# Patient Record
Sex: FEMALE | Race: WHITE | NOT HISPANIC OR LATINO | Employment: OTHER | URBAN - METROPOLITAN AREA
[De-identification: names, ages, dates, MRNs, and addresses within clinical notes are randomized per-mention and may not be internally consistent; named-entity substitution may affect disease eponyms.]

---

## 2017-03-13 ENCOUNTER — HOSPITAL ENCOUNTER (OUTPATIENT)
Dept: CARDIOLOGY | Facility: HOSPITAL | Age: 77
Discharge: HOME | End: 2017-03-13
Attending: INTERNAL MEDICINE | Admitting: INTERNAL MEDICINE

## 2017-03-13 ENCOUNTER — HOSPITAL ENCOUNTER (OUTPATIENT)
Dept: LAB | Facility: HOSPITAL | Age: 77
Discharge: HOME | End: 2017-03-13
Attending: INTERNAL MEDICINE | Admitting: INTERNAL MEDICINE

## 2017-03-13 LAB
ALBUMIN SERPL-MCNC: 4.3 G/DL (ref 3.5–4.8)
ALP SERPL-CCNC: 71 IU/L (ref 32–91)
ALT SERPL-CCNC: 16 IU/L (ref 14–54)
ANION GAP SERPL CALC-SCNC: 14.7 MMOL/L (ref 10–20)
AST SERPL-CCNC: 19 IU/L (ref 15–41)
BILIRUB DIRECT SERPL-MCNC: 0.1 MG/DL (ref 0.1–0.5)
BILIRUB SERPL-MCNC: 0.7 MG/DL (ref 0.3–1.2)
BUN SERPL-MCNC: 23 MG/DL (ref 8–20)
BUN/CREAT SERPL: 20.9 (ref 5.4–26.2)
CALCIUM SERPL-MCNC: 9.5 MG/DL (ref 8.9–10.3)
CHLORIDE SERPL-SCNC: 101 MMOL/L (ref 101–111)
CONV CO2: 26 MMOL/L (ref 22–32)
CONV TOTAL PROTEIN: 6.7 G/DL (ref 6.1–7.9)
CREAT UR-MCNC: 1.1 MG/DL (ref 0.4–1)
GLUCOSE SERPL-MCNC: 103 MG/DL (ref 65–99)
POTASSIUM SERPL-SCNC: 5.7 MMOL/L (ref 3.6–5.1)
SODIUM SERPL-SCNC: 136 MMOL/L (ref 136–144)
TSH SERPL-ACNC: 3.25 UIU/ML (ref 0.34–5.6)

## 2017-04-03 ENCOUNTER — HOSPITAL ENCOUNTER (OUTPATIENT)
Dept: OTHER | Facility: HOSPITAL | Age: 77
Discharge: HOME | End: 2017-04-03
Attending: FAMILY MEDICINE | Admitting: FAMILY MEDICINE

## 2017-04-03 LAB
ALBUMIN SERPL-MCNC: 3.9 G/DL (ref 3.5–4.8)
ALBUMIN/GLOB SERPL: 1.9 {RATIO} (ref 1–1.7)
ALP SERPL-CCNC: 65 IU/L (ref 32–91)
ALT SERPL-CCNC: 18 IU/L (ref 14–54)
ANION GAP SERPL CALC-SCNC: 15 MMOL/L (ref 10–20)
AST SERPL-CCNC: 21 IU/L (ref 15–41)
BILIRUB SERPL-MCNC: 0.6 MG/DL (ref 0.3–1.2)
BUN SERPL-MCNC: 22 MG/DL (ref 8–20)
BUN/CREAT SERPL: 24.4 (ref 5.4–26.2)
CALCIUM SERPL-MCNC: 9.3 MG/DL (ref 8.9–10.3)
CHLORIDE SERPL-SCNC: 101 MMOL/L (ref 101–111)
CHOLEST SERPL-MCNC: 194 MG/DL
CHOLEST/HDLC SERPL: 3.9 {RATIO}
CONV CO2: 26 MMOL/L (ref 22–32)
CONV LDL CHOLESTEROL DIRECT: 115 MG/DL (ref 0–100)
CONV TOTAL PROTEIN: 6 G/DL (ref 6.1–7.9)
CREAT UR-MCNC: 0.9 MG/DL (ref 0.4–1)
GLOBULIN UR ELPH-MCNC: 2.1 G/DL (ref 2.5–3.8)
GLUCOSE SERPL-MCNC: 99 MG/DL (ref 65–99)
HDLC SERPL-MCNC: 50 MG/DL
LDLC/HDLC SERPL: 2.3 {RATIO}
LIPID INTERPRETATION: ABNORMAL
POTASSIUM SERPL-SCNC: 5 MMOL/L (ref 3.6–5.1)
SODIUM SERPL-SCNC: 137 MMOL/L (ref 136–144)
TRIGL SERPL-MCNC: 131 MG/DL
VLDLC SERPL CALC-MCNC: 28.9 MG/DL

## 2017-04-09 LAB
ALPRAZ SERPL-MCNC: NORMAL NG/ML
AMOBARBITAL SERPL-MCNC: NORMAL UG/ML
AMPHETAMINES SERPL QL SCN: NEGATIVE
AMPHETAMINES SPEC QL: NORMAL
AMPHETAMINES UR QL: NORMAL
BARBITURATES SERPLBLD QL: NEGATIVE
BENZODIAZ SERPL SCN-MCNC: NEGATIVE NG/ML
BUTABARBITAL SERPL-MCNC: NORMAL UG/ML
BUTALBITAL SERPL-MCNC: NORMAL UG/ML
BZE SERPL-MCNC: NORMAL NG/ML
BZE UR QL: NEGATIVE
CANNABINOIDS SERPL-MCNC: NEGATIVE NG/ML
CARBOXYTHC SPEC-MCNC: NORMAL NG/ML
COCAETHYLENE SERPL-MCNC: NORMAL MG/ML
COCAINE SPEC-MCNC: NORMAL NG/ML
CODEINE UR QL: NORMAL
DESALKYLFLURAZ BLD CFM-MCNC: NORMAL NG/ML
EME SERPL-MCNC: NORMAL NG/ML
HYDROCODONE SERPL-MCNC: NORMAL NG/ML
HYDROMORPHONE SERPL-MCNC: NORMAL NG/ML
LORAZEPAM SPEC-MCNC: NORMAL NG/ML
MDA SERPLBLD-MCNC: NORMAL NG/ML
MDMA SERPLBLD-MCNC: NORMAL NG/ML
METHADONE BLD QL SCN: NEGATIVE
METHADONE SPEC QL: NORMAL
MORPHINE SERPLBLD-MCNC: NORMAL NG/ML
NORDIAZEPAM SERPL-MCNC: NORMAL NG/ML
NORPROPOXYPH SPEC-MCNC: NORMAL UG/ML
OPIATES UR QL SCN: NEGATIVE
OXAZEPAM SPEC-MCNC: NORMAL UG/ML
OXYCODONE SERPL-MCNC: NORMAL NG/ML
PCP SPEC QL: NORMAL
PCP SPEC-MCNC: NEGATIVE NG/ML
PENTOBARB SERPL-MCNC: NORMAL UG/ML
PHENOBARB SERPL-MCNC: NORMAL UG/ML
PROPOXYPHENE SCREEN, SER/BLD: NEGATIVE
SECOBARBITAL UR QL: NORMAL
THC CONFIRM: NORMAL

## 2017-06-19 ENCOUNTER — HOSPITAL ENCOUNTER (OUTPATIENT)
Dept: PAIN MEDICINE | Facility: HOSPITAL | Age: 77
Discharge: HOME | End: 2017-06-19
Attending: PHYSICAL MEDICINE & REHABILITATION | Admitting: PHYSICAL MEDICINE & REHABILITATION

## 2017-11-15 ENCOUNTER — HOSPITAL ENCOUNTER (OUTPATIENT)
Dept: CARDIOLOGY | Facility: HOSPITAL | Age: 77
Discharge: HOME | End: 2017-11-15
Attending: INTERNAL MEDICINE | Admitting: INTERNAL MEDICINE

## 2019-06-11 ENCOUNTER — CONVERSION ENCOUNTER (OUTPATIENT)
Dept: ORTHOPEDIC SURGERY | Facility: CLINIC | Age: 79
End: 2019-06-11

## 2019-06-12 VITALS
DIASTOLIC BLOOD PRESSURE: 72 MMHG | HEART RATE: 59 BPM | SYSTOLIC BLOOD PRESSURE: 135 MMHG | HEIGHT: 63 IN | WEIGHT: 179 LBS | BODY MASS INDEX: 31.71 KG/M2

## 2019-06-13 NOTE — PROGRESS NOTES
Visit Type:  Follow-up    CC:  Right knee injection.      Vital Signs:    Patient Profile:    79 Years Old Female  Height:     63 inches  Weight:     179 pounds  BMI:        31.70     Pulse rate: 59 / minute  BP Sittin / 72  (left arm)    Cuff size:  regular      Problems: Active problems were reviewed with the patient during this visit.  Medications: Medications were reviewed with the patient during this visit.  Allergies: Allergies were reviewed with the patient during this visit.  No Known Drug Allergy.        Vitals Entered By: [facility] (2019 1:55 PM)    Active Medications (reviewed today):  MAGNESIUM 500 MG ORAL CAPSULE (MAGNESIUM OXIDE) Take 1 tablet by mouth daily as needed  OXYCODONE-ACETAMINOPHEN  MG ORAL TABLET (OXYCODONE-ACETAMINOPHEN) take every 6 hours prn  LISINOPRIL 10 MG ORAL TABLET (LISINOPRIL) Take 1/2 tablet by mouth daily  FLUTICASONE PROPIONATE 50 MCG/ACT NASAL SUSPENSION (FLUTICASONE PROPIONATE) Inhale 1 spray once daily  DICLOFENAC SODIUM  MG ORAL TABLET EXTENDED RELEASE 24 HOUR (DICLOFENAC SODIUM) Take one (1) tablet by mouth twice a day  METAMUCIL 0.52 GM ORAL CAPSULE (PSYLLIUM) As directed  COMBIVENT RESPIMAT  MCG/ACT INHALATION AEROSOL SOLUTION (IPRATROPIUM-ALBUTEROL) Instll 1 spray every morning  BUMETANIDE 0.5 MG ORAL TABLET (BUMETANIDE) Take 1 tablet by mouth daily  NEXIUM 40 MG ORAL CAPSULE DELAYED RELEASE (ESOMEPRAZOLE MAGNESIUM) Take 1 tablet by mouth daily  ASPIRIN 325 MG ORAL TABLET (ASPIRIN) Take 1 tablet by mouth daily  SERTRALINE HCL 50 MG ORAL TABLET (SERTRALINE HCL) Take 1 tablet by mouth daily at bedtime    Current Allergies (reviewed today):  * NKDA (Critical)    Current Medications (including medications started today):   MAGNESIUM 500 MG ORAL CAPSULE (MAGNESIUM OXIDE) Take 1 tablet by mouth daily as needed  OXYCODONE-ACETAMINOPHEN  MG ORAL TABLET (OXYCODONE-ACETAMINOPHEN) take every 6 hours prn  LISINOPRIL 10 MG ORAL TABLET  (LISINOPRIL) Take 1/2 tablet by mouth daily  FLUTICASONE PROPIONATE 50 MCG/ACT NASAL SUSPENSION (FLUTICASONE PROPIONATE) Inhale 1 spray once daily  DICLOFENAC SODIUM  MG ORAL TABLET EXTENDED RELEASE 24 HOUR (DICLOFENAC SODIUM) Take one (1) tablet by mouth twice a day  METAMUCIL 0.52 GM ORAL CAPSULE (PSYLLIUM) As directed  COMBIVENT RESPIMAT  MCG/ACT INHALATION AEROSOL SOLUTION (IPRATROPIUM-ALBUTEROL) Instll 1 spray every morning  BUMETANIDE 0.5 MG ORAL TABLET (BUMETANIDE) Take 1 tablet by mouth daily  NEXIUM 40 MG ORAL CAPSULE DELAYED RELEASE (ESOMEPRAZOLE MAGNESIUM) Take 1 tablet by mouth daily  ASPIRIN 325 MG ORAL TABLET (ASPIRIN) Take 1 tablet by mouth daily  SERTRALINE HCL 50 MG ORAL TABLET (SERTRALINE HCL) Take 1 tablet by mouth daily at bedtime      Past Medical History:     Reviewed history from 06/05/2018 and no changes required:        CHF        Hyperlipidemia        Hypertension        COPD        S/P Pacemaker        Hx of Hypertrophic Cardiomyopathy        Right knee DJD        No Drug Allergies?                              Past Surgical History:     Reviewed history from 03/09/2018 and no changes required:        Corpectomy C6, Arthrodesis C3-4, 5-6, 6-7 2/4/14        Open Heart Surgery 2013        Hypercardiomyoperathy        Breast Surgery: Implants        Gallbladder        TLIF L4-L5 posterior decompression L3-L4 L5-S1    6/4/14        Pacemaker: Dual Chamber  (03/16/2017)        Heart Catherization (12/11/2017)    Family History Summary:      Reviewed history Last on 04/17/2019 and no changes required:06/11/2019  Other Family Member - Has Family History of Heart Disease - Entered On: 3/30/2016  Other Family Member - Has FH Other Cancer - Entered On: 3/24/2016  Other Family Member - Has FH Hypertension - Entered On: 3/24/2016  Other Family Member - Has FH Heart Disease - Entered On: 3/24/2016  Other Family Member - Has FH Diabetes - Entered On: 3/24/2016    General Comments - FH:  FH  Diabetes  FH Heart Disease  FH Hypertension  FH Stroke  FH Other Cancer      Social History:     Reviewed history from 04/17/2019 and no changes required:        Patient is a former smoker.        Passive Smoke: N        Alcohol Use: N        Drug Use: N        HIV/High Risk: N        Regular Exercise: N                  Blood Pressure:  Today's BP: 135/72 mm Hg          Impression & Recommendations:    Problem # 1:  DJD of right knee (ICD-715.16) ([id number])    Orders:  Arthrocentesis Major  (20610)  Kenalog 40mg (CPT-)      ORTHO FOLLOW-UP VISIT     CHIEF COMPLAINT:    Domonique See presents for a right knee steriod injection. Her last injection was on 06/05/2018 and significantly reduced her pain.    REVIEW OF SYSTEMS    Gen -no fever, chills , sweats, headache  Eyes - no irritation or discharge  ENT -  no ear pain , runny nose , sore throat , difficulty swallowing  Resp - no cough , congestion , excessive expectoration  CVS - no chest pain , palpitations.  Abd - no pain , nausea , vomiting , diarrhea  Skin - no rash , lesions.  Neuro - no dizziness      PHYSICAL EXAMINATION:    Alert, oriented, overweight individual in no acute distress, walking unassisted  Right lower extremity shows no erythema, rashes, or open skin lesions. There is a mild amount of swelling. It is grossly well aligned, and the patient is neurovascularly intact distally. The knee is stable to varus and valgus stress, there is no patellar   maltracking or crepitus noted, and plantar and dorsiflexion is 5/5. There is mild tenderness to palpation and with range of motion.    ASSESSMENT:    Right knee DJD   overweight    PLAN:     intraarticular steroid injection today, weight loss, and follow up as needed.    PROCEDURE:    After consent was obtained and a time out was properly performed, the right knee was prepped with alcohol and chlorhexidine. Sterile technique was used, along with a 25 gauge needle, to inject 4 cc of 1% lidocaine  and 1 cc of 40 mg/mL kenalog into the   knee. The patient tolerated the procedure well.            Patient Instructions:  1)  Please schedule a follow-up appointment as needed.  2)  The patient was encouraged to lose weight for better health.  3)  Limit activity to comfort and avoid activities that increase discomfort.  Apply moist heat and/or ice to knee and take medication as instructed for pain relief.    ]      Electronically signed by Araceli WALLACE on 06/11/2019 at 2:14 PM  ________________________________________________________________________       Disclaimer: Converted Note message may not contain all data elements that existed in the [employer] source system. Please see [employer] for the original note details.

## 2019-07-19 ENCOUNTER — CLINICAL SUPPORT NO REQUIREMENTS (OUTPATIENT)
Dept: CARDIOLOGY | Facility: CLINIC | Age: 79
End: 2019-07-19

## 2019-07-19 ENCOUNTER — HOSPITAL ENCOUNTER (OUTPATIENT)
Dept: INFUSION THERAPY | Facility: HOSPITAL | Age: 79
Discharge: HOME | End: 2019-07-19
Admitting: NURSE PRACTITIONER

## 2019-07-19 VITALS
HEIGHT: 62 IN | SYSTOLIC BLOOD PRESSURE: 102 MMHG | RESPIRATION RATE: 12 BRPM | OXYGEN SATURATION: 98 % | DIASTOLIC BLOOD PRESSURE: 44 MMHG | BODY MASS INDEX: 33.13 KG/M2 | HEART RATE: 58 BPM | TEMPERATURE: 97.9 F | WEIGHT: 180 LBS

## 2019-07-19 DIAGNOSIS — R00.1 BRADYCARDIA: ICD-10-CM

## 2019-07-19 DIAGNOSIS — D50.9 IRON DEFICIENCY ANEMIA, UNSPECIFIED IRON DEFICIENCY ANEMIA TYPE: Primary | ICD-10-CM

## 2019-07-19 DIAGNOSIS — Z95.0 PACEMAKER: Primary | ICD-10-CM

## 2019-07-19 PROBLEM — D64.9 ANEMIA, UNSPECIFIED: Status: ACTIVE | Noted: 2019-07-19

## 2019-07-19 PROCEDURE — 93294 REM INTERROG EVL PM/LDLS PM: CPT | Performed by: INTERNAL MEDICINE

## 2019-07-19 PROCEDURE — 36415 COLL VENOUS BLD VENIPUNCTURE: CPT

## 2019-07-19 PROCEDURE — 96365 THER/PROPH/DIAG IV INF INIT: CPT

## 2019-07-19 PROCEDURE — 93296 REM INTERROG EVL PM/IDS: CPT | Performed by: INTERNAL MEDICINE

## 2019-07-19 PROCEDURE — 25010000002 FERRIC CARBOXYMALTOSE 750 MG/15ML SOLUTION 15 ML VIAL: Performed by: NURSE PRACTITIONER

## 2019-07-19 RX ORDER — DIPHENHYDRAMINE HYDROCHLORIDE 50 MG/ML
50 INJECTION INTRAMUSCULAR; INTRAVENOUS AS NEEDED
Status: CANCELLED | OUTPATIENT
Start: 2019-07-19

## 2019-07-19 RX ORDER — LISINOPRIL 10 MG/1
TABLET ORAL EVERY 24 HOURS
COMMUNITY
Start: 2017-03-31 | End: 2019-10-30 | Stop reason: DRUGHIGH

## 2019-07-19 RX ORDER — DIPHENHYDRAMINE HYDROCHLORIDE 50 MG/ML
50 INJECTION INTRAMUSCULAR; INTRAVENOUS AS NEEDED
Status: DISCONTINUED | OUTPATIENT
Start: 2019-07-19 | End: 2019-07-21 | Stop reason: HOSPADM

## 2019-07-19 RX ORDER — FLUTICASONE PROPIONATE 50 MCG
1 SPRAY, SUSPENSION (ML) NASAL DAILY PRN
COMMUNITY
Start: 2016-04-22 | End: 2023-01-18

## 2019-07-19 RX ORDER — GUAIFENESIN 1200 MG/1
TABLET, EXTENDED RELEASE ORAL
Refills: 6 | COMMUNITY
Start: 2019-07-10 | End: 2021-06-28

## 2019-07-19 RX ORDER — BUMETANIDE 0.5 MG/1
0.5 TABLET ORAL DAILY
COMMUNITY
Start: 2019-06-03 | End: 2023-01-23 | Stop reason: HOSPADM

## 2019-07-19 RX ORDER — ASPIRIN 325 MG
325 TABLET ORAL DAILY
COMMUNITY
Start: 2016-03-24 | End: 2022-11-22

## 2019-07-19 RX ORDER — FOLIC ACID 0.8 MG
TABLET ORAL
COMMUNITY
Start: 2017-11-10 | End: 2021-02-08

## 2019-07-19 RX ADMIN — FERRIC CARBOXYMALTOSE INJECTION 750 MG: 50 INJECTION, SOLUTION INTRAVENOUS at 17:20

## 2019-07-26 ENCOUNTER — HOSPITAL ENCOUNTER (OUTPATIENT)
Dept: INFUSION THERAPY | Facility: HOSPITAL | Age: 79
Discharge: HOME | End: 2019-07-26
Admitting: NURSE PRACTITIONER

## 2019-07-26 VITALS
RESPIRATION RATE: 14 BRPM | HEART RATE: 64 BPM | BODY MASS INDEX: 32.2 KG/M2 | OXYGEN SATURATION: 96 % | TEMPERATURE: 97.3 F | WEIGHT: 175 LBS | HEIGHT: 62 IN | DIASTOLIC BLOOD PRESSURE: 63 MMHG | SYSTOLIC BLOOD PRESSURE: 101 MMHG

## 2019-07-26 DIAGNOSIS — D50.9 IRON DEFICIENCY ANEMIA, UNSPECIFIED IRON DEFICIENCY ANEMIA TYPE: Primary | ICD-10-CM

## 2019-07-26 PROCEDURE — 36415 COLL VENOUS BLD VENIPUNCTURE: CPT

## 2019-07-26 PROCEDURE — 25010000002 FERRIC CARBOXYMALTOSE 750 MG/15ML SOLUTION 15 ML VIAL: Performed by: NURSE PRACTITIONER

## 2019-07-26 PROCEDURE — 96365 THER/PROPH/DIAG IV INF INIT: CPT

## 2019-07-26 RX ORDER — DIPHENHYDRAMINE HYDROCHLORIDE 50 MG/ML
50 INJECTION INTRAMUSCULAR; INTRAVENOUS AS NEEDED
OUTPATIENT
Start: 2019-07-26

## 2019-07-26 RX ADMIN — FERRIC CARBOXYMALTOSE INJECTION 750 MG: 50 INJECTION, SOLUTION INTRAVENOUS at 11:45

## 2019-10-18 ENCOUNTER — CLINICAL SUPPORT NO REQUIREMENTS (OUTPATIENT)
Dept: CARDIOLOGY | Facility: CLINIC | Age: 79
End: 2019-10-18

## 2019-10-18 DIAGNOSIS — R00.1 BRADYCARDIA: ICD-10-CM

## 2019-10-18 DIAGNOSIS — Z95.0 PACEMAKER: Primary | ICD-10-CM

## 2019-10-30 ENCOUNTER — CLINICAL SUPPORT NO REQUIREMENTS (OUTPATIENT)
Dept: CARDIOLOGY | Facility: CLINIC | Age: 79
End: 2019-10-30

## 2019-10-30 ENCOUNTER — OFFICE VISIT (OUTPATIENT)
Dept: CARDIOLOGY | Facility: CLINIC | Age: 79
End: 2019-10-30

## 2019-10-30 VITALS
DIASTOLIC BLOOD PRESSURE: 89 MMHG | HEART RATE: 60 BPM | HEIGHT: 62 IN | WEIGHT: 182 LBS | BODY MASS INDEX: 33.49 KG/M2 | SYSTOLIC BLOOD PRESSURE: 146 MMHG | OXYGEN SATURATION: 93 %

## 2019-10-30 DIAGNOSIS — I10 ESSENTIAL HYPERTENSION: ICD-10-CM

## 2019-10-30 DIAGNOSIS — Z95.0 PACEMAKER: Primary | ICD-10-CM

## 2019-10-30 DIAGNOSIS — I48.0 PAROXYSMAL ATRIAL FIBRILLATION (HCC): ICD-10-CM

## 2019-10-30 DIAGNOSIS — D50.9 IRON DEFICIENCY ANEMIA, UNSPECIFIED IRON DEFICIENCY ANEMIA TYPE: ICD-10-CM

## 2019-10-30 DIAGNOSIS — R00.1 BRADYCARDIA: ICD-10-CM

## 2019-10-30 DIAGNOSIS — E78.5 HYPERLIPIDEMIA, UNSPECIFIED HYPERLIPIDEMIA TYPE: ICD-10-CM

## 2019-10-30 PROCEDURE — 99213 OFFICE O/P EST LOW 20 MIN: CPT | Performed by: INTERNAL MEDICINE

## 2019-10-30 PROCEDURE — 93280 PM DEVICE PROGR EVAL DUAL: CPT | Performed by: INTERNAL MEDICINE

## 2019-10-30 RX ORDER — LISINOPRIL 5 MG/1
5 TABLET ORAL DAILY
COMMUNITY
Start: 2019-09-30 | End: 2022-06-09

## 2019-10-30 RX ORDER — OXYCODONE AND ACETAMINOPHEN 10; 325 MG/1; MG/1
1 TABLET ORAL EVERY 4 HOURS PRN
Refills: 0 | COMMUNITY
Start: 2019-10-26 | End: 2023-01-13

## 2020-01-30 ENCOUNTER — CLINICAL SUPPORT NO REQUIREMENTS (OUTPATIENT)
Dept: CARDIOLOGY | Facility: CLINIC | Age: 80
End: 2020-01-30

## 2020-01-30 DIAGNOSIS — R00.1 BRADYCARDIA: ICD-10-CM

## 2020-01-30 DIAGNOSIS — I48.0 PAROXYSMAL ATRIAL FIBRILLATION (HCC): ICD-10-CM

## 2020-01-30 DIAGNOSIS — Z95.0 PACEMAKER: Primary | ICD-10-CM

## 2020-01-30 PROCEDURE — 93294 REM INTERROG EVL PM/LDLS PM: CPT | Performed by: INTERNAL MEDICINE

## 2020-01-30 PROCEDURE — 93296 REM INTERROG EVL PM/IDS: CPT | Performed by: INTERNAL MEDICINE

## 2020-04-30 ENCOUNTER — CLINICAL SUPPORT NO REQUIREMENTS (OUTPATIENT)
Dept: CARDIOLOGY | Facility: CLINIC | Age: 80
End: 2020-04-30

## 2020-04-30 DIAGNOSIS — R00.1 BRADYCARDIA: ICD-10-CM

## 2020-04-30 DIAGNOSIS — Z95.0 PACEMAKER: Primary | ICD-10-CM

## 2020-04-30 DIAGNOSIS — I48.0 PAROXYSMAL ATRIAL FIBRILLATION (HCC): ICD-10-CM

## 2020-05-02 PROCEDURE — 93296 REM INTERROG EVL PM/IDS: CPT | Performed by: INTERNAL MEDICINE

## 2020-05-02 PROCEDURE — 93294 REM INTERROG EVL PM/LDLS PM: CPT | Performed by: INTERNAL MEDICINE

## 2020-08-05 ENCOUNTER — CLINICAL SUPPORT NO REQUIREMENTS (OUTPATIENT)
Dept: CARDIOLOGY | Facility: CLINIC | Age: 80
End: 2020-08-05

## 2020-08-05 ENCOUNTER — TELEPHONE (OUTPATIENT)
Dept: CARDIOLOGY | Facility: CLINIC | Age: 80
End: 2020-08-05

## 2020-08-05 DIAGNOSIS — Z95.0 PACEMAKER: Primary | ICD-10-CM

## 2020-08-05 DIAGNOSIS — R00.1 BRADYCARDIA: ICD-10-CM

## 2020-08-05 PROCEDURE — 93296 REM INTERROG EVL PM/IDS: CPT | Performed by: INTERNAL MEDICINE

## 2020-08-05 PROCEDURE — 93294 REM INTERROG EVL PM/LDLS PM: CPT | Performed by: INTERNAL MEDICINE

## 2020-08-05 NOTE — TELEPHONE ENCOUNTER
Please let the patient know there is no atrial fibrillation also can we decrease the high rate set to 150 beats?

## 2020-08-05 NOTE — TELEPHONE ENCOUNTER
PM report received, no A Fib noted. High rate set at 5 cycles at 175 before recording occurs. Please advise.

## 2020-08-05 NOTE — TELEPHONE ENCOUNTER
Patient called stating her PM box went off 6 days ago as well as today. She states she is having increase SOA denies CP or Jaw pain. Patient states her b/p has been 140/85 and HR is 104. She wanted to see what her device report shows.She is requesting a call from Rylie.   Call back number is [phone]

## 2020-08-06 NOTE — TELEPHONE ENCOUNTER
Spoke to patient, offered to see patient and reduce rates to 150 bpm. No A Fib noted. Patient concerned with SOA, advised her to see Dr Louis or PCP moore this. She is seeing PCP today.

## 2020-09-21 ENCOUNTER — OFFICE VISIT (OUTPATIENT)
Dept: ORTHOPEDIC SURGERY | Facility: CLINIC | Age: 80
End: 2020-09-21

## 2020-09-21 VITALS
HEIGHT: 60 IN | BODY MASS INDEX: 37.11 KG/M2 | DIASTOLIC BLOOD PRESSURE: 76 MMHG | SYSTOLIC BLOOD PRESSURE: 116 MMHG | HEART RATE: 106 BPM | WEIGHT: 189 LBS

## 2020-09-21 DIAGNOSIS — M17.11 PRIMARY OSTEOARTHRITIS OF RIGHT KNEE: Primary | ICD-10-CM

## 2020-09-21 DIAGNOSIS — E66.01 MORBID OBESITY (HCC): ICD-10-CM

## 2020-09-21 PROBLEM — I50.9 CONGESTIVE HEART FAILURE: Status: ACTIVE | Noted: 2020-09-21

## 2020-09-21 PROBLEM — I10 HYPERTENSION: Status: ACTIVE | Noted: 2020-09-21

## 2020-09-21 PROBLEM — I82.409 DEEP VEIN THROMBOSIS (DVT): Status: ACTIVE | Noted: 2017-11-20

## 2020-09-21 PROBLEM — I42.1 HYPERTROPHIC OBSTRUCTIVE CARDIOMYOPATHY: Status: ACTIVE | Noted: 2017-11-10

## 2020-09-21 PROBLEM — M79.7 FIBROMYOSITIS: Status: ACTIVE | Noted: 2017-06-19

## 2020-09-21 PROBLEM — E78.5 HYPERLIPIDEMIA: Status: ACTIVE | Noted: 2020-09-21

## 2020-09-21 PROBLEM — Z83.3 FAMILY HISTORY OF DIABETES MELLITUS: Status: ACTIVE | Noted: 2020-09-21

## 2020-09-21 PROBLEM — R94.30 ABNORMAL RESULT OF CARDIOVASCULAR FUNCTION STUDY: Status: ACTIVE | Noted: 2017-12-04

## 2020-09-21 PROCEDURE — 99213 OFFICE O/P EST LOW 20 MIN: CPT | Performed by: PHYSICIAN ASSISTANT

## 2020-09-21 PROCEDURE — 20610 DRAIN/INJ JOINT/BURSA W/O US: CPT | Performed by: PHYSICIAN ASSISTANT

## 2020-09-21 RX ORDER — LIDOCAINE HYDROCHLORIDE 10 MG/ML
4 INJECTION, SOLUTION INFILTRATION; PERINEURAL
Status: COMPLETED | OUTPATIENT
Start: 2020-09-21 | End: 2020-09-21

## 2020-09-21 RX ORDER — TRIAMCINOLONE ACETONIDE 40 MG/ML
40 INJECTION, SUSPENSION INTRA-ARTICULAR; INTRAMUSCULAR
Status: COMPLETED | OUTPATIENT
Start: 2020-09-21 | End: 2020-09-21

## 2020-09-21 RX ADMIN — TRIAMCINOLONE ACETONIDE 40 MG: 40 INJECTION, SUSPENSION INTRA-ARTICULAR; INTRAMUSCULAR at 12:28

## 2020-09-21 RX ADMIN — LIDOCAINE HYDROCHLORIDE 4 ML: 10 INJECTION, SOLUTION INFILTRATION; PERINEURAL at 12:28

## 2020-09-21 NOTE — PROGRESS NOTES
ORTHO FOLLOW UP       Subjective:    HPI:   Domonique See is a 80 y.o. female who presents for right knee steroid injection for her known history of right knee DJD.  Her last injection was on 6/11/2019 and helped reduce her pain.  She has not had imaging in quite some time.      Past Medical History:   Diagnosis Date   • Anemia    • CHF (congestive heart failure) (CMS/HCC)    • COPD (chronic obstructive pulmonary disease) (CMS/HCC)    • GERD (gastroesophageal reflux disease)    • Hx of hypertrophic cardiomyopathy    • Hyperlipidemia    • Hypertension    • Peptic ulceration    • Primary osteoarthritis of right knee 9/21/2020       Past Surgical History:   Procedure Laterality Date   • BACK SURGERY     • BREAST AUGMENTATION     • CARDIAC PACEMAKER PLACEMENT  03/16/2017    Dual Chamber   • CHOLECYSTECTOMY     • COLONOSCOPY     • CORONARY ARTERY BYPASS GRAFT  2013   • THORACIC DECOMPRESSION POSTERIOR FUSION  06/04/2014    L3-5 & S1   • UPPER GASTROINTESTINAL ENDOSCOPY         Social History     Occupational History   • Not on file   Tobacco Use   • Smoking status: Former Smoker   • Smokeless tobacco: Never Used   Substance and Sexual Activity   • Alcohol use: No     Frequency: Never   • Drug use: No   • Sexual activity: Defer      The following portions of the patient's history were reviewed and updated as appropriate: allergies, current medications, past family history, past medical history, past social history, past surgical history and problem list.    Medications:    Current Outpatient Medications:   •  aspirin 325 MG tablet, Daily., Disp: , Rfl:   •  bumetanide (BUMEX) 0.5 MG tablet, , Disp: , Rfl:   •  diclofenac sodium (VOTAREN XR) 100 MG 24 hr tablet, DICLOFENAC SODIUM  MG XR24H-TAB, Disp: , Rfl:   •  fluticasone (FLONASE) 50 MCG/ACT nasal spray, FLUTICASONE PROPIONATE 50 MCG/ACT SUSP, Disp: , Rfl:   •  ipratropium-albuterol (COMBIVENT RESPIMAT)  MCG/ACT inhaler, COMBIVENT RESPIMAT   "MCG/ACT AERS, Disp: , Rfl:   •  lisinopril (PRINIVIL,ZESTRIL) 5 MG tablet, , Disp: , Rfl:   •  Magnesium 500 MG capsule, MAGNESIUM 500 MG CAPS, Disp: , Rfl:   •  MUCINEX MAXIMUM STRENGTH 1200 MG tablet sustained-release 12 hour, TK 2 TS PO BID, Disp: , Rfl: 6  •  NEXIUM 40 MG capsule, , Disp: , Rfl:   •  oxyCODONE-acetaminophen (PERCOCET)  MG per tablet, TK 1 T PO  Q 4 H PRN, Disp: , Rfl: 0  •  psyllium (METAMUCIL) 0.52 g capsule, METAMUCIL 0.52 GM CAPS, Disp: , Rfl:   •  sertraline (ZOLOFT) 50 MG tablet, , Disp: , Rfl:     Allergies:  No Known Allergies    Review of Systems:  Gen -no fever, chills , sweats, headache   Eyes - no irritation or discharge   ENT -  no ear pain , runny nose , sore throat , difficulty swallowing   Resp - no cough , congestion , excessive expectoration   CVS - no chest pain , palpitations.   Abd - no pain , nausea , vomiting , diarrhea   Skin - no rash , lesions.   Neuro - no dizziness    Please see HPI for any other pertinent positives.  All other systems were reviewed and are negative.       Objective   Objective:    /76 (BP Location: Right arm, Patient Position: Sitting, Cuff Size: Large Adult)   Pulse 106   Ht 152.4 cm (60\")   Wt 85.7 kg (189 lb)   BMI 36.91 kg/m²     Physical Examination:  Alert, oriented, obese individual in no acute distress, walking unassisted  Right lower extremity shows no erythema, rashes, or open skin lesions. There is a mild amount of swelling. It is grossly well aligned, and the patient is neurovascularly intact distally. The knee is stable to varus and valgus stress, there is no patellar maltracking or crepitus noted, and plantar and dorsiflexion is 5/5. There is mild tenderness to palpation and with range of motion, which is decreased due to pain.         Imaging:  xrays obtained today   right Knee X-Ray  Indication: Right knee pain  AP, Lateral, Gillis views  Findings:Shows moderate to severe tricompartmental DJD, worse in the medial and " patellofemoral compartment, There is subchondral sclerosis, subchondral cysts, and osteophytosis present. and No fractures or dislocations are appreciated  decreased joint spaces  Hardware appropriately positioned not applicable    yes prior studies available for comparison.    This patient's x-ray report was graded according to the Kellgren and Brian classification.  This took into account the joint space narrowing, osteophyte formation, sclerosis of the distal femur/proximal tibia along with deformity of those bones.  The findings were indicative of K L grade 4.    X-RAY was ordered and reviewed by MADISON Coleman            Assessment:  1. Primary osteoarthritis of right knee    2. Morbid obesity (CMS/[facility])                 Plan:  We will continue with conservative treatment options at this time, as the patient is not interested in surgery.  Intra-articular steroid injection today.  Risks and benefits were discussed and postinjection instructions were given.  Weight loss is highly recommended.  She will be fitted for a brace today.  We did discuss future use with Visco supplementation.  She will continue her diclofenac.  She may follow-up as needed.  Natural history and expected course discussed. Questions answered.  Educational materials distributed.  Rest, ice, compression, and elevation (RICE) therapy.  Quad strengthening exercises.  OTC analgesics as needed.  Arthrocentesis. See procedure note.  cortisone injections  viscosupplementation  physical therapy  bracing  weight loss  activtiy modification  assistive devices  Large Joint Arthrocentesis: R knee  Date/Time: 9/21/2020 12:28 PM  Consent given by: patient  Timeout: Immediately prior to procedure a time out was called to verify the correct patient, procedure, equipment, support staff and site/side marked as required   Supporting Documentation  Indications: pain   Procedure Details  Location: knee - R knee  Preparation: Patient was prepped and draped  in the usual sterile fashion  Needle size: 25 G  Approach: anterolateral  Medications administered: 4 mL lidocaine 1 %; 40 mg triamcinolone acetonide 40 MG/ML  Patient tolerance: patient tolerated the procedure well with no immediate complications      After consent was obtained and a time out was properly performed, the right knee was prepped with alcohol and chlorhexidine. Sterile technique was used, along with a 25 gauge needle, to inject 4 cc of 1% lidocaine and 1 cc of 40 mg/mL kenalog into the knee. The patient tolerated the procedure well.     Advance Care Planning   ACP discussion was held with the patient during this visit. Patient does not have an advance directive, information provided.             MADISON Coleman  09/21/20  12:24 EDT

## 2020-09-21 NOTE — PATIENT INSTRUCTIONS
Preventing Health Risks of Being Overweight  Maintaining a healthy body weight is an important part of your overall health. Your healthy body weight depends on your age, gender, and height. Being overweight puts you at risk for many health problems, including:  · Heart disease.  · Diabetes.  · Problems sleeping.  · Joint problems.  You can make changes to your diet and lifestyle to prevent these risks. Consider working with a health care provider or a dietitian to make these changes.  What nutrition changes can be made?    · Eat only as much as your body needs. In most cases, this is about 2,000 calories a day, but the amount varies depending on your height, gender, and activity level. Ask your health care provider how many calories you should have each day. Eating more than your body needs on a regular basis can cause you to become overweight or obese.  · Eat slowly, and stop eating when you feel full.  · Choose healthy foods, including:  ? Fruits and vegetables.  ? Lean meats.  ? Low-fat dairy products.  ? High-fiber foods, such as whole grains and beans.  ? Healthy snacks like vegetable sticks, a piece of fruit, or a small amount of yogurt or cheese.  · Avoid foods and drinks that are high in sugar, salt (sodium), saturated fat, or trans fat. This includes:  ? Many desserts such as candy, cookies, and ice cream.  ? Soda.  ? Fried foods.  ? Processed meats such as hot dogs or lunch meats.  ? Prepackaged snack foods.  What lifestyle changes can be made?    · Exercise for at least 150 minutes a week to prevent weight gain, or as often as recommended by your health care provider. Do moderate-intensity exercise, such as brisk walking.  ? Spread it out by exercising for 30 minutes 5 days a week, or in short 10-minute bursts several times a day.  · Find other ways to stay active and burn calories, such as yard work or a hobby that involves physical activity.  · Get at least 8 hours of sleep each night. When you are  well-rested, you are more likely to be active and make healthy choices during the day. To sleep better:  ? Try to go to bed and wake up at about the same time every day.  ? Keep your bedroom dark, quiet, and cool.  ? Make sure that your bed is comfortable.  ? Avoid stimulating activities, such as watching television or exercising, for at least one hour before bedtime.  Why are these changes important?  Eating healthy and being active helps you lose weight and prevent health problems caused by being overweight. Making these changes can also help you manage stress, feel better mentally, and connect with friends and family.  What can happen if changes are not made?  Being overweight can affect you for your entire life. You may develop joint or bone problems that make it painful or difficult for you to play sports or do activities you enjoy. Being overweight puts stress on your heart and lungs and can lead to medical problems like diabetes, heart disease, and sleeping problems.  Where to find support  You can get support for preventing health risks of being overweight from:  · Your health care provider or a dietitian. They can provide guidance about healthy eating and healthy lifestyle choices.  · Weight loss support groups, online or in-person.  Where to find more information  · MyPlate: www.choosemyplate.gov  ? This an online tool that provides personalized recommendations about foods to eat each day.  · The Centers for Disease Control and Prevention: www.cdc.gov/healthyweight  ? This resource gives tips for managing weight and having an active lifestyle.  Summary  · To prevent unhealthy weight gain, it is important to maintain a healthy diet high in vegetables and whole grains, exercise regularly, and get at least 8 hours of sleep each night.  · Making these changes helps prevent many long-term (chronic) health conditions that can shorten your life, such as diabetes, heart disease, and stroke.  This information is  not intended to replace advice given to you by your health care provider. Make sure you discuss any questions you have with your health care provider.  Document Released: 11/14/2018 Document Revised: 12/21/2018 Document Reviewed: 11/14/2018  Elsefor; to (do) Patient Education © 2020 [employer]      Knee Injection  A knee injection is a procedure to get medicine into your knee joint to relieve the pain, swelling, and stiffness of arthritis. Your health care provider uses a needle to inject medicine, which may also help to lubricate and cushion your knee joint. You may need more than one injection.  Tell a health care provider about:  · Any allergies you have.  · All medicines you are taking, including vitamins, herbs, eye drops, creams, and over-the-counter medicines.  · Any problems you or family members have had with anesthetic medicines.  · Any blood disorders you have.  · Any surgeries you have had.  · Any medical conditions you have.  · Whether you are pregnant or may be pregnant.  What are the risks?  Generally, this is a safe procedure. However, problems may occur, including:  · Infection.  · Bleeding.  · Symptoms that get worse.  · Damage to the area around your knee.  · Allergic reaction to any of the medicines.  · Skin reactions from repeated injections.  What happens before the procedure?  · Ask your health care provider about changing or stopping your regular medicines. This is especially important if you are taking diabetes medicines or blood thinners.  · Plan to have someone take you home from the hospital or clinic.  What happens during the procedure?    · You will sit or lie down in a position for your knee to be treated.  · The skin over your kneecap will be cleaned with a germ-killing soap.  · You will be given a medicine that numbs the area (local anesthetic). You may feel some stinging.  · The medicine will be injected into your knee. The needle is carefully placed between your kneecap and your knee.  The medicine is injected into the joint space.  · The needle will be removed at the end of the procedure.  · A bandage (dressing) may be placed over the injection site.  The procedure may vary among health care providers and hospitals.  What can I expect after the procedure?  · Your blood pressure, heart rate, breathing rate, and blood oxygen level will be monitored until you leave the hospital or clinic.  · You may have to move your knee through its full range of motion. This helps to get all the medicine into your joint space.  · You will be watched to make sure that you do not have a reaction to the injected medicine.  · You may feel more pain, swelling, and warmth than you did before the injection. This reaction may last about 1-2 days.  Follow these instructions at home:  Medicines  · Take over-the-counter and prescription medicines only as told by your doctor.  · Do not drive or use heavy machinery while taking prescription pain medicine.  · Do not take medicines such as aspirin and ibuprofen unless your health care provider tells you to take them.  Injection site care  · Follow instructions from your health care provider about:  ? How to take care of your puncture site.  ? When and how you should change your dressing.  ? When you should remove your dressing.  · Check your injection area every day for signs of infection. Check for:  ? More redness, swelling, or pain after 2 days.  ? Fluid or blood.  ? Pus or a bad smell.  ? Warmth.  Managing pain, stiffness, and swelling    · If directed, put ice on the injection area:  ? Put ice in a plastic bag.  ? Place a towel between your skin and the bag.  ? Leave the ice on for 20 minutes, 2-3 times per day.  · Do not apply heat to your knee.  · Raise (elevate) the injection area above the level of your heart while you are sitting or lying down.  General instructions  · If you were given a dressing, keep it dry until your health care provider says it can be removed. Ask  your health care provider when you can start showering or taking a bath.  · Avoid strenuous activities for as long as directed by your health care provider. Ask your health care provider when you can return to your normal activities.  · Keep all follow-up visits as told by your health care provider. This is important. You may need more injections.  Contact a health care provider if you have:  · A fever.  · Warmth in your injection area.  · Fluid, blood, or pus coming from your injection site.  · Symptoms at your injection site that last longer than 2 days after your procedure.  Get help right away if:  · Your knee:  ? Turns very red.  ? Becomes very swollen.  ? Is in severe pain.  Summary  · A knee injection is a procedure to get medicine into your knee joint to relieve the pain, swelling, and stiffness of arthritis.  · A needle is carefully placed between your kneecap and your knee to inject medicine into the joint space.  · Before the procedure, ask your health care provider about changing or stopping your regular medicines, especially if you are taking diabetes medicines or blood thinners.  · Contact your health care provider if you have any problems or questions after your procedure.  This information is not intended to replace advice given to you by your health care provider. Make sure you discuss any questions you have with your health care provider.  Document Released: 03/10/2008 Document Revised: 01/07/2019 Document Reviewed: 01/07/2019  Elsevier Patient Education © 2020 [employer]      Advance Care Planning and Advance Directives     You make decisions on a daily basis - decisions about where you want to live, your career, your home, your life. Perhaps one of the most important decisions you face is your choice for future medical care. Take time to talk with your family and your healthcare team and start planning today.  Advance Care Planning is a process that can help you:  · Understand possible future  healthcare decisions in light of your own experiences  · Reflect on those decision in light of your goals and values  · Discuss your decisions with those closest to you and the healthcare professionals that care for you  · Make a plan by creating a document that reflects your wishes    Surrogate Decision Maker  In the event of a medical emergency, which has left you unable to communicate or to make your own decisions, you would need someone to make decisions for you.  It is important to discuss your preferences for medical treatment with this person while you are in good health.     Qualities of a surrogate decision maker:  • Willing to take on this role and responsibility  • Knows what you want for future medical care  • Willing to follow your wishes even if they don't agree with them  • Able to make difficult medical decisions under stressful circumstances    Advance Directives  These are legal documents you can create that will guide your healthcare team and decision maker(s) when needed. These documents can be stored in the electronic medical record.    · Living Will - a legal document to guide your care if you have a terminal condition or a serious illness and are unable to communicate. States vary by statute in document names/types, but most forms may include one or more of the following:        -  Directions regarding life-prolonging treatments        -  Directions regarding artificially provided nutrition/hydration        -  Choosing a healthcare decision maker        -  Direction regarding organ/tissue donation    · Durable Power of  for Healthcare - this document names an -in-fact to make medical decisions for you, but it may also allow this person to make personal and financial decisions for you. Please seek the advice of an  if you need this type of document.    **Advance Directives are not required and no one may discriminate against you if you do not sign one.    Medical  Orders  Many states allow specific forms/orders signed by your physician to record your wishes for medical treatment in your current state of health. This form, signed in personal communication with your physician, addresses resuscitation and other medical interventions that you may or may not want.      For more information or to schedule a time with a [facility] Advance Care Planning Facilitator contact: [facility]/ACP or call [phone] and someone will contact you directly.

## 2020-09-28 ENCOUNTER — OFFICE VISIT (OUTPATIENT)
Dept: ORTHOPEDIC SURGERY | Facility: CLINIC | Age: 80
End: 2020-09-28

## 2020-09-28 VITALS
HEART RATE: 65 BPM | DIASTOLIC BLOOD PRESSURE: 80 MMHG | SYSTOLIC BLOOD PRESSURE: 145 MMHG | HEIGHT: 60 IN | WEIGHT: 189 LBS | BODY MASS INDEX: 37.11 KG/M2

## 2020-09-28 DIAGNOSIS — M54.12 CERVICAL RADICULITIS: ICD-10-CM

## 2020-09-28 DIAGNOSIS — M17.11 PRIMARY OSTEOARTHRITIS OF RIGHT KNEE: Primary | ICD-10-CM

## 2020-09-28 DIAGNOSIS — M25.511 ACUTE PAIN OF RIGHT SHOULDER: ICD-10-CM

## 2020-09-28 PROCEDURE — 99213 OFFICE O/P EST LOW 20 MIN: CPT | Performed by: ORTHOPAEDIC SURGERY

## 2020-09-28 RX ORDER — METHYLPREDNISOLONE 4 MG/1
TABLET ORAL
Qty: 21 TABLET | Refills: 0 | Status: SHIPPED | OUTPATIENT
Start: 2020-09-28 | End: 2021-02-08

## 2020-09-28 NOTE — PROGRESS NOTES
"     Patient ID: Domonique See is a 80 y.o. female.    Chief Complaint:    Chief Complaint   Patient presents with   • Right Shoulder - Consult       HPI:  Domonique is an 80-year-old female here with several weeks of right arm numbness and weakness.  She is a distant history of first rib resection for thoracic outlet syndrome as well as cervical spine surgery.  There is no recent injury.  He feels like the right arm is numb.  She has pain in the neck.  No treatment to date  Past Medical History:   Diagnosis Date   • Anemia    • CHF (congestive heart failure) (CMS/HCC)    • COPD (chronic obstructive pulmonary disease) (CMS/[facility])    • GERD (gastroesophageal reflux disease)    • Hx of hypertrophic cardiomyopathy    • Hyperlipidemia    • Hypertension    • Peptic ulceration    • Primary osteoarthritis of right knee 9/21/2020       Past Surgical History:   Procedure Laterality Date   • BACK SURGERY     • BREAST AUGMENTATION     • CARDIAC PACEMAKER PLACEMENT  03/16/2017    Dual Chamber   • CHOLECYSTECTOMY     • COLONOSCOPY     • CORONARY ARTERY BYPASS GRAFT  2013   • THORACIC DECOMPRESSION POSTERIOR FUSION  06/04/2014    L3-5 & S1   • UPPER GASTROINTESTINAL ENDOSCOPY         History reviewed. No pertinent family history.       Social History     Occupational History   • Not on file   Tobacco Use   • Smoking status: Former Smoker   • Smokeless tobacco: Never Used   Substance and Sexual Activity   • Alcohol use: No     Frequency: Never   • Drug use: No   • Sexual activity: Defer      Review of Systems   Cardiovascular: Negative for chest pain.   Musculoskeletal: Positive for arthralgias.       Objective:    /80   Pulse 65   Ht 152.4 cm (60\")   Wt 85.7 kg (189 lb)   BMI 36.91 kg/m²     Physical Examination:  She is a pleasant female in no distress. She is alert and oriented x3 and appears her stated age.  She has reduced cervical range of motion.  She has diffuse paraspinal tenderness at the base of the cervical " spine.  Shoulder itself demonstrates no scars and no atrophy.  Passive elevation 170 degrees abduction 130 degrees external rotation 40 degrees.  Speed, Garrard, supraspinatus are negative.  Belly press and liftoff are 4/5.Sensory and motor exam are intact all distributions. Radial pulse is palpable and capillary refill is less than two seconds to all digits    Imaging:  X-rays of the shoulder demonstrate well-maintained joint spaces    Assessment:  Right upper extremity radiculopathy    Plan:  I suspect her cervical spine is the origin of her symptoms.  I wrote for a Medrol pack.  If symptoms do not improve I discussed several names she could follow-up with to manage her neck issues.  See me as needed          Disclaimer: Please note that areas of this note were completed with computer voice recognition software.  Quite often unanticipated grammatical, syntax, homophones, and other interpretive errors are inadvertently transcribed by the computer software. Please excuse any errors that have escaped final proofreading.

## 2021-01-27 ENCOUNTER — TELEPHONE (OUTPATIENT)
Dept: ORTHOPEDIC SURGERY | Facility: CLINIC | Age: 81
End: 2021-01-27

## 2021-01-27 NOTE — TELEPHONE ENCOUNTER
Call placed to patient, advised that is okay but would need to do an eval and xrays on the other knee since we had not evaluated her for that side yet. Patient agreeable with this.

## 2021-01-27 NOTE — TELEPHONE ENCOUNTER
Caller: GEOFFREY GENAO  Relationship to Patient: SELF    Phone Number: [phone]  Reason for Call: PATIENT IS COMING IN FOR A KENELOG INJECTION IN HER RIGHT KNEE ON 02/08/21 PATIENT WAS WANTING TO SPEAK WITH SOMEONE ABOUT POSSIBLY GETTING ONE IN HER LEFT KNEE THE SAME DAY AS WELL

## 2021-02-08 ENCOUNTER — OFFICE VISIT (OUTPATIENT)
Dept: ORTHOPEDIC SURGERY | Facility: CLINIC | Age: 81
End: 2021-02-08

## 2021-02-08 VITALS
DIASTOLIC BLOOD PRESSURE: 71 MMHG | BODY MASS INDEX: 38.48 KG/M2 | HEART RATE: 69 BPM | WEIGHT: 196 LBS | SYSTOLIC BLOOD PRESSURE: 121 MMHG | HEIGHT: 60 IN

## 2021-02-08 DIAGNOSIS — M17.0 PRIMARY OSTEOARTHRITIS OF BOTH KNEES: Primary | ICD-10-CM

## 2021-02-08 DIAGNOSIS — E66.01 MORBID OBESITY (HCC): ICD-10-CM

## 2021-02-08 PROCEDURE — 20610 DRAIN/INJ JOINT/BURSA W/O US: CPT | Performed by: PHYSICIAN ASSISTANT

## 2021-02-08 PROCEDURE — 99213 OFFICE O/P EST LOW 20 MIN: CPT | Performed by: PHYSICIAN ASSISTANT

## 2021-02-08 RX ORDER — LIDOCAINE HYDROCHLORIDE 10 MG/ML
2 INJECTION, SOLUTION INFILTRATION; PERINEURAL
Status: COMPLETED | OUTPATIENT
Start: 2021-02-08 | End: 2021-02-08

## 2021-02-08 RX ORDER — TRIAMCINOLONE ACETONIDE 40 MG/ML
40 INJECTION, SUSPENSION INTRA-ARTICULAR; INTRAMUSCULAR
Status: COMPLETED | OUTPATIENT
Start: 2021-02-08 | End: 2021-02-08

## 2021-02-08 RX ORDER — SERTRALINE HYDROCHLORIDE 100 MG/1
100 TABLET, FILM COATED ORAL DAILY
COMMUNITY
Start: 2020-11-11

## 2021-02-08 RX ORDER — MULTIPLE VITAMINS W/ MINERALS TAB 9MG-400MCG
1 TAB ORAL DAILY
COMMUNITY

## 2021-02-08 RX ADMIN — TRIAMCINOLONE ACETONIDE 40 MG: 40 INJECTION, SUSPENSION INTRA-ARTICULAR; INTRAMUSCULAR at 16:14

## 2021-02-08 RX ADMIN — LIDOCAINE HYDROCHLORIDE 2 ML: 10 INJECTION, SOLUTION INFILTRATION; PERINEURAL at 16:14

## 2021-02-08 NOTE — PROGRESS NOTES
ORTHO FOLLOW UP       Subjective:    HPI:   Domonique See is a 81 y.o. female who presents for right knee steroid injection for known history of right knee DJD.  Her last injection was on 9/21/2020 and helped reduce her pain.  She also would like to be evaluated for her left knee, which has been bothering her for about 6 months.  She denies any injury.  She describes a dull, achy pain, mainly located on the anterior portion of the knee.  She denies any radiation, numbness, or tingling.  She does report mechanical symptoms such as locking and popping.  She does use anti-inflammatories, which does help some with the discomfort.  She denies any previous history of surgery or injections in the left knee.      Past Medical History:   Diagnosis Date   • Anemia    • CHF (congestive heart failure) (CMS/[facility])    • COPD (chronic obstructive pulmonary disease) (CMS/[facility])    • GERD (gastroesophageal reflux disease)    • Hx of hypertrophic cardiomyopathy    • Hyperlipidemia    • Hypertension    • Peptic ulceration    • Primary osteoarthritis of right knee 9/21/2020       Past Surgical History:   Procedure Laterality Date   • BACK SURGERY     • BREAST AUGMENTATION     • CARDIAC PACEMAKER PLACEMENT  03/16/2017    Dual Chamber   • CHOLECYSTECTOMY     • COLONOSCOPY     • CORONARY ARTERY BYPASS GRAFT  2013   • THORACIC DECOMPRESSION POSTERIOR FUSION  06/04/2014    L3-5 & S1   • UPPER GASTROINTESTINAL ENDOSCOPY         Social History     Occupational History   • Not on file   Tobacco Use   • Smoking status: Former Smoker   • Smokeless tobacco: Never Used   Substance and Sexual Activity   • Alcohol use: No     Frequency: Never   • Drug use: No   • Sexual activity: Defer      The following portions of the patient's history were reviewed and updated as appropriate: allergies, current medications, past family history, past medical history, past social history, past surgical history and problem list.    Medications:    Current  "Outpatient Medications:   •  aspirin 325 MG tablet, Daily., Disp: , Rfl:   •  bumetanide (BUMEX) 0.5 MG tablet, , Disp: , Rfl:   •  diclofenac sodium (VOTAREN XR) 100 MG 24 hr tablet, DICLOFENAC SODIUM  MG XR24H-TAB, Disp: , Rfl:   •  fluticasone (FLONASE) 50 MCG/ACT nasal spray, FLUTICASONE PROPIONATE 50 MCG/ACT SUSP, Disp: , Rfl:   •  ipratropium-albuterol (COMBIVENT RESPIMAT)  MCG/ACT inhaler, COMBIVENT RESPIMAT  MCG/ACT AERS, Disp: , Rfl:   •  lisinopril (PRINIVIL,ZESTRIL) 5 MG tablet, , Disp: , Rfl:   •  MUCINEX MAXIMUM STRENGTH 1200 MG tablet sustained-release 12 hour, TK 2 TS PO BID, Disp: , Rfl: 6  •  multivitamin with minerals (CENTRUM ADULTS PO), Take 1 tablet by mouth Daily., Disp: , Rfl:   •  NEXIUM 40 MG capsule, , Disp: , Rfl:   •  oxyCODONE-acetaminophen (PERCOCET)  MG per tablet, TK 1 T PO  Q 4 H PRN, Disp: , Rfl: 0  •  psyllium (METAMUCIL) 0.52 g capsule, METAMUCIL 0.52 GM CAPS, Disp: , Rfl:   •  sertraline (ZOLOFT) 100 MG tablet, , Disp: , Rfl:   •  sertraline (ZOLOFT) 50 MG tablet, Prn only per patient, Disp: , Rfl:   •  vitamin D3 125 MCG (5000 UT) capsule capsule, Take 5,000 Units by mouth Daily., Disp: , Rfl:     Allergies:  No Known Allergies    Review of Systems:  Gen -no fever, chills , sweats, headache   Eyes - no irritation or discharge   ENT -  no ear pain , runny nose , sore throat , difficulty swallowing   Resp - no cough , congestion , excessive expectoration   CVS - no chest pain , palpitations.   Abd - no pain , nausea , vomiting , diarrhea   Skin - no rash , lesions.   Neuro - no dizziness    Please see HPI for any other pertinent positives.  All other systems were reviewed and are negative.       Objective   Objective:    /71 (BP Location: Left arm, Patient Position: Sitting, Cuff Size: Large Adult)   Pulse 69   Ht 152.4 cm (60\")   Wt 88.9 kg (196 lb)   BMI 38.28 kg/m²     Physical Examination:  Alert, oriented, obese individual in no acute " distress, walking unassisted  Right lower extremity shows no erythema, rashes, or open skin lesions. There is a mild amount of swelling. It is grossly well aligned, and the patient is neurovascularly intact distally. The knee is stable to varus and valgus stress, there is no patellar maltracking or crepitus noted, and plantar and dorsiflexion is 5/5. There is mild tenderness to palpation and with range of motion, which is decreased due to pain.  Left lower extremity shows no erythema, rashes, or open skin lesions. There is a mild amount of swelling. It is grossly well aligned, and the patient is neurovascularly intact distally. The knee is stable to varus and valgus stress, there is no patellar maltracking or crepitus noted, and plantar and dorsiflexion is 5/5. There is mild tenderness to palpation around the patella and with range of motion, which is about 0-115.           Imaging:  xrays obtained today  left Knee X-Ray  Indication: Left knee pain  AP, Lateral, Timpson views  Findings:Shows mild to moderate tricompartmental DJD, worse in the patellofemoral compartment, No fractures or dislocations are appreciated and patellar sublaxation  subnormal joint spaces  Hardware appropriately positioned not applicable    no prior studies available for comparison.    This patient's x-ray report was graded according to the Kellgren and Brian classification.  This took into account the joint space narrowing, osteophyte formation, sclerosis of the distal femur/proximal tibia along with deformity of those bones.  The findings were indicative of K L grade 3.    X-RAY was ordered and reviewed by MADISON Coleman          Assessment:  1. Primary osteoarthritis of both knees    2. Morbid obesity (CMS/[facility])                 Plan:  We will continue conservative treatment options at this time.  Bilateral intra-articular steroid injections today.  Risks and benefits were discussed and postinjection instructions were given.  She  should continue her weight loss efforts as well as her anti-inflammatories.  I will plan to see her back in 3 months for recheck.  Natural history and expected course discussed. Questions answered.  Educational materials distributed.  Rest, ice, compression, and elevation (RICE) therapy.  Quad strengthening exercises.  NSAIDs per medication orders.  OTC analgesics as needed.  Arthrocentesis. See procedure note.  cortisone injections  viscosupplementation  physical therapy  bracing  weight loss  activtiy modification  assistive devices  Large Joint Arthrocentesis  Date/Time: 2/8/2021 4:14 PM  Consent given by: patient  Timeout: Immediately prior to procedure a time out was called to verify the correct patient, procedure, equipment, support staff and site/side marked as required   Supporting Documentation  Indications: pain   Procedure Details  Location: knee - Knee joint: Bilateral.  Preparation: Patient was prepped and draped in the usual sterile fashion  Needle size: 25 G  Approach: anterolateral  Medications administered: 2 mL lidocaine 1 %; 40 mg triamcinolone acetonide 40 MG/ML  Patient tolerance: patient tolerated the procedure well with no immediate complications      After consent was obtained and a time out was properly performed, the right knee was prepped with alcohol and chlorhexidine. Sterile technique was used, along with a 25 gauge needle, to inject 2 cc of 1% lidocaine and 1 cc of 40 mg/mL kenalog into the knee. The patient tolerated the procedure well.  After consent was obtained and a time out was properly performed, the left knee was prepped with alcohol and chlorhexidine. Sterile technique was used, along with a 25 gauge needle, to inject 2 cc of 1% lidocaine and 1 cc of 40 mg/mL kenalog into the knee. The patient tolerated the procedure well.               MADISON Coleman  02/08/21  16:11 EST

## 2021-02-08 NOTE — PATIENT INSTRUCTIONS
Knee Injection  A knee injection is a procedure to get medicine into your knee joint to relieve the pain, swelling, and stiffness of arthritis. Your health care provider uses a needle to inject medicine, which may also help to lubricate and cushion your knee joint. You may need more than one injection.  Tell a health care provider about:  · Any allergies you have.  · All medicines you are taking, including vitamins, herbs, eye drops, creams, and over-the-counter medicines.  · Any problems you or family members have had with anesthetic medicines.  · Any blood disorders you have.  · Any surgeries you have had.  · Any medical conditions you have.  · Whether you are pregnant or may be pregnant.  What are the risks?  Generally, this is a safe procedure. However, problems may occur, including:  · Infection.  · Bleeding.  · Symptoms that get worse.  · Damage to the area around your knee.  · Allergic reaction to any of the medicines.  · Skin reactions from repeated injections.  What happens before the procedure?  · Ask your health care provider about changing or stopping your regular medicines. This is especially important if you are taking diabetes medicines or blood thinners.  · Plan to have someone take you home from the hospital or clinic.  What happens during the procedure?    · You will sit or lie down in a position for your knee to be treated.  · The skin over your kneecap will be cleaned with a germ-killing soap.  · You will be given a medicine that numbs the area (local anesthetic). You may feel some stinging.  · The medicine will be injected into your knee. The needle is carefully placed between your kneecap and your knee. The medicine is injected into the joint space.  · The needle will be removed at the end of the procedure.  · A bandage (dressing) may be placed over the injection site.  The procedure may vary among health care providers and hospitals.  What can I expect after the procedure?  · Your blood  pressure, heart rate, breathing rate, and blood oxygen level will be monitored until you leave the hospital or clinic.  · You may have to move your knee through its full range of motion. This helps to get all the medicine into your joint space.  · You will be watched to make sure that you do not have a reaction to the injected medicine.  · You may feel more pain, swelling, and warmth than you did before the injection. This reaction may last about 1-2 days.  Follow these instructions at home:  Medicines  · Take over-the-counter and prescription medicines only as told by your doctor.  · Do not drive or use heavy machinery while taking prescription pain medicine.  · Do not take medicines such as aspirin and ibuprofen unless your health care provider tells you to take them.  Injection site care  · Follow instructions from your health care provider about:  ? How to take care of your puncture site.  ? When and how you should change your dressing.  ? When you should remove your dressing.  · Check your injection area every day for signs of infection. Check for:  ? More redness, swelling, or pain after 2 days.  ? Fluid or blood.  ? Pus or a bad smell.  ? Warmth.  Managing pain, stiffness, and swelling    · If directed, put ice on the injection area:  ? Put ice in a plastic bag.  ? Place a towel between your skin and the bag.  ? Leave the ice on for 20 minutes, 2-3 times per day.  · Do not apply heat to your knee.  · Raise (elevate) the injection area above the level of your heart while you are sitting or lying down.  General instructions  · If you were given a dressing, keep it dry until your health care provider says it can be removed. Ask your health care provider when you can start showering or taking a bath.  · Avoid strenuous activities for as long as directed by your health care provider. Ask your health care provider when you can return to your normal activities.  · Keep all follow-up visits as told by your health  care provider. This is important. You may need more injections.  Contact a health care provider if you have:  · A fever.  · Warmth in your injection area.  · Fluid, blood, or pus coming from your injection site.  · Symptoms at your injection site that last longer than 2 days after your procedure.  Get help right away if:  · Your knee:  ? Turns very red.  ? Becomes very swollen.  ? Is in severe pain.  Summary  · A knee injection is a procedure to get medicine into your knee joint to relieve the pain, swelling, and stiffness of arthritis.  · A needle is carefully placed between your kneecap and your knee to inject medicine into the joint space.  · Before the procedure, ask your health care provider about changing or stopping your regular medicines, especially if you are taking diabetes medicines or blood thinners.  · Contact your health care provider if you have any problems or questions after your procedure.  This information is not intended to replace advice given to you by your health care provider. Make sure you discuss any questions you have with your health care provider.  Document Revised: 01/07/2019 Document Reviewed: 01/07/2019  Elsevier Patient Education © 2020 [employer]

## 2021-04-13 ENCOUNTER — TRANSCRIBE ORDERS (OUTPATIENT)
Dept: ADMINISTRATIVE | Facility: HOSPITAL | Age: 81
End: 2021-04-13

## 2021-04-13 DIAGNOSIS — M79.89 RIGHT LEG SWELLING: Primary | ICD-10-CM

## 2021-04-16 ENCOUNTER — HOSPITAL ENCOUNTER (OUTPATIENT)
Dept: CARDIOLOGY | Facility: HOSPITAL | Age: 81
Discharge: HOME | End: 2021-04-16
Admitting: NURSE PRACTITIONER

## 2021-04-16 DIAGNOSIS — M79.89 RIGHT LEG SWELLING: ICD-10-CM

## 2021-04-16 LAB
BH CV LOWER VASCULAR LEFT COMMON FEMORAL AUGMENT: NORMAL
BH CV LOWER VASCULAR LEFT COMMON FEMORAL COMPETENT: NORMAL
BH CV LOWER VASCULAR LEFT COMMON FEMORAL COMPRESS: NORMAL
BH CV LOWER VASCULAR LEFT COMMON FEMORAL PHASIC: NORMAL
BH CV LOWER VASCULAR LEFT COMMON FEMORAL SPONT: NORMAL
BH CV LOWER VASCULAR RIGHT COMMON FEMORAL AUGMENT: NORMAL
BH CV LOWER VASCULAR RIGHT COMMON FEMORAL COMPETENT: NORMAL
BH CV LOWER VASCULAR RIGHT COMMON FEMORAL COMPRESS: NORMAL
BH CV LOWER VASCULAR RIGHT COMMON FEMORAL PHASIC: NORMAL
BH CV LOWER VASCULAR RIGHT COMMON FEMORAL SPONT: NORMAL
BH CV LOWER VASCULAR RIGHT DISTAL FEMORAL COMPRESS: NORMAL
BH CV LOWER VASCULAR RIGHT GASTRONEMIUS COMPRESS: NORMAL
BH CV LOWER VASCULAR RIGHT GREATER SAPH AK COMPRESS: NORMAL
BH CV LOWER VASCULAR RIGHT GREATER SAPH BK COMPRESS: NORMAL
BH CV LOWER VASCULAR RIGHT LESSER SAPH COMPRESS: NORMAL
BH CV LOWER VASCULAR RIGHT MID FEMORAL AUGMENT: NORMAL
BH CV LOWER VASCULAR RIGHT MID FEMORAL COMPETENT: NORMAL
BH CV LOWER VASCULAR RIGHT MID FEMORAL COMPRESS: NORMAL
BH CV LOWER VASCULAR RIGHT MID FEMORAL PHASIC: NORMAL
BH CV LOWER VASCULAR RIGHT MID FEMORAL SPONT: NORMAL
BH CV LOWER VASCULAR RIGHT PERONEAL COMPRESS: NORMAL
BH CV LOWER VASCULAR RIGHT POPLITEAL AUGMENT: NORMAL
BH CV LOWER VASCULAR RIGHT POPLITEAL COMPETENT: NORMAL
BH CV LOWER VASCULAR RIGHT POPLITEAL COMPRESS: NORMAL
BH CV LOWER VASCULAR RIGHT POPLITEAL PHASIC: NORMAL
BH CV LOWER VASCULAR RIGHT POPLITEAL SPONT: NORMAL
BH CV LOWER VASCULAR RIGHT POSTERIOR TIBIAL COMPRESS: NORMAL
BH CV LOWER VASCULAR RIGHT PROFUNDA FEMORAL COMPRESS: NORMAL
BH CV LOWER VASCULAR RIGHT PROXIMAL FEMORAL COMPRESS: NORMAL
BH CV LOWER VASCULAR RIGHT SAPHENOFEMORAL JUNCTION COMPRESS: NORMAL

## 2021-04-16 PROCEDURE — 93971 EXTREMITY STUDY: CPT

## 2021-04-28 ENCOUNTER — OFFICE VISIT (OUTPATIENT)
Dept: CARDIOLOGY | Facility: CLINIC | Age: 81
End: 2021-04-28

## 2021-04-28 ENCOUNTER — CLINICAL SUPPORT NO REQUIREMENTS (OUTPATIENT)
Dept: CARDIOLOGY | Facility: CLINIC | Age: 81
End: 2021-04-28

## 2021-04-28 VITALS — BODY MASS INDEX: 35.33 KG/M2 | WEIGHT: 192 LBS | HEIGHT: 62 IN | TEMPERATURE: 97.2 F

## 2021-04-28 DIAGNOSIS — Z95.0 PACEMAKER: Primary | ICD-10-CM

## 2021-04-28 DIAGNOSIS — E78.2 MIXED HYPERLIPIDEMIA: ICD-10-CM

## 2021-04-28 DIAGNOSIS — I10 ESSENTIAL HYPERTENSION: ICD-10-CM

## 2021-04-28 DIAGNOSIS — R00.1 BRADYCARDIA: ICD-10-CM

## 2021-04-28 PROCEDURE — 99214 OFFICE O/P EST MOD 30 MIN: CPT | Performed by: INTERNAL MEDICINE

## 2021-04-28 PROCEDURE — 93280 PM DEVICE PROGR EVAL DUAL: CPT | Performed by: INTERNAL MEDICINE

## 2021-04-28 PROCEDURE — 93000 ELECTROCARDIOGRAM COMPLETE: CPT | Performed by: INTERNAL MEDICINE

## 2021-04-28 RX ORDER — POLYETHYLENE GLYCOL 3350 17 G/17G
17 POWDER, FOR SOLUTION ORAL EVERY OTHER DAY
COMMUNITY

## 2021-04-28 NOTE — PROGRESS NOTES
Subjective:     Encounter Date:04/28/2021      Patient ID: Domonique See is a 81 y.o. female.    Chief Complaint: Coronary Artery Disease & heart racing since getting covid vaccine  History of Present Illness     81-year-old white female patient with known history of septal myomectomy,  for hypertrophic obstructive cardiomyopathy,  history of nonsustained VT,  history of hypertension,  comes back for follow up   patient underwent permanent pacemaker placement for symptomatic bradycardia  she stopped  beta-blockers due to side effects previously  Pacemaker site healed well  Patient took 2 shots of COVID-19 vaccine after the second shot she felt palpitations for 1 hour heart rate around mid 90s  No more problems since then    Patient underwent cardiac catheterization December 2017 which showed up to 30% lad disease otherwise no evidence of any obstructive coronary artery disease   patient is doing reasonably well her pacemaker interrogated functioning well   No more episodes of atrial fibrillation this time  Previously patient decided not to take anticoagulation and fully understand the risks of stroke with paroxysmal A. Fib  EKG showed atrial pacer rhythm probably LVH compared to the last EKG no significant changes noted  Patient will get labs with PCP regularly she can see me once a year  The following portions of the patient's history were reviewed and updated as appropriate: Allergies current medications past family history past medical history past social history past surgical history problem list and review of systems  Past Medical History:   Diagnosis Date   • Anemia    • CHF (congestive heart failure) (CMS/[facility])    • COPD (chronic obstructive pulmonary disease) (CMS/[facility])    • GERD (gastroesophageal reflux disease)    • Hx of hypertrophic cardiomyopathy    • Hyperlipidemia    • Hypertension    • Peptic ulceration    • Primary osteoarthritis of right knee 9/21/2020     Past Surgical History:   Procedure  "Laterality Date   • BACK SURGERY     • BREAST AUGMENTATION     • CARDIAC PACEMAKER PLACEMENT  03/16/2017    Dual Chamber   • CHOLECYSTECTOMY     • COLONOSCOPY     • CORONARY ARTERY BYPASS GRAFT  2013   • THORACIC DECOMPRESSION POSTERIOR FUSION  06/04/2014    L3-5 & S1   • UPPER GASTROINTESTINAL ENDOSCOPY       Temp 97.2 °F (36.2 °C) (Infrared)   Ht 157.5 cm (62\")   Wt 87.1 kg (192 lb)   LMP  (LMP Unknown)   BMI 35.12 kg/m²   History reviewed. No pertinent family history.    Current Outpatient Medications:   •  aspirin 325 MG tablet, Daily., Disp: , Rfl:   •  bumetanide (BUMEX) 0.5 MG tablet, Take 0.5 mg by mouth Daily., Disp: , Rfl:   •  diclofenac sodium (VOTAREN XR) 100 MG 24 hr tablet, DICLOFENAC SODIUM  MG XR24H-TAB, Disp: , Rfl:   •  fluticasone (FLONASE) 50 MCG/ACT nasal spray, FLUTICASONE PROPIONATE 50 MCG/ACT SUSP, Disp: , Rfl:   •  lisinopril (PRINIVIL,ZESTRIL) 5 MG tablet, Take 5 mg by mouth Daily., Disp: , Rfl:   •  MUCINEX MAXIMUM STRENGTH 1200 MG tablet sustained-release 12 hour, TK 2 TS PO BID, Disp: , Rfl: 6  •  multivitamin with minerals (CENTRUM ADULTS PO), Take 1 tablet by mouth Daily., Disp: , Rfl:   •  NEXIUM 40 MG capsule, , Disp: , Rfl:   •  oxyCODONE-acetaminophen (PERCOCET)  MG per tablet, TK 1 T PO  Q 4 H PRN, Disp: , Rfl: 0  •  polyethylene glycol (MIRALAX) 17 g packet, Take 17 g by mouth Daily., Disp: , Rfl:   •  sertraline (ZOLOFT) 100 MG tablet, Take 100 mg by mouth Daily., Disp: , Rfl:   •  vitamin D3 125 MCG (5000 UT) capsule capsule, Take 5,000 Units by mouth Daily., Disp: , Rfl:   •  ipratropium-albuterol (COMBIVENT RESPIMAT)  MCG/ACT inhaler, COMBIVENT RESPIMAT  MCG/ACT AERS, Disp: , Rfl:   Social History     Socioeconomic History   • Marital status:      Spouse name: Not on file   • Number of children: Not on file   • Years of education: Not on file   • Highest education level: Not on file   Tobacco Use   • Smoking status: Former Smoker   • " Smokeless tobacco: Never Used   Vaping Use   • Vaping Use: Never used   Substance and Sexual Activity   • Alcohol use: No   • Drug use: No   • Sexual activity: Defer     No Known Allergies  Review of Systems   Constitutional: Negative for fever and malaise/fatigue.   Cardiovascular: Negative for chest pain, dyspnea on exertion and palpitations.   Respiratory: Negative for cough and shortness of breath.    Skin: Negative for rash.   Gastrointestinal: Negative for abdominal pain, nausea and vomiting.   Neurological: Negative for focal weakness and headaches.   All other systems reviewed and are negative.             Objective:     Physical Exam  Vital stable neck no JVP elevation lungs bilateral mostly clear heart sounds S1-S2 regular extremities no edema bilateral pulses present equal    ECG 12 Lead    Date/Time: 4/28/2021 12:20 PM  Performed by: Raimundo Milan MD  Authorized by: Raimundo Milan MD   Comments: EKG showed atrial pacer rhythm probably LVH compared to the last EKG no significant changes noted            Lab Review:       Assessment:          Diagnosis Plan   1. Pacemaker     2. Mixed hyperlipidemia     3. Essential hypertension            Plan:       MDM  Number of Diagnoses or Management Options  Essential hypertension: established, improving  Mixed hyperlipidemia: established, improving  Pacemaker: established, improving     Amount and/or Complexity of Data Reviewed  Review and summarize past medical records: yes    Risk of Complications, Morbidity, and/or Mortality  Presenting problems: moderate  Management options: moderate

## 2021-05-17 PROCEDURE — 93296 REM INTERROG EVL PM/IDS: CPT | Performed by: INTERNAL MEDICINE

## 2021-05-17 PROCEDURE — 93294 REM INTERROG EVL PM/LDLS PM: CPT | Performed by: INTERNAL MEDICINE

## 2021-06-08 ENCOUNTER — OFFICE VISIT (OUTPATIENT)
Dept: ORTHOPEDIC SURGERY | Facility: CLINIC | Age: 81
End: 2021-06-08

## 2021-06-08 VITALS
SYSTOLIC BLOOD PRESSURE: 118 MMHG | WEIGHT: 191.4 LBS | HEART RATE: 73 BPM | DIASTOLIC BLOOD PRESSURE: 71 MMHG | HEIGHT: 62 IN | BODY MASS INDEX: 35.22 KG/M2

## 2021-06-08 DIAGNOSIS — E66.01 MORBID OBESITY (HCC): ICD-10-CM

## 2021-06-08 DIAGNOSIS — M17.11 PRIMARY OSTEOARTHRITIS OF RIGHT KNEE: Primary | ICD-10-CM

## 2021-06-08 PROCEDURE — 20610 DRAIN/INJ JOINT/BURSA W/O US: CPT | Performed by: PHYSICIAN ASSISTANT

## 2021-06-08 RX ORDER — LIDOCAINE HYDROCHLORIDE 10 MG/ML
2 INJECTION, SOLUTION INFILTRATION; PERINEURAL
Status: COMPLETED | OUTPATIENT
Start: 2021-06-08 | End: 2021-06-08

## 2021-06-08 RX ORDER — TRIAMCINOLONE ACETONIDE 40 MG/ML
80 INJECTION, SUSPENSION INTRA-ARTICULAR; INTRAMUSCULAR
Status: COMPLETED | OUTPATIENT
Start: 2021-06-08 | End: 2021-06-08

## 2021-06-08 RX ADMIN — TRIAMCINOLONE ACETONIDE 80 MG: 40 INJECTION, SUSPENSION INTRA-ARTICULAR; INTRAMUSCULAR at 15:39

## 2021-06-08 RX ADMIN — LIDOCAINE HYDROCHLORIDE 2 ML: 10 INJECTION, SOLUTION INFILTRATION; PERINEURAL at 15:39

## 2021-06-08 NOTE — PROGRESS NOTES
ORTHO FOLLOW UP       Subjective:    HPI:   Domonique See is a 81 y.o. female who presents for right knee steroid injection for known history of right knee DJD.  She last had bilateral intra-articular steroid injections on 2/8/2021, which helped to reduce her pain.      Past Medical History:   Diagnosis Date   • Anemia    • CHF (congestive heart failure) (CMS/[facility])    • COPD (chronic obstructive pulmonary disease) (CMS/[facility])    • GERD (gastroesophageal reflux disease)    • Hx of hypertrophic cardiomyopathy    • Hyperlipidemia    • Hypertension    • Peptic ulceration    • Primary osteoarthritis of both knees 2/8/2021   • Primary osteoarthritis of right knee 9/21/2020       Past Surgical History:   Procedure Laterality Date   • BACK SURGERY     • BREAST AUGMENTATION     • CARDIAC PACEMAKER PLACEMENT  03/16/2017    Dual Chamber   • CHOLECYSTECTOMY     • COLONOSCOPY     • CORONARY ARTERY BYPASS GRAFT  2013   • THORACIC DECOMPRESSION POSTERIOR FUSION  06/04/2014    L3-5 & S1   • UPPER GASTROINTESTINAL ENDOSCOPY         Social History     Occupational History   • Not on file   Tobacco Use   • Smoking status: Former Smoker   • Smokeless tobacco: Never Used   Vaping Use   • Vaping Use: Never used   Substance and Sexual Activity   • Alcohol use: No   • Drug use: No   • Sexual activity: Defer      The following portions of the patient's history were reviewed and updated as appropriate: allergies, current medications, past family history, past medical history, past social history, past surgical history and problem list.    Medications:    Current Outpatient Medications:   •  aspirin 325 MG tablet, Daily., Disp: , Rfl:   •  bumetanide (BUMEX) 0.5 MG tablet, Take 0.5 mg by mouth Daily., Disp: , Rfl:   •  diclofenac sodium (VOTAREN XR) 100 MG 24 hr tablet, DICLOFENAC SODIUM  MG XR24H-TAB, Disp: , Rfl:   •  fluticasone (FLONASE) 50 MCG/ACT nasal spray, FLUTICASONE PROPIONATE 50 MCG/ACT SUSP, Disp: , Rfl:   •   "ipratropium-albuterol (COMBIVENT RESPIMAT)  MCG/ACT inhaler, COMBIVENT RESPIMAT  MCG/ACT AERS, Disp: , Rfl:   •  lisinopril (PRINIVIL,ZESTRIL) 5 MG tablet, Take 5 mg by mouth Daily., Disp: , Rfl:   •  MUCINEX MAXIMUM STRENGTH 1200 MG tablet sustained-release 12 hour, TK 2 TS PO BID, Disp: , Rfl: 6  •  multivitamin with minerals (CENTRUM ADULTS PO), Take 1 tablet by mouth Daily., Disp: , Rfl:   •  NEXIUM 40 MG capsule, , Disp: , Rfl:   •  oxyCODONE-acetaminophen (PERCOCET)  MG per tablet, TK 1 T PO  Q 4 H PRN, Disp: , Rfl: 0  •  polyethylene glycol (MIRALAX) 17 g packet, Take 17 g by mouth Daily., Disp: , Rfl:   •  sertraline (ZOLOFT) 100 MG tablet, Take 100 mg by mouth Daily., Disp: , Rfl:     Allergies:  No Known Allergies    Review of Systems:  Gen -no fever, chills , sweats, headache   Eyes - no irritation or discharge   ENT -  no ear pain , runny nose , sore throat , difficulty swallowing   Resp - no cough , congestion , excessive expectoration   CVS - no chest pain , palpitations.   Abd - no pain , nausea , vomiting , diarrhea   Skin - no rash , lesions.   Neuro - no dizziness    Please see HPI for any other pertinent positives.  All other systems were reviewed and are negative.       Objective   Objective:    /71 (BP Location: Left arm, Patient Position: Sitting, Cuff Size: Large Adult)   Pulse 73   Ht 157.5 cm (62\")   Wt 86.8 kg (191 lb 6.4 oz)   LMP  (LMP Unknown)   BMI 35.01 kg/m²     Physical Examination:  Alert, oriented, obese individual in no acute distress, walking unassisted  Right lower extremity shows no erythema, rashes, or open skin lesions. There is a mild amount of swelling. It is grossly well aligned, and the patient is neurovascularly intact distally. The knee is stable to varus and valgus stress, there is no patellar maltracking or crepitus noted, and plantar and dorsiflexion is 5/5. There is mild tenderness to palpation and with range of motion, which is " decreased due to pain.             Imaging:              Assessment:  1. Primary osteoarthritis of right knee    2. Morbid obesity (CMS/HCC)                 Plan:  We will continue conservative treatment options at this time.  Right knee intra-articular steroid injections today.  Risks and benefits were discussed and postinjection instructions were given.  She should continue her weight loss efforts as well as her anti-inflammatories.  I will plan to see her back in 3 months for recheck.  Large Joint Arthrocentesis: R knee  Date/Time: 6/8/2021 3:39 PM  Consent given by: patient  Timeout: Immediately prior to procedure a time out was called to verify the correct patient, procedure, equipment, support staff and site/side marked as required   Supporting Documentation  Indications: pain   Procedure Details  Location: knee - R knee  Preparation: Patient was prepped and draped in the usual sterile fashion  Needle size: 25 G  Medications administered: 2 mL lidocaine 1 %; 80 mg triamcinolone acetonide 40 MG/ML  Patient tolerance: patient tolerated the procedure well with no immediate complications      After consent was obtained and a time out was properly performed, the right knee was prepped with alcohol and chlorhexidine. Sterile technique was used, along with a 25 gauge needle, to inject 2 cc of 1% lidocaine and 2 cc of 40 mg/mL kenalog into the knee. The patient tolerated the procedure well.               MADISON Coleman  06/08/21  15:38 EDT

## 2021-06-08 NOTE — PATIENT INSTRUCTIONS
Knee Injection  A knee injection is a procedure to get medicine into your knee joint to relieve the pain, swelling, and stiffness of arthritis. Your health care provider uses a needle to inject medicine, which may also help to lubricate and cushion your knee joint. You may need more than one injection.  Tell a health care provider about:  · Any allergies you have.  · All medicines you are taking, including vitamins, herbs, eye drops, creams, and over-the-counter medicines.  · Any problems you or family members have had with anesthetic medicines.  · Any blood disorders you have.  · Any surgeries you have had.  · Any medical conditions you have.  · Whether you are pregnant or may be pregnant.  What are the risks?  Generally, this is a safe procedure. However, problems may occur, including:  · Infection.  · Bleeding.  · Symptoms that get worse.  · Damage to the area around your knee.  · Allergic reaction to any of the medicines.  · Skin reactions from repeated injections.  What happens before the procedure?  · Ask your health care provider about changing or stopping your regular medicines. This is especially important if you are taking diabetes medicines or blood thinners.  · Plan to have someone take you home from the hospital or clinic.  What happens during the procedure?    · You will sit or lie down in a position for your knee to be treated.  · The skin over your kneecap will be cleaned with a germ-killing soap.  · You will be given a medicine that numbs the area (local anesthetic). You may feel some stinging.  · The medicine will be injected into your knee. The needle is carefully placed between your kneecap and your knee. The medicine is injected into the joint space.  · The needle will be removed at the end of the procedure.  · A bandage (dressing) may be placed over the injection site.  The procedure may vary among health care providers and hospitals.  What can I expect after the procedure?  · Your blood  pressure, heart rate, breathing rate, and blood oxygen level will be monitored until you leave the hospital or clinic.  · You may have to move your knee through its full range of motion. This helps to get all the medicine into your joint space.  · You will be watched to make sure that you do not have a reaction to the injected medicine.  · You may feel more pain, swelling, and warmth than you did before the injection. This reaction may last about 1-2 days.  Follow these instructions at home:  Medicines  · Take over-the-counter and prescription medicines only as told by your doctor.  · Do not drive or use heavy machinery while taking prescription pain medicine.  · Do not take medicines such as aspirin and ibuprofen unless your health care provider tells you to take them.  Injection site care  · Follow instructions from your health care provider about:  ? How to take care of your puncture site.  ? When and how you should change your dressing.  ? When you should remove your dressing.  · Check your injection area every day for signs of infection. Check for:  ? More redness, swelling, or pain after 2 days.  ? Fluid or blood.  ? Pus or a bad smell.  ? Warmth.  Managing pain, stiffness, and swelling    · If directed, put ice on the injection area:  ? Put ice in a plastic bag.  ? Place a towel between your skin and the bag.  ? Leave the ice on for 20 minutes, 2-3 times per day.  · Do not apply heat to your knee.  · Raise (elevate) the injection area above the level of your heart while you are sitting or lying down.  General instructions  · If you were given a dressing, keep it dry until your health care provider says it can be removed. Ask your health care provider when you can start showering or taking a bath.  · Avoid strenuous activities for as long as directed by your health care provider. Ask your health care provider when you can return to your normal activities.  · Keep all follow-up visits as told by your health  care provider. This is important. You may need more injections.  Contact a health care provider if you have:  · A fever.  · Warmth in your injection area.  · Fluid, blood, or pus coming from your injection site.  · Symptoms at your injection site that last longer than 2 days after your procedure.  Get help right away if:  · Your knee:  ? Turns very red.  ? Becomes very swollen.  ? Is in severe pain.  Summary  · A knee injection is a procedure to get medicine into your knee joint to relieve the pain, swelling, and stiffness of arthritis.  · A needle is carefully placed between your kneecap and your knee to inject medicine into the joint space.  · Before the procedure, ask your health care provider about changing or stopping your regular medicines, especially if you are taking diabetes medicines or blood thinners.  · Contact your health care provider if you have any problems or questions after your procedure.  This information is not intended to replace advice given to you by your health care provider. Make sure you discuss any questions you have with your health care provider.  Document Revised: 01/07/2019 Document Reviewed: 01/07/2019  Elsevier Patient Education © 2021 [employer]

## 2021-06-28 ENCOUNTER — APPOINTMENT (OUTPATIENT)
Dept: CT IMAGING | Facility: HOSPITAL | Age: 81
End: 2021-06-28

## 2021-06-28 ENCOUNTER — APPOINTMENT (OUTPATIENT)
Dept: GENERAL RADIOLOGY | Facility: HOSPITAL | Age: 81
End: 2021-06-28

## 2021-06-28 ENCOUNTER — HOSPITAL ENCOUNTER (OUTPATIENT)
Facility: HOSPITAL | Age: 81
Setting detail: OBSERVATION
Discharge: HOME | End: 2021-06-30
Attending: EMERGENCY MEDICINE | Admitting: INTERNAL MEDICINE

## 2021-06-28 DIAGNOSIS — R42 DIZZINESS: Primary | ICD-10-CM

## 2021-06-28 DIAGNOSIS — I63.9 CEREBROVASCULAR ACCIDENT (CVA), UNSPECIFIED MECHANISM (HCC): ICD-10-CM

## 2021-06-28 LAB
ALBUMIN SERPL-MCNC: 3.9 G/DL (ref 3.5–5.2)
ALBUMIN/GLOB SERPL: 1.9 G/DL
ALP SERPL-CCNC: 62 U/L (ref 39–117)
ALT SERPL W P-5'-P-CCNC: 13 U/L (ref 1–33)
ANION GAP SERPL CALCULATED.3IONS-SCNC: 9 MMOL/L (ref 5–15)
APTT PPP: 27.6 SECONDS (ref 24–31)
AST SERPL-CCNC: 16 U/L (ref 1–32)
BASOPHILS # BLD AUTO: 0 10*3/MM3 (ref 0–0.2)
BASOPHILS NFR BLD AUTO: 0.9 % (ref 0–1.5)
BILIRUB SERPL-MCNC: 0.3 MG/DL (ref 0–1.2)
BILIRUB UR QL STRIP: NEGATIVE
BUN SERPL-MCNC: 10 MG/DL (ref 8–23)
BUN/CREAT SERPL: 12.5 (ref 7–25)
CALCIUM SPEC-SCNC: 8.9 MG/DL (ref 8.6–10.5)
CHLORIDE SERPL-SCNC: 96 MMOL/L (ref 98–107)
CLARITY UR: CLEAR
CO2 SERPL-SCNC: 26 MMOL/L (ref 22–29)
COLOR UR: YELLOW
CREAT SERPL-MCNC: 0.8 MG/DL (ref 0.57–1)
DEPRECATED RDW RBC AUTO: 43.8 FL (ref 37–54)
EOSINOPHIL # BLD AUTO: 0.1 10*3/MM3 (ref 0–0.4)
EOSINOPHIL NFR BLD AUTO: 1.7 % (ref 0.3–6.2)
ERYTHROCYTE [DISTWIDTH] IN BLOOD BY AUTOMATED COUNT: 14.2 % (ref 12.3–15.4)
GFR SERPL CREATININE-BSD FRML MDRD: 69 ML/MIN/1.73
GLOBULIN UR ELPH-MCNC: 2.1 GM/DL
GLUCOSE SERPL-MCNC: 94 MG/DL (ref 65–99)
GLUCOSE UR STRIP-MCNC: NEGATIVE MG/DL
HCT VFR BLD AUTO: 37.5 % (ref 34–46.6)
HGB BLD-MCNC: 12.5 G/DL (ref 12–15.9)
HGB UR QL STRIP.AUTO: NEGATIVE
INR PPP: 0.93 (ref 0.93–1.1)
KETONES UR QL STRIP: NEGATIVE
LEUKOCYTE ESTERASE UR QL STRIP.AUTO: NEGATIVE
LYMPHOCYTES # BLD AUTO: 1.4 10*3/MM3 (ref 0.7–3.1)
LYMPHOCYTES NFR BLD AUTO: 32.5 % (ref 19.6–45.3)
MAGNESIUM SERPL-MCNC: 1.8 MG/DL (ref 1.6–2.4)
MCH RBC QN AUTO: 29.1 PG (ref 26.6–33)
MCHC RBC AUTO-ENTMCNC: 33.2 G/DL (ref 31.5–35.7)
MCV RBC AUTO: 87.6 FL (ref 79–97)
MONOCYTES # BLD AUTO: 0.4 10*3/MM3 (ref 0.1–0.9)
MONOCYTES NFR BLD AUTO: 10.2 % (ref 5–12)
NEUTROPHILS NFR BLD AUTO: 2.3 10*3/MM3 (ref 1.7–7)
NEUTROPHILS NFR BLD AUTO: 54.7 % (ref 42.7–76)
NITRITE UR QL STRIP: NEGATIVE
NRBC BLD AUTO-RTO: 0 /100 WBC (ref 0–0.2)
PH UR STRIP.AUTO: 7.5 [PH] (ref 5–8)
PLATELET # BLD AUTO: 151 10*3/MM3 (ref 140–450)
PMV BLD AUTO: 8.9 FL (ref 6–12)
POTASSIUM SERPL-SCNC: 5 MMOL/L (ref 3.5–5.2)
PROT SERPL-MCNC: 6 G/DL (ref 6–8.5)
PROT UR QL STRIP: NEGATIVE
PROTHROMBIN TIME: 10.4 SECONDS (ref 9.6–11.7)
RBC # BLD AUTO: 4.28 10*6/MM3 (ref 3.77–5.28)
SODIUM SERPL-SCNC: 131 MMOL/L (ref 136–145)
SP GR UR STRIP: 1.01 (ref 1–1.03)
TROPONIN T SERPL-MCNC: <0.01 NG/ML (ref 0–0.03)
TSH SERPL DL<=0.05 MIU/L-ACNC: 3.65 UIU/ML (ref 0.27–4.2)
UROBILINOGEN UR QL STRIP: NORMAL
WBC # BLD AUTO: 4.2 10*3/MM3 (ref 3.4–10.8)

## 2021-06-28 PROCEDURE — 70450 CT HEAD/BRAIN W/O DYE: CPT

## 2021-06-28 PROCEDURE — 70498 CT ANGIOGRAPHY NECK: CPT

## 2021-06-28 PROCEDURE — 93005 ELECTROCARDIOGRAM TRACING: CPT | Performed by: EMERGENCY MEDICINE

## 2021-06-28 PROCEDURE — 71045 X-RAY EXAM CHEST 1 VIEW: CPT

## 2021-06-28 PROCEDURE — 81003 URINALYSIS AUTO W/O SCOPE: CPT | Performed by: EMERGENCY MEDICINE

## 2021-06-28 PROCEDURE — 80053 COMPREHEN METABOLIC PANEL: CPT | Performed by: EMERGENCY MEDICINE

## 2021-06-28 PROCEDURE — 85730 THROMBOPLASTIN TIME PARTIAL: CPT | Performed by: EMERGENCY MEDICINE

## 2021-06-28 PROCEDURE — 85025 COMPLETE CBC W/AUTO DIFF WBC: CPT | Performed by: EMERGENCY MEDICINE

## 2021-06-28 PROCEDURE — 84443 ASSAY THYROID STIM HORMONE: CPT | Performed by: EMERGENCY MEDICINE

## 2021-06-28 PROCEDURE — 85610 PROTHROMBIN TIME: CPT | Performed by: EMERGENCY MEDICINE

## 2021-06-28 PROCEDURE — 70496 CT ANGIOGRAPHY HEAD: CPT

## 2021-06-28 PROCEDURE — 84484 ASSAY OF TROPONIN QUANT: CPT | Performed by: EMERGENCY MEDICINE

## 2021-06-28 PROCEDURE — 0 IOPAMIDOL PER 1 ML: Performed by: EMERGENCY MEDICINE

## 2021-06-28 PROCEDURE — 83735 ASSAY OF MAGNESIUM: CPT | Performed by: EMERGENCY MEDICINE

## 2021-06-28 PROCEDURE — 99285 EMERGENCY DEPT VISIT HI MDM: CPT

## 2021-06-28 RX ORDER — ASPIRIN 81 MG/1
324 TABLET, CHEWABLE ORAL ONCE
Status: COMPLETED | OUTPATIENT
Start: 2021-06-28 | End: 2021-06-29

## 2021-06-28 RX ADMIN — SODIUM CHLORIDE 500 ML: 9 INJECTION, SOLUTION INTRAVENOUS at 19:51

## 2021-06-28 RX ADMIN — IOPAMIDOL 100 ML: 755 INJECTION, SOLUTION INTRAVENOUS at 23:42

## 2021-06-29 ENCOUNTER — APPOINTMENT (OUTPATIENT)
Dept: CARDIOLOGY | Facility: HOSPITAL | Age: 81
End: 2021-06-29

## 2021-06-29 LAB
BH CV ECHO MEAS - ACS: 2 CM
BH CV ECHO MEAS - AO MAX PG (FULL): 2.2 MMHG
BH CV ECHO MEAS - AO MAX PG: 4.8 MMHG
BH CV ECHO MEAS - AO MEAN PG (FULL): 0.94 MMHG
BH CV ECHO MEAS - AO MEAN PG: 2.4 MMHG
BH CV ECHO MEAS - AO ROOT AREA (BSA CORRECTED): 1.6
BH CV ECHO MEAS - AO ROOT AREA: 7.2 CM^2
BH CV ECHO MEAS - AO ROOT DIAM: 3 CM
BH CV ECHO MEAS - AO V2 MAX: 109.2 CM/SEC
BH CV ECHO MEAS - AO V2 MEAN: 70.8 CM/SEC
BH CV ECHO MEAS - AO V2 VTI: 20.6 CM
BH CV ECHO MEAS - ASC AORTA: 2.7 CM
BH CV ECHO MEAS - AVA(I,A): 2.6 CM^2
BH CV ECHO MEAS - AVA(I,D): 2.6 CM^2
BH CV ECHO MEAS - AVA(V,A): 2.5 CM^2
BH CV ECHO MEAS - AVA(V,D): 2.5 CM^2
BH CV ECHO MEAS - BSA(HAYCOCK): 2 M^2
BH CV ECHO MEAS - BSA: 1.9 M^2
BH CV ECHO MEAS - BZI_BMI: 31.6 KILOGRAMS/M^2
BH CV ECHO MEAS - BZI_METRIC_HEIGHT: 165.1 CM
BH CV ECHO MEAS - BZI_METRIC_WEIGHT: 86.2 KG
BH CV ECHO MEAS - EDV(CUBED): 174.6 ML
BH CV ECHO MEAS - EDV(MOD-SP4): 109.4 ML
BH CV ECHO MEAS - EDV(TEICH): 153 ML
BH CV ECHO MEAS - EF(CUBED): 69.2 %
BH CV ECHO MEAS - EF(MOD-SP4): 66 %
BH CV ECHO MEAS - EF(TEICH): 60.1 %
BH CV ECHO MEAS - ESV(CUBED): 53.8 ML
BH CV ECHO MEAS - ESV(MOD-SP4): 37.2 ML
BH CV ECHO MEAS - ESV(TEICH): 61 ML
BH CV ECHO MEAS - FS: 32.4 %
BH CV ECHO MEAS - IVS/LVPW: 1.2
BH CV ECHO MEAS - IVSD: 0.85 CM
BH CV ECHO MEAS - LA DIMENSION(2D): 4.4 CM
BH CV ECHO MEAS - LV DIASTOLIC VOL/BSA (35-75): 56.5 ML/M^2
BH CV ECHO MEAS - LV MASS(C)D: 157.5 GRAMS
BH CV ECHO MEAS - LV MASS(C)DI: 81.4 GRAMS/M^2
BH CV ECHO MEAS - LV MAX PG: 2.5 MMHG
BH CV ECHO MEAS - LV MEAN PG: 1.4 MMHG
BH CV ECHO MEAS - LV SYSTOLIC VOL/BSA (12-30): 19.2 ML/M^2
BH CV ECHO MEAS - LV V1 MAX: 79.7 CM/SEC
BH CV ECHO MEAS - LV V1 MEAN: 57.3 CM/SEC
BH CV ECHO MEAS - LV V1 VTI: 15.6 CM
BH CV ECHO MEAS - LVIDD: 5.6 CM
BH CV ECHO MEAS - LVIDS: 3.8 CM
BH CV ECHO MEAS - LVOT AREA: 3.4 CM^2
BH CV ECHO MEAS - LVOT DIAM: 2.1 CM
BH CV ECHO MEAS - LVPWD: 0.7 CM
BH CV ECHO MEAS - MR MAX PG: 98 MMHG
BH CV ECHO MEAS - MR MAX VEL: 494.9 CM/SEC
BH CV ECHO MEAS - MV MAX PG: 2.9 MMHG
BH CV ECHO MEAS - MV MEAN PG: 1.2 MMHG
BH CV ECHO MEAS - MV V2 MAX: 85.2 CM/SEC
BH CV ECHO MEAS - MV V2 MEAN: 52 CM/SEC
BH CV ECHO MEAS - MV V2 VTI: 22.8 CM
BH CV ECHO MEAS - MVA(VTI): 2.4 CM^2
BH CV ECHO MEAS - PA ACC TIME: 0.09 SEC
BH CV ECHO MEAS - PA MAX PG (FULL): 2.3 MMHG
BH CV ECHO MEAS - PA MAX PG: 4.6 MMHG
BH CV ECHO MEAS - PA MEAN PG (FULL): 1.4 MMHG
BH CV ECHO MEAS - PA MEAN PG: 2.6 MMHG
BH CV ECHO MEAS - PA PR(ACCEL): 37.5 MMHG
BH CV ECHO MEAS - PA V2 MAX: 106.8 CM/SEC
BH CV ECHO MEAS - PA V2 MEAN: 77.7 CM/SEC
BH CV ECHO MEAS - PA V2 VTI: 20.1 CM
BH CV ECHO MEAS - RAP SYSTOLE: 3 MMHG
BH CV ECHO MEAS - RV MAX PG: 2.2 MMHG
BH CV ECHO MEAS - RV MEAN PG: 1.2 MMHG
BH CV ECHO MEAS - RV V1 MAX: 74.8 CM/SEC
BH CV ECHO MEAS - RV V1 MEAN: 51.3 CM/SEC
BH CV ECHO MEAS - RV V1 VTI: 14.2 CM
BH CV ECHO MEAS - RVDD: 3.5 CM
BH CV ECHO MEAS - RVSP: 20.6 MMHG
BH CV ECHO MEAS - SI(AO): 76.8 ML/M^2
BH CV ECHO MEAS - SI(CUBED): 62.4 ML/M^2
BH CV ECHO MEAS - SI(LVOT): 27.8 ML/M^2
BH CV ECHO MEAS - SI(MOD-SP4): 37.3 ML/M^2
BH CV ECHO MEAS - SI(TEICH): 47.5 ML/M^2
BH CV ECHO MEAS - SV(AO): 148.7 ML
BH CV ECHO MEAS - SV(CUBED): 120.8 ML
BH CV ECHO MEAS - SV(LVOT): 53.8 ML
BH CV ECHO MEAS - SV(MOD-SP4): 72.2 ML
BH CV ECHO MEAS - SV(TEICH): 92 ML
BH CV ECHO MEAS - TR MAX VEL: 207.3 CM/SEC
LV EF 2D ECHO EST: 50 %
QT INTERVAL: 472 MS
SARS-COV-2 RNA PNL SPEC NAA+PROBE: NOT DETECTED

## 2021-06-29 PROCEDURE — 93306 TTE W/DOPPLER COMPLETE: CPT | Performed by: INTERNAL MEDICINE

## 2021-06-29 PROCEDURE — 96372 THER/PROPH/DIAG INJ SC/IM: CPT

## 2021-06-29 PROCEDURE — G0378 HOSPITAL OBSERVATION PER HR: HCPCS

## 2021-06-29 PROCEDURE — 99218 PR INITIAL OBSERVATION CARE/DAY 30 MINUTES: CPT | Performed by: INTERNAL MEDICINE

## 2021-06-29 PROCEDURE — 87635 SARS-COV-2 COVID-19 AMP PRB: CPT | Performed by: EMERGENCY MEDICINE

## 2021-06-29 PROCEDURE — 93306 TTE W/DOPPLER COMPLETE: CPT

## 2021-06-29 PROCEDURE — 25010000002 ENOXAPARIN PER 10 MG: Performed by: INTERNAL MEDICINE

## 2021-06-29 PROCEDURE — 99204 OFFICE O/P NEW MOD 45 MIN: CPT | Performed by: PSYCHIATRY & NEUROLOGY

## 2021-06-29 RX ORDER — IBUPROFEN 400 MG/1
400 TABLET ORAL 2 TIMES DAILY WITH MEALS
Status: DISCONTINUED | OUTPATIENT
Start: 2021-06-29 | End: 2021-06-30 | Stop reason: HOSPADM

## 2021-06-29 RX ORDER — OXYCODONE HYDROCHLORIDE 5 MG/1
5 TABLET ORAL EVERY 4 HOURS PRN
Status: DISCONTINUED | OUTPATIENT
Start: 2021-06-29 | End: 2021-06-29

## 2021-06-29 RX ORDER — ASPIRIN 325 MG
325 TABLET ORAL DAILY
Status: DISCONTINUED | OUTPATIENT
Start: 2021-06-30 | End: 2021-06-30 | Stop reason: HOSPADM

## 2021-06-29 RX ORDER — MECLIZINE HYDROCHLORIDE 25 MG/1
25 TABLET ORAL 3 TIMES DAILY PRN
Status: DISCONTINUED | OUTPATIENT
Start: 2021-06-29 | End: 2021-06-30 | Stop reason: HOSPADM

## 2021-06-29 RX ORDER — ONDANSETRON 2 MG/ML
4 INJECTION INTRAMUSCULAR; INTRAVENOUS EVERY 6 HOURS PRN
Status: DISCONTINUED | OUTPATIENT
Start: 2021-06-29 | End: 2021-06-30 | Stop reason: HOSPADM

## 2021-06-29 RX ORDER — MULTIPLE VITAMINS W/ MINERALS TAB 9MG-400MCG
1 TAB ORAL DAILY
Status: DISCONTINUED | OUTPATIENT
Start: 2021-06-29 | End: 2021-06-30 | Stop reason: HOSPADM

## 2021-06-29 RX ORDER — SODIUM CHLORIDE 0.9 % (FLUSH) 0.9 %
10 SYRINGE (ML) INJECTION AS NEEDED
Status: DISCONTINUED | OUTPATIENT
Start: 2021-06-29 | End: 2021-06-30 | Stop reason: HOSPADM

## 2021-06-29 RX ORDER — POLYETHYLENE GLYCOL 3350 17 G/17G
17 POWDER, FOR SOLUTION ORAL DAILY
Status: DISCONTINUED | OUTPATIENT
Start: 2021-06-29 | End: 2021-06-30 | Stop reason: HOSPADM

## 2021-06-29 RX ORDER — SERTRALINE HYDROCHLORIDE 100 MG/1
100 TABLET, FILM COATED ORAL DAILY
Status: DISCONTINUED | OUTPATIENT
Start: 2021-06-29 | End: 2021-06-30 | Stop reason: HOSPADM

## 2021-06-29 RX ORDER — ACETAMINOPHEN 325 MG/1
650 TABLET ORAL EVERY 4 HOURS PRN
Status: DISCONTINUED | OUTPATIENT
Start: 2021-06-29 | End: 2021-06-30 | Stop reason: HOSPADM

## 2021-06-29 RX ORDER — PANTOPRAZOLE SODIUM 40 MG/1
40 TABLET, DELAYED RELEASE ORAL EVERY MORNING
Status: DISCONTINUED | OUTPATIENT
Start: 2021-06-29 | End: 2021-06-30 | Stop reason: HOSPADM

## 2021-06-29 RX ORDER — BUMETANIDE 1 MG/1
0.5 TABLET ORAL DAILY
Status: DISCONTINUED | OUTPATIENT
Start: 2021-06-29 | End: 2021-06-30 | Stop reason: HOSPADM

## 2021-06-29 RX ORDER — HYDROCODONE BITARTRATE AND ACETAMINOPHEN 10; 325 MG/1; MG/1
1 TABLET ORAL EVERY 6 HOURS PRN
Status: DISCONTINUED | OUTPATIENT
Start: 2021-06-29 | End: 2021-06-30 | Stop reason: HOSPADM

## 2021-06-29 RX ORDER — LISINOPRIL 5 MG/1
5 TABLET ORAL DAILY
Status: DISCONTINUED | OUTPATIENT
Start: 2021-06-29 | End: 2021-06-30 | Stop reason: HOSPADM

## 2021-06-29 RX ORDER — SODIUM CHLORIDE 0.9 % (FLUSH) 0.9 %
10 SYRINGE (ML) INJECTION EVERY 12 HOURS SCHEDULED
Status: DISCONTINUED | OUTPATIENT
Start: 2021-06-29 | End: 2021-06-30 | Stop reason: HOSPADM

## 2021-06-29 RX ORDER — HYDROCODONE BITARTRATE AND ACETAMINOPHEN 5; 325 MG/1; MG/1
2 TABLET ORAL ONCE AS NEEDED
Status: COMPLETED | OUTPATIENT
Start: 2021-06-29 | End: 2021-06-29

## 2021-06-29 RX ADMIN — IBUPROFEN 400 MG: 400 TABLET ORAL at 08:09

## 2021-06-29 RX ADMIN — POLYETHYLENE GLYCOL 3350 17 G: 17 POWDER, FOR SOLUTION ORAL at 08:09

## 2021-06-29 RX ADMIN — OXYCODONE 5 MG: 5 TABLET ORAL at 08:09

## 2021-06-29 RX ADMIN — OXYCODONE 5 MG: 5 TABLET ORAL at 12:07

## 2021-06-29 RX ADMIN — SERTRALINE HYDROCHLORIDE 100 MG: 100 TABLET ORAL at 19:51

## 2021-06-29 RX ADMIN — HYDROCODONE BITARTRATE AND ACETAMINOPHEN 1 TABLET: 10; 325 TABLET ORAL at 23:57

## 2021-06-29 RX ADMIN — ASPIRIN 81 MG CHEWABLE TABLET 324 MG: 81 TABLET CHEWABLE at 00:04

## 2021-06-29 RX ADMIN — ENOXAPARIN SODIUM 40 MG: 40 INJECTION SUBCUTANEOUS at 15:30

## 2021-06-29 RX ADMIN — LISINOPRIL 5 MG: 5 TABLET ORAL at 08:09

## 2021-06-29 RX ADMIN — ACETAMINOPHEN 650 MG: 325 TABLET, FILM COATED ORAL at 12:07

## 2021-06-29 RX ADMIN — HYDROCODONE BITARTRATE AND ACETAMINOPHEN 1 TABLET: 10; 325 TABLET ORAL at 18:18

## 2021-06-29 RX ADMIN — SODIUM CHLORIDE, PRESERVATIVE FREE 10 ML: 5 INJECTION INTRAVENOUS at 20:03

## 2021-06-29 RX ADMIN — PANTOPRAZOLE SODIUM 40 MG: 40 TABLET, DELAYED RELEASE ORAL at 06:34

## 2021-06-29 RX ADMIN — SODIUM CHLORIDE, PRESERVATIVE FREE 10 ML: 5 INJECTION INTRAVENOUS at 08:22

## 2021-06-29 RX ADMIN — OXYCODONE 5 MG: 5 TABLET ORAL at 16:06

## 2021-06-29 RX ADMIN — BUMETANIDE 0.5 MG: 1 TABLET ORAL at 08:09

## 2021-06-29 RX ADMIN — IBUPROFEN 400 MG: 400 TABLET ORAL at 16:06

## 2021-06-29 RX ADMIN — MULTIPLE VITAMINS W/ MINERALS TAB 1 TABLET: TAB at 19:50

## 2021-06-29 RX ADMIN — HYDROCODONE BITARTRATE AND ACETAMINOPHEN 2 TABLET: 5; 325 TABLET ORAL at 00:37

## 2021-06-30 ENCOUNTER — APPOINTMENT (OUTPATIENT)
Dept: MRI IMAGING | Facility: HOSPITAL | Age: 81
End: 2021-06-30

## 2021-06-30 VITALS
RESPIRATION RATE: 14 BRPM | TEMPERATURE: 98.1 F | BODY MASS INDEX: 31.77 KG/M2 | WEIGHT: 190.7 LBS | HEART RATE: 60 BPM | DIASTOLIC BLOOD PRESSURE: 74 MMHG | HEIGHT: 65 IN | SYSTOLIC BLOOD PRESSURE: 143 MMHG | OXYGEN SATURATION: 92 %

## 2021-06-30 PROBLEM — R42 DIZZINESS: Status: RESOLVED | Noted: 2021-06-28 | Resolved: 2021-06-30

## 2021-06-30 LAB
CHOLEST SERPL-MCNC: 223 MG/DL (ref 0–200)
HBA1C MFR BLD: 6.1 % (ref 3.5–5.6)
HDLC SERPL-MCNC: 48 MG/DL (ref 40–60)
LDLC SERPL CALC-MCNC: 144 MG/DL (ref 0–100)
LDLC/HDLC SERPL: 2.94 {RATIO}
TRIGL SERPL-MCNC: 170 MG/DL (ref 0–150)
VIT B12 BLD-MCNC: 1255 PG/ML (ref 211–946)
VLDLC SERPL-MCNC: 31 MG/DL (ref 5–40)

## 2021-06-30 PROCEDURE — 70551 MRI BRAIN STEM W/O DYE: CPT

## 2021-06-30 PROCEDURE — 36415 COLL VENOUS BLD VENIPUNCTURE: CPT | Performed by: PSYCHIATRY & NEUROLOGY

## 2021-06-30 PROCEDURE — 99213 OFFICE O/P EST LOW 20 MIN: CPT | Performed by: PSYCHIATRY & NEUROLOGY

## 2021-06-30 PROCEDURE — G0378 HOSPITAL OBSERVATION PER HR: HCPCS

## 2021-06-30 PROCEDURE — 80061 LIPID PANEL: CPT | Performed by: PSYCHIATRY & NEUROLOGY

## 2021-06-30 PROCEDURE — 82607 VITAMIN B-12: CPT | Performed by: PSYCHIATRY & NEUROLOGY

## 2021-06-30 PROCEDURE — 99217 PR OBSERVATION CARE DISCHARGE MANAGEMENT: CPT | Performed by: INTERNAL MEDICINE

## 2021-06-30 PROCEDURE — 97165 OT EVAL LOW COMPLEX 30 MIN: CPT

## 2021-06-30 PROCEDURE — 83036 HEMOGLOBIN GLYCOSYLATED A1C: CPT | Performed by: PSYCHIATRY & NEUROLOGY

## 2021-06-30 RX ORDER — ATORVASTATIN CALCIUM 40 MG/1
80 TABLET, FILM COATED ORAL NIGHTLY
Status: DISCONTINUED | OUTPATIENT
Start: 2021-06-30 | End: 2021-06-30

## 2021-06-30 RX ORDER — ATORVASTATIN CALCIUM 40 MG/1
40 TABLET, FILM COATED ORAL NIGHTLY
Qty: 30 TABLET | Refills: 0 | Status: SHIPPED | OUTPATIENT
Start: 2021-06-30

## 2021-06-30 RX ORDER — ATORVASTATIN CALCIUM 40 MG/1
40 TABLET, FILM COATED ORAL NIGHTLY
Status: DISCONTINUED | OUTPATIENT
Start: 2021-06-30 | End: 2021-06-30 | Stop reason: HOSPADM

## 2021-06-30 RX ADMIN — IBUPROFEN 400 MG: 400 TABLET ORAL at 08:09

## 2021-06-30 RX ADMIN — ASPIRIN 325 MG ORAL TABLET 325 MG: 325 PILL ORAL at 08:09

## 2021-06-30 RX ADMIN — BUMETANIDE 0.5 MG: 1 TABLET ORAL at 08:09

## 2021-06-30 RX ADMIN — SERTRALINE HYDROCHLORIDE 100 MG: 100 TABLET ORAL at 08:09

## 2021-06-30 RX ADMIN — PANTOPRAZOLE SODIUM 40 MG: 40 TABLET, DELAYED RELEASE ORAL at 06:01

## 2021-06-30 RX ADMIN — MULTIPLE VITAMINS W/ MINERALS TAB 1 TABLET: TAB at 08:09

## 2021-06-30 RX ADMIN — HYDROCODONE BITARTRATE AND ACETAMINOPHEN 1 TABLET: 10; 325 TABLET ORAL at 06:01

## 2021-06-30 RX ADMIN — ACETAMINOPHEN 650 MG: 325 TABLET, FILM COATED ORAL at 03:03

## 2021-06-30 RX ADMIN — SODIUM CHLORIDE, PRESERVATIVE FREE 10 ML: 5 INJECTION INTRAVENOUS at 08:09

## 2021-06-30 RX ADMIN — POLYETHYLENE GLYCOL 3350 17 G: 17 POWDER, FOR SOLUTION ORAL at 08:09

## 2021-06-30 RX ADMIN — LISINOPRIL 5 MG: 5 TABLET ORAL at 08:09

## 2021-06-30 RX ADMIN — HYDROCODONE BITARTRATE AND ACETAMINOPHEN 1 TABLET: 10; 325 TABLET ORAL at 12:16

## 2021-08-02 ENCOUNTER — TRANSCRIBE ORDERS (OUTPATIENT)
Dept: ADMINISTRATIVE | Facility: HOSPITAL | Age: 81
End: 2021-08-02

## 2021-08-02 DIAGNOSIS — M48.02 CERVICAL SPINAL STENOSIS: Primary | ICD-10-CM

## 2021-08-03 ENCOUNTER — TRANSCRIBE ORDERS (OUTPATIENT)
Dept: ADMINISTRATIVE | Facility: HOSPITAL | Age: 81
End: 2021-08-03

## 2021-08-03 DIAGNOSIS — M48.062 LUMBAR STENOSIS WITH NEUROGENIC CLAUDICATION: Primary | ICD-10-CM

## 2021-08-16 PROCEDURE — 93296 REM INTERROG EVL PM/IDS: CPT | Performed by: INTERNAL MEDICINE

## 2021-08-16 PROCEDURE — 93294 REM INTERROG EVL PM/LDLS PM: CPT | Performed by: INTERNAL MEDICINE

## 2021-08-20 ENCOUNTER — APPOINTMENT (OUTPATIENT)
Dept: MRI IMAGING | Facility: HOSPITAL | Age: 81
End: 2021-08-20

## 2021-09-23 ENCOUNTER — HOSPITAL ENCOUNTER (OUTPATIENT)
Dept: MRI IMAGING | Facility: HOSPITAL | Age: 81
Discharge: HOME | End: 2021-09-23

## 2021-09-23 DIAGNOSIS — M48.02 CERVICAL SPINAL STENOSIS: ICD-10-CM

## 2021-09-23 DIAGNOSIS — M48.062 LUMBAR STENOSIS WITH NEUROGENIC CLAUDICATION: ICD-10-CM

## 2021-09-23 PROCEDURE — 72148 MRI LUMBAR SPINE W/O DYE: CPT

## 2021-09-23 PROCEDURE — 72141 MRI NECK SPINE W/O DYE: CPT

## 2021-10-26 ENCOUNTER — TELEPHONE (OUTPATIENT)
Dept: CARDIOLOGY | Facility: CLINIC | Age: 81
End: 2021-10-26

## 2021-10-26 NOTE — TELEPHONE ENCOUNTER
Spoke to patient, she would like to see Dr Pleitez for her following cardiologist. Will schedule when she comes for her device check.

## 2021-11-10 ENCOUNTER — CLINICAL SUPPORT NO REQUIREMENTS (OUTPATIENT)
Dept: CARDIOLOGY | Facility: CLINIC | Age: 81
End: 2021-11-10

## 2021-11-10 DIAGNOSIS — Z95.0 PACEMAKER: Primary | ICD-10-CM

## 2021-11-10 DIAGNOSIS — R00.1 BRADYCARDIA: ICD-10-CM

## 2021-11-10 PROCEDURE — 93280 PM DEVICE PROGR EVAL DUAL: CPT | Performed by: INTERNAL MEDICINE

## 2021-11-15 PROCEDURE — 93296 REM INTERROG EVL PM/IDS: CPT | Performed by: INTERNAL MEDICINE

## 2021-11-15 PROCEDURE — 93294 REM INTERROG EVL PM/LDLS PM: CPT | Performed by: INTERNAL MEDICINE

## 2022-02-14 PROCEDURE — 93294 REM INTERROG EVL PM/LDLS PM: CPT | Performed by: INTERNAL MEDICINE

## 2022-02-14 PROCEDURE — 93296 REM INTERROG EVL PM/IDS: CPT | Performed by: INTERNAL MEDICINE

## 2022-03-08 ENCOUNTER — LAB (OUTPATIENT)
Dept: LAB | Facility: HOSPITAL | Age: 82
End: 2022-03-08

## 2022-03-08 ENCOUNTER — TRANSCRIBE ORDERS (OUTPATIENT)
Dept: ADMINISTRATIVE | Facility: HOSPITAL | Age: 82
End: 2022-03-08

## 2022-03-08 DIAGNOSIS — R73.9 BLOOD GLUCOSE ELEVATED: ICD-10-CM

## 2022-03-08 DIAGNOSIS — D50.9 IRON DEFICIENCY ANEMIA, UNSPECIFIED IRON DEFICIENCY ANEMIA TYPE: ICD-10-CM

## 2022-03-08 DIAGNOSIS — E78.5 HYPERLIPIDEMIA, UNSPECIFIED HYPERLIPIDEMIA TYPE: ICD-10-CM

## 2022-03-08 DIAGNOSIS — E87.5 HYPERPOTASSEMIA: ICD-10-CM

## 2022-03-08 DIAGNOSIS — R23.2 FLUSHING: ICD-10-CM

## 2022-03-08 DIAGNOSIS — D50.9 IRON DEFICIENCY ANEMIA, UNSPECIFIED IRON DEFICIENCY ANEMIA TYPE: Primary | ICD-10-CM

## 2022-03-08 LAB
ALBUMIN SERPL-MCNC: 4.4 G/DL (ref 3.5–5.2)
ALBUMIN/GLOB SERPL: 1.8 G/DL
ALP SERPL-CCNC: 81 U/L (ref 39–117)
ALT SERPL W P-5'-P-CCNC: 17 U/L (ref 1–33)
ANION GAP SERPL CALCULATED.3IONS-SCNC: 11.7 MMOL/L (ref 5–15)
AST SERPL-CCNC: 18 U/L (ref 1–32)
BASOPHILS # BLD AUTO: 0.01 10*3/MM3 (ref 0–0.2)
BASOPHILS NFR BLD AUTO: 0.2 % (ref 0–1.5)
BILIRUB SERPL-MCNC: 0.6 MG/DL (ref 0–1.2)
BUN SERPL-MCNC: 20 MG/DL (ref 8–23)
BUN/CREAT SERPL: 19 (ref 7–25)
CALCIUM SPEC-SCNC: 9.8 MG/DL (ref 8.6–10.5)
CHLORIDE SERPL-SCNC: 102 MMOL/L (ref 98–107)
CHOLEST SERPL-MCNC: 124 MG/DL (ref 0–200)
CO2 SERPL-SCNC: 25.3 MMOL/L (ref 22–29)
CREAT SERPL-MCNC: 1.05 MG/DL (ref 0.57–1)
CRP SERPL-MCNC: 0.44 MG/DL (ref 0–0.5)
DEPRECATED RDW RBC AUTO: 42.3 FL (ref 37–54)
EGFRCR SERPLBLD CKD-EPI 2021: 52.8 ML/MIN/1.73
EOSINOPHIL # BLD AUTO: 0.06 10*3/MM3 (ref 0–0.4)
EOSINOPHIL NFR BLD AUTO: 1.2 % (ref 0.3–6.2)
ERYTHROCYTE [DISTWIDTH] IN BLOOD BY AUTOMATED COUNT: 12.6 % (ref 12.3–15.4)
ERYTHROCYTE [SEDIMENTATION RATE] IN BLOOD: 2 MM/HR (ref 0–30)
FERRITIN SERPL-MCNC: 131 NG/ML (ref 13–150)
FOLATE SERPL-MCNC: 15.7 NG/ML (ref 4.78–24.2)
GLOBULIN UR ELPH-MCNC: 2.5 GM/DL
GLUCOSE SERPL-MCNC: 92 MG/DL (ref 65–99)
HBA1C MFR BLD: 5.9 % (ref 3.5–5.6)
HCT VFR BLD AUTO: 38.8 % (ref 34–46.6)
HDLC SERPL-MCNC: 57 MG/DL (ref 40–60)
HGB BLD-MCNC: 12.6 G/DL (ref 12–15.9)
IMM GRANULOCYTES # BLD AUTO: 0.01 10*3/MM3 (ref 0–0.05)
IMM GRANULOCYTES NFR BLD AUTO: 0.2 % (ref 0–0.5)
IRON 24H UR-MRATE: 56 MCG/DL (ref 37–145)
IRON SATN MFR SERPL: 14 % (ref 20–50)
LDLC SERPL CALC-MCNC: 52 MG/DL (ref 0–100)
LDLC/HDLC SERPL: 0.92 {RATIO}
LYMPHOCYTES # BLD AUTO: 1.07 10*3/MM3 (ref 0.7–3.1)
LYMPHOCYTES NFR BLD AUTO: 21.2 % (ref 19.6–45.3)
MCH RBC QN AUTO: 29.8 PG (ref 26.6–33)
MCHC RBC AUTO-ENTMCNC: 32.5 G/DL (ref 31.5–35.7)
MCV RBC AUTO: 91.7 FL (ref 79–97)
MONOCYTES # BLD AUTO: 0.64 10*3/MM3 (ref 0.1–0.9)
MONOCYTES NFR BLD AUTO: 12.7 % (ref 5–12)
NEUTROPHILS NFR BLD AUTO: 3.26 10*3/MM3 (ref 1.7–7)
NEUTROPHILS NFR BLD AUTO: 64.5 % (ref 42.7–76)
NRBC BLD AUTO-RTO: 0 /100 WBC (ref 0–0.2)
PLATELET # BLD AUTO: 151 10*3/MM3 (ref 140–450)
PMV BLD AUTO: 11.6 FL (ref 6–12)
POTASSIUM SERPL-SCNC: 5.3 MMOL/L (ref 3.5–5.2)
PROT SERPL-MCNC: 6.9 G/DL (ref 6–8.5)
RBC # BLD AUTO: 4.23 10*6/MM3 (ref 3.77–5.28)
SODIUM SERPL-SCNC: 139 MMOL/L (ref 136–145)
TIBC SERPL-MCNC: 407 MCG/DL (ref 298–536)
TRANSFERRIN SERPL-MCNC: 273 MG/DL (ref 200–360)
TRIGL SERPL-MCNC: 73 MG/DL (ref 0–150)
TSH SERPL DL<=0.05 MIU/L-ACNC: 3.98 UIU/ML (ref 0.27–4.2)
VIT B12 BLD-MCNC: 1349 PG/ML (ref 211–946)
VLDLC SERPL-MCNC: 15 MG/DL (ref 5–40)
WBC NRBC COR # BLD: 5.05 10*3/MM3 (ref 3.4–10.8)

## 2022-03-08 PROCEDURE — 82607 VITAMIN B-12: CPT

## 2022-03-08 PROCEDURE — 85652 RBC SED RATE AUTOMATED: CPT

## 2022-03-08 PROCEDURE — 86140 C-REACTIVE PROTEIN: CPT

## 2022-03-08 PROCEDURE — 83540 ASSAY OF IRON: CPT

## 2022-03-08 PROCEDURE — 84443 ASSAY THYROID STIM HORMONE: CPT

## 2022-03-08 PROCEDURE — 85025 COMPLETE CBC W/AUTO DIFF WBC: CPT

## 2022-03-08 PROCEDURE — 80053 COMPREHEN METABOLIC PANEL: CPT

## 2022-03-08 PROCEDURE — 80061 LIPID PANEL: CPT

## 2022-03-08 PROCEDURE — 83036 HEMOGLOBIN GLYCOSYLATED A1C: CPT

## 2022-03-08 PROCEDURE — 82728 ASSAY OF FERRITIN: CPT

## 2022-03-08 PROCEDURE — 82746 ASSAY OF FOLIC ACID SERUM: CPT

## 2022-03-08 PROCEDURE — 84466 ASSAY OF TRANSFERRIN: CPT

## 2022-03-08 PROCEDURE — 36415 COLL VENOUS BLD VENIPUNCTURE: CPT

## 2022-03-09 ENCOUNTER — LAB (OUTPATIENT)
Dept: LAB | Facility: HOSPITAL | Age: 82
End: 2022-03-09

## 2022-03-09 ENCOUNTER — CLINICAL SUPPORT NO REQUIREMENTS (OUTPATIENT)
Dept: CARDIOLOGY | Facility: CLINIC | Age: 82
End: 2022-03-09

## 2022-03-09 ENCOUNTER — OFFICE VISIT (OUTPATIENT)
Dept: CARDIOLOGY | Facility: CLINIC | Age: 82
End: 2022-03-09

## 2022-03-09 VITALS
WEIGHT: 193 LBS | BODY MASS INDEX: 32.15 KG/M2 | HEIGHT: 65 IN | DIASTOLIC BLOOD PRESSURE: 82 MMHG | OXYGEN SATURATION: 95 % | SYSTOLIC BLOOD PRESSURE: 131 MMHG | HEART RATE: 64 BPM

## 2022-03-09 DIAGNOSIS — R06.09 DYSPNEA ON EXERTION: Primary | ICD-10-CM

## 2022-03-09 DIAGNOSIS — R73.03 PREDIABETES: ICD-10-CM

## 2022-03-09 DIAGNOSIS — I10 ESSENTIAL HYPERTENSION: ICD-10-CM

## 2022-03-09 DIAGNOSIS — R00.1 BRADYCARDIA: ICD-10-CM

## 2022-03-09 DIAGNOSIS — R07.2 PRECORDIAL PAIN: ICD-10-CM

## 2022-03-09 DIAGNOSIS — I50.32 CHRONIC HEART FAILURE WITH PRESERVED EJECTION FRACTION: ICD-10-CM

## 2022-03-09 DIAGNOSIS — R06.09 DYSPNEA ON EXERTION: ICD-10-CM

## 2022-03-09 DIAGNOSIS — E78.5 DYSLIPIDEMIA: ICD-10-CM

## 2022-03-09 DIAGNOSIS — E66.9 OBESITY (BMI 30-39.9): ICD-10-CM

## 2022-03-09 DIAGNOSIS — Z95.0 PACEMAKER: Primary | ICD-10-CM

## 2022-03-09 LAB — NT-PROBNP SERPL-MCNC: 573 PG/ML (ref 0–1800)

## 2022-03-09 PROCEDURE — 83880 ASSAY OF NATRIURETIC PEPTIDE: CPT

## 2022-03-09 PROCEDURE — 99214 OFFICE O/P EST MOD 30 MIN: CPT | Performed by: INTERNAL MEDICINE

## 2022-03-09 PROCEDURE — 93000 ELECTROCARDIOGRAM COMPLETE: CPT | Performed by: INTERNAL MEDICINE

## 2022-03-09 PROCEDURE — 93280 PM DEVICE PROGR EVAL DUAL: CPT | Performed by: INTERNAL MEDICINE

## 2022-03-09 NOTE — PROGRESS NOTES
Subjective:     Encounter Date:03/09/2022      Patient ID: Domonique See is a 82 y.o. female.    Chief Complaint : Complaining of chest pain and shortness of breath.  Here to establish cardiac care for hypertrophic cardiomyopathy, pacemaker, CHF, dyslipidemia  History of Present Illness      Ms. Domonique See  has PMH of     Hypertrophic cardiomyopathy, history of septal myomectomy,      nonsustained VT  hypertension  SSS, [facility] permanent pacemaker 3/16/2017  Paroxysmal atrial fibrillation, refused anticoagulation  CAD, cath 12/2017 30% LAD  CHF due to hypertrophic cardiomyopathy  Hyperlipidemia  COPD  Peptic ulcer disease  Back surgery, breast augmentation  Former smoker    Here to establish cardiac care.  Patient is to see Dr. Louis in the past wants to establish care with me.  Is complaining of chest pain, progressively worsening shortness of breath with she cannot even walk minimal activity without getting out of breath.  Has chest pain which is sharp stabbing.  Is feeling tired and exhausted and has profuse sweating at times.  Patient's arterial blood pressure is 131/82, heart rate 64, O2 sat of 95% on room air.  BMI is over 32.  Echocardiogram 6/29/2021 reveals EF of 50% with LV and RV  Labs from 3/8/2022 revealed normal ESR and CRP.  A1c improved to 5.9 from 6.1 previously, TSH 3.9.  CMP with a potassium of 5.3 creatinine of 1.05 and GFR 52.  Normal CBC.  Lipid profile with cholesterol 124 triglycerides 73 HDL 57 LDL 52.      Assessment:    Chest pain  Shortness of breath and dyspnea on exertion  Hypertrophic cardiomyopathy, myomectomy, NSVT  Chronic HFpEF due to diastolic dysfunction from hypertrophic cardiomyopathy  Hypertension  Dyslipidemia  Prediabetes  Paroxysmal atrial fibrillation  Obesity with BMI over 30  CKD  Hyperkalemia      Recommendations and plan:    Reviewed EKG results with patient.  Patient is complaining of chest pain will schedule stress test.  Patient says she cannot  walk due to feeling exhausted and shortness of breath with minimal activity.  We will schedule Lexiscan Cardiolite.  We will check BNP level.  Discussed about COVID-19 vaccination.  Patient took her shorts.  Will follow up and consider further evaluation treatment.  Continue aspirin, atorvastatin, Bumex, lisinopril as tolerated  Previously patient decided not to take anticoagulation and fully understand the risks of stroke with paroxysmal A. Fib.  Patient's potassium of 5.3 is probably hemolyzed specimen.          ECG 12 Lead    Date/Time: 3/9/2022 8:20 AM  Performed by: Evgeny Gregory MD  Authorized by: Evgeny Gregory MD   Comparison: compared with previous ECG from 6/28/2021  Comparison to previous ECG: EKG done today reviewed/interpreted by me reveals sinus rhythm with 100% a paced rhythm with rate of 60 bpm, Q waves in V1 V2, no new change compared to EKG from 6/28/2021              The following portions of the patient's history were reviewed and updated as appropriate: allergies, current medications, past family history, past medical history, past social history, past surgical history and problem list.    Assessment:         [facility]     Diagnosis Plan   1. Dyspnea on exertion  Stress Test With Myocardial Perfusion One Day    BNP   2. Precordial pain  Stress Test With Myocardial Perfusion One Day    BNP   3. Chronic heart failure with preserved ejection fraction (HCC)  Stress Test With Myocardial Perfusion One Day    BNP   4. Prediabetes  Stress Test With Myocardial Perfusion One Day    BNP   5. Obesity (BMI 30-39.9)  Stress Test With Myocardial Perfusion One Day    BNP   6. Essential hypertension  Stress Test With Myocardial Perfusion One Day    BNP   7. Dyslipidemia            Plan:               Past Medical History:  Past Medical History:   Diagnosis Date   • Anemia    • CHF (congestive heart failure) (HCC)    • GERD (gastroesophageal reflux disease)    • Hx of hypertrophic cardiomyopathy     • Hypertension    • Peptic ulceration    • Primary osteoarthritis of both knees 2021   • Primary osteoarthritis of right knee 2020     Past Surgical History:  Past Surgical History:   Procedure Laterality Date   • ABDOMINAL HERNIA REPAIR     • BACK SURGERY     • BREAST AUGMENTATION     • CARDIAC PACEMAKER PLACEMENT  2017    Dual Chamber   • CERVICAL RIB RESECTION Bilateral    • CHOLECYSTECTOMY     • COLONOSCOPY     • CORONARY ARTERY BYPASS GRAFT     • THORACIC DECOMPRESSION POSTERIOR FUSION  2014    L3-5 & S1   • UPPER GASTROINTESTINAL ENDOSCOPY        Allergies:  No Known Allergies  Home Meds:  Current Meds:     Current Outpatient Medications:   •  aspirin 325 MG tablet, Take 325 mg by mouth Daily., Disp: , Rfl:   •  atorvastatin (LIPITOR) 40 MG tablet, Take 1 tablet by mouth Every Night., Disp: 30 tablet, Rfl: 0  •  bumetanide (BUMEX) 0.5 MG tablet, Take 0.5 mg by mouth Daily., Disp: , Rfl:   •  diclofenac sodium (VOTAREN XR) 100 MG 24 hr tablet, Take 100 mg by mouth Daily., Disp: , Rfl:   •  fluticasone (FLONASE) 50 MCG/ACT nasal spray, 1 spray into the nostril(s) as directed by provider Daily., Disp: , Rfl:   •  lisinopril (PRINIVIL,ZESTRIL) 5 MG tablet, Take 5 mg by mouth Daily., Disp: , Rfl:   •  multivitamin with minerals tablet tablet, Take 1 tablet by mouth Daily., Disp: , Rfl:   •  NEXIUM 40 MG capsule, Take 40 mg by mouth Daily., Disp: , Rfl:   •  oxyCODONE-acetaminophen (PERCOCET)  MG per tablet, Take 1 tablet by mouth Every 4 (Four) Hours As Needed., Disp: , Rfl: 0  •  polyethylene glycol (MIRALAX) 17 g packet, Take 17 g by mouth Daily., Disp: , Rfl:   •  sertraline (ZOLOFT) 100 MG tablet, Take 100 mg by mouth Daily., Disp: , Rfl:   Social History:   Social History     Tobacco Use   • Smoking status: Former Smoker     Years: 8.00     Quit date:      Years since quittin.2   • Smokeless tobacco: Never Used   Substance Use Topics   • Alcohol use: No      Family  "History:  Family History   Problem Relation Age of Onset   • Colon cancer Mother    • Colon cancer Brother    • Colon cancer Daughter               Review of Systems   Constitutional: Positive for malaise/fatigue and night sweats.   Cardiovascular: Positive for chest pain. Negative for leg swelling and palpitations.   Respiratory: Positive for shortness of breath.    Neurological: Negative for dizziness and numbness.     All other systems are negative         Objective:     Physical Exam  /82 (BP Location: Left arm, Patient Position: Sitting, Cuff Size: Large Adult)   Pulse 64   Ht 165.1 cm (65\")   Wt 87.5 kg (193 lb)   LMP  (LMP Unknown)   SpO2 95%   BMI 32.12 kg/m²   General:  Appears in no acute distress  Eyes: Sclera is anicteric,  conjunctiva is clear   HEENT:  No JVD.  No carotid bruits  Respiratory: Respirations regular and unlabored at rest.  Clear to auscultation  Cardiovascular: S1,S2 Regular rate and rhythm. No murmur, rub or gallop auscultated.   Extremities: No digital clubbing or cyanosis, no edema  Skin: Color pink. Skin warm and dry to touch. No rashes  No xanthoma  Neuro: Alert and awake.    Lab Reviewed:         Evgeny Gregory MD  3/13/2022 08:34 EDT      Much of the above report is an electronic transcription/translation of the spoken language to printed text using Dragon Software. As such, the subtleties and finesse of the spoken language may permit erroneous, or at times, nonsensical words or phrases to be inadvertently transcribed; thus changes may be made at a later date to rectify these errors.         "

## 2022-04-07 ENCOUNTER — HOSPITAL ENCOUNTER (OUTPATIENT)
Dept: CARDIOLOGY | Facility: HOSPITAL | Age: 82
Discharge: HOME | End: 2022-04-07

## 2022-04-07 DIAGNOSIS — R06.09 DYSPNEA ON EXERTION: ICD-10-CM

## 2022-04-07 DIAGNOSIS — R07.2 PRECORDIAL PAIN: ICD-10-CM

## 2022-04-07 DIAGNOSIS — I50.32 CHRONIC HEART FAILURE WITH PRESERVED EJECTION FRACTION: ICD-10-CM

## 2022-04-07 DIAGNOSIS — E66.9 OBESITY (BMI 30-39.9): ICD-10-CM

## 2022-04-07 DIAGNOSIS — R73.03 PREDIABETES: ICD-10-CM

## 2022-04-07 DIAGNOSIS — I10 ESSENTIAL HYPERTENSION: ICD-10-CM

## 2022-04-07 LAB
BH CV REST NUCLEAR ISOTOPE DOSE: 10.6 MCI
BH CV STRESS BP STAGE 1: NORMAL
BH CV STRESS COMMENTS STAGE 1: NORMAL
BH CV STRESS DOSE REGADENOSON STAGE 1: 0.4
BH CV STRESS DURATION MIN STAGE 1: 0
BH CV STRESS DURATION SEC STAGE 1: 10
BH CV STRESS HR STAGE 1: 62
BH CV STRESS NUCLEAR ISOTOPE DOSE: 31.7 MCI
BH CV STRESS PROTOCOL 1: NORMAL
BH CV STRESS RECOVERY BP: NORMAL MMHG
BH CV STRESS RECOVERY HR: 63 BPM
BH CV STRESS STAGE 1: 1
MAXIMAL PREDICTED HEART RATE: 137 BPM
STRESS BASELINE BP: NORMAL MMHG
STRESS BASELINE HR: 62 BPM
STRESS TARGET HR: 116 BPM

## 2022-04-07 PROCEDURE — A9500 TC99M SESTAMIBI: HCPCS | Performed by: INTERNAL MEDICINE

## 2022-04-07 PROCEDURE — 0 TECHNETIUM SESTAMIBI: Performed by: INTERNAL MEDICINE

## 2022-04-07 PROCEDURE — 93017 CV STRESS TEST TRACING ONLY: CPT

## 2022-04-07 PROCEDURE — 25010000002 REGADENOSON 0.4 MG/5ML SOLUTION: Performed by: INTERNAL MEDICINE

## 2022-04-07 PROCEDURE — 93018 CV STRESS TEST I&R ONLY: CPT | Performed by: INTERNAL MEDICINE

## 2022-04-07 PROCEDURE — 78452 HT MUSCLE IMAGE SPECT MULT: CPT

## 2022-04-07 PROCEDURE — 78452 HT MUSCLE IMAGE SPECT MULT: CPT | Performed by: INTERNAL MEDICINE

## 2022-04-07 RX ADMIN — TECHNETIUM TC 99M SESTAMIBI 1 DOSE: 1 INJECTION INTRAVENOUS at 12:17

## 2022-04-07 RX ADMIN — TECHNETIUM TC 99M SESTAMIBI 1 DOSE: 1 INJECTION INTRAVENOUS at 14:31

## 2022-04-07 RX ADMIN — REGADENOSON 0.4 MG: 0.08 INJECTION, SOLUTION INTRAVENOUS at 14:31

## 2022-05-16 PROCEDURE — 93294 REM INTERROG EVL PM/LDLS PM: CPT | Performed by: INTERNAL MEDICINE

## 2022-05-16 PROCEDURE — 93296 REM INTERROG EVL PM/IDS: CPT | Performed by: INTERNAL MEDICINE

## 2022-06-06 ENCOUNTER — TELEPHONE (OUTPATIENT)
Dept: ORTHOPEDIC SURGERY | Facility: CLINIC | Age: 82
End: 2022-06-06

## 2022-06-06 NOTE — TELEPHONE ENCOUNTER
Provider: MICHAEL  Caller:GEOFFREY  Phone Number: [phone]  Reason for Call: PATIENT IS CALL TO GET SCHEDULE FOR RIGHT KNEE INJECTIONS

## 2022-06-09 ENCOUNTER — OFFICE VISIT (OUTPATIENT)
Dept: ORTHOPEDIC SURGERY | Facility: CLINIC | Age: 82
End: 2022-06-09

## 2022-06-09 VITALS
WEIGHT: 194.6 LBS | HEIGHT: 65 IN | SYSTOLIC BLOOD PRESSURE: 108 MMHG | DIASTOLIC BLOOD PRESSURE: 71 MMHG | BODY MASS INDEX: 32.42 KG/M2 | HEART RATE: 61 BPM

## 2022-06-09 DIAGNOSIS — M17.11 PRIMARY OSTEOARTHRITIS OF RIGHT KNEE: Primary | ICD-10-CM

## 2022-06-09 DIAGNOSIS — E66.9 OBESITY (BMI 30.0-34.9): ICD-10-CM

## 2022-06-09 PROBLEM — E66.811 OBESITY (BMI 30.0-34.9): Status: ACTIVE | Noted: 2022-06-09

## 2022-06-09 PROCEDURE — 20610 DRAIN/INJ JOINT/BURSA W/O US: CPT | Performed by: PHYSICIAN ASSISTANT

## 2022-06-09 RX ORDER — LISINOPRIL 2.5 MG/1
2.5 TABLET ORAL DAILY
COMMUNITY
Start: 2022-05-13

## 2022-06-09 RX ORDER — LIDOCAINE HYDROCHLORIDE 10 MG/ML
2 INJECTION, SOLUTION INFILTRATION; PERINEURAL
Status: COMPLETED | OUTPATIENT
Start: 2022-06-09 | End: 2022-06-09

## 2022-06-09 RX ORDER — TRIAMCINOLONE ACETONIDE 40 MG/ML
80 INJECTION, SUSPENSION INTRA-ARTICULAR; INTRAMUSCULAR
Status: COMPLETED | OUTPATIENT
Start: 2022-06-09 | End: 2022-06-09

## 2022-06-09 RX ADMIN — TRIAMCINOLONE ACETONIDE 80 MG: 40 INJECTION, SUSPENSION INTRA-ARTICULAR; INTRAMUSCULAR at 10:56

## 2022-06-09 RX ADMIN — LIDOCAINE HYDROCHLORIDE 2 ML: 10 INJECTION, SOLUTION INFILTRATION; PERINEURAL at 10:56

## 2022-06-09 NOTE — PROGRESS NOTES
ORTHO FOLLOW UP       Subjective:    HPI:   Domonique See is a 82 y.o. female who presents in follow-up for right knee pain with a known history of right knee DJD.  She last had an intra-articular steroid injection on 2021, which did help to reduce her pain.      Past Medical History:   Diagnosis Date   • Anemia    • CHF (congestive heart failure) (HCC)    • GERD (gastroesophageal reflux disease)    • Hx of hypertrophic cardiomyopathy    • Hypertension    • Peptic ulceration    • Primary osteoarthritis of both knees 2021   • Primary osteoarthritis of right knee 2020       Past Surgical History:   Procedure Laterality Date   • ABDOMINAL HERNIA REPAIR     • BACK SURGERY     • BREAST AUGMENTATION     • CARDIAC PACEMAKER PLACEMENT  2017    Dual Chamber   • CERVICAL RIB RESECTION Bilateral    • CHOLECYSTECTOMY     • COLONOSCOPY     • CORONARY ARTERY BYPASS GRAFT     • THORACIC DECOMPRESSION POSTERIOR FUSION  2014    L3-5 & S1   • UPPER GASTROINTESTINAL ENDOSCOPY         Social History     Occupational History   • Not on file   Tobacco Use   • Smoking status: Former Smoker     Years: 8.00     Quit date:      Years since quittin.4   • Smokeless tobacco: Never Used   Vaping Use   • Vaping Use: Never used   Substance and Sexual Activity   • Alcohol use: No   • Drug use: No   • Sexual activity: Defer      The following portions of the patient's history were reviewed and updated as appropriate: allergies, current medications, past family history, past medical history, past social history, past surgical history and problem list.    Medications:    Current Outpatient Medications:   •  aspirin 325 MG tablet, Take 325 mg by mouth Daily., Disp: , Rfl:   •  atorvastatin (LIPITOR) 40 MG tablet, Take 1 tablet by mouth Every Night., Disp: 30 tablet, Rfl: 0  •  bumetanide (BUMEX) 0.5 MG tablet, Take 0.5 mg by mouth Daily., Disp: , Rfl:   •  diclofenac sodium (VOTAREN XR) 100 MG 24 hr  "tablet, Take 100 mg by mouth Daily., Disp: , Rfl:   •  fluticasone (FLONASE) 50 MCG/ACT nasal spray, 1 spray into the nostril(s) as directed by provider Daily., Disp: , Rfl:   •  lisinopril (PRINIVIL,ZESTRIL) 2.5 MG tablet, 1 tablet Daily., Disp: , Rfl:   •  multivitamin with minerals tablet tablet, Take 1 tablet by mouth Daily., Disp: , Rfl:   •  NEXIUM 40 MG capsule, Take 40 mg by mouth Daily., Disp: , Rfl:   •  oxyCODONE-acetaminophen (PERCOCET)  MG per tablet, Take 1 tablet by mouth Every 4 (Four) Hours As Needed., Disp: , Rfl: 0  •  polyethylene glycol (MIRALAX) 17 g packet, Take 17 g by mouth Daily., Disp: , Rfl:   •  sertraline (ZOLOFT) 100 MG tablet, Take 100 mg by mouth Daily., Disp: , Rfl:     Allergies:  No Known Allergies    Review of Systems:  Gen -no fever, chills , sweats, headache   Eyes - no irritation or discharge   ENT -  no ear pain , runny nose , sore throat , difficulty swallowing   Resp - no cough , congestion , excessive expectoration   CVS - no chest pain , palpitations.   Abd - no pain , nausea , vomiting , diarrhea   Skin - no rash , lesions.   Neuro - no dizziness    Please see HPI for any other pertinent positives.  All other systems were reviewed and are negative.       Objective   Objective:    /71 (BP Location: Left arm, Patient Position: Sitting, Cuff Size: Large Adult)   Pulse 61   Ht 165.1 cm (65\")   Wt 88.3 kg (194 lb 9.6 oz)   LMP  (LMP Unknown)   BMI 32.38 kg/m²     Physical Examination:  Alert, oriented, obese individual in no acute distress, walking unassisted  Right lower extremity shows no erythema, rashes, or open skin lesions. There is a mild amount of swelling. It is grossly well aligned, and the patient is neurovascularly intact distally. The knee is stable to varus and valgus stress, there is no patellar maltracking or crepitus noted, and plantar and dorsiflexion is 5/5. There is mild tenderness to palpation and with range of motion, which is decreased " due to pain.             Imaging:              Assessment:  1. Primary osteoarthritis of right knee    2. Obesity (BMI 30.0-34.9)                 Plan:  She would like to continue conservative treatment measures at this time.  If she fails conservative treatment options, she would likely be a candidate for total knee replacement.  Intra-articular steroid injection today, risks and benefits were discussed and postinjection instructions were given.  Continue weight loss efforts.  We did discuss future use with Visco supplementation.  She may follow-up as needed.  - Large Joint Arthrocentesis: R knee on 6/9/2022 10:56 AM  Indications: pain  Details: 25 G needle, anterolateral approach  Medications: 2 mL lidocaine 1 %; 80 mg triamcinolone acetonide 40 MG/ML  Outcome: tolerated well, no immediate complications  Procedure, treatment alternatives, risks and benefits explained, specific risks discussed. Consent was given by the patient. Immediately prior to procedure a time out was called to verify the correct patient, procedure, equipment, support staff and site/side marked as required. Patient was prepped and draped in the usual sterile fashion.                    MADISON Coleman  06/09/22  10:54 EDT

## 2022-11-07 ENCOUNTER — TELEPHONE (OUTPATIENT)
Dept: ORTHOPEDIC SURGERY | Facility: CLINIC | Age: 82
End: 2022-11-07

## 2022-11-07 NOTE — TELEPHONE ENCOUNTER
Caller: PATIENT    Relationship to patient: SELF     Best call back number: [phone]    Chief complaint: RIGHT KNEE PAIN    Type of visit: INJECTION    Requested date: PATIENT PREFERS ANYTHING AFTER 10 AM.     Additional notes: PATIENT WAS CALLING TO SCHEDULE AN INJECTION WITH MS. RODRIGUEZ FOR THE RIGHT KNEE. PATIENT'S LAST APPT WITH MS. RODRIGUEZ WAS ON 06.09.22. PATIENT IS REQUESTING AN APPT AFTER 10:00 AM. THANK YOU!

## 2022-11-09 ENCOUNTER — CLINICAL SUPPORT (OUTPATIENT)
Dept: ORTHOPEDIC SURGERY | Facility: CLINIC | Age: 82
End: 2022-11-09

## 2022-11-09 VITALS — HEART RATE: 60 BPM | WEIGHT: 194 LBS | BODY MASS INDEX: 32.32 KG/M2 | HEIGHT: 65 IN

## 2022-11-09 DIAGNOSIS — M17.11 PRIMARY OSTEOARTHRITIS OF RIGHT KNEE: Primary | ICD-10-CM

## 2022-11-09 DIAGNOSIS — E66.9 OBESITY (BMI 30.0-34.9): ICD-10-CM

## 2022-11-09 PROCEDURE — 20610 DRAIN/INJ JOINT/BURSA W/O US: CPT | Performed by: PHYSICIAN ASSISTANT

## 2022-11-09 RX ORDER — ZOLPIDEM TARTRATE 5 MG/1
5 TABLET ORAL NIGHTLY PRN
COMMUNITY
Start: 2022-10-17

## 2022-11-09 RX ORDER — METHYLPREDNISOLONE ACETATE 40 MG/ML
80 INJECTION, SUSPENSION INTRA-ARTICULAR; INTRALESIONAL; INTRAMUSCULAR; SOFT TISSUE
Status: COMPLETED | OUTPATIENT
Start: 2022-11-09 | End: 2022-11-09

## 2022-11-09 RX ORDER — LIDOCAINE HYDROCHLORIDE 10 MG/ML
2 INJECTION, SOLUTION EPIDURAL; INFILTRATION; INTRACAUDAL; PERINEURAL
Status: COMPLETED | OUTPATIENT
Start: 2022-11-09 | End: 2022-11-09

## 2022-11-09 RX ADMIN — LIDOCAINE HYDROCHLORIDE 2 ML: 10 INJECTION, SOLUTION EPIDURAL; INFILTRATION; INTRACAUDAL; PERINEURAL at 14:17

## 2022-11-09 RX ADMIN — METHYLPREDNISOLONE ACETATE 80 MG: 40 INJECTION, SUSPENSION INTRA-ARTICULAR; INTRALESIONAL; INTRAMUSCULAR; SOFT TISSUE at 14:17

## 2022-11-09 NOTE — PROGRESS NOTES
ORTHO FOLLOW UP       Subjective:    HPI:   Domonique See is a 82 y.o. female who presents in follow-up for right knee pain with a known history of right knee DJD.  She last had an intra-articular steroid injection on 2022, which did help to reduce her pain.      Past Medical History:   Diagnosis Date   • Anemia    • CHF (congestive heart failure) (HCC)    • GERD (gastroesophageal reflux disease)    • Hx of hypertrophic cardiomyopathy    • Hypertension    • Peptic ulceration    • Primary osteoarthritis of both knees 2021   • Primary osteoarthritis of right knee 2020       Past Surgical History:   Procedure Laterality Date   • ABDOMINAL HERNIA REPAIR     • BACK SURGERY     • BREAST AUGMENTATION     • CARDIAC PACEMAKER PLACEMENT  2017    Dual Chamber   • CERVICAL RIB RESECTION Bilateral    • CHOLECYSTECTOMY     • COLONOSCOPY     • CORONARY ARTERY BYPASS GRAFT     • THORACIC DECOMPRESSION POSTERIOR FUSION  2014    L3-5 & S1   • UPPER GASTROINTESTINAL ENDOSCOPY         Social History     Occupational History   • Not on file   Tobacco Use   • Smoking status: Former     Years: 8.00     Types: Cigarettes     Quit date:      Years since quittin.8   • Smokeless tobacco: Never   Vaping Use   • Vaping Use: Never used   Substance and Sexual Activity   • Alcohol use: No   • Drug use: No   • Sexual activity: Defer      The following portions of the patient's history were reviewed and updated as appropriate: allergies, current medications, past family history, past medical history, past social history, past surgical history and problem list.    Medications:    Current Outpatient Medications:   •  aspirin 325 MG tablet, Take 325 mg by mouth Daily., Disp: , Rfl:   •  atorvastatin (LIPITOR) 40 MG tablet, Take 1 tablet by mouth Every Night., Disp: 30 tablet, Rfl: 0  •  bumetanide (BUMEX) 0.5 MG tablet, Take 0.5 mg by mouth Daily., Disp: , Rfl:   •  diclofenac sodium (VOTAREN XR) 100 MG  "24 hr tablet, Take 100 mg by mouth Daily., Disp: , Rfl:   •  lisinopril (PRINIVIL,ZESTRIL) 2.5 MG tablet, 1 tablet Daily., Disp: , Rfl:   •  multivitamin with minerals tablet tablet, Take 1 tablet by mouth Daily., Disp: , Rfl:   •  NEXIUM 40 MG capsule, Take 40 mg by mouth Daily., Disp: , Rfl:   •  oxyCODONE-acetaminophen (PERCOCET)  MG per tablet, Take 1 tablet by mouth Every 4 (Four) Hours As Needed., Disp: , Rfl: 0  •  polyethylene glycol (MIRALAX) 17 g packet, Take 17 g by mouth Daily., Disp: , Rfl:   •  sertraline (ZOLOFT) 100 MG tablet, Take 100 mg by mouth Daily., Disp: , Rfl:   •  zolpidem (AMBIEN) 5 MG tablet, Take 1 tablet by mouth every night at bedtime., Disp: , Rfl:   •  fluticasone (FLONASE) 50 MCG/ACT nasal spray, 1 spray into the nostril(s) as directed by provider Daily., Disp: , Rfl:     Allergies:  No Known Allergies       Objective   Objective:    Pulse 60   Ht 165.1 cm (65\")   Wt 88 kg (194 lb)   LMP  (LMP Unknown)   BMI 32.28 kg/m²     Physical Examination:  Alert, oriented, obese individual in no acute distress, walking unassisted  Right lower extremity shows no erythema, rashes, or open skin lesions. There is a mild amount of swelling. It is grossly well aligned, and the patient is neurovascularly intact distally. The knee is stable to varus and valgus stress, there is no patellar maltracking or crepitus noted, and plantar and dorsiflexion is 5/5. There is mild tenderness to palpation and with range of motion, which is decreased due to pain.             Imaging:              Assessment:  1. Primary osteoarthritis of right knee    2. Obesity (BMI 30.0-34.9)                 Plan:  Right knee intra-articular steroid injection today, risks and benefits were discussed and postinjection instructions were given.  Continue weight loss efforts.  Follow-up in 3 months.  - Large Joint Arthrocentesis: R knee on 11/9/2022 2:17 PM  Indications: pain  Details: 25 G needle, anterolateral " approach  Medications: 80 mg methylPREDNISolone acetate 40 MG/ML; 2 mL lidocaine PF 1% 1 %  Outcome: tolerated well, no immediate complications  Procedure, treatment alternatives, risks and benefits explained, specific risks discussed. Consent was given by the patient. Immediately prior to procedure a time out was called to verify the correct patient, procedure, equipment, support staff and site/side marked as required. Patient was prepped and draped in the usual sterile fashion.                    MADISON Coleman  11/09/22  14:16 EST

## 2022-11-14 PROCEDURE — 93296 REM INTERROG EVL PM/IDS: CPT | Performed by: INTERNAL MEDICINE

## 2022-11-14 PROCEDURE — 93294 REM INTERROG EVL PM/LDLS PM: CPT | Performed by: INTERNAL MEDICINE

## 2022-11-22 ENCOUNTER — OFFICE VISIT (OUTPATIENT)
Dept: CARDIOLOGY | Facility: CLINIC | Age: 82
End: 2022-11-22

## 2022-11-22 VITALS
DIASTOLIC BLOOD PRESSURE: 70 MMHG | BODY MASS INDEX: 32.15 KG/M2 | WEIGHT: 193 LBS | SYSTOLIC BLOOD PRESSURE: 113 MMHG | OXYGEN SATURATION: 96 % | HEIGHT: 65 IN | HEART RATE: 60 BPM

## 2022-11-22 DIAGNOSIS — R53.83 OTHER FATIGUE: ICD-10-CM

## 2022-11-22 DIAGNOSIS — I10 ESSENTIAL HYPERTENSION: ICD-10-CM

## 2022-11-22 DIAGNOSIS — I48.0 PAROXYSMAL ATRIAL FIBRILLATION: ICD-10-CM

## 2022-11-22 DIAGNOSIS — Z95.0 PACEMAKER: ICD-10-CM

## 2022-11-22 DIAGNOSIS — E66.9 OBESITY (BMI 30-39.9): ICD-10-CM

## 2022-11-22 DIAGNOSIS — R06.09 DYSPNEA ON EXERTION: Primary | ICD-10-CM

## 2022-11-22 DIAGNOSIS — I42.1 CARDIOMYOPATHY, HYPERTROPHIC OBSTRUCTIVE: ICD-10-CM

## 2022-11-22 PROCEDURE — 99214 OFFICE O/P EST MOD 30 MIN: CPT | Performed by: INTERNAL MEDICINE

## 2022-11-22 PROCEDURE — 93000 ELECTROCARDIOGRAM COMPLETE: CPT | Performed by: INTERNAL MEDICINE

## 2022-11-22 RX ORDER — ASPIRIN 81 MG/1
81 TABLET ORAL DAILY
Qty: 90 TABLET | Refills: 3 | Status: ON HOLD | OUTPATIENT
Start: 2022-11-22 | End: 2023-01-16

## 2022-11-22 NOTE — PROGRESS NOTES
Subjective:     Encounter Date:11/22/2022      Patient ID: Domonique See is a 82 y.o. female.    Chief Complaint : Follow-up for hypertrophic cardiomyopathy, pacemaker, CHF, dyslipidemia    History of Present Illness           Ms. Domonique See  has PMH of     Hypertrophic cardiomyopathy, history of septal myomectomy,      nonsustained VT  hypertension  SSS, [employer] permanent pacemaker 3/16/2017  Paroxysmal atrial fibrillation, refused anticoagulation  CAD, cath 12/2017 30% LAD  CHF due to hypertrophic cardiomyopathy  Hyperlipidemia  COPD  Peptic ulcer disease  Back surgery, breast augmentation  Former smoker     Here for follow-up..  Patient is to see Dr. Louis in the past.  Patient has history of paroxysmal A. fib previously refused anticoagulation.  Patient is complaining of fatigue and dyspnea on exertion.  Patient is willing to go on anticoagulation now.    Patient's arterial blood pressure is 113/77, heart rate 60, O2 sat of 96% on room air.  BMI is over 32.    Echocardiogram 6/29/2021 reveals EF of 50% with LV and RV  Labs from 3/8/2022 revealed normal ESR and CRP.  A1c improved to 5.9 from 6.1 previously, TSH 3.9.  CMP with a potassium of 5.3 creatinine of 1.05 and GFR 52.  Normal CBC.  Lipid profile with cholesterol 124 triglycerides 73 HDL 57 LDL 52.        Assessment:     Fatigue  Dyspnea on exertion  Hypertrophic cardiomyopathy, myomectomy, NSVT  Chronic HFpEF due to diastolic dysfunction from hypertrophic cardiomyopathy  Hypertension  Dyslipidemia  Prediabetes  Paroxysmal atrial fibrillation  Obesity with BMI over 30  CKD  Hyperkalemia        Recommendations and plan:     Reviewed EKG results with patient.  CHADVASC2 SCORE   [id number] Score: 7 (11/25/2022 10:42 PM)  Patient has a high CHADS2 Vascor of 7 due to female gender, age over 75, history of stroke, CHF, hypertension making it 7, will benefit from long-term anticoagulation to prevent thromboembolic events.  Patient previously  had refused anticoagulation is willing to take Eliquis at the current time.  Risk benefits alternatives explained.  Will decrease aspirin to baby aspirin continue Eliquis 5 twice daily.  Will check labs before visit.  Continue aspirin, atorvastatin, Bumex, lisinopril as tolerated  Reviewed BMI over 30 counseled on weight loss diet and exercise.Patient's pacemaker check is revealing intermittent mode switching due to A. fib.  Will follow-up in pacer clinic.         ECG 12 Lead    Date/Time: 11/22/2022 12:50 PM  Performed by: Evgeny Gregory MD  Authorized by: Evgeny Gregory MD   Comparison: compared with previous ECG from 3/9/2022  Comparison to previous ECG: EKG done today reviewed/interpreted by me reveals a paced rhythm at the rate of 63 bpm with LVH, no new change compared EKG from 3/9/2022.             Lexiscan Cardiolite performed 4/7/2022 reviewed/interpreted by me reveals normal perfusion EF of 64%    Copied text in this portion of the note has been reviewed and is accurate as of 11/25/2022  The following portions of the patient's history were reviewed and updated as appropriate: allergies, current medications, past family history, past medical history, past social history, past surgical history and problem list.    Assessment:         MDM     Diagnosis Plan   1. Dyspnea on exertion  Comprehensive Metabolic Panel    Lipid Panel    BNP    TSH    ECG 12 Lead      2. Other fatigue  Comprehensive Metabolic Panel    Lipid Panel    BNP    TSH    ECG 12 Lead      3. Paroxysmal atrial fibrillation (HCC)  Comprehensive Metabolic Panel    Lipid Panel    BNP    TSH    ECG 12 Lead      4. Pacemaker  Comprehensive Metabolic Panel    Lipid Panel    BNP    TSH    ECG 12 Lead      5. Essential hypertension  Comprehensive Metabolic Panel    Lipid Panel    BNP    TSH    ECG 12 Lead      6. Obesity (BMI 30-39.9)  Comprehensive Metabolic Panel    Lipid Panel    BNP    TSH    ECG 12 Lead      7.  Cardiomyopathy, hypertrophic obstructive (HCC)  ECG 12 Lead             Plan:               Past Medical History:  Past Medical History:   Diagnosis Date   • Anemia    • CHF (congestive heart failure) (HCC)    • GERD (gastroesophageal reflux disease)    • Hx of hypertrophic cardiomyopathy    • Hypertension    • Peptic ulceration    • Primary osteoarthritis of both knees 2/8/2021   • Primary osteoarthritis of right knee 9/21/2020     Past Surgical History:  Past Surgical History:   Procedure Laterality Date   • ABDOMINAL HERNIA REPAIR     • BACK SURGERY     • BREAST AUGMENTATION     • CARDIAC PACEMAKER PLACEMENT  03/16/2017    Dual Chamber   • CERVICAL RIB RESECTION Bilateral    • CHOLECYSTECTOMY     • COLONOSCOPY     • CORONARY ARTERY BYPASS GRAFT  2013   • THORACIC DECOMPRESSION POSTERIOR FUSION  06/04/2014    L3-5 & S1   • UPPER GASTROINTESTINAL ENDOSCOPY        Allergies:  No Known Allergies  Home Meds:  Current Meds:     Current Outpatient Medications:   •  atorvastatin (LIPITOR) 40 MG tablet, Take 1 tablet by mouth Every Night., Disp: 30 tablet, Rfl: 0  •  bumetanide (BUMEX) 0.5 MG tablet, Take 0.5 mg by mouth Daily., Disp: , Rfl:   •  diclofenac sodium (VOTAREN XR) 100 MG 24 hr tablet, Take 100 mg by mouth Daily., Disp: , Rfl:   •  lisinopril (PRINIVIL,ZESTRIL) 2.5 MG tablet, 1 tablet Daily., Disp: , Rfl:   •  multivitamin with minerals tablet tablet, Take 1 tablet by mouth Daily., Disp: , Rfl:   •  NEXIUM 40 MG capsule, Take 40 mg by mouth Daily., Disp: , Rfl:   •  oxyCODONE-acetaminophen (PERCOCET)  MG per tablet, Take 1 tablet by mouth Every 4 (Four) Hours As Needed., Disp: , Rfl: 0  •  polyethylene glycol (MIRALAX) 17 g packet, Take 17 g by mouth Daily., Disp: , Rfl:   •  sertraline (ZOLOFT) 100 MG tablet, Take 100 mg by mouth Daily., Disp: , Rfl:   •  zolpidem (AMBIEN) 5 MG tablet, Take 1 tablet by mouth every night at bedtime., Disp: , Rfl:   •  apixaban (ELIQUIS) 5 MG tablet tablet, Take 1 tablet  "by mouth 2 (Two) Times a Day., Disp: 180 tablet, Rfl: 3  •  aspirin 81 MG EC tablet, Take 1 tablet by mouth Daily., Disp: 90 tablet, Rfl: 3  •  fluticasone (FLONASE) 50 MCG/ACT nasal spray, 1 spray into the nostril(s) as directed by provider Daily., Disp: , Rfl:   Social History:   Social History     Tobacco Use   • Smoking status: Former     Years: 8.00     Types: Cigarettes     Quit date:      Years since quittin.9   • Smokeless tobacco: Never   Substance Use Topics   • Alcohol use: No      Family History:  Family History   Problem Relation Age of Onset   • Colon cancer Mother    • Colon cancer Brother    • Colon cancer Daughter               Review of Systems   Constitutional: Positive for malaise/fatigue.   Cardiovascular: Positive for leg swelling. Negative for chest pain and palpitations.   Respiratory: Positive for shortness of breath.    Skin: Negative for rash.   Neurological: Positive for dizziness. Negative for light-headedness and numbness.     All other systems are negative         Objective:     Physical Exam  /70   Pulse 60   Ht 165.1 cm (65\")   Wt 87.5 kg (193 lb)   LMP  (LMP Unknown)   SpO2 96%   BMI 32.12 kg/m²   General:  Appears in no acute distress  Eyes: Sclera is anicteric,  conjunctiva is clear   HEENT:  No JVD.  No carotid bruits  Respiratory: Respirations regular and unlabored at rest.  Clear to auscultation  Cardiovascular: S1,S2 Regular rate and rhythm. No murmur, rub or gallop auscultated.   Extremities: No digital clubbing or cyanosis, no edema  Skin: Color pink. Skin warm and dry to touch. No rashes  No xanthoma  Neuro: Alert and awake.    Lab Reviewed:         Evgeny Gregory MD  2022 22:42 EST      EMR Dragon/Transcription:   \"Dictated utilizing Dragon dictation\".        "

## 2022-12-16 ENCOUNTER — TELEPHONE (OUTPATIENT)
Dept: CARDIOLOGY | Facility: CLINIC | Age: 82
End: 2022-12-16

## 2022-12-16 NOTE — TELEPHONE ENCOUNTER
Per MARGAUX Reyes Dr wants this pt to see Dr Young     Atrial Flutter with RVR    (MARGAUX Reyes does not need to see pt )

## 2022-12-16 NOTE — TELEPHONE ENCOUNTER
CALLED PATIENT. MADE APT 12/27 @ 11. I DID OFFER APT 12/20 @ 8:45 AM, BUT SHE WAS NOT ABLE TO MAKE IT THAT DAY.

## 2022-12-19 ENCOUNTER — TELEPHONE (OUTPATIENT)
Dept: ORTHOPEDIC SURGERY | Facility: CLINIC | Age: 82
End: 2022-12-19

## 2022-12-19 NOTE — TELEPHONE ENCOUNTER
Caller: GEOFFREY GENAO    Relationship to patient: SELF    Best call back number: [phone]    Chief complaint: RT SHOULDER PAIN    Type of visit: F/U    Requested date: ASAP     If rescheduling, when is the original appointment: 2-2-23     Additional notes: PT IS EXPERIENCING CONSTANT PAIN FROM SHOULDER TO FINGER TIPS & WOULD LIKE A CALL BACK TO POSSIBLY BE WORKED IN SOONER THAN 2-2-23 APPT. PT WAS LAST SEEN FOR SHOULDER SEPT 2020

## 2022-12-27 ENCOUNTER — TELEPHONE (OUTPATIENT)
Dept: CARDIOLOGY | Facility: CLINIC | Age: 82
End: 2022-12-27

## 2022-12-27 NOTE — TELEPHONE ENCOUNTER
Pt dx with Covid she was prescribed Paxlovid. Her pharmacist told her that this interacts with Eliquis. She is asking if she can d/c Eliquis temporarily.

## 2023-01-10 ENCOUNTER — OFFICE VISIT (OUTPATIENT)
Dept: CARDIOLOGY | Facility: CLINIC | Age: 83
End: 2023-01-10
Payer: MEDICARE

## 2023-01-10 ENCOUNTER — PREP FOR SURGERY (OUTPATIENT)
Dept: OTHER | Facility: HOSPITAL | Age: 83
End: 2023-01-10
Payer: MEDICARE

## 2023-01-10 VITALS
DIASTOLIC BLOOD PRESSURE: 66 MMHG | SYSTOLIC BLOOD PRESSURE: 129 MMHG | WEIGHT: 191 LBS | HEIGHT: 65 IN | OXYGEN SATURATION: 99 % | BODY MASS INDEX: 31.82 KG/M2 | HEART RATE: 93 BPM

## 2023-01-10 DIAGNOSIS — I42.1 HYPERTROPHIC OBSTRUCTIVE CARDIOMYOPATHY: ICD-10-CM

## 2023-01-10 DIAGNOSIS — I48.0 PAROXYSMAL ATRIAL FIBRILLATION: Primary | ICD-10-CM

## 2023-01-10 DIAGNOSIS — I48.19 PERSISTENT ATRIAL FIBRILLATION: Primary | ICD-10-CM

## 2023-01-10 DIAGNOSIS — Z95.0 PACEMAKER: ICD-10-CM

## 2023-01-10 PROCEDURE — 99214 OFFICE O/P EST MOD 30 MIN: CPT | Performed by: INTERNAL MEDICINE

## 2023-01-10 RX ORDER — GUAIFENESIN 600 MG/1
1200 TABLET, EXTENDED RELEASE ORAL 2 TIMES DAILY
COMMUNITY

## 2023-01-10 RX ORDER — HYDROCODONE BITARTRATE AND ACETAMINOPHEN 10; 325 MG/1; MG/1
1 TABLET ORAL 4 TIMES DAILY PRN
Status: ON HOLD | COMMUNITY
Start: 2022-12-22 | End: 2023-01-16

## 2023-01-10 NOTE — H&P (VIEW-ONLY)
HP      Name: Domonique See ADMIT: (Not on file)   : 1940  PCP: Anish Lew FNP    MRN: [id number] LOS: 0 days   AGE/SEX: 82 y.o. female  ROOM: Room/bed info not found     Chief Complaint   Patient presents with   • Consult   • Atrial Flutter       Subjective        History of present illness  Domonique See is an 82-year-old female patient who has history of hypertrophic obstructive cardiomyopathy and has had myomectomy at [facility] in , also has a dual-chamber [employer] pacemaker on 3/16/2017 for sick sinus syndrome.  Patient is referred here today due to atrial fibrillation and atrial flutter seen on device interrogation in 2022.  Patient says that here in the last several months she has been feeling very fatigued and unable to keep up with her grandchildren which she used to easily previously.  She denies any chest pain, no syncopal episodes no lower extremity edema.    Past Medical History:   Diagnosis Date   • Anemia    • CHF (congestive heart failure) (HCC)    • Coronary artery disease    • GERD (gastroesophageal reflux disease)    • Heart murmur Don't know   • Hx of hypertrophic cardiomyopathy    • Hypertension    • Peptic ulceration    • Primary osteoarthritis of both knees 2021   • Primary osteoarthritis of right knee 2020   • Sleep apnea      Past Surgical History:   Procedure Laterality Date   • ABDOMINAL HERNIA REPAIR     • BACK SURGERY     • BREAST AUGMENTATION     • CARDIAC CATHETERIZATION     • CARDIAC PACEMAKER PLACEMENT  2017    Dual Chamber   • CERVICAL RIB RESECTION Bilateral    • CHOLECYSTECTOMY     • COLONOSCOPY     • CORONARY ARTERY BYPASS GRAFT     • THORACIC DECOMPRESSION POSTERIOR FUSION  2014    L3-5 & S1   • UPPER GASTROINTESTINAL ENDOSCOPY       Family History   Problem Relation Age of Onset   • Colon cancer Mother    • Colon cancer Brother    • Heart attack Brother    • Colon cancer Daughter      Social History      Tobacco Use   • Smoking status: Former     Packs/day: 1.00     Years: 8.00     Pack years: 8.00     Types: Cigarettes     Start date: 1977     Quit date: 1985     Years since quittin.0     Passive exposure: Past   • Smokeless tobacco: Never   • Tobacco comments:     Quit    Vaping Use   • Vaping Use: Never used   Substance Use Topics   • Alcohol use: Never   • Drug use: Never     (Not in a hospital admission)    Allergies:  Patient has no known allergies.    Review of systems    Constitutional: Negative.    Respiratory and cardiovascular: As detailed in HPI section.  Gastrointestinal: Negative for constipation, nausea and vomiting negative for abdominal distention, abdominal pain and diarrhea.   Genitourinary: Negative for difficulty urinating and flank pain.   Musculoskeletal: Negative for arthralgias, joint swelling and myalgias.   Skin: Negative for color change, rash and wound.   Neurological: Negative for dizziness, syncope, weakness and headaches.   Hematological: Negative for adenopathy.   Psychiatric/Behavioral: Negative for confusion.   All other systems reviewed and are negative.    Physical Exam  VITALS REVIEWED    General:      well developed, in no acute distress.    Head:      normocephalic and atraumatic.    Eyes:      PERRL/EOM intact, conjunctiva and sclera clear with out nystagmus.    Neck:      no masses, thyromegaly,  trachea central with normal respiratory effort and PMI displaced laterally  Lungs:      Clear to auscultation bilaterally  Heart:       Irregular rhythm  Msk:      no deformity or scoliosis noted of thoracic or lumbar spine.    Pulses:      pulses normal in all 4 extremities.    Extremities:       No lower extremity edema  Neurologic:      no focal deficits.   alert oriented x3  Skin:      intact without lesions or rashes.    Psych:      alert and cooperative; normal mood and affect; normal attention span and concentration.      Result Review :                Pertinent cardiac workup    1. Stress test 4/7/2022 normal, ejection fraction 64%.  2. EKG November 22, 2022 atrial paced      Procedures        Assessment and Plan      Domonique See is an 82-year-old female patient who has history of hypertrophic obstructive cardiomyopathy status post myomectomy in 2013, also sick sinus syndrome and presence of a dual-chamber pacemaker, is referred here for evaluation of arrhythmia.  Patient had been in sinus rhythm, up until December 2022 and device interrogation did not show atrial arrhythmia with regular intervals, most likely atrial flutter but also most likely presence of A. fib as well given her history of cardiac surgery.  Patient has been feeling fatigued and tired over the last couple of months most likely coinciding with onset of arrhythmia.  I did discuss with the patient about rhythm management options including cardioversion with addition of an antiarrhythmic  or ablation, and also potential need for combination of more than 1 therapy measure.  Patient seem to be interested in having an ablation done, I went over the risks and benefits of the procedure and she is agreeable.  We will go ahead and schedule her for ablation of A. fib and atrial flutter.  Patient was appropriately started on Eliquis which she will continue.  Her heart rate is not elevated, therefore I will not add any other medications today.      Diagnoses and all orders for this visit:    1. Paroxysmal atrial fibrillation (HCC) (Primary)    2. Pacemaker    3. Hypertrophic obstructive cardiomyopathy (HCC)           No follow-ups on file.  Patient was given instructions and counseling regarding her condition or for health maintenance advice. Please see specific information pulled into the AVS if appropriate.

## 2023-01-10 NOTE — PROGRESS NOTES
HP      Name: Domonique See ADMIT: (Not on file)   : 1940  PCP: Anish Lew FNP    MRN: [id number] LOS: 0 days   AGE/SEX: 82 y.o. female  ROOM: Room/bed info not found     Chief Complaint   Patient presents with   • Consult   • Atrial Flutter       Subjective        History of present illness  Domonique See is an 82-year-old female patient who has history of hypertrophic obstructive cardiomyopathy and has had myomectomy at [facility] in , also has a dual-chamber [employer] pacemaker on 3/16/2017 for sick sinus syndrome.  Patient is referred here today due to atrial fibrillation and atrial flutter seen on device interrogation in 2022.  Patient says that here in the last several months she has been feeling very fatigued and unable to keep up with her grandchildren which she used to easily previously.  She denies any chest pain, no syncopal episodes no lower extremity edema.    Past Medical History:   Diagnosis Date   • Anemia    • CHF (congestive heart failure) (HCC)    • Coronary artery disease    • GERD (gastroesophageal reflux disease)    • Heart murmur Don't know   • Hx of hypertrophic cardiomyopathy    • Hypertension    • Peptic ulceration    • Primary osteoarthritis of both knees 2021   • Primary osteoarthritis of right knee 2020   • Sleep apnea      Past Surgical History:   Procedure Laterality Date   • ABDOMINAL HERNIA REPAIR     • BACK SURGERY     • BREAST AUGMENTATION     • CARDIAC CATHETERIZATION     • CARDIAC PACEMAKER PLACEMENT  2017    Dual Chamber   • CERVICAL RIB RESECTION Bilateral    • CHOLECYSTECTOMY     • COLONOSCOPY     • CORONARY ARTERY BYPASS GRAFT     • THORACIC DECOMPRESSION POSTERIOR FUSION  2014    L3-5 & S1   • UPPER GASTROINTESTINAL ENDOSCOPY       Family History   Problem Relation Age of Onset   • Colon cancer Mother    • Colon cancer Brother    • Heart attack Brother    • Colon cancer Daughter      Social History      Tobacco Use   • Smoking status: Former     Packs/day: 1.00     Years: 8.00     Pack years: 8.00     Types: Cigarettes     Start date: 1977     Quit date: 1985     Years since quittin.0     Passive exposure: Past   • Smokeless tobacco: Never   • Tobacco comments:     Quit    Vaping Use   • Vaping Use: Never used   Substance Use Topics   • Alcohol use: Never   • Drug use: Never     (Not in a hospital admission)    Allergies:  Patient has no known allergies.    Review of systems    Constitutional: Negative.    Respiratory and cardiovascular: As detailed in HPI section.  Gastrointestinal: Negative for constipation, nausea and vomiting negative for abdominal distention, abdominal pain and diarrhea.   Genitourinary: Negative for difficulty urinating and flank pain.   Musculoskeletal: Negative for arthralgias, joint swelling and myalgias.   Skin: Negative for color change, rash and wound.   Neurological: Negative for dizziness, syncope, weakness and headaches.   Hematological: Negative for adenopathy.   Psychiatric/Behavioral: Negative for confusion.   All other systems reviewed and are negative.    Physical Exam  VITALS REVIEWED    General:      well developed, in no acute distress.    Head:      normocephalic and atraumatic.    Eyes:      PERRL/EOM intact, conjunctiva and sclera clear with out nystagmus.    Neck:      no masses, thyromegaly,  trachea central with normal respiratory effort and PMI displaced laterally  Lungs:      Clear to auscultation bilaterally  Heart:       Irregular rhythm  Msk:      no deformity or scoliosis noted of thoracic or lumbar spine.    Pulses:      pulses normal in all 4 extremities.    Extremities:       No lower extremity edema  Neurologic:      no focal deficits.   alert oriented x3  Skin:      intact without lesions or rashes.    Psych:      alert and cooperative; normal mood and affect; normal attention span and concentration.      Result Review :                Pertinent cardiac workup    1. Stress test 4/7/2022 normal, ejection fraction 64%.  2. EKG November 22, 2022 atrial paced      Procedures        Assessment and Plan      Domonique See is an 82-year-old female patient who has history of hypertrophic obstructive cardiomyopathy status post myomectomy in 2013, also sick sinus syndrome and presence of a dual-chamber pacemaker, is referred here for evaluation of arrhythmia.  Patient had been in sinus rhythm, up until December 2022 and device interrogation did not show atrial arrhythmia with regular intervals, most likely atrial flutter but also most likely presence of A. fib as well given her history of cardiac surgery.  Patient has been feeling fatigued and tired over the last couple of months most likely coinciding with onset of arrhythmia.  I did discuss with the patient about rhythm management options including cardioversion with addition of an antiarrhythmic  or ablation, and also potential need for combination of more than 1 therapy measure.  Patient seem to be interested in having an ablation done, I went over the risks and benefits of the procedure and she is agreeable.  We will go ahead and schedule her for ablation of A. fib and atrial flutter.  Patient was appropriately started on Eliquis which she will continue.  Her heart rate is not elevated, therefore I will not add any other medications today.      Diagnoses and all orders for this visit:    1. Paroxysmal atrial fibrillation (HCC) (Primary)    2. Pacemaker    3. Hypertrophic obstructive cardiomyopathy (HCC)           No follow-ups on file.  Patient was given instructions and counseling regarding her condition or for health maintenance advice. Please see specific information pulled into the AVS if appropriate.

## 2023-01-10 NOTE — LETTER
January 10, 2023     Evgeny Gregory MD  [address] [facility] [place] [address]    Patient: Domonique See   YOB: 1940   Date of Visit: 1/10/2023       Dear Dr. Colt MD:    Thank you for referring Domonique See to me for evaluation. Below are the relevant portions of my assessment and plan of care.    If you have questions, please do not hesitate to call me. I look forward to following Domonique along with you.         Sincerely,        Leeanne Pleitez MD        CC: No Recipients  Leeanne Pleitez MD  01/10/23 1047  Incomplete  HP      Name: Domonique See ADMIT: (Not on file)   : 1940  PCP: Anish Lew FNP    MRN: [id number] LOS: 0 days   AGE/SEX: 82 y.o. female  ROOM: Room/bed info not found     Chief Complaint   Patient presents with   • Consult   • Atrial Flutter       Subjective         History of present illness  Domonique See is an 82-year-old female patient who has history of hypertrophic obstructive cardiomyopathy and has had myomectomy at [facility] in , also has a dual-chamber [employer] pacemaker on 3/16/2017 for sick sinus syndrome.  Patient is referred here today due to atrial fibrillation and atrial flutter seen on device interrogation in 2022.  Patient says that here in the last several months she has been feeling very fatigued and unable to keep up with her grandchildren which she used to easily previously.  She denies any chest pain, no syncopal episodes no lower extremity edema.    Past Medical History:   Diagnosis Date   • Anemia    • CHF (congestive heart failure) ([facility])    • Coronary artery disease    • GERD (gastroesophageal reflux disease)    • Heart murmur Don't know   • Hx of hypertrophic cardiomyopathy    • Hypertension    • Peptic ulceration    • Primary osteoarthritis of both knees 2021   • Primary osteoarthritis of right knee 2020   • Sleep apnea 2017     Past Surgical History:   Procedure  Laterality Date   • ABDOMINAL HERNIA REPAIR     • BACK SURGERY     • BREAST AUGMENTATION     • CARDIAC CATHETERIZATION     • CARDIAC PACEMAKER PLACEMENT  2017    Dual Chamber   • CERVICAL RIB RESECTION Bilateral    • CHOLECYSTECTOMY     • COLONOSCOPY     • CORONARY ARTERY BYPASS GRAFT     • THORACIC DECOMPRESSION POSTERIOR FUSION  2014    L3-5 & S1   • UPPER GASTROINTESTINAL ENDOSCOPY       Family History   Problem Relation Age of Onset   • Colon cancer Mother    • Colon cancer Brother    • Heart attack Brother    • Colon cancer Daughter      Social History     Tobacco Use   • Smoking status: Former     Packs/day: 1.00     Years: 8.00     Pack years: 8.00     Types: Cigarettes     Start date: 1977     Quit date: 1985     Years since quittin.0     Passive exposure: Past   • Smokeless tobacco: Never   • Tobacco comments:     Quit    Vaping Use   • Vaping Use: Never used   Substance Use Topics   • Alcohol use: Never   • Drug use: Never     (Not in a hospital admission)    Allergies:  Patient has no known allergies.    Review of systems    Constitutional: Negative.    Respiratory and cardiovascular: As detailed in HPI section.  Gastrointestinal: Negative for constipation, nausea and vomiting negative for abdominal distention, abdominal pain and diarrhea.   Genitourinary: Negative for difficulty urinating and flank pain.   Musculoskeletal: Negative for arthralgias, joint swelling and myalgias.   Skin: Negative for color change, rash and wound.   Neurological: Negative for dizziness, syncope, weakness and headaches.   Hematological: Negative for adenopathy.   Psychiatric/Behavioral: Negative for confusion.   All other systems reviewed and are negative.    Physical Exam  VITALS REVIEWED    General:      well developed, in no acute distress.    Head:      normocephalic and atraumatic.    Eyes:      PERRL/EOM intact, conjunctiva and sclera clear with out nystagmus.    Neck:      no masses,  thyromegaly,  trachea central with normal respiratory effort and PMI displaced laterally  Lungs:      Clear to auscultation bilaterally  Heart:       Irregular rhythm  Msk:      no deformity or scoliosis noted of thoracic or lumbar spine.    Pulses:      pulses normal in all 4 extremities.    Extremities:       No lower extremity edema  Neurologic:      no focal deficits.   alert oriented x3  Skin:      intact without lesions or rashes.    Psych:      alert and cooperative; normal mood and affect; normal attention span and concentration.      Result Review :              Pertinent cardiac workup    1. Stress test 4/7/2022 normal, ejection fraction 64%.  2. EKG November 22, 2022 atrial paced      Procedures       Assessment and Plan      Domonique See is an 82-year-old female patient who has history of hypertrophic obstructive cardiomyopathy status post myomectomy in 2013, also sick sinus syndrome and presence of a dual-chamber pacemaker, is referred here for evaluation of arrhythmia.  Patient had been in sinus rhythm, up until December 2022 and device interrogation did not show atrial arrhythmia with regular intervals, most likely atrial flutter but also most likely presence of A. fib as well given her history of cardiac surgery.  Patient has been feeling fatigued and tired over the last couple of months most likely coinciding with onset of arrhythmia.  I did discuss with the patient about rhythm management options including cardioversion with addition of an antiarrhythmic  or ablation, and also potential need for combination of more than 1 therapy measure.  Patient seem to be interested in having an ablation done, I went over the risks and benefits of the procedure and she is agreeable.  We will go ahead and schedule her for ablation of A. fib and atrial flutter.  Patient was appropriately started on Eliquis which she will continue.  Her heart rate is not elevated, therefore I will not add any other  medications today.      Diagnoses and all orders for this visit:    1. Paroxysmal atrial fibrillation (HCC) (Primary)    2. Pacemaker    3. Hypertrophic obstructive cardiomyopathy (HCC)           No follow-ups on file.  Patient was given instructions and counseling regarding her condition or for health maintenance advice. Please see specific information pulled into the AVS if appropriate.

## 2023-01-12 ENCOUNTER — TELEPHONE (OUTPATIENT)
Dept: CARDIOLOGY | Facility: CLINIC | Age: 83
End: 2023-01-12
Payer: MEDICARE

## 2023-01-12 NOTE — TELEPHONE ENCOUNTER
Patient was scheduled for LUISA and ablation on 1/26/23 but Dr. CORLEY is going to be out of town. Called and LM for patient to move procedure to this coming Monday on 1/16 @ 11am. Asked her to call back and confirm and go over instructions.

## 2023-01-13 ENCOUNTER — ANESTHESIA EVENT (OUTPATIENT)
Dept: CARDIOLOGY | Facility: HOSPITAL | Age: 83
End: 2023-01-13
Payer: MEDICARE

## 2023-01-13 NOTE — TELEPHONE ENCOUNTER
Spoke with patient yesterday and went over new date, time and instructions for procedure on Monday 1/16.

## 2023-01-13 NOTE — ANESTHESIA PREPROCEDURE EVALUATION
Anesthesia Evaluation     Patient summary reviewed and Nursing notes reviewed   no history of anesthetic complications:  NPO Solid Status: > 8 hours  NPO Liquid Status: > 8 hours           Airway   Dental      Pulmonary    (+) a smoker Former, shortness of breath, sleep apnea,   Cardiovascular     ECG reviewed  PT is on anticoagulation therapy    (+) pacemaker pacemaker, hypertension, valvular problems/murmurs murmur, AI and MR, CAD, CABG, dysrhythmias Atrial Fib, Atrial Flutter, CHF , DVT, hyperlipidemia,       Neuro/Psych  (+) numbness,    GI/Hepatic/Renal/Endo    (+) obesity,  GERD, PUD,      Musculoskeletal     (+) back pain, chronic pain, myalgias, neck pain, radiculopathy  Abdominal    Substance History      OB/GYN          Other   arthritis,      ROS/Med Hx Other: Hyperkalemia, fibromyositis, h/o hypertrophic cardiomyopathy    Echocardiogram Findings    Left Ventricle Estimated left ventricular EF = 50% Left ventricular systolic function is normal.   The left ventricular cavity is borderline dilated.  Right Ventricle The right ventricular cavity is borderline dilated.  Left Atrium The left atrial cavity is mildly dilated.  Right Atrium Normal right atrial cavity size noted.  Aortic Valve Mild aortic valve regurgitation is present.  Mitral Valve Moderate mitral annular calcification is present. Mild mitral valve regurgitation is present.  Tricuspid Valve No evidence of significant tricuspid valve regurgitation is present.  Pulmonic Valve The pulmonic valve is grossly normal in structure.  Greater Vessels No dilation of the aortic root is present.  Pericardium There is no evidence of pericardial effusion. . There is evidence of a fat pad present.    Stress  NUCLEAR IMAGIN.  There was  uniform uptake of Cardiolite both in resting and post stress images, no ischemia seen.  2.  Gated images reveal  normal LV size and contractility, LVEF of 64%.     CONCLUSION:  1.  Lexiscan Cardiolite with normal  perfusion, negative for ischemia.  2.  Normal wall motion.  LVEF of 64%.      PSH  BACK SURGERY CHOLECYSTECTOMY  COLONOSCOPY UPPER GASTROINTESTINAL ENDOSCOPY  BREAST AUGMENTATION CARDIAC PACEMAKER PLACEMENT  THORACIC DECOMPRESSION POSTERIOR FUSION CORONARY ARTERY BYPASS GRAFT  CERVICAL RIB RESECTION ABDOMINAL HERNIA REPAIR  CARDIAC CATHETERIZATION                 Anesthesia Plan    ASA 3     general     (Patient identified; pre-operative vital signs, all relevant labs/studies, complete medical/surgical/anesthetic history, full medication list, full allergy list, and NPO status obtained/reviewed; physical assessment performed; anesthetic options, side effects, potential complications, risks, and benefits discussed; questions answered; written anesthesia consent obtained; patient cleared for procedure; anesthesia machine and equipment checked and functioning)  intravenous induction     Anesthetic plan, risks, benefits, and alternatives have been provided, discussed and informed consent has been obtained with: patient.    Plan discussed with CRNA and CAA.        CODE STATUS:

## 2023-01-16 ENCOUNTER — LAB (OUTPATIENT)
Dept: LAB | Facility: HOSPITAL | Age: 83
End: 2023-01-16
Payer: MEDICARE

## 2023-01-16 ENCOUNTER — HOSPITAL ENCOUNTER (OUTPATIENT)
Dept: CARDIOLOGY | Facility: HOSPITAL | Age: 83
End: 2023-01-16
Payer: MEDICARE

## 2023-01-16 ENCOUNTER — ANESTHESIA (OUTPATIENT)
Dept: CARDIOLOGY | Facility: HOSPITAL | Age: 83
End: 2023-01-16
Payer: MEDICARE

## 2023-01-16 ENCOUNTER — HOSPITAL ENCOUNTER (OUTPATIENT)
Facility: HOSPITAL | Age: 83
Discharge: HOME | End: 2023-01-17
Attending: INTERNAL MEDICINE | Admitting: INTERNAL MEDICINE
Payer: MEDICARE

## 2023-01-16 DIAGNOSIS — I48.19 PERSISTENT ATRIAL FIBRILLATION: ICD-10-CM

## 2023-01-16 LAB
ALBUMIN SERPL-MCNC: 4.3 G/DL (ref 3.5–5.2)
ALBUMIN/GLOB SERPL: 1.6 G/DL
ALP SERPL-CCNC: 97 U/L (ref 39–117)
ALT SERPL W P-5'-P-CCNC: 22 U/L (ref 1–33)
ANION GAP SERPL CALCULATED.3IONS-SCNC: 10 MMOL/L (ref 5–15)
AST SERPL-CCNC: 21 U/L (ref 1–32)
BASOPHILS # BLD AUTO: 0 10*3/MM3 (ref 0–0.2)
BASOPHILS NFR BLD AUTO: 0.6 % (ref 0–1.5)
BILIRUB SERPL-MCNC: 0.4 MG/DL (ref 0–1.2)
BUN SERPL-MCNC: 28 MG/DL (ref 8–23)
BUN/CREAT SERPL: 22.6 (ref 7–25)
CALCIUM SPEC-SCNC: 9.6 MG/DL (ref 8.6–10.5)
CHLORIDE SERPL-SCNC: 103 MMOL/L (ref 98–107)
CO2 SERPL-SCNC: 28 MMOL/L (ref 22–29)
CREAT SERPL-MCNC: 1.24 MG/DL (ref 0.57–1)
DEPRECATED RDW RBC AUTO: 56 FL (ref 37–54)
EGFRCR SERPLBLD CKD-EPI 2021: 43.3 ML/MIN/1.73
EOSINOPHIL # BLD AUTO: 0.1 10*3/MM3 (ref 0–0.4)
EOSINOPHIL NFR BLD AUTO: 2.1 % (ref 0.3–6.2)
ERYTHROCYTE [DISTWIDTH] IN BLOOD BY AUTOMATED COUNT: 17.1 % (ref 12.3–15.4)
GLOBULIN UR ELPH-MCNC: 2.7 GM/DL
GLUCOSE SERPL-MCNC: 96 MG/DL (ref 65–99)
HCT VFR BLD AUTO: 34.7 % (ref 34–46.6)
HGB BLD-MCNC: 11.7 G/DL (ref 12–15.9)
INR PPP: 1.01 (ref 0.93–1.1)
LYMPHOCYTES # BLD AUTO: 1.5 10*3/MM3 (ref 0.7–3.1)
LYMPHOCYTES NFR BLD AUTO: 26 % (ref 19.6–45.3)
MAGNESIUM SERPL-MCNC: 2.1 MG/DL (ref 1.6–2.4)
MCH RBC QN AUTO: 29.9 PG (ref 26.6–33)
MCHC RBC AUTO-ENTMCNC: 33.7 G/DL (ref 31.5–35.7)
MCV RBC AUTO: 88.7 FL (ref 79–97)
MONOCYTES # BLD AUTO: 0.8 10*3/MM3 (ref 0.1–0.9)
MONOCYTES NFR BLD AUTO: 14.3 % (ref 5–12)
NEUTROPHILS NFR BLD AUTO: 3.3 10*3/MM3 (ref 1.7–7)
NEUTROPHILS NFR BLD AUTO: 57 % (ref 42.7–76)
NRBC BLD AUTO-RTO: 0.1 /100 WBC (ref 0–0.2)
PLATELET # BLD AUTO: 237 10*3/MM3 (ref 140–450)
PMV BLD AUTO: 9.3 FL (ref 6–12)
POTASSIUM SERPL-SCNC: 4.7 MMOL/L (ref 3.5–5.2)
PROT SERPL-MCNC: 7 G/DL (ref 6–8.5)
PROTHROMBIN TIME: 10.4 SECONDS (ref 9.6–11.7)
RBC # BLD AUTO: 3.92 10*6/MM3 (ref 3.77–5.28)
SODIUM SERPL-SCNC: 141 MMOL/L (ref 136–145)
WBC NRBC COR # BLD: 5.8 10*3/MM3 (ref 3.4–10.8)

## 2023-01-16 PROCEDURE — A9270 NON-COVERED ITEM OR SERVICE: HCPCS | Performed by: INTERNAL MEDICINE

## 2023-01-16 PROCEDURE — 63710000001 ATORVASTATIN 40 MG TABLET: Performed by: INTERNAL MEDICINE

## 2023-01-16 PROCEDURE — C1733 CATH, EP, OTHR THAN COOL-TIP: HCPCS | Performed by: INTERNAL MEDICINE

## 2023-01-16 PROCEDURE — G0378 HOSPITAL OBSERVATION PER HR: HCPCS

## 2023-01-16 PROCEDURE — 25010000002 MIDAZOLAM PER 1 MG: Performed by: NURSE ANESTHETIST, CERTIFIED REGISTERED

## 2023-01-16 PROCEDURE — 63710000001 PANTOPRAZOLE 40 MG TABLET DELAYED-RELEASE: Performed by: INTERNAL MEDICINE

## 2023-01-16 PROCEDURE — 85347 COAGULATION TIME ACTIVATED: CPT

## 2023-01-16 PROCEDURE — C1894 INTRO/SHEATH, NON-LASER: HCPCS | Performed by: INTERNAL MEDICINE

## 2023-01-16 PROCEDURE — 25010000002 PROTAMINE SULFATE PER 10 MG: Performed by: NURSE ANESTHETIST, CERTIFIED REGISTERED

## 2023-01-16 PROCEDURE — 85610 PROTHROMBIN TIME: CPT

## 2023-01-16 PROCEDURE — 63710000001 BUMETANIDE 1 MG TABLET: Performed by: INTERNAL MEDICINE

## 2023-01-16 PROCEDURE — 25010000002 FENTANYL CITRATE (PF) 100 MCG/2ML SOLUTION: Performed by: NURSE ANESTHETIST, CERTIFIED REGISTERED

## 2023-01-16 PROCEDURE — C1766 INTRO/SHEATH,STRBLE,NON-PEEL: HCPCS | Performed by: INTERNAL MEDICINE

## 2023-01-16 PROCEDURE — 25010000002 HEPARIN (PORCINE) 25000-0.45 UT/250ML-% SOLUTION: Performed by: NURSE ANESTHETIST, CERTIFIED REGISTERED

## 2023-01-16 PROCEDURE — C1730 CATH, EP, 19 OR FEW ELECT: HCPCS | Performed by: INTERNAL MEDICINE

## 2023-01-16 PROCEDURE — 0 IOPAMIDOL PER 1 ML: Performed by: INTERNAL MEDICINE

## 2023-01-16 PROCEDURE — 85025 COMPLETE CBC W/AUTO DIFF WBC: CPT

## 2023-01-16 PROCEDURE — 83735 ASSAY OF MAGNESIUM: CPT

## 2023-01-16 PROCEDURE — 25010000002 PROPOFOL 10 MG/ML EMULSION: Performed by: NURSE ANESTHETIST, CERTIFIED REGISTERED

## 2023-01-16 PROCEDURE — 25010000002 FENTANYL CITRATE (PF) 50 MCG/ML SOLUTION: Performed by: NURSE ANESTHETIST, CERTIFIED REGISTERED

## 2023-01-16 PROCEDURE — 25010000002 PHENYLEPHRINE 10 MG/ML SOLUTION 5 ML VIAL: Performed by: NURSE ANESTHETIST, CERTIFIED REGISTERED

## 2023-01-16 PROCEDURE — C1769 GUIDE WIRE: HCPCS | Performed by: INTERNAL MEDICINE

## 2023-01-16 PROCEDURE — 63710000001 LISINOPRIL 5 MG TABLET: Performed by: INTERNAL MEDICINE

## 2023-01-16 PROCEDURE — 63710000001 HYDROCODONE-ACETAMINOPHEN 7.5-325 MG TABLET: Performed by: INTERNAL MEDICINE

## 2023-01-16 PROCEDURE — C1732 CATH, EP, DIAG/ABL, 3D/VECT: HCPCS | Performed by: INTERNAL MEDICINE

## 2023-01-16 PROCEDURE — C1759 CATH, INTRA ECHOCARDIOGRAPHY: HCPCS | Performed by: INTERNAL MEDICINE

## 2023-01-16 PROCEDURE — 25010000002 HEPARIN (PORCINE) PER 1000 UNITS: Performed by: NURSE ANESTHETIST, CERTIFIED REGISTERED

## 2023-01-16 PROCEDURE — 93656 COMPRE EP EVAL ABLTJ ATR FIB: CPT | Performed by: INTERNAL MEDICINE

## 2023-01-16 PROCEDURE — 36415 COLL VENOUS BLD VENIPUNCTURE: CPT

## 2023-01-16 PROCEDURE — 93655 ICAR CATH ABLTJ DSCRT ARRHYT: CPT | Performed by: INTERNAL MEDICINE

## 2023-01-16 PROCEDURE — 63710000001 SERTRALINE 100 MG TABLET: Performed by: INTERNAL MEDICINE

## 2023-01-16 PROCEDURE — 80053 COMPREHEN METABOLIC PANEL: CPT

## 2023-01-16 PROCEDURE — 63710000001 APIXABAN 5 MG TABLET: Performed by: INTERNAL MEDICINE

## 2023-01-16 PROCEDURE — 25010000002 ONDANSETRON PER 1 MG: Performed by: NURSE ANESTHETIST, CERTIFIED REGISTERED

## 2023-01-16 RX ORDER — ONDANSETRON 2 MG/ML
INJECTION INTRAMUSCULAR; INTRAVENOUS AS NEEDED
Status: DISCONTINUED | OUTPATIENT
Start: 2023-01-16 | End: 2023-01-16 | Stop reason: SURG

## 2023-01-16 RX ORDER — ALBUTEROL SULFATE 2.5 MG/3ML
2.5 SOLUTION RESPIRATORY (INHALATION) ONCE AS NEEDED
Status: DISCONTINUED | OUTPATIENT
Start: 2023-01-16 | End: 2023-01-16 | Stop reason: HOSPADM

## 2023-01-16 RX ORDER — LISINOPRIL 5 MG/1
2.5 TABLET ORAL DAILY
Status: DISCONTINUED | OUTPATIENT
Start: 2023-01-16 | End: 2023-01-17 | Stop reason: HOSPADM

## 2023-01-16 RX ORDER — HEPARIN SODIUM 10000 [USP'U]/100ML
INJECTION, SOLUTION INTRAVENOUS CONTINUOUS PRN
Status: DISCONTINUED | OUTPATIENT
Start: 2023-01-16 | End: 2023-01-16 | Stop reason: SURG

## 2023-01-16 RX ORDER — FENTANYL CITRATE 50 UG/ML
25 INJECTION, SOLUTION INTRAMUSCULAR; INTRAVENOUS
Status: DISCONTINUED | OUTPATIENT
Start: 2023-01-16 | End: 2023-01-16 | Stop reason: HOSPADM

## 2023-01-16 RX ORDER — MIDAZOLAM HYDROCHLORIDE 1 MG/ML
INJECTION INTRAMUSCULAR; INTRAVENOUS AS NEEDED
Status: DISCONTINUED | OUTPATIENT
Start: 2023-01-16 | End: 2023-01-16 | Stop reason: SURG

## 2023-01-16 RX ORDER — ACETAMINOPHEN 650 MG/1
650 SUPPOSITORY RECTAL ONCE AS NEEDED
Status: DISCONTINUED | OUTPATIENT
Start: 2023-01-16 | End: 2023-01-16 | Stop reason: HOSPADM

## 2023-01-16 RX ORDER — HYDROCODONE BITARTRATE AND ACETAMINOPHEN 5; 325 MG/1; MG/1
1 TABLET ORAL ONCE AS NEEDED
Status: DISCONTINUED | OUTPATIENT
Start: 2023-01-16 | End: 2023-01-16 | Stop reason: HOSPADM

## 2023-01-16 RX ORDER — DIPHENHYDRAMINE HYDROCHLORIDE 50 MG/ML
12.5 INJECTION INTRAMUSCULAR; INTRAVENOUS
Status: DISCONTINUED | OUTPATIENT
Start: 2023-01-16 | End: 2023-01-16 | Stop reason: HOSPADM

## 2023-01-16 RX ORDER — DROPERIDOL 2.5 MG/ML
0.62 INJECTION, SOLUTION INTRAMUSCULAR; INTRAVENOUS ONCE AS NEEDED
Status: DISCONTINUED | OUTPATIENT
Start: 2023-01-16 | End: 2023-01-16 | Stop reason: HOSPADM

## 2023-01-16 RX ORDER — OXYCODONE AND ACETAMINOPHEN 10; 325 MG/1; MG/1
1 TABLET ORAL EVERY 4 HOURS PRN
COMMUNITY
End: 2023-01-18

## 2023-01-16 RX ORDER — PROTAMINE SULFATE 10 MG/ML
INJECTION, SOLUTION INTRAVENOUS AS NEEDED
Status: DISCONTINUED | OUTPATIENT
Start: 2023-01-16 | End: 2023-01-16 | Stop reason: SURG

## 2023-01-16 RX ORDER — FENTANYL CITRATE 50 UG/ML
50 INJECTION, SOLUTION INTRAMUSCULAR; INTRAVENOUS
Status: DISCONTINUED | OUTPATIENT
Start: 2023-01-16 | End: 2023-01-16 | Stop reason: HOSPADM

## 2023-01-16 RX ORDER — PHENYLEPHRINE HYDROCHLORIDE 10 MG/ML
INJECTION INTRAVENOUS AS NEEDED
Status: DISCONTINUED | OUTPATIENT
Start: 2023-01-16 | End: 2023-01-16

## 2023-01-16 RX ORDER — ACETAMINOPHEN 325 MG/1
650 TABLET ORAL ONCE AS NEEDED
Status: DISCONTINUED | OUTPATIENT
Start: 2023-01-16 | End: 2023-01-16 | Stop reason: HOSPADM

## 2023-01-16 RX ORDER — PANTOPRAZOLE SODIUM 40 MG/1
40 TABLET, DELAYED RELEASE ORAL DAILY
Status: DISCONTINUED | OUTPATIENT
Start: 2023-01-16 | End: 2023-01-17 | Stop reason: HOSPADM

## 2023-01-16 RX ORDER — SODIUM CHLORIDE 9 MG/ML
30 INJECTION, SOLUTION INTRAVENOUS CONTINUOUS
Status: DISCONTINUED | OUTPATIENT
Start: 2023-01-16 | End: 2023-01-17 | Stop reason: HOSPADM

## 2023-01-16 RX ORDER — PROPOFOL 10 MG/ML
VIAL (ML) INTRAVENOUS AS NEEDED
Status: DISCONTINUED | OUTPATIENT
Start: 2023-01-16 | End: 2023-01-16 | Stop reason: SURG

## 2023-01-16 RX ORDER — SERTRALINE HYDROCHLORIDE 100 MG/1
100 TABLET, FILM COATED ORAL DAILY
Status: DISCONTINUED | OUTPATIENT
Start: 2023-01-16 | End: 2023-01-17 | Stop reason: HOSPADM

## 2023-01-16 RX ORDER — HYDROCODONE BITARTRATE AND ACETAMINOPHEN 7.5; 325 MG/1; MG/1
1 TABLET ORAL EVERY 4 HOURS PRN
Status: DISCONTINUED | OUTPATIENT
Start: 2023-01-16 | End: 2023-01-17 | Stop reason: HOSPADM

## 2023-01-16 RX ORDER — ASPIRIN 81 MG/1
81 TABLET ORAL NIGHTLY
COMMUNITY
End: 2023-02-08 | Stop reason: SDUPTHER

## 2023-01-16 RX ORDER — NALOXONE HCL 0.4 MG/ML
0.4 VIAL (ML) INJECTION AS NEEDED
Status: DISCONTINUED | OUTPATIENT
Start: 2023-01-16 | End: 2023-01-16 | Stop reason: HOSPADM

## 2023-01-16 RX ORDER — ROCURONIUM BROMIDE 10 MG/ML
INJECTION, SOLUTION INTRAVENOUS AS NEEDED
Status: DISCONTINUED | OUTPATIENT
Start: 2023-01-16 | End: 2023-01-16 | Stop reason: SURG

## 2023-01-16 RX ORDER — SODIUM CHLORIDE 9 MG/ML
100 INJECTION, SOLUTION INTRAVENOUS CONTINUOUS
Status: DISCONTINUED | OUTPATIENT
Start: 2023-01-16 | End: 2023-01-17 | Stop reason: HOSPADM

## 2023-01-16 RX ORDER — ATORVASTATIN CALCIUM 40 MG/1
40 TABLET, FILM COATED ORAL NIGHTLY
Status: DISCONTINUED | OUTPATIENT
Start: 2023-01-16 | End: 2023-01-17 | Stop reason: HOSPADM

## 2023-01-16 RX ORDER — LABETALOL HYDROCHLORIDE 5 MG/ML
5 INJECTION, SOLUTION INTRAVENOUS
Status: DISCONTINUED | OUTPATIENT
Start: 2023-01-16 | End: 2023-01-16 | Stop reason: HOSPADM

## 2023-01-16 RX ORDER — HYDRALAZINE HYDROCHLORIDE 20 MG/ML
5 INJECTION INTRAMUSCULAR; INTRAVENOUS
Status: DISCONTINUED | OUTPATIENT
Start: 2023-01-16 | End: 2023-01-16 | Stop reason: HOSPADM

## 2023-01-16 RX ORDER — ZOLPIDEM TARTRATE 5 MG/1
5 TABLET ORAL NIGHTLY PRN
Status: DISCONTINUED | OUTPATIENT
Start: 2023-01-16 | End: 2023-01-17 | Stop reason: HOSPADM

## 2023-01-16 RX ORDER — PHENYLEPHRINE HCL IN 0.9% NACL 1 MG/10 ML
SYRINGE (ML) INTRAVENOUS AS NEEDED
Status: DISCONTINUED | OUTPATIENT
Start: 2023-01-16 | End: 2023-01-16 | Stop reason: SURG

## 2023-01-16 RX ORDER — EPHEDRINE SULFATE 5 MG/ML
5 INJECTION INTRAVENOUS ONCE AS NEEDED
Status: DISCONTINUED | OUTPATIENT
Start: 2023-01-16 | End: 2023-01-16 | Stop reason: HOSPADM

## 2023-01-16 RX ORDER — HEPARIN SODIUM 1000 [USP'U]/ML
INJECTION, SOLUTION INTRAVENOUS; SUBCUTANEOUS AS NEEDED
Status: DISCONTINUED | OUTPATIENT
Start: 2023-01-16 | End: 2023-01-16 | Stop reason: SURG

## 2023-01-16 RX ORDER — ONDANSETRON 2 MG/ML
4 INJECTION INTRAMUSCULAR; INTRAVENOUS ONCE AS NEEDED
Status: DISCONTINUED | OUTPATIENT
Start: 2023-01-16 | End: 2023-01-16 | Stop reason: HOSPADM

## 2023-01-16 RX ORDER — BUMETANIDE 1 MG/1
0.5 TABLET ORAL DAILY
Status: DISCONTINUED | OUTPATIENT
Start: 2023-01-16 | End: 2023-01-17 | Stop reason: HOSPADM

## 2023-01-16 RX ORDER — FENTANYL CITRATE 50 UG/ML
INJECTION, SOLUTION INTRAMUSCULAR; INTRAVENOUS AS NEEDED
Status: DISCONTINUED | OUTPATIENT
Start: 2023-01-16 | End: 2023-01-16 | Stop reason: SURG

## 2023-01-16 RX ADMIN — PHENYLEPHRINE HYDROCHLORIDE 0.5 MCG/KG/MIN: 10 INJECTION INTRAVENOUS at 12:26

## 2023-01-16 RX ADMIN — MIDAZOLAM 2 MG: 1 INJECTION INTRAMUSCULAR; INTRAVENOUS at 11:50

## 2023-01-16 RX ADMIN — HYDROCODONE BITARTRATE AND ACETAMINOPHEN 1 TABLET: 7.5; 325 TABLET ORAL at 16:28

## 2023-01-16 RX ADMIN — PROPOFOL 110 MG: 10 INJECTION, EMULSION INTRAVENOUS at 11:57

## 2023-01-16 RX ADMIN — HYDROCODONE BITARTRATE AND ACETAMINOPHEN 1 TABLET: 7.5; 325 TABLET ORAL at 20:30

## 2023-01-16 RX ADMIN — ONDANSETRON 4 MG: 2 INJECTION INTRAMUSCULAR; INTRAVENOUS at 14:26

## 2023-01-16 RX ADMIN — Medication 100 MCG: at 12:25

## 2023-01-16 RX ADMIN — HEPARIN SODIUM 10000 UNITS: 1000 INJECTION INTRAVENOUS; SUBCUTANEOUS at 12:17

## 2023-01-16 RX ADMIN — HEPARIN SODIUM 1000 UNITS: 1000 INJECTION INTRAVENOUS; SUBCUTANEOUS at 13:14

## 2023-01-16 RX ADMIN — FENTANYL CITRATE 50 MCG: 50 INJECTION INTRAMUSCULAR; INTRAVENOUS at 13:20

## 2023-01-16 RX ADMIN — ROCURONIUM BROMIDE 40 MG: 50 INJECTION INTRAVENOUS at 11:57

## 2023-01-16 RX ADMIN — HEPARIN SODIUM 1000 UNITS: 1000 INJECTION INTRAVENOUS; SUBCUTANEOUS at 13:45

## 2023-01-16 RX ADMIN — LISINOPRIL 2.5 MG: 5 TABLET ORAL at 18:01

## 2023-01-16 RX ADMIN — BUMETANIDE 0.5 MG: 1 TABLET ORAL at 18:01

## 2023-01-16 RX ADMIN — PROTAMINE SULFATE 50 MG: 10 INJECTION, SOLUTION INTRAVENOUS at 14:28

## 2023-01-16 RX ADMIN — FENTANYL CITRATE 50 MCG: 50 INJECTION INTRAMUSCULAR; INTRAVENOUS at 12:43

## 2023-01-16 RX ADMIN — APIXABAN 5 MG: 5 TABLET, FILM COATED ORAL at 20:30

## 2023-01-16 RX ADMIN — PANTOPRAZOLE SODIUM 40 MG: 40 TABLET, DELAYED RELEASE ORAL at 18:01

## 2023-01-16 RX ADMIN — SERTRALINE 100 MG: 100 TABLET, FILM COATED ORAL at 18:01

## 2023-01-16 RX ADMIN — SODIUM CHLORIDE: 9 INJECTION, SOLUTION INTRAVENOUS at 13:14

## 2023-01-16 RX ADMIN — SODIUM CHLORIDE 100 ML/HR: 9 INJECTION, SOLUTION INTRAVENOUS at 18:21

## 2023-01-16 RX ADMIN — SODIUM CHLORIDE 100 ML/HR: 9 INJECTION, SOLUTION INTRAVENOUS at 16:40

## 2023-01-16 RX ADMIN — HEPARIN SODIUM 18 UNITS/KG/HR: 10000 INJECTION, SOLUTION INTRAVENOUS at 12:17

## 2023-01-16 RX ADMIN — SODIUM CHLORIDE 30 ML/HR: 9 INJECTION, SOLUTION INTRAVENOUS at 10:16

## 2023-01-16 RX ADMIN — HEPARIN SODIUM 4000 UNITS: 1000 INJECTION INTRAVENOUS; SUBCUTANEOUS at 12:32

## 2023-01-16 RX ADMIN — ATORVASTATIN CALCIUM 40 MG: 40 TABLET, FILM COATED ORAL at 20:30

## 2023-01-16 RX ADMIN — FENTANYL CITRATE 50 MCG: 50 INJECTION, SOLUTION INTRAMUSCULAR; INTRAVENOUS at 15:25

## 2023-01-16 NOTE — Clinical Note
A 14 fr sheath was  inserted with ultrasound guidance into the right femoral vein. Sheath insertion not delayed.

## 2023-01-16 NOTE — ANESTHESIA POSTPROCEDURE EVALUATION
Patient: Domonique See    Procedure Summary     Date: 01/16/23 Room / Location: [place] CATH LAB 3 /  [facility] CATH INVASIVE LOCATION    Anesthesia Start: 1146 Anesthesia Stop: 1452    Procedure: Ablation atrial fibrillation and flutter, cryo Duong and Ramón aware Diagnosis:       Persistent atrial fibrillation (HCC)      (aFib and flutter)    Providers: Leeanne Pleitez MD Provider: Simeon Christie MD    Anesthesia Type: general ASA Status: 3          Anesthesia Type: general    Vitals  Vitals Value Taken Time   /58 01/16/23 1540   Temp 96.8 °F (36 °C) 01/16/23 1451   Pulse 60 01/16/23 1540   Resp 10 01/16/23 1506   SpO2 92 % 01/16/23 1540   Vitals shown include unvalidated device data.        Post Anesthesia Care and Evaluation    Patient location during evaluation: PACU  Patient participation: complete - patient participated  Level of consciousness: awake  Pain scale: See nurse's notes for pain score.  Pain management: adequate    Airway patency: patent  Anesthetic complications: No anesthetic complications  PONV Status: none  Cardiovascular status: acceptable  Respiratory status: acceptable and spontaneous ventilation  Hydration status: acceptable    Comments: Patient seen and examined postoperatively; vital signs stable; SpO2 greater than or equal to 90%; cardiopulmonary status stable; nausea/vomiting adequately controlled; pain adequately controlled; no apparent anesthesia complications; patient discharged from anesthesia care when discharge criteria were met

## 2023-01-16 NOTE — Clinical Note
Hemostasis started on the right femoral vein. Figure 8 suturing was used in achieving hemostasis. Closure device deployed in the vessel. Hemostasis achieved successfully.

## 2023-01-16 NOTE — ANESTHESIA PROCEDURE NOTES
Airway  Urgency: elective    Date/Time: 1/16/2023 11:59 AM  Airway not difficult    General Information and Staff    Patient location during procedure: OR  Anesthesiologist: Simeon Christie MD  CRNA/CAA: Debra Abernathy CRNA    Indications and Patient Condition  Indications for airway management: airway protection    Preoxygenated: yes  MILS maintained throughout  Mask difficulty assessment: 2 - vent by mask + OA or adjuvant +/- NMBA    Final Airway Details  Final airway type: endotracheal airway      Successful airway: ETT  Cuffed: yes   Successful intubation technique: direct laryngoscopy  Facilitating devices/methods: intubating stylet  Endotracheal tube insertion site: oral  Blade: Kirill  Blade size: 3  ETT size (mm): 7.0  Cormack-Lehane Classification: grade I - full view of glottis  Placement verified by: chest auscultation and capnometry   Measured from: lips  ETT/EBT  to lips (cm): 21  Number of attempts at approach: 1  Assessment: lips, teeth, and gum same as pre-op and atraumatic intubation

## 2023-01-16 NOTE — Clinical Note
A 6 fr sheath was  inserted with ultrasound guidance into the left femoral vein. Sheath insertion not delayed.

## 2023-01-16 NOTE — Clinical Note
Hemostasis started on the left femoral vein. Figure 8 suturing was used in achieving hemostasis. Closure device deployed in the vessel. Hemostasis achieved successfully.

## 2023-01-16 NOTE — Clinical Note
A 9 fr sheath was  inserted with ultrasound guidance into the left femoral vein. Sheath insertion not delayed.

## 2023-01-17 VITALS
OXYGEN SATURATION: 96 % | HEART RATE: 68 BPM | HEIGHT: 60 IN | DIASTOLIC BLOOD PRESSURE: 56 MMHG | SYSTOLIC BLOOD PRESSURE: 108 MMHG | BODY MASS INDEX: 38.78 KG/M2 | TEMPERATURE: 97.4 F | RESPIRATION RATE: 22 BRPM | WEIGHT: 197.53 LBS

## 2023-01-17 LAB
ACT BLD: 293 SECONDS (ref 89–137)
ACT BLD: 323 SECONDS (ref 89–137)
ACT BLD: 329 SECONDS (ref 89–137)
ACT BLD: 341 SECONDS (ref 89–137)
ACT BLD: 348 SECONDS (ref 89–137)

## 2023-01-17 PROCEDURE — 99239 HOSP IP/OBS DSCHRG MGMT >30: CPT | Performed by: INTERNAL MEDICINE

## 2023-01-17 PROCEDURE — 93010 ELECTROCARDIOGRAM REPORT: CPT | Performed by: INTERNAL MEDICINE

## 2023-01-17 PROCEDURE — 63710000001 SERTRALINE 100 MG TABLET: Performed by: INTERNAL MEDICINE

## 2023-01-17 PROCEDURE — A9270 NON-COVERED ITEM OR SERVICE: HCPCS | Performed by: INTERNAL MEDICINE

## 2023-01-17 PROCEDURE — 93005 ELECTROCARDIOGRAM TRACING: CPT | Performed by: INTERNAL MEDICINE

## 2023-01-17 PROCEDURE — 63710000001 HYDROCODONE-ACETAMINOPHEN 7.5-325 MG TABLET: Performed by: INTERNAL MEDICINE

## 2023-01-17 PROCEDURE — 63710000001 APIXABAN 5 MG TABLET: Performed by: INTERNAL MEDICINE

## 2023-01-17 PROCEDURE — G0378 HOSPITAL OBSERVATION PER HR: HCPCS

## 2023-01-17 PROCEDURE — 63710000001 BUMETANIDE 1 MG TABLET: Performed by: INTERNAL MEDICINE

## 2023-01-17 RX ORDER — AMIODARONE HYDROCHLORIDE 200 MG/1
200 TABLET ORAL 2 TIMES DAILY
Qty: 60 TABLET | Refills: 2 | Status: SHIPPED | OUTPATIENT
Start: 2023-01-17 | End: 2023-02-22

## 2023-01-17 RX ADMIN — BUMETANIDE 0.5 MG: 1 TABLET ORAL at 08:25

## 2023-01-17 RX ADMIN — SODIUM CHLORIDE 100 ML/HR: 9 INJECTION, SOLUTION INTRAVENOUS at 03:56

## 2023-01-17 RX ADMIN — APIXABAN 5 MG: 5 TABLET, FILM COATED ORAL at 08:25

## 2023-01-17 RX ADMIN — HYDROCODONE BITARTRATE AND ACETAMINOPHEN 1 TABLET: 7.5; 325 TABLET ORAL at 08:28

## 2023-01-17 RX ADMIN — SERTRALINE 100 MG: 100 TABLET, FILM COATED ORAL at 08:25

## 2023-01-17 RX ADMIN — HYDROCODONE BITARTRATE AND ACETAMINOPHEN 1 TABLET: 7.5; 325 TABLET ORAL at 03:28

## 2023-01-17 NOTE — DISCHARGE SUMMARY
[facility]    Date of Admission: 1/16/2023    Date of Discharge:  1/17/2023    Length of stay:  LOS: 0 days     Admission Diagnosis: A. fib    Discharge Diagnosis: Same, status post ablation    Presenting Problem/History of Present Illness  Active Hospital Problems    Diagnosis  POA   • **Persistent atrial fibrillation (HCC) [I48.19]  Unknown     Priority: High      Resolved Hospital Problems   No resolved problems to display.          Hospital Course  Domonique See is a 82 y.o. female presented for elective A. fib and atrial flutter ablation, procedure was done on 1/16/2023, no procedural complications.  Patient remained in sinus rhythm overnight.  She was discharged home in a stable condition, no bleeding or hematoma at vascular access site.  We will add amiodarone for short-term.  We will see her in follow-up in 1 month.      The discharge process took about 35 minutes during which we discussed about long-term A. fib management, anticoagulation, medication changes, follow-up appointments, activity restrictions.  The patient voiced understanding and all her  questions were answered to her  satisfaction.    Past Medical History:   Diagnosis Date   • Anemia    • CHF (congestive heart failure) (HCC)    • Coronary artery disease 2013   • GERD (gastroesophageal reflux disease)    • Heart murmur Don't know   • Hx of hypertrophic cardiomyopathy    • Hypertension    • Peptic ulceration    • Persistent atrial fibrillation (HCC) 1/10/2023   • Primary osteoarthritis of both knees 02/08/2021   • Primary osteoarthritis of right knee 09/21/2020   • Sleep apnea 2017     Past Surgical History:   Procedure Laterality Date   • ABDOMINAL HERNIA REPAIR     • BACK SURGERY     • BREAST AUGMENTATION     • CARDIAC CATHETERIZATION     • CARDIAC PACEMAKER PLACEMENT  03/16/2017    Dual Chamber   • CERVICAL RIB RESECTION Bilateral    • CERVICAL RIB RESECTION Bilateral 1963    bilateral cervical ribs removed - extra ribs located and  causing pain   • CHOLECYSTECTOMY     • COLONOSCOPY     • CORONARY ARTERY BYPASS GRAFT     • THORACIC DECOMPRESSION POSTERIOR FUSION  2014    L3-5 & S1   • UPPER GASTROINTESTINAL ENDOSCOPY       Social History     Socioeconomic History   • Marital status:    Tobacco Use   • Smoking status: Former     Packs/day: 1.00     Years: 8.00     Pack years: 8.00     Types: Cigarettes     Start date: 1977     Quit date: 1985     Years since quittin.0     Passive exposure: Past   • Smokeless tobacco: Never   • Tobacco comments:     Quit    Vaping Use   • Vaping Use: Never used   Substance and Sexual Activity   • Alcohol use: Yes     Comment: 6 drinks a year in social settings   • Drug use: Never   • Sexual activity: Defer       Procedures Performed: A. fib and atrial flutter ablation, see report for details.       Pertinent Test Results:     Lab Results (last 72 hours)     Procedure Component Value Units Date/Time    POC Activated Clotting Time [[id number]]  (Abnormal) Collected: 23 1351    Specimen: Venous Blood Updated: 23 0831     Activated Clotting Time  348 Seconds      Comment: Serial Number: [id number]:  [id number]       POC Activated Clotting Time [[id number]]  (Abnormal) Collected: 23 1328    Specimen: Venous Blood Updated: 23 0831     Activated Clotting Time  329 Seconds      Comment: Serial Number: [id number]:  [id number]       POC Activated Clotting Time [[id number]]  (Abnormal) Collected: 23 1309    Specimen: Venous Blood Updated: 23 0831     Activated Clotting Time  323 Seconds      Comment: Serial Number: [id number]:  [id number]       POC Activated Clotting Time [[id number]]  (Abnormal) Collected: 23 1245    Specimen: Venous Blood Updated: 23 0831     Activated Clotting Time  341 Seconds      Comment: Serial Number: [id number]:  [id number]       POC Activated Clotting Time [[id number]]  (Abnormal) Collected: 23 1226    Specimen:  "Venous Blood Updated: 01/17/23 0831     Activated Clotting Time  293 Seconds      Comment: Serial Number: [id number]:  [id number]             Condition on Discharge: Stable    Vital Signs  /47   Pulse 62   Temp 97.7 °F (36.5 °C) (Oral)   Resp 16   Ht 152.4 cm (60\")   Wt 89.6 kg (197 lb 8.5 oz)   LMP  (LMP Unknown)   SpO2 94%   BMI 38.58 kg/m²     Physical Exam    Physical Exam  VITALS REVIEWED    General:      well developed, in no acute distress.    Head:      normocephalic and atraumatic.    Eyes:      PERRL/EOM intact, conjunctiva and sclera clear with out nystagmus.    Neck:      no masses, thyromegaly,  trachea central with normal respiratory effort and PMI displaced laterally  Lungs:      Clear  Heart:       Regular rate and rhythm  Msk:      no deformity or scoliosis noted of thoracic or lumbar spine.    Pulses:      pulses normal in all 4 extremities.    Extremities:       No lower extremity edema, no bleeding or hematoma at vascular access site.  Neurologic:      no focal deficits.   alert oriented x3  Skin:      intact without lesions or rashes.    Psych:      alert and cooperative; normal mood and affect; normal attention span and concentration.      Discharge Disposition  Home or Self Care    Discharge Medications     Discharge Medications      New Medications      Instructions Start Date   amiodarone 200 MG tablet  Commonly known as: PACERONE   200 mg, Oral, 2 Times Daily         Continue These Medications      Instructions Start Date   apixaban 5 MG tablet tablet  Commonly known as: ELIQUIS   5 mg, Oral, 2 Times Daily      aspirin 81 MG EC tablet   81 mg, Oral, Nightly      atorvastatin 40 MG tablet  Commonly known as: LIPITOR   40 mg, Oral, Nightly      bumetanide 0.5 MG tablet  Commonly known as: BUMEX   0.5 mg, Oral, Daily      Diclofenac Sodium 1 % gel gel  Commonly known as: VOLTAREN   4 g, Topical, 4 Times Daily PRN      diclofenac sodium 100 MG 24 hr tablet  Commonly known as: " VOTAREN XR   100 mg, Oral, Daily      fluticasone 50 MCG/ACT nasal spray  Commonly known as: FLONASE   1 spray, Nasal, Daily PRN      guaiFENesin 600 MG 12 hr tablet  Commonly known as: MUCINEX   1,200 mg, Oral, 2 Times Daily      lisinopril 2.5 MG tablet  Commonly known as: PRINIVIL,ZESTRIL   2.5 mg, Oral, Daily      multivitamin with minerals tablet tablet   1 tablet, Oral, Daily      nexIUM 40 MG capsule  Generic drug: esomeprazole   40 mg, Oral, Daily      oxyCODONE-acetaminophen  MG per tablet  Commonly known as: PERCOCET   1 tablet, Oral, Every 4 Hours PRN      polyethylene glycol 17 g packet  Commonly known as: MIRALAX   17 g, Oral, Every Other Day      sertraline 100 MG tablet  Commonly known as: ZOLOFT   100 mg, Oral, Daily      zolpidem 5 MG tablet  Commonly known as: AMBIEN   5 mg, Oral, Nightly PRN             Discharge Diet: Cardiac    Activity at Discharge: No heavy lifting for 3 to 5 days.    Follow-up Appointments  Future Appointments   Date Time Provider Department Center   5/22/2023  1:40 PM Evgeny Gregory MD [facility]   5/22/2023  2:30 PM [facility] [place] DEVICE CHECK [facility]       Risk for Readmission (LACE) Score: 3 (1/17/2023  6:00 AM)      Leeanne Pleitez MD  01/17/23  09:00 EST

## 2023-01-17 NOTE — PLAN OF CARE
Goal Outcome Evaluation:                 Problem: Adult Inpatient Plan of Care  Goal: Plan of Care Review  Outcome: Ongoing, Progressing  Goal: Patient-Specific Goal (Individualized)  Outcome: Ongoing, Progressing  Goal: Absence of Hospital-Acquired Illness or Injury  Outcome: Ongoing, Progressing  Intervention: Identify and Manage Fall Risk  Recent Flowsheet Documentation  Taken 1/17/2023 1000 by Ruby Kirk RN  Safety Promotion/Fall Prevention:   assistive device/personal items within reach   safety round/check completed   room organization consistent   nonskid shoes/slippers when out of bed   lighting adjusted   fall prevention program maintained   clutter free environment maintained  Taken 1/17/2023 0800 by Ruby Kirk RN  Safety Promotion/Fall Prevention:   activity supervised   assistive device/personal items within reach   clutter free environment maintained   safety round/check completed   room organization consistent   nonskid shoes/slippers when out of bed   lighting adjusted  Intervention: Prevent Skin Injury  Recent Flowsheet Documentation  Taken 1/17/2023 0800 by Ruby Kirk RN  Skin Protection: adhesive use limited  Intervention: Prevent Infection  Recent Flowsheet Documentation  Taken 1/17/2023 1000 by Ruby Kirk RN  Infection Prevention:   single patient room provided   personal protective equipment utilized   rest/sleep promoted   hand hygiene promoted   equipment surfaces disinfected  Taken 1/17/2023 0800 by Ruby Kirk RN  Infection Prevention:   single patient room provided   rest/sleep promoted   personal protective equipment utilized   hand hygiene promoted   equipment surfaces disinfected  Goal: Optimal Comfort and Wellbeing  Outcome: Ongoing, Progressing  Intervention: Provide Person-Centered Care  Recent Flowsheet Documentation  Taken 1/17/2023 0800 by Ruby Kirk RN  Trust Relationship/Rapport:   care explained   choices provided  Goal: Readiness for  Transition of Care  Outcome: Ongoing, Progressing     Problem: Fall Injury Risk  Goal: Absence of Fall and Fall-Related Injury  Outcome: Ongoing, Progressing  Intervention: Identify and Manage Contributors  Recent Flowsheet Documentation  Taken 1/17/2023 1000 by Ruby Kirk RN  Medication Review/Management: medications reviewed  Taken 1/17/2023 0800 by Ruby Kirk RN  Medication Review/Management: medications reviewed  Intervention: Promote Injury-Free Environment  Recent Flowsheet Documentation  Taken 1/17/2023 1000 by Ruby Kirk RN  Safety Promotion/Fall Prevention:   assistive device/personal items within reach   safety round/check completed   room organization consistent   nonskid shoes/slippers when out of bed   lighting adjusted   fall prevention program maintained   clutter free environment maintained  Taken 1/17/2023 0800 by Ruby Kirk RN  Safety Promotion/Fall Prevention:   activity supervised   assistive device/personal items within reach   clutter free environment maintained   safety round/check completed   room organization consistent   nonskid shoes/slippers when out of bed   lighting adjusted     Problem: Asthma Comorbidity  Goal: Maintenance of Asthma Control  Outcome: Ongoing, Progressing  Intervention: Maintain Asthma Symptom Control  Recent Flowsheet Documentation  Taken 1/17/2023 1000 by Ruby Kirk RN  Medication Review/Management: medications reviewed  Taken 1/17/2023 0800 by Ruby Kirk RN  Medication Review/Management: medications reviewed     Problem: Behavioral Health Comorbidity  Goal: Maintenance of Behavioral Health Symptom Control  Outcome: Ongoing, Progressing  Intervention: Maintain Behavioral Health Symptom Control  Recent Flowsheet Documentation  Taken 1/17/2023 1000 by Ruby Kirk RN  Medication Review/Management: medications reviewed  Taken 1/17/2023 0800 by Ruby Kirk RN  Medication Review/Management: medications reviewed      Problem: COPD (Chronic Obstructive Pulmonary Disease) Comorbidity  Goal: Maintenance of COPD Symptom Control  Outcome: Ongoing, Progressing  Intervention: Maintain COPD-Symptom Control  Recent Flowsheet Documentation  Taken 1/17/2023 1000 by Ruby Kirk RN  Medication Review/Management: medications reviewed  Taken 1/17/2023 0800 by Ruby Kirk RN  Medication Review/Management: medications reviewed     Problem: Diabetes Comorbidity  Goal: Blood Glucose Level Within Targeted Range  Outcome: Ongoing, Progressing     Problem: Heart Failure Comorbidity  Goal: Maintenance of Heart Failure Symptom Control  Outcome: Ongoing, Progressing  Intervention: Maintain Heart Failure-Management  Recent Flowsheet Documentation  Taken 1/17/2023 1000 by Ruby Kirk RN  Medication Review/Management: medications reviewed  Taken 1/17/2023 0800 by Ruby Kirk RN  Medication Review/Management: medications reviewed     Problem: Hypertension Comorbidity  Goal: Blood Pressure in Desired Range  Outcome: Ongoing, Progressing  Intervention: Maintain Blood Pressure Management  Recent Flowsheet Documentation  Taken 1/17/2023 1000 by Ruby Kirk RN  Medication Review/Management: medications reviewed  Taken 1/17/2023 0800 by Ruby Kirk RN  Medication Review/Management: medications reviewed     Problem: Obstructive Sleep Apnea Risk or Actual Comorbidity Management  Goal: Unobstructed Breathing During Sleep  Outcome: Ongoing, Progressing     Problem: Osteoarthritis Comorbidity  Goal: Maintenance of Osteoarthritis Symptom Control  Outcome: Ongoing, Progressing  Intervention: Maintain Osteoarthritis Symptom Control  Recent Flowsheet Documentation  Taken 1/17/2023 1000 by Ruby Kirk RN  Medication Review/Management: medications reviewed  Taken 1/17/2023 0800 by Ruby Kirk RN  Medication Review/Management: medications reviewed     Problem: Pain Chronic (Persistent) (Comorbidity Management)  Goal:  Acceptable Pain Control and Functional Ability  Outcome: Ongoing, Progressing  Intervention: Manage Persistent Pain  Recent Flowsheet Documentation  Taken 1/17/2023 1000 by Ruby Kirk RN  Medication Review/Management: medications reviewed  Taken 1/17/2023 0800 by Ruby Kirk RN  Medication Review/Management: medications reviewed  Intervention: Optimize Psychosocial Wellbeing  Recent Flowsheet Documentation  Taken 1/17/2023 0800 by Ruby Kirk RN  Diversional Activities:   television   smartphone  Family/Support System Care: self-care encouraged     Problem: Seizure Disorder Comorbidity  Goal: Maintenance of Seizure Control  Outcome: Ongoing, Progressing

## 2023-01-17 NOTE — PLAN OF CARE
Problem: Adult Inpatient Plan of Care  Goal: Plan of Care Review  Outcome: Ongoing, Progressing  Goal: Patient-Specific Goal (Individualized)  Outcome: Ongoing, Progressing  Goal: Absence of Hospital-Acquired Illness or Injury  Outcome: Ongoing, Progressing  Intervention: Identify and Manage Fall Risk  Recent Flowsheet Documentation  Taken 1/17/2023 0358 by Noemy Ybarra RN  Safety Promotion/Fall Prevention:   safety round/check completed   room organization consistent   nonskid shoes/slippers when out of bed   lighting adjusted   fall prevention program maintained   clutter free environment maintained   assistive device/personal items within reach  Taken 1/17/2023 0200 by Noemy Ybarra RN  Safety Promotion/Fall Prevention:   safety round/check completed   room organization consistent   nonskid shoes/slippers when out of bed   lighting adjusted   fall prevention program maintained   clutter free environment maintained   assistive device/personal items within reach  Taken 1/17/2023 0000 by Noemy Ybarra RN  Safety Promotion/Fall Prevention:   safety round/check completed   room organization consistent   nonskid shoes/slippers when out of bed   lighting adjusted   fall prevention program maintained   clutter free environment maintained   assistive device/personal items within reach  Taken 1/16/2023 2200 by Noemy Ybarra RN  Safety Promotion/Fall Prevention:   safety round/check completed   room organization consistent   nonskid shoes/slippers when out of bed   lighting adjusted   fall prevention program maintained   clutter free environment maintained   assistive device/personal items within reach  Taken 1/16/2023 1930 by Noemy Ybarra RN  Safety Promotion/Fall Prevention:   safety round/check completed   room organization consistent   nonskid shoes/slippers when out of bed   lighting adjusted   fall prevention program maintained   clutter free environment maintained   assistive device/personal items  within reach  Intervention: Prevent Skin Injury  Recent Flowsheet Documentation  Taken 1/16/2023 1930 by Noemy Ybarra RN  Skin Protection:   adhesive use limited   incontinence pads utilized  Intervention: Prevent and Manage VTE (Venous Thromboembolism) Risk  Recent Flowsheet Documentation  Taken 1/17/2023 0000 by Noemy Ybarra RN  VTE Prevention/Management: patient refused intervention  Taken 1/16/2023 1930 by Noemy Ybarra RN  VTE Prevention/Management: patient refused intervention  Intervention: Prevent Infection  Recent Flowsheet Documentation  Taken 1/17/2023 0358 by Noemy Ybarra RN  Infection Prevention:   single patient room provided   rest/sleep promoted   hand hygiene promoted  Taken 1/17/2023 0200 by Noemy Ybarra RN  Infection Prevention:   single patient room provided   rest/sleep promoted   hand hygiene promoted  Taken 1/17/2023 0000 by Noemy Ybarra RN  Infection Prevention:   single patient room provided   rest/sleep promoted   hand hygiene promoted  Taken 1/16/2023 2200 by Noemy Ybarra RN  Infection Prevention:   single patient room provided   rest/sleep promoted   hand hygiene promoted  Taken 1/16/2023 1930 by Noemy Ybarra RN  Infection Prevention:   single patient room provided   rest/sleep promoted  Goal: Optimal Comfort and Wellbeing  Outcome: Ongoing, Progressing  Intervention: Monitor Pain and Promote Comfort  Recent Flowsheet Documentation  Taken 1/16/2023 1930 by Noemy Ybarra RN  Pain Management Interventions: cold applied  Intervention: Provide Person-Centered Care  Recent Flowsheet Documentation  Taken 1/17/2023 0358 by Noemy Ybarra RN  Trust Relationship/Rapport: (pt sleeping) other (see comments)  Taken 1/17/2023 0000 by Noemy Ybarra RN  Trust Relationship/Rapport: (pt sleeping) other (see comments)  Taken 1/16/2023 1930 by Noemy Ybarra RN  Trust Relationship/Rapport:   care explained   choices provided   emotional support provided   empathic  listening provided   questions answered   questions encouraged   reassurance provided   thoughts/feelings acknowledged  Goal: Readiness for Transition of Care  Outcome: Ongoing, Progressing     Problem: Fall Injury Risk  Goal: Absence of Fall and Fall-Related Injury  Outcome: Ongoing, Progressing  Intervention: Identify and Manage Contributors  Recent Flowsheet Documentation  Taken 1/17/2023 0358 by Noemy Ybarra RN  Medication Review/Management: medications reviewed  Taken 1/17/2023 0200 by Noemy Ybarra RN  Medication Review/Management: medications reviewed  Taken 1/17/2023 0000 by Noemy Ybarra RN  Medication Review/Management: medications reviewed  Taken 1/16/2023 2200 by Noemy Ybarra RN  Medication Review/Management: medications reviewed  Taken 1/16/2023 1930 by Noemy Ybarra RN  Medication Review/Management: medications reviewed  Intervention: Promote Injury-Free Environment  Recent Flowsheet Documentation  Taken 1/17/2023 0358 by Noemy Ybarra RN  Safety Promotion/Fall Prevention:   safety round/check completed   room organization consistent   nonskid shoes/slippers when out of bed   lighting adjusted   fall prevention program maintained   clutter free environment maintained   assistive device/personal items within reach  Taken 1/17/2023 0200 by Noemy Ybarra RN  Safety Promotion/Fall Prevention:   safety round/check completed   room organization consistent   nonskid shoes/slippers when out of bed   lighting adjusted   fall prevention program maintained   clutter free environment maintained   assistive device/personal items within reach  Taken 1/17/2023 0000 by Noemy Ybarra RN  Safety Promotion/Fall Prevention:   safety round/check completed   room organization consistent   nonskid shoes/slippers when out of bed   lighting adjusted   fall prevention program maintained   clutter free environment maintained   assistive device/personal items within reach  Taken 1/16/2023 2200 by  Tate, Kiosha, RN  Safety Promotion/Fall Prevention:   safety round/check completed   room organization consistent   nonskid shoes/slippers when out of bed   lighting adjusted   fall prevention program maintained   clutter free environment maintained   assistive device/personal items within reach  Taken 1/16/2023 1930 by Noemy Ybarra RN  Safety Promotion/Fall Prevention:   safety round/check completed   room organization consistent   nonskid shoes/slippers when out of bed   lighting adjusted   fall prevention program maintained   clutter free environment maintained   assistive device/personal items within reach     Problem: Asthma Comorbidity  Goal: Maintenance of Asthma Control  Outcome: Ongoing, Progressing  Intervention: Maintain Asthma Symptom Control  Recent Flowsheet Documentation  Taken 1/17/2023 0358 by Noemy Ybarra RN  Medication Review/Management: medications reviewed  Taken 1/17/2023 0200 by Noemy Ybarra RN  Medication Review/Management: medications reviewed  Taken 1/17/2023 0000 by Noemy Ybarra RN  Medication Review/Management: medications reviewed  Taken 1/16/2023 2200 by Noemy Ybarra RN  Medication Review/Management: medications reviewed  Taken 1/16/2023 1930 by Noemy Ybarra RN  Medication Review/Management: medications reviewed     Problem: Behavioral Health Comorbidity  Goal: Maintenance of Behavioral Health Symptom Control  Outcome: Ongoing, Progressing  Intervention: Maintain Behavioral Health Symptom Control  Recent Flowsheet Documentation  Taken 1/17/2023 0358 by Noemy Ybarra RN  Medication Review/Management: medications reviewed  Taken 1/17/2023 0200 by Noemy Ybarra RN  Medication Review/Management: medications reviewed  Taken 1/17/2023 0000 by Noemy Ybarra RN  Medication Review/Management: medications reviewed  Taken 1/16/2023 2200 by Noemy Ybarra RN  Medication Review/Management: medications reviewed  Taken 1/16/2023 1930 by Noemy Ybarra  RN  Medication Review/Management: medications reviewed     Problem: COPD (Chronic Obstructive Pulmonary Disease) Comorbidity  Goal: Maintenance of COPD Symptom Control  Outcome: Ongoing, Progressing  Intervention: Maintain COPD-Symptom Control  Recent Flowsheet Documentation  Taken 1/17/2023 0358 by Noemy Ybarra RN  Medication Review/Management: medications reviewed  Taken 1/17/2023 0200 by Noemy Ybarra RN  Medication Review/Management: medications reviewed  Taken 1/17/2023 0000 by Noemy Ybarra RN  Medication Review/Management: medications reviewed  Taken 1/16/2023 2200 by Noemy Ybarra RN  Medication Review/Management: medications reviewed  Taken 1/16/2023 1930 by Noemy Ybarra RN  Medication Review/Management: medications reviewed     Problem: Diabetes Comorbidity  Goal: Blood Glucose Level Within Targeted Range  Outcome: Ongoing, Progressing     Problem: Heart Failure Comorbidity  Goal: Maintenance of Heart Failure Symptom Control  Outcome: Ongoing, Progressing  Intervention: Maintain Heart Failure-Management  Recent Flowsheet Documentation  Taken 1/17/2023 0358 by Noemy Ybarra RN  Medication Review/Management: medications reviewed  Taken 1/17/2023 0200 by Noemy Ybarra RN  Medication Review/Management: medications reviewed  Taken 1/17/2023 0000 by Noemy Ybarra RN  Medication Review/Management: medications reviewed  Taken 1/16/2023 2200 by Noemy Ybarra RN  Medication Review/Management: medications reviewed  Taken 1/16/2023 1930 by Noemy Ybarra RN  Medication Review/Management: medications reviewed     Problem: Hypertension Comorbidity  Goal: Blood Pressure in Desired Range  Outcome: Ongoing, Progressing  Intervention: Maintain Blood Pressure Management  Recent Flowsheet Documentation  Taken 1/17/2023 0358 by Noemy Ybarra RN  Medication Review/Management: medications reviewed  Taken 1/17/2023 0200 by Noemy Ybarra RN  Medication Review/Management: medications  reviewed  Taken 1/17/2023 0000 by Noemy Ybarra RN  Medication Review/Management: medications reviewed  Taken 1/16/2023 2200 by Noemy Ybarra RN  Medication Review/Management: medications reviewed  Taken 1/16/2023 1930 by Noemy Ybarra RN  Medication Review/Management: medications reviewed     Problem: Obstructive Sleep Apnea Risk or Actual Comorbidity Management  Goal: Unobstructed Breathing During Sleep  Outcome: Ongoing, Progressing     Problem: Osteoarthritis Comorbidity  Goal: Maintenance of Osteoarthritis Symptom Control  Outcome: Ongoing, Progressing  Intervention: Maintain Osteoarthritis Symptom Control  Recent Flowsheet Documentation  Taken 1/17/2023 0358 by Noemy Ybarra RN  Medication Review/Management: medications reviewed  Taken 1/17/2023 0200 by Noemy Ybarra RN  Medication Review/Management: medications reviewed  Taken 1/17/2023 0000 by Noemy Ybarra RN  Medication Review/Management: medications reviewed  Taken 1/16/2023 2200 by Noemy Ybarra RN  Medication Review/Management: medications reviewed  Taken 1/16/2023 1930 by Noemy Ybarra RN  Medication Review/Management: medications reviewed     Problem: Pain Chronic (Persistent) (Comorbidity Management)  Goal: Acceptable Pain Control and Functional Ability  Outcome: Ongoing, Progressing  Intervention: Manage Persistent Pain  Recent Flowsheet Documentation  Taken 1/17/2023 0358 by Noemy Ybarra RN  Medication Review/Management: medications reviewed  Taken 1/17/2023 0200 by Noemy Ybarra RN  Medication Review/Management: medications reviewed  Taken 1/17/2023 0000 by Noemy Ybarra RN  Medication Review/Management: medications reviewed  Taken 1/16/2023 2200 by Noemy Ybarra RN  Medication Review/Management: medications reviewed  Taken 1/16/2023 1930 by Noemy Ybarra RN  Medication Review/Management: medications reviewed  Intervention: Develop Pain Management Plan  Recent Flowsheet Documentation  Taken 1/16/2023 1930 by  Noemy Ybarra, RN  Pain Management Interventions: cold applied  Intervention: Optimize Psychosocial Wellbeing  Recent Flowsheet Documentation  Taken 1/17/2023 0358 by Noemy Ybarra RN  Diversional Activities: (pt sleeping) other (see comments)  Taken 1/17/2023 0000 by Noemy Ybarra RN  Diversional Activities: (pt sleeping) other (see comments)  Taken 1/16/2023 1930 by Noemy Ybarra RN  Diversional Activities:   television   smartphone     Problem: Seizure Disorder Comorbidity  Goal: Maintenance of Seizure Control  Outcome: Ongoing, Progressing   Goal Outcome Evaluation:         This nurse has educated the patient regarding her PRN medications.

## 2023-01-18 ENCOUNTER — HOSPITAL ENCOUNTER (INPATIENT)
Facility: HOSPITAL | Age: 83
LOS: 5 days | Discharge: HOME | DRG: 292 | End: 2023-01-23
Attending: EMERGENCY MEDICINE | Admitting: INTERNAL MEDICINE
Payer: MEDICARE

## 2023-01-18 ENCOUNTER — APPOINTMENT (OUTPATIENT)
Dept: GENERAL RADIOLOGY | Facility: HOSPITAL | Age: 83
DRG: 292 | End: 2023-01-18
Payer: MEDICARE

## 2023-01-18 DIAGNOSIS — I50.9 ACUTE ON CHRONIC CONGESTIVE HEART FAILURE, UNSPECIFIED HEART FAILURE TYPE: Primary | ICD-10-CM

## 2023-01-18 PROBLEM — I48.91 ATRIAL FIBRILLATION: Status: ACTIVE | Noted: 2019-10-30

## 2023-01-18 PROBLEM — G47.30 SLEEP APNEA: Chronic | Status: ACTIVE | Noted: 2017-01-01

## 2023-01-18 LAB
ALBUMIN SERPL-MCNC: 3.9 G/DL (ref 3.5–5.2)
ALBUMIN/GLOB SERPL: 2 G/DL
ALP SERPL-CCNC: 95 U/L (ref 39–117)
ALT SERPL W P-5'-P-CCNC: 44 U/L (ref 1–33)
ANION GAP SERPL CALCULATED.3IONS-SCNC: 9 MMOL/L (ref 5–15)
APTT PPP: 30.5 SECONDS (ref 61–76.5)
AST SERPL-CCNC: 55 U/L (ref 1–32)
BASOPHILS # BLD AUTO: 0 10*3/MM3 (ref 0–0.2)
BASOPHILS NFR BLD AUTO: 0.3 % (ref 0–1.5)
BILIRUB SERPL-MCNC: 0.6 MG/DL (ref 0–1.2)
BUN SERPL-MCNC: 18 MG/DL (ref 8–23)
BUN/CREAT SERPL: 22.2 (ref 7–25)
CALCIUM SPEC-SCNC: 8.7 MG/DL (ref 8.6–10.5)
CHLORIDE SERPL-SCNC: 105 MMOL/L (ref 98–107)
CO2 SERPL-SCNC: 26 MMOL/L (ref 22–29)
CREAT SERPL-MCNC: 0.81 MG/DL (ref 0.57–1)
DEPRECATED RDW RBC AUTO: 59.9 FL (ref 37–54)
EGFRCR SERPLBLD CKD-EPI 2021: 72.1 ML/MIN/1.73
EOSINOPHIL # BLD AUTO: 0.1 10*3/MM3 (ref 0–0.4)
EOSINOPHIL NFR BLD AUTO: 1.1 % (ref 0.3–6.2)
ERYTHROCYTE [DISTWIDTH] IN BLOOD BY AUTOMATED COUNT: 18.3 % (ref 12.3–15.4)
GLOBULIN UR ELPH-MCNC: 2 GM/DL
GLUCOSE SERPL-MCNC: 113 MG/DL (ref 65–99)
HCT VFR BLD AUTO: 32.9 % (ref 34–46.6)
HGB BLD-MCNC: 10.3 G/DL (ref 12–15.9)
HOLD SPECIMEN: NORMAL
HOLD SPECIMEN: NORMAL
INR PPP: 1.01 (ref 0.93–1.1)
LYMPHOCYTES # BLD AUTO: 0.6 10*3/MM3 (ref 0.7–3.1)
LYMPHOCYTES NFR BLD AUTO: 10.7 % (ref 19.6–45.3)
MAGNESIUM SERPL-MCNC: 2 MG/DL (ref 1.6–2.4)
MCH RBC QN AUTO: 29.2 PG (ref 26.6–33)
MCHC RBC AUTO-ENTMCNC: 31.2 G/DL (ref 31.5–35.7)
MCV RBC AUTO: 93.4 FL (ref 79–97)
MONOCYTES # BLD AUTO: 0.6 10*3/MM3 (ref 0.1–0.9)
MONOCYTES NFR BLD AUTO: 10 % (ref 5–12)
NEUTROPHILS NFR BLD AUTO: 4.7 10*3/MM3 (ref 1.7–7)
NEUTROPHILS NFR BLD AUTO: 77.9 % (ref 42.7–76)
NRBC BLD AUTO-RTO: 0 /100 WBC (ref 0–0.2)
NT-PROBNP SERPL-MCNC: 3265 PG/ML (ref 0–1800)
PLATELET # BLD AUTO: 158 10*3/MM3 (ref 140–450)
PMV BLD AUTO: 9.2 FL (ref 6–12)
POTASSIUM SERPL-SCNC: 4.6 MMOL/L (ref 3.5–5.2)
PROT SERPL-MCNC: 5.9 G/DL (ref 6–8.5)
PROTHROMBIN TIME: 10.4 SECONDS (ref 9.6–11.7)
RBC # BLD AUTO: 3.52 10*6/MM3 (ref 3.77–5.28)
SODIUM SERPL-SCNC: 140 MMOL/L (ref 136–145)
TROPONIN T SERPL-MCNC: 1.04 NG/ML (ref 0–0.03)
WBC NRBC COR # BLD: 6 10*3/MM3 (ref 3.4–10.8)
WHOLE BLOOD HOLD COAG: NORMAL
WHOLE BLOOD HOLD SPECIMEN: NORMAL

## 2023-01-18 PROCEDURE — 84484 ASSAY OF TROPONIN QUANT: CPT | Performed by: EMERGENCY MEDICINE

## 2023-01-18 PROCEDURE — 80053 COMPREHEN METABOLIC PANEL: CPT | Performed by: EMERGENCY MEDICINE

## 2023-01-18 PROCEDURE — 99285 EMERGENCY DEPT VISIT HI MDM: CPT

## 2023-01-18 PROCEDURE — 25010000002 FUROSEMIDE PER 20 MG: Performed by: EMERGENCY MEDICINE

## 2023-01-18 PROCEDURE — 85610 PROTHROMBIN TIME: CPT | Performed by: EMERGENCY MEDICINE

## 2023-01-18 PROCEDURE — 85730 THROMBOPLASTIN TIME PARTIAL: CPT | Performed by: EMERGENCY MEDICINE

## 2023-01-18 PROCEDURE — 71045 X-RAY EXAM CHEST 1 VIEW: CPT

## 2023-01-18 PROCEDURE — 83880 ASSAY OF NATRIURETIC PEPTIDE: CPT | Performed by: EMERGENCY MEDICINE

## 2023-01-18 PROCEDURE — 85025 COMPLETE CBC W/AUTO DIFF WBC: CPT | Performed by: EMERGENCY MEDICINE

## 2023-01-18 PROCEDURE — 93005 ELECTROCARDIOGRAM TRACING: CPT | Performed by: EMERGENCY MEDICINE

## 2023-01-18 PROCEDURE — 93005 ELECTROCARDIOGRAM TRACING: CPT

## 2023-01-18 PROCEDURE — 83735 ASSAY OF MAGNESIUM: CPT | Performed by: EMERGENCY MEDICINE

## 2023-01-18 RX ORDER — SODIUM CHLORIDE 0.9 % (FLUSH) 0.9 %
10 SYRINGE (ML) INJECTION AS NEEDED
Status: DISCONTINUED | OUTPATIENT
Start: 2023-01-18 | End: 2023-01-23 | Stop reason: HOSPADM

## 2023-01-18 RX ORDER — OXYCODONE HYDROCHLORIDE 5 MG/1
10 TABLET ORAL ONCE
Status: COMPLETED | OUTPATIENT
Start: 2023-01-18 | End: 2023-01-18

## 2023-01-18 RX ORDER — MULTIPLE VITAMINS W/ MINERALS TAB 9MG-400MCG
1 TAB ORAL DAILY
Status: DISCONTINUED | OUTPATIENT
Start: 2023-01-19 | End: 2023-01-23 | Stop reason: HOSPADM

## 2023-01-18 RX ORDER — BISACODYL 5 MG/1
5 TABLET, DELAYED RELEASE ORAL DAILY PRN
Status: DISCONTINUED | OUTPATIENT
Start: 2023-01-18 | End: 2023-01-23

## 2023-01-18 RX ORDER — LISINOPRIL 5 MG/1
2.5 TABLET ORAL DAILY
Status: DISCONTINUED | OUTPATIENT
Start: 2023-01-19 | End: 2023-01-23 | Stop reason: HOSPADM

## 2023-01-18 RX ORDER — POLYETHYLENE GLYCOL 3350 17 G/17G
17 POWDER, FOR SOLUTION ORAL DAILY PRN
Status: DISCONTINUED | OUTPATIENT
Start: 2023-01-18 | End: 2023-01-23

## 2023-01-18 RX ORDER — ZOLPIDEM TARTRATE 5 MG/1
5 TABLET ORAL NIGHTLY PRN
Status: DISCONTINUED | OUTPATIENT
Start: 2023-01-18 | End: 2023-01-23 | Stop reason: HOSPADM

## 2023-01-18 RX ORDER — GUAIFENESIN 600 MG/1
1200 TABLET, EXTENDED RELEASE ORAL 2 TIMES DAILY
Status: DISCONTINUED | OUTPATIENT
Start: 2023-01-18 | End: 2023-01-23 | Stop reason: HOSPADM

## 2023-01-18 RX ORDER — HYDROCODONE BITARTRATE AND ACETAMINOPHEN 5; 325 MG/1; MG/1
1 TABLET ORAL EVERY 4 HOURS PRN
Status: DISCONTINUED | OUTPATIENT
Start: 2023-01-18 | End: 2023-01-18 | Stop reason: SDUPTHER

## 2023-01-18 RX ORDER — POLYETHYLENE GLYCOL 3350 17 G/17G
17 POWDER, FOR SOLUTION ORAL EVERY OTHER DAY
Status: DISCONTINUED | OUTPATIENT
Start: 2023-01-19 | End: 2023-01-23 | Stop reason: HOSPADM

## 2023-01-18 RX ORDER — ATORVASTATIN CALCIUM 40 MG/1
40 TABLET, FILM COATED ORAL NIGHTLY
Status: DISCONTINUED | OUTPATIENT
Start: 2023-01-18 | End: 2023-01-23 | Stop reason: HOSPADM

## 2023-01-18 RX ORDER — BISACODYL 10 MG
10 SUPPOSITORY, RECTAL RECTAL DAILY PRN
Status: DISCONTINUED | OUTPATIENT
Start: 2023-01-18 | End: 2023-01-23

## 2023-01-18 RX ORDER — SERTRALINE HYDROCHLORIDE 100 MG/1
100 TABLET, FILM COATED ORAL DAILY
Status: DISCONTINUED | OUTPATIENT
Start: 2023-01-19 | End: 2023-01-23 | Stop reason: HOSPADM

## 2023-01-18 RX ORDER — IBUPROFEN 400 MG/1
400 TABLET ORAL 2 TIMES DAILY WITH MEALS
Status: DISCONTINUED | OUTPATIENT
Start: 2023-01-19 | End: 2023-01-23 | Stop reason: HOSPADM

## 2023-01-18 RX ORDER — AMOXICILLIN 250 MG
2 CAPSULE ORAL 2 TIMES DAILY
Status: DISCONTINUED | OUTPATIENT
Start: 2023-01-18 | End: 2023-01-23

## 2023-01-18 RX ORDER — FUROSEMIDE 10 MG/ML
40 INJECTION INTRAMUSCULAR; INTRAVENOUS ONCE
Status: COMPLETED | OUTPATIENT
Start: 2023-01-18 | End: 2023-01-18

## 2023-01-18 RX ORDER — ONDANSETRON 2 MG/ML
4 INJECTION INTRAMUSCULAR; INTRAVENOUS EVERY 6 HOURS PRN
Status: DISCONTINUED | OUTPATIENT
Start: 2023-01-18 | End: 2023-01-23 | Stop reason: HOSPADM

## 2023-01-18 RX ORDER — HYDROCODONE BITARTRATE AND ACETAMINOPHEN 10; 325 MG/1; MG/1
1 TABLET ORAL EVERY 4 HOURS PRN
COMMUNITY
End: 2023-02-08

## 2023-01-18 RX ORDER — HYDROCODONE BITARTRATE AND ACETAMINOPHEN 10; 325 MG/1; MG/1
1 TABLET ORAL EVERY 4 HOURS PRN
Status: DISCONTINUED | OUTPATIENT
Start: 2023-01-18 | End: 2023-01-23 | Stop reason: HOSPADM

## 2023-01-18 RX ORDER — ASPIRIN 81 MG/1
81 TABLET ORAL NIGHTLY
Status: DISCONTINUED | OUTPATIENT
Start: 2023-01-18 | End: 2023-01-23 | Stop reason: HOSPADM

## 2023-01-18 RX ORDER — PANTOPRAZOLE SODIUM 40 MG/1
40 TABLET, DELAYED RELEASE ORAL
Status: DISCONTINUED | OUTPATIENT
Start: 2023-01-19 | End: 2023-01-23 | Stop reason: HOSPADM

## 2023-01-18 RX ORDER — FUROSEMIDE 10 MG/ML
40 INJECTION INTRAMUSCULAR; INTRAVENOUS EVERY 12 HOURS
Status: DISCONTINUED | OUTPATIENT
Start: 2023-01-19 | End: 2023-01-19

## 2023-01-18 RX ORDER — OXYCODONE HYDROCHLORIDE 5 MG/1
5 TABLET ORAL EVERY 4 HOURS PRN
Status: DISCONTINUED | OUTPATIENT
Start: 2023-01-18 | End: 2023-01-18

## 2023-01-18 RX ORDER — SODIUM CHLORIDE 0.9 % (FLUSH) 0.9 %
10 SYRINGE (ML) INJECTION EVERY 12 HOURS SCHEDULED
Status: DISCONTINUED | OUTPATIENT
Start: 2023-01-18 | End: 2023-01-23 | Stop reason: HOSPADM

## 2023-01-18 RX ORDER — SODIUM CHLORIDE 9 MG/ML
40 INJECTION, SOLUTION INTRAVENOUS AS NEEDED
Status: DISCONTINUED | OUTPATIENT
Start: 2023-01-18 | End: 2023-01-23 | Stop reason: HOSPADM

## 2023-01-18 RX ORDER — CHOLECALCIFEROL (VITAMIN D3) 125 MCG
5 CAPSULE ORAL NIGHTLY PRN
Status: DISCONTINUED | OUTPATIENT
Start: 2023-01-18 | End: 2023-01-23 | Stop reason: HOSPADM

## 2023-01-18 RX ORDER — AMIODARONE HYDROCHLORIDE 200 MG/1
200 TABLET ORAL 2 TIMES DAILY
Status: DISCONTINUED | OUTPATIENT
Start: 2023-01-18 | End: 2023-01-23 | Stop reason: HOSPADM

## 2023-01-18 RX ADMIN — SENNOSIDES AND DOCUSATE SODIUM 2 TABLET: 50; 8.6 TABLET ORAL at 20:32

## 2023-01-18 RX ADMIN — APIXABAN 5 MG: 5 TABLET, FILM COATED ORAL at 20:32

## 2023-01-18 RX ADMIN — ATORVASTATIN CALCIUM 40 MG: 40 TABLET, FILM COATED ORAL at 20:32

## 2023-01-18 RX ADMIN — OXYCODONE HYDROCHLORIDE 10 MG: 5 TABLET ORAL at 18:30

## 2023-01-18 RX ADMIN — AMIODARONE HYDROCHLORIDE 200 MG: 200 TABLET ORAL at 20:32

## 2023-01-18 RX ADMIN — FUROSEMIDE 40 MG: 10 INJECTION, SOLUTION INTRAMUSCULAR; INTRAVENOUS at 16:29

## 2023-01-18 RX ADMIN — ASPIRIN 81 MG: 81 TABLET, COATED ORAL at 20:32

## 2023-01-18 RX ADMIN — GUAIFENESIN 1200 MG: 600 TABLET, EXTENDED RELEASE ORAL at 20:31

## 2023-01-18 NOTE — CASE MANAGEMENT/SOCIAL WORK
Case Management Discharge Note      Final Note: Routine Home         Selected Continued Care - Discharged on 1/17/2023 Admission date: 1/16/2023 - Discharge disposition: Home or Self Care            Transportation Services  Private: Car    Final Discharge Disposition Code: 01 - home or self-care

## 2023-01-19 ENCOUNTER — APPOINTMENT (OUTPATIENT)
Dept: CARDIOLOGY | Facility: HOSPITAL | Age: 83
DRG: 292 | End: 2023-01-19
Payer: MEDICARE

## 2023-01-19 LAB
ANION GAP SERPL CALCULATED.3IONS-SCNC: 9 MMOL/L (ref 5–15)
B PARAPERT DNA SPEC QL NAA+PROBE: NOT DETECTED
B PERT DNA SPEC QL NAA+PROBE: NOT DETECTED
BUN SERPL-MCNC: 16 MG/DL (ref 8–23)
BUN/CREAT SERPL: 22.2 (ref 7–25)
C PNEUM DNA NPH QL NAA+NON-PROBE: NOT DETECTED
CA-I SERPL ISE-MCNC: 1.16 MMOL/L (ref 1.2–1.3)
CALCIUM SPEC-SCNC: 8.6 MG/DL (ref 8.6–10.5)
CHLORIDE SERPL-SCNC: 103 MMOL/L (ref 98–107)
CHOLEST SERPL-MCNC: 107 MG/DL (ref 0–200)
CK SERPL-CCNC: 45 U/L (ref 20–180)
CO2 SERPL-SCNC: 26 MMOL/L (ref 22–29)
CREAT SERPL-MCNC: 0.72 MG/DL (ref 0.57–1)
EGFRCR SERPLBLD CKD-EPI 2021: 83.1 ML/MIN/1.73
FERRITIN SERPL-MCNC: 116.4 NG/ML (ref 13–150)
FLUAV SUBTYP SPEC NAA+PROBE: NOT DETECTED
FLUBV RNA ISLT QL NAA+PROBE: NOT DETECTED
GLUCOSE SERPL-MCNC: 115 MG/DL (ref 65–99)
HADV DNA SPEC NAA+PROBE: NOT DETECTED
HBA1C MFR BLD: 5.8 % (ref 3.5–5.6)
HCOV 229E RNA SPEC QL NAA+PROBE: NOT DETECTED
HCOV HKU1 RNA SPEC QL NAA+PROBE: NOT DETECTED
HCOV NL63 RNA SPEC QL NAA+PROBE: NOT DETECTED
HCOV OC43 RNA SPEC QL NAA+PROBE: NOT DETECTED
HDLC SERPL-MCNC: 67 MG/DL (ref 40–60)
HMPV RNA NPH QL NAA+NON-PROBE: NOT DETECTED
HPIV1 RNA ISLT QL NAA+PROBE: NOT DETECTED
HPIV2 RNA SPEC QL NAA+PROBE: NOT DETECTED
HPIV3 RNA NPH QL NAA+PROBE: NOT DETECTED
HPIV4 P GENE NPH QL NAA+PROBE: NOT DETECTED
IRON 24H UR-MRATE: 9 MCG/DL (ref 37–145)
IRON SATN MFR SERPL: 3 % (ref 20–50)
LDLC SERPL CALC-MCNC: 26 MG/DL (ref 0–100)
LDLC/HDLC SERPL: 0.41 {RATIO}
M PNEUMO IGG SER IA-ACNC: NOT DETECTED
NT-PROBNP SERPL-MCNC: 3818 PG/ML (ref 0–1800)
POTASSIUM SERPL-SCNC: 4.6 MMOL/L (ref 3.5–5.2)
RHINOVIRUS RNA SPEC NAA+PROBE: NOT DETECTED
RSV RNA NPH QL NAA+NON-PROBE: NOT DETECTED
SARS-COV-2 RNA NPH QL NAA+NON-PROBE: NOT DETECTED
SODIUM SERPL-SCNC: 138 MMOL/L (ref 136–145)
TIBC SERPL-MCNC: 358 MCG/DL (ref 298–536)
TRANSFERRIN SERPL-MCNC: 240 MG/DL (ref 200–360)
TRIGL SERPL-MCNC: 64 MG/DL (ref 0–150)
TROPONIN T SERPL-MCNC: 0.68 NG/ML (ref 0–0.03)
TSH SERPL DL<=0.05 MIU/L-ACNC: 0.34 UIU/ML (ref 0.27–4.2)
VLDLC SERPL-MCNC: 14 MG/DL (ref 5–40)

## 2023-01-19 PROCEDURE — 83880 ASSAY OF NATRIURETIC PEPTIDE: CPT | Performed by: INTERNAL MEDICINE

## 2023-01-19 PROCEDURE — 99222 1ST HOSP IP/OBS MODERATE 55: CPT | Performed by: INTERNAL MEDICINE

## 2023-01-19 PROCEDURE — 84443 ASSAY THYROID STIM HORMONE: CPT | Performed by: INTERNAL MEDICINE

## 2023-01-19 PROCEDURE — 84484 ASSAY OF TROPONIN QUANT: CPT | Performed by: INTERNAL MEDICINE

## 2023-01-19 PROCEDURE — 80061 LIPID PANEL: CPT | Performed by: INTERNAL MEDICINE

## 2023-01-19 PROCEDURE — 36415 COLL VENOUS BLD VENIPUNCTURE: CPT | Performed by: INTERNAL MEDICINE

## 2023-01-19 PROCEDURE — 82550 ASSAY OF CK (CPK): CPT | Performed by: INTERNAL MEDICINE

## 2023-01-19 PROCEDURE — 97162 PT EVAL MOD COMPLEX 30 MIN: CPT

## 2023-01-19 PROCEDURE — 80048 BASIC METABOLIC PNL TOTAL CA: CPT | Performed by: INTERNAL MEDICINE

## 2023-01-19 PROCEDURE — 97166 OT EVAL MOD COMPLEX 45 MIN: CPT

## 2023-01-19 PROCEDURE — 83036 HEMOGLOBIN GLYCOSYLATED A1C: CPT | Performed by: INTERNAL MEDICINE

## 2023-01-19 PROCEDURE — 25010000002 CALCIUM GLUCONATE 2-0.675 GM/100ML-% SOLUTION: Performed by: INTERNAL MEDICINE

## 2023-01-19 PROCEDURE — 93306 TTE W/DOPPLER COMPLETE: CPT | Performed by: INTERNAL MEDICINE

## 2023-01-19 PROCEDURE — 25010000002 FUROSEMIDE PER 20 MG: Performed by: INTERNAL MEDICINE

## 2023-01-19 PROCEDURE — 93306 TTE W/DOPPLER COMPLETE: CPT

## 2023-01-19 PROCEDURE — 25010000002 NA FERRIC GLUC CPLX PER 12.5 MG: Performed by: NURSE PRACTITIONER

## 2023-01-19 PROCEDURE — 84466 ASSAY OF TRANSFERRIN: CPT | Performed by: INTERNAL MEDICINE

## 2023-01-19 PROCEDURE — 82330 ASSAY OF CALCIUM: CPT | Performed by: INTERNAL MEDICINE

## 2023-01-19 PROCEDURE — 82728 ASSAY OF FERRITIN: CPT | Performed by: INTERNAL MEDICINE

## 2023-01-19 PROCEDURE — 0202U NFCT DS 22 TRGT SARS-COV-2: CPT | Performed by: NURSE PRACTITIONER

## 2023-01-19 PROCEDURE — 83540 ASSAY OF IRON: CPT | Performed by: INTERNAL MEDICINE

## 2023-01-19 RX ORDER — BUMETANIDE 0.25 MG/ML
1 INJECTION INTRAMUSCULAR; INTRAVENOUS EVERY 12 HOURS
Status: DISCONTINUED | OUTPATIENT
Start: 2023-01-19 | End: 2023-01-23

## 2023-01-19 RX ORDER — BUMETANIDE 0.25 MG/ML
1 INJECTION INTRAMUSCULAR; INTRAVENOUS ONCE
Status: DISCONTINUED | OUTPATIENT
Start: 2023-01-19 | End: 2023-01-19

## 2023-01-19 RX ORDER — CALCIUM GLUCONATE 20 MG/ML
2 INJECTION, SOLUTION INTRAVENOUS ONCE
Status: COMPLETED | OUTPATIENT
Start: 2023-01-19 | End: 2023-01-19

## 2023-01-19 RX ADMIN — GUAIFENESIN 1200 MG: 600 TABLET, EXTENDED RELEASE ORAL at 09:13

## 2023-01-19 RX ADMIN — IBUPROFEN 400 MG: 400 TABLET ORAL at 09:12

## 2023-01-19 RX ADMIN — IBUPROFEN 400 MG: 400 TABLET ORAL at 17:26

## 2023-01-19 RX ADMIN — MULTIPLE VITAMINS W/ MINERALS TAB 1 TABLET: TAB at 09:13

## 2023-01-19 RX ADMIN — ASPIRIN 81 MG: 81 TABLET, COATED ORAL at 20:37

## 2023-01-19 RX ADMIN — Medication 10 ML: at 20:38

## 2023-01-19 RX ADMIN — SODIUM CHLORIDE 250 MG: 9 INJECTION, SOLUTION INTRAVENOUS at 20:43

## 2023-01-19 RX ADMIN — GUAIFENESIN 1200 MG: 600 TABLET, EXTENDED RELEASE ORAL at 20:37

## 2023-01-19 RX ADMIN — DICLOFENAC SODIUM 4 G: 10 GEL TOPICAL at 02:48

## 2023-01-19 RX ADMIN — APIXABAN 5 MG: 5 TABLET, FILM COATED ORAL at 09:12

## 2023-01-19 RX ADMIN — BUMETANIDE 1 MG: 0.25 INJECTION INTRAMUSCULAR; INTRAVENOUS at 15:41

## 2023-01-19 RX ADMIN — Medication 10 ML: at 09:13

## 2023-01-19 RX ADMIN — LISINOPRIL 2.5 MG: 5 TABLET ORAL at 09:12

## 2023-01-19 RX ADMIN — SERTRALINE 100 MG: 100 TABLET, FILM COATED ORAL at 09:13

## 2023-01-19 RX ADMIN — AMIODARONE HYDROCHLORIDE 200 MG: 200 TABLET ORAL at 09:13

## 2023-01-19 RX ADMIN — PANTOPRAZOLE SODIUM 40 MG: 40 TABLET, DELAYED RELEASE ORAL at 05:53

## 2023-01-19 RX ADMIN — APIXABAN 5 MG: 5 TABLET, FILM COATED ORAL at 20:37

## 2023-01-19 RX ADMIN — AMIODARONE HYDROCHLORIDE 200 MG: 200 TABLET ORAL at 20:37

## 2023-01-19 RX ADMIN — FUROSEMIDE 40 MG: 10 INJECTION, SOLUTION INTRAMUSCULAR; INTRAVENOUS at 09:13

## 2023-01-19 RX ADMIN — HYDROCODONE BITARTRATE AND ACETAMINOPHEN 1 TABLET: 10; 325 TABLET ORAL at 20:37

## 2023-01-19 RX ADMIN — ATORVASTATIN CALCIUM 40 MG: 40 TABLET, FILM COATED ORAL at 20:37

## 2023-01-19 RX ADMIN — HYDROCODONE BITARTRATE AND ACETAMINOPHEN 1 TABLET: 10; 325 TABLET ORAL at 01:09

## 2023-01-19 RX ADMIN — HYDROCODONE BITARTRATE AND ACETAMINOPHEN 1 TABLET: 10; 325 TABLET ORAL at 08:37

## 2023-01-19 RX ADMIN — CALCIUM GLUCONATE 2 G: 20 INJECTION, SOLUTION INTRAVENOUS at 09:49

## 2023-01-19 RX ADMIN — HYDROCODONE BITARTRATE AND ACETAMINOPHEN 1 TABLET: 10; 325 TABLET ORAL at 15:41

## 2023-01-20 LAB
ANION GAP SERPL CALCULATED.3IONS-SCNC: 10 MMOL/L (ref 5–15)
BH CV ECHO MEAS - ACS: 2.1 CM
BH CV ECHO MEAS - AI P1/2T: 452.2 MSEC
BH CV ECHO MEAS - AO MAX PG: 10.6 MMHG
BH CV ECHO MEAS - AO MEAN PG: 6 MMHG
BH CV ECHO MEAS - AO V2 MAX: 163 CM/SEC
BH CV ECHO MEAS - AO V2 VTI: 33.9 CM
BH CV ECHO MEAS - AVA(I,D): 1.9 CM2
BH CV ECHO MEAS - EDV(CUBED): 132.7 ML
BH CV ECHO MEAS - EDV(MOD-SP4): 93.1 ML
BH CV ECHO MEAS - EF(MOD-BP): 61 %
BH CV ECHO MEAS - EF(MOD-SP4): 61.4 %
BH CV ECHO MEAS - ESV(CUBED): 32.8 ML
BH CV ECHO MEAS - ESV(MOD-SP4): 35.9 ML
BH CV ECHO MEAS - FS: 37.3 %
BH CV ECHO MEAS - IVS/LVPW: 0.89 CM
BH CV ECHO MEAS - IVSD: 0.8 CM
BH CV ECHO MEAS - LA DIMENSION: 4.3 CM
BH CV ECHO MEAS - LAT PEAK E' VEL: 11.8 CM/SEC
BH CV ECHO MEAS - LV DIASTOLIC VOL/BSA (35-75): 49.9 CM2
BH CV ECHO MEAS - LV MASS(C)D: 151.8 GRAMS
BH CV ECHO MEAS - LV MAX PG: 3.5 MMHG
BH CV ECHO MEAS - LV MEAN PG: 2 MMHG
BH CV ECHO MEAS - LV SYSTOLIC VOL/BSA (12-30): 19.2 CM2
BH CV ECHO MEAS - LV V1 MAX: 93.4 CM/SEC
BH CV ECHO MEAS - LV V1 VTI: 20.5 CM
BH CV ECHO MEAS - LVIDD: 5.1 CM
BH CV ECHO MEAS - LVIDS: 3.2 CM
BH CV ECHO MEAS - LVOT AREA: 3.1 CM2
BH CV ECHO MEAS - LVOT DIAM: 2 CM
BH CV ECHO MEAS - LVPWD: 0.9 CM
BH CV ECHO MEAS - MED PEAK E' VEL: 7.5 CM/SEC
BH CV ECHO MEAS - MR MAX PG: 53.6 MMHG
BH CV ECHO MEAS - MR MAX VEL: 366 CM/SEC
BH CV ECHO MEAS - MV A MAX VEL: 51.9 CM/SEC
BH CV ECHO MEAS - MV DEC SLOPE: 445 CM/SEC2
BH CV ECHO MEAS - MV DEC TIME: 0.15 MSEC
BH CV ECHO MEAS - MV E MAX VEL: 123 CM/SEC
BH CV ECHO MEAS - MV E/A: 2.37
BH CV ECHO MEAS - MV MAX PG: 5.9 MMHG
BH CV ECHO MEAS - MV MEAN PG: 2 MMHG
BH CV ECHO MEAS - MV P1/2T: 81.6 MSEC
BH CV ECHO MEAS - MV V2 VTI: 37.7 CM
BH CV ECHO MEAS - MVA(P1/2T): 2.7 CM2
BH CV ECHO MEAS - MVA(VTI): 1.71 CM2
BH CV ECHO MEAS - PA ACC TIME: 0.08 SEC
BH CV ECHO MEAS - PA PR(ACCEL): 44.4 MMHG
BH CV ECHO MEAS - PA V2 MAX: 147 CM/SEC
BH CV ECHO MEAS - RAP SYSTOLE: 15 MMHG
BH CV ECHO MEAS - RV MAX PG: 1.98 MMHG
BH CV ECHO MEAS - RV V1 MAX: 70.3 CM/SEC
BH CV ECHO MEAS - RV V1 VTI: 13.7 CM
BH CV ECHO MEAS - RVDD: 6.2 CM
BH CV ECHO MEAS - RVSP: 59.9 MMHG
BH CV ECHO MEAS - SI(MOD-SP4): 30.6 ML/M2
BH CV ECHO MEAS - SV(LVOT): 64.4 ML
BH CV ECHO MEAS - SV(MOD-SP4): 57.2 ML
BH CV ECHO MEAS - TAPSE (>1.6): 1.56 CM
BH CV ECHO MEAS - TR MAX PG: 44.9 MMHG
BH CV ECHO MEAS - TR MAX VEL: 335 CM/SEC
BH CV ECHO MEASUREMENTS AVERAGE E/E' RATIO: 12.75
BH CV XLRA - TDI S': 10.3 CM/SEC
BUN SERPL-MCNC: 19 MG/DL (ref 8–23)
BUN/CREAT SERPL: 22.6 (ref 7–25)
CALCIUM SPEC-SCNC: 9.3 MG/DL (ref 8.6–10.5)
CHLORIDE SERPL-SCNC: 101 MMOL/L (ref 98–107)
CO2 SERPL-SCNC: 27 MMOL/L (ref 22–29)
CREAT SERPL-MCNC: 0.84 MG/DL (ref 0.57–1)
EGFRCR SERPLBLD CKD-EPI 2021: 69 ML/MIN/1.73
GLUCOSE SERPL-MCNC: 126 MG/DL (ref 65–99)
LEFT ATRIUM VOLUME INDEX: 42.5 ML/M2
MAXIMAL PREDICTED HEART RATE: 137 BPM
POTASSIUM SERPL-SCNC: 4 MMOL/L (ref 3.5–5.2)
SINUS: 2.8 CM
SODIUM SERPL-SCNC: 138 MMOL/L (ref 136–145)
STJ: 2.7 CM
STRESS TARGET HR: 116 BPM

## 2023-01-20 PROCEDURE — 80048 BASIC METABOLIC PNL TOTAL CA: CPT | Performed by: INTERNAL MEDICINE

## 2023-01-20 PROCEDURE — 99232 SBSQ HOSP IP/OBS MODERATE 35: CPT | Performed by: INTERNAL MEDICINE

## 2023-01-20 PROCEDURE — 36415 COLL VENOUS BLD VENIPUNCTURE: CPT | Performed by: INTERNAL MEDICINE

## 2023-01-20 RX ORDER — ECHINACEA PURPUREA EXTRACT 125 MG
2 TABLET ORAL AS NEEDED
Status: DISCONTINUED | OUTPATIENT
Start: 2023-01-20 | End: 2023-01-23 | Stop reason: HOSPADM

## 2023-01-20 RX ADMIN — AMIODARONE HYDROCHLORIDE 200 MG: 200 TABLET ORAL at 08:36

## 2023-01-20 RX ADMIN — HYDROCODONE BITARTRATE AND ACETAMINOPHEN 1 TABLET: 10; 325 TABLET ORAL at 02:19

## 2023-01-20 RX ADMIN — MULTIPLE VITAMINS W/ MINERALS TAB 1 TABLET: TAB at 08:37

## 2023-01-20 RX ADMIN — APIXABAN 5 MG: 5 TABLET, FILM COATED ORAL at 08:36

## 2023-01-20 RX ADMIN — Medication 10 ML: at 08:39

## 2023-01-20 RX ADMIN — SERTRALINE 100 MG: 100 TABLET, FILM COATED ORAL at 08:36

## 2023-01-20 RX ADMIN — IBUPROFEN 400 MG: 400 TABLET ORAL at 08:36

## 2023-01-20 RX ADMIN — AMIODARONE HYDROCHLORIDE 200 MG: 200 TABLET ORAL at 20:32

## 2023-01-20 RX ADMIN — SENNOSIDES AND DOCUSATE SODIUM 2 TABLET: 50; 8.6 TABLET ORAL at 20:32

## 2023-01-20 RX ADMIN — PANTOPRAZOLE SODIUM 40 MG: 40 TABLET, DELAYED RELEASE ORAL at 05:53

## 2023-01-20 RX ADMIN — Medication 10 ML: at 20:33

## 2023-01-20 RX ADMIN — IBUPROFEN 400 MG: 400 TABLET ORAL at 17:15

## 2023-01-20 RX ADMIN — HYDROCODONE BITARTRATE AND ACETAMINOPHEN 1 TABLET: 10; 325 TABLET ORAL at 17:16

## 2023-01-20 RX ADMIN — BUMETANIDE 1 MG: 0.25 INJECTION INTRAMUSCULAR; INTRAVENOUS at 17:15

## 2023-01-20 RX ADMIN — LISINOPRIL 2.5 MG: 5 TABLET ORAL at 08:37

## 2023-01-20 RX ADMIN — HYDROCODONE BITARTRATE AND ACETAMINOPHEN 1 TABLET: 10; 325 TABLET ORAL at 12:30

## 2023-01-20 RX ADMIN — APIXABAN 5 MG: 5 TABLET, FILM COATED ORAL at 20:33

## 2023-01-20 RX ADMIN — GUAIFENESIN 1200 MG: 600 TABLET, EXTENDED RELEASE ORAL at 20:32

## 2023-01-20 RX ADMIN — HYDROCODONE BITARTRATE AND ACETAMINOPHEN 1 TABLET: 10; 325 TABLET ORAL at 21:15

## 2023-01-20 RX ADMIN — ATORVASTATIN CALCIUM 40 MG: 40 TABLET, FILM COATED ORAL at 20:32

## 2023-01-20 RX ADMIN — HYDROCODONE BITARTRATE AND ACETAMINOPHEN 1 TABLET: 10; 325 TABLET ORAL at 08:36

## 2023-01-20 RX ADMIN — BUMETANIDE 1 MG: 0.25 INJECTION INTRAMUSCULAR; INTRAVENOUS at 05:52

## 2023-01-20 RX ADMIN — ASPIRIN 81 MG: 81 TABLET, COATED ORAL at 20:33

## 2023-01-20 RX ADMIN — GUAIFENESIN 1200 MG: 600 TABLET, EXTENDED RELEASE ORAL at 08:36

## 2023-01-21 LAB
ANION GAP SERPL CALCULATED.3IONS-SCNC: 11 MMOL/L (ref 5–15)
BASOPHILS # BLD AUTO: 0 10*3/MM3 (ref 0–0.2)
BASOPHILS NFR BLD AUTO: 0.9 % (ref 0–1.5)
BUN SERPL-MCNC: 24 MG/DL (ref 8–23)
BUN/CREAT SERPL: 28.2 (ref 7–25)
CALCIUM SPEC-SCNC: 9.8 MG/DL (ref 8.6–10.5)
CHLORIDE SERPL-SCNC: 99 MMOL/L (ref 98–107)
CO2 SERPL-SCNC: 29 MMOL/L (ref 22–29)
CREAT SERPL-MCNC: 0.85 MG/DL (ref 0.57–1)
DEPRECATED RDW RBC AUTO: 54.7 FL (ref 37–54)
EGFRCR SERPLBLD CKD-EPI 2021: 68.1 ML/MIN/1.73
EOSINOPHIL # BLD AUTO: 0.2 10*3/MM3 (ref 0–0.4)
EOSINOPHIL NFR BLD AUTO: 3.3 % (ref 0.3–6.2)
ERYTHROCYTE [DISTWIDTH] IN BLOOD BY AUTOMATED COUNT: 16.9 % (ref 12.3–15.4)
GLUCOSE SERPL-MCNC: 113 MG/DL (ref 65–99)
HCT VFR BLD AUTO: 33.9 % (ref 34–46.6)
HGB BLD-MCNC: 11.4 G/DL (ref 12–15.9)
LYMPHOCYTES # BLD AUTO: 1.1 10*3/MM3 (ref 0.7–3.1)
LYMPHOCYTES NFR BLD AUTO: 22.2 % (ref 19.6–45.3)
MCH RBC QN AUTO: 29.8 PG (ref 26.6–33)
MCHC RBC AUTO-ENTMCNC: 33.6 G/DL (ref 31.5–35.7)
MCV RBC AUTO: 88.8 FL (ref 79–97)
MONOCYTES # BLD AUTO: 0.5 10*3/MM3 (ref 0.1–0.9)
MONOCYTES NFR BLD AUTO: 11.2 % (ref 5–12)
NEUTROPHILS NFR BLD AUTO: 3 10*3/MM3 (ref 1.7–7)
NEUTROPHILS NFR BLD AUTO: 62.4 % (ref 42.7–76)
NRBC BLD AUTO-RTO: 0.4 /100 WBC (ref 0–0.2)
PLATELET # BLD AUTO: 212 10*3/MM3 (ref 140–450)
PMV BLD AUTO: 9.6 FL (ref 6–12)
POTASSIUM SERPL-SCNC: 4.3 MMOL/L (ref 3.5–5.2)
QT INTERVAL: 425 MS
RBC # BLD AUTO: 3.81 10*6/MM3 (ref 3.77–5.28)
SODIUM SERPL-SCNC: 139 MMOL/L (ref 136–145)
TROPONIN T SERPL-MCNC: 0.36 NG/ML (ref 0–0.03)
WBC NRBC COR # BLD: 4.8 10*3/MM3 (ref 3.4–10.8)

## 2023-01-21 PROCEDURE — 80048 BASIC METABOLIC PNL TOTAL CA: CPT | Performed by: INTERNAL MEDICINE

## 2023-01-21 PROCEDURE — 85025 COMPLETE CBC W/AUTO DIFF WBC: CPT | Performed by: INTERNAL MEDICINE

## 2023-01-21 PROCEDURE — 84484 ASSAY OF TROPONIN QUANT: CPT | Performed by: INTERNAL MEDICINE

## 2023-01-21 PROCEDURE — 99232 SBSQ HOSP IP/OBS MODERATE 35: CPT | Performed by: INTERNAL MEDICINE

## 2023-01-21 RX ADMIN — PANTOPRAZOLE SODIUM 40 MG: 40 TABLET, DELAYED RELEASE ORAL at 04:26

## 2023-01-21 RX ADMIN — SERTRALINE 100 MG: 100 TABLET, FILM COATED ORAL at 08:35

## 2023-01-21 RX ADMIN — LISINOPRIL 2.5 MG: 5 TABLET ORAL at 08:35

## 2023-01-21 RX ADMIN — HYDROCODONE BITARTRATE AND ACETAMINOPHEN 1 TABLET: 10; 325 TABLET ORAL at 17:15

## 2023-01-21 RX ADMIN — GUAIFENESIN 1200 MG: 600 TABLET, EXTENDED RELEASE ORAL at 20:02

## 2023-01-21 RX ADMIN — ASPIRIN 81 MG: 81 TABLET, COATED ORAL at 20:02

## 2023-01-21 RX ADMIN — AMIODARONE HYDROCHLORIDE 200 MG: 200 TABLET ORAL at 20:02

## 2023-01-21 RX ADMIN — SENNOSIDES AND DOCUSATE SODIUM 2 TABLET: 50; 8.6 TABLET ORAL at 08:35

## 2023-01-21 RX ADMIN — IBUPROFEN 400 MG: 400 TABLET ORAL at 16:59

## 2023-01-21 RX ADMIN — HYDROCODONE BITARTRATE AND ACETAMINOPHEN 1 TABLET: 10; 325 TABLET ORAL at 10:16

## 2023-01-21 RX ADMIN — HYDROCODONE BITARTRATE AND ACETAMINOPHEN 1 TABLET: 10; 325 TABLET ORAL at 04:26

## 2023-01-21 RX ADMIN — Medication 10 ML: at 08:35

## 2023-01-21 RX ADMIN — APIXABAN 5 MG: 5 TABLET, FILM COATED ORAL at 20:02

## 2023-01-21 RX ADMIN — APIXABAN 5 MG: 5 TABLET, FILM COATED ORAL at 08:35

## 2023-01-21 RX ADMIN — POLYETHYLENE GLYCOL 3350 17 G: 17 POWDER, FOR SOLUTION ORAL at 08:36

## 2023-01-21 RX ADMIN — Medication 10 ML: at 20:02

## 2023-01-21 RX ADMIN — IBUPROFEN 400 MG: 400 TABLET ORAL at 08:35

## 2023-01-21 RX ADMIN — AMIODARONE HYDROCHLORIDE 200 MG: 200 TABLET ORAL at 08:35

## 2023-01-21 RX ADMIN — BUMETANIDE 1 MG: 0.25 INJECTION INTRAMUSCULAR; INTRAVENOUS at 16:54

## 2023-01-21 RX ADMIN — SENNOSIDES AND DOCUSATE SODIUM 2 TABLET: 50; 8.6 TABLET ORAL at 20:02

## 2023-01-21 RX ADMIN — GUAIFENESIN 1200 MG: 600 TABLET, EXTENDED RELEASE ORAL at 08:35

## 2023-01-21 RX ADMIN — BUMETANIDE 1 MG: 0.25 INJECTION INTRAMUSCULAR; INTRAVENOUS at 04:26

## 2023-01-21 RX ADMIN — ATORVASTATIN CALCIUM 40 MG: 40 TABLET, FILM COATED ORAL at 20:02

## 2023-01-21 RX ADMIN — MULTIPLE VITAMINS W/ MINERALS TAB 1 TABLET: TAB at 08:35

## 2023-01-22 LAB
ANION GAP SERPL CALCULATED.3IONS-SCNC: 11 MMOL/L (ref 5–15)
BASOPHILS # BLD AUTO: 0 10*3/MM3 (ref 0–0.2)
BASOPHILS NFR BLD AUTO: 0.4 % (ref 0–1.5)
BUN SERPL-MCNC: 20 MG/DL (ref 8–23)
BUN/CREAT SERPL: 24.7 (ref 7–25)
CALCIUM SPEC-SCNC: 9.8 MG/DL (ref 8.6–10.5)
CHLORIDE SERPL-SCNC: 97 MMOL/L (ref 98–107)
CO2 SERPL-SCNC: 29 MMOL/L (ref 22–29)
CREAT SERPL-MCNC: 0.81 MG/DL (ref 0.57–1)
DEPRECATED RDW RBC AUTO: 56.4 FL (ref 37–54)
EGFRCR SERPLBLD CKD-EPI 2021: 72.1 ML/MIN/1.73
EOSINOPHIL # BLD AUTO: 0.1 10*3/MM3 (ref 0–0.4)
EOSINOPHIL NFR BLD AUTO: 3.3 % (ref 0.3–6.2)
ERYTHROCYTE [DISTWIDTH] IN BLOOD BY AUTOMATED COUNT: 17.1 % (ref 12.3–15.4)
GLUCOSE BLDC GLUCOMTR-MCNC: 131 MG/DL (ref 70–105)
GLUCOSE SERPL-MCNC: 95 MG/DL (ref 65–99)
HCT VFR BLD AUTO: 36.1 % (ref 34–46.6)
HGB BLD-MCNC: 11.9 G/DL (ref 12–15.9)
LYMPHOCYTES # BLD AUTO: 0.9 10*3/MM3 (ref 0.7–3.1)
LYMPHOCYTES NFR BLD AUTO: 21.1 % (ref 19.6–45.3)
MCH RBC QN AUTO: 29.5 PG (ref 26.6–33)
MCHC RBC AUTO-ENTMCNC: 32.9 G/DL (ref 31.5–35.7)
MCV RBC AUTO: 89.7 FL (ref 79–97)
MONOCYTES # BLD AUTO: 0.6 10*3/MM3 (ref 0.1–0.9)
MONOCYTES NFR BLD AUTO: 13.9 % (ref 5–12)
NEUTROPHILS NFR BLD AUTO: 2.7 10*3/MM3 (ref 1.7–7)
NEUTROPHILS NFR BLD AUTO: 61.3 % (ref 42.7–76)
NRBC BLD AUTO-RTO: 0.3 /100 WBC (ref 0–0.2)
PLATELET # BLD AUTO: 219 10*3/MM3 (ref 140–450)
PMV BLD AUTO: 9.3 FL (ref 6–12)
POTASSIUM SERPL-SCNC: 4 MMOL/L (ref 3.5–5.2)
RBC # BLD AUTO: 4.02 10*6/MM3 (ref 3.77–5.28)
SODIUM SERPL-SCNC: 137 MMOL/L (ref 136–145)
WBC NRBC COR # BLD: 4.4 10*3/MM3 (ref 3.4–10.8)

## 2023-01-22 PROCEDURE — 85025 COMPLETE CBC W/AUTO DIFF WBC: CPT | Performed by: INTERNAL MEDICINE

## 2023-01-22 PROCEDURE — 99232 SBSQ HOSP IP/OBS MODERATE 35: CPT | Performed by: INTERNAL MEDICINE

## 2023-01-22 PROCEDURE — 82962 GLUCOSE BLOOD TEST: CPT

## 2023-01-22 PROCEDURE — 80048 BASIC METABOLIC PNL TOTAL CA: CPT | Performed by: INTERNAL MEDICINE

## 2023-01-22 RX ADMIN — Medication 10 ML: at 20:07

## 2023-01-22 RX ADMIN — GUAIFENESIN 1200 MG: 600 TABLET, EXTENDED RELEASE ORAL at 20:07

## 2023-01-22 RX ADMIN — BUMETANIDE 1 MG: 0.25 INJECTION INTRAMUSCULAR; INTRAVENOUS at 17:00

## 2023-01-22 RX ADMIN — IBUPROFEN 400 MG: 400 TABLET ORAL at 09:02

## 2023-01-22 RX ADMIN — APIXABAN 5 MG: 5 TABLET, FILM COATED ORAL at 20:07

## 2023-01-22 RX ADMIN — SENNOSIDES AND DOCUSATE SODIUM 2 TABLET: 50; 8.6 TABLET ORAL at 09:02

## 2023-01-22 RX ADMIN — Medication 10 ML: at 09:01

## 2023-01-22 RX ADMIN — BUMETANIDE 1 MG: 0.25 INJECTION INTRAMUSCULAR; INTRAVENOUS at 05:13

## 2023-01-22 RX ADMIN — HYDROCODONE BITARTRATE AND ACETAMINOPHEN 1 TABLET: 10; 325 TABLET ORAL at 14:37

## 2023-01-22 RX ADMIN — SERTRALINE 100 MG: 100 TABLET, FILM COATED ORAL at 09:02

## 2023-01-22 RX ADMIN — HYDROCODONE BITARTRATE AND ACETAMINOPHEN 1 TABLET: 10; 325 TABLET ORAL at 03:37

## 2023-01-22 RX ADMIN — AMIODARONE HYDROCHLORIDE 200 MG: 200 TABLET ORAL at 20:07

## 2023-01-22 RX ADMIN — LISINOPRIL 2.5 MG: 5 TABLET ORAL at 09:02

## 2023-01-22 RX ADMIN — ASPIRIN 81 MG: 81 TABLET, COATED ORAL at 20:07

## 2023-01-22 RX ADMIN — GUAIFENESIN 1200 MG: 600 TABLET, EXTENDED RELEASE ORAL at 09:01

## 2023-01-22 RX ADMIN — PANTOPRAZOLE SODIUM 40 MG: 40 TABLET, DELAYED RELEASE ORAL at 05:13

## 2023-01-22 RX ADMIN — HYDROCODONE BITARTRATE AND ACETAMINOPHEN 1 TABLET: 10; 325 TABLET ORAL at 09:02

## 2023-01-22 RX ADMIN — APIXABAN 5 MG: 5 TABLET, FILM COATED ORAL at 09:02

## 2023-01-22 RX ADMIN — AMIODARONE HYDROCHLORIDE 200 MG: 200 TABLET ORAL at 09:02

## 2023-01-22 RX ADMIN — MULTIPLE VITAMINS W/ MINERALS TAB 1 TABLET: TAB at 09:02

## 2023-01-22 RX ADMIN — IBUPROFEN 400 MG: 400 TABLET ORAL at 17:00

## 2023-01-22 RX ADMIN — ATORVASTATIN CALCIUM 40 MG: 40 TABLET, FILM COATED ORAL at 20:07

## 2023-01-22 RX ADMIN — HYDROCODONE BITARTRATE AND ACETAMINOPHEN 1 TABLET: 10; 325 TABLET ORAL at 20:07

## 2023-01-23 ENCOUNTER — READMISSION MANAGEMENT (OUTPATIENT)
Dept: CALL CENTER | Facility: HOSPITAL | Age: 83
End: 2023-01-23
Payer: MEDICARE

## 2023-01-23 VITALS
HEIGHT: 60 IN | BODY MASS INDEX: 39.95 KG/M2 | SYSTOLIC BLOOD PRESSURE: 128 MMHG | TEMPERATURE: 98.4 F | WEIGHT: 203.48 LBS | DIASTOLIC BLOOD PRESSURE: 70 MMHG | RESPIRATION RATE: 20 BRPM | OXYGEN SATURATION: 94 % | HEART RATE: 80 BPM

## 2023-01-23 LAB
ANION GAP SERPL CALCULATED.3IONS-SCNC: 11 MMOL/L (ref 5–15)
ANISOCYTOSIS BLD QL: ABNORMAL
BUN SERPL-MCNC: 24 MG/DL (ref 8–23)
BUN/CREAT SERPL: 21.4 (ref 7–25)
CALCIUM SPEC-SCNC: 9.3 MG/DL (ref 8.6–10.5)
CHLORIDE SERPL-SCNC: 97 MMOL/L (ref 98–107)
CO2 SERPL-SCNC: 28 MMOL/L (ref 22–29)
CREAT SERPL-MCNC: 1.12 MG/DL (ref 0.57–1)
DEPRECATED RDW RBC AUTO: 53.4 FL (ref 37–54)
EGFRCR SERPLBLD CKD-EPI 2021: 48.9 ML/MIN/1.73
ELLIPTOCYTES BLD QL SMEAR: ABNORMAL
EOSINOPHIL # BLD MANUAL: 0.25 10*3/MM3 (ref 0–0.4)
EOSINOPHIL NFR BLD MANUAL: 6 % (ref 0.3–6.2)
ERYTHROCYTE [DISTWIDTH] IN BLOOD BY AUTOMATED COUNT: 16.6 % (ref 12.3–15.4)
GLUCOSE SERPL-MCNC: 131 MG/DL (ref 65–99)
HCT VFR BLD AUTO: 34.3 % (ref 34–46.6)
HGB BLD-MCNC: 11.6 G/DL (ref 12–15.9)
LYMPHOCYTES # BLD MANUAL: 0.97 10*3/MM3 (ref 0.7–3.1)
LYMPHOCYTES NFR BLD MANUAL: 5 % (ref 5–12)
MCH RBC QN AUTO: 30.1 PG (ref 26.6–33)
MCHC RBC AUTO-ENTMCNC: 33.9 G/DL (ref 31.5–35.7)
MCV RBC AUTO: 88.7 FL (ref 79–97)
METAMYELOCYTES NFR BLD MANUAL: 1 % (ref 0–0)
MONOCYTES # BLD: 0.21 10*3/MM3 (ref 0.1–0.9)
MYELOCYTES NFR BLD MANUAL: 4 % (ref 0–0)
NEUTROPHILS # BLD AUTO: 2.56 10*3/MM3 (ref 1.7–7)
NEUTROPHILS NFR BLD MANUAL: 55 % (ref 42.7–76)
NEUTS BAND NFR BLD MANUAL: 6 % (ref 0–5)
PLAT MORPH BLD: NORMAL
PLATELET # BLD AUTO: 235 10*3/MM3 (ref 140–450)
PMV BLD AUTO: 8.9 FL (ref 6–12)
POTASSIUM SERPL-SCNC: 4.4 MMOL/L (ref 3.5–5.2)
RBC # BLD AUTO: 3.87 10*6/MM3 (ref 3.77–5.28)
SCAN SLIDE: NORMAL
SODIUM SERPL-SCNC: 136 MMOL/L (ref 136–145)
VARIANT LYMPHS NFR BLD MANUAL: 17 % (ref 19.6–45.3)
VARIANT LYMPHS NFR BLD MANUAL: 6 % (ref 0–5)
WBC MORPH BLD: NORMAL
WBC NRBC COR # BLD: 4.2 10*3/MM3 (ref 3.4–10.8)

## 2023-01-23 PROCEDURE — 85007 BL SMEAR W/DIFF WBC COUNT: CPT | Performed by: INTERNAL MEDICINE

## 2023-01-23 PROCEDURE — 80048 BASIC METABOLIC PNL TOTAL CA: CPT | Performed by: INTERNAL MEDICINE

## 2023-01-23 PROCEDURE — 85025 COMPLETE CBC W/AUTO DIFF WBC: CPT | Performed by: INTERNAL MEDICINE

## 2023-01-23 PROCEDURE — 94618 PULMONARY STRESS TESTING: CPT

## 2023-01-23 PROCEDURE — 99232 SBSQ HOSP IP/OBS MODERATE 35: CPT | Performed by: INTERNAL MEDICINE

## 2023-01-23 PROCEDURE — 94761 N-INVAS EAR/PLS OXIMETRY MLT: CPT

## 2023-01-23 PROCEDURE — 94799 UNLISTED PULMONARY SVC/PX: CPT

## 2023-01-23 RX ORDER — BUMETANIDE 1 MG/1
1 TABLET ORAL DAILY
Status: DISCONTINUED | OUTPATIENT
Start: 2023-01-24 | End: 2023-01-23 | Stop reason: HOSPADM

## 2023-01-23 RX ORDER — BUMETANIDE 1 MG/1
1 TABLET ORAL DAILY
Qty: 30 TABLET | Refills: 2 | Status: SHIPPED | OUTPATIENT
Start: 2023-01-24

## 2023-01-23 RX ADMIN — HYDROCODONE BITARTRATE AND ACETAMINOPHEN 1 TABLET: 10; 325 TABLET ORAL at 05:46

## 2023-01-23 RX ADMIN — APIXABAN 5 MG: 5 TABLET, FILM COATED ORAL at 08:40

## 2023-01-23 RX ADMIN — IBUPROFEN 400 MG: 400 TABLET ORAL at 08:40

## 2023-01-23 RX ADMIN — GUAIFENESIN 1200 MG: 600 TABLET, EXTENDED RELEASE ORAL at 08:40

## 2023-01-23 RX ADMIN — POLYETHYLENE GLYCOL 3350 17 G: 17 POWDER, FOR SOLUTION ORAL at 08:45

## 2023-01-23 RX ADMIN — BUMETANIDE 1 MG: 0.25 INJECTION INTRAMUSCULAR; INTRAVENOUS at 05:05

## 2023-01-23 RX ADMIN — HYDROCODONE BITARTRATE AND ACETAMINOPHEN 1 TABLET: 10; 325 TABLET ORAL at 10:46

## 2023-01-23 RX ADMIN — Medication 10 ML: at 08:40

## 2023-01-23 RX ADMIN — LISINOPRIL 2.5 MG: 5 TABLET ORAL at 08:40

## 2023-01-23 RX ADMIN — MULTIPLE VITAMINS W/ MINERALS TAB 1 TABLET: TAB at 08:40

## 2023-01-23 RX ADMIN — AMIODARONE HYDROCHLORIDE 200 MG: 200 TABLET ORAL at 08:40

## 2023-01-23 RX ADMIN — SERTRALINE 100 MG: 100 TABLET, FILM COATED ORAL at 08:40

## 2023-01-23 RX ADMIN — PANTOPRAZOLE SODIUM 40 MG: 40 TABLET, DELAYED RELEASE ORAL at 05:05

## 2023-01-24 NOTE — OUTREACH NOTE
Prep Survey    Flowsheet Row Responses   [facility] facility patient discharged from? [facility]   Is LACE score < 7 ? No   Eligibility Readm Mgmt   Discharge diagnosis Acute on chronic diastolic congestive heart failure   Does the patient have one of the following disease processes/diagnoses(primary or secondary)? CHF   Does the patient have Home health ordered? No   Is there a DME ordered? No   Prep survey completed? Yes          NIGEL MOSER - Registered Nurse

## 2023-01-25 ENCOUNTER — READMISSION MANAGEMENT (OUTPATIENT)
Dept: CALL CENTER | Facility: HOSPITAL | Age: 83
End: 2023-01-25
Payer: MEDICARE

## 2023-01-25 NOTE — OUTREACH NOTE
CHF Week 1 Survey    Flowsheet Row Responses   [facility] patient discharged from? [facility]   Does the patient have one of the following disease processes/diagnoses(primary or secondary)? CHF   CHF Week 1 attempt successful? Yes   Call start time 1024   Call end time 1028   Discharge diagnosis Acute on chronic diastolic congestive heart failure   Person spoke with today (if not patient) and relationship Patient   Meds reviewed with patient/caregiver? Yes   Does the patient have all medications ordered at discharge? Yes   Is the patient taking all medications as directed (includes completed medication regime)? Yes   Does the patient have a primary care provider?  Yes   Does the patient have an appointment with their PCP within 7 days of discharge? Greater than 7 days   Comments regarding PCP ZEUS Torres 2/9 at 2 pm   Nursing Interventions Verified appointment date/time/provider   Comments Evgeny Gregory MD one month   Has home health visited the patient within 72 hours of discharge? N/A   Pulse Ox monitoring Intermittent   Pulse Ox device source Patient   O2 Sat comments 95-97%    O2 Sat: education provided Sat levels, Monitoring frequency, When to seek care   Psychosocial issues? No   Did the patient receive a copy of their discharge instructions? Yes   Nursing interventions Reviewed instructions with patient   What is the patient's perception of their health status since discharge? Improving   Nursing interventions Nurse provided patient education   Is the patient able to teach back signs and symptoms of worsening condition? (i.e. weight gain, shortness of air, etc.) Yes   If the patient is a current smoker, are they able to teach back resources for cessation? Not a smoker   Is the patient/caregiver able to teach back the hierarchy of who to call/visit for symptoms/problems? PCP, Specialist, Home health nurse, Urgent Care, ED, 911 Yes   Is the patient able to teach back Heart Failure Zones? No    CHF Nursing Interventions Education provided on various zones   CHF Zone this Call Green Zone   Green Zone Patient reports doing well, No new or worsening shortness of breath, No new swelling -  feet, ankles and legs look normal for you, Weight check stable, No chest pain   Green Zone Interventions Daily weight check, Low sodium diet, Follow up visits planned, Meds as directed    CHF Week 1 call completed? Yes   Call end time 1026          HELADIO WOODS - Registered Nurse

## 2023-01-26 ENCOUNTER — APPOINTMENT (OUTPATIENT)
Dept: CARDIOLOGY | Facility: HOSPITAL | Age: 83
End: 2023-01-26
Payer: MEDICARE

## 2023-01-27 LAB — QT INTERVAL: 442 MS

## 2023-02-01 ENCOUNTER — READMISSION MANAGEMENT (OUTPATIENT)
Dept: CALL CENTER | Facility: HOSPITAL | Age: 83
End: 2023-02-01
Payer: MEDICARE

## 2023-02-01 ENCOUNTER — TELEPHONE (OUTPATIENT)
Dept: PHYSICAL THERAPY | Facility: CLINIC | Age: 83
End: 2023-02-01

## 2023-02-01 NOTE — OUTREACH NOTE
CHF Week 2 Survey    Flowsheet Row Responses   [facility] patient discharged from? [facility]   Does the patient have one of the following disease processes/diagnoses(primary or secondary)? CHF   Week 2 attempt successful? Yes   Call start time 1312   Call end time 1313   Discharge diagnosis Acute on chronic diastolic congestive heart failure   Is the patient taking all medications as directed (includes completed medication regime)? Yes   Comments regarding appointments f/u with cardiology on 2/8   Does the patient have a primary care provider?  Yes   Comments regarding PCP ZEUS Torres 2/9 at 2 pm   Has the patient kept scheduled appointments due by today? N/A   Has home health visited the patient within 72 hours of discharge? N/A   Psychosocial issues? No   What is the patient's perception of their health status since discharge? Improving   Nursing interventions Nurse provided patient education   Is the patient able to teach back signs and symptoms of worsening condition? (i.e. weight gain, shortness of air, etc.) Yes   Is the patient/caregiver able to teach back the hierarchy of who to call/visit for symptoms/problems? PCP, Specialist, Home health nurse, Urgent Care, ED, 911 Yes   CHF Zone this Call Green Zone   Green Zone Patient reports doing well   CHF Week 2 call completed? Yes   Wrap up additional comments Doing well, no questions.   Call end time 1313          KIESHA EWBSTER - Registered Nurse

## 2023-02-07 NOTE — PROGRESS NOTES
Subjective:     Encounter Date:02/08/2023      Patient ID: Domonique See is a 83 y.o. female.    Chief Complaint: hospital follow up acute on chronic right-sided heart failure    History of Present Illness    Ms. Domonique See  has PMH of     Hypertrophic cardiomyopathy, history of septal myomectomy ([facility])    nonsustained VT  hypertension  SSS, [facility] permanent pacemaker 3/16/2017  Paroxysmal atrial fibrillation, refused anticoagulation  CAD, cath 12/2017 30% LAD  CHF due to hypertrophic cardiomyopathy  Prediabetes with A1c of 5.8 on 1/19/2023  Hyperlipidemia  COPD  Peptic ulcer disease  Back surgery, breast augmentation  Former smoker    Patient was admitted 1/18/2023 with acute on chronic right-sided heart failure.  patient was recently in the hospital 1/16/2023 underwent A. fib ablation was given fluids in the postop orders and came in with shortness of breath, which is progressively worse initially with exertion and at rest.  Developed hypoxemia with O2 sats of 85 to 88% on room air was put on 4 L by EMS.     Work-up in the ED 1/18/2023 revealed elevated troponin over 1.  proBNP over 3000.  Glucose elevated.  A1c 1/19/2023 was 5.8.  Hemoglobin  10.  MCV normal.  Chest x-ray 1/18/2023 reveals increased vascular interstitial markings consistent with fluid overload.  EKG done 1/18/2023 reveals sinus rhythm with first-degree AV block and PACs.       In the hospital patient was aggressively diuresed and improved.  She was discharged on increased dose of oral Bumex 1 mg daily.    Today she is here for follow-up from hospitalization.  She denies any chest pain or shortness of breath.  However she does report increasing fatigue when she is showering and walking up the steps.  Patient reports that this is due to her significant back pain while she was in the hospital she was taken off of her oral Voltaren did develop some mild renal insufficiency with diuresis.  She reports that without  Voltaren she cannot function due to her chronic back pain.    The blood pressure is 141/70 heart rate 60           Lab Review:     1/20/2023: Troponin was 1.04---came down to 0.678.  proBNP 3818.  Glucose elevated.  Hemoglobin 10.3 with MCV of 93.  Respiratory viral panel 1/19/2023 was negative.  1/21/2023: Glucose 126, BUN/creatinine are 19 and 1.84, potassium 4  1/22/2023: BUN/creatinine are 21/0.81, normal BMP  1/23/2023: BUN/creatinine are 24/1.12      Normal LV size and contractility EF of 60 to 65%  Severe right ventricular and right atrial enlargement, moderate left atrial enlargement seen  Pulmonic valve is not well visualized.  Aortic valve, mitral valve, tricuspid valve appears structurally normal, moderate mitral and mild aortic regurgitation seen.  No pericardial effusion seen.  Proximal aorta appears normal in size.       The following portions of the patient's history were reviewed and updated as appropriate: allergies, current medications, past family history, past medical history, past social history, past surgical history and problem list.    Review of Systems   Constitutional: Positive for malaise/fatigue.   Cardiovascular: Positive for dyspnea on exertion. Negative for chest pain, leg swelling and palpitations.   Respiratory: Negative for cough and shortness of breath.    Musculoskeletal: Positive for back pain, joint pain and neck pain.   Gastrointestinal: Negative for abdominal pain, nausea and vomiting.   Neurological: Positive for numbness. Negative for dizziness, focal weakness, headaches and light-headedness.   All other systems reviewed and are negative.    Past Medical History:   Diagnosis Date   • Anemia    • CHF (congestive heart failure) ([facility])    • Congenital heart disease    • Coronary artery disease 2013   • GERD (gastroesophageal reflux disease)    • Heart murmur Don't know   • Hx of hypertrophic cardiomyopathy    • Hypertension    • Peptic ulceration    • Persistent atrial  "fibrillation (HCC) 01/10/2023   • Primary osteoarthritis of both knees 02/08/2021   • Primary osteoarthritis of right knee 09/21/2020   • Sleep apnea 2017     Past Surgical History:   Procedure Laterality Date   • ABDOMINAL HERNIA REPAIR     • ABLATION OF DYSRHYTHMIC FOCUS  01-   • BACK SURGERY     • BREAST AUGMENTATION     • CARDIAC CATHETERIZATION     • CARDIAC ELECTROPHYSIOLOGY PROCEDURE N/A 01/16/2023    Procedure: Ablation atrial fibrillation and flutter, cryo Duong and Ramón aware;  Surgeon: Leeanne Pleitez MD;  Location: [facility] CATH INVASIVE LOCATION;  Service: Cardiovascular;  Laterality: N/A;   • CARDIAC PACEMAKER PLACEMENT  03/16/2017    Dual Chamber   • CERVICAL RIB RESECTION Bilateral    • CERVICAL RIB RESECTION Bilateral 1963    bilateral cervical ribs removed - extra ribs located and causing pain   • CHOLECYSTECTOMY     • COLONOSCOPY     • CORONARY ARTERY BYPASS GRAFT  2013   • THORACIC DECOMPRESSION POSTERIOR FUSION  06/04/2014    L3-5 & S1   • UPPER GASTROINTESTINAL ENDOSCOPY       /70   Pulse 60   Ht 152.4 cm (60\")   Wt 87.5 kg (193 lb)   LMP  (LMP Unknown)   SpO2 95%   BMI 37.69 kg/m²   Family History   Problem Relation Age of Onset   • Colon cancer Mother    • Colon cancer Brother    • Heart attack Brother    • Colon cancer Daughter        Current Outpatient Medications:   •  amiodarone (PACERONE) 200 MG tablet, Take 1 tablet by mouth 2 (Two) Times a Day., Disp: 60 tablet, Rfl: 2  •  apixaban (ELIQUIS) 5 MG tablet tablet, Take 1 tablet by mouth 2 (Two) Times a Day., Disp: 180 tablet, Rfl: 3  •  aspirin 81 MG EC tablet, Take 1 tablet by mouth Every Night., Disp: 90 tablet, Rfl: 3  •  atorvastatin (LIPITOR) 40 MG tablet, Take 1 tablet by mouth Every Night., Disp: 30 tablet, Rfl: 0  •  bumetanide (BUMEX) 1 MG tablet, Take 1 tablet by mouth Daily., Disp: 30 tablet, Rfl: 2  •  guaiFENesin (MUCINEX) 600 MG 12 hr tablet, Take 1,200 mg by mouth 2 (Two) Times a Day., Disp: , Rfl:   • "  lisinopril (PRINIVIL,ZESTRIL) 2.5 MG tablet, Take 2.5 mg by mouth Daily., Disp: , Rfl:   •  multivitamin with minerals tablet tablet, Take 1 tablet by mouth Daily., Disp: , Rfl:   •  NEXIUM 40 MG capsule, Take 40 mg by mouth Daily., Disp: , Rfl:   •  polyethylene glycol (MIRALAX) 17 g packet, Take 17 g by mouth Every Other Day., Disp: , Rfl:   •  sertraline (ZOLOFT) 100 MG tablet, Take 100 mg by mouth Daily., Disp: , Rfl:   •  zolpidem (AMBIEN) 5 MG tablet, Take 5 mg by mouth At Night As Needed., Disp: , Rfl:   •  Diclofenac Sodium (VOLTAREN) 1 % gel gel, Apply 4 grams topically to the appropriate area as directed 4 (Four) Times a Day As Needed pain, Disp: 100 g, Rfl: 0  •  metoprolol succinate XL (TOPROL-XL) 25 MG 24 hr tablet, Take 1 tablet by mouth Daily for 120 days., Disp: 30 tablet, Rfl: 3  No Known Allergies  Social History     Socioeconomic History   • Marital status:    Tobacco Use   • Smoking status: Former     Packs/day: 1.00     Years: 8.00     Pack years: 8.00     Types: Cigarettes     Start date: 1977     Quit date: 1985     Years since quittin.1     Passive exposure: Past   • Smokeless tobacco: Never   • Tobacco comments:     Quit    Vaping Use   • Vaping Use: Never used   Substance and Sexual Activity   • Alcohol use: Not Currently     Comment: 6 drinks a year in social settings   • Drug use: Never   • Sexual activity: Not Currently     Partners: Male                Objective:     Vitals reviewed.   Constitutional:       Appearance: Healthy appearance. Not in distress.   Neck:      Vascular: No JVR. JVD normal.   Pulmonary:      Effort: Increased respiratory effort.      Breath sounds: Normal breath sounds. No wheezing. No rhonchi. No rales.      Comments: Mildly increased effect   Chest:      Chest wall: Not tender to palpatation.   Cardiovascular:      PMI at left midclavicular line. Normal rate. Regular rhythm. paced Normal S1. Normal S2.      Murmurs: There is no murmur.       No gallop. No click. No rub.   Pulses:     Intact distal pulses.   Edema:     Peripheral edema absent.   Abdominal:      General: Bowel sounds are normal.      Palpations: Abdomen is soft.      Tenderness: There is no abdominal tenderness.   Musculoskeletal: Normal range of motion.         General: No tenderness. Skin:     General: Skin is warm and dry.   Neurological:      General: No focal deficit present.      Mental Status: Alert and oriented to person, place and time.       Procedures                  Assessment:     [facility]     Diagnosis Plan   1. Hospital discharge follow-up        2. Hypertrophic obstructive cardiomyopathy (HCC)        3. Persistent atrial fibrillation (HCC)        4. Paroxysmal atrial fibrillation (HCC)        5. Pacemaker        6. Morbid obesity (HCC)        7. Iron deficiency anemia, unspecified iron deficiency anemia type                       Plan:     Reviewed hospitalization  Reviewed medications  Reviewed echocardiogram with patient  Reviewed heart failure      Continue oral Bumex 1 mg daily,   Continue amiodarone and Eliquis for A-fib  Discussed with Dr. Pleitez regarding starting metoprolol succinate for guideline medical therapy with her right-sided heart failure.    Advised patient to continue taking lisinopril    - Daily weight:  same time every day, same clothing   - Low Salt (sodium) diet   - Watch fluid intake     Discussed risk and benefits of taking NSAIDs  Advised patient to talk to her primary care provider and orthopedic surgeons for pain control    Patient to follow-up with Dr. rGegory next scheduled follow-up pacemaker interrogation      Addendum: Discussed with Dr. Pleitez and we will add low-dose metoprolol succinate  All patient to advise.          Electronically signed by WOODY Thomas, 02/08/23, 4:41 PM EST.        This document is intended for medical expert use only.  Reading of this document by patients and/or patient's family without  participating medical staff guidance may result in misinterpretation and unintended morbidity. Any interpretation of such data is the responsibility of the patient and/or family member responsible for the patient in concert with their primary or specialist providers, not to be left for sources of online search as such as [employer], [employer] or similar queries.  Relying on these approaches to knowledge may result in misinterpretation, misguided goals of care and even death should patient or family members try recommendations outside of the realm of professional medical care in a supervised inpatient environment.

## 2023-02-08 ENCOUNTER — TELEPHONE (OUTPATIENT)
Dept: CARDIOLOGY | Facility: CLINIC | Age: 83
End: 2023-02-08

## 2023-02-08 ENCOUNTER — READMISSION MANAGEMENT (OUTPATIENT)
Dept: CALL CENTER | Facility: HOSPITAL | Age: 83
End: 2023-02-08
Payer: MEDICARE

## 2023-02-08 ENCOUNTER — OFFICE VISIT (OUTPATIENT)
Dept: CARDIOLOGY | Facility: CLINIC | Age: 83
End: 2023-02-08
Payer: MEDICARE

## 2023-02-08 VITALS
BODY MASS INDEX: 37.89 KG/M2 | DIASTOLIC BLOOD PRESSURE: 70 MMHG | HEIGHT: 60 IN | HEART RATE: 60 BPM | OXYGEN SATURATION: 95 % | WEIGHT: 193 LBS | SYSTOLIC BLOOD PRESSURE: 141 MMHG

## 2023-02-08 DIAGNOSIS — D50.9 IRON DEFICIENCY ANEMIA, UNSPECIFIED IRON DEFICIENCY ANEMIA TYPE: ICD-10-CM

## 2023-02-08 DIAGNOSIS — E66.01 MORBID OBESITY: ICD-10-CM

## 2023-02-08 DIAGNOSIS — I42.1 HYPERTROPHIC OBSTRUCTIVE CARDIOMYOPATHY: ICD-10-CM

## 2023-02-08 DIAGNOSIS — I48.0 PAROXYSMAL ATRIAL FIBRILLATION: ICD-10-CM

## 2023-02-08 DIAGNOSIS — Z09 HOSPITAL DISCHARGE FOLLOW-UP: Primary | ICD-10-CM

## 2023-02-08 DIAGNOSIS — I48.19 PERSISTENT ATRIAL FIBRILLATION: ICD-10-CM

## 2023-02-08 DIAGNOSIS — Z95.0 PACEMAKER: ICD-10-CM

## 2023-02-08 PROCEDURE — 99214 OFFICE O/P EST MOD 30 MIN: CPT | Performed by: NURSE PRACTITIONER

## 2023-02-08 RX ORDER — ASPIRIN 81 MG/1
81 TABLET ORAL NIGHTLY
Qty: 90 TABLET | Refills: 3 | Status: SHIPPED | OUTPATIENT
Start: 2023-02-08

## 2023-02-08 RX ORDER — METOPROLOL SUCCINATE 25 MG/1
25 TABLET, EXTENDED RELEASE ORAL DAILY
Qty: 30 TABLET | Refills: 3 | Status: SHIPPED | OUTPATIENT
Start: 2023-02-08 | End: 2023-02-09

## 2023-02-08 NOTE — TELEPHONE ENCOUNTER
Please let patient know that I sent in metoprolol succinate for her I discussed this with Dr. Pleitez and he was agreeable

## 2023-02-08 NOTE — OUTREACH NOTE
CHF Week 3 Survey    Flowsheet Row Responses   [employer] facility patient discharged from? [facility]   Does the patient have one of the following disease processes/diagnoses(primary or secondary)? CHF   Week 3 attempt successful? No   Unsuccessful attempts Attempt 1          Chantal WATSON - Licensed Nurse

## 2023-02-09 ENCOUNTER — READMISSION MANAGEMENT (OUTPATIENT)
Dept: CALL CENTER | Facility: HOSPITAL | Age: 83
End: 2023-02-09
Payer: MEDICARE

## 2023-02-09 RX ORDER — METOPROLOL SUCCINATE 25 MG/1
TABLET, EXTENDED RELEASE ORAL
Qty: 90 TABLET | Refills: 1 | Status: SHIPPED | OUTPATIENT
Start: 2023-02-09

## 2023-02-09 NOTE — OUTREACH NOTE
CHF Week 3 Survey    Flowsheet Row Responses   [employer] facility patient discharged from? [facility]   Does the patient have one of the following disease processes/diagnoses(primary or secondary)? CHF   Week 3 attempt successful? No   Unsuccessful attempts Attempt 2          Vivien MOSER - Registered Nurse

## 2023-02-13 PROCEDURE — 93294 REM INTERROG EVL PM/LDLS PM: CPT | Performed by: INTERNAL MEDICINE

## 2023-02-13 PROCEDURE — 93296 REM INTERROG EVL PM/IDS: CPT | Performed by: INTERNAL MEDICINE

## 2023-02-22 RX ORDER — AMIODARONE HYDROCHLORIDE 200 MG/1
TABLET ORAL
Qty: 180 TABLET | Refills: 3 | Status: SHIPPED | OUTPATIENT
Start: 2023-02-22

## 2023-02-22 NOTE — TELEPHONE ENCOUNTER
Rx Refill Note  Requested Prescriptions     Pending Prescriptions Disp Refills   • amiodarone (PACERONE) 200 MG tablet [Pharmacy Med Name: AMIODARONE 200MG TABLETS] 180 tablet 3     Sig: TAKE 1 TABLET BY MOUTH TWICE DAILY      Last office visit with prescribing clinician: 1/10/2023   Last telemedicine visit with prescribing clinician: 5/22/2023   Next office visit with prescribing clinician: Visit date not found   {    Joanna Fontana MA  02/22/23, 14:16 EST

## 2023-04-28 ENCOUNTER — HOSPITAL ENCOUNTER (INPATIENT)
Facility: HOSPITAL | Age: 83
LOS: 2 days | Discharge: HOME HEALTH | End: 2023-05-02
Attending: EMERGENCY MEDICINE | Admitting: INTERNAL MEDICINE
Payer: MEDICARE

## 2023-04-28 ENCOUNTER — APPOINTMENT (OUTPATIENT)
Dept: CT IMAGING | Facility: HOSPITAL | Age: 83
End: 2023-04-28
Payer: MEDICARE

## 2023-04-28 DIAGNOSIS — M54.50 ACUTE MIDLINE LOW BACK PAIN WITHOUT SCIATICA: Primary | ICD-10-CM

## 2023-04-28 DIAGNOSIS — I95.9 HYPOTENSION, UNSPECIFIED HYPOTENSION TYPE: ICD-10-CM

## 2023-04-28 LAB
ALBUMIN SERPL-MCNC: 3.5 G/DL (ref 3.5–5.2)
ALBUMIN/GLOB SERPL: 1.6 G/DL
ALP SERPL-CCNC: 88 U/L (ref 39–117)
ALT SERPL W P-5'-P-CCNC: 26 U/L (ref 1–33)
ANION GAP SERPL CALCULATED.3IONS-SCNC: 10 MMOL/L (ref 5–15)
ANION GAP SERPL CALCULATED.3IONS-SCNC: 10 MMOL/L (ref 5–15)
AST SERPL-CCNC: 21 U/L (ref 1–32)
BACTERIA UR QL AUTO: ABNORMAL /HPF
BASOPHILS # BLD AUTO: 0.1 10*3/MM3 (ref 0–0.2)
BASOPHILS NFR BLD AUTO: 0.8 % (ref 0–1.5)
BILIRUB SERPL-MCNC: 0.4 MG/DL (ref 0–1.2)
BILIRUB UR QL STRIP: NEGATIVE
BUN SERPL-MCNC: 29 MG/DL (ref 8–23)
BUN SERPL-MCNC: 33 MG/DL (ref 8–23)
BUN/CREAT SERPL: 16.2 (ref 7–25)
BUN/CREAT SERPL: 17.6 (ref 7–25)
CALCIUM SPEC-SCNC: 8.1 MG/DL (ref 8.6–10.5)
CALCIUM SPEC-SCNC: 8.5 MG/DL (ref 8.6–10.5)
CHLORIDE SERPL-SCNC: 95 MMOL/L (ref 98–107)
CHLORIDE SERPL-SCNC: 97 MMOL/L (ref 98–107)
CLARITY UR: CLEAR
CO2 SERPL-SCNC: 26 MMOL/L (ref 22–29)
CO2 SERPL-SCNC: 27 MMOL/L (ref 22–29)
COLOR UR: YELLOW
CREAT SERPL-MCNC: 1.79 MG/DL (ref 0.57–1)
CREAT SERPL-MCNC: 1.88 MG/DL (ref 0.57–1)
D-LACTATE SERPL-SCNC: 0.6 MMOL/L (ref 0.3–2)
DEPRECATED RDW RBC AUTO: 49.4 FL (ref 37–54)
EGFRCR SERPLBLD CKD-EPI 2021: 26.1 ML/MIN/1.73
EGFRCR SERPLBLD CKD-EPI 2021: 27.7 ML/MIN/1.73
EOSINOPHIL # BLD AUTO: 0 10*3/MM3 (ref 0–0.4)
EOSINOPHIL NFR BLD AUTO: 0.4 % (ref 0.3–6.2)
ERYTHROCYTE [DISTWIDTH] IN BLOOD BY AUTOMATED COUNT: 14.8 % (ref 12.3–15.4)
GEN 5 2HR TROPONIN T REFLEX: 20 NG/L
GLOBULIN UR ELPH-MCNC: 2.2 GM/DL
GLUCOSE SERPL-MCNC: 104 MG/DL (ref 65–99)
GLUCOSE SERPL-MCNC: 95 MG/DL (ref 65–99)
GLUCOSE UR STRIP-MCNC: NEGATIVE MG/DL
HCT VFR BLD AUTO: 32.9 % (ref 34–46.6)
HGB BLD-MCNC: 10.8 G/DL (ref 12–15.9)
HGB UR QL STRIP.AUTO: NEGATIVE
HOLD SPECIMEN: NORMAL
HYALINE CASTS UR QL AUTO: ABNORMAL /LPF
KETONES UR QL STRIP: NEGATIVE
LEUKOCYTE ESTERASE UR QL STRIP.AUTO: ABNORMAL
LYMPHOCYTES # BLD AUTO: 0.7 10*3/MM3 (ref 0.7–3.1)
LYMPHOCYTES NFR BLD AUTO: 5.7 % (ref 19.6–45.3)
MCH RBC QN AUTO: 29.7 PG (ref 26.6–33)
MCHC RBC AUTO-ENTMCNC: 32.9 G/DL (ref 31.5–35.7)
MCV RBC AUTO: 90.4 FL (ref 79–97)
MONOCYTES # BLD AUTO: 1.1 10*3/MM3 (ref 0.1–0.9)
MONOCYTES NFR BLD AUTO: 9.1 % (ref 5–12)
NEUTROPHILS NFR BLD AUTO: 10.3 10*3/MM3 (ref 1.7–7)
NEUTROPHILS NFR BLD AUTO: 84 % (ref 42.7–76)
NITRITE UR QL STRIP: NEGATIVE
NRBC BLD AUTO-RTO: 0 /100 WBC (ref 0–0.2)
PH UR STRIP.AUTO: 5.5 [PH] (ref 5–8)
PLATELET # BLD AUTO: 157 10*3/MM3 (ref 140–450)
PMV BLD AUTO: 9.3 FL (ref 6–12)
POTASSIUM SERPL-SCNC: 4.4 MMOL/L (ref 3.5–5.2)
POTASSIUM SERPL-SCNC: 5.2 MMOL/L (ref 3.5–5.2)
PROT SERPL-MCNC: 5.7 G/DL (ref 6–8.5)
PROT UR QL STRIP: NEGATIVE
RBC # BLD AUTO: 3.64 10*6/MM3 (ref 3.77–5.28)
RBC # UR STRIP: ABNORMAL /HPF
REF LAB TEST METHOD: ABNORMAL
SODIUM SERPL-SCNC: 132 MMOL/L (ref 136–145)
SODIUM SERPL-SCNC: 133 MMOL/L (ref 136–145)
SP GR UR STRIP: 1.01 (ref 1–1.03)
SQUAMOUS #/AREA URNS HPF: ABNORMAL /HPF
TROPONIN T DELTA: 1 NG/L
TROPONIN T SERPL HS-MCNC: 19 NG/L
UROBILINOGEN UR QL STRIP: ABNORMAL
WBC # UR STRIP: ABNORMAL /HPF
WBC NRBC COR # BLD: 12.3 10*3/MM3 (ref 3.4–10.8)
WHOLE BLOOD HOLD COAG: NORMAL

## 2023-04-28 PROCEDURE — 99285 EMERGENCY DEPT VISIT HI MDM: CPT

## 2023-04-28 PROCEDURE — 93005 ELECTROCARDIOGRAM TRACING: CPT | Performed by: PHYSICIAN ASSISTANT

## 2023-04-28 PROCEDURE — P9612 CATHETERIZE FOR URINE SPEC: HCPCS

## 2023-04-28 PROCEDURE — 36415 COLL VENOUS BLD VENIPUNCTURE: CPT | Performed by: PHYSICIAN ASSISTANT

## 2023-04-28 PROCEDURE — G0378 HOSPITAL OBSERVATION PER HR: HCPCS

## 2023-04-28 PROCEDURE — 83540 ASSAY OF IRON: CPT | Performed by: INTERNAL MEDICINE

## 2023-04-28 PROCEDURE — 82533 TOTAL CORTISOL: CPT | Performed by: INTERNAL MEDICINE

## 2023-04-28 PROCEDURE — 36415 COLL VENOUS BLD VENIPUNCTURE: CPT

## 2023-04-28 PROCEDURE — 87040 BLOOD CULTURE FOR BACTERIA: CPT | Performed by: PHYSICIAN ASSISTANT

## 2023-04-28 PROCEDURE — 72131 CT LUMBAR SPINE W/O DYE: CPT

## 2023-04-28 PROCEDURE — 82550 ASSAY OF CK (CPK): CPT | Performed by: INTERNAL MEDICINE

## 2023-04-28 PROCEDURE — 84550 ASSAY OF BLOOD/URIC ACID: CPT | Performed by: INTERNAL MEDICINE

## 2023-04-28 PROCEDURE — 81001 URINALYSIS AUTO W/SCOPE: CPT | Performed by: PHYSICIAN ASSISTANT

## 2023-04-28 PROCEDURE — 83605 ASSAY OF LACTIC ACID: CPT

## 2023-04-28 PROCEDURE — 85025 COMPLETE CBC W/AUTO DIFF WBC: CPT | Performed by: PHYSICIAN ASSISTANT

## 2023-04-28 PROCEDURE — 84484 ASSAY OF TROPONIN QUANT: CPT | Performed by: PHYSICIAN ASSISTANT

## 2023-04-28 PROCEDURE — 80053 COMPREHEN METABOLIC PANEL: CPT | Performed by: PHYSICIAN ASSISTANT

## 2023-04-28 RX ORDER — SODIUM CHLORIDE 0.9 % (FLUSH) 0.9 %
3 SYRINGE (ML) INJECTION EVERY 12 HOURS SCHEDULED
Status: DISCONTINUED | OUTPATIENT
Start: 2023-04-28 | End: 2023-05-02 | Stop reason: HOSPADM

## 2023-04-28 RX ORDER — OXYCODONE AND ACETAMINOPHEN 10; 325 MG/1; MG/1
1 TABLET ORAL EVERY 4 HOURS PRN
COMMUNITY

## 2023-04-28 RX ORDER — NITROGLYCERIN 0.4 MG/1
0.4 TABLET SUBLINGUAL
Status: DISCONTINUED | OUTPATIENT
Start: 2023-04-28 | End: 2023-05-02 | Stop reason: HOSPADM

## 2023-04-28 RX ORDER — OXYCODONE HYDROCHLORIDE 5 MG/1
5 TABLET ORAL EVERY 4 HOURS PRN
Status: DISCONTINUED | OUTPATIENT
Start: 2023-04-28 | End: 2023-05-02 | Stop reason: HOSPADM

## 2023-04-28 RX ORDER — ONDANSETRON 4 MG/1
4 TABLET, FILM COATED ORAL EVERY 6 HOURS PRN
Status: DISCONTINUED | OUTPATIENT
Start: 2023-04-28 | End: 2023-05-02 | Stop reason: HOSPADM

## 2023-04-28 RX ORDER — DICLOFENAC POTASSIUM 50 MG/1
50 TABLET, FILM COATED ORAL EVERY MORNING
COMMUNITY
End: 2023-05-02 | Stop reason: HOSPADM

## 2023-04-28 RX ORDER — SODIUM CHLORIDE 9 MG/ML
40 INJECTION, SOLUTION INTRAVENOUS AS NEEDED
Status: DISCONTINUED | OUTPATIENT
Start: 2023-04-28 | End: 2023-05-02 | Stop reason: HOSPADM

## 2023-04-28 RX ORDER — ATORVASTATIN CALCIUM 40 MG/1
40 TABLET, FILM COATED ORAL NIGHTLY
Status: DISCONTINUED | OUTPATIENT
Start: 2023-04-28 | End: 2023-05-02 | Stop reason: HOSPADM

## 2023-04-28 RX ORDER — ASPIRIN 81 MG/1
81 TABLET ORAL NIGHTLY
Status: DISCONTINUED | OUTPATIENT
Start: 2023-04-28 | End: 2023-04-29

## 2023-04-28 RX ORDER — ACETAMINOPHEN 325 MG/1
650 TABLET ORAL EVERY 4 HOURS PRN
Status: DISCONTINUED | OUTPATIENT
Start: 2023-04-28 | End: 2023-05-02 | Stop reason: HOSPADM

## 2023-04-28 RX ORDER — ONDANSETRON 2 MG/ML
4 INJECTION INTRAMUSCULAR; INTRAVENOUS EVERY 6 HOURS PRN
Status: DISCONTINUED | OUTPATIENT
Start: 2023-04-28 | End: 2023-05-02 | Stop reason: HOSPADM

## 2023-04-28 RX ORDER — SODIUM CHLORIDE 0.9 % (FLUSH) 0.9 %
10 SYRINGE (ML) INJECTION AS NEEDED
Status: DISCONTINUED | OUTPATIENT
Start: 2023-04-28 | End: 2023-05-02 | Stop reason: HOSPADM

## 2023-04-28 RX ORDER — HYDRALAZINE HYDROCHLORIDE 20 MG/ML
10 INJECTION INTRAMUSCULAR; INTRAVENOUS EVERY 6 HOURS PRN
Status: DISCONTINUED | OUTPATIENT
Start: 2023-04-28 | End: 2023-05-02 | Stop reason: HOSPADM

## 2023-04-28 RX ORDER — SODIUM CHLORIDE 0.9 % (FLUSH) 0.9 %
3-10 SYRINGE (ML) INJECTION AS NEEDED
Status: DISCONTINUED | OUTPATIENT
Start: 2023-04-28 | End: 2023-05-02 | Stop reason: HOSPADM

## 2023-04-28 RX ORDER — SODIUM CHLORIDE 9 MG/ML
75 INJECTION, SOLUTION INTRAVENOUS CONTINUOUS
Status: DISCONTINUED | OUTPATIENT
Start: 2023-04-28 | End: 2023-04-29

## 2023-04-28 RX ADMIN — Medication 3 ML: at 23:21

## 2023-04-28 RX ADMIN — ATORVASTATIN CALCIUM 40 MG: 40 TABLET, FILM COATED ORAL at 23:20

## 2023-04-28 RX ADMIN — SODIUM CHLORIDE 1000 ML: 9 INJECTION, SOLUTION INTRAVENOUS at 18:21

## 2023-04-28 RX ADMIN — ASPIRIN 81 MG: 81 TABLET, COATED ORAL at 23:20

## 2023-04-28 RX ADMIN — SODIUM CHLORIDE 500 ML: 0.9 INJECTION, SOLUTION INTRAVENOUS at 19:50

## 2023-04-28 NOTE — ED PROVIDER NOTES
Subjective   History of Present Illness  Patient is an 83-year-old female presents to the ED sent by primary care for complaints of acute severe back pain for the past week.  Patient states she has had 2 falls over the past week.  She denies any lightheadedness or dizziness causing her to fall.  She states the first when she states her foot slipped out from under her and the second time she fell she was bending over cleaning around her toilet.  She denies head injury or LOC at the time of either incident.  Patient reports history of chronic back pain that has gotten more severe since these falls.  She denies any saddle anesthesia or new bladder bowel incontinence.  She reports has had generalized fatigue/weakness since December when she was diagnosed with COVID-19.  She denies any significant chest pain or shortness of breath.  No recent fever or illness.  No urinary complaints.  Upon arrival to the ED patient was found to be hypotensive patient denies any history of hypotension states she is on blood pressure medications for hypertension without significant change in her dosages recently.  Patient states she is on narcotic pain medicine which she took about 3 PM this afternoon.  She denies any current lightheadedness or dizziness.  She denies any abdominal pain.  No black or bloody stools.        Review of Systems   Constitutional: Positive for fatigue (Since December). Negative for activity change, appetite change, chills, diaphoresis, fever and unexpected weight change.   HENT: Negative.    Eyes: Negative for photophobia and visual disturbance.   Respiratory: Negative.    Cardiovascular: Negative.    Gastrointestinal: Negative for abdominal distention, abdominal pain, blood in stool, constipation, diarrhea, nausea and vomiting.   Genitourinary: Negative for decreased urine volume, difficulty urinating, dysuria, flank pain, frequency, hematuria and urgency.   Musculoskeletal: Positive for back pain. Negative for  gait problem, neck pain and neck stiffness.   Skin: Negative.    Neurological: Positive for weakness (Since December). Negative for dizziness, tremors, seizures, syncope, facial asymmetry, speech difficulty, light-headedness, numbness and headaches.   Hematological: Negative.        Past Medical History:   Diagnosis Date   • Anemia    • CHF (congestive heart failure)    • Congenital heart disease    • Coronary artery disease 2013   • GERD (gastroesophageal reflux disease)    • Heart murmur Don't know   • Hx of hypertrophic cardiomyopathy    • Hypertension    • Peptic ulceration    • Persistent atrial fibrillation 01/10/2023   • Primary osteoarthritis of both knees 02/08/2021   • Primary osteoarthritis of right knee 09/21/2020   • Sleep apnea 2017       No Known Allergies    Past Surgical History:   Procedure Laterality Date   • ABDOMINAL HERNIA REPAIR     • ABLATION OF DYSRHYTHMIC FOCUS  01-   • BACK SURGERY     • BREAST AUGMENTATION     • CARDIAC CATHETERIZATION     • CARDIAC ELECTROPHYSIOLOGY PROCEDURE N/A 01/16/2023    Procedure: Ablation atrial fibrillation and flutter, cryo Duong and Ramón aware;  Surgeon: Leeanne Pleitez MD;  Location: [facility] INVASIVE LOCATION;  Service: Cardiovascular;  Laterality: N/A;   • CARDIAC PACEMAKER PLACEMENT  03/16/2017    Dual Chamber   • CERVICAL RIB RESECTION Bilateral    • CERVICAL RIB RESECTION Bilateral 1963    bilateral cervical ribs removed - extra ribs located and causing pain   • CHOLECYSTECTOMY     • COLONOSCOPY     • CORONARY ARTERY BYPASS GRAFT  2013   • THORACIC DECOMPRESSION POSTERIOR FUSION  06/04/2014    L3-5 & S1   • UPPER GASTROINTESTINAL ENDOSCOPY         Family History   Problem Relation Age of Onset   • Colon cancer Mother    • Colon cancer Brother    • Heart attack Brother    • Colon cancer Daughter        Social History     Socioeconomic History   • Marital status:    Tobacco Use   • Smoking status: Former     Packs/day: 1.00     Years:  8.00     Pack years: 8.00     Types: Cigarettes     Start date: 1977     Quit date: 1985     Years since quittin.3     Passive exposure: Past   • Smokeless tobacco: Never   • Tobacco comments:     Quit    Vaping Use   • Vaping Use: Never used   Substance and Sexual Activity   • Alcohol use: Not Currently     Comment: 6 drinks a year in social settings   • Drug use: Never   • Sexual activity: Defer     Partners: Male           Objective   Physical Exam  Vitals and nursing note reviewed.   Constitutional:       General: She is not in acute distress.     Appearance: She is well-developed. She is obese. She is not ill-appearing, toxic-appearing or diaphoretic.   HENT:      Head: Normocephalic and atraumatic.      Mouth/Throat:      Mouth: Mucous membranes are moist.      Pharynx: Oropharynx is clear.   Eyes:      General: No scleral icterus.     Extraocular Movements: Extraocular movements intact.      Pupils: Pupils are equal, round, and reactive to light.   Cardiovascular:      Rate and Rhythm: Normal rate and regular rhythm.      Pulses: Normal pulses.      Heart sounds: No murmur heard.    No friction rub. No gallop.   Pulmonary:      Effort: Pulmonary effort is normal. No respiratory distress.      Breath sounds: Normal breath sounds. No stridor. No wheezing, rhonchi or rales.   Chest:      Chest wall: No tenderness.   Abdominal:      General: Bowel sounds are normal. There is no distension. There are no signs of injury.      Palpations: Abdomen is soft. There is no mass.      Tenderness: There is no abdominal tenderness. There is no right CVA tenderness, left CVA tenderness, guarding or rebound.      Hernia: No hernia is present.   Musculoskeletal:      Cervical back: Normal range of motion. No rigidity or bony tenderness.      Thoracic back: No tenderness or bony tenderness. Normal range of motion.      Lumbar back: Tenderness present. Decreased range of motion. Negative right straight leg raise  "test and negative left straight leg raise test.        Back:       Comments: Back:  Cervical, thoracic, lumbar spine or midline with no midline tenderness or step-off,.  Spinal musculature soft, tenderness in region as noted above to palpation, no palpable mass spasm, no overlying erythema, no ecchymosis. Range of motion is present but decreased secondary to pain with increased discomfort noted performing distal muscle strength is 5/5.      Negative straight leg raise BLE   Skin:     General: Skin is warm.      Capillary Refill: Capillary refill takes less than 2 seconds.      Coloration: Skin is not cyanotic, jaundiced or pale.      Findings: No rash.   Neurological:      General: No focal deficit present.      Mental Status: She is alert and oriented to person, place, and time.   Psychiatric:         Mood and Affect: Mood normal.         Behavior: Behavior normal.         Procedures           ED Course  ED Course as of 04/29/23 1340   Fri Apr 28, 2023 1941 POC Lactate [AA]   1954 Inspect queried patient takes Percocet 10 mg daily [AA]      ED Course User Index  [AA] Yuliet Swain PA      BP 95/60 (BP Location: Right arm, Patient Position: Lying)   Pulse 59   Temp 98.4 °F (36.9 °C) (Oral)   Resp 14   Ht 152.4 cm (60\")   Wt 92.6 kg (204 lb 2.3 oz)   LMP  (LMP Unknown)   SpO2 95%   BMI 39.87 kg/m²   Medications   sodium chloride 0.9 % flush 10 mL (has no administration in time range)   sodium chloride 0.9 % flush 3 mL (3 mL Intravenous Given 4/29/23 0845)   sodium chloride 0.9 % flush 3-10 mL (has no administration in time range)   sodium chloride 0.9 % infusion 40 mL (has no administration in time range)   nitroglycerin (NITROSTAT) SL tablet 0.4 mg (has no administration in time range)   acetaminophen (TYLENOL) tablet 650 mg (has no administration in time range)   ondansetron (ZOFRAN) tablet 4 mg ( Oral Not Given:  See Alt 4/29/23 0845)     Or   ondansetron (ZOFRAN) injection 4 mg (4 mg Intravenous " Given 4/29/23 0845)   hydrALAZINE (APRESOLINE) injection 10 mg (has no administration in time range)   albuterol (PROVENTIL) nebulizer solution 0.5% 2.5 mg/0.5mL (has no administration in time range)   apixaban (ELIQUIS) tablet 2.5 mg (2.5 mg Oral Given 4/29/23 0845)   atorvastatin (LIPITOR) tablet 40 mg (40 mg Oral Given 4/28/23 2320)   oxyCODONE (ROXICODONE) immediate release tablet 5 mg (5 mg Oral Given 4/29/23 0526)   pantoprazole (PROTONIX) EC tablet 40 mg (40 mg Oral Given 4/29/23 1145)   methylPREDNISolone sodium succinate (SOLU-Medrol) injection 20 mg (20 mg Intravenous Given 4/29/23 1145)   sodium chloride 0.9 % infusion (has no administration in time range)   sodium chloride 0.9 % bolus 1,000 mL (0 mL Intravenous Stopped 4/28/23 1851)   sodium chloride 0.9 % bolus 500 mL (500 mL Intravenous New Bag 4/28/23 1950)   calcium gluconate 2-0.675 GM/100ML NACL IVPB (2 g Intravenous New Bag 4/29/23 1145)     Labs Reviewed   COMPREHENSIVE METABOLIC PANEL - Abnormal; Notable for the following components:       Result Value    Glucose 104 (*)     BUN 33 (*)     Creatinine 1.88 (*)     Sodium 132 (*)     Chloride 95 (*)     Calcium 8.5 (*)     Total Protein 5.7 (*)     eGFR 26.1 (*)     All other components within normal limits    Narrative:     GFR Normal >60  Chronic Kidney Disease <60  Kidney Failure <15    The GFR formula is only valid for adults with stable renal function between ages 18 and 70.   TROPONIN - Abnormal; Notable for the following components:    HS Troponin T 19 (*)     All other components within normal limits    Narrative:     High Sensitive Troponin T Reference Range:  <10.0 ng/L- Negative Female for AMI  <15.0 ng/L- Negative Male for AMI  >=10 - Abnormal Female indicating possible myocardial injury.  >=15 - Abnormal Male indicating possible myocardial injury.   Clinicians would have to utilize clinical acumen, EKG, Troponin, and serial changes to determine if it is an Acute Myocardial Infarction  or myocardial injury due to an underlying chronic condition.        CBC WITH AUTO DIFFERENTIAL - Abnormal; Notable for the following components:    WBC 12.30 (*)     RBC 3.64 (*)     Hemoglobin 10.8 (*)     Hematocrit 32.9 (*)     Neutrophil % 84.0 (*)     Lymphocyte % 5.7 (*)     Neutrophils, Absolute 10.30 (*)     Monocytes, Absolute 1.10 (*)     All other components within normal limits   HIGH SENSITIVITIY TROPONIN T 2HR - Abnormal; Notable for the following components:    HS Troponin T 20 (*)     All other components within normal limits    Narrative:     High Sensitive Troponin T Reference Range:  <10.0 ng/L- Negative Female for AMI  <15.0 ng/L- Negative Male for AMI  >=10 - Abnormal Female indicating possible myocardial injury.  >=15 - Abnormal Male indicating possible myocardial injury.   Clinicians would have to utilize clinical acumen, EKG, Troponin, and serial changes to determine if it is an Acute Myocardial Infarction or myocardial injury due to an underlying chronic condition.        URINALYSIS W/ CULTURE IF INDICATED - Abnormal; Notable for the following components:    Leuk Esterase, UA Trace (*)     All other components within normal limits    Narrative:     In absence of clinical symptoms, the presence of pyuria, bacteria, and/or nitrites on the urinalysis result does not correlate with infection.   URINALYSIS, MICROSCOPIC ONLY - Abnormal; Notable for the following components:    RBC, UA 0-2 (*)     WBC, UA 3-5 (*)     Bacteria, UA Trace (*)     All other components within normal limits   BASIC METABOLIC PANEL - Abnormal; Notable for the following components:    BUN 29 (*)     Creatinine 1.79 (*)     Sodium 133 (*)     Chloride 97 (*)     Calcium 8.1 (*)     eGFR 27.7 (*)     All other components within normal limits    Narrative:     GFR Normal >60  Chronic Kidney Disease <60  Kidney Failure <15    The GFR formula is only valid for adults with stable renal function between ages 18 and 70.   CBC WITH  AUTO DIFFERENTIAL - Abnormal; Notable for the following components:    RBC 3.45 (*)     Hemoglobin 10.3 (*)     Hematocrit 32.1 (*)     Neutrophil % 78.2 (*)     Lymphocyte % 9.3 (*)     Neutrophils, Absolute 7.60 (*)     Monocytes, Absolute 1.10 (*)     All other components within normal limits   IRON - Abnormal; Notable for the following components:    Iron 22 (*)     All other components within normal limits   CK - Normal   URIC ACID - Normal   POC LACTATE - Normal   BLOOD CULTURE   BLOOD CULTURE   EOSINOPHIL SMEAR   CORTISOL    Narrative:     Cortisol Reference Ranges:    Cortisol 6AM - 10AM Range: 6.02-18.40 mcg/dl  Cortisol 4PM - 8PM Range: 2.68-10.50 mcg/dl      Results may be falsely increased if patient taking Biotin.     TROPONIN   BNP (IN-HOUSE)   PROTEIN ELEC + INTERP, SERUM   SODIUM, URINE, RANDOM   POC LACTATE   CBC AND DIFFERENTIAL    Narrative:     The following orders were created for panel order CBC & Differential.  Procedure                               Abnormality         Status                     ---------                               -----------         ------                     CBC Auto Differential[[id number]]        Abnormal            Final result                 Please view results for these tests on the individual orders.   EXTRA TUBES    Narrative:     The following orders were created for panel order Extra Tubes.  Procedure                               Abnormality         Status                     ---------                               -----------         ------                     Gold Top - SST[[id number]]                                   Final result               Light Blue Top[[id number]]                                   Final result                 Please view results for these tests on the individual orders.   GOLD TOP - SST   LIGHT BLUE TOP   CBC AND DIFFERENTIAL    Narrative:     The following orders were created for panel order CBC & Differential.  Procedure                                Abnormality         Status                     ---------                               -----------         ------                     CBC Auto Differential[[id number]]        Abnormal            Final result                 Please view results for these tests on the individual orders.     CT Lumbar Spine Without Contrast    Result Date: 4/28/2023  Impression: 1. Negative for acute osseous abnormality. 2. Postsurgical changes of lumbar fixation at L4-5. 3. Multilevel degenerative findings above. Electronically Signed: [place]  4/28/2023 7:04 PM EDT  Workstation ID: [id number]                                         Medical Decision Making  Chart Review: Discharge summary reviewed from 1/23/2023 presenting with acute on chronic CHF A-fib she had had a recent cardiac ablation prior to admission    Comorbidity: As per past medical history  Differentials: Fracture, dislocation, contusion, cauda equina, epidural abscess, disc herniation, aortic dissection, electrode abnormality, dehydration     ;this list is not all inclusive and does not constitute the entirety of considered causes  EKG: Interpreted by myself and Dr. Ku shows atrial paced complex prolonged GA interval incomplete left bundle branch block LVH previous EKG reviewed from 1/18/2023 without significant change  Labs: As above  Radiology: My interpretation CT lumbar spine shows no obvious fracture correlated with radiologist interpretation as below  CT Lumbar Spine Without Contrast   Final Result    Impression:    1. Negative for acute osseous abnormality.    2. Postsurgical changes of lumbar fixation at L4-5.    3. Multilevel degenerative findings above        Electronically Signed: [place]      4/28/2023 7:04 PM EDT      Workstation ID: [id number]     Disposition/Treatment:  Appropriate PPE was worn during exam and throughout all encounters with the patient.  While in the ED IV was placed and labs were obtained patient was placed on  proper monitors upon arrival to the ED was found to be hypotensive was given fluids with some improvement of her blood pressure.  Patient denies any lightheadedness or dizziness no significant chest pain, shortness of breath, or abdominal pain.  Patient's only complaint was low back pain for the past week after 2 falls.  She denies head injury during these incidents and states she was not lightheaded or dizzy to cause her to fall.    In regards to her low back pain CT lumbar spine was ordered which showed no acute osseous abnormalities as above.    Due to patient's hypotension additional labs and EKG were ordered.  POC lactic normal at 0.6 blood cultures are pending.  CBC shows WBC of 12.3 hemoglobin 10.8 hematocrit 332.9 platelets 157.  On chart review patient had a hemoglobin of 11.6 on 1/23/2023.  Troponin 19; pending repeat troponin.  Metabolic panel showed glucose 104 BUN 33 creatinine 1.88 sodium 132 potassium 5.2.  On chart review patient had a BUN of 24 creatinine 1.12 back in January.     Hemoccult negative on exam today.  Case was discussed with attending who also saw the patient at bedside.  Due to patient's continued hypotension she will be admitted for further evaluation we will also trend troponin.  She does look somewhat dehydrated with elevated BUN/creatinine today.  She was given fluids while in the ED with improvement on reassessment of her blood pressure.    Spoke to Zoey Acosta NP with PCP group who agreed for admission.     Insert critical care the high probability of sudden, clinically significant deterioration in the patient's condition required the highest level of my preparedness to intervene urgently.  The services I provided to this patient were to treat and/or prevent clinically significant deterioration that could result in: death or increase blood loss . Services included the following: chart data review, reviewing nurses notes an/or old charts, documentation time, consultant  collaboration regarding findings and treatment options, vital sign assessments and ordering, interpreting and reviewing diagnostic studies/lab test.  Aggregate critical care time was 37 minutes , which includes only time during which I was engaged in work directly related to the patient's care, as described above, whether at the bedside or elsewhere in the Emergency Department. It did not include time spent performing other reported procedures.     Acute midline low back pain without sciatica: complicated acute illness or injury  Hypotension, unspecified hypotension type: complicated acute illness or injury  Amount and/or Complexity of Data Reviewed  External Data Reviewed: labs, radiology and notes.  Labs: ordered. Decision-making details documented in ED Course.  Radiology: ordered. Decision-making details documented in ED Course.  ECG/medicine tests: ordered.  Discussion of management or test interpretation with external provider(s): As above    Risk  Prescription drug management.  Decision regarding hospitalization.          Final diagnoses:   Acute midline low back pain without sciatica   Hypotension, unspecified hypotension type       ED Disposition  ED Disposition     ED Disposition   Decision to Admit    Condition   --    Comment   Level of Care: Med/Surg [1]   Admitting Physician: ZAIDA DAVIS [[id number]]   Attending Physician: ZAIDA DAVIS [[id number]]               No follow-up provider specified.       Medication List      No changes were made to your prescriptions during this visit.          Yuliet Swain PA  04/28/23 2101       Yuliet Swain PA  04/29/23 1340

## 2023-04-29 ENCOUNTER — APPOINTMENT (OUTPATIENT)
Dept: ULTRASOUND IMAGING | Facility: HOSPITAL | Age: 83
End: 2023-04-29
Payer: MEDICARE

## 2023-04-29 PROBLEM — H35.039 HYPERTENSIVE RETINOPATHY: Status: ACTIVE | Noted: 2022-11-01

## 2023-04-29 LAB
BASOPHILS # BLD AUTO: 0 10*3/MM3 (ref 0–0.2)
BASOPHILS # BLD AUTO: 0 10*3/MM3 (ref 0–0.2)
BASOPHILS NFR BLD AUTO: 0.1 % (ref 0–1.5)
BASOPHILS NFR BLD AUTO: 0.2 % (ref 0–1.5)
CA-I SERPL ISE-MCNC: 1.22 MMOL/L (ref 1.2–1.3)
CK SERPL-CCNC: 23 U/L (ref 20–180)
CORTIS SERPL-MCNC: 9.57 MCG/DL
DEPRECATED RDW RBC AUTO: 49.4 FL (ref 37–54)
DEPRECATED RDW RBC AUTO: 50.3 FL (ref 37–54)
EOSINOPHIL # BLD AUTO: 0 10*3/MM3 (ref 0–0.4)
EOSINOPHIL # BLD AUTO: 0.1 10*3/MM3 (ref 0–0.4)
EOSINOPHIL NFR BLD AUTO: 0.1 % (ref 0.3–6.2)
EOSINOPHIL NFR BLD AUTO: 0.6 % (ref 0.3–6.2)
EOSINOPHIL SPEC QL MICRO: 0 % EOS/100 CELLS (ref 0–0)
ERYTHROCYTE [DISTWIDTH] IN BLOOD BY AUTOMATED COUNT: 15 % (ref 12.3–15.4)
ERYTHROCYTE [DISTWIDTH] IN BLOOD BY AUTOMATED COUNT: 15.3 % (ref 12.3–15.4)
HCT VFR BLD AUTO: 31.1 % (ref 34–46.6)
HCT VFR BLD AUTO: 32.1 % (ref 34–46.6)
HGB BLD-MCNC: 10.3 G/DL (ref 12–15.9)
HGB BLD-MCNC: 10.4 G/DL (ref 12–15.9)
IRON 24H UR-MRATE: 22 MCG/DL (ref 37–145)
LYMPHOCYTES # BLD AUTO: 0.4 10*3/MM3 (ref 0.7–3.1)
LYMPHOCYTES # BLD AUTO: 0.9 10*3/MM3 (ref 0.7–3.1)
LYMPHOCYTES NFR BLD AUTO: 5.6 % (ref 19.6–45.3)
LYMPHOCYTES NFR BLD AUTO: 9.3 % (ref 19.6–45.3)
MCH RBC QN AUTO: 30 PG (ref 26.6–33)
MCH RBC QN AUTO: 30.3 PG (ref 26.6–33)
MCHC RBC AUTO-ENTMCNC: 32.2 G/DL (ref 31.5–35.7)
MCHC RBC AUTO-ENTMCNC: 33.6 G/DL (ref 31.5–35.7)
MCV RBC AUTO: 90.2 FL (ref 79–97)
MCV RBC AUTO: 93.1 FL (ref 79–97)
MONOCYTES # BLD AUTO: 0.3 10*3/MM3 (ref 0.1–0.9)
MONOCYTES # BLD AUTO: 1.1 10*3/MM3 (ref 0.1–0.9)
MONOCYTES NFR BLD AUTO: 11.7 % (ref 5–12)
MONOCYTES NFR BLD AUTO: 4.2 % (ref 5–12)
NEUTROPHILS NFR BLD AUTO: 6.3 10*3/MM3 (ref 1.7–7)
NEUTROPHILS NFR BLD AUTO: 7.6 10*3/MM3 (ref 1.7–7)
NEUTROPHILS NFR BLD AUTO: 78.2 % (ref 42.7–76)
NEUTROPHILS NFR BLD AUTO: 90 % (ref 42.7–76)
NRBC BLD AUTO-RTO: 0 /100 WBC (ref 0–0.2)
NRBC BLD AUTO-RTO: 0.2 /100 WBC (ref 0–0.2)
NT-PROBNP SERPL-MCNC: 956.8 PG/ML (ref 0–1800)
PLATELET # BLD AUTO: 144 10*3/MM3 (ref 140–450)
PLATELET # BLD AUTO: 153 10*3/MM3 (ref 140–450)
PMV BLD AUTO: 9.1 FL (ref 6–12)
PMV BLD AUTO: 9.4 FL (ref 6–12)
RBC # BLD AUTO: 3.45 10*6/MM3 (ref 3.77–5.28)
RBC # BLD AUTO: 3.45 10*6/MM3 (ref 3.77–5.28)
SODIUM UR-SCNC: 133 MMOL/L
TROPONIN T SERPL HS-MCNC: 13 NG/L
URATE SERPL-MCNC: 4.7 MG/DL (ref 2.4–5.7)
WBC NRBC COR # BLD: 7 10*3/MM3 (ref 3.4–10.8)
WBC NRBC COR # BLD: 9.7 10*3/MM3 (ref 3.4–10.8)

## 2023-04-29 PROCEDURE — 80053 COMPREHEN METABOLIC PANEL: CPT | Performed by: INTERNAL MEDICINE

## 2023-04-29 PROCEDURE — 85025 COMPLETE CBC W/AUTO DIFF WBC: CPT | Performed by: NURSE PRACTITIONER

## 2023-04-29 PROCEDURE — 99222 1ST HOSP IP/OBS MODERATE 55: CPT | Performed by: INTERNAL MEDICINE

## 2023-04-29 PROCEDURE — 25010000002 ONDANSETRON PER 1 MG: Performed by: NURSE PRACTITIONER

## 2023-04-29 PROCEDURE — 87205 SMEAR GRAM STAIN: CPT | Performed by: INTERNAL MEDICINE

## 2023-04-29 PROCEDURE — 84484 ASSAY OF TROPONIN QUANT: CPT | Performed by: FAMILY MEDICINE

## 2023-04-29 PROCEDURE — 25010000002 METHYLPREDNISOLONE PER 40 MG: Performed by: FAMILY MEDICINE

## 2023-04-29 PROCEDURE — 83735 ASSAY OF MAGNESIUM: CPT | Performed by: INTERNAL MEDICINE

## 2023-04-29 PROCEDURE — 25010000002 CALCIUM GLUCONATE 2-0.675 GM/100ML-% SOLUTION: Performed by: INTERNAL MEDICINE

## 2023-04-29 PROCEDURE — 76775 US EXAM ABDO BACK WALL LIM: CPT

## 2023-04-29 PROCEDURE — 84100 ASSAY OF PHOSPHORUS: CPT | Performed by: INTERNAL MEDICINE

## 2023-04-29 PROCEDURE — G0378 HOSPITAL OBSERVATION PER HR: HCPCS

## 2023-04-29 PROCEDURE — 84165 PROTEIN E-PHORESIS SERUM: CPT | Performed by: INTERNAL MEDICINE

## 2023-04-29 PROCEDURE — 82330 ASSAY OF CALCIUM: CPT | Performed by: INTERNAL MEDICINE

## 2023-04-29 PROCEDURE — 83880 ASSAY OF NATRIURETIC PEPTIDE: CPT | Performed by: FAMILY MEDICINE

## 2023-04-29 PROCEDURE — 84300 ASSAY OF URINE SODIUM: CPT | Performed by: INTERNAL MEDICINE

## 2023-04-29 PROCEDURE — 84443 ASSAY THYROID STIM HORMONE: CPT | Performed by: INTERNAL MEDICINE

## 2023-04-29 RX ORDER — PANTOPRAZOLE SODIUM 40 MG/1
40 TABLET, DELAYED RELEASE ORAL
Status: DISCONTINUED | OUTPATIENT
Start: 2023-04-29 | End: 2023-05-02 | Stop reason: HOSPADM

## 2023-04-29 RX ORDER — METHYLPREDNISOLONE SODIUM SUCCINATE 40 MG/ML
20 INJECTION, POWDER, LYOPHILIZED, FOR SOLUTION INTRAMUSCULAR; INTRAVENOUS DAILY
Status: DISCONTINUED | OUTPATIENT
Start: 2023-04-29 | End: 2023-04-30

## 2023-04-29 RX ORDER — SODIUM CHLORIDE 9 MG/ML
100 INJECTION, SOLUTION INTRAVENOUS CONTINUOUS
Status: DISCONTINUED | OUTPATIENT
Start: 2023-04-29 | End: 2023-04-30

## 2023-04-29 RX ORDER — CALCIUM GLUCONATE 20 MG/ML
2 INJECTION, SOLUTION INTRAVENOUS ONCE
Status: COMPLETED | OUTPATIENT
Start: 2023-04-29 | End: 2023-04-29

## 2023-04-29 RX ADMIN — Medication 3 ML: at 19:53

## 2023-04-29 RX ADMIN — OXYCODONE 5 MG: 5 TABLET ORAL at 19:51

## 2023-04-29 RX ADMIN — CALCIUM GLUCONATE 2 G: 20 INJECTION, SOLUTION INTRAVENOUS at 11:45

## 2023-04-29 RX ADMIN — ATORVASTATIN CALCIUM 40 MG: 40 TABLET, FILM COATED ORAL at 19:51

## 2023-04-29 RX ADMIN — Medication 3 ML: at 08:45

## 2023-04-29 RX ADMIN — PANTOPRAZOLE SODIUM 40 MG: 40 TABLET, DELAYED RELEASE ORAL at 11:45

## 2023-04-29 RX ADMIN — ONDANSETRON 4 MG: 2 INJECTION INTRAMUSCULAR; INTRAVENOUS at 08:45

## 2023-04-29 RX ADMIN — APIXABAN 2.5 MG: 2.5 TABLET, FILM COATED ORAL at 08:45

## 2023-04-29 RX ADMIN — OXYCODONE 5 MG: 5 TABLET ORAL at 00:53

## 2023-04-29 RX ADMIN — APIXABAN 2.5 MG: 2.5 TABLET, FILM COATED ORAL at 19:52

## 2023-04-29 RX ADMIN — OXYCODONE 5 MG: 5 TABLET ORAL at 15:07

## 2023-04-29 RX ADMIN — SODIUM CHLORIDE 125 ML/HR: 9 INJECTION, SOLUTION INTRAVENOUS at 08:48

## 2023-04-29 RX ADMIN — METHYLPREDNISOLONE SODIUM SUCCINATE 20 MG: 40 INJECTION, POWDER, FOR SOLUTION INTRAMUSCULAR; INTRAVENOUS at 11:45

## 2023-04-29 RX ADMIN — OXYCODONE 5 MG: 5 TABLET ORAL at 05:26

## 2023-04-29 RX ADMIN — SODIUM CHLORIDE 125 ML/HR: 9 INJECTION, SOLUTION INTRAVENOUS at 00:53

## 2023-04-29 NOTE — CONSULTS
NEPHROLOGY CONSULTATION-----[facility]    [facility]  [phone]  Juliana Muñoz MD    Patient Care Team:  Anish Lew FNP as PCP - Evgeny Machuca MD as Consulting Physician (Cardiology)  Leeanne Pleitez MD as Consulting Physician (Cardiology)  Radha Owusu APRN as Nurse Practitioner (Nurse Practitioner)  Kathe Muñoz MD as Consulting Physician (Nephrology)    CC/REASON FOR CONSULTATION: RENAL FAILURE/ELEVATED SERUM CREATININE    PHYSICIAN REQUESTING CONSULTATION:     History of Present Illness    HPI    Patient is a 83 y.o. WF whom I was asked to see in consultation for evaluation and management of renal failure/elevated serum creatinine. Patient was admitted after presenting to ER with severe back pain and with multiple falls over the past 2 weeks.  Patient denies prior knowledge of functional kidney disease, proteinuria, or hematuria. No recent IV dye exposure. +NSAID use in the form of Diclofenac. No known h/o hepatitis, TB, rheumatic fever, jaundice, SLE. Patient does bleed/bruise easily as she is chronically anticoagulated on Eliquis.  No urinary sx. No edema or fluid retention.  +Compliance with home meds. Was on diuretics in the form of   Bumex prior to admission. Was on ACE-I/ARB in the form of Lisinopril prior to admission. No herbal med use. +Hypotension    Review of Systems   Constitutional: Positive for activity change, appetite change and fatigue. Negative for chills, diaphoresis, fever and unexpected weight change.   HENT: Negative for congestion, dental problem, drooling, ear discharge, ear pain, facial swelling, hearing loss, mouth sores, nosebleeds, postnasal drip, rhinorrhea, sinus pressure, sinus pain, sneezing, sore throat, tinnitus, trouble swallowing and voice change.    Eyes: Negative for photophobia, pain, discharge, redness, itching and visual disturbance.    Respiratory: Positive for cough. Negative for apnea, choking, chest tightness, shortness of breath, wheezing and stridor.    Cardiovascular: Positive for palpitations. Negative for chest pain and leg swelling.   Gastrointestinal: Negative for abdominal distention, abdominal pain, anal bleeding, blood in stool, constipation, diarrhea, nausea, rectal pain and vomiting.   Endocrine: Negative for cold intolerance, heat intolerance, polydipsia, polyphagia and polyuria.   Genitourinary: Negative for decreased urine volume, difficulty urinating, dysuria, enuresis, flank pain, frequency, genital sores, hematuria and urgency.   Musculoskeletal: Positive for arthralgias and back pain. Negative for gait problem, joint swelling, myalgias, neck pain and neck stiffness.   Skin: Negative for color change, pallor, rash and wound.   Allergic/Immunologic: Negative for environmental allergies, food allergies and immunocompromised state.   Neurological: Positive for weakness. Negative for dizziness, tremors, seizures, syncope, facial asymmetry, speech difficulty, light-headedness, numbness and headaches.   Hematological: Negative for adenopathy. Bruises/bleeds easily.   Psychiatric/Behavioral: Negative for agitation, behavioral problems, confusion, decreased concentration, dysphoric mood, hallucinations, self-injury, sleep disturbance and suicidal ideas. The patient is not nervous/anxious and is not hyperactive.           Past Medical History:   Diagnosis Date   • Anemia    • CHF (congestive heart failure)    • Congenital heart disease    • Coronary artery disease 2013   • GERD (gastroesophageal reflux disease)    • Heart murmur Don't know   • Hx of hypertrophic cardiomyopathy    • Hypertension    • Peptic ulceration    • Persistent atrial fibrillation 01/10/2023   • Primary osteoarthritis of both knees 02/08/2021   • Primary osteoarthritis of right knee 09/21/2020   • Sleep apnea 2017       Past Surgical History:   Procedure  Laterality Date   • ABDOMINAL HERNIA REPAIR     • ABLATION OF DYSRHYTHMIC FOCUS  2023   • BACK SURGERY     • BREAST AUGMENTATION     • CARDIAC CATHETERIZATION     • CARDIAC ELECTROPHYSIOLOGY PROCEDURE N/A 2023    Procedure: Ablation atrial fibrillation and flutter, cryo Duong and Ramón aware;  Surgeon: Leeanne Pleitez MD;  Location: [facility] INVASIVE LOCATION;  Service: Cardiovascular;  Laterality: N/A;   • CARDIAC PACEMAKER PLACEMENT  2017    Dual Chamber   • CERVICAL RIB RESECTION Bilateral    • CERVICAL RIB RESECTION Bilateral 1963    bilateral cervical ribs removed - extra ribs located and causing pain   • CHOLECYSTECTOMY     • COLONOSCOPY     • CORONARY ARTERY BYPASS GRAFT     • THORACIC DECOMPRESSION POSTERIOR FUSION  2014    L3-5 & S1   • UPPER GASTROINTESTINAL ENDOSCOPY         Family History   Problem Relation Age of Onset   • Colon cancer Mother    • Colon cancer Brother    • Heart attack Brother    • Colon cancer Daughter        Social History     Tobacco Use   • Smoking status: Former     Packs/day: 1.00     Years: 8.00     Pack years: 8.00     Types: Cigarettes     Start date: 1977     Quit date: 1985     Years since quittin.3     Passive exposure: Past   • Smokeless tobacco: Never   • Tobacco comments:     Quit    Vaping Use   • Vaping Use: Never used   Substance Use Topics   • Alcohol use: Not Currently     Comment: 6 drinks a year in social settings   • Drug use: Never       Home Meds:   Medications Prior to Admission   Medication Sig Dispense Refill Last Dose   • amiodarone (PACERONE) 200 MG tablet TAKE 1 TABLET BY MOUTH TWICE DAILY 180 tablet 3 2023   • apixaban (ELIQUIS) 5 MG tablet tablet Take 1 tablet by mouth 2 (Two) Times a Day. 180 tablet 3 2023   • aspirin 81 MG EC tablet Take 1 tablet by mouth Every Night. 90 tablet 3 2023   • atorvastatin (LIPITOR) 40 MG tablet Take 1 tablet by mouth Every Night. 30 tablet 0 2023   •  bumetanide (BUMEX) 1 MG tablet Take 1 tablet by mouth Daily. 30 tablet 2 4/28/2023   • diclofenac (CATAFLAM) 50 MG tablet Take 1 tablet by mouth Every Morning.      • guaiFENesin (MUCINEX) 600 MG 12 hr tablet Take 2 tablets by mouth 2 (Two) Times a Day.   4/28/2023   • lisinopril (PRINIVIL,ZESTRIL) 2.5 MG tablet Take 1 tablet by mouth Daily.   4/28/2023   • metoprolol succinate XL (TOPROL-XL) 25 MG 24 hr tablet TAKE 1 TABLET BY MOUTH DAILY 90 tablet 1    • oxyCODONE-acetaminophen (PERCOCET)  MG per tablet Take 1 tablet by mouth Every 4 (Four) Hours As Needed for Moderate Pain.      • Semaglutide, 1 MG/DOSE, (OZEMPIC) 2 MG/1.5ML solution pen-injector Inject 1 mg under the skin into the appropriate area as directed 1 (One) Time Per Week.      • sertraline (ZOLOFT) 100 MG tablet Take 1 tablet by mouth Every Evening.   4/27/2023   • albuterol (PROVENTIL) (5 MG/ML) 0.5% nebulizer solution Take 2.5 mg by nebulization 3 (Three) Times a Day As Needed for Wheezing.          Scheduled Meds:  apixaban, 2.5 mg, Oral, BID  atorvastatin, 40 mg, Oral, Nightly  methylPREDNISolone sodium succinate, 20 mg, Intravenous, Daily  pantoprazole, 40 mg, Oral, Q AM  sodium chloride, 3 mL, Intravenous, Q12H        Continuous Infusions:  sodium chloride, 125 mL/hr, Last Rate: 125 mL/hr (04/29/23 0848)        PRN Meds:  •  acetaminophen  •  albuterol  •  hydrALAZINE  •  nitroglycerin  •  ondansetron **OR** ondansetron  •  oxyCODONE  •  [COMPLETED] Insert Peripheral IV **AND** sodium chloride  •  sodium chloride  •  sodium chloride    Allergies:  Patient has no known allergies.    OBJECTIVE    Vital Signs  Temp:  [97.5 °F (36.4 °C)-98.6 °F (37 °C)] 98.6 °F (37 °C)  Heart Rate:  [59-62] 59  Resp:  [14-20] 16  BP: ()/(31-62) 98/62    No intake/output data recorded.  I/O last 3 completed shifts:  In: 587.1 [I.V.:587.1]  Out: -     Physical Exam:  General Appearance: alert, appears stated age and cooperative +FATIGUE  Head:  normocephalic, without obvious abnormality and atraumatic +DRY OP  Eyes: conjunctivae and sclerae normal and no icterus  Neck: supple and no JVD  Lungs: clear to auscultation and respirations regular  Heart: IRREG IRREG +MELVIN. NO GALLOP, RUB, OR S3  Chest Wall: no abnormalities observed  Abdomen: normal bowel sounds and soft, nontender +OBESITY  Extremities: moves extremities well, no edema, no cyanosis +DJD  Skin: no bleeding, bruising or rash +DECREASED SKIN TUGOR  Neurologic: Alert, and oriented. No focal deficits    Results Review:    I reviewed the patient's new clinical results.    WBC WBC   Date Value Ref Range Status   04/29/2023 9.70 3.40 - 10.80 10*3/mm3 Final   04/28/2023 12.30 (H) 3.40 - 10.80 10*3/mm3 Final      HGB Hemoglobin   Date Value Ref Range Status   04/29/2023 10.3 (L) 12.0 - 15.9 g/dL Final   04/28/2023 10.8 (L) 12.0 - 15.9 g/dL Final      HCT Hematocrit   Date Value Ref Range Status   04/29/2023 32.1 (L) 34.0 - 46.6 % Final   04/28/2023 32.9 (L) 34.0 - 46.6 % Final      Platelets No results found for: LABPLAT   MCV MCV   Date Value Ref Range Status   04/29/2023 93.1 79.0 - 97.0 fL Final   04/28/2023 90.4 79.0 - 97.0 fL Final          Sodium Sodium   Date Value Ref Range Status   04/28/2023 133 (L) 136 - 145 mmol/L Final   04/28/2023 132 (L) 136 - 145 mmol/L Final      Potassium Potassium   Date Value Ref Range Status   04/28/2023 4.4 3.5 - 5.2 mmol/L Final   04/28/2023 5.2 3.5 - 5.2 mmol/L Final     Comment:     Slight hemolysis detected by analyzer. Results may be affected.      Chloride Chloride   Date Value Ref Range Status   04/28/2023 97 (L) 98 - 107 mmol/L Final   04/28/2023 95 (L) 98 - 107 mmol/L Final      CO2 CO2   Date Value Ref Range Status   04/28/2023 26.0 22.0 - 29.0 mmol/L Final   04/28/2023 27.0 22.0 - 29.0 mmol/L Final      BUN BUN   Date Value Ref Range Status   04/28/2023 29 (H) 8 - 23 mg/dL Final   04/28/2023 33 (H) 8 - 23 mg/dL Final      Creatinine Creatinine   Date Value  Ref Range Status   04/28/2023 1.79 (H) 0.57 - 1.00 mg/dL Final   04/28/2023 1.88 (H) 0.57 - 1.00 mg/dL Final      Calcium Calcium   Date Value Ref Range Status   04/28/2023 8.1 (L) 8.6 - 10.5 mg/dL Final   04/28/2023 8.5 (L) 8.6 - 10.5 mg/dL Final      PO4 No results found for: CAPO4   Albumin Albumin   Date Value Ref Range Status   04/28/2023 3.5 3.5 - 5.2 g/dL Final      Magnesium No results found for: MG   Uric Acid No results found for: URICACID       Imaging Results (Last 72 Hours)     Procedure Component Value Units Date/Time    CT Lumbar Spine Without Contrast [[id number]] Collected: 04/28/23 1850     Updated: 04/28/23 1906    Narrative:      CT LUMBAR SPINE WO CONTRAST    Date of Exam: 4/28/2023 6:31 PM EDT    Indication: low back pain, low back pain.    Comparison: MRI lumbar spine 9/23/2021  Technique: Axial CT images were obtained of the lumbar spine without contrast administration.  Sagittal and coronal reconstructions were performed.  Automated exposure control and iterative reconstruction methods were used.    Findings:  There is transitional anatomy with 6 lumbar-type vertebral bodies. For the purposes of this dictation the last disc space is labeled as L5-S1 the last fully formed vertebral body is labeled as L5.     Postsurgical changes of prior posterior lumbar fixation noted at the L4-5 level. There is degenerative retrolisthesis of L1 on L2 measuring 3 mm, L2 on L3 measuring 2 mm and L3 on L4 measuring 4 mm. The posterior spinous processes are intact. There is no   acute lumbar spine fracture. Included portions of the sacrum and pelvis are intact. Transverse spinous processes intact.     Normal adrenal glands. The kidneys are without hydronephrosis. Extensive vascular calcifications of the abdominal aorta and branch vasculature. Spleen is mildly enlarged. There is a mild convex left levocurvature of the lumbar spine centered at the L3   level.    T12-L1: Disc desiccation with vacuum disc and mild  circumferential disc osteophyte. Negative for canal stenosis. No foraminal stenosis.    L1-2: Mild circumferential disc osteophyte. Slight retrolisthesis of L1 on L2 with mild effacement of the ventral thecal sac. Mild facet arthritis and ligamentum flavum thickening. Negative for canal stenosis. Mild bilateral foraminal stenosis.    L2-3: Slight retrolisthesis of L2 on L3. Disc desiccation with a mild circumferential disc bulge. Mild facet arthritis and ligamentum flavum thickening. Mild central canal stenosis. Mild bilateral foraminal stenosis.    L3-4: Slight retrolisthesis of L3 on L4 streak artifact from L4 pedicle screws partially limits assessment. There is retrolisthesis of L3 on L4. Mild posterior disc osteophyte contributing to suspected mild central canal stenosis. Moderate bilateral   facet arthritis right greater than left. Osseous spurring contributes to moderate to severe right foraminal stenosis. Moderate left foraminal stenosis.    L4-5: Streak artifact from hardware limits assessment. Within limits of artifact no high-grade central canal stenosis. Negative for foraminal stenosis on the right. Asymmetric left extraforaminal spurring contributes to mild left foraminal stenosis.   Right-sided laminotomy suspected.    L5-S1: Streak artifact from L5 pedicle screws partially limits assessment. Moderate circumferential disc bulge. Moderate bilateral facet arthritis. Mild central canal and lateral recess narrowing. Mild bilateral foraminal narrowing.      Impression:      Impression:  1. Negative for acute osseous abnormality.  2. Postsurgical changes of lumbar fixation at L4-5.  3. Multilevel degenerative findings above.        Electronically Signed: Mark Talamantes    4/28/2023 7:04 PM EDT    Workstation ID: [id number]            Results for orders placed during the hospital encounter of 01/18/23    XR Chest 1 View    Narrative  XR CHEST 1 VW    Date of Exam: 1/18/2023 2:27 PM EST    Indication:  soa.    Comparison: 6/28/2021    Findings:  Cardiac size is stable. There are postoperative changes of prior sternotomy. Vascular markings have increased slightly compared with the last study. Left-sided transvenous pacemaker is unchanged in position. There are bilateral calcified breast implants.    Impression  Impression:    1. Increase in the vascular and interstitial markings in both lungs which may reflect mild fluid overload.  2. Postoperative changes of prior sternotomy and left-sided pacemaker placement.    Electronically Signed: Lake Garza  1/18/2023 2:32 PM EST  Workstation ID: [id number]      Results for orders placed during the hospital encounter of 06/28/21    XR Chest 1 View    Narrative  DATE OF EXAM:  6/28/2021 7:04 PM    PROCEDURE:  XR CHEST 1 VW-    INDICATIONS:  ataxia/near syncope    COMPARISON:  AP chest x-ray 05/17/2017.    TECHNIQUE:  Single radiographic AP view of the chest was obtained.    FINDINGS:  Cardiac silhouette is mildly enlarged, but is exaggerated by portable AP  technique. Sternal wires are again noted. Pacemaker leads appear stable.  Lungs are adequately expanded and clear. No pneumothorax or large  pleural effusion is seen. Postoperative changes in the lower cervical  spine are partially included.    Impression  No acute cardiopulmonary abnormality or significant change.    Electronically Signed By-Jackie Hooker MD On:6/28/2021 7:16 PM  This report was finalized on [id number] by  Jackie Hooker MD.      Results for orders placed in visit on 02/08/21    XR Knee 3 View Left    Narrative  left Knee X-Ray  Indication: Left knee pain  AP, Lateral, [place] views  Findings:Shows mild to moderate tricompartmental DJD, worse in the patellofemoral compartment, No fractures or dislocations are appreciated and patellar sublaxation  subnormal joint spaces  Hardware appropriately positioned not applicable    no prior studies available for comparison.    This patient's x-ray report was  graded according to the Kellgren and Brian classification.  This took into account the joint space narrowing, osteophyte formation, sclerosis of the distal femur/proximal tibia along with deformity of those bones.  The findings were indicative of K L grade 3.    X-RAY was ordered and reviewed by MADISON Coleman        Results for orders placed during the hospital encounter of 04/16/21    Duplex venous lower extremity right CAR    Interpretation Summary  · Normal bilateral lower extremity venous duplex scan.      ASSESSMENT / PLAN      Acute midline low back pain without sciatica      1. RENAL FAILURE---------Nonoliguric. +ARF/DAYLIN with historically normal baseline renal function based on review of records. +ARF/DAYLIN is secondary to prerenal state/intravascular volume depletion with concomitant diuretic use and also from ATN secondary to hypotension with concomitant NSAID and ACE-I use. D/C Diclofenac, Lisinopril, and Bumex. Hydrate aggressively and avoid hypotension. Check urine and serum studies and PVR and renal US . No NSAIDs or IV dye. Dose meds for CrCl less than 10 cc/min until ARF/DAYLIN is resolved    2. BENIGN ESSENTIAL HTN------BP low. Hold all BP meds/diuretics. Check Cortisol and TSH    3. DEHYDRATION------On IVFs. Off Bumex    4. OA/DJD/CBP-------D/C Diclofenac. No NSAIDs. Check uric acid levels    5. ATRIAL FIBRILLATION----------Rate controlled and anticoagulated    6. ANEMIA-----H/H stable. Check Fe and SPEP    7. H/O CHF/HYPERTROPHIC CM-------Prerenal at present. IVFs and hold Bumex and follow    8. HYPERLIPIDEMIA------On Statin. Check CK, TSH    9. GERD/PUD PROPHYLAXIS--------On PPI. Benefits outweigh risks despite DAYLIN    10. DVT PROPHYLAXIS--------Anticoagulated on Eliquis      I discussed the patient's findings and my recommendations with patient, nursing staff and primary care team    Will follow along closely. Thank you for allowing us to see this patient in renal consultation.    [facility]  [phone]  MD Juliana Aparicio MD  04/29/23  10:36 EDT

## 2023-04-29 NOTE — CASE MANAGEMENT/SOCIAL WORK
Continued Stay Note  SUSAN Goldstein     Patient Name: Domonique See  MRN: [id number]  Today's Date: 4/29/2023    Admit Date: 4/28/2023        Discharge Plan     Row Name 04/29/23 1627       Plan    Plan Comments dc barrier: IV fluids, monitor bp, cardio and nephrology following.                    Nettie Gerardo RN

## 2023-04-29 NOTE — PLAN OF CARE
Goal Outcome Evaluation:  Plan of Care Reviewed With: patient        Progress: no change  Outcome Evaluation: Patient stable and complains of pain when ambulating. Cardiology and nephrology consulted today. Calcium replaced. IV fluids continued. Patient does state she feels tired and weak. Safety measures in place. Bed alarm on. Call light within reach. Patient able to make needs known. Will continue to monitor.

## 2023-04-29 NOTE — CONSULTS
Cardiology Consult Note      REQUESTING PHYSICIAN    Tatiana Gilliland MD    PATIENT IDENTIFICATION  Name: Domonique See  Age: 83 y.o.  Sex: female  :  1940  MRN: [id number]             REASON FOR CONSULTATION:  83-year-old female with history of nonocclusive coronary disease per heart cath performed 2017 that showed 30% LAD.  History of paroxysmal atrial fibrillation not anticoagulated per patient preference.  History of hypertrophic obstructive cardiomyopathy status post septal myomectomy at [facility], history of congestive heart failure, sick sinus syndrome status post [facility] permanent pacemaker placement 3/16/2017, history of nonsustained VT, hypertension.  History of A-fib ablation 2023      CC:  Chest pain?  Back pain  Falls  Chest discomfort  Shortness of breath    HISTORY OF PRESENT ILLNESS:   Patient presented to the emergency department at [facility] 2023 with acute on chronic low back pain, fatigue, shortness of breath.  She reports symptoms progressively worsening over the past week.  She reports shortness of breath with activity.  She denies any lower extremity edema, orthopnea or PND.  She does report multiple falls over the past 2 weeks.  Upon my evaluation, she is supine in bed asleep with normal respiratory effort.  She does not report significant improvement in her symptoms.      REVIEW OF SYSTEMS:  Pertinent items are noted in HPI, all other systems reviewed and negative    OBJECTIVE   Sodium 133  Creatinine 1.79  Hemoglobin 10.3  EKG: Atrial paced    ASSESSMENT  Chest discomfort  Shortness of breath  Dehydration  Benign essential hypertension  Atrial fibrillation-persistent  Elevated chads vascular score  Coagulopathy on Eliquis  History of hypertrophic obstructive cardiomyopathy status post prior myomectomy  History of nonsustained VT   Permanent pacemaker in situ    PLAN  Patient reports that she does not recall telling anyone she had chest  "discomfort.  She notes that the last episode of chest discomfort she has had was several months ago.    Nephrology has evaluated: Bumex and lisinopril discontinued, aggressive hydration ordered  Blood pressure actually low at 95 systolic  Continue apixaban, statin  Recommend PT/OT eval  Monitor rhythm closely-currently paced  Further recommendations as patient's hospital course progresses  No plans for further cardiac work-up unless symptoms dictate otherwise.        Vital Signs  Visit Vitals  BP 95/60 (BP Location: Right arm, Patient Position: Lying)   Pulse 59   Temp 98.4 °F (36.9 °C) (Oral)   Resp 14   Ht 152.4 cm (60\")   Wt 92.6 kg (204 lb 2.3 oz)   LMP  (LMP Unknown)   SpO2 95%   BMI 39.87 kg/m²     Oxygen Therapy  SpO2: 95 %  Pulse Oximetry Type: Continuous  Device (Oxygen Therapy): nasal cannula  Device (Oxygen Therapy) (Infant): nasal cannula  Flow (L/min): 2  Flowsheet Rows      Flowsheet Row First Filed Value   Admission Height 152.4 cm (60\") Documented at 04/28/2023 1734   Admission Weight 86.2 kg (190 lb) Documented at 04/28/2023 1734          Intake & Output (last 3 days)         04/26 0701  04/27 0700 04/27 0701  04/28 0700 04/28 0701  04/29 0700 04/29 0701  04/30 0700    P.O.    240    I.V. (mL/kg)   587.1 (6.3)     Total Intake(mL/kg)   587.1 (6.3) 240 (2.6)    Net   +587.1 +240            Urine Unmeasured Occurrence   1 x           Lines, Drains & Airways       Active LDAs       Name Placement date Placement time Site Days    Peripheral IV 04/28/23 1940 Anterior;Distal;Right Forearm 04/28/23 1940  Forearm  less than 1                    MEDICAL HISTORY    Past Medical History:   Diagnosis Date    Anemia     CHF (congestive heart failure)     Congenital heart disease     Coronary artery disease 2013    GERD (gastroesophageal reflux disease)     Heart murmur Don't know    Hx of hypertrophic cardiomyopathy     Hypertension     Peptic ulceration     Persistent atrial fibrillation 01/10/2023    Primary " "osteoarthritis of both knees 2021    Primary osteoarthritis of right knee 2020    Sleep apnea 2017        SURGICAL HISTORY    Past Surgical History:   Procedure Laterality Date    ABDOMINAL HERNIA REPAIR      ABLATION OF DYSRHYTHMIC FOCUS  2023    BACK SURGERY      BREAST AUGMENTATION      CARDIAC CATHETERIZATION      CARDIAC ELECTROPHYSIOLOGY PROCEDURE N/A 2023    Procedure: Ablation atrial fibrillation and flutter, cryo Duong and Ramón aware;  Surgeon: Leeanne Pleitez MD;  Location: [facility] CATH INVASIVE LOCATION;  Service: Cardiovascular;  Laterality: N/A;    CARDIAC PACEMAKER PLACEMENT  2017    Dual Chamber    CERVICAL RIB RESECTION Bilateral     CERVICAL RIB RESECTION Bilateral 1963    bilateral cervical ribs removed - extra ribs located and causing pain    CHOLECYSTECTOMY      COLONOSCOPY      CORONARY ARTERY BYPASS GRAFT      THORACIC DECOMPRESSION POSTERIOR FUSION  2014    L3-5 & S1    UPPER GASTROINTESTINAL ENDOSCOPY          FAMILY HISTORY    Family History   Problem Relation Age of Onset    Colon cancer Mother     Colon cancer Brother     Heart attack Brother     Colon cancer Daughter        SOCIAL HISTORY    Social History     Tobacco Use    Smoking status: Former     Packs/day: 1.00     Years: 8.00     Pack years: 8.00     Types: Cigarettes     Start date: 1977     Quit date: 1985     Years since quittin.3     Passive exposure: Past    Smokeless tobacco: Never    Tobacco comments:     Quit    Substance Use Topics    Alcohol use: Not Currently     Comment: 6 drinks a year in social settings        ALLERGIES    No Known Allergies           BP 95/60 (BP Location: Right arm, Patient Position: Lying)   Pulse 59   Temp 98.4 °F (36.9 °C) (Oral)   Resp 14   Ht 152.4 cm (60\")   Wt 92.6 kg (204 lb 2.3 oz)   LMP  (LMP Unknown)   SpO2 95%   BMI 39.87 kg/m²   Intake/Output last 3 shifts:  I/O last 3 completed shifts:  In: 587.1 [I.V.:587.1]  Out: - "   Intake/Output this shift:  I/O this shift:  In: 240 [P.O.:240]  Out: -     PHYSICAL EXAM:    General: Alert, cooperative, no distress, appears stated age  Head:  Normocephalic, atraumatic, mucous membranes moist  Eyes:  Conjunctivae/corneas clear, EOM's intact     Neck:  Supple,  no bruit  Lungs: Clear to auscultation bilaterally, no wheezes, rhonchi or rales are noted  Chest wall: No tenderness  Heart::  Regular rate and rhythm, S1 and S2 normal, 1/6 holosystolic murmur.  No rub or gallop  Abdomen: Soft, nontender, nondistended, bowel sounds active  Extremities: No cyanosis, clubbing, or edema   Pulses: 2+ and symmetric all extremities  Skin:  No rashes or lesions  Neuro/psych: A&O x3. CN II through XII are grossly intact with appropriate affect      Scheduled Meds:      apixaban, 2.5 mg, Oral, BID  atorvastatin, 40 mg, Oral, Nightly  methylPREDNISolone sodium succinate, 20 mg, Intravenous, Daily  pantoprazole, 40 mg, Oral, Q AM  sodium chloride, 3 mL, Intravenous, Q12H        Continuous Infusions:    sodium chloride, 100 mL/hr        PRN Meds:      acetaminophen    albuterol    hydrALAZINE    nitroglycerin    ondansetron **OR** ondansetron    oxyCODONE    [COMPLETED] Insert Peripheral IV **AND** sodium chloride    sodium chloride    sodium chloride        Results Review:     I reviewed the patient's new clinical results.    CBC    Results from last 7 days   Lab Units 04/29/23  0359 04/28/23  1818   WBC 10*3/mm3 9.70 12.30*   HEMOGLOBIN g/dL 10.3* 10.8*   PLATELETS 10*3/mm3 144 157     Cr Clearance Estimated Creatinine Clearance: 23.7 mL/min (A) (by C-G formula based on SCr of 1.79 mg/dL (H)).  Coag     HbA1C   Lab Results   Component Value Date    HGBA1C 5.8 (H) 01/19/2023    HGBA1C 5.9 (H) 03/08/2022    HGBA1C 6.1 (H) 06/30/2021     Blood Glucose No results found for: POCGLU  Infection     CMP   Results from last 7 days   Lab Units 04/28/23  2224 04/28/23  1818   SODIUM mmol/L 133* 132*   POTASSIUM mmol/L 4.4  5.2   CHLORIDE mmol/L 97* 95*   CO2 mmol/L 26.0 27.0   BUN mg/dL 29* 33*   CREATININE mg/dL 1.79* 1.88*   GLUCOSE mg/dL 95 104*   ALBUMIN g/dL  --  3.5   BILIRUBIN mg/dL  --  0.4   ALK PHOS U/L  --  88   AST (SGOT) U/L  --  21   ALT (SGPT) U/L  --  26     ABG      UA    Results from last 7 days   Lab Units 04/28/23  2101   NITRITE UA  Negative   WBC UA /HPF 3-5*   BACTERIA UA /HPF Trace*   SQUAM EPITHEL UA /HPF 0-2     EVANGELISTA  No results found for: POCMETH, POCAMPHET, POCBARBITUR, POCBENZO, POCCOCAINE, POCOPIATES, POCOXYCODO, POCPHENCYC, POCPROPOXY, POCTHC, POCTRICYC  Lysis Labs   Results from last 7 days   Lab Units 04/29/23  0359 04/28/23  2224 04/28/23  1818   HEMOGLOBIN g/dL 10.3*  --  10.8*   PLATELETS 10*3/mm3 144  --  157   CREATININE mg/dL  --  1.79* 1.88*     Radiology(recent) CT Lumbar Spine Without Contrast    Result Date: 4/28/2023  Impression: 1. Negative for acute osseous abnormality. 2. Postsurgical changes of lumbar fixation at L4-5. 3. Multilevel degenerative findings above. Electronically Signed: Mark Talamantes  4/28/2023 7:04 PM EDT  Workstation ID: [id number]        Results from last 7 days   Lab Units 04/28/23  2224   CK TOTAL U/L 23   HSTROP T ng/L 20*       Xrays, labs reviewed personally by physician.    ECG/EMG Results (most recent)       Procedure Component Value Units Date/Time    ECG 12 Lead Other; hypotension [[id number]] Collected: 04/28/23 1821     Updated: 04/28/23 1823     QT Interval 431 ms     Narrative:      HEART RATE= 60  bpm  RR Interval= 999  ms  MO Interval= 289  ms  P Horizontal Axis= 236  deg  P Front Axis= 0  deg  QRSD Interval= 119  ms  QT Interval= 431  ms  QRS Axis= -31  deg  T Wave Axis= 103  deg  - ABNORMAL ECG -  Atrial-paced complexes  Prolonged MO interval  Incomplete left bundle branch block  LVH w/ repol abnormalities, possible ischemia  When compared with ECG of 18-Rubens-2023 13:56:54,  New or worsened ischemia or infarction  Electronically Signed By:   Date and Time of  Study: 2023-04-28 18:21:45              Medication Review:   I have reviewed the patient's current medication list  Scheduled Meds:apixaban, 2.5 mg, Oral, BID  atorvastatin, 40 mg, Oral, Nightly  methylPREDNISolone sodium succinate, 20 mg, Intravenous, Daily  pantoprazole, 40 mg, Oral, Q AM  sodium chloride, 3 mL, Intravenous, Q12H      Continuous Infusions:sodium chloride, 100 mL/hr      PRN Meds:.  acetaminophen    albuterol    hydrALAZINE    nitroglycerin    ondansetron **OR** ondansetron    oxyCODONE    [COMPLETED] Insert Peripheral IV **AND** sodium chloride    sodium chloride    sodium chloride    Imaging:  Imaging Results (Last 72 Hours)       Procedure Component Value Units Date/Time    US Renal Bilateral [[id number]] Resulted: 04/29/23 1138     Updated: 04/29/23 1242    CT Lumbar Spine Without Contrast [[id number]] Collected: 04/28/23 1850     Updated: 04/28/23 1906    Narrative:      CT LUMBAR SPINE WO CONTRAST    Date of Exam: 4/28/2023 6:31 PM EDT    Indication: low back pain, low back pain.    Comparison: MRI lumbar spine 9/23/2021  Technique: Axial CT images were obtained of the lumbar spine without contrast administration.  Sagittal and coronal reconstructions were performed.  Automated exposure control and iterative reconstruction methods were used.    Findings:  There is transitional anatomy with 6 lumbar-type vertebral bodies. For the purposes of this dictation the last disc space is labeled as L5-S1 the last fully formed vertebral body is labeled as L5.     Postsurgical changes of prior posterior lumbar fixation noted at the L4-5 level. There is degenerative retrolisthesis of L1 on L2 measuring 3 mm, L2 on L3 measuring 2 mm and L3 on L4 measuring 4 mm. The posterior spinous processes are intact. There is no   acute lumbar spine fracture. Included portions of the sacrum and pelvis are intact. Transverse spinous processes intact.     Normal adrenal glands. The kidneys are without hydronephrosis.  Extensive vascular calcifications of the abdominal aorta and branch vasculature. Spleen is mildly enlarged. There is a mild convex left levocurvature of the lumbar spine centered at the L3   level.    T12-L1: Disc desiccation with vacuum disc and mild circumferential disc osteophyte. Negative for canal stenosis. No foraminal stenosis.    L1-2: Mild circumferential disc osteophyte. Slight retrolisthesis of L1 on L2 with mild effacement of the ventral thecal sac. Mild facet arthritis and ligamentum flavum thickening. Negative for canal stenosis. Mild bilateral foraminal stenosis.    L2-3: Slight retrolisthesis of L2 on L3. Disc desiccation with a mild circumferential disc bulge. Mild facet arthritis and ligamentum flavum thickening. Mild central canal stenosis. Mild bilateral foraminal stenosis.    L3-4: Slight retrolisthesis of L3 on L4 streak artifact from L4 pedicle screws partially limits assessment. There is retrolisthesis of L3 on L4. Mild posterior disc osteophyte contributing to suspected mild central canal stenosis. Moderate bilateral   facet arthritis right greater than left. Osseous spurring contributes to moderate to severe right foraminal stenosis. Moderate left foraminal stenosis.    L4-5: Streak artifact from hardware limits assessment. Within limits of artifact no high-grade central canal stenosis. Negative for foraminal stenosis on the right. Asymmetric left extraforaminal spurring contributes to mild left foraminal stenosis.   Right-sided laminotomy suspected.    L5-S1: Streak artifact from L5 pedicle screws partially limits assessment. Moderate circumferential disc bulge. Moderate bilateral facet arthritis. Mild central canal and lateral recess narrowing. Mild bilateral foraminal narrowing.      Impression:      Impression:  1. Negative for acute osseous abnormality.  2. Postsurgical changes of lumbar fixation at L4-5.  3. Multilevel degenerative findings above.        Electronically Signed: Mark  "Mouser    4/28/2023 7:04 PM EDT    Workstation ID: [id number]              WOODY Flores  04/29/23  14:07 EDT       EMR Dragon/Transcription:   \"Dictated utilizing Dragon dictation\".                 Electronically signed by WOODY Flores, 04/29/23, 2:07 PM EDT.    Cardiology attending:  Seen and examined.  Chart and labs reviewed.  History and exam findings are verified with above changes noted.  Assessment and plan notated by APC after being formulated by attending consultant.  Note that greater than 50% of the time spent in care of the patient was provided by attending consultant.  Patient does not recall telling anyone she had chest pain.  In fact she reports the last episode of chest discomfort she had was in the fall 2022.  She does have some shortness of breath.  Her blood pressure is low.  She is being hydrated by nephrology.  No plans for further cardiac work-up unless symptoms dictate otherwise.    "

## 2023-04-29 NOTE — H&P
Patient Care Team:  Anish Lew FNP as PCP - General  Evgeny Gregory MD as Consulting Physician (Cardiology)  Leeanne Pleitez MD as Consulting Physician (Cardiology)  Radha Owusu APRN as Nurse Practitioner (Nurse Practitioner)    Chief Conplaint  Subjective     The patient is a 83 y.o. female presents with acute onset low back pain of intractable nature associated with fatigue, anginal equivalent, and acute renal failure    HPI  Several week history of some progressive weakness and fatigue with new onset acute low back pain with lower extremity weakness.  She denied loss of bowel or bladder or foot drag.  Review of systems was a generally positive for a number of systemic issues.  She does relate some substernal chest pressure and tightness with some shortness of breath  Review of Systems  Review of Systems   Constitutional: Positive for activity change and appetite change.   Respiratory: Negative.    Cardiovascular: Negative for chest pain.   Neurological: Positive for weakness.       History  Past Medical History:   Diagnosis Date   • Anemia    • CHF (congestive heart failure)    • Congenital heart disease    • Coronary artery disease 2013   • GERD (gastroesophageal reflux disease)    • Heart murmur Don't know   • Hx of hypertrophic cardiomyopathy    • Hypertension    • Peptic ulceration    • Persistent atrial fibrillation 01/10/2023   • Primary osteoarthritis of both knees 02/08/2021   • Primary osteoarthritis of right knee 09/21/2020   • Sleep apnea 2017     Past Surgical History:   Procedure Laterality Date   • ABDOMINAL HERNIA REPAIR     • ABLATION OF DYSRHYTHMIC FOCUS  01-   • BACK SURGERY     • BREAST AUGMENTATION     • CARDIAC CATHETERIZATION     • CARDIAC ELECTROPHYSIOLOGY PROCEDURE N/A 01/16/2023    Procedure: Ablation atrial fibrillation and flutter, sarthak Watters and Ramón aware;  Surgeon: Leeanne Pleitez MD;  Location: [facility] INVASIVE LOCATION;  Service:  Cardiovascular;  Laterality: N/A;   • CARDIAC PACEMAKER PLACEMENT  2017    Dual Chamber   • CERVICAL RIB RESECTION Bilateral    • CERVICAL RIB RESECTION Bilateral 1963    bilateral cervical ribs removed - extra ribs located and causing pain   • CHOLECYSTECTOMY     • COLONOSCOPY     • CORONARY ARTERY BYPASS GRAFT     • THORACIC DECOMPRESSION POSTERIOR FUSION  2014    L3-5 & S1   • UPPER GASTROINTESTINAL ENDOSCOPY       Family History   Problem Relation Age of Onset   • Colon cancer Mother    • Colon cancer Brother    • Heart attack Brother    • Colon cancer Daughter      Social History     Tobacco Use   • Smoking status: Former     Packs/day: 1.00     Years: 8.00     Pack years: 8.00     Types: Cigarettes     Start date: 1977     Quit date: 1985     Years since quittin.3     Passive exposure: Past   • Smokeless tobacco: Never   • Tobacco comments:     Quit    Vaping Use   • Vaping Use: Never used   Substance Use Topics   • Alcohol use: Not Currently     Comment: 6 drinks a year in social settings   • Drug use: Never     Allergies:  Patient has no known allergies.    Objective     Vital Signs  Temp:  [97.5 °F (36.4 °C)-98.6 °F (37 °C)] 98.6 °F (37 °C)  Heart Rate:  [59-62] 59  Resp:  [14-20] 16  BP: ()/(31-62) 98/62      Physical Exam:   Physical Exam  Vitals and nursing note reviewed.   Constitutional:       Appearance: Normal appearance.   Cardiovascular:      Rate and Rhythm: Normal rate.      Heart sounds: Normal heart sounds.   Pulmonary:      Breath sounds: Normal breath sounds.   Skin:     General: Skin is warm.   Neurological:      General: No focal deficit present.      Mental Status: She is alert.              Results Review:   CBC    Results from last 7 days   Lab Units 23  0359 23  1818   WBC 10*3/mm3 9.70 12.30*   HEMOGLOBIN g/dL 10.3* 10.8*   PLATELETS 10*3/mm3 144 157     BMP   Results from last 7 days   Lab Units 23  2224 23  1818   SODIUM  mmol/L 133* 132*   POTASSIUM mmol/L 4.4 5.2   CHLORIDE mmol/L 97* 95*   CO2 mmol/L 26.0 27.0   BUN mg/dL 29* 33*   CREATININE mg/dL 1.79* 1.88*   GLUCOSE mg/dL 95 104*     Cr Clearance Estimated Creatinine Clearance: 23.7 mL/min (A) (by C-G formula based on SCr of 1.79 mg/dL (H)).  Coag     HbA1C   Lab Results   Component Value Date    HGBA1C 5.8 (H) 01/19/2023    HGBA1C 5.9 (H) 03/08/2022    HGBA1C 6.1 (H) 06/30/2021     Blood Glucose No results found for: POCGLU  Infection     CMP   Results from last 7 days   Lab Units 04/28/23 2224 04/28/23  1818   SODIUM mmol/L 133* 132*   POTASSIUM mmol/L 4.4 5.2   CHLORIDE mmol/L 97* 95*   CO2 mmol/L 26.0 27.0   BUN mg/dL 29* 33*   CREATININE mg/dL 1.79* 1.88*   GLUCOSE mg/dL 95 104*   ALBUMIN g/dL  --  3.5   BILIRUBIN mg/dL  --  0.4   ALK PHOS U/L  --  88   AST (SGOT) U/L  --  21   ALT (SGPT) U/L  --  26     UA    Results from last 7 days   Lab Units 04/28/23  2101   NITRITE UA  Negative   WBC UA /HPF 3-5*   BACTERIA UA /HPF Trace*   SQUAM EPITHEL UA /HPF 0-2     Radiology(recent) CT Lumbar Spine Without Contrast    Result Date: 4/28/2023  Impression: 1. Negative for acute osseous abnormality. 2. Postsurgical changes of lumbar fixation at L4-5. 3. Multilevel degenerative findings above. Electronically Signed: Mark Talamantes  4/28/2023 7:04 PM EDT  Workstation ID: [id number]       Assessment:      Acute midline low back pain without sciatica  Acute low back pain  Profound fatigue  Acute anginal equivalent  Acute renal failure superimposed upon chronic disease kidney disease stage II  Hypertrophic cardiomyopathy  Atherosclerotic heart disease of native coronary arteries with angina pectoris  Hypoventilation apnea syndrome  DAVID  HFpEF  MDE mild recurrent  History of pacemaker placement  Permanent atrial fibrillation  Hypercoagulable state secondary to atrial fibrillation  Degenerative disc disease cervical spine  Degenerative disc disease lumbosacral spine  Ischemic  cardiomyopathy  History of coronary artery bypass grafting  History of deep venous thrombosis  Dyslipidemia  History esophageal reflux disease without esophagitis  Peptic ulcer disease    Plan:  Supportive care//work-up underway//nephrology and cardiology to participate    Expected Length of Stay  3 days    I discussed the patients findings and my recommendations with patient and nursing staff.     Jose Martin Aleman MD  04/29/23  10:26 EDT

## 2023-04-30 LAB
ALBUMIN SERPL-MCNC: 3.2 G/DL (ref 3.5–5.2)
ALBUMIN/GLOB SERPL: 1.3 G/DL
ALP SERPL-CCNC: 85 U/L (ref 39–117)
ALT SERPL W P-5'-P-CCNC: 27 U/L (ref 1–33)
ANION GAP SERPL CALCULATED.3IONS-SCNC: 8 MMOL/L (ref 5–15)
AST SERPL-CCNC: 22 U/L (ref 1–32)
BILIRUB SERPL-MCNC: 0.4 MG/DL (ref 0–1.2)
BUN SERPL-MCNC: 18 MG/DL (ref 8–23)
BUN/CREAT SERPL: 21.4 (ref 7–25)
CALCIUM SPEC-SCNC: 8.9 MG/DL (ref 8.6–10.5)
CHLORIDE SERPL-SCNC: 101 MMOL/L (ref 98–107)
CO2 SERPL-SCNC: 24 MMOL/L (ref 22–29)
CREAT SERPL-MCNC: 0.84 MG/DL (ref 0.57–1)
EGFRCR SERPLBLD CKD-EPI 2021: 68.6 ML/MIN/1.73
GLOBULIN UR ELPH-MCNC: 2.5 GM/DL
GLUCOSE SERPL-MCNC: 127 MG/DL (ref 65–99)
MAGNESIUM SERPL-MCNC: 1.9 MG/DL (ref 1.6–2.4)
PHOSPHATE SERPL-MCNC: 3 MG/DL (ref 2.5–4.5)
POTASSIUM SERPL-SCNC: 5.1 MMOL/L (ref 3.5–5.2)
PROT SERPL-MCNC: 5.7 G/DL (ref 6–8.5)
SODIUM SERPL-SCNC: 133 MMOL/L (ref 136–145)
TSH SERPL DL<=0.05 MIU/L-ACNC: 4.92 UIU/ML (ref 0.27–4.2)

## 2023-04-30 PROCEDURE — 80048 BASIC METABOLIC PNL TOTAL CA: CPT | Performed by: NURSE PRACTITIONER

## 2023-04-30 PROCEDURE — 83735 ASSAY OF MAGNESIUM: CPT | Performed by: INTERNAL MEDICINE

## 2023-04-30 PROCEDURE — 84100 ASSAY OF PHOSPHORUS: CPT | Performed by: INTERNAL MEDICINE

## 2023-04-30 PROCEDURE — 85007 BL SMEAR W/DIFF WBC COUNT: CPT | Performed by: NURSE PRACTITIONER

## 2023-04-30 PROCEDURE — 85025 COMPLETE CBC W/AUTO DIFF WBC: CPT | Performed by: NURSE PRACTITIONER

## 2023-04-30 PROCEDURE — 25010000002 NA FERRIC GLUC CPLX PER 12.5 MG: Performed by: INTERNAL MEDICINE

## 2023-04-30 PROCEDURE — 25010000002 ONDANSETRON PER 1 MG: Performed by: NURSE PRACTITIONER

## 2023-04-30 PROCEDURE — 25010000002 METHYLPREDNISOLONE PER 40 MG: Performed by: FAMILY MEDICINE

## 2023-04-30 RX ADMIN — SODIUM CHLORIDE 100 ML/HR: 9 INJECTION, SOLUTION INTRAVENOUS at 06:30

## 2023-04-30 RX ADMIN — OXYCODONE 5 MG: 5 TABLET ORAL at 00:35

## 2023-04-30 RX ADMIN — APIXABAN 2.5 MG: 2.5 TABLET, FILM COATED ORAL at 08:29

## 2023-04-30 RX ADMIN — Medication 3 ML: at 21:42

## 2023-04-30 RX ADMIN — METHYLPREDNISOLONE SODIUM SUCCINATE 20 MG: 40 INJECTION, POWDER, FOR SOLUTION INTRAMUSCULAR; INTRAVENOUS at 08:29

## 2023-04-30 RX ADMIN — OXYCODONE 5 MG: 5 TABLET ORAL at 04:42

## 2023-04-30 RX ADMIN — ATORVASTATIN CALCIUM 40 MG: 40 TABLET, FILM COATED ORAL at 21:42

## 2023-04-30 RX ADMIN — SODIUM CHLORIDE 125 MG: 9 INJECTION, SOLUTION INTRAVENOUS at 10:42

## 2023-04-30 RX ADMIN — OXYCODONE 5 MG: 5 TABLET ORAL at 18:35

## 2023-04-30 RX ADMIN — ONDANSETRON 4 MG: 2 INJECTION INTRAMUSCULAR; INTRAVENOUS at 14:16

## 2023-04-30 RX ADMIN — Medication 3 ML: at 08:29

## 2023-04-30 RX ADMIN — OXYCODONE 5 MG: 5 TABLET ORAL at 10:41

## 2023-04-30 RX ADMIN — PANTOPRAZOLE SODIUM 40 MG: 40 TABLET, DELAYED RELEASE ORAL at 06:30

## 2023-04-30 RX ADMIN — APIXABAN 2.5 MG: 2.5 TABLET, FILM COATED ORAL at 21:42

## 2023-04-30 NOTE — PROGRESS NOTES
LOS: 0 days   Patient Care Team:  Anish Lew FNP as PCP - General  Evgeny Gregory MD as Consulting Physician (Cardiology)  Leeanne Pleitez MD as Consulting Physician (Cardiology)  Radha Owusu APRN as Nurse Practitioner (Nurse Practitioner)  Kathe Muñoz MD as Consulting Physician (Nephrology)    Subjective     Patient denies any complaints this morning except extreme weakness    Review of Systems   Constitutional: Positive for activity change, appetite change and fatigue.   HENT: Negative.    Respiratory: Positive for cough.    Cardiovascular: Negative.    Gastrointestinal: Negative.    Genitourinary: Negative.    Musculoskeletal: Positive for back pain and gait problem.   Neurological: Positive for weakness.   Psychiatric/Behavioral: Negative.            Objective     Vital Signs  Temp:  [98.3 °F (36.8 °C)-99 °F (37.2 °C)] 98.3 °F (36.8 °C)  Heart Rate:  [60-62] 62  Resp:  [14-18] 18  BP: ()/(60-78) 149/73      Physical Exam  Vitals reviewed.   Constitutional:       Appearance: She is obese. She is not ill-appearing.   HENT:      Head: Normocephalic and atraumatic.      Right Ear: External ear normal.      Left Ear: External ear normal.      Nose: Nose normal.      Mouth/Throat:      Mouth: Mucous membranes are moist.   Eyes:      General:         Right eye: No discharge.         Left eye: No discharge.   Cardiovascular:      Rate and Rhythm: Normal rate and regular rhythm.      Pulses: Normal pulses.      Heart sounds: Normal heart sounds.   Pulmonary:      Effort: Pulmonary effort is normal.      Breath sounds: Normal breath sounds.   Abdominal:      General: Bowel sounds are normal.      Palpations: Abdomen is soft.   Musculoskeletal:         General: Normal range of motion.      Cervical back: Normal range of motion.   Skin:     General: Skin is warm and dry.   Neurological:      Mental Status: She is alert and oriented to person, place, and time.   Psychiatric:          Behavior: Behavior normal.              Results Review:    Lab Results (last 24 hours)     Procedure Component Value Units Date/Time    TSH [[id number]]  (Abnormal) Collected: 04/29/23 2303    Specimen: Blood Updated: 04/30/23 0009     TSH 4.920 uIU/mL     Comprehensive Metabolic Panel [[id number]]  (Abnormal) Collected: 04/29/23 2303    Specimen: Blood Updated: 04/30/23 0006     Glucose 127 mg/dL      BUN 18 mg/dL      Creatinine 0.84 mg/dL      Sodium 133 mmol/L      Potassium 5.1 mmol/L      Chloride 101 mmol/L      CO2 24.0 mmol/L      Calcium 8.9 mg/dL      Total Protein 5.7 g/dL      Albumin 3.2 g/dL      ALT (SGPT) 27 U/L      AST (SGOT) 22 U/L      Alkaline Phosphatase 85 U/L      Total Bilirubin 0.4 mg/dL      Globulin 2.5 gm/dL      A/G Ratio 1.3 g/dL      BUN/Creatinine Ratio 21.4     Anion Gap 8.0 mmol/L      eGFR 68.6 mL/min/1.73     Narrative:      GFR Normal >60  Chronic Kidney Disease <60  Kidney Failure <15    The GFR formula is only valid for adults with stable renal function between ages 18 and 70.    Magnesium [[id number]]  (Normal) Collected: 04/29/23 2303    Specimen: Blood Updated: 04/30/23 0006     Magnesium 1.9 mg/dL     Phosphorus [[id number]]  (Normal) Collected: 04/29/23 2303    Specimen: Blood Updated: 04/30/23 0002     Phosphorus 3.0 mg/dL     Calcium, Ionized [[id number]]  (Normal) Collected: 04/29/23 2303    Specimen: Blood Updated: 04/29/23 2339     Ionized Calcium 1.22 mmol/L     CBC & Differential [[id number]]  (Abnormal) Collected: 04/29/23 2303    Specimen: Blood Updated: 04/29/23 2324    Narrative:      The following orders were created for panel order CBC & Differential.  Procedure                               Abnormality         Status                     ---------                               -----------         ------                     CBC Auto Differential[[id number]]        Abnormal            Final result                 Please view results for these tests on the  individual orders.    CBC Auto Differential [[id number]]  (Abnormal) Collected: 04/29/23 2303    Specimen: Blood Updated: 04/29/23 2324     WBC 7.00 10*3/mm3      RBC 3.45 10*6/mm3      Hemoglobin 10.4 g/dL      Hematocrit 31.1 %      MCV 90.2 fL      MCH 30.3 pg      MCHC 33.6 g/dL      RDW 15.0 %      RDW-SD 49.4 fl      MPV 9.4 fL      Platelets 153 10*3/mm3      Neutrophil % 90.0 %      Lymphocyte % 5.6 %      Monocyte % 4.2 %      Eosinophil % 0.1 %      Basophil % 0.1 %      Neutrophils, Absolute 6.30 10*3/mm3      Lymphocytes, Absolute 0.40 10*3/mm3      Monocytes, Absolute 0.30 10*3/mm3      Eosinophils, Absolute 0.00 10*3/mm3      Basophils, Absolute 0.00 10*3/mm3      nRBC 0.2 /100 WBC     Blood Culture - Blood, Arm, Right [[id number]]  (Normal) Collected: 04/28/23 2036    Specimen: Blood from Arm, Right Updated: 04/29/23 2045     Blood Culture No growth at 24 hours    Narrative:      Less than seven (7) mL's of blood was collected.  Insufficient quantity may yield false negative results.    Blood Culture - Blood, Arm, Left [[id number]]  (Normal) Collected: 04/28/23 2030    Specimen: Blood from Arm, Left Updated: 04/29/23 2045     Blood Culture No growth at 24 hours    Narrative:      Less than seven (7) mL's of blood was collected.  Insufficient quantity may yield false negative results.    Eosinophil Smear - Urine, Urine, Clean Catch [[id number]]  (Normal) Collected: 04/29/23 1709    Specimen: Urine, Clean Catch Updated: 04/29/23 1916     Eosinophil Smear 0 % EOS/100 Cells     High Sensitivity Troponin T [[id number]]  (Abnormal) Collected: 04/29/23 1744    Specimen: Blood Updated: 04/29/23 1817     HS Troponin T 13 ng/L     Narrative:      High Sensitive Troponin T Reference Range:  <10.0 ng/L- Negative Female for AMI  <15.0 ng/L- Negative Male for AMI  >=10 - Abnormal Female indicating possible myocardial injury.  >=15 - Abnormal Male indicating possible myocardial injury.   Clinicians would have to utilize  clinical acumen, EKG, Troponin, and serial changes to determine if it is an Acute Myocardial Infarction or myocardial injury due to an underlying chronic condition.         BNP [[id number]]  (Normal) Collected: 04/29/23 1744    Specimen: Blood Updated: 04/29/23 1817     proBNP 956.8 pg/mL     Narrative:      Among patients with dyspnea, NT-proBNP is highly sensitive for the detection of acute congestive heart failure. In addition NT-proBNP of <300 pg/ml effectively rules out acute congestive heart failure with 99% negative predictive value.    Results may be falsely decreased if patient taking Biotin.      Protein Elec + Interp, Serum [[id number]] Collected: 04/29/23 1744    Specimen: Blood Updated: 04/29/23 1748    Sodium, Urine, Random - Urine, Clean Catch [[id number]] Collected: 04/29/23 1709    Specimen: Urine, Clean Catch Updated: 04/29/23 1722     Sodium, Urine 133 mmol/L     Narrative:      Reference intervals for random urine have not been established.  Clinical usage is dependent upon physician's interpretation in combination with other laboratory tests.       Cortisol [[id number]] Collected: 04/28/23 2224    Specimen: Blood Updated: 04/29/23 1324     Cortisol 9.57 mcg/dL     Narrative:      Cortisol Reference Ranges:    Cortisol 6AM - 10AM Range: 6.02-18.40 mcg/dl  Cortisol 4PM - 8PM Range: 2.68-10.50 mcg/dl      Results may be falsely increased if patient taking Biotin.      Iron [[id number]]  (Abnormal) Collected: 04/28/23 2224    Specimen: Blood Updated: 04/29/23 1318     Iron 22 mcg/dL     CK [[id number]]  (Normal) Collected: 04/28/23 2224    Specimen: Blood Updated: 04/29/23 1318     Creatine Kinase 23 U/L     Uric Acid [[id number]]  (Normal) Collected: 04/28/23 2224    Specimen: Blood Updated: 04/29/23 1318     Uric Acid 4.7 mg/dL            Imaging Results (Last 24 Hours)     Procedure Component Value Units Date/Time    US Renal Bilateral [[id number]] Collected: 04/29/23 1540     Updated: 04/29/23 1543     Narrative:      US RENAL BILATERAL    Date of Exam: 4/29/2023 2:48 PM EDT    Indication: Acute renal failure.    Comparison: No comparisons available.    Technique: Grayscale and color Doppler ultrasound evaluation of the kidneys and urinary bladder was performed.      FINDINGS:  The right kidney measures 8.7 x 4.9 x 4.3 cm. The right renal cortical echogenicity is unremarkable. No focal cortical abnormalities are identified. No definitive stones are seen. There is no hydronephrosis. Flow is observed in the right kidney on   Doppler evaluation.    The left kidney measures 8.9 x 4.2 x 4.1 cm. The left renal cortical echogenicity is unremarkable. No focal cortical abnormalities are identified. No definitive stones are seen. There is no hydronephrosis. Flow is observed in the left kidney on Doppler   evaluation.    Screening evaluation of the bladder is unremarkable.      Impression:      1.Negative ultrasound of the bilateral kidneys. There is no hydronephrosis bilaterally.      Electronically Signed: Clement Fung    4/29/2023 3:41 PM EDT    Workstation ID: [id number]               I reviewed the patient's new clinical results.    Medication Review:   Scheduled Meds:apixaban, 2.5 mg, Oral, BID  atorvastatin, 40 mg, Oral, Nightly  ferric gluconate, 125 mg, Intravenous, Daily  methylPREDNISolone sodium succinate, 20 mg, Intravenous, Daily  pantoprazole, 40 mg, Oral, Q AM  sodium chloride, 3 mL, Intravenous, Q12H      Continuous Infusions:   PRN Meds:.•  acetaminophen  •  albuterol  •  hydrALAZINE  •  nitroglycerin  •  ondansetron **OR** ondansetron  •  oxyCODONE  •  [COMPLETED] Insert Peripheral IV **AND** sodium chloride  •  sodium chloride  •  sodium chloride     Interval History:    4/30 patient without any complaints of pain this morning however blood pressure has been labile from hypotensive to mild hypertension.  Nephrology following continue supportive care and plan we will add PT and OT evaluation    Assessment  & Plan     Acute midline low back pain without sciatica  -CT L-spine without any acute findings  Profound fatigue/weakness  Acute renal failure due to volume depletion  -Nephrology following  -DC diclofenac, lisinopril and Bumex  -Hydrating  Dehydration-IV fluids  Hypotension with labile blood pressure  Atrial fibrillation-rate controlled and anticoagulated with Eliquis  Anemia  History of hypertrophic obstructive cardiomyopathy status post prior myomectomy  History of nonsustained VT   Permanent pacemaker   Degenerative disc disease lumbosacral spine    Plan for disposition:MARLA Acosta, APRN  04/30/23  09:56 EDT

## 2023-04-30 NOTE — PLAN OF CARE
Goal Outcome Evaluation:               Currently off O2 and sats are in the 90s. Resp even and unlabored. No distress. Takes prn pain med effective for back pain. No falls. Bed alarm engaged and all fall precaution pieces in place. BP still low but not as was on admit. Ivfs continue and no acute chf s/s. Remains with the unchanged cough, prod at times from covid in Dec pt states and lcta. Call light in reach.

## 2023-04-30 NOTE — PLAN OF CARE
Goal Outcome Evaluation:              Outcome Evaluation: pt ambulating well with complaint of slight nausea today. Vitals and electrolytes have been WNL.

## 2023-04-30 NOTE — PROGRESS NOTES
"NEPHROLOGY PROGRESS NOTE------[facility]    [facility]  [phone]  Juliana Muñoz MD      Patient Care Team:  Anish Lew FNP as PCP - Evgeny Machuca MD as Consulting Physician (Cardiology)  Leeanne Pleitez MD as Consulting Physician (Cardiology)  Radha Owusu APRN as Nurse Practitioner (Nurse Practitioner)  Kathe Muñoz MD as Consulting Physician (Nephrology)      Provider:  Juliana Muñoz MD  Patient Name: Domonique See  :  1940    SUBJECTIVE:    F/U CRF/CKD    Feeling good. No SOB, CP, dysuria. Cough better off of ACE-I    Medication:  apixaban, 2.5 mg, Oral, BID  atorvastatin, 40 mg, Oral, Nightly  methylPREDNISolone sodium succinate, 20 mg, Intravenous, Daily  pantoprazole, 40 mg, Oral, Q AM  sodium chloride, 3 mL, Intravenous, Q12H      sodium chloride, 100 mL/hr, Last Rate: 100 mL/hr (23 0630)        OBJECTIVE    Vital Sign Min/Max for last 24 hours  Temp  Min: 98.3 °F (36.8 °C)  Max: 99 °F (37.2 °C)   BP  Min: 95/60  Max: 149/73   Pulse  Min: 60  Max: 62   Resp  Min: 14  Max: 18   SpO2  Min: 94 %  Max: 96 %   No data recorded   No data recorded     Flowsheet Rows    Flowsheet Row First Filed Value   Admission Height 152.4 cm (60\") Documented at 2023 173   Admission Weight 86.2 kg (190 lb) Documented at 2023 1734          No intake/output data recorded.  I/O last 3 completed shifts:  In: 3451.1 [P.O.:480; I.V.:2971.1]  Out: 200 [Urine:200]    Physical Exam:  General Appearance: alert, appears stated age and cooperative    Head: normocephalic, without obvious abnormality and atraumatic    Eyes: conjunctivae and sclerae normal and no icterus  Neck: supple and no JVD  Lungs: clear to auscultation and respirations regular  Heart: IRREG IRREG +MELVIN. NO GALLOP, RUB, OR S3  Chest Wall: no abnormalities observed  Abdomen: normal bowel sounds and soft, nontender " +OBESITY  Extremities: moves extremities well, no edema, no cyanosis +DJD  Skin: no bleeding, bruising or rash    Neurologic: Alert, and oriented. No focal deficits  Labs:    WBC WBC   Date Value Ref Range Status   04/29/2023 7.00 3.40 - 10.80 10*3/mm3 Final   04/29/2023 9.70 3.40 - 10.80 10*3/mm3 Final   04/28/2023 12.30 (H) 3.40 - 10.80 10*3/mm3 Final      HGB Hemoglobin   Date Value Ref Range Status   04/29/2023 10.4 (L) 12.0 - 15.9 g/dL Final   04/29/2023 10.3 (L) 12.0 - 15.9 g/dL Final   04/28/2023 10.8 (L) 12.0 - 15.9 g/dL Final      HCT Hematocrit   Date Value Ref Range Status   04/29/2023 31.1 (L) 34.0 - 46.6 % Final   04/29/2023 32.1 (L) 34.0 - 46.6 % Final   04/28/2023 32.9 (L) 34.0 - 46.6 % Final      Platelets No results found for: LABPLAT   MCV MCV   Date Value Ref Range Status   04/29/2023 90.2 79.0 - 97.0 fL Final   04/29/2023 93.1 79.0 - 97.0 fL Final   04/28/2023 90.4 79.0 - 97.0 fL Final          Sodium Sodium   Date Value Ref Range Status   04/29/2023 133 (L) 136 - 145 mmol/L Final   04/28/2023 133 (L) 136 - 145 mmol/L Final   04/28/2023 132 (L) 136 - 145 mmol/L Final      Potassium Potassium   Date Value Ref Range Status   04/29/2023 5.1 3.5 - 5.2 mmol/L Final   04/28/2023 4.4 3.5 - 5.2 mmol/L Final   04/28/2023 5.2 3.5 - 5.2 mmol/L Final     Comment:     Slight hemolysis detected by analyzer. Results may be affected.      Chloride Chloride   Date Value Ref Range Status   04/29/2023 101 98 - 107 mmol/L Final   04/28/2023 97 (L) 98 - 107 mmol/L Final   04/28/2023 95 (L) 98 - 107 mmol/L Final      CO2 CO2   Date Value Ref Range Status   04/29/2023 24.0 22.0 - 29.0 mmol/L Final   04/28/2023 26.0 22.0 - 29.0 mmol/L Final   04/28/2023 27.0 22.0 - 29.0 mmol/L Final      BUN BUN   Date Value Ref Range Status   04/29/2023 18 8 - 23 mg/dL Final   04/28/2023 29 (H) 8 - 23 mg/dL Final   04/28/2023 33 (H) 8 - 23 mg/dL Final      Creatinine Creatinine   Date Value Ref Range Status   04/29/2023 0.84 0.57 -  1.00 mg/dL Final   04/28/2023 1.79 (H) 0.57 - 1.00 mg/dL Final   04/28/2023 1.88 (H) 0.57 - 1.00 mg/dL Final      Calcium Calcium   Date Value Ref Range Status   04/29/2023 8.9 8.6 - 10.5 mg/dL Final   04/28/2023 8.1 (L) 8.6 - 10.5 mg/dL Final   04/28/2023 8.5 (L) 8.6 - 10.5 mg/dL Final      PO4 No components found for: PO4   Albumin Albumin   Date Value Ref Range Status   04/29/2023 3.2 (L) 3.5 - 5.2 g/dL Final   04/28/2023 3.5 3.5 - 5.2 g/dL Final      Magnesium Magnesium   Date Value Ref Range Status   04/29/2023 1.9 1.6 - 2.4 mg/dL Final      Uric Acid No components found for: URIC ACID     Imaging Results (Last 72 Hours)     Procedure Component Value Units Date/Time    US Renal Bilateral [[id number]] Collected: 04/29/23 1540     Updated: 04/29/23 1543    Narrative:      US RENAL BILATERAL    Date of Exam: 4/29/2023 2:48 PM EDT    Indication: Acute renal failure.    Comparison: No comparisons available.    Technique: Grayscale and color Doppler ultrasound evaluation of the kidneys and urinary bladder was performed.      FINDINGS:  The right kidney measures 8.7 x 4.9 x 4.3 cm. The right renal cortical echogenicity is unremarkable. No focal cortical abnormalities are identified. No definitive stones are seen. There is no hydronephrosis. Flow is observed in the right kidney on   Doppler evaluation.    The left kidney measures 8.9 x 4.2 x 4.1 cm. The left renal cortical echogenicity is unremarkable. No focal cortical abnormalities are identified. No definitive stones are seen. There is no hydronephrosis. Flow is observed in the left kidney on Doppler   evaluation.    Screening evaluation of the bladder is unremarkable.      Impression:      1.Negative ultrasound of the bilateral kidneys. There is no hydronephrosis bilaterally.      Electronically Signed: Clement Fung    4/29/2023 3:41 PM EDT    Workstation ID: [id number]    CT Lumbar Spine Without Contrast [[id number]] Collected: 04/28/23 1850     Updated: 04/28/23  1906    Narrative:      CT LUMBAR SPINE WO CONTRAST    Date of Exam: 4/28/2023 6:31 PM EDT    Indication: low back pain, low back pain.    Comparison: MRI lumbar spine 9/23/2021  Technique: Axial CT images were obtained of the lumbar spine without contrast administration.  Sagittal and coronal reconstructions were performed.  Automated exposure control and iterative reconstruction methods were used.    Findings:  There is transitional anatomy with 6 lumbar-type vertebral bodies. For the purposes of this dictation the last disc space is labeled as L5-S1 the last fully formed vertebral body is labeled as L5.     Postsurgical changes of prior posterior lumbar fixation noted at the L4-5 level. There is degenerative retrolisthesis of L1 on L2 measuring 3 mm, L2 on L3 measuring 2 mm and L3 on L4 measuring 4 mm. The posterior spinous processes are intact. There is no   acute lumbar spine fracture. Included portions of the sacrum and pelvis are intact. Transverse spinous processes intact.     Normal adrenal glands. The kidneys are without hydronephrosis. Extensive vascular calcifications of the abdominal aorta and branch vasculature. Spleen is mildly enlarged. There is a mild convex left levocurvature of the lumbar spine centered at the L3   level.    T12-L1: Disc desiccation with vacuum disc and mild circumferential disc osteophyte. Negative for canal stenosis. No foraminal stenosis.    L1-2: Mild circumferential disc osteophyte. Slight retrolisthesis of L1 on L2 with mild effacement of the ventral thecal sac. Mild facet arthritis and ligamentum flavum thickening. Negative for canal stenosis. Mild bilateral foraminal stenosis.    L2-3: Slight retrolisthesis of L2 on L3. Disc desiccation with a mild circumferential disc bulge. Mild facet arthritis and ligamentum flavum thickening. Mild central canal stenosis. Mild bilateral foraminal stenosis.    L3-4: Slight retrolisthesis of L3 on L4 streak artifact from L4 pedicle screws  partially limits assessment. There is retrolisthesis of L3 on L4. Mild posterior disc osteophyte contributing to suspected mild central canal stenosis. Moderate bilateral   facet arthritis right greater than left. Osseous spurring contributes to moderate to severe right foraminal stenosis. Moderate left foraminal stenosis.    L4-5: Streak artifact from hardware limits assessment. Within limits of artifact no high-grade central canal stenosis. Negative for foraminal stenosis on the right. Asymmetric left extraforaminal spurring contributes to mild left foraminal stenosis.   Right-sided laminotomy suspected.    L5-S1: Streak artifact from L5 pedicle screws partially limits assessment. Moderate circumferential disc bulge. Moderate bilateral facet arthritis. Mild central canal and lateral recess narrowing. Mild bilateral foraminal narrowing.      Impression:      Impression:  1. Negative for acute osseous abnormality.  2. Postsurgical changes of lumbar fixation at L4-5.  3. Multilevel degenerative findings above.        Electronically Signed: Mark Talamantes    4/28/2023 7:04 PM EDT    Workstation ID: [id number]          Results for orders placed during the hospital encounter of 01/18/23    XR Chest 1 View    Narrative  XR CHEST 1 VW    Date of Exam: 1/18/2023 2:27 PM EST    Indication: soa.    Comparison: 6/28/2021    Findings:  Cardiac size is stable. There are postoperative changes of prior sternotomy. Vascular markings have increased slightly compared with the last study. Left-sided transvenous pacemaker is unchanged in position. There are bilateral calcified breast implants.    Impression  Impression:    1. Increase in the vascular and interstitial markings in both lungs which may reflect mild fluid overload.  2. Postoperative changes of prior sternotomy and left-sided pacemaker placement.    Electronically Signed: Lake Garza  1/18/2023 2:32 PM EST  Workstation ID: [id number]      Results for orders placed during the  hospital encounter of 06/28/21    XR Chest 1 View    Narrative  DATE OF EXAM:  6/28/2021 7:04 PM    PROCEDURE:  XR CHEST 1 VW-    INDICATIONS:  ataxia/near syncope    COMPARISON:  AP chest x-ray 05/17/2017.    TECHNIQUE:  Single radiographic AP view of the chest was obtained.    FINDINGS:  Cardiac silhouette is mildly enlarged, but is exaggerated by portable AP  technique. Sternal wires are again noted. Pacemaker leads appear stable.  Lungs are adequately expanded and clear. No pneumothorax or large  pleural effusion is seen. Postoperative changes in the lower cervical  spine are partially included.    Impression  No acute cardiopulmonary abnormality or significant change.    Electronically Signed By-Jackie Hooker MD On:6/28/2021 7:16 PM  This report was finalized on [id number] by  Jackie Hooker MD.      Results for orders placed in visit on 02/08/21    XR Knee 3 View Left    Narrative  left Knee X-Ray  Indication: Left knee pain  AP, Lateral, Excelsior views  Findings:Shows mild to moderate tricompartmental DJD, worse in the patellofemoral compartment, No fractures or dislocations are appreciated and patellar sublaxation  subnormal joint spaces  Hardware appropriately positioned not applicable    no prior studies available for comparison.    This patient's x-ray report was graded according to the Kellgren and Brian classification.  This took into account the joint space narrowing, osteophyte formation, sclerosis of the distal femur/proximal tibia along with deformity of those bones.  The findings were indicative of K L grade 3.    X-RAY was ordered and reviewed by MADISON Coleman      Results for orders placed during the hospital encounter of 04/16/21    Duplex venous lower extremity right CAR    Interpretation Summary  · Normal bilateral lower extremity venous duplex scan.        ASSESSMENT / PLAN      Acute midline low back pain without sciatica    1. ARF/DAYLIN---------Nonoliguric. ARF/DAYLIN is resolved.  +ARF/DAYLIN with historically normal baseline renal function based on review of records. +ARF/DAYLIN is secondary to prerenal state/intravascular volume depletion with concomitant diuretic use and also from ATN secondary to hypotension with concomitant NSAID and ACE-I use. D/C Diclofenac, Lisinopril, and Bumex. Hydrate aggressively and avoid hypotension. No NSAIDs or IV dye. Dose meds for CrCl less than 10 cc/min until ARF/DAYLIN is resolved     2. BENIGN ESSENTIAL HTN------BP better with holding all BP meds/diuretics.       3. DEHYDRATION------Clinically better. D/C IVFs this AM and keep off of Bumex and follow     4. OA/DJD/CBP-------D/C Diclofenac. No NSAIDs. Uric normal     5. ATRIAL FIBRILLATION----------Rate controlled and anticoagulated     6. ANEMIA-----H/H stable. IV iron for DAVID     7. H/O CHF/HYPERTROPHIC CM-----D/C IVFs this AM. Keep off of Bumex for now     8. HYPERLIPIDEMIA------On Statin. CK, TSH ok     9. GERD/PUD PROPHYLAXIS--------On PPI. Benefits outweigh risks despite DAYLIN     10. DVT PROPHYLAXIS--------Anticoagulated on Eliquis        Juliana Muñoz MD  [facility]  [phone]  04/30/23  08:24 EDT

## 2023-05-01 ENCOUNTER — APPOINTMENT (OUTPATIENT)
Dept: GENERAL RADIOLOGY | Facility: HOSPITAL | Age: 83
End: 2023-05-01
Payer: MEDICARE

## 2023-05-01 LAB
ANION GAP SERPL CALCULATED.3IONS-SCNC: 8 MMOL/L (ref 5–15)
B PARAPERT DNA SPEC QL NAA+PROBE: NOT DETECTED
B PERT DNA SPEC QL NAA+PROBE: NOT DETECTED
BUN SERPL-MCNC: 11 MG/DL (ref 8–23)
BUN/CREAT SERPL: 14.1 (ref 7–25)
C PNEUM DNA NPH QL NAA+NON-PROBE: NOT DETECTED
CALCIUM SPEC-SCNC: 9.4 MG/DL (ref 8.6–10.5)
CHLORIDE SERPL-SCNC: 100 MMOL/L (ref 98–107)
CO2 SERPL-SCNC: 26 MMOL/L (ref 22–29)
CREAT SERPL-MCNC: 0.78 MG/DL (ref 0.57–1)
DACRYOCYTES BLD QL SMEAR: ABNORMAL
DEPRECATED RDW RBC AUTO: 49.9 FL (ref 37–54)
EGFRCR SERPLBLD CKD-EPI 2021: 75 ML/MIN/1.73
ERYTHROCYTE [DISTWIDTH] IN BLOOD BY AUTOMATED COUNT: 15.4 % (ref 12.3–15.4)
FLUAV SUBTYP SPEC NAA+PROBE: NOT DETECTED
FLUBV RNA ISLT QL NAA+PROBE: NOT DETECTED
GLUCOSE BLDC GLUCOMTR-MCNC: 85 MG/DL (ref 70–105)
GLUCOSE SERPL-MCNC: 115 MG/DL (ref 65–99)
HADV DNA SPEC NAA+PROBE: NOT DETECTED
HCOV 229E RNA SPEC QL NAA+PROBE: NOT DETECTED
HCOV HKU1 RNA SPEC QL NAA+PROBE: NOT DETECTED
HCOV NL63 RNA SPEC QL NAA+PROBE: NOT DETECTED
HCOV OC43 RNA SPEC QL NAA+PROBE: NOT DETECTED
HCT VFR BLD AUTO: 33.9 % (ref 34–46.6)
HGB BLD-MCNC: 11 G/DL (ref 12–15.9)
HMPV RNA NPH QL NAA+NON-PROBE: NOT DETECTED
HPIV1 RNA ISLT QL NAA+PROBE: NOT DETECTED
HPIV2 RNA SPEC QL NAA+PROBE: NOT DETECTED
HPIV3 RNA NPH QL NAA+PROBE: NOT DETECTED
HPIV4 P GENE NPH QL NAA+PROBE: NOT DETECTED
LYMPHOCYTES # BLD MANUAL: 0.64 10*3/MM3 (ref 0.7–3.1)
LYMPHOCYTES NFR BLD MANUAL: 5 % (ref 5–12)
M PNEUMO IGG SER IA-ACNC: NOT DETECTED
MAGNESIUM SERPL-MCNC: 1.9 MG/DL (ref 1.6–2.4)
MCH RBC QN AUTO: 29.7 PG (ref 26.6–33)
MCHC RBC AUTO-ENTMCNC: 32.4 G/DL (ref 31.5–35.7)
MCV RBC AUTO: 91.9 FL (ref 79–97)
METAMYELOCYTES NFR BLD MANUAL: 2 % (ref 0–0)
MONOCYTES # BLD: 0.36 10*3/MM3 (ref 0.1–0.9)
NEUTROPHILS # BLD AUTO: 5.96 10*3/MM3 (ref 1.7–7)
NEUTROPHILS NFR BLD MANUAL: 80 % (ref 42.7–76)
NEUTS BAND NFR BLD MANUAL: 4 % (ref 0–5)
OVALOCYTES BLD QL SMEAR: ABNORMAL
PHOSPHATE SERPL-MCNC: 2.7 MG/DL (ref 2.5–4.5)
PLATELET # BLD AUTO: 178 10*3/MM3 (ref 140–450)
PMV BLD AUTO: 9.2 FL (ref 6–12)
POIKILOCYTOSIS BLD QL SMEAR: ABNORMAL
POTASSIUM SERPL-SCNC: 4.5 MMOL/L (ref 3.5–5.2)
QT INTERVAL: 431 MS
RBC # BLD AUTO: 3.69 10*6/MM3 (ref 3.77–5.28)
RHINOVIRUS RNA SPEC NAA+PROBE: DETECTED
RSV RNA NPH QL NAA+NON-PROBE: NOT DETECTED
SARS-COV-2 RNA NPH QL NAA+NON-PROBE: NOT DETECTED
SCAN SLIDE: NORMAL
SMALL PLATELETS BLD QL SMEAR: ADEQUATE
SODIUM SERPL-SCNC: 134 MMOL/L (ref 136–145)
VARIANT LYMPHS NFR BLD MANUAL: 9 % (ref 19.6–45.3)
WBC MORPH BLD: NORMAL
WBC NRBC COR # BLD: 7.1 10*3/MM3 (ref 3.4–10.8)

## 2023-05-01 PROCEDURE — 99232 SBSQ HOSP IP/OBS MODERATE 35: CPT | Performed by: INTERNAL MEDICINE

## 2023-05-01 PROCEDURE — 97162 PT EVAL MOD COMPLEX 30 MIN: CPT

## 2023-05-01 PROCEDURE — 97165 OT EVAL LOW COMPLEX 30 MIN: CPT

## 2023-05-01 PROCEDURE — 25010000002 MAGNESIUM SULFATE IN D5W 1G/100ML (PREMIX) 1-5 GM/100ML-% SOLUTION: Performed by: INTERNAL MEDICINE

## 2023-05-01 PROCEDURE — 82533 TOTAL CORTISOL: CPT | Performed by: NURSE PRACTITIONER

## 2023-05-01 PROCEDURE — 25010000002 NA FERRIC GLUC CPLX PER 12.5 MG: Performed by: INTERNAL MEDICINE

## 2023-05-01 PROCEDURE — 82948 REAGENT STRIP/BLOOD GLUCOSE: CPT

## 2023-05-01 PROCEDURE — 71045 X-RAY EXAM CHEST 1 VIEW: CPT

## 2023-05-01 PROCEDURE — 0202U NFCT DS 22 TRGT SARS-COV-2: CPT | Performed by: NURSE PRACTITIONER

## 2023-05-01 RX ORDER — POLYETHYLENE GLYCOL 3350 17 G/17G
17 POWDER, FOR SOLUTION ORAL DAILY
Status: DISCONTINUED | OUTPATIENT
Start: 2023-05-01 | End: 2023-05-02 | Stop reason: HOSPADM

## 2023-05-01 RX ORDER — COSYNTROPIN 0.25 MG/ML
0.25 INJECTION, POWDER, FOR SOLUTION INTRAMUSCULAR; INTRAVENOUS ONCE
Status: COMPLETED | OUTPATIENT
Start: 2023-05-02 | End: 2023-05-02

## 2023-05-01 RX ORDER — MAGNESIUM SULFATE 1 G/100ML
1 INJECTION INTRAVENOUS ONCE
Status: COMPLETED | OUTPATIENT
Start: 2023-05-01 | End: 2023-05-01

## 2023-05-01 RX ADMIN — Medication 3 ML: at 08:24

## 2023-05-01 RX ADMIN — APIXABAN 2.5 MG: 2.5 TABLET, FILM COATED ORAL at 21:01

## 2023-05-01 RX ADMIN — OXYCODONE 5 MG: 5 TABLET ORAL at 21:01

## 2023-05-01 RX ADMIN — OXYCODONE 5 MG: 5 TABLET ORAL at 05:55

## 2023-05-01 RX ADMIN — MAGNESIUM SULFATE IN DEXTROSE 1 G: 10 INJECTION, SOLUTION INTRAVENOUS at 09:59

## 2023-05-01 RX ADMIN — PANTOPRAZOLE SODIUM 40 MG: 40 TABLET, DELAYED RELEASE ORAL at 05:55

## 2023-05-01 RX ADMIN — OXYCODONE 5 MG: 5 TABLET ORAL at 16:22

## 2023-05-01 RX ADMIN — OXYCODONE 5 MG: 5 TABLET ORAL at 00:39

## 2023-05-01 RX ADMIN — APIXABAN 2.5 MG: 2.5 TABLET, FILM COATED ORAL at 08:24

## 2023-05-01 RX ADMIN — Medication 3 ML: at 21:01

## 2023-05-01 RX ADMIN — OXYCODONE 5 MG: 5 TABLET ORAL at 11:14

## 2023-05-01 RX ADMIN — SODIUM CHLORIDE 125 MG: 9 INJECTION, SOLUTION INTRAVENOUS at 08:24

## 2023-05-01 RX ADMIN — ATORVASTATIN CALCIUM 40 MG: 40 TABLET, FILM COATED ORAL at 21:01

## 2023-05-01 RX ADMIN — POLYETHYLENE GLYCOL 3350 17 G: 17 POWDER, FOR SOLUTION ORAL at 09:59

## 2023-05-01 NOTE — PLAN OF CARE
Goal Outcome Evaluation:              Outcome Evaluation: Pt is an 83 yo female admitted 4/28/23 with c/o low back pain and LE weakness. CT Spine (-) acute abnormality. US kidneys (-).     PMHx significant for CAD, CHF, HTN, Afib, RENO.    At baseline, pt is independent, driving. Her son lives with her in a Tri-level home with 5 steps to bedroom. She did not previously need or utilizing AE. Pt reportedly had 2 falls recently at home. Oriented x4 and scores 2/28 on SBT, indicating normal cognition. Bed mobility with SBA / Supervision. Ambulates with CGA. LB dressing with CGA. UB ADLs with setup. Pt c/o 8/10 pain, exacerbated by sitting in chair. BUE ROM grossly WFL though pt states RUE IR impairment at baseline. Pt demos decreased activity tolerance and impaired balance, limiting ADL function. She would benefit from [facility] services at discharge.

## 2023-05-01 NOTE — PROGRESS NOTES
Cardiology Progress Note    Patient Identification:  Name: Domonique See  Age: 83 y.o.  Sex: female  :  1940  MRN: [id number]                 Follow Up / Chief Complaint: Weakness, hypotension   Chief Complaint   Patient presents with   • Back Pain     Pt fell twice last week, now experiencing increased back pain. Pt [facility] for [facility] scan of back       Interval History: Patient presented on 2023 with fall, weakness.  She was noted to be hypotensive.     NP NOTE:  Patient seen with Dr. Gregory. Patient sitting in chair, no distress noted.  She denies any chest pain or shortness of breath.  She reports general weakness, occasional nausea no vomiting.  She has not been feeing well since December when she had COVID.  We will  Check ACTH to rule out adrenal insuffiencey.      Electronically signed by WOODY Thomas, 23, 9:56 AM EDT.    Cardiology attending addendum :    I have personally performed a face-to-face diagnostic evaluation, physical exam and reviewed data on this patient.  I have reviewed documentation done by me and nurse practitioner  and corrected as needed.  And agree with the different components of documentation.Greater than 50% of the time spent in the care of this patient was provided by attending consultant/me.           Subjective: Patient seen and examined.  Chart reviewed.  Labs reviewed.  Discussed with RN taking care of patient.  Patient says she has not been feeling well since her COVID diagnosis during .      Objective:   2023: HS troponin 19-- 20---13; bun 33 creatinine 1.88, WBC 12.3, hgb 10.8 UA unremarkable, blood cultures negative thus far, lactic normal   2023: glucose elevated, , bun 18 creatinine 0.84  TSH 4.92 hgb 10.4    PM random cortisol was 9.5  2023: glucose elevated, , hgb 11.0     History of present illness :    Ms. Domonique See  has PMH of     Hypertrophic cardiomyopathy, history of septal  myomectomy,      nonsustained VT  hypertension  SSS, [employer] permanent pacemaker 3/16/2017  Paroxysmal atrial fibrillation, refused anticoagulation  CAD, cath 12/2017 30% LAD  CHF due to hypertrophic cardiomyopathy  Prediabetes with A1c of 5.8 on 1/19/2023  Hyperlipidemia  COPD  Peptic ulcer disease  Back surgery, breast augmentation  Former smoker         Patient presented to the emergency department at [facility] 4/28/2023 with acute on chronic low back pain, fatigue, shortness of breath.  She reports symptoms progressively worsening over the past week.  She reports shortness of breath with activity.  She denies any lower extremity edema, orthopnea or PND.  She does report multiple falls over the past 2 weeks.  Upon my evaluation, she is supine in bed asleep with normal respiratory effort.  She does not report significant improvement in her symptoms.      Chart review   Admitted 1/18/2023 with CHF.  Patient was recently in the hospital 1/16/2023 underwent A. fib ablation was given fluids in the postop orders and came in with shortness of breath, which is progressively worse initially with exertion and at rest.  Developed hypoxemia with O2 sats of 85 to 88% on room air was put on 4 L by EMS.     Work-up here 1/18/2023 revealed elevated troponin over 1.  proBNP over 3000.  Glucose elevated.  A1c 1/19/2023 was 5.8.  Hemoglobin over 10.  MCV normal.  Chest x-ray 1/18/2023 reveals increased vascular interstitial markings consistent with fluid overload.  EKG done 1/18/2023 reveals sinus rhythm with first-degree AV block and PACs.        Echocardiogram 6/29/2021 reveals EF of 50% with LV and RV  Labs from 3/8/2022 revealed normal ESR and CRP.  A1c improved to 5.9 from 6.1 previously, TSH 3.9.  CMP with a potassium of 5.3 creatinine of 1.05 and GFR 52.  Normal CBC.  Lipid profile with cholesterol 124 triglycerides 73 HDL 57 LDL 52.        Assessment:     Weakness, hypotension   Shortness of breath  Dyspnea on  exertion  Acute on chronic HFpEF due to fluid overload and diastolic dysfunction from hypertrophic cardiomyopathy and A. fib.  Hypertrophic cardiomyopathy, myomectomy, NSVT  Chronic HFpEF due to diastolic dysfunction from hypertrophic cardiomyopathy  Hypertension  Dyslipidemia  Prediabetes  Paroxysmal atrial fibrillation, status post ablation, long-term current use of anticoagulation  Obesity with BMI over 30  CKD  Hyperkalemia        Recommendations and plan:     Telemetry is revealing sinus rhythm with a intermittent paced rhythm.  Will monitor telemetry  Will monitor O2 sats  Monitor renal function and urine output.  Echocardiogram 1/19/2023 revealed normal LV systolic function moderate MR, mild AR  CHADVASC2 SCORE   [id number] Score: 7 (11/25/2022 10:42 PM)  Patient has a high CHADS2 Vascor of 7 due to female gender, age over 75, history of stroke, CHF, hypertension making it 7, will benefit from long-term anticoagulation to prevent thromboembolic events.  We will continue Eliquis as tolerated.  Patient is hypotensive we will hold off on lisinopril.  We will check respiratory viral panel  We will check ACTH stim test  Continue aspirin, atorvastatin  as tolerated  Reviewed BMI over 30, counseled on weight loss diet and exercise.  Follow-up in pacemaker clinic.  Check O2 saturations.  Discussed with RN taking care of patient to coordinate care     Lexiscan Cardiolite performed 4/7/2022 reviewed/interpreted by me reveals normal perfusion EF of 64%      :    Copied text in this portion of the note has been reviewed and is accurate as of 5/1/2023    Past Medical History:  Past Medical History:   Diagnosis Date   • Anemia    • CHF (congestive heart failure)    • Congenital heart disease    • Coronary artery disease 2013   • GERD (gastroesophageal reflux disease)    • Heart murmur Don't know   • Hx of hypertrophic cardiomyopathy    • Hypertension    • Peptic ulceration    • Persistent atrial fibrillation 01/10/2023    • Primary osteoarthritis of both knees 2021   • Primary osteoarthritis of right knee 2020   • Sleep apnea 2017     Past Surgical History:  Past Surgical History:   Procedure Laterality Date   • ABDOMINAL HERNIA REPAIR     • ABLATION OF DYSRHYTHMIC FOCUS  2023   • BACK SURGERY     • BREAST AUGMENTATION     • CARDIAC CATHETERIZATION     • CARDIAC ELECTROPHYSIOLOGY PROCEDURE N/A 2023    Procedure: Ablation atrial fibrillation and flutter, cryo Duong and Ramón aware;  Surgeon: Leeanne Pleitez MD;  Location: [facility] INVASIVE LOCATION;  Service: Cardiovascular;  Laterality: N/A;   • CARDIAC PACEMAKER PLACEMENT  2017    Dual Chamber   • CERVICAL RIB RESECTION Bilateral    • CERVICAL RIB RESECTION Bilateral     bilateral cervical ribs removed - extra ribs located and causing pain   • CHOLECYSTECTOMY     • COLONOSCOPY     • CORONARY ARTERY BYPASS GRAFT     • THORACIC DECOMPRESSION POSTERIOR FUSION  2014    L3-5 & S1   • UPPER GASTROINTESTINAL ENDOSCOPY          Social History:   Social History     Tobacco Use   • Smoking status: Former     Packs/day: 1.00     Years: 8.00     Pack years: 8.00     Types: Cigarettes     Start date: 1977     Quit date: 1985     Years since quittin.3     Passive exposure: Past   • Smokeless tobacco: Never   • Tobacco comments:     Quit    Substance Use Topics   • Alcohol use: Not Currently     Comment: 6 drinks a year in social settings      Family History:  Family History   Problem Relation Age of Onset   • Colon cancer Mother    • Colon cancer Brother    • Heart attack Brother    • Colon cancer Daughter           Allergies:  No Known Allergies  Scheduled Meds:  apixaban, 2.5 mg, BID  atorvastatin, 40 mg, Nightly  [START ON 2023] cosyntropin, 0.25 mg, Once  pantoprazole, 40 mg, Q AM  polyethylene glycol, 17 g, Daily  sodium chloride, 3 mL, Q12H          Review of Systems:   ROS  Review of Systems   Constitution: Negative  "for chills and fever.   Cardiovascular: Negative for chest pain and palpitations.   Respiratory: Negative for cough and hemoptysis.    Gastrointestinal: Negative for nausea.        Constitutional:  Temp:  [98.2 °F (36.8 °C)-98.4 °F (36.9 °C)] 98.4 °F (36.9 °C)  Heart Rate:  [60-61] 61  Resp:  [14-15] 14  BP: (130-136)/(75-78) 136/75    Physical Exam   /75 (BP Location: Left arm, Patient Position: Lying)   Pulse 61   Temp 98.4 °F (36.9 °C) (Oral)   Resp 14   Ht 152.4 cm (60\")   Wt 92.6 kg (204 lb 2.3 oz)   LMP  (LMP Unknown)   SpO2 100%   BMI 39.87 kg/m²   General:  Appears in no acute distress  Eyes: Sclera is anicteric,  conjunctiva is clear   HEENT:  No JVD. Thyroid not visibly enlarged. No mucosal pallor or cyanosis  Respiratory: Respirations regular and unlabored at rest.  Bilaterally good breath sounds, with good air entry in all fields. No crackles, rubs or wheezes auscultated  Cardiovascular: S1,S2 Regular rate and rhythm.  2 out of 6 systolic ejection murmur, no rub or gallop auscultated.  . No pretibial pitting edema  Gastrointestinal: Abdomen nondistended, soft  Musculoskeletal:  No abnormal movements  Extremities: No digital clubbing or cyanosis  Skin: Color pink. Skin warm and dry to touch. No rashes  No xanthoma  Neuro: Alert and awake, no lateralizing deficits appreciated    INTAKE AND OUTPUT:    Intake/Output Summary (Last 24 hours) at 5/1/2023 1116  Last data filed at 4/30/2023 1600  Gross per 24 hour   Intake 240 ml   Output --   Net 240 ml       Cardiographics  Telemetry: sinus rhythm     ECG:   ECG 12 Lead Other; hypotension   Final Result   HEART RATE= 60  bpm   RR Interval= 999  ms   MI Interval= 289  ms   P Horizontal Axis= 236  deg   P Front Axis= 0  deg   QRSD Interval= 119  ms   QT Interval= 431  ms   QRS Axis= -31  deg   T Wave Axis= 103  deg   - ABNORMAL ECG -   Atrial-paced complexes   Prolonged MI interval   Incomplete left bundle branch block   LVH w/ repol abnormalities, " possible ischemia   When compared with ECG of 18-Jan-2023 13:56:54,   New or worsened ischemia or infarction   Electronically Signed By: Kade Ku (ELSA) 01-May-2023 09:19:54   Date and Time of Study: 2023-04-28 18:21:45      SCANNED - TELEMETRY     Final Result      SCANNED - TELEMETRY     Final Result      SCANNED - TELEMETRY     Final Result      SCANNED - TELEMETRY     Final Result        I have personally reviewed EKG    Echocardiogram: Results for orders placed during the hospital encounter of 01/18/23    Adult Transthoracic Echo Complete w/ Color, Spectral and Contrast if necessary per protocol    Interpretation Summary  •  Estimated right ventricular systolic pressure from tricuspid regurgitation is markedly elevated (>55 mmHg).      Normal LV size and contractility EF of 60 to 65%  Severe right ventricular and right atrial enlargement, moderate left atrial enlargement seen  Pulmonic valve is not well visualized.  Aortic valve, mitral valve, tricuspid valve appears structurally normal, moderate mitral and mild aortic regurgitation seen.  No pericardial effusion seen.  Proximal aorta appears normal in size.      Lab Review   I have reviewed the labs  Results from last 7 days   Lab Units 04/29/23  1744 04/28/23  2224 04/28/23  1818   CK TOTAL U/L  --  23  --    HSTROP T ng/L 13* 20* 19*     Results from last 7 days   Lab Units 04/30/23  2343   MAGNESIUM mg/dL 1.9     Results from last 7 days   Lab Units 04/30/23  2343   SODIUM mmol/L 134*   POTASSIUM mmol/L 4.5   BUN mg/dL 11   CREATININE mg/dL 0.78   CALCIUM mg/dL 9.4         Results from last 7 days   Lab Units 04/30/23  2343 04/29/23  2303 04/29/23  0359   WBC 10*3/mm3 7.10 7.00 9.70   HEMOGLOBIN g/dL 11.0* 10.4* 10.3*   HEMATOCRIT % 33.9* 31.1* 32.1*   PLATELETS 10*3/mm3 178 153 144           RADIOLOGY:  Imaging Results (Last 24 Hours)     Procedure Component Value Units Date/Time    XR Chest 1 View [[id number]] Collected: 05/01/23 1028     Updated:  "05/01/23 1030    Narrative:      XR CHEST 1 VW    Date of Exam: 5/1/2023 10:21 AM EDT    Indication: weakness, hypotension    Comparison: AP portable chest 1/18/2023.    Findings:  No acute airspace disease. Heart size is normal. Median sternotomy changes are present. Pacemaker leads extend to the expected locations of the right atrium and right ventricle. No right or left pneumothorax is identified. Pulmonary vascular distribution   is normal. Bilateral breast implants are incidentally noted. There are surgical changes of the cervical spine.      Impression:      Impression:    1. No acute chest findings.      Electronically Signed: Elena Lomax    5/1/2023 10:28 AM EDT    Workstation ID: [id number]                )5/1/2023  Evgeny Gregory MD      [facility] [place]/Transcription:   \"Dictated utilizing Dragon dictation\".   "

## 2023-05-01 NOTE — THERAPY EVALUATION
Patient Name: Domonique See  : 1940    MRN: [id number]                              Today's Date: 2023       Admit Date: 2023    Visit Dx:     ICD-10-CM ICD-9-CM   1. Acute midline low back pain without sciatica  M54.50 724.2   2. Hypotension, unspecified hypotension type  I95.9 458.9     Patient Active Problem List   Diagnosis   • Iron deficiency anemia   • Anemia, unspecified   • Pacemaker   • Atrial fibrillation (HCC)   • Bradycardia   • Abnormal result of cardiovascular function study   • Cervical disc disorder with radiculopathy   • Cervical radiculopathy   • Cervical stenosis of spinal canal   • Cholecystitis   • CHF (congestive heart failure) (HCC)   • Deep vein thrombosis (DVT)   • Dyspnea   • Family history of diabetes mellitus   • Fibromyositis   • Hyperlipidemia   • Essential hypertension   • Hypertrophic obstructive cardiomyopathy   • Lumbar radiculopathy   • Primary localized osteoarthritis   • Sacroiliitis, not elsewhere classified   • Spondylolisthesis, acquired   • Primary osteoarthritis of right knee   • Morbid obesity   • Primary osteoarthritis of both knees   • Obesity (BMI 30.0-34.9)   • Persistent atrial fibrillation   • Hx of hypertrophic cardiomyopathy   • Coronary artery disease   • Sleep apnea   • CHF exacerbation   • Acute midline low back pain without sciatica   • Hypertensive retinopathy     Past Medical History:   Diagnosis Date   • Anemia    • CHF (congestive heart failure)    • Congenital heart disease    • Coronary artery disease    • GERD (gastroesophageal reflux disease)    • Heart murmur Don't know   • Hx of hypertrophic cardiomyopathy    • Hypertension    • Peptic ulceration    • Persistent atrial fibrillation 01/10/2023   • Primary osteoarthritis of both knees 2021   • Primary osteoarthritis of right knee 2020   • Sleep apnea 2017     Past Surgical History:   Procedure Laterality Date   • ABDOMINAL HERNIA REPAIR     • ABLATION OF DYSRHYTHMIC  FOCUS  01-   • BACK SURGERY     • BREAST AUGMENTATION     • CARDIAC CATHETERIZATION     • CARDIAC ELECTROPHYSIOLOGY PROCEDURE N/A 01/16/2023    Procedure: Ablation atrial fibrillation and flutter, cryo Duong and Ramón aware;  Surgeon: Leeanne Pleitez MD;  Location: [facility] CATH INVASIVE LOCATION;  Service: Cardiovascular;  Laterality: N/A;   • CARDIAC PACEMAKER PLACEMENT  03/16/2017    Dual Chamber   • CERVICAL RIB RESECTION Bilateral    • CERVICAL RIB RESECTION Bilateral 1963    bilateral cervical ribs removed - extra ribs located and causing pain   • CHOLECYSTECTOMY     • COLONOSCOPY     • CORONARY ARTERY BYPASS GRAFT  2013   • THORACIC DECOMPRESSION POSTERIOR FUSION  06/04/2014    L3-5 & S1   • UPPER GASTROINTESTINAL ENDOSCOPY        General Information     Row Name 05/01/23 1122          OT Time and Intention    Document Type evaluation  -ES     Mode of Treatment occupational therapy  -ES     Row Name 05/01/23 1122          General Information    Patient Profile Reviewed yes  -ES     Prior Level of Function independent:;ADL's;driving  -ES     Row Name 05/01/23 1122          Occupational Profile    Reason for Services/Referral (Occupational Profile) Pt is an 81 yo female admitted 4/28/23 with c/o low back pain and LE weakness. CT Spine (-) acute abnormality. US kidneys (-).     PMHx significant for CAD, CHF, HTN, Afib, RENO.    At baseline, pt is independent, driving. Her son lives with her in a Tri-level home with 5 steps to bedroom. She did not previously need or utilizing AE.  -ES     Row Name 05/01/23 1122          Living Environment    People in Home child(su), adult  -ES     Row Name 05/01/23 1122          Home Main Entrance    Number of Stairs, Main Entrance none  -ES     Row Name 05/01/23 1122          Stairs Within Home, Primary    Number of Stairs, Within Home, Primary five  -ES     Row Name 05/01/23 1122          Cognition    Orientation Status (Cognition) oriented x 4  -ES     Row Name  05/01/23 1122          Safety Issues, Functional Mobility    Impairments Affecting Function (Mobility) endurance/activity tolerance;shortness of breath;pain;strength;balance;coordination  -ES           User Key  (r) = Recorded By, (t) = Taken By, (c) = Cosigned By    Initials Name Provider Type    Kaylan Berman OT Occupational Therapist                 Mobility/ADL's     Row Name 05/01/23 1123          Bed Mobility    Bed Mobility bed mobility (all) activities  -ES     All Activities, [place] (Bed Mobility) supervision  -ES     Assistive Device (Bed Mobility) head of bed elevated  -ES     Row Name 05/01/23 1123          Sit-Stand Transfer    Sit-Stand [place] (Transfers) contact guard  -ES     Row Name 05/01/23 1123          Activities of Daily Living    BADL Assessment/Intervention upper body dressing;lower body dressing;toileting  -ES     Row Name 05/01/23 1123          Upper Body Dressing Assessment/Training    [place] Level (Upper Body Dressing) set up  -ES     Row Name 05/01/23 1123          Lower Body Dressing Assessment/Training    [place] Level (Lower Body Dressing) contact guard assist  -ES     Row Name 05/01/23 1123          Toileting Assessment/Training    [place] Level (Toileting) supervision  -ES           User Key  (r) = Recorded By, (t) = Taken By, (c) = Cosigned By    Initials Name Provider Type    Kaylan Berman OT Occupational Therapist               Obj/Interventions     Row Name 05/01/23 1521          Sensory Assessment (Somatosensory)    Sensory Assessment (Somatosensory) sensation intact  -ES     Row Name 05/01/23 1521          Vision Assessment/Intervention    Visual Impairment/Limitations WNL  -ES     Row Name 05/01/23 1521          Range of Motion Comprehensive    General Range of Motion bilateral upper extremity ROM WFL  -ES     Comment, General Range of Motion RUE IR impaired at baseline. Pt reports compensations  -ES     Row Name 05/01/23 1521           Strength Comprehensive (MMT)    General Manual Muscle Testing (MMT) Assessment upper extremity strength deficits identified  -ES     Comment, General Manual Muscle Testing (MMT) Assessment LUE  -ES     Row Name 05/01/23 1521          Balance    Balance Assessment sitting static balance;sitting dynamic balance;standing dynamic balance;standing static balance  -ES     Static Sitting Balance modified independence  -ES     Dynamic Sitting Balance modified independence  -ES     Static Standing Balance contact guard  -ES     Dynamic Standing Balance minimal assist  -ES     Balance Interventions sitting;standing;static;dynamic  -ES           User Key  (r) = Recorded By, (t) = Taken By, (c) = Cosigned By    Initials Name Provider Type    ES Kaylan Pompa, OT Occupational Therapist               Goals/Plan     Row Name 05/01/23 1530          Bathing Goal 1 (OT)    Activity/Device (Bathing Goal 1, OT) bathing skills, all  -ES     [place] Level/Cues Needed (Bathing Goal 1, OT) independent  -ES     Time Frame (Bathing Goal 1, OT) 2 weeks  -ES     Row Name 05/01/23 1530          Dressing Goal 1 (OT)    Activity/Device (Dressing Goal 1, OT) dressing skills, all  -ES     [place]/Cues Needed (Dressing Goal 1, OT) modified independence  -ES     Time Frame (Dressing Goal 1, OT) 2 weeks  -ES     Row Name 05/01/23 1530          Toileting Goal 1 (OT)    Activity/Device (Toileting Goal 1, OT) toileting skills, all  -ES     [place] Level/Cues Needed (Toileting Goal 1, OT) modified independence  -ES     Time Frame (Toileting Goal 1, OT) 2 weeks  -ES     Row Name 05/01/23 1530          Therapy Assessment/Plan (OT)    Planned Therapy Interventions (OT) activity tolerance training;BADL retraining;functional balance retraining;IADL retraining;neuromuscular control/coordination retraining;patient/caregiver education/training;ROM/therapeutic exercise  -ES           User Key  (r) = Recorded By, (t) = Taken By, (c) =  Cosigned By    Initials Name Provider Type    Kaylan Berman, OT Occupational Therapist               Clinical Impression     Row Name 05/01/23 1523          Pain Assessment    Pretreatment Pain Rating 8/10  -ES     Posttreatment Pain Rating 8/10  -ES     Row Name 05/01/23 1523          Plan of Care Review    Outcome Evaluation Pt is an 81 yo female admitted 4/28/23 with c/o low back pain and LE weakness. CT Spine (-) acute abnormality. US kidneys (-).     PMHx significant for CAD, CHF, HTN, Afib, RENO.    At baseline, pt is independent, driving. Her son lives with her in a Tri-level home with 5 steps to bedroom. She did not previously need or utilizing AE. Pt reportedly had 2 falls recently at home. Oriented x4 and scores 2/28 on SBT, indicating normal cognition. Bed mobility with SBA / Supervision. Ambulates with CGA. LB dressing with CGA. UB ADLs with setup. Pt c/o 8/10 pain, exacerbated by sitting in chair. BUE ROM grossly WFL though pt states RUE IR impairment at baseline. Pt demos decreased activity tolerance and impaired balance, limiting ADL function. She would benefit from [facility] services at discharge.  -ES     Row Name 05/01/23 1523          Therapy Assessment/Plan (OT)    Rehab Potential (OT) good, to achieve stated therapy goals  -ES     Criteria for Skilled Therapeutic Interventions Met (OT) yes;skilled treatment is necessary  -ES     Therapy Frequency (OT) 3 times/wk  -ES     Predicted Duration of Therapy Intervention (OT) until dc  -ES     Row Name 05/01/23 1523          Therapy Plan Review/Discharge Plan (OT)    Anticipated Discharge Disposition (OT) home with home health  -ES     Row Name 05/01/23 1523          Vital Signs    Pre SpO2 (%) 96  -ES     O2 Delivery Pre Treatment room air  -ES     Intra SpO2 (%) 94  -ES     O2 Delivery Intra Treatment room air  -ES     Post SpO2 (%) 97  -ES     O2 Delivery Post Treatment room air  -ES     Pre Patient Position Supine  -ES     Intra Patient Position  Standing  -ES     Post Patient Position Sitting  -ES     Row Name 05/01/23 1523          Positioning and Restraints    Pre-Treatment Position in bed  -ES     Post Treatment Position chair  -ES     In Chair notified nsg;call light within reach;encouraged to call for assist;exit alarm on  -ES           User Key  (r) = Recorded By, (t) = Taken By, (c) = Cosigned By    Initials Name Provider Type    Kaylan Berman OT Occupational Therapist               Outcome Measures     Row Name 05/01/23 1533          How much help from another is currently needed...    Putting on and taking off regular lower body clothing? 3  -ES     Bathing (including washing, rinsing, and drying) 3  -ES     Toileting (which includes using toilet bed pan or urinal) 4  -ES     Putting on and taking off regular upper body clothing 4  -ES     Taking care of personal grooming (such as brushing teeth) 4  -ES     Eating meals 4  -ES     AM-PAC 6 Clicks Score (OT) 22  -ES     Row Name 05/01/23 1004 05/01/23 0800       How much help from another person do you currently need...    Turning from your back to your side while in flat bed without using bedrails? 4  -BR 4  -TA    Moving from lying on back to sitting on the side of a flat bed without bedrails? 4  -BR 4  -TA    Moving to and from a bed to a chair (including a wheelchair)? 3  -BR 4  -TA    Standing up from a chair using your arms (e.g., wheelchair, bedside chair)? 4  -BR 4  -TA    Climbing 3-5 steps with a railing? 3  -BR 3  -TA    To walk in hospital room? 3  -BR 4  -TA    AM-PAC 6 Clicks Score (PT) 21  -BR 23  -TA    Highest level of mobility 6 --> Walked 10 steps or more  -BR 7 --> Walked 25 feet or more  -TA    Row Name 05/01/23 1533 05/01/23 1004       Functional Assessment    Outcome Measure Options AM-PAC 6 Clicks Daily Activity (OT)  -ES AM-PAC 6 Clicks Basic Mobility (PT)  -BR          User Key  (r) = Recorded By, (t) = Taken By, (c) = Cosigned By    Initials Name Provider Type     Kaylan Berman, OT Occupational Therapist    Hanna Guzman RN Registered Nurse    Annette Ba, PT Physical Therapist                Occupational Therapy Education     Title: PT OT SLP Therapies (Done)     Topic: Occupational Therapy (Done)     Point: ADL training (Done)     Description:   Instruct learner(s) on proper safety adaptation and remediation techniques during self care or transfers.   Instruct in proper use of assistive devices.              Learning Progress Summary           Patient Acceptance, E,TB, VU,NR by VINAY at 5/1/2023 1535    Acceptance, E,D, VU,DU by BR at 5/1/2023 1004    Acceptance, E,TB, VU by  at 4/29/2023 1631                   Point: Home exercise program (Done)     Description:   Instruct learner(s) on appropriate technique for monitoring, assisting and/or progressing therapeutic exercises/activities.              Learning Progress Summary           Patient Acceptance, E,D, VU,DU by BR at 5/1/2023 1004    Acceptance, E,TB, VU by  at 4/29/2023 1631                   Point: Precautions (Done)     Description:   Instruct learner(s) on prescribed precautions during self-care and functional transfers.              Learning Progress Summary           Patient Acceptance, E,TB, VU,NR by VINAY at 5/1/2023 1535    Acceptance, E,D, VU,DU by BR at 5/1/2023 1004    Acceptance, E,TB, VU by  at 4/29/2023 1631                   Point: Body mechanics (Done)     Description:   Instruct learner(s) on proper positioning and spine alignment during self-care, functional mobility activities and/or exercises.              Learning Progress Summary           Patient Acceptance, E,TB, VU,NR by VINAY at 5/1/2023 1535    Acceptance, E,D, VU,DU by BR at 5/1/2023 1004    Acceptance, E,TB, VU by  at 4/29/2023 1631                               User Key     Initials Effective Dates Name Provider Type Discipline    VINAY 06/16/21 -  Kaylan Pompa OT Occupational Therapist OT     06/16/21 -   Jenelle Young LPN Licensed Nurse Nurse     02/01/22 -  Annette Perdomo, PT Physical Therapist PT              OT Recommendation and Plan  Planned Therapy Interventions (OT): activity tolerance training, BADL retraining, functional balance retraining, IADL retraining, neuromuscular control/coordination retraining, patient/caregiver education/training, ROM/therapeutic exercise  Therapy Frequency (OT): 3 times/wk  Plan of Care Review  Outcome Evaluation: Pt is an 83 yo female admitted 4/28/23 with c/o low back pain and LE weakness. CT Spine (-) acute abnormality. US kidneys (-).     PMHx significant for CAD, CHF, HTN, Afib, RENO.    At baseline, pt is independent, driving. Her son lives with her in a Tri-level home with 5 steps to bedroom. She did not previously need or utilizing AE. Pt reportedly had 2 falls recently at home. Oriented x4 and scores 2/28 on SBT, indicating normal cognition. Bed mobility with SBA / Supervision. Ambulates with CGA. LB dressing with CGA. UB ADLs with setup. Pt c/o 8/10 pain, exacerbated by sitting in chair. BUE ROM grossly WFL though pt states RUE IR impairment at baseline. Pt demos decreased activity tolerance and impaired balance, limiting ADL function. She would benefit from [facility] services at discharge.     Time Calculation:    Time Calculation- OT     Row Name 05/01/23 1533             Time Calculation- OT    OT Start Time 0910  -ES      OT Stop Time 0935  -ES      OT Time Calculation (min) 25 min  -ES      Total Timed Code Minutes- OT 0 minute(s)  -ES      OT Received On 05/01/23  -ES      OT - Next Appointment 05/03/23  -ES      OT Goal Re-Cert Due Date 05/15/23  -ES            User Key  (r) = Recorded By, (t) = Taken By, (c) = Cosigned By    Initials Name Provider Type    Kaylan Berman OT Occupational Therapist              Therapy Charges for Today     Code Description Service Date Service Provider Modifiers Qty    [id number]  OT EVAL LOW COMPLEXITY 4 5/1/2023  Peña, Kaylan AARON, OT GO 1               Kaylan Pompa, OT  5/1/2023

## 2023-05-01 NOTE — PROGRESS NOTES
"NEPHROLOGY PROGRESS NOTE------[facility]    [facility]  [phone]  Juliana Muñoz MD      Patient Care Team:  Anish Lew FNP as PCP - Evgeny Machuca MD as Consulting Physician (Cardiology)  Leeanne Pleitez MD as Consulting Physician (Cardiology)  Radha Owusu APRN as Nurse Practitioner (Nurse Practitioner)  Kathe Muñoz MD as Consulting Physician (Nephrology)      Provider:  Juliana Muñoz MD  Patient Name: Domonique See  :  1940    SUBJECTIVE:    F/U CRF/CKD    No complaints. No SOB, CP, palpitations, cramping. No dysuria.     Medication:  apixaban, 2.5 mg, Oral, BID  atorvastatin, 40 mg, Oral, Nightly  ferric gluconate, 125 mg, Intravenous, Daily  pantoprazole, 40 mg, Oral, Q AM  sodium chloride, 3 mL, Intravenous, Q12H           OBJECTIVE    Vital Sign Min/Max for last 24 hours  Temp  Min: 98.2 °F (36.8 °C)  Max: 98.4 °F (36.9 °C)   BP  Min: 124/88  Max: 136/75   Pulse  Min: 60  Max: 62   Resp  Min: 14  Max: 16   SpO2  Min: 93 %  Max: 100 %   No data recorded   No data recorded     Flowsheet Rows    Flowsheet Row First Filed Value   Admission Height 152.4 cm (60\") Documented at 2023 1734   Admission Weight 86.2 kg (190 lb) Documented at 2023 1734          No intake/output data recorded.  I/O last 3 completed shifts:  In: 2984 [P.O.:600; I.V.:2384]  Out: 200 [Urine:200]    Physical Exam:  General Appearance: alert, appears stated age and cooperative    Head: normocephalic, without obvious abnormality and atraumatic    Eyes: conjunctivae and sclerae normal and no icterus  Neck: supple and no JVD  Lungs: clear to auscultation and respirations regular  Heart: IRREG IRREG +MELVIN. NO GALLOP, RUB, OR S3  Chest Wall: no abnormalities observed  Abdomen: normal bowel sounds and soft, nontender +OBESITY  Extremities: moves extremities well, no edema, no cyanosis +DJD  Skin: no " bleeding, bruising or rash    Neurologic: Alert, and oriented. No focal deficits  Labs:    WBC WBC   Date Value Ref Range Status   04/30/2023 7.10 3.40 - 10.80 10*3/mm3 Final   04/29/2023 7.00 3.40 - 10.80 10*3/mm3 Final   04/29/2023 9.70 3.40 - 10.80 10*3/mm3 Final   04/28/2023 12.30 (H) 3.40 - 10.80 10*3/mm3 Final      HGB Hemoglobin   Date Value Ref Range Status   04/30/2023 11.0 (L) 12.0 - 15.9 g/dL Final   04/29/2023 10.4 (L) 12.0 - 15.9 g/dL Final   04/29/2023 10.3 (L) 12.0 - 15.9 g/dL Final   04/28/2023 10.8 (L) 12.0 - 15.9 g/dL Final      HCT Hematocrit   Date Value Ref Range Status   04/30/2023 33.9 (L) 34.0 - 46.6 % Final   04/29/2023 31.1 (L) 34.0 - 46.6 % Final   04/29/2023 32.1 (L) 34.0 - 46.6 % Final   04/28/2023 32.9 (L) 34.0 - 46.6 % Final      Platelets No results found for: LABPLAT   MCV MCV   Date Value Ref Range Status   04/30/2023 91.9 79.0 - 97.0 fL Final   04/29/2023 90.2 79.0 - 97.0 fL Final   04/29/2023 93.1 79.0 - 97.0 fL Final   04/28/2023 90.4 79.0 - 97.0 fL Final          Sodium Sodium   Date Value Ref Range Status   04/30/2023 134 (L) 136 - 145 mmol/L Final   04/29/2023 133 (L) 136 - 145 mmol/L Final   04/28/2023 133 (L) 136 - 145 mmol/L Final   04/28/2023 132 (L) 136 - 145 mmol/L Final      Potassium Potassium   Date Value Ref Range Status   04/30/2023 4.5 3.5 - 5.2 mmol/L Final   04/29/2023 5.1 3.5 - 5.2 mmol/L Final   04/28/2023 4.4 3.5 - 5.2 mmol/L Final   04/28/2023 5.2 3.5 - 5.2 mmol/L Final     Comment:     Slight hemolysis detected by analyzer. Results may be affected.      Chloride Chloride   Date Value Ref Range Status   04/30/2023 100 98 - 107 mmol/L Final   04/29/2023 101 98 - 107 mmol/L Final   04/28/2023 97 (L) 98 - 107 mmol/L Final   04/28/2023 95 (L) 98 - 107 mmol/L Final      CO2 CO2   Date Value Ref Range Status   04/30/2023 26.0 22.0 - 29.0 mmol/L Final   04/29/2023 24.0 22.0 - 29.0 mmol/L Final   04/28/2023 26.0 22.0 - 29.0 mmol/L Final   04/28/2023 27.0 22.0 - 29.0  mmol/L Final      BUN BUN   Date Value Ref Range Status   04/30/2023 11 8 - 23 mg/dL Final   04/29/2023 18 8 - 23 mg/dL Final   04/28/2023 29 (H) 8 - 23 mg/dL Final   04/28/2023 33 (H) 8 - 23 mg/dL Final      Creatinine Creatinine   Date Value Ref Range Status   04/30/2023 0.78 0.57 - 1.00 mg/dL Final   04/29/2023 0.84 0.57 - 1.00 mg/dL Final   04/28/2023 1.79 (H) 0.57 - 1.00 mg/dL Final   04/28/2023 1.88 (H) 0.57 - 1.00 mg/dL Final      Calcium Calcium   Date Value Ref Range Status   04/30/2023 9.4 8.6 - 10.5 mg/dL Final   04/29/2023 8.9 8.6 - 10.5 mg/dL Final   04/28/2023 8.1 (L) 8.6 - 10.5 mg/dL Final   04/28/2023 8.5 (L) 8.6 - 10.5 mg/dL Final      PO4 No components found for: PO4   Albumin Albumin   Date Value Ref Range Status   04/29/2023 3.2 (L) 3.5 - 5.2 g/dL Final   04/28/2023 3.5 3.5 - 5.2 g/dL Final      Magnesium Magnesium   Date Value Ref Range Status   04/30/2023 1.9 1.6 - 2.4 mg/dL Final   04/29/2023 1.9 1.6 - 2.4 mg/dL Final      Uric Acid No components found for: URIC ACID     Imaging Results (Last 72 Hours)     Procedure Component Value Units Date/Time    US Renal Bilateral [[id number]] Collected: 04/29/23 1540     Updated: 04/29/23 1543    Narrative:      US RENAL BILATERAL    Date of Exam: 4/29/2023 2:48 PM EDT    Indication: Acute renal failure.    Comparison: No comparisons available.    Technique: Grayscale and color Doppler ultrasound evaluation of the kidneys and urinary bladder was performed.      FINDINGS:  The right kidney measures 8.7 x 4.9 x 4.3 cm. The right renal cortical echogenicity is unremarkable. No focal cortical abnormalities are identified. No definitive stones are seen. There is no hydronephrosis. Flow is observed in the right kidney on   Doppler evaluation.    The left kidney measures 8.9 x 4.2 x 4.1 cm. The left renal cortical echogenicity is unremarkable. No focal cortical abnormalities are identified. No definitive stones are seen. There is no hydronephrosis. Flow is  observed in the left kidney on Doppler   evaluation.    Screening evaluation of the bladder is unremarkable.      Impression:      1.Negative ultrasound of the bilateral kidneys. There is no hydronephrosis bilaterally.      Electronically Signed: Clement Fung    4/29/2023 3:41 PM EDT    Workstation ID: [id number]    CT Lumbar Spine Without Contrast [[id number]] Collected: 04/28/23 1850     Updated: 04/28/23 1906    Narrative:      CT LUMBAR SPINE WO CONTRAST    Date of Exam: 4/28/2023 6:31 PM EDT    Indication: low back pain, low back pain.    Comparison: MRI lumbar spine 9/23/2021  Technique: Axial CT images were obtained of the lumbar spine without contrast administration.  Sagittal and coronal reconstructions were performed.  Automated exposure control and iterative reconstruction methods were used.    Findings:  There is transitional anatomy with 6 lumbar-type vertebral bodies. For the purposes of this dictation the last disc space is labeled as L5-S1 the last fully formed vertebral body is labeled as L5.     Postsurgical changes of prior posterior lumbar fixation noted at the L4-5 level. There is degenerative retrolisthesis of L1 on L2 measuring 3 mm, L2 on L3 measuring 2 mm and L3 on L4 measuring 4 mm. The posterior spinous processes are intact. There is no   acute lumbar spine fracture. Included portions of the sacrum and pelvis are intact. Transverse spinous processes intact.     Normal adrenal glands. The kidneys are without hydronephrosis. Extensive vascular calcifications of the abdominal aorta and branch vasculature. Spleen is mildly enlarged. There is a mild convex left levocurvature of the lumbar spine centered at the L3   level.    T12-L1: Disc desiccation with vacuum disc and mild circumferential disc osteophyte. Negative for canal stenosis. No foraminal stenosis.    L1-2: Mild circumferential disc osteophyte. Slight retrolisthesis of L1 on L2 with mild effacement of the ventral thecal sac. Mild facet  arthritis and ligamentum flavum thickening. Negative for canal stenosis. Mild bilateral foraminal stenosis.    L2-3: Slight retrolisthesis of L2 on L3. Disc desiccation with a mild circumferential disc bulge. Mild facet arthritis and ligamentum flavum thickening. Mild central canal stenosis. Mild bilateral foraminal stenosis.    L3-4: Slight retrolisthesis of L3 on L4 streak artifact from L4 pedicle screws partially limits assessment. There is retrolisthesis of L3 on L4. Mild posterior disc osteophyte contributing to suspected mild central canal stenosis. Moderate bilateral   facet arthritis right greater than left. Osseous spurring contributes to moderate to severe right foraminal stenosis. Moderate left foraminal stenosis.    L4-5: Streak artifact from hardware limits assessment. Within limits of artifact no high-grade central canal stenosis. Negative for foraminal stenosis on the right. Asymmetric left extraforaminal spurring contributes to mild left foraminal stenosis.   Right-sided laminotomy suspected.    L5-S1: Streak artifact from L5 pedicle screws partially limits assessment. Moderate circumferential disc bulge. Moderate bilateral facet arthritis. Mild central canal and lateral recess narrowing. Mild bilateral foraminal narrowing.      Impression:      Impression:  1. Negative for acute osseous abnormality.  2. Postsurgical changes of lumbar fixation at L4-5.  3. Multilevel degenerative findings above.        Electronically Signed: Mark Yarbroughr    4/28/2023 7:04 PM EDT    Workstation ID: [id number]          Results for orders placed during the hospital encounter of 01/18/23    XR Chest 1 View    Narrative  XR CHEST 1 VW    Date of Exam: 1/18/2023 2:27 PM EST    Indication: soa.    Comparison: 6/28/2021    Findings:  Cardiac size is stable. There are postoperative changes of prior sternotomy. Vascular markings have increased slightly compared with the last study. Left-sided transvenous pacemaker is unchanged in  position. There are bilateral calcified breast implants.    Impression  Impression:    1. Increase in the vascular and interstitial markings in both lungs which may reflect mild fluid overload.  2. Postoperative changes of prior sternotomy and left-sided pacemaker placement.    Electronically Signed: Lake Garza  1/18/2023 2:32 PM EST  Workstation ID: [id number]      Results for orders placed during the hospital encounter of 06/28/21    XR Chest 1 View    Narrative  DATE OF EXAM:  6/28/2021 7:04 PM    PROCEDURE:  XR CHEST 1 VW-    INDICATIONS:  ataxia/near syncope    COMPARISON:  AP chest x-ray 05/17/2017.    TECHNIQUE:  Single radiographic AP view of the chest was obtained.    FINDINGS:  Cardiac silhouette is mildly enlarged, but is exaggerated by portable AP  technique. Sternal wires are again noted. Pacemaker leads appear stable.  Lungs are adequately expanded and clear. No pneumothorax or large  pleural effusion is seen. Postoperative changes in the lower cervical  spine are partially included.    Impression  No acute cardiopulmonary abnormality or significant change.    Electronically Signed By-Jackie Hooker MD On:6/28/2021 7:16 PM  This report was finalized on [id number] by  Jackie Hooker MD.      Results for orders placed in visit on 02/08/21    XR Knee 3 View Left    Narrative  left Knee X-Ray  Indication: Left knee pain  AP, Lateral, [place] views  Findings:Shows mild to moderate tricompartmental DJD, worse in the patellofemoral compartment, No fractures or dislocations are appreciated and patellar sublaxation  subnormal joint spaces  Hardware appropriately positioned not applicable    no prior studies available for comparison.    This patient's x-ray report was graded according to the Kellgren and Brian classification.  This took into account the joint space narrowing, osteophyte formation, sclerosis of the distal femur/proximal tibia along with deformity of those bones.  The findings were  indicative of K L grade 3.    X-RAY was ordered and reviewed by MADISON Coleman      Results for orders placed during the hospital encounter of 04/16/21    Duplex venous lower extremity right CAR    Interpretation Summary  · Normal bilateral lower extremity venous duplex scan.        ASSESSMENT / PLAN      Acute midline low back pain without sciatica    1. ARF/DAYLIN---------Nonoliguric. ARF/DAYLIN is resolved. +ARF/DAYLIN with historically normal baseline renal function based on review of records. +ARF/DAYLIN is secondary to prerenal state/intravascular volume depletion with concomitant diuretic use and also from ATN secondary to hypotension with concomitant NSAID and ACE-I use. D/C Diclofenac, Lisinopril, and Bumex. Hydrate aggressively and avoid hypotension. No NSAIDs or IV dye. Dose meds for CrCl less than 10 cc/min until ARF/DAYLIN is resolved     2. BENIGN ESSENTIAL HTN------BP good     3. DEHYDRATION------Clinically better. Off IVFs and Bumex     4. OA/DJD/CBP-------D/C Diclofenac. No NSAIDs. Uric normal     5. ATRIAL FIBRILLATION----------Rate controlled and anticoagulated     6. ANEMIA-----H/H stable. IV iron for DAVID     7. H/O CHF/HYPERTROPHIC CM-----Off IVFs and Keep off of Bumex for now     8. HYPERLIPIDEMIA------On Statin. CK, TSH ok     9. GERD/PUD PROPHYLAXIS--------On PPI. Benefits outweigh risks despite DAYLIN     10. DVT PROPHYLAXIS--------Anticoagulated on Augustinaqujessica Muñoz MD  [facility]  [phone]  05/01/23  07:36 EDT

## 2023-05-01 NOTE — PLAN OF CARE
Goal Outcome Evaluation:  Plan of Care Reviewed With: patient  Domonique See is an 83 y.o. F past who presented to [facility] on 4/28/23 with acute onset of LBP of intractable nature. CT lumbar spine was without acute findings. Chest X-ray pending. Nephrology and cardiology are following pt. Past Medical Hx: PACEMAKER, anemia, a-fib, CHF, lumbar and cervical nerve root disorder, HTN, OA, sacroiliitis. Pt reports having 2 recent falls at home. Pt alert and Oriented x 4. At baseline, pt has an son who lives with her in a home with no steps for entry and 5 interior steps. Typically, she drives and uses no AD for community mobility. This date, pt is at a SBA  level for bed mobility and transfers and ambulated 125 feet with CGA of 1 and ascended and descended 4 steps with rail and CGA of 1. Pt ha d shortness of breath after gait and sats were 97% on room air. Pt has general unsteadiness when up on feet with impaired ankle strategy and decreased righting reactions. PT will follow pt while she is in hospital. PT recommendation is Home with Home Health Physical Therapy.

## 2023-05-01 NOTE — THERAPY EVALUATION
Patient Name: Domonique See  : 1940    MRN: [id number]                              Today's Date: 2023       Admit Date: 2023    Visit Dx:     ICD-10-CM ICD-9-CM   1. Acute midline low back pain without sciatica  M54.50 724.2   2. Hypotension, unspecified hypotension type  I95.9 458.9     Patient Active Problem List   Diagnosis   • Iron deficiency anemia   • Anemia, unspecified   • Pacemaker   • Atrial fibrillation (HCC)   • Bradycardia   • Abnormal result of cardiovascular function study   • Cervical disc disorder with radiculopathy   • Cervical radiculopathy   • Cervical stenosis of spinal canal   • Cholecystitis   • CHF (congestive heart failure) (HCC)   • Deep vein thrombosis (DVT)   • Dyspnea   • Family history of diabetes mellitus   • Fibromyositis   • Hyperlipidemia   • Essential hypertension   • Hypertrophic obstructive cardiomyopathy   • Lumbar radiculopathy   • Primary localized osteoarthritis   • Sacroiliitis, not elsewhere classified   • Spondylolisthesis, acquired   • Primary osteoarthritis of right knee   • Morbid obesity   • Primary osteoarthritis of both knees   • Obesity (BMI 30.0-34.9)   • Persistent atrial fibrillation   • Hx of hypertrophic cardiomyopathy   • Coronary artery disease   • Sleep apnea   • CHF exacerbation   • Acute midline low back pain without sciatica   • Hypertensive retinopathy     Past Medical History:   Diagnosis Date   • Anemia    • CHF (congestive heart failure)    • Congenital heart disease    • Coronary artery disease    • GERD (gastroesophageal reflux disease)    • Heart murmur Don't know   • Hx of hypertrophic cardiomyopathy    • Hypertension    • Peptic ulceration    • Persistent atrial fibrillation 01/10/2023   • Primary osteoarthritis of both knees 2021   • Primary osteoarthritis of right knee 2020   • Sleep apnea 2017     Past Surgical History:   Procedure Laterality Date   • ABDOMINAL HERNIA REPAIR     • ABLATION OF DYSRHYTHMIC  FOCUS  01-   • BACK SURGERY     • BREAST AUGMENTATION     • CARDIAC CATHETERIZATION     • CARDIAC ELECTROPHYSIOLOGY PROCEDURE N/A 01/16/2023    Procedure: Ablation atrial fibrillation and flutter, cryo Duong and Ramón aware;  Surgeon: Leeanne Pleitez MD;  Location: [facility] CATH INVASIVE LOCATION;  Service: Cardiovascular;  Laterality: N/A;   • CARDIAC PACEMAKER PLACEMENT  03/16/2017    Dual Chamber   • CERVICAL RIB RESECTION Bilateral    • CERVICAL RIB RESECTION Bilateral 1963    bilateral cervical ribs removed - extra ribs located and causing pain   • CHOLECYSTECTOMY     • COLONOSCOPY     • CORONARY ARTERY BYPASS GRAFT  2013   • THORACIC DECOMPRESSION POSTERIOR FUSION  06/04/2014    L3-5 & S1   • UPPER GASTROINTESTINAL ENDOSCOPY        General Information     Row Name 05/01/23 0943          Physical Therapy Time and Intention    Document Type evaluation  -BR     Mode of Treatment physical therapy  -BR     Row Name 05/01/23 0943          General Information    Patient Profile Reviewed yes  -BR     Prior Level of Function independent:;driving;community mobility;all household mobility  -BR     Row Name 05/01/23 0943          Living Environment    People in Home child(su), adult  -BR     Row Name 05/01/23 0943          Home Main Entrance    Number of Stairs, Main Entrance none  -BR     Row Name 05/01/23 0943          Stairs Within Home, Primary    Number of Stairs, Within Home, Primary five  -BR     Stair Railings, Within Home, Primary railings safe and in good condition  -BR     Row Name 05/01/23 0943          Cognition    Orientation Status (Cognition) oriented x 4  -BR     Row Name 05/01/23 0943          Safety Issues, Functional Mobility    Impairments Affecting Function (Mobility) endurance/activity tolerance;shortness of breath;pain;strength;balance  -BR           User Key  (r) = Recorded By, (t) = Taken By, (c) = Cosigned By    Initials Name Provider Type    BR Annette Perdomo, PT Physical Therapist                Mobility     Row Name 05/01/23 0945          Bed Mobility    Bed Mobility bed mobility (all) activities  -BR     All Activities, [place] (Bed Mobility) supervision  -BR     Assistive Device (Bed Mobility) head of bed elevated  -BR     Row Name 05/01/23 0945          Sit-Stand Transfer    Sit-Stand [place] (Transfers) contact guard  -BR     Row Name 05/01/23 0945          Gait/Stairs (Locomotion)    [place] Level (Gait) contact guard;1 person assist;1 person to manage equipment  -BR     Distance in Feet (Gait) 125 with decreased foot clearance BLE  -BR     Deviations/Abnormal Patterns (Gait) weight shifting decreased;base of support, wide  -BR     Bilateral Gait Deviations heel strike decreased  -BR     [place] Level (Stairs) contact guard  -BR     Number of Steps (Stairs) 4  -BR     Ascending Technique (Stairs) step-over-step  -BR     Descending Technique (Stairs) step-over-step  -BR     Stairs, Impairments strength decreased;impaired balance  -BR           User Key  (r) = Recorded By, (t) = Taken By, (c) = Cosigned By    Initials Name Provider Type    BR Annette Perdomo PT Physical Therapist               Obj/Interventions     Row Name 05/01/23 0947          Range of Motion Comprehensive    General Range of Motion bilateral lower extremity ROM WFL  -BR     Row Name 05/01/23 0947          Strength Comprehensive (MMT)    Comment, General Manual Muscle Testing (MMT) Assessment grossly 3+/5 BLE  -BR     Row Name 05/01/23 0947          Balance    Balance Assessment sitting static balance;sitting dynamic balance;standing static balance;standing dynamic balance;ankle strategy assessment  -BR     Static Sitting Balance modified independence  -BR     Dynamic Sitting Balance standby assist  -BR     Position, Sitting Balance unsupported;sitting edge of bed  -BR     Static Standing Balance contact guard  -BR     Dynamic Standing Balance minimal assist  -BR     Position/Device Used, Standing  Balance unsupported  -BR     Row Name 05/01/23 0947          Sensory Assessment (Somatosensory)    Sensory Assessment (Somatosensory) sensation intact  -BR     Row Name 05/01/23 0947          Ankle Strategy (Balance)    Ankle Strategy Assessment (Balance) decreased ankle strategy  -BR           User Key  (r) = Recorded By, (t) = Taken By, (c) = Cosigned By    Initials Name Provider Type    BR Annette Perdomo, PT Physical Therapist               Goals/Plan     Row Name 05/01/23 1003          Transfer Goal 1 (PT)    Activity/Assistive Device (Transfer Goal 1, PT) transfers, all  -BR     [place] Level/Cues Needed (Transfer Goal 1, PT) modified independence  -BR     Time Frame (Transfer Goal 1, PT) long term goal (LTG);2 weeks  -BR     Row Name 05/01/23 1003          Gait Training Goal 1 (PT)    Activity/Assistive Device (Gait Training Goal 1, PT) gait (walking locomotion)  -BR     [place] Level (Gait Training Goal 1, PT) standby assist  -BR     Distance (Gait Training Goal 1, PT) 300  -BR     Time Frame (Gait Training Goal 1, PT) long term goal (LTG);2 weeks  -BR     Row Name 05/01/23 1003          Therapy Assessment/Plan (PT)    Planned Therapy Interventions (PT) balance training;bed mobility training;gait training;stair training;strengthening;transfer training;patient/family education  -BR           User Key  (r) = Recorded By, (t) = Taken By, (c) = Cosigned By    Initials Name Provider Type    BR Annette Perdomo, PT Physical Therapist               Clinical Impression     Row Name 05/01/23 0948          Pain    Pretreatment Pain Rating 8/10  -BR     Posttreatment Pain Rating 8/10  -BR     Pain Location generalized  -BR     Pain Location - back  -BR     Row Name 05/01/23 0948          Plan of Care Review    Plan of Care Reviewed With patient  -BR     Outcome Evaluation Domonique Lancasterrobbin is an 83 y.o. F past who presented to [facility] on 4/28/23 with acute onset of LBP of intractable nature. CT lumbar spine  was without acute findings. Chest X-ray pending. Nephrology and cardiology are following pt. Past Medical Hx: PACEMAKER, anemia, a-fib, CHF, lumbar and cervical nerve root disorder, HTN, OA, sacroiliitis. Pt reports having 2 recent falls at home. Pt alert and Oriented x 4. At baseline, pt has an son who lives with her in a home with no steps for entry and 5 interior steps. Typically, she drives and uses no AD for community mobility. This date, pt is at a SBA  level for bed mobility and transfers and ambulated 125 feet with CGA of 1 and ascended and descended 4 steps with rail and CGA of 1. Pt ha d shortness of breath after gait and sats were 97% on room air. Pt has general unsteadiness when up on feet with impaired ankle strategy and decreased righting reactions. PT will follow pt while she is in hospital. PT recommendation is Home with Home Health Physical Therapy.  -BR     Row Name 05/01/23 0948          Therapy Assessment/Plan (PT)    Rehab Potential (PT) good, to achieve stated therapy goals  -BR     Criteria for Skilled Interventions Met (PT) yes;meets criteria;skilled treatment is necessary  -BR     Therapy Frequency (PT) 3 times/wk  -BR     Predicted Duration of Therapy Intervention (PT) until D/C  -BR     Row Name 05/01/23 0948          Vital Signs    Pre SpO2 (%) 96  -BR     O2 Delivery Pre Treatment room air  -BR     Post SpO2 (%) 97  -BR     O2 Delivery Post Treatment room air  -BR     Pre Patient Position Supine  -BR     Intra Patient Position Standing  -BR     Post Patient Position Sitting  -BR     Row Name 05/01/23 0948          Positioning and Restraints    Pre-Treatment Position in bed  -BR     Post Treatment Position chair  -BR     In Chair notified nsg;reclined;call light within reach;encouraged to call for assist;exit alarm on  -BR           User Key  (r) = Recorded By, (t) = Taken By, (c) = Cosigned By    Initials Name Provider Type    BR Annette Perdomo, PT Physical Therapist                Outcome Measures     Row Name 05/01/23 1004          How much help from another person do you currently need...    Turning from your back to your side while in flat bed without using bedrails? 4  -BR     Moving from lying on back to sitting on the side of a flat bed without bedrails? 4  -BR     Moving to and from a bed to a chair (including a wheelchair)? 3  -BR     Standing up from a chair using your arms (e.g., wheelchair, bedside chair)? 4  -BR     Climbing 3-5 steps with a railing? 3  -BR     To walk in hospital room? 3  -BR     AM-PAC 6 Clicks Score (PT) 21  -BR     Highest level of mobility 6 --> Walked 10 steps or more  -BR     Row Name 05/01/23 1004          Functional Assessment    Outcome Measure Options AM-PAC 6 Clicks Basic Mobility (PT)  -BR           User Key  (r) = Recorded By, (t) = Taken By, (c) = Cosigned By    Initials Name Provider Type     Annette Perdomo, PT Physical Therapist                             Physical Therapy Education     Title: PT OT SLP Therapies (Done)     Topic: Physical Therapy (Done)     Point: Mobility training (Done)     Learning Progress Summary           Patient Acceptance, E,D, VU,DU by  at 5/1/2023 1004    Acceptance, E,TB, VU by  at 4/29/2023 1631                   Point: Home exercise program (Done)     Learning Progress Summary           Patient Acceptance, E,D, VU,DU by  at 5/1/2023 1004    Acceptance, E,TB, VU by  at 4/29/2023 1631                   Point: Body mechanics (Done)     Learning Progress Summary           Patient Acceptance, E,D, VU,DU by  at 5/1/2023 1004    Acceptance, E,TB, VU by  at 4/29/2023 1631                   Point: Precautions (Done)     Learning Progress Summary           Patient Acceptance, E,D, VU,DU by  at 5/1/2023 1004    Acceptance, E,TB, VU by  at 4/29/2023 1631                               User Key     Initials Effective Dates Name Provider Type Discipline     06/16/21 -  Jenelle Young LPN Licensed Nurse Nurse     MAYLIN 02/01/22 -  Annette Perdomo PT Physical Therapist PT              PT Recommendation and Plan  Planned Therapy Interventions (PT): balance training, bed mobility training, gait training, stair training, strengthening, transfer training, patient/family education  Plan of Care Reviewed With: patient  Outcome Evaluation: Domonique See is an 83 y.o. F past who presented to [facility] on 4/28/23 with acute onset of LBP of intractable nature. CT lumbar spine was without acute findings. Chest X-ray pending. Nephrology and cardiology are following pt. Past Medical Hx: PACEMAKER, anemia, a-fib, CHF, lumbar and cervical nerve root disorder, HTN, OA, sacroiliitis. Pt reports having 2 recent falls at home. Pt alert and Oriented x 4. At baseline, pt has an son who lives with her in a home with no steps for entry and 5 interior steps. Typically, she drives and uses no AD for community mobility. This date, pt is at a SBA  level for bed mobility and transfers and ambulated 125 feet with CGA of 1 and ascended and descended 4 steps with rail and CGA of 1. Pt ha d shortness of breath after gait and sats were 97% on room air. Pt has general unsteadiness when up on feet with impaired ankle strategy and decreased righting reactions. PT will follow pt while she is in hospital. PT recommendation is Home with Home Health Physical Therapy.     Time Calculation:    PT Charges     Row Name 05/01/23 1004             Time Calculation    Start Time 0900  -BR      Stop Time 0928  -BR      Time Calculation (min) 28 min  -BR      PT Received On 05/01/23  -BR      PT - Next Appointment 05/03/23  -BR      PT Goal Re-Cert Due Date 05/15/23  -BR         Time Calculation- PT    Total Timed Code Minutes- PT 0 minute(s)  -BR            User Key  (r) = Recorded By, (t) = Taken By, (c) = Cosigned By    Initials Name Provider Type    Annette Ba PT Physical Therapist              Therapy Charges for Today     Code Description Service Date  Service Provider Modifiers Qty    06694812296 HC PT EVAL MOD COMPLEXITY 4 5/1/2023 Annette Perdomo, PT GP 1          PT G-Codes  Outcome Measure Options: AM-PAC 6 Clicks Basic Mobility (PT)  AM-PAC 6 Clicks Score (PT): 21  PT Discharge Summary  Anticipated Discharge Disposition (PT): home with home health    Annette Perdomo, PT  5/1/2023

## 2023-05-01 NOTE — PLAN OF CARE
Goal Outcome Evaluation:      BP stable. Pt ambulating in room well. PO PRN pain med given.

## 2023-05-01 NOTE — CASE MANAGEMENT/SOCIAL WORK
Discharge Planning Assessment   [place]     Patient Name: Domonique See  MRN: [id number]  Today's Date: 5/1/2023    Admit Date: 4/28/2023    Plan: PT recommends home health. Need  choice   Discharge Needs Assessment     Row Name 05/01/23 1603       Living Environment    People in Home child(su), adult    Current Living Arrangements home    Primary Care Provided by self    Provides Primary Care For no one    Family Caregiver if Needed child(su), adult    Quality of Family Relationships helpful    Able to Return to Prior Arrangements yes       Resource/Environmental Concerns    Resource/Environmental Concerns none    Transportation Concerns none       Transition Planning    Patient/Family Anticipated Services at Transition home health care    Transportation Anticipated family or friend will provide       Discharge Needs Assessment    Readmission Within the Last 30 Days no previous admission in last 30 days    Equipment Currently Used at Home cpap;nebulizer    Concerns to be Addressed discharge planning    Anticipated Changes Related to Illness none    Equipment Needed After Discharge other (see comments)  to be determined    Discharge Facility/Level of Care Needs home with home health    Provided Post Acute Provider List? Yes    Post Acute Provider List Home Health;DME Supplier    Provided Post Acute Provider Quality & Resource List? Yes    Post Acute Provider Quality and Resource List Home Health    Delivered To Patient    Method of Delivery In person               Discharge Plan     Row Name 05/01/23 1604       Plan    Plan PT recommends home health. Need HH choice    Plan Comments Barriers to dc: consults and monitoring.              Continued Care and Services - Admitted Since 4/28/2023    Coordination has not been started for this encounter.       Expected Discharge Date and Time     Expected Discharge Date Expected Discharge Time    May 2, 2023          Demographic Summary     Row Name 05/01/23 1609        General Information    Admission Type inpatient    Arrived From emergency department    Required Notices Provided Important Message from Medicare    Referral Source admission list    Reason for Consult discharge planning    Preferred Language English               Functional Status     Row Name 05/01/23 1603       Functional Status    Usual Activity Tolerance moderate    Current Activity Tolerance moderate       Functional Status, IADL    Medications independent    Meal Preparation independent    Housekeeping independent    Laundry independent    Shopping independent       Mental Status    General Appearance WDL WDL       Mental Status Summary    Recent Changes in Mental Status/Cognitive Functioning no changes                         Francesca Jaimes RN

## 2023-05-01 NOTE — PROGRESS NOTES
LOS: 1 day   Patient Care Team:  Anish Lew FNP as PCP - General  Evgeny Gregory MD as Consulting Physician (Cardiology)  Leeanne Pleitez MD as Consulting Physician (Cardiology)  Radha Owusu APRN as Nurse Practitioner (Nurse Practitioner)  Kathe Muñoz MD as Consulting Physician (Nephrology)    Subjective     Patient is feeling better up to chair continues with weaknes    Review of Systems   Constitutional: Positive for activity change and fatigue. Negative for appetite change.   HENT: Negative.    Respiratory: Positive for cough.    Cardiovascular: Negative.    Gastrointestinal: Negative.    Genitourinary: Negative.    Musculoskeletal: Positive for back pain. Negative for gait problem.   Neurological: Positive for weakness.   Psychiatric/Behavioral: Negative.            Objective     Vital Signs  Temp:  [98.2 °F (36.8 °C)-98.4 °F (36.9 °C)] 98.4 °F (36.9 °C)  Heart Rate:  [60-62] 61  Resp:  [14-16] 14  BP: (124-136)/(75-88) 136/75      Physical Exam  Vitals reviewed.   Constitutional:       Appearance: She is obese. She is not ill-appearing.   HENT:      Head: Normocephalic and atraumatic.      Right Ear: External ear normal.      Left Ear: External ear normal.      Nose: Nose normal.      Mouth/Throat:      Mouth: Mucous membranes are moist.   Eyes:      General:         Right eye: No discharge.         Left eye: No discharge.   Cardiovascular:      Rate and Rhythm: Normal rate and regular rhythm.      Pulses: Normal pulses.      Heart sounds: Normal heart sounds.   Pulmonary:      Effort: Pulmonary effort is normal.      Breath sounds: Normal breath sounds.   Abdominal:      General: Bowel sounds are normal.      Palpations: Abdomen is soft.   Musculoskeletal:         General: Normal range of motion.      Cervical back: Normal range of motion.   Skin:     General: Skin is warm and dry.   Neurological:      Mental Status: She is alert and oriented to person, place, and  time.   Psychiatric:         Behavior: Behavior normal.              Results Review:    Lab Results (last 24 hours)     Procedure Component Value Units Date/Time    POC Glucose Once [[id number]]  (Normal) Collected: 05/01/23 0754    Specimen: Blood Updated: 05/01/23 0756     Glucose 85 mg/dL      Comment: Serial Number: [id number]:  [id number]       Manual Differential [[id number]]  (Abnormal) Collected: 04/30/23 2343    Specimen: Blood Updated: 05/01/23 0121     Neutrophil % 80.0 %      Lymphocyte % 9.0 %      Monocyte % 5.0 %      Bands %  4.0 %      Metamyelocyte % 2.0 %      Neutrophils Absolute 5.96 10*3/mm3      Lymphocytes Absolute 0.64 10*3/mm3      Monocytes Absolute 0.36 10*3/mm3      Dacrocytes Slight/1+     Ovalocytes Slight/1+     Poikilocytes Slight/1+     WBC Morphology Normal     Platelet Estimate Adequate    CBC & Differential [[id number]]  (Abnormal) Collected: 04/30/23 2343    Specimen: Blood Updated: 05/01/23 0121    Narrative:      The following orders were created for panel order CBC & Differential.  Procedure                               Abnormality         Status                     ---------                               -----------         ------                     CBC Auto Differential[[id number]]        Abnormal            Final result               Scan Slide[[id number]]                                       Final result                 Please view results for these tests on the individual orders.    Scan Slide [[id number]] Collected: 04/30/23 2343    Specimen: Blood Updated: 05/01/23 0121     Scan Slide --     Comment: See Manual Differential Results       CBC Auto Differential [[id number]]  (Abnormal) Collected: 04/30/23 2343    Specimen: Blood Updated: 05/01/23 0121     WBC 7.10 10*3/mm3      RBC 3.69 10*6/mm3      Hemoglobin 11.0 g/dL      Hematocrit 33.9 %      MCV 91.9 fL      MCH 29.7 pg      MCHC 32.4 g/dL      RDW 15.4 %      RDW-SD 49.9 fl      MPV 9.2 fL      Platelets 178  10*3/mm3     Narrative:      The previously reported component NRBC is no longer being reported. Previous result was 0.1 /100 WBC (Reference Range: 0.0-0.2 /100 WBC) on 5/1/2023 at 0019 EDT.    Basic Metabolic Panel [[id number]]  (Abnormal) Collected: 04/30/23 2343    Specimen: Blood Updated: 05/01/23 0022     Glucose 115 mg/dL      BUN 11 mg/dL      Creatinine 0.78 mg/dL      Sodium 134 mmol/L      Potassium 4.5 mmol/L      Chloride 100 mmol/L      CO2 26.0 mmol/L      Calcium 9.4 mg/dL      BUN/Creatinine Ratio 14.1     Anion Gap 8.0 mmol/L      eGFR 75.0 mL/min/1.73     Narrative:      GFR Normal >60  Chronic Kidney Disease <60  Kidney Failure <15    The GFR formula is only valid for adults with stable renal function between ages 18 and 70.    Magnesium [[id number]]  (Normal) Collected: 04/30/23 2343    Specimen: Blood Updated: 05/01/23 0022     Magnesium 1.9 mg/dL     Phosphorus [[id number]]  (Normal) Collected: 04/30/23 2343    Specimen: Blood Updated: 05/01/23 0022     Phosphorus 2.7 mg/dL     Blood Culture - Blood, Arm, Right [[id number]]  (Normal) Collected: 04/28/23 2036    Specimen: Blood from Arm, Right Updated: 04/30/23 2045     Blood Culture No growth at 2 days    Narrative:      Less than seven (7) mL's of blood was collected.  Insufficient quantity may yield false negative results.    Blood Culture - Blood, Arm, Left [[id number]]  (Normal) Collected: 04/28/23 2030    Specimen: Blood from Arm, Left Updated: 04/30/23 2045     Blood Culture No growth at 2 days    Narrative:      Less than seven (7) mL's of blood was collected.  Insufficient quantity may yield false negative results.           Imaging Results (Last 24 Hours)     ** No results found for the last 24 hours. **               I reviewed the patient's new clinical results.    Medication Review:   Scheduled Meds:apixaban, 2.5 mg, Oral, BID  atorvastatin, 40 mg, Oral, Nightly  [START ON 5/2/2023] cosyntropin, 0.25 mg, Intravenous, Once  magnesium  sulfate, 1 g, Intravenous, Once  pantoprazole, 40 mg, Oral, Q AM  sodium chloride, 3 mL, Intravenous, Q12H      Continuous Infusions:   PRN Meds:.•  acetaminophen  •  albuterol  •  hydrALAZINE  •  nitroglycerin  •  ondansetron **OR** ondansetron  •  oxyCODONE  •  [COMPLETED] Insert Peripheral IV **AND** sodium chloride  •  sodium chloride  •  sodium chloride     Interval History:    4/30 patient without any complaints of pain this morning however blood pressure has been labile from hypotensive to mild hypertension.  Nephrology following continue supportive care and plan we will add PT and OT evaluation    Assessment & Plan     Acute midline low back pain without sciatica  -CT L-spine without any acute findings  Profound fatigue/weakness-improving    Acute renal failure due to volume depletion-resolved  -Nephrology following  -DC diclofenac, lisinopril and Bumex     Dehydration-resolved    Hypotension with labile blood pressure-improving  Atrial fibrillation-rate controlled and anticoagulated with Eliquis  Anemia  History of hypertrophic obstructive cardiomyopathy status post prior myomectomy  History of nonsustained VT   Permanent pacemaker   Degenerative disc disease lumbosacral spine    Plan for disposition:TBD waiting on PT assessment    Zoey Acosta, WOODY  05/01/23  09:45 EDT

## 2023-05-01 NOTE — PLAN OF CARE
Goal Outcome Evaluation:      Pt has not felt well today. Has had moderate to severe aching pain all over body. PO pain meds given. Viral panel sent to lab. Came back positive for rhinovirus. Placed in droplet precautions. Ambulates to bathroom with standby assist. Room air. VSS. Call light and personal items within reach. Care plan ongoing.     Progress: no change

## 2023-05-02 ENCOUNTER — TRANSCRIBE ORDERS (OUTPATIENT)
Dept: HOME HEALTH SERVICES | Facility: HOME HEALTHCARE | Age: 83
End: 2023-05-02
Payer: MEDICARE

## 2023-05-02 ENCOUNTER — READMISSION MANAGEMENT (OUTPATIENT)
Dept: CALL CENTER | Facility: HOSPITAL | Age: 83
End: 2023-05-02
Payer: MEDICARE

## 2023-05-02 ENCOUNTER — HOME HEALTH ADMISSION (OUTPATIENT)
Dept: HOME HEALTH SERVICES | Facility: HOME HEALTHCARE | Age: 83
End: 2023-05-02
Payer: MEDICARE

## 2023-05-02 VITALS
HEIGHT: 60 IN | TEMPERATURE: 98.2 F | RESPIRATION RATE: 16 BRPM | WEIGHT: 204.15 LBS | DIASTOLIC BLOOD PRESSURE: 81 MMHG | SYSTOLIC BLOOD PRESSURE: 149 MMHG | BODY MASS INDEX: 40.08 KG/M2 | HEART RATE: 60 BPM | OXYGEN SATURATION: 93 %

## 2023-05-02 DIAGNOSIS — M54.50 ACUTE MIDLINE LOW BACK PAIN WITHOUT SCIATICA: Primary | ICD-10-CM

## 2023-05-02 LAB
ALBUMIN SERPL ELPH-MCNC: 3 G/DL (ref 2.9–4.4)
ALBUMIN/GLOB SERPL: 1.3 {RATIO} (ref 0.7–1.7)
ALPHA1 GLOB SERPL ELPH-MCNC: 0.4 G/DL (ref 0–0.4)
ALPHA2 GLOB SERPL ELPH-MCNC: 0.8 G/DL (ref 0.4–1)
ANION GAP SERPL CALCULATED.3IONS-SCNC: 9 MMOL/L (ref 5–15)
B-GLOBULIN SERPL ELPH-MCNC: 0.8 G/DL (ref 0.7–1.3)
BUN SERPL-MCNC: 10 MG/DL (ref 8–23)
BUN/CREAT SERPL: 10.6 (ref 7–25)
CALCIUM SPEC-SCNC: 9 MG/DL (ref 8.6–10.5)
CHLORIDE SERPL-SCNC: 101 MMOL/L (ref 98–107)
CO2 SERPL-SCNC: 26 MMOL/L (ref 22–29)
CORTIS SERPL-MCNC: 12.4 MCG/DL
CORTIS SERPL-MCNC: 25.28 MCG/DL
CORTIS SERPL-MCNC: 27.01 MCG/DL
CREAT SERPL-MCNC: 0.94 MG/DL (ref 0.57–1)
DEPRECATED RDW RBC AUTO: 47.7 FL (ref 37–54)
EGFRCR SERPLBLD CKD-EPI 2021: 60 ML/MIN/1.73
ERYTHROCYTE [DISTWIDTH] IN BLOOD BY AUTOMATED COUNT: 15 % (ref 12.3–15.4)
GAMMA GLOB SERPL ELPH-MCNC: 0.4 G/DL (ref 0.4–1.8)
GLOBULIN SER CALC-MCNC: 2.3 G/DL (ref 2.2–3.9)
GLUCOSE SERPL-MCNC: 91 MG/DL (ref 65–99)
HCT VFR BLD AUTO: 32.3 % (ref 34–46.6)
HGB BLD-MCNC: 10.5 G/DL (ref 12–15.9)
LABORATORY COMMENT REPORT: ABNORMAL
M PROTEIN SERPL ELPH-MCNC: ABNORMAL G/DL
MCH RBC QN AUTO: 29.5 PG (ref 26.6–33)
MCHC RBC AUTO-ENTMCNC: 32.4 G/DL (ref 31.5–35.7)
MCV RBC AUTO: 91.1 FL (ref 79–97)
PLATELET # BLD AUTO: 189 10*3/MM3 (ref 140–450)
PMV BLD AUTO: 8.4 FL (ref 6–12)
POTASSIUM SERPL-SCNC: 4.7 MMOL/L (ref 3.5–5.2)
PROT PATTERN SERPL ELPH-IMP: ABNORMAL
PROT SERPL-MCNC: 5.3 G/DL (ref 6–8.5)
RBC # BLD AUTO: 3.55 10*6/MM3 (ref 3.77–5.28)
SODIUM SERPL-SCNC: 136 MMOL/L (ref 136–145)
WBC NRBC COR # BLD: 6.5 10*3/MM3 (ref 3.4–10.8)

## 2023-05-02 PROCEDURE — 85027 COMPLETE CBC AUTOMATED: CPT | Performed by: INTERNAL MEDICINE

## 2023-05-02 PROCEDURE — 25010000002 COSYNTROPIN PER 0.25 MG: Performed by: NURSE PRACTITIONER

## 2023-05-02 PROCEDURE — 82533 TOTAL CORTISOL: CPT | Performed by: NURSE PRACTITIONER

## 2023-05-02 PROCEDURE — 99232 SBSQ HOSP IP/OBS MODERATE 35: CPT | Performed by: NURSE PRACTITIONER

## 2023-05-02 PROCEDURE — 80048 BASIC METABOLIC PNL TOTAL CA: CPT | Performed by: INTERNAL MEDICINE

## 2023-05-02 RX ADMIN — APIXABAN 2.5 MG: 2.5 TABLET, FILM COATED ORAL at 09:51

## 2023-05-02 RX ADMIN — PANTOPRAZOLE SODIUM 40 MG: 40 TABLET, DELAYED RELEASE ORAL at 09:51

## 2023-05-02 RX ADMIN — OXYCODONE 5 MG: 5 TABLET ORAL at 02:03

## 2023-05-02 RX ADMIN — Medication 3 ML: at 09:51

## 2023-05-02 RX ADMIN — COSYNTROPIN 0.25 MG: 0.25 INJECTION, POWDER, LYOPHILIZED, FOR SOLUTION INTRAVENOUS at 02:04

## 2023-05-02 RX ADMIN — POLYETHYLENE GLYCOL 3350 17 G: 17 POWDER, FOR SOLUTION ORAL at 09:51

## 2023-05-02 RX ADMIN — OXYCODONE 5 MG: 5 TABLET ORAL at 09:51

## 2023-05-02 NOTE — PROGRESS NOTES
Cardiology Progress Note    Patient Identification:  Name: Domonique See  Age: 83 y.o.  Sex: female  :  1940  MRN: [id number]                 Follow Up / Chief Complaint: Weakness, hypotension   Chief Complaint   Patient presents with   • Back Pain     Pt fell twice last week, now experiencing increased back pain. Pt sent from [facility] for CT scan of back       Interval History: Patient presented on 2023 with fall, weakness.  She was noted to be hypotensive. Positive for rhinovirus     NP NOTE:  Patient seen.  She test positive for rhinovirus, likely her cause of weakness due to dehydration.  Patient denies any chest pain.  She continues to reports some dyspnea on exertion and diffuse sweating on exertion (she reports her sister and mother did this too).   Advise patient to continue OFF of bumex and lisinopril.  Monitor blood pressure and weight closely as home.  IF weight or BP going back up then restart lower dose of bumex (0.5mg). CXR unremarkable.  Follow up at already scheduled appointment with Dr. Gregory.     Electronically signed by WOODY Thomas, 23, 1:32 PM EDT.         Cardiology attending addendum :    I have personally performed a face-to-face diagnostic evaluation, physical exam and reviewed data on this patient.  I have reviewed documentation done by me and nurse practitioner  and corrected as needed.  And agree with the different components of documentation.Greater than 50% of the time spent in the care of this patient was provided by attending consultant/me.       Subjective: Patient seen and examined.  Chart reviewed.  Labs reviewed.  Discussed with RN taking care of patient.  Patient says she has not been feeling well since her COVID diagnosis during .      Objective:   2023: HS troponin 19-- 20---13; bun 33 creatinine 1.88, WBC 12.3, hgb 10.8 UA unremarkable, blood cultures negative thus far, lactic normal   2023: glucose elevated, , bun 18  creatinine 0.84  TSH 4.92 hgb 10.4    PM random cortisol was 9.5  4/30/2023: glucose elevated, , hgb 11.0   5/2/2023: hgb 10.5     History of present illness :    Ms. Domonique See  has PMH of     Hypertrophic cardiomyopathy, history of septal myomectomy,      nonsustained VT  hypertension  SSS, [employer] permanent pacemaker 3/16/2017  Paroxysmal atrial fibrillation, refused anticoagulation  CAD, cath 12/2017 30% LAD  CHF due to hypertrophic cardiomyopathy  Prediabetes with A1c of 5.8 on 1/19/2023  Hyperlipidemia  COPD  Peptic ulcer disease  Back surgery, breast augmentation  Former smoker         Patient presented to the emergency department at [facility] 4/28/2023 with acute on chronic low back pain, fatigue, shortness of breath.  She reports symptoms progressively worsening over the past week.  She reports shortness of breath with activity.  She denies any lower extremity edema, orthopnea or PND.  She does report multiple falls over the past 2 weeks.  Upon my evaluation, she is supine in bed asleep with normal respiratory effort.  She does not report significant improvement in her symptoms.      Chart review   Admitted 1/18/2023 with CHF.  Patient was recently in the hospital 1/16/2023 underwent A. fib ablation was given fluids in the postop orders and came in with shortness of breath, which is progressively worse initially with exertion and at rest.  Developed hypoxemia with O2 sats of 85 to 88% on room air was put on 4 L by EMS.     Work-up here 1/18/2023 revealed elevated troponin over 1.  proBNP over 3000.  Glucose elevated.  A1c 1/19/2023 was 5.8.  Hemoglobin over 10.  MCV normal.  Chest x-ray 1/18/2023 reveals increased vascular interstitial markings consistent with fluid overload.  EKG done 1/18/2023 reveals sinus rhythm with first-degree AV block and PACs.        Echocardiogram 6/29/2021 reveals EF of 50% with LV and RV  Labs from 3/8/2022 revealed normal ESR and CRP.  A1c improved  to 5.9 from 6.1 previously, TSH 3.9.  CMP with a potassium of 5.3 creatinine of 1.05 and GFR 52.  Normal CBC.  Lipid profile with cholesterol 124 triglycerides 73 HDL 57 LDL 52.        Assessment:     Weakness, hypotension   Shortness of breath  Dyspnea on exertion  Acute on chronic HFpEF due to fluid overload and diastolic dysfunction from hypertrophic cardiomyopathy and A. fib.  Hypertrophic cardiomyopathy, myomectomy, NSVT  Chronic HFpEF due to diastolic dysfunction from hypertrophic cardiomyopathy  Hypertension  Dyslipidemia  Prediabetes  Paroxysmal atrial fibrillation, status post ablation, long-term current use of anticoagulation  Obesity with BMI over 30  CKD  Hyperkalemia        Recommendations and plan:     Telemetry is revealing sinus rhythm with a intermittent paced rhythm.  Will monitor telemetry  Will monitor O2 sats  Monitor renal function and urine output.  Echocardiogram 1/19/2023 revealed normal LV systolic function moderate MR, mild AR  CHADVASC2 SCORE   [id number] Score: 7 (11/25/2022 10:42 PM)  Patient has a high CHADS2 Vascor of 7 due to female gender, age over 75, history of stroke, CHF, hypertension making it 7, will benefit from long-term anticoagulation to prevent thromboembolic events.  We will continue Eliquis as tolerated.  Patient is hypotensive we will hold off on lisinopril.  We will check respiratory viral panel  We will check ACTH stim test  Continue aspirin, atorvastatin  as tolerated  Reviewed BMI over 30, counseled on weight loss diet and exercise.  Follow-up in pacemaker clinic.  Check O2 saturations.  Discussed with RN taking care of patient to coordinate care     Lexiscan Cardiolite performed 4/7/2022 reviewed/interpreted by me reveals normal perfusion EF of 64%      :    Copied text in this portion of the note has been reviewed and is accurate as of 5/2/2023    Past Medical History:  Past Medical History:   Diagnosis Date   • Anemia    • CHF (congestive heart failure)    •  Congenital heart disease    • Coronary artery disease    • GERD (gastroesophageal reflux disease)    • Heart murmur Don't know   • Hx of hypertrophic cardiomyopathy    • Hypertension    • Peptic ulceration    • Persistent atrial fibrillation 01/10/2023   • Primary osteoarthritis of both knees 2021   • Primary osteoarthritis of right knee 2020   • Sleep apnea 2017     Past Surgical History:  Past Surgical History:   Procedure Laterality Date   • ABDOMINAL HERNIA REPAIR     • ABLATION OF DYSRHYTHMIC FOCUS  2023   • BACK SURGERY     • BREAST AUGMENTATION     • CARDIAC CATHETERIZATION     • CARDIAC ELECTROPHYSIOLOGY PROCEDURE N/A 2023    Procedure: Ablation atrial fibrillation and flutter, cryo Utong and Ramón aware;  Surgeon: Leeanne Pleitez MD;  Location: [facility] INVASIVE LOCATION;  Service: Cardiovascular;  Laterality: N/A;   • CARDIAC PACEMAKER PLACEMENT  2017    Dual Chamber   • CERVICAL RIB RESECTION Bilateral    • CERVICAL RIB RESECTION Bilateral 1963    bilateral cervical ribs removed - extra ribs located and causing pain   • CHOLECYSTECTOMY     • COLONOSCOPY     • CORONARY ARTERY BYPASS GRAFT     • THORACIC DECOMPRESSION POSTERIOR FUSION  2014    L3-5 & S1   • UPPER GASTROINTESTINAL ENDOSCOPY          Social History:   Social History     Tobacco Use   • Smoking status: Former     Packs/day: 1.00     Years: 8.00     Pack years: 8.00     Types: Cigarettes     Start date: 1977     Quit date: 1985     Years since quittin.3     Passive exposure: Past   • Smokeless tobacco: Never   • Tobacco comments:     Quit    Substance Use Topics   • Alcohol use: Not Currently     Comment: 6 drinks a year in social settings      Family History:  Family History   Problem Relation Age of Onset   • Colon cancer Mother    • Colon cancer Brother    • Heart attack Brother    • Colon cancer Daughter           Allergies:  No Known Allergies  Scheduled Meds:  apixaban,  "2.5 mg, BID  atorvastatin, 40 mg, Nightly  pantoprazole, 40 mg, Q AM  polyethylene glycol, 17 g, Daily  sodium chloride, 3 mL, Q12H          Review of Systems:   ROS  Review of Systems   Constitution: Negative for chills and fever.   Cardiovascular: Negative for chest pain and palpitations.   Respiratory: Negative for cough and hemoptysis.    Gastrointestinal: Negative for nausea.        Constitutional:  Temp:  [98 °F (36.7 °C)-98.5 °F (36.9 °C)] 98.2 °F (36.8 °C)  Heart Rate:  [60] 60  Resp:  [14-16] 16  BP: (106-149)/(61-81) 149/81    Physical Exam   /81 (BP Location: Right arm, Patient Position: Sitting)   Pulse 60   Temp 98.2 °F (36.8 °C) (Oral)   Resp 16   Ht 152.4 cm (60\")   Wt 92.6 kg (204 lb 2.3 oz)   LMP  (LMP Unknown)   SpO2 93%   BMI 39.87 kg/m²   General:  Appears in no acute distress  Eyes: Sclera is anicteric,  conjunctiva is clear   HEENT:  No JVD. Thyroid not visibly enlarged. No mucosal pallor or cyanosis  Respiratory: Respirations regular and unlabored at rest.  Bilaterally good breath sounds, with good air entry in all fields. No crackles, rubs or wheezes auscultated  Cardiovascular: S1,S2 Regular rate and rhythm.  2 out of 6 systolic ejection murmur, no rub or gallop auscultated.  . No pretibial pitting edema  Gastrointestinal: Abdomen nondistended, soft  Musculoskeletal:  No abnormal movements  Extremities: No digital clubbing or cyanosis  Skin: Color pink. Skin warm and dry to touch. No rashes  No xanthoma  Neuro: Alert and awake, no lateralizing deficits appreciated    INTAKE AND OUTPUT:    Intake/Output Summary (Last 24 hours) at 5/2/2023 0839  Last data filed at 5/1/2023 2048  Gross per 24 hour   Intake 480 ml   Output --   Net 480 ml       Cardiographics  Telemetry: sinus rhythm     ECG:   ECG 12 Lead Other; hypotension   Final Result   HEART RATE= 60  bpm   RR Interval= 999  ms   NJ Interval= 289  ms   P Horizontal Axis= 236  deg   P Front Axis= 0  deg   QRSD Interval= 119  " ms   QT Interval= 431  ms   QRS Axis= -31  deg   T Wave Axis= 103  deg   - ABNORMAL ECG -   Atrial-paced complexes   Prolonged IL interval   Incomplete left bundle branch block   LVH w/ repol abnormalities, possible ischemia   When compared with ECG of 18-Jan-2023 13:56:54,   New or worsened ischemia or infarction   Electronically Signed By: Kade Ku (ELSA) 01-May-2023 09:19:54   Date and Time of Study: 2023-04-28 18:21:45      SCANNED - TELEMETRY     Final Result      SCANNED - TELEMETRY     Final Result      SCANNED - TELEMETRY     Final Result      SCANNED - TELEMETRY     Final Result      SCANNED - TELEMETRY     Final Result      SCANNED - TELEMETRY     Final Result      SCANNED - TELEMETRY     Final Result      SCANNED - TELEMETRY     Final Result      SCANNED - TELEMETRY     Final Result      SCANNED - TELEMETRY     Final Result      SCANNED - TELEMETRY     Final Result        I have personally reviewed EKG    Echocardiogram: Results for orders placed during the hospital encounter of 01/18/23    Adult Transthoracic Echo Complete w/ Color, Spectral and Contrast if necessary per protocol    Interpretation Summary  •  Estimated right ventricular systolic pressure from tricuspid regurgitation is markedly elevated (>55 mmHg).      Normal LV size and contractility EF of 60 to 65%  Severe right ventricular and right atrial enlargement, moderate left atrial enlargement seen  Pulmonic valve is not well visualized.  Aortic valve, mitral valve, tricuspid valve appears structurally normal, moderate mitral and mild aortic regurgitation seen.  No pericardial effusion seen.  Proximal aorta appears normal in size.      Lab Review   I have reviewed the labs  Results from last 7 days   Lab Units 04/29/23  1744 04/28/23  2224 04/28/23  1818   CK TOTAL U/L  --  23  --    HSTROP T ng/L 13* 20* 19*     Results from last 7 days   Lab Units 04/30/23  2343   MAGNESIUM mg/dL 1.9     Results from last 7 days   Lab Units  "05/02/23  0246   SODIUM mmol/L 136   POTASSIUM mmol/L 4.7   BUN mg/dL 10   CREATININE mg/dL 0.94   CALCIUM mg/dL 9.0         Results from last 7 days   Lab Units 05/02/23  0246 04/30/23  2343 04/29/23  2303   WBC 10*3/mm3 6.50 7.10 7.00   HEMOGLOBIN g/dL 10.5* 11.0* 10.4*   HEMATOCRIT % 32.3* 33.9* 31.1*   PLATELETS 10*3/mm3 189 178 153           RADIOLOGY:  Imaging Results (Last 24 Hours)     Procedure Component Value Units Date/Time    XR Chest 1 View [[id number]] Collected: 05/01/23 1028     Updated: 05/01/23 1030    Narrative:      XR CHEST 1 VW    Date of Exam: 5/1/2023 10:21 AM EDT    Indication: weakness, hypotension    Comparison: AP portable chest 1/18/2023.    Findings:  No acute airspace disease. Heart size is normal. Median sternotomy changes are present. Pacemaker leads extend to the expected locations of the right atrium and right ventricle. No right or left pneumothorax is identified. Pulmonary vascular distribution   is normal. Bilateral breast implants are incidentally noted. There are surgical changes of the cervical spine.      Impression:      Impression:    1. No acute chest findings.      Electronically Signed: Elena Lomax    5/1/2023 10:28 AM EDT    Workstation ID: [id number]                )5/2/2023  MD JONO Barajas Dragon/Transcription:   \"Dictated utilizing Dragon dictation\".   "

## 2023-05-02 NOTE — PROGRESS NOTES
"NEPHROLOGY PROGRESS NOTE------[facility]    [facility]  [phone]  Juliana Muñoz MD      Patient Care Team:  Anish Lew FNP as PCP - Evgeny Machuca MD as Consulting Physician (Cardiology)  Leeanne Pleitez MD as Consulting Physician (Cardiology)  Radha Owusu APRN as Nurse Practitioner (Nurse Practitioner)  Kathe Muñoz MD as Consulting Physician (Nephrology)      Provider:  Juliana Muñoz MD  Patient Name: Domonique See  :  1940    SUBJECTIVE:    F/U CRF/CKD    No complaints. No SOB, CP, palpitations, cramping. No dysuria.     Medication:  apixaban, 2.5 mg, Oral, BID  atorvastatin, 40 mg, Oral, Nightly  pantoprazole, 40 mg, Oral, Q AM  polyethylene glycol, 17 g, Oral, Daily  sodium chloride, 3 mL, Intravenous, Q12H           OBJECTIVE    Vital Sign Min/Max for last 24 hours  Temp  Min: 98 °F (36.7 °C)  Max: 98.5 °F (36.9 °C)   BP  Min: 106/63  Max: 149/81   Pulse  Min: 60  Max: 60   Resp  Min: 14  Max: 16   SpO2  Min: 92 %  Max: 97 %   No data recorded   No data recorded     Flowsheet Rows    Flowsheet Row First Filed Value   Admission Height 152.4 cm (60\") Documented at 2023 1734   Admission Weight 86.2 kg (190 lb) Documented at 2023 1734          No intake/output data recorded.  I/O last 3 completed shifts:  In: 900 [P.O.:900]  Out: -     Physical Exam:  General Appearance: alert, appears stated age and cooperative    Head: normocephalic, without obvious abnormality and atraumatic    Eyes: conjunctivae and sclerae normal and no icterus  Neck: supple and no JVD  Lungs: clear to auscultation and respirations regular  Heart: IRREG IRREG +MELVIN. NO GALLOP, RUB, OR S3  Chest Wall: no abnormalities observed  Abdomen: normal bowel sounds and soft, nontender +OBESITY  Extremities: moves extremities well, no edema, no cyanosis +DJD  Skin: no bleeding, bruising or rash  "   Neurologic: Alert, and oriented. No focal deficits  Labs:    WBC WBC   Date Value Ref Range Status   05/02/2023 6.50 3.40 - 10.80 10*3/mm3 Final   04/30/2023 7.10 3.40 - 10.80 10*3/mm3 Final   04/29/2023 7.00 3.40 - 10.80 10*3/mm3 Final      HGB Hemoglobin   Date Value Ref Range Status   05/02/2023 10.5 (L) 12.0 - 15.9 g/dL Final   04/30/2023 11.0 (L) 12.0 - 15.9 g/dL Final   04/29/2023 10.4 (L) 12.0 - 15.9 g/dL Final      HCT Hematocrit   Date Value Ref Range Status   05/02/2023 32.3 (L) 34.0 - 46.6 % Final   04/30/2023 33.9 (L) 34.0 - 46.6 % Final   04/29/2023 31.1 (L) 34.0 - 46.6 % Final      Platelets No results found for: LABPLAT   MCV MCV   Date Value Ref Range Status   05/02/2023 91.1 79.0 - 97.0 fL Final   04/30/2023 91.9 79.0 - 97.0 fL Final   04/29/2023 90.2 79.0 - 97.0 fL Final          Sodium Sodium   Date Value Ref Range Status   05/02/2023 136 136 - 145 mmol/L Final   04/30/2023 134 (L) 136 - 145 mmol/L Final   04/29/2023 133 (L) 136 - 145 mmol/L Final      Potassium Potassium   Date Value Ref Range Status   05/02/2023 4.7 3.5 - 5.2 mmol/L Final     Comment:     Slight hemolysis detected by analyzer. Results may be affected.   04/30/2023 4.5 3.5 - 5.2 mmol/L Final   04/29/2023 5.1 3.5 - 5.2 mmol/L Final      Chloride Chloride   Date Value Ref Range Status   05/02/2023 101 98 - 107 mmol/L Final   04/30/2023 100 98 - 107 mmol/L Final   04/29/2023 101 98 - 107 mmol/L Final      CO2 CO2   Date Value Ref Range Status   05/02/2023 26.0 22.0 - 29.0 mmol/L Final   04/30/2023 26.0 22.0 - 29.0 mmol/L Final   04/29/2023 24.0 22.0 - 29.0 mmol/L Final      BUN BUN   Date Value Ref Range Status   05/02/2023 10 8 - 23 mg/dL Final   04/30/2023 11 8 - 23 mg/dL Final   04/29/2023 18 8 - 23 mg/dL Final      Creatinine Creatinine   Date Value Ref Range Status   05/02/2023 0.94 0.57 - 1.00 mg/dL Final   04/30/2023 0.78 0.57 - 1.00 mg/dL Final   04/29/2023 0.84 0.57 - 1.00 mg/dL Final      Calcium Calcium   Date Value Ref  Range Status   05/02/2023 9.0 8.6 - 10.5 mg/dL Final   04/30/2023 9.4 8.6 - 10.5 mg/dL Final   04/29/2023 8.9 8.6 - 10.5 mg/dL Final      PO4 No components found for: PO4   Albumin Albumin   Date Value Ref Range Status   04/29/2023 3.2 (L) 3.5 - 5.2 g/dL Final      Magnesium Magnesium   Date Value Ref Range Status   04/30/2023 1.9 1.6 - 2.4 mg/dL Final   04/29/2023 1.9 1.6 - 2.4 mg/dL Final      Uric Acid No components found for: URIC ACID     Imaging Results (Last 72 Hours)     Procedure Component Value Units Date/Time    XR Chest 1 View [[id number]] Collected: 05/01/23 1028     Updated: 05/01/23 1030    Narrative:      XR CHEST 1 VW    Date of Exam: 5/1/2023 10:21 AM EDT    Indication: weakness, hypotension    Comparison: AP portable chest 1/18/2023.    Findings:  No acute airspace disease. Heart size is normal. Median sternotomy changes are present. Pacemaker leads extend to the expected locations of the right atrium and right ventricle. No right or left pneumothorax is identified. Pulmonary vascular distribution   is normal. Bilateral breast implants are incidentally noted. There are surgical changes of the cervical spine.      Impression:      Impression:    1. No acute chest findings.      Electronically Signed: Elena Lomax    5/1/2023 10:28 AM EDT    Workstation ID: [id number]    US Renal Bilateral [[id number]] Collected: 04/29/23 1540     Updated: 04/29/23 1543    Narrative:      US RENAL BILATERAL    Date of Exam: 4/29/2023 2:48 PM EDT    Indication: Acute renal failure.    Comparison: No comparisons available.    Technique: Grayscale and color Doppler ultrasound evaluation of the kidneys and urinary bladder was performed.      FINDINGS:  The right kidney measures 8.7 x 4.9 x 4.3 cm. The right renal cortical echogenicity is unremarkable. No focal cortical abnormalities are identified. No definitive stones are seen. There is no hydronephrosis. Flow is observed in the right kidney on   Doppler  evaluation.    The left kidney measures 8.9 x 4.2 x 4.1 cm. The left renal cortical echogenicity is unremarkable. No focal cortical abnormalities are identified. No definitive stones are seen. There is no hydronephrosis. Flow is observed in the left kidney on Doppler   evaluation.    Screening evaluation of the bladder is unremarkable.      Impression:      1.Negative ultrasound of the bilateral kidneys. There is no hydronephrosis bilaterally.      Electronically Signed: Clement Fung    4/29/2023 3:41 PM EDT    Workstation ID: [id number]          Results for orders placed during the hospital encounter of 04/28/23    XR Chest 1 View    Narrative  XR CHEST 1 VW    Date of Exam: 5/1/2023 10:21 AM EDT    Indication: weakness, hypotension    Comparison: AP portable chest 1/18/2023.    Findings:  No acute airspace disease. Heart size is normal. Median sternotomy changes are present. Pacemaker leads extend to the expected locations of the right atrium and right ventricle. No right or left pneumothorax is identified. Pulmonary vascular distribution  is normal. Bilateral breast implants are incidentally noted. There are surgical changes of the cervical spine.    Impression  Impression:    1. No acute chest findings.      Electronically Signed: Elena Lomax  5/1/2023 10:28 AM EDT  Workstation ID: [id number]      Results for orders placed during the hospital encounter of 01/18/23    XR Chest 1 View    Narrative  XR CHEST 1 VW    Date of Exam: 1/18/2023 2:27 PM EST    Indication: soa.    Comparison: 6/28/2021    Findings:  Cardiac size is stable. There are postoperative changes of prior sternotomy. Vascular markings have increased slightly compared with the last study. Left-sided transvenous pacemaker is unchanged in position. There are bilateral calcified breast implants.    Impression  Impression:    1. Increase in the vascular and interstitial markings in both lungs which may reflect mild fluid overload.  2. Postoperative  changes of prior sternotomy and left-sided pacemaker placement.    Electronically Signed: Laek Garza  1/18/2023 2:32 PM EST  Workstation ID: [id number]      Results for orders placed during the hospital encounter of 06/28/21    XR Chest 1 View    Narrative  DATE OF EXAM:  6/28/2021 7:04 PM    PROCEDURE:  XR CHEST 1 VW-    INDICATIONS:  ataxia/near syncope    COMPARISON:  AP chest x-ray 05/17/2017.    TECHNIQUE:  Single radiographic AP view of the chest was obtained.    FINDINGS:  Cardiac silhouette is mildly enlarged, but is exaggerated by portable AP  technique. Sternal wires are again noted. Pacemaker leads appear stable.  Lungs are adequately expanded and clear. No pneumothorax or large  pleural effusion is seen. Postoperative changes in the lower cervical  spine are partially included.    Impression  No acute cardiopulmonary abnormality or significant change.    Electronically Signed By-Jackie Hooker MD On:6/28/2021 7:16 PM  This report was finalized on [id number] by  Jackie Hooker MD.      Results for orders placed during the hospital encounter of 04/16/21    Duplex venous lower extremity right CAR    Interpretation Summary  · Normal bilateral lower extremity venous duplex scan.        ASSESSMENT / PLAN      Acute midline low back pain without sciatica    1. ARF/DAYLIN---------Nonoliguric. ARF/DAYLIN is resolved. +ARF/DAYLIN with historically normal baseline renal function based on review of records. +ARF/DAYLIN is secondary to prerenal state/intravascular volume depletion with concomitant diuretic use and also from ATN secondary to hypotension with concomitant NSAID and ACE-I use. D/C Diclofenac, Lisinopril, and Bumex. Hydrate aggressively and avoid hypotension. No NSAIDs or IV dye. Dose meds for CrCl less than 10 cc/min until ARF/DAYLIN is resolved     2. BENIGN ESSENTIAL HTN------BP good     3. DEHYDRATION------Clinically better. Off IVFs and Bumex     4. OA/DJD/CBP-------D/C Diclofenac. No NSAIDs. Uric normal     5.  ATRIAL FIBRILLATION----------Rate controlled and anticoagulated     6. ANEMIA-----H/H stable. IV iron for DAVID     7. H/O CHF/HYPERTROPHIC CM-----Off IVFs and Keep off of Bumex for now     8. HYPERLIPIDEMIA------On Statin. CK, TSH ok     9. GERD/PUD PROPHYLAXIS--------On PPI. Benefits outweigh risks despite DAYLIN     10. DVT PROPHYLAXIS--------Anticoagulated on Eliquis    NOT MUCH ELSE TO ADD FROM A RENAL STANDPOINT. WILL SIGN OFF. PLEASE CALL IF NEW ISSUES ARISE. THANKS    Juliana Muñoz MD  [facility]  [phone]  05/02/23  07:18 EDT

## 2023-05-02 NOTE — PLAN OF CARE
Goal Outcome Evaluation:         New labs collected tonight. Pt still reports of vague complaints. Still slightly nauseated at times.

## 2023-05-02 NOTE — PLAN OF CARE
Patient to D/C home with son. Patient reports feeling better today. AVS and medication changes reviewed with patient and PIV removed. Patient to leave via wheelchair. Awaiting wheelchair to transport patient to [facility].

## 2023-05-02 NOTE — PROGRESS NOTES
Verified demographics and explained services. Pt is agreeable and has no other agency    PT and OT    Anish Lew NP will be the following provider    Pharmacy: [facility]

## 2023-05-02 NOTE — CASE MANAGEMENT/SOCIAL WORK
Continued Stay Note  [facility]     Patient Name: Domonique See  MRN: [id number]  Today's Date: 5/2/2023    Admit Date: 4/28/2023    Plan: Referral to  [facility] pending acceptance. Orders are in   Discharge Plan     Row Name 05/02/23 1005       Plan    Plan Referral to  [facility] pending acceptance. Orders are in    Patient/Family in Agreement with Plan yes    Plan Comments Referral sent to [facility]. NP and bedside nurse notfied via epic chat    Row Name 05/02/23 1002       Plan    Plan Referral to [facility] pending acceptance. will need HH orders                   Expected Discharge Date and Time     Expected Discharge Date Expected Discharge Time    May 2, 2023             Francesca Jaimes RN

## 2023-05-02 NOTE — DISCHARGE SUMMARY
Date of Discharge:  5/2/2023    Discharge Diagnosis:   Acute midline low back pain without sciatica  Acute renal failure  Volume depletion  Dehydration  Hypertension  Atrial fibs  Anemia  Cardiomyopathy  Permanent pacemaker  Degenerative disc disease    Presenting Problem/History of Present Illness  Active Hospital Problems    Diagnosis  POA   • **Acute midline low back pain without sciatica [M54.50]  Yes      Resolved Hospital Problems   No resolved problems to display.          Hospital Course    Patient is a 83 y.o. female presented with acute onset low back pain associated with fatigue.  She has several week history of progressive weakness and fatigue and new onset low back pain.  She was also found to have dehydration and acute kidney injury.  Nephrology following and the following meds have been discontinued Diclofenac, Lisinopril, and Bumex.  Patient's blood pressure kidney function improved with hydration she will continue on her beta-blocker and amiodarone.  Lisinopril is on hold.  Patient continues with weakness and will have home health PT is recommended.  She has a follow-up appoint with PCP in 2 weeks elevated to follow-up with cardiology in 1 month.  She is agreeable to this plan at discharge.    Procedures Performed         Consults:   Consults     Date and Time Order Name Status Description    4/29/2023 10:35 AM Inpatient Nephrology Consult Completed     4/29/2023 10:35 AM Inpatient Cardiology Consult Completed     4/28/2023  8:28 PM Family Medicine Consult            Pertinent Test Results:    Lab Results (most recent)     Procedure Component Value Units Date/Time    Cortisol [[id number]] Collected: 05/02/23 0318    Specimen: Blood Updated: 05/02/23 0354     Cortisol 27.01 mcg/dL     Narrative:      Cortisol Reference Ranges:    Cortisol 6AM - 10AM Range: 6.02-18.40 mcg/dl  Cortisol 4PM - 8PM Range: 2.68-10.50 mcg/dl      Results may be falsely increased if patient taking Biotin.      Cortisol  [[id number]] Collected: 05/02/23 0246    Specimen: Blood Updated: 05/02/23 0318     Cortisol 25.28 mcg/dL     Narrative:      Cortisol Reference Ranges:    Cortisol 6AM - 10AM Range: 6.02-18.40 mcg/dl  Cortisol 4PM - 8PM Range: 2.68-10.50 mcg/dl      Results may be falsely increased if patient taking Biotin.      Basic Metabolic Panel [[id number]]  (Abnormal) Collected: 05/02/23 0246    Specimen: Blood Updated: 05/02/23 0311     Glucose 91 mg/dL      BUN 10 mg/dL      Creatinine 0.94 mg/dL      Sodium 136 mmol/L      Potassium 4.7 mmol/L      Comment: Slight hemolysis detected by analyzer. Results may be affected.        Chloride 101 mmol/L      CO2 26.0 mmol/L      Calcium 9.0 mg/dL      BUN/Creatinine Ratio 10.6     Anion Gap 9.0 mmol/L      eGFR 60.0 mL/min/1.73     Narrative:      GFR Normal >60  Chronic Kidney Disease <60  Kidney Failure <15    The GFR formula is only valid for adults with stable renal function between ages 18 and 70.    CBC (No Diff) [[id number]]  (Abnormal) Collected: 05/02/23 0246    Specimen: Blood Updated: 05/02/23 0251     WBC 6.50 10*3/mm3      RBC 3.55 10*6/mm3      Hemoglobin 10.5 g/dL      Hematocrit 32.3 %      MCV 91.1 fL      MCH 29.5 pg      MCHC 32.4 g/dL      RDW 15.0 %      RDW-SD 47.7 fl      MPV 8.4 fL      Platelets 189 10*3/mm3     Blood Culture - Blood, Arm, Right [[id number]]  (Normal) Collected: 04/28/23 2036    Specimen: Blood from Arm, Right Updated: 05/01/23 2045     Blood Culture No growth at 3 days    Narrative:      Less than seven (7) mL's of blood was collected.  Insufficient quantity may yield false negative results.    Blood Culture - Blood, Arm, Left [[id number]]  (Normal) Collected: 04/28/23 2030    Specimen: Blood from Arm, Left Updated: 05/01/23 2045     Blood Culture No growth at 3 days    Narrative:      Less than seven (7) mL's of blood was collected.  Insufficient quantity may yield false negative results.    Respiratory Panel PCR w/COVID-19(SARS-CoV-2)  ROHIT/HENRRY/ELSA/PAD/COR/MAD/CATHERINE In-House, NP Swab in UTM/VTM, 3-4 HR TAT - Swab, Nasopharynx [[id number]]  (Abnormal) Collected: 05/01/23 0959    Specimen: Swab from Nasopharynx Updated: 05/01/23 1131     ADENOVIRUS, PCR Not Detected     Coronavirus 229E Not Detected     Coronavirus HKU1 Not Detected     Coronavirus NL63 Not Detected     Coronavirus OC43 Not Detected     COVID19 Not Detected     Human Metapneumovirus Not Detected     Human Rhinovirus/Enterovirus Detected     Influenza A PCR Not Detected     Influenza B PCR Not Detected     Parainfluenza Virus 1 Not Detected     Parainfluenza Virus 2 Not Detected     Parainfluenza Virus 3 Not Detected     Parainfluenza Virus 4 Not Detected     RSV, PCR Not Detected     Bordetella pertussis pcr Not Detected     Bordetella parapertussis PCR Not Detected     Chlamydophila pneumoniae PCR Not Detected     Mycoplasma pneumo by PCR Not Detected    Narrative:      In the setting of a positive respiratory panel with a viral infection PLUS a negative procalcitonin without other underlying concern for bacterial infection, consider observing off antibiotics or discontinuation of antibiotics and continue supportive care. If the respiratory panel is positive for atypical bacterial infection (Bordetella pertussis, Chlamydophila pneumoniae, or Mycoplasma pneumoniae), consider antibiotic de-escalation to target atypical bacterial infection.    POC Glucose Once [[id number]]  (Normal) Collected: 05/01/23 0754    Specimen: Blood Updated: 05/01/23 0756     Glucose 85 mg/dL      Comment: Serial Number: [id number]:  [id number]       Manual Differential [[id number]]  (Abnormal) Collected: 04/30/23 2343    Specimen: Blood Updated: 05/01/23 0121     Neutrophil % 80.0 %      Lymphocyte % 9.0 %      Monocyte % 5.0 %      Bands %  4.0 %      Metamyelocyte % 2.0 %      Neutrophils Absolute 5.96 10*3/mm3      Lymphocytes Absolute 0.64 10*3/mm3      Monocytes Absolute 0.36 10*3/mm3      Dacrocytes  Slight/1+     Ovalocytes Slight/1+     Poikilocytes Slight/1+     WBC Morphology Normal     Platelet Estimate Adequate    CBC & Differential [[id number]]  (Abnormal) Collected: 04/30/23 2343    Specimen: Blood Updated: 05/01/23 0121    Narrative:      The following orders were created for panel order CBC & Differential.  Procedure                               Abnormality         Status                     ---------                               -----------         ------                     CBC Auto Differential[[id number]]        Abnormal            Final result               Scan Slide[[id number]]                                       Final result                 Please view results for these tests on the individual orders.    Scan Slide [[id number]] Collected: 04/30/23 2343    Specimen: Blood Updated: 05/01/23 0121     Scan Slide --     Comment: See Manual Differential Results       CBC Auto Differential [[id number]]  (Abnormal) Collected: 04/30/23 2343    Specimen: Blood Updated: 05/01/23 0121     WBC 7.10 10*3/mm3      RBC 3.69 10*6/mm3      Hemoglobin 11.0 g/dL      Hematocrit 33.9 %      MCV 91.9 fL      MCH 29.7 pg      MCHC 32.4 g/dL      RDW 15.4 %      RDW-SD 49.9 fl      MPV 9.2 fL      Platelets 178 10*3/mm3     Narrative:      The previously reported component NRBC is no longer being reported. Previous result was 0.1 /100 WBC (Reference Range: 0.0-0.2 /100 WBC) on 5/1/2023 at 0019 EDT.    Basic Metabolic Panel [[id number]]  (Abnormal) Collected: 04/30/23 2343    Specimen: Blood Updated: 05/01/23 0022     Glucose 115 mg/dL      BUN 11 mg/dL      Creatinine 0.78 mg/dL      Sodium 134 mmol/L      Potassium 4.5 mmol/L      Chloride 100 mmol/L      CO2 26.0 mmol/L      Calcium 9.4 mg/dL      BUN/Creatinine Ratio 14.1     Anion Gap 8.0 mmol/L      eGFR 75.0 mL/min/1.73     Narrative:      GFR Normal >60  Chronic Kidney Disease <60  Kidney Failure <15    The GFR formula is only valid for adults with stable renal  function between ages 18 and 70.    Magnesium [[id number]]  (Normal) Collected: 04/30/23 2343    Specimen: Blood Updated: 05/01/23 0022     Magnesium 1.9 mg/dL     Phosphorus [[id number]]  (Normal) Collected: 04/30/23 2343    Specimen: Blood Updated: 05/01/23 0022     Phosphorus 2.7 mg/dL     TSH [[id number]]  (Abnormal) Collected: 04/29/23 2303    Specimen: Blood Updated: 04/30/23 0009     TSH 4.920 uIU/mL     Comprehensive Metabolic Panel [[id number]]  (Abnormal) Collected: 04/29/23 2303    Specimen: Blood Updated: 04/30/23 0006     Glucose 127 mg/dL      BUN 18 mg/dL      Creatinine 0.84 mg/dL      Sodium 133 mmol/L      Potassium 5.1 mmol/L      Chloride 101 mmol/L      CO2 24.0 mmol/L      Calcium 8.9 mg/dL      Total Protein 5.7 g/dL      Albumin 3.2 g/dL      ALT (SGPT) 27 U/L      AST (SGOT) 22 U/L      Alkaline Phosphatase 85 U/L      Total Bilirubin 0.4 mg/dL      Globulin 2.5 gm/dL      A/G Ratio 1.3 g/dL      BUN/Creatinine Ratio 21.4     Anion Gap 8.0 mmol/L      eGFR 68.6 mL/min/1.73     Narrative:      GFR Normal >60  Chronic Kidney Disease <60  Kidney Failure <15    The GFR formula is only valid for adults with stable renal function between ages 18 and 70.    Magnesium [[id number]]  (Normal) Collected: 04/29/23 2303    Specimen: Blood Updated: 04/30/23 0006     Magnesium 1.9 mg/dL     Phosphorus [[id number]]  (Normal) Collected: 04/29/23 2303    Specimen: Blood Updated: 04/30/23 0002     Phosphorus 3.0 mg/dL     Calcium, Ionized [[id number]]  (Normal) Collected: 04/29/23 2303    Specimen: Blood Updated: 04/29/23 2339     Ionized Calcium 1.22 mmol/L     CBC & Differential [[id number]]  (Abnormal) Collected: 04/29/23 2303    Specimen: Blood Updated: 04/29/23 2324    Narrative:      The following orders were created for panel order CBC & Differential.  Procedure                               Abnormality         Status                     ---------                               -----------         ------                      CBC Auto Differential[[id number]]        Abnormal            Final result                 Please view results for these tests on the individual orders.    CBC Auto Differential [[id number]]  (Abnormal) Collected: 04/29/23 2303    Specimen: Blood Updated: 04/29/23 2324     WBC 7.00 10*3/mm3      RBC 3.45 10*6/mm3      Hemoglobin 10.4 g/dL      Hematocrit 31.1 %      MCV 90.2 fL      MCH 30.3 pg      MCHC 33.6 g/dL      RDW 15.0 %      RDW-SD 49.4 fl      MPV 9.4 fL      Platelets 153 10*3/mm3      Neutrophil % 90.0 %      Lymphocyte % 5.6 %      Monocyte % 4.2 %      Eosinophil % 0.1 %      Basophil % 0.1 %      Neutrophils, Absolute 6.30 10*3/mm3      Lymphocytes, Absolute 0.40 10*3/mm3      Monocytes, Absolute 0.30 10*3/mm3      Eosinophils, Absolute 0.00 10*3/mm3      Basophils, Absolute 0.00 10*3/mm3      nRBC 0.2 /100 WBC     Eosinophil Smear - Urine, Urine, Clean Catch [[id number]]  (Normal) Collected: 04/29/23 1709    Specimen: Urine, Clean Catch Updated: 04/29/23 1916     Eosinophil Smear 0 % EOS/100 Cells     High Sensitivity Troponin T [[id number]]  (Abnormal) Collected: 04/29/23 1744    Specimen: Blood Updated: 04/29/23 1817     HS Troponin T 13 ng/L     Narrative:      High Sensitive Troponin T Reference Range:  <10.0 ng/L- Negative Female for AMI  <15.0 ng/L- Negative Male for AMI  >=10 - Abnormal Female indicating possible myocardial injury.  >=15 - Abnormal Male indicating possible myocardial injury.   Clinicians would have to utilize clinical acumen, EKG, Troponin, and serial changes to determine if it is an Acute Myocardial Infarction or myocardial injury due to an underlying chronic condition.         BNP [[id number]]  (Normal) Collected: 04/29/23 1744    Specimen: Blood Updated: 04/29/23 1817     proBNP 956.8 pg/mL     Narrative:      Among patients with dyspnea, NT-proBNP is highly sensitive for the detection of acute congestive heart failure. In addition NT-proBNP of <300 pg/ml effectively  rules out acute congestive heart failure with 99% negative predictive value.    Results may be falsely decreased if patient taking Biotin.      Protein Elec + Interp, Serum [[id number]] Collected: 04/29/23 1744    Specimen: Blood Updated: 04/29/23 1748    Sodium, Urine, Random - Urine, Clean Catch [[id number]] Collected: 04/29/23 1709    Specimen: Urine, Clean Catch Updated: 04/29/23 1722     Sodium, Urine 133 mmol/L     Narrative:      Reference intervals for random urine have not been established.  Clinical usage is dependent upon physician's interpretation in combination with other laboratory tests.       Iron [[id number]]  (Abnormal) Collected: 04/28/23 2224    Specimen: Blood Updated: 04/29/23 1318     Iron 22 mcg/dL     CK [[id number]]  (Normal) Collected: 04/28/23 2224    Specimen: Blood Updated: 04/29/23 1318     Creatine Kinase 23 U/L     Uric Acid [[id number]]  (Normal) Collected: 04/28/23 2224    Specimen: Blood Updated: 04/29/23 1318     Uric Acid 4.7 mg/dL     High Sensitivity Troponin T 2Hr [[id number]]  (Abnormal) Collected: 04/28/23 2224    Specimen: Blood Updated: 04/28/23 2254     HS Troponin T 20 ng/L      Troponin T Delta 1 ng/L     Narrative:      High Sensitive Troponin T Reference Range:  <10.0 ng/L- Negative Female for AMI  <15.0 ng/L- Negative Male for AMI  >=10 - Abnormal Female indicating possible myocardial injury.  >=15 - Abnormal Male indicating possible myocardial injury.   Clinicians would have to utilize clinical acumen, EKG, Troponin, and serial changes to determine if it is an Acute Myocardial Infarction or myocardial injury due to an underlying chronic condition.         Urinalysis, Microscopic Only - Urine, Catheter [[id number]]  (Abnormal) Collected: 04/28/23 2101    Specimen: Urine, Catheter Updated: 04/28/23 2137     RBC, UA 0-2 /HPF      WBC, UA 3-5 /HPF      Comment: Urine culture not indicated.        Bacteria, UA Trace /HPF      Squamous Epithelial Cells, UA 0-2 /HPF      Hyaline  Casts, UA 13-20 /LPF      Methodology Manual Light Microscopy    Urinalysis With Culture If Indicated - Urine, Catheter [[id number]]  (Abnormal) Collected: 04/28/23 2101    Specimen: Urine, Catheter Updated: 04/28/23 2112     Color, UA Yellow     Appearance, UA Clear     pH, UA 5.5     Specific Gravity, UA 1.011     Glucose, UA Negative     Ketones, UA Negative     Bilirubin, UA Negative     Blood, UA Negative     Protein, UA Negative     Leuk Esterase, UA Trace     Nitrite, UA Negative     Urobilinogen, UA 0.2 E.U./dL    Narrative:      In absence of clinical symptoms, the presence of pyuria, bacteria, and/or nitrites on the urinalysis result does not correlate with infection.    POC Lactate [[id number]]  (Normal) Collected: 04/28/23 1952    Specimen: Blood Updated: 04/28/23 1953     Lactate 0.6 mmol/L      Comment: Serial Number: [id number]:  [id number]       Extra Tubes [[id number]] Collected: 04/28/23 1818    Specimen: Blood, Venous Line Updated: 04/28/23 1931    Narrative:      The following orders were created for panel order Extra Tubes.  Procedure                               Abnormality         Status                     ---------                               -----------         ------                     Gold Top - SST[[id number]]                                   Final result               Light Blue Top[[id number]]                                   Final result                 Please view results for these tests on the individual orders.    Gold Top - SST [[id number]] Collected: 04/28/23 1818    Specimen: Blood Updated: 04/28/23 1931     Extra Tube Hold for add-ons.     Comment: Auto resulted.       Light Blue Top [[id number]] Collected: 04/28/23 1818    Specimen: Blood Updated: 04/28/23 1931     Extra Tube Hold for add-ons.     Comment: Auto resulted       High Sensitivity Troponin T [[id number]]  (Abnormal) Collected: 04/28/23 1818    Specimen: Blood Updated: 04/28/23 1909     HS Troponin T 19 ng/L      Narrative:      High Sensitive Troponin T Reference Range:  <10.0 ng/L- Negative Female for AMI  <15.0 ng/L- Negative Male for AMI  >=10 - Abnormal Female indicating possible myocardial injury.  >=15 - Abnormal Male indicating possible myocardial injury.   Clinicians would have to utilize clinical acumen, EKG, Troponin, and serial changes to determine if it is an Acute Myocardial Infarction or myocardial injury due to an underlying chronic condition.         Comprehensive Metabolic Panel [[id number]]  (Abnormal) Collected: 04/28/23 1818    Specimen: Blood Updated: 04/28/23 1902     Glucose 104 mg/dL      BUN 33 mg/dL      Creatinine 1.88 mg/dL      Sodium 132 mmol/L      Potassium 5.2 mmol/L      Comment: Slight hemolysis detected by analyzer. Results may be affected.        Chloride 95 mmol/L      CO2 27.0 mmol/L      Calcium 8.5 mg/dL      Total Protein 5.7 g/dL      Albumin 3.5 g/dL      ALT (SGPT) 26 U/L      AST (SGOT) 21 U/L      Comment: Slight hemolysis detected by analyzer. Results may be affected.        Alkaline Phosphatase 88 U/L      Total Bilirubin 0.4 mg/dL      Globulin 2.2 gm/dL      A/G Ratio 1.6 g/dL      BUN/Creatinine Ratio 17.6     Anion Gap 10.0 mmol/L      eGFR 26.1 mL/min/1.73     Narrative:      GFR Normal >60  Chronic Kidney Disease <60  Kidney Failure <15    The GFR formula is only valid for adults with stable renal function between ages 18 and 70.           Results for orders placed during the hospital encounter of 01/18/23    Adult Transthoracic Echo Complete w/ Color, Spectral and Contrast if necessary per protocol    Interpretation Summary  •  Estimated right ventricular systolic pressure from tricuspid regurgitation is markedly elevated (>55 mmHg).      Normal LV size and contractility EF of 60 to 65%  Severe right ventricular and right atrial enlargement, moderate left atrial enlargement seen  Pulmonic valve is not well visualized.  Aortic valve, mitral valve, tricuspid valve appears  structurally normal, moderate mitral and mild aortic regurgitation seen.  No pericardial effusion seen.  Proximal aorta appears normal in size.       Condition on Discharge:  Stable    Vital Signs  Temp:  [98 °F (36.7 °C)-98.5 °F (36.9 °C)] 98.2 °F (36.8 °C)  Heart Rate:  [60] 60  Resp:  [14-16] 16  BP: (106-149)/(61-81) 149/81      Physical Exam  Vitals reviewed.   Constitutional:       Appearance: She is not ill-appearing.   HENT:      Head: Normocephalic and atraumatic.      Right Ear: External ear normal.      Left Ear: External ear normal.      Nose: Nose normal.      Mouth/Throat:      Mouth: Mucous membranes are moist.   Eyes:      General:         Right eye: No discharge.         Left eye: No discharge.   Cardiovascular:      Rate and Rhythm: Normal rate and regular rhythm.      Pulses: Normal pulses.      Heart sounds: Normal heart sounds.   Pulmonary:      Effort: Pulmonary effort is normal.      Breath sounds: Normal breath sounds.   Abdominal:      General: Bowel sounds are normal.      Palpations: Abdomen is soft.   Musculoskeletal:         General: Normal range of motion.      Cervical back: Normal range of motion.   Skin:     General: Skin is warm and dry.   Neurological:      Mental Status: She is alert and oriented to person, place, and time.   Psychiatric:         Behavior: Behavior normal.              Discharge Disposition  Home-Health Care [facility]    Discharge Medications     Discharge Medications      Continue These Medications      Instructions Start Date   albuterol (5 MG/ML) 0.5% nebulizer solution  Commonly known as: PROVENTIL   2.5 mg, Nebulization, 3 Times Daily PRN      amiodarone 200 MG tablet  Commonly known as: PACERONE   TAKE 1 TABLET BY MOUTH TWICE DAILY      apixaban 5 MG tablet tablet  Commonly known as: ELIQUIS   5 mg, Oral, 2 Times Daily      aspirin 81 MG EC tablet   81 mg, Oral, Nightly      atorvastatin 40 MG tablet  Commonly known as: LIPITOR   40 mg, Oral, Nightly       metoprolol succinate XL 25 MG 24 hr tablet  Commonly known as: TOPROL-XL   TAKE 1 TABLET BY MOUTH DAILY      oxyCODONE-acetaminophen  MG per tablet  Commonly known as: PERCOCET   1 tablet, Oral, Every 4 Hours PRN      Semaglutide (1 MG/DOSE) 2 MG/1.5ML solution pen-injector  Commonly known as: OZEMPIC   1 mg, Subcutaneous, Weekly      sertraline 100 MG tablet  Commonly known as: ZOLOFT   100 mg, Oral, Every Evening         Stop These Medications    bumetanide 1 MG tablet  Commonly known as: BUMEX     diclofenac 50 MG tablet  Commonly known as: CATAFLAM     guaiFENesin 600 MG 12 hr tablet  Commonly known as: MUCINEX     lisinopril 2.5 MG tablet  Commonly known as: PRINIVIL,ZESTRIL            Discharge Diet:   Diet Instructions     Diet: Cardiac Diets; Healthy Heart (2-3 Na+); Regular Texture (IDDSI 7); Thin (IDDSI 0)      Discharge Diet: Cardiac Diets    Cardiac Diet: Healthy Heart (2-3 Na+)    Texture: Regular Texture (IDDSI 7)    Fluid Consistency: Thin (IDDSI 0)          Activity at Discharge:   Activity Instructions     Gradually Increase Activity Until at Pre-Hospitalization Level            Follow-up Appointments  Future Appointments   Date Time Provider Department Center   5/22/2023  2:30 PM [facility]ICE [facility]   5/22/2023  3:20 PM Evgeny Gregory MD [facility]     Additional Instructions for the Follow-ups that You Need to Schedule     Ambulatory Referral to Physical Therapy Evaluate and treat; Stretching, ROM, Strengthening   As directed      Specialty needed: Evaluate and treat    Exercises: Stretching ROM Strengthening    Follow-up needed: Yes         Discharge Follow-up with PCP   As directed       Currently Documented PCP:    Anish Lew FNP    PCP Phone Number:    [phone]     Follow Up Details: Sarah Chavez 5/15 at 1:20         Discharge Follow-up with Specified Provider: Dr. Gregory; 1 Month   As directed      To:   Sandella    Follow Up: 1 Month               Test Results Pending at Discharge  Pending Labs     Order Current Status    Protein Elec + Interp, Serum In process    Blood Culture - Blood, Arm, Left Preliminary result    Blood Culture - Blood, Arm, Right Preliminary result           WOODY Kaufman  05/02/23  09:51 EDT    Time: Discharge 25 min

## 2023-05-02 NOTE — DISCHARGE PLACEMENT REQUEST
"Geoffrey See (83 y.o. Female)     Date of Birth   1940    Social Security Number       Address   [address] [place] [facility]    Home Phone   [phone]    MRN   [id number]       Church   [employer]    Marital Status                               Admission Date   4/28/23    Admission Type   Emergency    Admitting Provider   Tatiana Gilliland MD    Attending Provider   Tatiana Gilliland MD    Department, Room/Bed   [facility] SURGICAL INPATIENT, 4132/1       Discharge Date       Discharge Disposition   [place] [facility] [place]ination                               Attending Provider: Tatiana Gilliland MD    Allergies: No Known Allergies    Isolation: Droplet   Infection: Rhinovirus  (05/01/23)   Code Status: CPR    Ht: 152.4 cm (60\")   Wt: 92.6 kg (204 lb 2.3 oz)    Admission Cmt: None   Principal Problem: Acute midline low back pain without sciatica [M54.50]                 Active Insurance as of 4/28/2023     Primary Coverage     Payor Plan Insurance Group Employer/Plan Group    MEDICARE MEDICARE A & B      Payor Plan Address Payor Plan Phone Number Payor Plan Fax Number Effective Dates    [address] [phone]  1/1/2004 - None Entered    [facility] [id number]       Subscriber Name Subscriber Birth Date Member ID       GEOFFREY SEE 1940 [id number]           Secondary Coverage     Payor Plan Insurance Group Employer/Plan Group    AARP MC SUP AAR HEALTH CARE OPTIONS      Payor Plan Address Payor Plan Phone Number Payor Plan Fax Number Effective Dates    [facility] [phone]  7/19/2019 - None Entered    [address]       [facility] [address]       Subscriber Name Subscriber Birth Date Member ID       GEOFFREY SEE 1940 [id number]                 Emergency Contacts      (Rel.) Home Phone Work Phone Mobile Phone    Boone See (Son) -- -- [phone]              "

## 2023-05-03 LAB
BACTERIA SPEC AEROBE CULT: NORMAL
BACTERIA SPEC AEROBE CULT: NORMAL

## 2023-05-03 NOTE — OUTREACH NOTE
Prep Survey    Flowsheet Row Responses   [facility] facility patient discharged from? [facility]   Is LACE score < 7 ? No   Eligibility Readm Mgmt   Discharge diagnosis Acute midline low back pain without sciatica   Does the patient have one of the following disease processes/diagnoses(primary or secondary)? Other   Does the patient have Home health ordered? Yes   What is the Home health agency?  [facility]    Is there a DME ordered? No   Prep survey completed? Yes          Nettie COLES - Registered Nurse

## 2023-05-03 NOTE — CASE MANAGEMENT/SOCIAL WORK
Case Management Discharge Note      Final Note: [facility]    Provided Post Acute Provider List?: Yes  Post Acute Provider List: Home Health, DME Supplier  Provided Post Acute Provider Quality & Resource List?: Yes  Post Acute Provider Quality and Resource List: Home Health  Delivered To: Patient  Method of Delivery: In person    Selected Continued Care - Discharged on 5/2/2023 Admission date: 4/28/2023 - Discharge disposition: [place] Care [facility] [place]        Home Medical Care Coordination complete.    Service Provider Selected Services Address Phone Fax Patient Preferred    [facility] [address] [facility] [place] [address] [phone] --                  Transportation Services  Private: Car    Final Discharge Disposition Code: 06 - home with home health care

## 2023-05-04 ENCOUNTER — HOME CARE VISIT (OUTPATIENT)
Dept: HOME HEALTH SERVICES | Facility: HOME HEALTHCARE | Age: 83
End: 2023-05-04
Payer: MEDICARE

## 2023-05-04 VITALS
WEIGHT: 191 LBS | BODY MASS INDEX: 37.5 KG/M2 | RESPIRATION RATE: 18 BRPM | SYSTOLIC BLOOD PRESSURE: 102 MMHG | HEART RATE: 62 BPM | DIASTOLIC BLOOD PRESSURE: 64 MMHG | TEMPERATURE: 97.7 F | OXYGEN SATURATION: 97 % | HEIGHT: 60 IN

## 2023-05-04 PROCEDURE — G0151 HHCP-SERV OF PT,EA 15 MIN: HCPCS

## 2023-05-05 ENCOUNTER — HOME CARE VISIT (OUTPATIENT)
Dept: HOME HEALTH SERVICES | Facility: HOME HEALTHCARE | Age: 83
End: 2023-05-05
Payer: MEDICARE

## 2023-05-05 NOTE — HOME HEALTH
"SOC Note:    Home Health ordered for: PT and OT    Reason for Hosp/Primary Dx/Co-morbidities: Recent hospitalization due to complaints of progressive weakness, back and inability to walk    Focus of Care: Other chronic pain. This has led to decreased mobility, sedentary lifestyle and declining quality of life    Skilled Need: PT services    Current Functional status/mobility/DME: Patient presents with generalized weakness, 4/5 gross strength, shortness of breath upon min exertion and requires sba/cga for sit to stand. Able to ambulate about 50ft indoor with sba/cga and wall/furniture support, showing slow and discontinous steps with low back pain graded 5/10    HB status/Living Arrangements: Patient lives in 2 [place] home and her son who is a disabled  lives with her and assisting as needed    Skin Integrity/wound status: Intact    Code Status: Full code    Fall Risk: High    Personal goal: \"To walk long distance witho pain\"    POC confirmed with patient on date 5/4/23    Plan for next visit: Pain relief measures to low back, therapeutic/home exercise program to ble, transfer teaching, and gait/endurance training"

## 2023-05-05 NOTE — HOME HEALTH
"HUD SOC – HUDDLE START OF CARE    Caregiver Status: Son who lives with patient  Availability: All the time  Ability to meet patient's needs: Good  Willingness: Yes    Risk for Hospitalization: High    Patient's goal: \"To walk long distance without pain\"    Services required to achieve goals: PT and OT    Potential Issues for goal attainment: N/A    Acuity and severity: High  Comorbidities: CHF, COPD, Chronic back pain, Kidney disease    Any Duplication of Services: N/A    Equipment: Std cane, Rolling walker. Rollator walker recommended      Any additional needs:    Based upon record review and collaboration conference, the recommended frequency for this patient is:     SN-----    PT-----PA 1D1, 1WK6    OT----1WK1 EVAL    ST-----    HHA---    MSW---         Please note that above is a recommendation only and that the plan of care should reflect the patient's clinical needs and goals. If in agreement, please complete note. If a different frequency is necessary, please explain in note box and proceed per patients clinical needs. HUD SOC – HUDDLE START OF CARE    Caregiver Status:   Availability:  Ability to meet patient's needs:  Willingness:    Risk for Hospitalization:     Patient's goal(s):    Services required to achieve goals:     Potential Issues for goal attainment:     Discipline Assessment Updates:     Problems identified:    Disease processes:  Acuity and severity:  Comorbidities:  Level of independence with management:     Knowledge deficits:  Current conditions:  Medications:  Care procedures:    Functional status and safety:    Mobility:    Self-care:     ADLs:     Any Duplication of Services:    Environmental issues:  physical environment:  set up/compatibility with patients needs  accessibility and safety    Equipment/Adjunct Services:  Devices for care present in the home:  Devices needed and not present:     Community resources involved/needed:  Dialysis:  Transportation:   Meals on Wheels:    Any " additional needs:    Based upon record review and collaboration conference, the recommended frequency for this patient is:     SN-----    PT-----    OT----    ST-----    HHA---    MSW---         Please note that above is a recommendation only and that the plan of care should reflect the patient's clinical needs and goals. If in agreement, please complete note. If a different frequency is necessary, please explain in note box and proceed per patients clinical needs.

## 2023-05-08 ENCOUNTER — HOME CARE VISIT (OUTPATIENT)
Dept: HOME HEALTH SERVICES | Facility: HOME HEALTHCARE | Age: 83
End: 2023-05-08
Payer: MEDICARE

## 2023-05-08 ENCOUNTER — READMISSION MANAGEMENT (OUTPATIENT)
Dept: CALL CENTER | Facility: HOSPITAL | Age: 83
End: 2023-05-08
Payer: MEDICARE

## 2023-05-08 NOTE — OUTREACH NOTE
Medical Week 1 Survey    Flowsheet Row Responses   [facility] patient discharged from? [facility]   Does the patient have one of the following disease processes/diagnoses(primary or secondary)? Other   Week 1 attempt successful? Yes   Call start time 1047   Call end time 1053   Discharge diagnosis Acute midline low back pain without sciatica   Meds reviewed with patient/caregiver? Yes   Is the patient having any side effects they believe may be caused by any medication additions or changes? No   Does the patient have all medications ordered at discharge? N/A   Prescription comments pt aware of medicatoin changes   Is the patient taking all medications as directed (includes completed medication regime)? Yes   Comments regarding appointments Cardiology 5/22/23   Does the patient have a primary care provider?  Yes   Does the patient have an appointment with their PCP within 7 days of discharge? Greater than 7 days   Nursing Interventions Verified appointment date/time/provider  [5/15/23]   Has the patient kept scheduled appointments due by today? N/A   What is the Home health agency?  [facility]    Has home health visited the patient within 72 hours of discharge? Yes   Home health comments HH every Thursday   Psychosocial issues? No   Did the patient receive a copy of their discharge instructions? Yes   Nursing interventions Reviewed instructions with patient   What is the patient's perception of their health status since discharge? Improving  [Reports she is much better--had not been feeling well in months she reports.  She reports her BP is ok with changes made with meds--encouraged to continue to monitor her QD wts and notify MD for wt gain and s/s as noted Bumex d/c'd.  ]   Is the patient/caregiver able to teach back the hierarchy of who to call/visit for symptoms/problems? PCP, Specialist, Home health nurse, Urgent Care, ED, 911 Yes   Additional teach back comments Pt with chronic back pain she reports.  Has had 2  recent falls--encouraged falls prevention and safe transfers.   Week 1 call completed? Yes          SCOTT STEIN - Registered Nurse

## 2023-05-11 ENCOUNTER — HOME CARE VISIT (OUTPATIENT)
Dept: HOME HEALTH SERVICES | Facility: HOME HEALTHCARE | Age: 83
End: 2023-05-11
Payer: MEDICARE

## 2023-05-11 PROCEDURE — G0157 HHC PT ASSISTANT EA 15: HCPCS

## 2023-05-12 VITALS
SYSTOLIC BLOOD PRESSURE: 124 MMHG | HEART RATE: 61 BPM | OXYGEN SATURATION: 96 % | TEMPERATURE: 96.9 F | DIASTOLIC BLOOD PRESSURE: 76 MMHG

## 2023-05-12 NOTE — HOME HEALTH
Routine Visit Note:   Patient was in the living room and said come in.   Patient rated her pain as 7/10.    Skill/education provided: Patient was instructed in therapeutic exercises, safe transfers and ambulation with an appropriate assist device.    Patient/caregiver response: Patient/caregiver understood instructions and performed exercises well.    Plan for next visit:  Advance gait and strengthening exercise as patient pain and tolerance allows.

## 2023-05-15 ENCOUNTER — HOME CARE VISIT (OUTPATIENT)
Dept: HOME HEALTH SERVICES | Facility: HOME HEALTHCARE | Age: 83
End: 2023-05-15
Payer: MEDICARE

## 2023-05-18 ENCOUNTER — HOME CARE VISIT (OUTPATIENT)
Dept: HOME HEALTH SERVICES | Facility: HOME HEALTHCARE | Age: 83
End: 2023-05-18
Payer: MEDICARE

## 2023-05-21 NOTE — PROGRESS NOTES
Subjective:     Encounter Date:05/22/2023      Patient ID: Domonique See is a 83 y.o. female.    Chief Complaint and history of present illness:     Follow-up for hypertrophic cardiomyopathy, pacemaker, CHF, dyslipidemia    History of Present Illness           Ms. Domonique See  has PMH of     Hypertrophic cardiomyopathy, history of septal myomectomy,      nonsustained VT  hypertension  SSS, [employer] permanent pacemaker 3/16/2017  Paroxysmal atrial fibrillation,  [id number] SCORE   [id number] Score: 8 (5/22/2023  3:37 PM)    CAD, cath 12/2017 30% LAD  CHF due to hypertrophic cardiomyopathy  Hyperlipidemia  COPD  Peptic ulcer disease  Back surgery, breast augmentation  Former smoker     Here for follow-up..  Patient is to see Dr. Louis in the past.  Patient has history of paroxysmal A. fib previously refused anticoagulation in the past and is now willing to undergo anticoagulation is on Eliquis.  Patient was recently admitted in the hospital 4/28/2023 with [facility].    Patient's arterial blood pressure is 108/64, heart rate 60, O2 sat of 97% on room air..  BMI is over 30.    Chart review:  Underwent A-fib ablation 1/16/2023  Admitted 1/18/2023 with CHF    Echocardiogram 6/29/2021 reveals EF of 50% with LV and RV  Labs from 3/8/2022 revealed normal ESR and CRP.  A1c improved to 5.9 from 6.1 previously, TSH 3.9.  CMP with a potassium of 5.3 creatinine of 1.05 and GFR 52.  Normal CBC.  Lipid profile with cholesterol 124 triglycerides 73 HDL 57 LDL 52.  Labs from 5/2/2023 revealed normal BMP        Assessment:        Hypertrophic cardiomyopathy, myomectomy, NSVT  Chronic HFpEF due to diastolic dysfunction from hypertrophic cardiomyopathy  Hypertension  Dyslipidemia  Prediabetes  Paroxysmal atrial fibrillation  Obesity with BMI over 30  CKD  Hyperkalemia        Recommendations and plan:       CHADVASC2 SCORE   [id number] Score: 8 (11/25/2022 10:42 PM)  Patient has a high CHADS2 Vascor of 8 due to female  gender, age over 75, history of stroke, CHF, hypertension making it 8, will benefit from long-term anticoagulation to prevent thromboembolic events.  Continue Eliquis as tolerated.  Continue aspirin, atorvastatin, amiodarone, metoprolol as tolerated.  Reviewed BMI over 30, counseled on weight loss diet and exercise.Patient's pacemaker check is we will follow device in device clinic.  Check is revealing A-fib.     Advised patient to follow-up with PMD for oxycodone and sertraline.       Lexiscan Cardiolite performed 4/7/2022 reviewed/interpreted by me reveals normal perfusion EF of 64%    Procedures     EKG done 4/28/2023 reveals a paced rhythm with rate of 60 bpm with incomplete left bundle branch block.    Copied text in this portion of the note has been reviewed and is accurate as of 5/22/2023  The following portions of the patient's history were reviewed and updated as appropriate: allergies, current medications, past family history, past medical history, past social history, past surgical history and problem list.    Assessment:         [facility]     Diagnosis Plan   1. Paroxysmal atrial fibrillation        2. Long term (current) use of anticoagulants        3. Hypertrophic obstructive cardiomyopathy        4. Obesity (BMI 30-39.9)        5. Dyslipidemia               Plan:               Past Medical History:  Past Medical History:   Diagnosis Date   • Anemia    • CHF (congestive heart failure)    • Congenital heart disease    • Coronary artery disease 2013   • GERD (gastroesophageal reflux disease)    • Heart murmur Don't know   • Hx of hypertrophic cardiomyopathy    • Hypertension    • Peptic ulceration    • Persistent atrial fibrillation 01/10/2023   • Primary osteoarthritis of both knees 02/08/2021   • Primary osteoarthritis of right knee 09/21/2020   • Sleep apnea 2017     Past Surgical History:  Past Surgical History:   Procedure Laterality Date   • ABDOMINAL HERNIA REPAIR     • ABLATION OF DYSRHYTHMIC FOCUS   01-   • BACK SURGERY     • BREAST AUGMENTATION     • CARDIAC CATHETERIZATION     • CARDIAC ELECTROPHYSIOLOGY PROCEDURE N/A 01/16/2023    Procedure: Ablation atrial fibrillation and flutter, cryo Duong and Ramón aware;  Surgeon: Leeanne Pleitez MD;  Location: [facility] CATH INVASIVE LOCATION;  Service: Cardiovascular;  Laterality: N/A;   • CARDIAC PACEMAKER PLACEMENT  03/16/2017    Dual Chamber   • CERVICAL RIB RESECTION Bilateral    • CERVICAL RIB RESECTION Bilateral 1963    bilateral cervical ribs removed - extra ribs located and causing pain   • CHOLECYSTECTOMY     • COLONOSCOPY     • CORONARY ARTERY BYPASS GRAFT  2013   • THORACIC DECOMPRESSION POSTERIOR FUSION  06/04/2014    L3-5 & S1   • UPPER GASTROINTESTINAL ENDOSCOPY        Allergies:  No Known Allergies  Home Meds:  Current Meds:     Current Outpatient Medications:   •  albuterol (PROVENTIL) (5 MG/ML) 0.5% nebulizer solution, Take 2.5 mg by nebulization 3 (Three) Times a Day As Needed for Wheezing. Indications: Reversible Disease of Blockage in Breathing Passages, Spasm of Lung Air Passages, Disp: , Rfl:   •  amiodarone (PACERONE) 200 MG tablet, TAKE 1 TABLET BY MOUTH TWICE DAILY, Disp: 180 tablet, Rfl: 3  •  apixaban (ELIQUIS) 5 MG tablet tablet, Take 1 tablet by mouth 2 (Two) Times a Day., Disp: 180 tablet, Rfl: 3  •  aspirin 81 MG EC tablet, Take 1 tablet by mouth Every Night., Disp: 90 tablet, Rfl: 3  •  atorvastatin (LIPITOR) 40 MG tablet, Take 1 tablet by mouth Every Night., Disp: 30 tablet, Rfl: 0  •  metoprolol succinate XL (TOPROL-XL) 25 MG 24 hr tablet, TAKE 1 TABLET BY MOUTH DAILY, Disp: 90 tablet, Rfl: 1  •  oxyCODONE-acetaminophen (PERCOCET)  MG per tablet, Take 1 tablet by mouth Every 4 (Four) Hours As Needed for Moderate Pain. PT TAKES HALF A PILL PRN   Indications: Pain, Disp: , Rfl:   •  sertraline (ZOLOFT) 100 MG tablet, Take 1 tablet by mouth Every Evening. Indications: Major Depressive Disorder, Disp: , Rfl:   Social  "History:   Social History     Tobacco Use   • Smoking status: Former     Packs/day: 1.00     Years: 8.00     Pack years: 8.00     Types: Cigarettes     Start date: 1977     Quit date: 1985     Years since quittin.4     Passive exposure: Past   • Smokeless tobacco: Never   • Tobacco comments:     Quit    Substance Use Topics   • Alcohol use: Not Currently     Comment: 6 drinks a year in social settings      Family History:  Family History   Problem Relation Age of Onset   • Colon cancer Mother    • Colon cancer Brother    • Heart attack Brother    • Colon cancer Daughter               Review of Systems   Cardiovascular: Negative for chest pain, leg swelling and palpitations.   Respiratory: Negative for shortness of breath.    Neurological: Negative for dizziness and numbness.     All other systems are negative         Objective:     Physical Exam  /64 (BP Location: Left arm, Patient Position: Sitting, Cuff Size: Large Adult)   Pulse 60   Ht 152.4 cm (60\")   Wt 84.4 kg (186 lb)   LMP  (LMP Unknown)   SpO2 97%   BMI 36.33 kg/m²   General:  Appears in no acute distress  Eyes: Sclera is anicteric,  conjunctiva is clear   HEENT:  No JVD.  No carotid bruits  Respiratory: Respirations regular and unlabored at rest.  Clear to auscultation  Cardiovascular: S1,S2 Regular rate and rhythm. No murmur, rub or gallop auscultated.   Extremities: No digital clubbing or cyanosis, no edema  Skin: Color pink. Skin warm and dry to touch. No rashes  No xanthoma  Neuro: Alert and awake.    Lab Reviewed:         Evgeny Gregory MD  2023 15:42 EDT      EMR Dragon/Transcription:   \"Dictated utilizing Dragon dictation\".        "

## 2023-05-22 ENCOUNTER — CLINICAL SUPPORT NO REQUIREMENTS (OUTPATIENT)
Dept: CARDIOLOGY | Facility: CLINIC | Age: 83
End: 2023-05-22
Payer: MEDICARE

## 2023-05-22 ENCOUNTER — OFFICE VISIT (OUTPATIENT)
Dept: CARDIOLOGY | Facility: CLINIC | Age: 83
End: 2023-05-22
Payer: MEDICARE

## 2023-05-22 VITALS
HEIGHT: 60 IN | DIASTOLIC BLOOD PRESSURE: 64 MMHG | OXYGEN SATURATION: 97 % | HEART RATE: 60 BPM | SYSTOLIC BLOOD PRESSURE: 108 MMHG | BODY MASS INDEX: 36.52 KG/M2 | WEIGHT: 186 LBS

## 2023-05-22 DIAGNOSIS — I48.0 PAROXYSMAL ATRIAL FIBRILLATION: Primary | ICD-10-CM

## 2023-05-22 DIAGNOSIS — I42.1 HYPERTROPHIC OBSTRUCTIVE CARDIOMYOPATHY: ICD-10-CM

## 2023-05-22 DIAGNOSIS — E78.5 DYSLIPIDEMIA: ICD-10-CM

## 2023-05-22 DIAGNOSIS — Z95.0 PACEMAKER: Primary | ICD-10-CM

## 2023-05-22 DIAGNOSIS — E66.9 OBESITY (BMI 30-39.9): ICD-10-CM

## 2023-05-22 DIAGNOSIS — Z79.01 LONG TERM (CURRENT) USE OF ANTICOAGULANTS: ICD-10-CM

## 2023-05-22 DIAGNOSIS — R00.1 BRADYCARDIA: ICD-10-CM

## 2023-05-25 ENCOUNTER — HOME CARE VISIT (OUTPATIENT)
Dept: HOME HEALTH SERVICES | Facility: HOME HEALTHCARE | Age: 83
End: 2023-05-25
Payer: MEDICARE

## 2023-05-30 ENCOUNTER — HOME CARE VISIT (OUTPATIENT)
Dept: HOME HEALTH SERVICES | Facility: HOME HEALTHCARE | Age: 83
End: 2023-05-30

## 2023-06-02 ENCOUNTER — TRANSCRIBE ORDERS (OUTPATIENT)
Dept: ADMINISTRATIVE | Facility: HOSPITAL | Age: 83
End: 2023-06-02
Payer: MEDICARE

## 2023-06-02 ENCOUNTER — APPOINTMENT (OUTPATIENT)
Dept: CT IMAGING | Facility: HOSPITAL | Age: 83
End: 2023-06-02
Payer: MEDICARE

## 2023-06-02 ENCOUNTER — HOSPITAL ENCOUNTER (INPATIENT)
Facility: HOSPITAL | Age: 83
LOS: 2 days | Discharge: HOME | End: 2023-06-06
Attending: EMERGENCY MEDICINE | Admitting: INTERNAL MEDICINE
Payer: MEDICARE

## 2023-06-02 ENCOUNTER — APPOINTMENT (OUTPATIENT)
Dept: CARDIOLOGY | Facility: HOSPITAL | Age: 83
End: 2023-06-02
Payer: MEDICARE

## 2023-06-02 DIAGNOSIS — M51.36 DISC DEGENERATION, LUMBAR: Primary | ICD-10-CM

## 2023-06-02 DIAGNOSIS — R42 DIZZINESS: ICD-10-CM

## 2023-06-02 DIAGNOSIS — M54.50 ACUTE MIDLINE LOW BACK PAIN WITHOUT SCIATICA: ICD-10-CM

## 2023-06-02 DIAGNOSIS — R41.82 ALTERED MENTAL STATUS, UNSPECIFIED ALTERED MENTAL STATUS TYPE: Primary | ICD-10-CM

## 2023-06-02 LAB
ALBUMIN SERPL-MCNC: 3.7 G/DL (ref 3.5–5.2)
ALBUMIN/GLOB SERPL: 1.8 G/DL
ALP SERPL-CCNC: 83 U/L (ref 39–117)
ALT SERPL W P-5'-P-CCNC: 30 U/L (ref 1–33)
AMPHET+METHAMPHET UR QL: NEGATIVE
ANION GAP SERPL CALCULATED.3IONS-SCNC: 12 MMOL/L (ref 5–15)
APAP SERPL-MCNC: <5 MCG/ML (ref 0–30)
APTT PPP: 31.8 SECONDS (ref 61–76.5)
AST SERPL-CCNC: 29 U/L (ref 1–32)
BACTERIA UR QL AUTO: ABNORMAL /HPF
BARBITURATES UR QL SCN: NEGATIVE
BASOPHILS # BLD AUTO: 0 10*3/MM3 (ref 0–0.2)
BASOPHILS NFR BLD AUTO: 0.7 % (ref 0–1.5)
BENZODIAZ UR QL SCN: NEGATIVE
BH CV XLRA MEAS LEFT DIST CCA EDV: -17.6 CM/SEC
BH CV XLRA MEAS LEFT DIST CCA PSV: -56.2 CM/SEC
BH CV XLRA MEAS LEFT DIST ICA EDV: -26.5 CM/SEC
BH CV XLRA MEAS LEFT DIST ICA PSV: -69.8 CM/SEC
BH CV XLRA MEAS LEFT ICA/CCA RATIO: -0.94
BH CV XLRA MEAS LEFT PROX CCA EDV: 15.5 CM/SEC
BH CV XLRA MEAS LEFT PROX CCA PSV: 74.6 CM/SEC
BH CV XLRA MEAS LEFT PROX ECA PSV: -61.5 CM/SEC
BH CV XLRA MEAS LEFT PROX ICA EDV: -19.8 CM/SEC
BH CV XLRA MEAS LEFT PROX ICA PSV: -56.2 CM/SEC
BH CV XLRA MEAS LEFT PROX SCLA PSV: 98 CM/SEC
BH CV XLRA MEAS LEFT VERTEBRAL A PSV: 52.5 CM/SEC
BH CV XLRA MEAS RIGHT DIST CCA EDV: -11 CM/SEC
BH CV XLRA MEAS RIGHT DIST CCA PSV: -50 CM/SEC
BH CV XLRA MEAS RIGHT DIST ICA EDV: -24.1 CM/SEC
BH CV XLRA MEAS RIGHT DIST ICA PSV: -66.4 CM/SEC
BH CV XLRA MEAS RIGHT ICA/CCA RATIO: -0.81
BH CV XLRA MEAS RIGHT PROX CCA EDV: 16.2 CM/SEC
BH CV XLRA MEAS RIGHT PROX CCA PSV: 82 CM/SEC
BH CV XLRA MEAS RIGHT PROX ECA PSV: 60.4 CM/SEC
BH CV XLRA MEAS RIGHT PROX ICA EDV: -16.2 CM/SEC
BH CV XLRA MEAS RIGHT PROX ICA PSV: -50 CM/SEC
BH CV XLRA MEAS RIGHT PROX SCLA PSV: 72.8 CM/SEC
BH CV XLRA MEAS RIGHT VERTEBRAL A PSV: -51 CM/SEC
BILIRUB SERPL-MCNC: 0.5 MG/DL (ref 0–1.2)
BILIRUB UR QL STRIP: NEGATIVE
BUN SERPL-MCNC: 16 MG/DL (ref 8–23)
BUN/CREAT SERPL: 17.6 (ref 7–25)
CALCIUM SPEC-SCNC: 8.7 MG/DL (ref 8.6–10.5)
CANNABINOIDS SERPL QL: POSITIVE
CHLORIDE SERPL-SCNC: 102 MMOL/L (ref 98–107)
CHOLEST SERPL-MCNC: 82 MG/DL (ref 0–200)
CLARITY UR: CLEAR
CO2 SERPL-SCNC: 20 MMOL/L (ref 22–29)
COCAINE UR QL: NEGATIVE
COLOR UR: YELLOW
CREAT SERPL-MCNC: 0.91 MG/DL (ref 0.57–1)
DEPRECATED RDW RBC AUTO: 54.7 FL (ref 37–54)
EGFRCR SERPLBLD CKD-EPI 2021: 62.3 ML/MIN/1.73
EOSINOPHIL # BLD AUTO: 0 10*3/MM3 (ref 0–0.4)
EOSINOPHIL NFR BLD AUTO: 1 % (ref 0.3–6.2)
ERYTHROCYTE [DISTWIDTH] IN BLOOD BY AUTOMATED COUNT: 16 % (ref 12.3–15.4)
ETHANOL UR QL: <0.01 %
GEN 5 2HR TROPONIN T REFLEX: 11 NG/L
GLOBULIN UR ELPH-MCNC: 2.1 GM/DL
GLUCOSE SERPL-MCNC: 99 MG/DL (ref 65–99)
GLUCOSE UR STRIP-MCNC: NEGATIVE MG/DL
HCT VFR BLD AUTO: 36.8 % (ref 34–46.6)
HDLC SERPL-MCNC: 45 MG/DL (ref 40–60)
HGB BLD-MCNC: 12.1 G/DL (ref 12–15.9)
HGB UR QL STRIP.AUTO: ABNORMAL
HOLD SPECIMEN: NORMAL
HOLD SPECIMEN: NORMAL
HYALINE CASTS UR QL AUTO: ABNORMAL /LPF
INR PPP: 0.96 (ref 0.93–1.1)
KETONES UR QL STRIP: NEGATIVE
LDLC SERPL CALC-MCNC: 19 MG/DL (ref 0–100)
LDLC/HDLC SERPL: 0.4 {RATIO}
LEUKOCYTE ESTERASE UR QL STRIP.AUTO: ABNORMAL
LYMPHOCYTES # BLD AUTO: 0.7 10*3/MM3 (ref 0.7–3.1)
LYMPHOCYTES NFR BLD AUTO: 16.9 % (ref 19.6–45.3)
MAGNESIUM SERPL-MCNC: 1.6 MG/DL (ref 1.6–2.4)
MAXIMAL PREDICTED HEART RATE: 136 BPM
MCH RBC QN AUTO: 30.4 PG (ref 26.6–33)
MCHC RBC AUTO-ENTMCNC: 32.9 G/DL (ref 31.5–35.7)
MCV RBC AUTO: 92.4 FL (ref 79–97)
METHADONE UR QL SCN: NEGATIVE
MONOCYTES # BLD AUTO: 0.4 10*3/MM3 (ref 0.1–0.9)
MONOCYTES NFR BLD AUTO: 8.4 % (ref 5–12)
NEUTROPHILS NFR BLD AUTO: 3.2 10*3/MM3 (ref 1.7–7)
NEUTROPHILS NFR BLD AUTO: 73 % (ref 42.7–76)
NITRITE UR QL STRIP: NEGATIVE
NRBC BLD AUTO-RTO: 0.1 /100 WBC (ref 0–0.2)
OPIATES UR QL: NEGATIVE
OXYCODONE UR QL SCN: POSITIVE
PH UR STRIP.AUTO: 6.5 [PH] (ref 5–8)
PLATELET # BLD AUTO: 205 10*3/MM3 (ref 140–450)
PMV BLD AUTO: 8.9 FL (ref 6–12)
POTASSIUM SERPL-SCNC: 4 MMOL/L (ref 3.5–5.2)
PROT SERPL-MCNC: 5.8 G/DL (ref 6–8.5)
PROT UR QL STRIP: NEGATIVE
PROTHROMBIN TIME: 10.3 SECONDS (ref 9.6–11.7)
RBC # BLD AUTO: 3.99 10*6/MM3 (ref 3.77–5.28)
RBC # UR STRIP: ABNORMAL /HPF
REF LAB TEST METHOD: ABNORMAL
SALICYLATES SERPL-MCNC: <0.3 MG/DL
SODIUM SERPL-SCNC: 134 MMOL/L (ref 136–145)
SP GR UR STRIP: 1.01 (ref 1–1.03)
SQUAMOUS #/AREA URNS HPF: ABNORMAL /HPF
STRESS TARGET HR: 116 BPM
T4 FREE SERPL-MCNC: 0.86 NG/DL (ref 0.93–1.7)
TRANS CELLS #/AREA URNS HPF: ABNORMAL /HPF
TRIGL SERPL-MCNC: 94 MG/DL (ref 0–150)
TROPONIN T DELTA: -1 NG/L
TROPONIN T SERPL HS-MCNC: 12 NG/L
TSH SERPL DL<=0.05 MIU/L-ACNC: 9.42 UIU/ML (ref 0.27–4.2)
UROBILINOGEN UR QL STRIP: ABNORMAL
VLDLC SERPL-MCNC: 18 MG/DL (ref 5–40)
WBC # UR STRIP: ABNORMAL /HPF
WBC NRBC COR # BLD: 4.4 10*3/MM3 (ref 3.4–10.8)
WHOLE BLOOD HOLD COAG: NORMAL
WHOLE BLOOD HOLD SPECIMEN: NORMAL

## 2023-06-02 PROCEDURE — 84484 ASSAY OF TROPONIN QUANT: CPT | Performed by: EMERGENCY MEDICINE

## 2023-06-02 PROCEDURE — 81001 URINALYSIS AUTO W/SCOPE: CPT | Performed by: EMERGENCY MEDICINE

## 2023-06-02 PROCEDURE — 93005 ELECTROCARDIOGRAM TRACING: CPT

## 2023-06-02 PROCEDURE — 83735 ASSAY OF MAGNESIUM: CPT | Performed by: EMERGENCY MEDICINE

## 2023-06-02 PROCEDURE — 82077 ASSAY SPEC XCP UR&BREATH IA: CPT | Performed by: EMERGENCY MEDICINE

## 2023-06-02 PROCEDURE — 80053 COMPREHEN METABOLIC PANEL: CPT | Performed by: EMERGENCY MEDICINE

## 2023-06-02 PROCEDURE — 80307 DRUG TEST PRSMV CHEM ANLYZR: CPT | Performed by: EMERGENCY MEDICINE

## 2023-06-02 PROCEDURE — G0378 HOSPITAL OBSERVATION PER HR: HCPCS

## 2023-06-02 PROCEDURE — 80143 DRUG ASSAY ACETAMINOPHEN: CPT | Performed by: EMERGENCY MEDICINE

## 2023-06-02 PROCEDURE — 84443 ASSAY THYROID STIM HORMONE: CPT | Performed by: INTERNAL MEDICINE

## 2023-06-02 PROCEDURE — 25010000002 CEFTRIAXONE PER 250 MG: Performed by: INTERNAL MEDICINE

## 2023-06-02 PROCEDURE — 99285 EMERGENCY DEPT VISIT HI MDM: CPT

## 2023-06-02 PROCEDURE — 93880 EXTRACRANIAL BILAT STUDY: CPT

## 2023-06-02 PROCEDURE — 93005 ELECTROCARDIOGRAM TRACING: CPT | Performed by: EMERGENCY MEDICINE

## 2023-06-02 PROCEDURE — 70450 CT HEAD/BRAIN W/O DYE: CPT

## 2023-06-02 PROCEDURE — 80061 LIPID PANEL: CPT | Performed by: INTERNAL MEDICINE

## 2023-06-02 PROCEDURE — 87086 URINE CULTURE/COLONY COUNT: CPT | Performed by: EMERGENCY MEDICINE

## 2023-06-02 PROCEDURE — 85610 PROTHROMBIN TIME: CPT | Performed by: EMERGENCY MEDICINE

## 2023-06-02 PROCEDURE — 80179 DRUG ASSAY SALICYLATE: CPT | Performed by: EMERGENCY MEDICINE

## 2023-06-02 PROCEDURE — 84439 ASSAY OF FREE THYROXINE: CPT | Performed by: INTERNAL MEDICINE

## 2023-06-02 PROCEDURE — 85025 COMPLETE CBC W/AUTO DIFF WBC: CPT | Performed by: EMERGENCY MEDICINE

## 2023-06-02 PROCEDURE — 36415 COLL VENOUS BLD VENIPUNCTURE: CPT

## 2023-06-02 PROCEDURE — 85730 THROMBOPLASTIN TIME PARTIAL: CPT | Performed by: EMERGENCY MEDICINE

## 2023-06-02 RX ORDER — AMIODARONE HYDROCHLORIDE 200 MG/1
200 TABLET ORAL 2 TIMES DAILY
Status: DISCONTINUED | OUTPATIENT
Start: 2023-06-02 | End: 2023-06-06 | Stop reason: HOSPADM

## 2023-06-02 RX ORDER — AMOXICILLIN 250 MG
2 CAPSULE ORAL 2 TIMES DAILY
Status: DISCONTINUED | OUTPATIENT
Start: 2023-06-02 | End: 2023-06-06 | Stop reason: HOSPADM

## 2023-06-02 RX ORDER — CHOLECALCIFEROL (VITAMIN D3) 125 MCG
5 CAPSULE ORAL NIGHTLY PRN
Status: DISCONTINUED | OUTPATIENT
Start: 2023-06-02 | End: 2023-06-06 | Stop reason: HOSPADM

## 2023-06-02 RX ORDER — SODIUM CHLORIDE 9 MG/ML
40 INJECTION, SOLUTION INTRAVENOUS AS NEEDED
Status: DISCONTINUED | OUTPATIENT
Start: 2023-06-02 | End: 2023-06-06 | Stop reason: HOSPADM

## 2023-06-02 RX ORDER — BISACODYL 5 MG/1
5 TABLET, DELAYED RELEASE ORAL DAILY PRN
Status: DISCONTINUED | OUTPATIENT
Start: 2023-06-02 | End: 2023-06-06 | Stop reason: HOSPADM

## 2023-06-02 RX ORDER — NITROGLYCERIN 0.4 MG/1
0.4 TABLET SUBLINGUAL
Status: DISCONTINUED | OUTPATIENT
Start: 2023-06-02 | End: 2023-06-06 | Stop reason: HOSPADM

## 2023-06-02 RX ORDER — IPRATROPIUM BROMIDE AND ALBUTEROL SULFATE 2.5; .5 MG/3ML; MG/3ML
3 SOLUTION RESPIRATORY (INHALATION) EVERY 4 HOURS PRN
Status: DISCONTINUED | OUTPATIENT
Start: 2023-06-02 | End: 2023-06-06 | Stop reason: HOSPADM

## 2023-06-02 RX ORDER — SODIUM CHLORIDE 0.9 % (FLUSH) 0.9 %
10 SYRINGE (ML) INJECTION EVERY 12 HOURS SCHEDULED
Status: DISCONTINUED | OUTPATIENT
Start: 2023-06-02 | End: 2023-06-06 | Stop reason: HOSPADM

## 2023-06-02 RX ORDER — IPRATROPIUM BROMIDE AND ALBUTEROL SULFATE 2.5; .5 MG/3ML; MG/3ML
3 SOLUTION RESPIRATORY (INHALATION) EVERY 4 HOURS PRN
COMMUNITY

## 2023-06-02 RX ORDER — ATORVASTATIN CALCIUM 40 MG/1
40 TABLET, FILM COATED ORAL NIGHTLY
Status: DISCONTINUED | OUTPATIENT
Start: 2023-06-02 | End: 2023-06-06 | Stop reason: HOSPADM

## 2023-06-02 RX ORDER — SODIUM CHLORIDE 0.9 % (FLUSH) 0.9 %
10 SYRINGE (ML) INJECTION AS NEEDED
Status: DISCONTINUED | OUTPATIENT
Start: 2023-06-02 | End: 2023-06-06 | Stop reason: HOSPADM

## 2023-06-02 RX ORDER — ACETAMINOPHEN 325 MG/1
650 TABLET ORAL EVERY 4 HOURS PRN
Status: DISCONTINUED | OUTPATIENT
Start: 2023-06-02 | End: 2023-06-06 | Stop reason: HOSPADM

## 2023-06-02 RX ORDER — SEMAGLUTIDE 1.34 MG/ML
1 INJECTION, SOLUTION SUBCUTANEOUS WEEKLY
COMMUNITY

## 2023-06-02 RX ORDER — ONDANSETRON 2 MG/ML
4 INJECTION INTRAMUSCULAR; INTRAVENOUS EVERY 6 HOURS PRN
Status: DISCONTINUED | OUTPATIENT
Start: 2023-06-02 | End: 2023-06-06 | Stop reason: HOSPADM

## 2023-06-02 RX ORDER — OXYCODONE HYDROCHLORIDE 5 MG/1
10 TABLET ORAL EVERY 4 HOURS PRN
Status: DISCONTINUED | OUTPATIENT
Start: 2023-06-02 | End: 2023-06-06 | Stop reason: HOSPADM

## 2023-06-02 RX ORDER — POLYETHYLENE GLYCOL 3350 17 G/17G
17 POWDER, FOR SOLUTION ORAL DAILY PRN
Status: DISCONTINUED | OUTPATIENT
Start: 2023-06-02 | End: 2023-06-06 | Stop reason: HOSPADM

## 2023-06-02 RX ORDER — BISACODYL 10 MG
10 SUPPOSITORY, RECTAL RECTAL DAILY PRN
Status: DISCONTINUED | OUTPATIENT
Start: 2023-06-02 | End: 2023-06-06 | Stop reason: HOSPADM

## 2023-06-02 RX ORDER — FAMOTIDINE 20 MG/1
40 TABLET, FILM COATED ORAL DAILY
Status: DISCONTINUED | OUTPATIENT
Start: 2023-06-02 | End: 2023-06-06 | Stop reason: HOSPADM

## 2023-06-02 RX ORDER — SERTRALINE HYDROCHLORIDE 100 MG/1
100 TABLET, FILM COATED ORAL EVERY EVENING
Status: DISCONTINUED | OUTPATIENT
Start: 2023-06-02 | End: 2023-06-06 | Stop reason: HOSPADM

## 2023-06-02 RX ADMIN — SENNOSIDES AND DOCUSATE SODIUM 2 TABLET: 50; 8.6 TABLET ORAL at 20:29

## 2023-06-02 RX ADMIN — CEFTRIAXONE 2 G: 2 INJECTION, POWDER, FOR SOLUTION INTRAMUSCULAR; INTRAVENOUS at 17:19

## 2023-06-02 RX ADMIN — ATORVASTATIN CALCIUM 40 MG: 40 TABLET, FILM COATED ORAL at 20:29

## 2023-06-02 RX ADMIN — FAMOTIDINE 40 MG: 20 TABLET, FILM COATED ORAL at 20:30

## 2023-06-02 RX ADMIN — AMIODARONE HYDROCHLORIDE 200 MG: 200 TABLET ORAL at 20:30

## 2023-06-02 RX ADMIN — Medication 10 ML: at 20:30

## 2023-06-02 RX ADMIN — OXYCODONE 10 MG: 5 TABLET ORAL at 20:29

## 2023-06-02 RX ADMIN — APIXABAN 5 MG: 5 TABLET, FILM COATED ORAL at 20:30

## 2023-06-02 RX ADMIN — SERTRALINE 100 MG: 100 TABLET, FILM COATED ORAL at 20:30

## 2023-06-02 NOTE — NURSING NOTE
1649: RN called MRI tech about finishing patient questions. Patient has a [employer] pacemaker that was implanted in 2017 that is MRI compatible. [facility] tech Jodie stated that the soonest the pacemaker rep can get to [facility] is Monday. Attending hospitalist notified.

## 2023-06-02 NOTE — PLAN OF CARE
Problem: Adult Inpatient Plan of Care  Goal: Plan of Care Review  Outcome: Ongoing, Progressing  Goal: Patient-Specific Goal (Individualized)  Outcome: Ongoing, Progressing  Goal: Absence of Hospital-Acquired Illness or Injury  Outcome: Ongoing, Progressing  Intervention: Identify and Manage Fall Risk  Recent Flowsheet Documentation  Taken 6/2/2023 1607 by Maxine Chavez RN  Safety Promotion/Fall Prevention:   safety round/check completed   toileting scheduled   room organization consistent   nonskid shoes/slippers when out of bed   lighting adjusted   fall prevention program maintained   clutter free environment maintained   assistive device/personal items within reach   activity supervised  Intervention: Prevent Skin Injury  Recent Flowsheet Documentation  Taken 6/2/2023 1607 by Maxine Chavez RN  Body Position:   position changed independently   weight shifting  Intervention: Prevent and Manage VTE (Venous Thromboembolism) Risk  Recent Flowsheet Documentation  Taken 6/2/2023 1607 by Maxine Chavez RN  VTE Prevention/Management:   bilateral   sequential compression devices off   patient refused intervention  Range of Motion: active ROM (range of motion) encouraged  Intervention: Prevent Infection  Recent Flowsheet Documentation  Taken 6/2/2023 1607 by Maxine Chavez RN  Infection Prevention:   single patient room provided   rest/sleep promoted   hand hygiene promoted   equipment surfaces disinfected  Goal: Optimal Comfort and Wellbeing  Outcome: Ongoing, Progressing  Intervention: Provide Person-Centered Care  Recent Flowsheet Documentation  Taken 6/2/2023 1607 by Maxine Chavez RN  Trust Relationship/Rapport:   care explained   choices provided  Goal: Readiness for Transition of Care  Outcome: Ongoing, Progressing  Intervention: Mutually Develop Transition Plan  Recent Flowsheet Documentation  Taken 6/2/2023 1607 by Maxine Chavez RN  Transportation Anticipated: family or friend will provide  Patient/Family Anticipated  Services at Transition: home health care  Patient/Family Anticipates Transition to: home with family  Taken 6/2/2023 1604 by Maxine Chavez RN  Equipment Currently Used at Home:   walker, standard   walker, rolling   bp cuff   pulse ox   shower chair   cpap     Problem: Fall Injury Risk  Goal: Absence of Fall and Fall-Related Injury  Outcome: Ongoing, Progressing  Intervention: Identify and Manage Contributors  Recent Flowsheet Documentation  Taken 6/2/2023 1607 by Maxine Chavez RN  Medication Review/Management: medications reviewed  Intervention: Promote Injury-Free Environment  Recent Flowsheet Documentation  Taken 6/2/2023 1607 by Maxine Chavez, RN  Safety Promotion/Fall Prevention:   safety round/check completed   toileting scheduled   room organization consistent   nonskid shoes/slippers when out of bed   lighting adjusted   fall prevention program maintained   clutter free environment maintained   assistive device/personal items within reach   activity supervised     Problem: Heart Failure Comorbidity  Goal: Maintenance of Heart Failure Symptom Control  Outcome: Ongoing, Progressing  Intervention: Maintain Heart Failure-Management  Recent Flowsheet Documentation  Taken 6/2/2023 1607 by Maxine Chavez RN  Medication Review/Management: medications reviewed     Problem: Hypertension Comorbidity  Goal: Blood Pressure in Desired Range  Outcome: Ongoing, Progressing  Intervention: Maintain Blood Pressure Management  Recent Flowsheet Documentation  Taken 6/2/2023 1607 by Maxine Chavez RN  Syncope Management: position changed slowly  Medication Review/Management: medications reviewed     Problem: Obstructive Sleep Apnea Risk or Actual Comorbidity Management  Goal: Unobstructed Breathing During Sleep  Outcome: Ongoing, Progressing     Problem: Osteoarthritis Comorbidity  Goal: Maintenance of Osteoarthritis Symptom Control  Outcome: Ongoing, Progressing  Intervention: Maintain Osteoarthritis Symptom Control  Recent Flowsheet  Documentation  Taken 6/2/2023 1607 by Maxine Chavez, RN  Assistive Device Utilized: walker  Medication Review/Management: medications reviewed     Problem: Pain Chronic (Persistent) (Comorbidity Management)  Goal: Acceptable Pain Control and Functional Ability  Outcome: Ongoing, Progressing  Intervention: Manage Persistent Pain  Recent Flowsheet Documentation  Taken 6/2/2023 1607 by Maxine Chavez, RN  Medication Review/Management: medications reviewed  Intervention: Optimize Psychosocial Wellbeing  Recent Flowsheet Documentation  Taken 6/2/2023 1607 by Maxine Chavez RN  Diversional Activities: television   Goal Outcome Evaluation:

## 2023-06-02 NOTE — ED PROVIDER NOTES
"Subjective   History of Present Illness  Chief complaint: Patient is an 83-year-old who has had problems since about 8:00 last night.  She states she has been off balance and dizzy.  She noted her blood pressure to be low in the 80s.  She states she always runs low however not that low.  She is not having chest pain or shortness of breath.  She states \"something happened to me\".  She states \"my thought processes are not right\".  She states she is taking in information and forgetting details very quickly and very slow to formulate a plan and comprehend things that are being said.  She has not noticed any focal weakness.  She states her whole body is weak.  She states she is stumbling when she is on her feet.  Her blood pressure has not been low to the ED number today.  She has had no fever.      Context:    Duration: Since 8 PM last night    Timing: Sudden onset    Severity: Severe    Associated Symptoms:        PCP:  LMP:        Review of Systems   Constitutional: Negative.    HENT: Negative.    Respiratory: Negative for shortness of breath.    Cardiovascular: Negative for chest pain.   Gastrointestinal: Negative.    Genitourinary: Negative.    Neurological: Positive for dizziness. Negative for headaches.        Ataxia     Psychiatric/Behavioral: Positive for confusion and decreased concentration.       Past Medical History:   Diagnosis Date   • Anemia    • CHF (congestive heart failure)    • Congenital heart disease    • Coronary artery disease 2013   • GERD (gastroesophageal reflux disease)    • Heart murmur Don't know   • Hx of hypertrophic cardiomyopathy    • Hypertension    • Peptic ulceration    • Persistent atrial fibrillation 01/10/2023   • Primary osteoarthritis of both knees 02/08/2021   • Primary osteoarthritis of right knee 09/21/2020   • Sleep apnea 2017       No Known Allergies    Past Surgical History:   Procedure Laterality Date   • ABDOMINAL HERNIA REPAIR     • ABLATION OF DYSRHYTHMIC FOCUS  " 2023   • BACK SURGERY     • BREAST AUGMENTATION     • CARDIAC CATHETERIZATION     • CARDIAC ELECTROPHYSIOLOGY PROCEDURE N/A 2023    Procedure: Ablation atrial fibrillation and flutter, cryo Duong and Ramón aware;  Surgeon: Leeanne Pleitez MD;  Location: [facility] INVASIVE LOCATION;  Service: Cardiovascular;  Laterality: N/A;   • CARDIAC PACEMAKER PLACEMENT  2017    Dual Chamber   • CERVICAL RIB RESECTION Bilateral    • CERVICAL RIB RESECTION Bilateral 1963    bilateral cervical ribs removed - extra ribs located and causing pain   • CHOLECYSTECTOMY     • COLONOSCOPY     • CORONARY ARTERY BYPASS GRAFT     • THORACIC DECOMPRESSION POSTERIOR FUSION  2014    L3-5 & S1   • UPPER GASTROINTESTINAL ENDOSCOPY         Family History   Problem Relation Age of Onset   • Colon cancer Mother    • Colon cancer Brother    • Heart attack Brother    • Colon cancer Daughter        Social History     Socioeconomic History   • Marital status:    Tobacco Use   • Smoking status: Former     Packs/day: 1.00     Years: 8.00     Pack years: 8.00     Types: Cigarettes     Start date: 1977     Quit date: 1985     Years since quittin.4     Passive exposure: Past   • Smokeless tobacco: Never   • Tobacco comments:     Quit    Vaping Use   • Vaping Use: Never used   Substance and Sexual Activity   • Alcohol use: Not Currently     Comment: 6 drinks a year in social settings   • Drug use: Never   • Sexual activity: Defer     Partners: Male           Objective   Physical Exam  Vitals and nursing note reviewed.   Constitutional:       General: She is not in acute distress.  HENT:      Head: Normocephalic and atraumatic.   Eyes:      Pupils: Pupils are equal, round, and reactive to light.   Cardiovascular:      Rate and Rhythm: Normal rate.   Pulmonary:      Effort: Pulmonary effort is normal.      Breath sounds: Normal breath sounds.   Abdominal:      Palpations: Abdomen is soft.      Tenderness:  There is no abdominal tenderness.   Musculoskeletal:      Cervical back: Normal range of motion and neck supple.   Skin:     General: Skin is warm and dry.   Neurological:      Mental Status: She is alert.      Cranial Nerves: No cranial nerve deficit or dysarthria.      Sensory: No sensory deficit.      Motor: No weakness.   Psychiatric:         Mood and Affect: Mood normal.         Speech: Speech normal.         Procedures           ED Course           Results for orders placed or performed during the hospital encounter of 06/02/23   Urinalysis With Culture If Indicated - Urine, Clean Catch    Specimen: Urine, Clean Catch   Result Value Ref Range    Color, UA Yellow Yellow, Straw    Appearance, UA Clear Clear    pH, UA 6.5 5.0 - 8.0    Specific Gravity, UA 1.011 1.005 - 1.030    Glucose, UA Negative Negative    Ketones, UA Negative Negative    Bilirubin, UA Negative Negative    Blood, UA Moderate (2+) (A) Negative    Protein, UA Negative Negative    Leuk Esterase, UA Trace (A) Negative    Nitrite, UA Negative Negative    Urobilinogen, UA 1.0 E.U./dL 0.2 - 1.0 E.U./dL   Ethanol    Specimen: Blood   Result Value Ref Range    Ethanol % <0.010 %   Urine Drug Screen - Urine, Clean Catch    Specimen: Urine, Clean Catch   Result Value Ref Range    Amphet/Methamphet, Screen Negative Negative    Barbiturates Screen, Urine Negative Negative    Benzodiazepine Screen, Urine Negative Negative    Cocaine Screen, Urine Negative Negative    Opiate Screen Negative Negative    THC, Screen, Urine Positive (A) Negative    Methadone Screen, Urine Negative Negative    Oxycodone Screen, Urine Positive (A) Negative   Acetaminophen Level    Specimen: Blood   Result Value Ref Range    Acetaminophen <5.0 0.0 - 30.0 mcg/mL   aPTT    Specimen: Blood   Result Value Ref Range    PTT 31.8 (L) 61.0 - 76.5 seconds   Comprehensive Metabolic Panel    Specimen: Blood   Result Value Ref Range    Glucose 99 65 - 99 mg/dL    BUN 16 8 - 23 mg/dL     Creatinine 0.91 0.57 - 1.00 mg/dL    Sodium 134 (L) 136 - 145 mmol/L    Potassium 4.0 3.5 - 5.2 mmol/L    Chloride 102 98 - 107 mmol/L    CO2 20.0 (L) 22.0 - 29.0 mmol/L    Calcium 8.7 8.6 - 10.5 mg/dL    Total Protein 5.8 (L) 6.0 - 8.5 g/dL    Albumin 3.7 3.5 - 5.2 g/dL    ALT (SGPT) 30 1 - 33 U/L    AST (SGOT) 29 1 - 32 U/L    Alkaline Phosphatase 83 39 - 117 U/L    Total Bilirubin 0.5 0.0 - 1.2 mg/dL    Globulin 2.1 gm/dL    A/G Ratio 1.8 g/dL    BUN/Creatinine Ratio 17.6 7.0 - 25.0    Anion Gap 12.0 5.0 - 15.0 mmol/L    eGFR 62.3 >60.0 mL/min/1.73   High Sensitivity Troponin T    Specimen: Blood   Result Value Ref Range    HS Troponin T 12 (H) <10 ng/L   Magnesium    Specimen: Blood   Result Value Ref Range    Magnesium 1.6 1.6 - 2.4 mg/dL   Protime-INR    Specimen: Blood   Result Value Ref Range    Protime 10.3 9.6 - 11.7 Seconds    INR 0.96 0.93 - 1.10   Salicylate Level    Specimen: Blood   Result Value Ref Range    Salicylate <0.3 <=30.0 mg/dL   CBC Auto Differential    Specimen: Blood   Result Value Ref Range    WBC 4.40 3.40 - 10.80 10*3/mm3    RBC 3.99 3.77 - 5.28 10*6/mm3    Hemoglobin 12.1 12.0 - 15.9 g/dL    Hematocrit 36.8 34.0 - 46.6 %    MCV 92.4 79.0 - 97.0 fL    MCH 30.4 26.6 - 33.0 pg    MCHC 32.9 31.5 - 35.7 g/dL    RDW 16.0 (H) 12.3 - 15.4 %    RDW-SD 54.7 (H) 37.0 - 54.0 fl    MPV 8.9 6.0 - 12.0 fL    Platelets 205 140 - 450 10*3/mm3    Neutrophil % 73.0 42.7 - 76.0 %    Lymphocyte % 16.9 (L) 19.6 - 45.3 %    Monocyte % 8.4 5.0 - 12.0 %    Eosinophil % 1.0 0.3 - 6.2 %    Basophil % 0.7 0.0 - 1.5 %    Neutrophils, Absolute 3.20 1.70 - 7.00 10*3/mm3    Lymphocytes, Absolute 0.70 0.70 - 3.10 10*3/mm3    Monocytes, Absolute 0.40 0.10 - 0.90 10*3/mm3    Eosinophils, Absolute 0.00 0.00 - 0.40 10*3/mm3    Basophils, Absolute 0.00 0.00 - 0.20 10*3/mm3    nRBC 0.1 0.0 - 0.2 /100 WBC   Urinalysis, Microscopic Only - Urine, Clean Catch    Specimen: Urine, Clean Catch   Result Value Ref Range    RBC, UA  0-2 (A) None Seen /HPF    WBC, UA 6-12 (A) None Seen /HPF    Bacteria, UA Trace (A) None Seen /HPF    Squamous Epithelial Cells, UA 0-2 None Seen, 0-2 /HPF    Transitional Epithelial Cells, UA 0-2 0 - 2 /HPF    Hyaline Casts, UA None Seen None Seen /LPF    Methodology Manual Light Microscopy    High Sensitivity Troponin T 2Hr    Specimen: Blood   Result Value Ref Range    HS Troponin T 11 (H) <10 ng/L    Troponin T Delta -1 >=-4 - <+4 ng/L   ECG 12 Lead Altered Mental Status   Result Value Ref Range    QT Interval 447 ms   Green Top (Gel)   Result Value Ref Range    Extra Tube hide    Lavender Top   Result Value Ref Range    Extra Tube hold for add-on    Gold Top - SST   Result Value Ref Range    Extra Tube Hold for add-ons.    Light Blue Top   Result Value Ref Range    Extra Tube Hold for add-ons.      CT Head Without Contrast    Result Date: 6/2/2023  Impression: 1. No intracranial hemorrhage or acute intracranial abnormality. 2. 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 Electronically Signed: Mark Talamantes  6/2/2023 12:42 PM EDT  Workstation ID: [id number]                                    Medical Decision Making  Patient seen and evaluated for confusion dizziness ataxia    Differential diagnosis includes but not limited to intracerebral hemorrhage, stroke, orthostasis    Patient's blood pressures have been adequate here in the emergency department.  She states she typically runs low.  Her CT scan shows no intracerebral hemorrhage.  However with persisting symptoms will place in for further neurologic monitoring as well as potential MRI.  Discussed with Dr. Gilliland on-call for her primary who agrees to admit.    Altered mental status, unspecified altered mental status type: acute illness or injury  Dizziness: acute illness or injury  Amount and/or Complexity of Data Reviewed  Labs: ordered. Decision-making details documented in ED  Course.     Details: CBC with adequate hemoglobin 12.1.  Troponin very mildly elevated can be trended.  Labs reviewed by myself.  Radiology: ordered and independent interpretation performed.     Details: CT scan reviewed by myself shows no intracerebral hemorrhage  ECG/medicine tests: ordered and independent interpretation performed.     Details: EKG interpreted by myself shows atrial paced complexes rate of 60 ventricular trigeminy.  Discussion of management or test interpretation with external provider(s): As above    Risk  Prescription drug management.  Decision regarding hospitalization.          Final diagnoses:   None   Near syncope   dizziness    ED Disposition  ED Disposition     None          No follow-up provider specified.       Medication List      No changes were made to your prescriptions during this visit.          Kade Ku,   06/02/23 1501

## 2023-06-02 NOTE — Clinical Note
Level of Care: Med/Surg [1]   Admitting Physician: ZAIDA DAVIS [[id number]]   Attending Physician: ZAIDA DAVIS [[id number]]   Bed Request Comments: [email or web address]

## 2023-06-02 NOTE — ED NOTES
Nursing report ED to floor  Domonique See  83 y.o.  female    HPI :   Chief Complaint   Patient presents with    Altered Mental Status     Pt states that she hasn't been able to remember things since 9pm last night. Took aspirin around 2030 before symptoms.       Admitting doctor:   Tatiana Gilliland MD    Admitting diagnosis:   The primary encounter diagnosis was Altered mental status, unspecified altered mental status type. A diagnosis of Dizziness was also pertinent to this visit.    Code status:   Current Code Status       Date Active Code Status Order ID Comments User Context       Prior            Allergies:   Patient has no known allergies.    Isolation:   No active isolations    Intake and Output  No intake or output data in the 24 hours ending 06/02/23 1448    Weight:       06/02/23  1100   Weight: 83.9 kg (185 lb)       Most recent vitals:   Vitals:    06/02/23 1210 06/02/23 1212 06/02/23 1216 06/02/23 1415   BP: 111/58 114/59 100/64 113/51   BP Location:       Patient Position: Lying Sitting Standing    Pulse: 63 64 75 60   Resp:       Temp:       TempSrc:       SpO2:       Weight:       Height:           Active LDAs/IV Access:   Lines, Drains & Airways       Active LDAs       Name Placement date Placement time Site Days    Peripheral IV 06/02/23 1120 Left Antecubital 06/02/23  1120  Antecubital  less than 1                    Labs (abnormal labs have a star):   Labs Reviewed   URINALYSIS W/ CULTURE IF INDICATED - Abnormal; Notable for the following components:       Result Value    Blood, UA Moderate (2+) (*)     Leuk Esterase, UA Trace (*)     All other components within normal limits    Narrative:     In absence of clinical symptoms, the presence of pyuria, bacteria, and/or nitrites on the urinalysis result does not correlate with infection.   URINE DRUG SCREEN - Abnormal; Notable for the following components:    THC, Screen, Urine Positive (*)     Oxycodone Screen, Urine Positive (*)     All other  components within normal limits    Narrative:     Negative Thresholds Per Drugs Screened:    Amphetamines                 500 ng/ml  Barbiturates                 200 ng/ml  Benzodiazepines              100 ng/ml  Cocaine                      300 ng/ml  Methadone                    300 ng/ml  Opiates                      300 ng/ml  Oxycodone                    100 ng/ml  THC                           50 ng/ml    The Normal Value for all drugs tested is negative. This report includes final unconfirmed screening results to be used for medical treatment purposes only. Unconfirmed results must not be used for non-medical purposes such as employment or legal testing. Clinical consideration should be applied to any drug of abuse test, particularly when unconfirmed results are used.          All urine drugs of abuse requests without chain of custody are for medical screening purposes only.  False positives are possible.     APTT - Abnormal; Notable for the following components:    PTT 31.8 (*)     All other components within normal limits   COMPREHENSIVE METABOLIC PANEL - Abnormal; Notable for the following components:    Sodium 134 (*)     CO2 20.0 (*)     Total Protein 5.8 (*)     All other components within normal limits    Narrative:     GFR Normal >60  Chronic Kidney Disease <60  Kidney Failure <15    The GFR formula is only valid for adults with stable renal function between ages 18 and 70.   TROPONIN - Abnormal; Notable for the following components:    HS Troponin T 12 (*)     All other components within normal limits    Narrative:     High Sensitive Troponin T Reference Range:  <10.0 ng/L- Negative Female for AMI  <15.0 ng/L- Negative Male for AMI  >=10 - Abnormal Female indicating possible myocardial injury.  >=15 - Abnormal Male indicating possible myocardial injury.   Clinicians would have to utilize clinical acumen, EKG, Troponin, and serial changes to determine if it is an Acute Myocardial Infarction or  myocardial injury due to an underlying chronic condition.        CBC WITH AUTO DIFFERENTIAL - Abnormal; Notable for the following components:    RDW 16.0 (*)     RDW-SD 54.7 (*)     Lymphocyte % 16.9 (*)     All other components within normal limits   URINALYSIS, MICROSCOPIC ONLY - Abnormal; Notable for the following components:    RBC, UA 0-2 (*)     WBC, UA 6-12 (*)     Bacteria, UA Trace (*)     All other components within normal limits   HIGH SENSITIVITIY TROPONIN T 2HR - Abnormal; Notable for the following components:    HS Troponin T 11 (*)     All other components within normal limits    Narrative:     High Sensitive Troponin T Reference Range:  <10.0 ng/L- Negative Female for AMI  <15.0 ng/L- Negative Male for AMI  >=10 - Abnormal Female indicating possible myocardial injury.  >=15 - Abnormal Male indicating possible myocardial injury.   Clinicians would have to utilize clinical acumen, EKG, Troponin, and serial changes to determine if it is an Acute Myocardial Infarction or myocardial injury due to an underlying chronic condition.        ACETAMINOPHEN LEVEL - Normal    Narrative:     Acetaminophen Therapeutic Range  5-20 ug/mL      Hours after ingestion            Toxic Value    4 Hours                           150 ug/mL    8 Hours                            70 ug/mL   12 Hours                            40 ug/mL   16 Hours                            20 ug/mL    These values apply to a single ingestion only.    MAGNESIUM - Normal   PROTIME-INR - Normal   SALICYLATE LEVEL - Normal   URINE CULTURE   RAINBOW DRAW    Narrative:     The following orders were created for panel order [facility] Draw.  Procedure                               Abnormality         Status                     ---------                               -----------         ------                     Green Top (Gel)[[id number]]                                  Final result               Lavender Top[[id number]]                                      Final result               Gold Top - [facility][[id number]]                                   Final result               Light Blue Top[[id number]]                                   Final result                 Please view results for these tests on the individual orders.   ETHANOL    Narrative:     Plasma Ethanol Clinical Symptoms:    ETOH (%)               Clinical Symptom  .01-.05              No apparent influence  .03-.12              Euphoria, Diminished judgment and attention   .09-.25              Impaired comprehension, Muscle incoordination  .18-.30              Confusion, Staggered gait, Slurred speech  .25-.40              Markedly decreased response to stimuli, unable to stand or                        walk, vomitting, sleep or stupor  .35-.50              Comatose, Anesthesia, Subnormal body temperature       GREEN TOP   LAVENDER TOP   GOLD TOP - SST   LIGHT BLUE TOP   CBC AND DIFFERENTIAL    Narrative:     The following orders were created for panel order CBC & Differential.  Procedure                               Abnormality         Status                     ---------                               -----------         ------                     CBC Auto Differential[[id number]]        Abnormal            Final result                 Please view results for these tests on the individual orders.       EKG:   ECG 12 Lead Altered Mental Status   Preliminary Result   HEART RATE= 60  bpm   RR Interval= 902  ms   KS Interval= 225  ms   P Horizontal Axis=   deg   P Front Axis=   deg   QRSD Interval= 131  ms   QT Interval= 447  ms   QRS Axis= -27  deg   T Wave Axis= 124  deg   - ABNORMAL ECG -   Atrial-paced complexes   Ventricular trigeminy   Prolonged KS interval   IVCD, consider RBBB   LVH with IVCD and secondary repol abnrm   Electronically Signed By:    Date and Time of Study: 2023-06-02 11:08:05          Meds given in ED:   Medications   sodium chloride 0.9 % flush 10 mL (has no administration in time range)    sodium chloride 0.9 % flush 10 mL (has no administration in time range)       Imaging results:  CT Head Without Contrast    Result Date: 2023  Impression: 1. No intracranial hemorrhage or acute intracranial abnormality. 2. 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 Electronically Signed: Mark Yarbroughr  2023 12:42 PM EDT  Workstation ID: [id number]     Ambulatory status:   - stand by     Social issues:   Social History     Socioeconomic History    Marital status:    Tobacco Use    Smoking status: Former     Packs/day: 1.00     Years: 8.00     Pack years: 8.00     Types: Cigarettes     Start date: 1977     Quit date: 1985     Years since quittin.4     Passive exposure: Past    Smokeless tobacco: Never    Tobacco comments:     Quit    Vaping Use    Vaping Use: Never used   Substance and Sexual Activity    Alcohol use: Not Currently     Comment: 6 drinks a year in social settings    Drug use: Never    Sexual activity: Defer     Partners: Male       NIH Stroke Scale: 0  Interval: baseline      Shameka Lima RN  23 14:48 EDT

## 2023-06-02 NOTE — H&P
"    Patient Care Team:  Anish Lew FNP as PCP - General  Evgeny Gregory MD as Consulting Physician (Cardiology)  Leeanne Pleitez MD as Consulting Physician (Cardiology)  Radha Owusu APRN as Nurse Practitioner (Nurse Practitioner)  Kathe Muñoz MD as Consulting Physician (Nephrology)    Chief complaint dizziness, balance problems    Subjective     Patient is a 83 y.o. female with history of hypertrophic cardiomyopathy, dual chamber pacemaker and atrial fibrillation who presents with sudden onset of dizziness, confusion and feeling off-balance last night.  Her BP was low at home, which is not unusual for her.  She denies any chest pain, palpitations, near syncope.  She has a h/o vertigo but says that this dizziness is distinctly different.  She has a \"weird feeling\" in her head.  Confusion is better today but she still feels \"off\" and off-balance.  No prior h/o cva.  She denies any urinary symptoms and has no h/o UTI.    Review of Systems   Constitutional: Positive for activity change and fatigue. Negative for appetite change, chills, diaphoresis and fever.   HENT: Negative for facial swelling.    Eyes: Negative for visual disturbance.   Respiratory: Negative for cough, shortness of breath and stridor.    Cardiovascular: Negative for chest pain, palpitations and leg swelling.   Gastrointestinal: Negative for abdominal pain, constipation, diarrhea, nausea and vomiting.   Endocrine: Negative for polyuria.   Genitourinary: Negative for dysuria and urgency.   Musculoskeletal: Positive for gait problem. Negative for arthralgias, back pain, joint swelling, myalgias and neck pain.   Skin: Negative for rash and wound.   Neurological: Positive for dizziness, speech difficulty and weakness. Negative for tremors, seizures, syncope, light-headedness, numbness and headaches.   Psychiatric/Behavioral: Positive for confusion.          History  Past Medical History:   Diagnosis Date   • Anemia  "   • CHF (congestive heart failure)    • Congenital heart disease    • Coronary artery disease    • GERD (gastroesophageal reflux disease)    • Heart murmur Don't know   • Hx of hypertrophic cardiomyopathy    • Hypertension    • Peptic ulceration    • Persistent atrial fibrillation 01/10/2023   • Primary osteoarthritis of both knees 2021   • Primary osteoarthritis of right knee 2020   • Sleep apnea 2017     Past Surgical History:   Procedure Laterality Date   • ABDOMINAL HERNIA REPAIR     • ABLATION OF DYSRHYTHMIC FOCUS  2023   • BACK SURGERY     • BREAST AUGMENTATION     • CARDIAC CATHETERIZATION     • CARDIAC ELECTROPHYSIOLOGY PROCEDURE N/A 2023    Procedure: Ablation atrial fibrillation and flutter, cryo Duong and Ramón aware;  Surgeon: Leeanne Pleitez MD;  Location: [facility] INVASIVE LOCATION;  Service: Cardiovascular;  Laterality: N/A;   • CARDIAC PACEMAKER PLACEMENT  2017    Dual Chamber   • CERVICAL RIB RESECTION Bilateral    • CERVICAL RIB RESECTION Bilateral 1963    bilateral cervical ribs removed - extra ribs located and causing pain   • CHOLECYSTECTOMY     • COLONOSCOPY     • CORONARY ARTERY BYPASS GRAFT     • THORACIC DECOMPRESSION POSTERIOR FUSION  2014    L3-5 & S1   • UPPER GASTROINTESTINAL ENDOSCOPY       Family History   Problem Relation Age of Onset   • Colon cancer Mother    • Colon cancer Brother    • Heart attack Brother    • Colon cancer Daughter      Social History     Tobacco Use   • Smoking status: Former     Packs/day: 1.00     Years: 8.00     Pack years: 8.00     Types: Cigarettes     Start date: 1977     Quit date: 1985     Years since quittin.4     Passive exposure: Past   • Smokeless tobacco: Never   • Tobacco comments:     Quit    Vaping Use   • Vaping Use: Never used   Substance Use Topics   • Alcohol use: Not Currently     Comment: 6 drinks a year in social settings   • Drug use: Never     Medications Prior to  Admission   Medication Sig Dispense Refill Last Dose   • amiodarone (PACERONE) 200 MG tablet TAKE 1 TABLET BY MOUTH TWICE DAILY 180 tablet 3 6/2/2023   • apixaban (ELIQUIS) 5 MG tablet tablet Take 1 tablet by mouth 2 (Two) Times a Day. 180 tablet 3 6/2/2023   • aspirin 81 MG EC tablet Take 1 tablet by mouth Every Night. 90 tablet 3 6/1/2023   • atorvastatin (LIPITOR) 40 MG tablet Take 1 tablet by mouth Every Night. 30 tablet 0 6/1/2023   • ipratropium-albuterol (DUO-NEB) 0.5-2.5 mg/3 ml nebulizer Take 3 mL by nebulization Every 4 (Four) Hours As Needed for Wheezing.   6/1/2023   • metoprolol succinate XL (TOPROL-XL) 25 MG 24 hr tablet TAKE 1 TABLET BY MOUTH DAILY 90 tablet 1 6/2/2023   • oxyCODONE-acetaminophen (PERCOCET)  MG per tablet Take 0.5 tablets by mouth Every 4 (Four) Hours As Needed for Moderate Pain. Indications: Pain   6/2/2023   • Semaglutide, 1 MG/DOSE, (Ozempic, 1 MG/DOSE,) 4 MG/3ML solution pen-injector Inject 1 mg under the skin into the appropriate area as directed 1 (One) Time Per Week. On Fridays 6/2/2023   • sertraline (ZOLOFT) 100 MG tablet Take 1 tablet by mouth Every Evening. Indications: Major Depressive Disorder   6/1/2023     Allergies:  Patient has no known allergies.    Objective     Vital Signs  Temp:  [98 °F (36.7 °C)-98.6 °F (37 °C)] 98.1 °F (36.7 °C)  Heart Rate:  [60-75] 62  Resp:  [13-18] 13  BP: ()/(38-69) 127/58     Physical Exam:      General Appearance:    Alert, cooperative, in no acute distress   Head:    Normocephalic, without obvious abnormality, atraumatic   Eyes:            Lids and lashes normal, conjunctivae and sclerae normal, no   icterus, no pallor, corneas clear, PERRLA   Ears:    Ears appear intact with no abnormalities noted   Throat:   No oral lesions, no thrush, oral mucosa moist   Neck:   No adenopathy, supple, trachea midline, no thyromegaly, no   carotid bruit, no JVD   Lungs:     Clear to auscultation,respirations regular, even and                   unlabored    Heart:    Regular rhythm and normal rate, normal S1 and S2, no            murmur, no gallop, no rub, no click   Chest Wall:    No abnormalities observed   Abdomen:     Normal bowel sounds, no masses, no organomegaly, soft        non-tender, non-distended, no guarding, no rebound                tenderness   Extremities:   Moves all extremities well, no edema, no cyanosis, no             redness   Pulses:   Pulses palpable and equal bilaterally   Skin:   No bleeding, bruising or rash   Lymph nodes:   No palpable adenopathy   Neurologic:   Cranial nerves 2 - 12 grossly intact, sensation intact, DTR       present and equal bilaterally       Results Review:     Imaging Results (Last 24 Hours)     Procedure Component Value Units Date/Time    CT Head Without Contrast [[id number]] Collected: 06/02/23 1239     Updated: 06/02/23 1244    Narrative:      CT HEAD WO CONTRAST    Date of Exam: 6/2/2023 12:32 PM EDT    Indication: ams.    Comparison: MRI brain 6/30/2021    Technique: Axial CT images were obtained of the head without contrast administration.  Coronal reconstructions were performed.  Automated exposure control and iterative reconstruction methods were used.    Findings:  No intracranial hemorrhage. Negative for mass effect or midline shift. Mild cerebral volume loss. Encephalomalacia involving the left frontal lobe related to remote infarct seen on prior brain MRI. Mild white matter findings of chronic microvascular   disease. Posterior fossa without acute abnormality. The globes are symmetric. No retro-orbital abnormality. The mastoid air cells are well-aerated. Sinus thickening at the right sphenoid sinus. No sinus fluid level. Calvarium intact.      Impression:      Impression:  1. No intracranial hemorrhage or acute intracranial abnormality.  2. 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      Electronically Signed:  Mark Mouser    6/2/2023 12:42 PM EDT    Workstation ID: [id number]           Lab Results (last 24 hours)     Procedure Component Value Units Date/Time    High Sensitivity Troponin T 2Hr [[id number]]  (Abnormal) Collected: 06/02/23 1414    Specimen: Blood Updated: 06/02/23 1442     HS Troponin T 11 ng/L      Troponin T Delta -1 ng/L     Narrative:      High Sensitive Troponin T Reference Range:  <10.0 ng/L- Negative Female for AMI  <15.0 ng/L- Negative Male for AMI  >=10 - Abnormal Female indicating possible myocardial injury.  >=15 - Abnormal Male indicating possible myocardial injury.   Clinicians would have to utilize clinical acumen, EKG, Troponin, and serial changes to determine if it is an Acute Myocardial Infarction or myocardial injury due to an underlying chronic condition.         Urine Drug Screen - Urine, Clean Catch [[id number]]  (Abnormal) Collected: 06/02/23 1221    Specimen: Urine, Clean Catch Updated: 06/02/23 1257     Amphet/Methamphet, Screen Negative     Barbiturates Screen, Urine Negative     Benzodiazepine Screen, Urine Negative     Cocaine Screen, Urine Negative     Opiate Screen Negative     THC, Screen, Urine Positive     Methadone Screen, Urine Negative     Oxycodone Screen, Urine Positive    Narrative:      Negative Thresholds Per Drugs Screened:    Amphetamines                 500 ng/ml  Barbiturates                 200 ng/ml  Benzodiazepines              100 ng/ml  Cocaine                      300 ng/ml  Methadone                    300 ng/ml  Opiates                      300 ng/ml  Oxycodone                    100 ng/ml  THC                           50 ng/ml    The Normal Value for all drugs tested is negative. This report includes final unconfirmed screening results to be used for medical treatment purposes only. Unconfirmed results must not be used for non-medical purposes such as employment or legal testing. Clinical consideration should be applied to any drug of abuse test, particularly  when unconfirmed results are used.          All urine drugs of abuse requests without chain of custody are for medical screening purposes only.  False positives are possible.      Comprehensive Metabolic Panel [[id number]]  (Abnormal) Collected: 06/02/23 1209    Specimen: Blood Updated: 06/02/23 1253     Glucose 99 mg/dL      BUN 16 mg/dL      Creatinine 0.91 mg/dL      Sodium 134 mmol/L      Potassium 4.0 mmol/L      Comment: Slight hemolysis detected by analyzer. Results may be affected.        Chloride 102 mmol/L      CO2 20.0 mmol/L      Calcium 8.7 mg/dL      Total Protein 5.8 g/dL      Albumin 3.7 g/dL      ALT (SGPT) 30 U/L      AST (SGOT) 29 U/L      Comment: Slight hemolysis detected by analyzer. Results may be affected.        Alkaline Phosphatase 83 U/L      Total Bilirubin 0.5 mg/dL      Globulin 2.1 gm/dL      A/G Ratio 1.8 g/dL      BUN/Creatinine Ratio 17.6     Anion Gap 12.0 mmol/L      eGFR 62.3 mL/min/1.73     Narrative:      GFR Normal >60  Chronic Kidney Disease <60  Kidney Failure <15    The GFR formula is only valid for adults with stable renal function between ages 18 and 70.    Magnesium [[id number]]  (Normal) Collected: 06/02/23 1209    Specimen: Blood Updated: 06/02/23 1253     Magnesium 1.6 mg/dL     High Sensitivity Troponin T [[id number]]  (Abnormal) Collected: 06/02/23 1209    Specimen: Blood Updated: 06/02/23 1249     HS Troponin T 12 ng/L     Narrative:      High Sensitive Troponin T Reference Range:  <10.0 ng/L- Negative Female for AMI  <15.0 ng/L- Negative Male for AMI  >=10 - Abnormal Female indicating possible myocardial injury.  >=15 - Abnormal Male indicating possible myocardial injury.   Clinicians would have to utilize clinical acumen, EKG, Troponin, and serial changes to determine if it is an Acute Myocardial Infarction or myocardial injury due to an underlying chronic condition.         Urinalysis, Microscopic Only - Urine, Clean Catch [[id number]]  (Abnormal) Collected:  06/02/23 1221    Specimen: Urine, Clean Catch Updated: 06/02/23 1247     RBC, UA 0-2 /HPF      WBC, UA 6-12 /HPF      Bacteria, UA Trace /HPF      Squamous Epithelial Cells, UA 0-2 /HPF      Transitional Epithelial Cells, UA 0-2 /HPF      Hyaline Casts, UA None Seen /LPF      Methodology Manual Light Microscopy    Urine Culture - Urine, Urine, Clean Catch [[id number]] Collected: 06/02/23 1221    Specimen: Urine, Clean Catch Updated: 06/02/23 1247    Urinalysis With Culture If Indicated - Urine, Clean Catch [[id number]]  (Abnormal) Collected: 06/02/23 1221    Specimen: Urine, Clean Catch Updated: 06/02/23 1239     Color, UA Yellow     Appearance, UA Clear     pH, UA 6.5     Specific Gravity, UA 1.011     Glucose, UA Negative     Ketones, UA Negative     Bilirubin, UA Negative     Blood, UA Moderate (2+)     Protein, UA Negative     Leuk Esterase, UA Trace     Nitrite, UA Negative     Urobilinogen, UA 1.0 E.U./dL    Narrative:      In absence of clinical symptoms, the presence of pyuria, bacteria, and/or nitrites on the urinalysis result does not correlate with infection.    [place] Draw [[id number]] Collected: 06/02/23 1129    Specimen: Blood Updated: 06/02/23 1230    Narrative:      The following orders were created for panel order [place] Draw.  Procedure                               Abnormality         Status                     ---------                               -----------         ------                     Green Top (Gel)[[id number]]                                  Final result               Lavender Top[[id number]]                                     Final result               Gold Top - SST[[id number]]                                   Final result               Light Blue Top[[id number]]                                   Final result                 Please view results for these tests on the individual orders.    Lavender Top [[id number]] Collected: 06/02/23 1129    Specimen: Blood Updated: 06/02/23 1230      Extra Tube hold for add-on     Comment: Auto resulted       Light Blue Top [[id number]] Collected: 06/02/23 1129    Specimen: Blood Updated: 06/02/23 1230     Extra Tube Hold for add-ons.     Comment: Auto resulted       Gold Top - SST [[id number]] Collected: 06/02/23 1129    Specimen: Blood Updated: 06/02/23 1230     Extra Tube Hold for add-ons.     Comment: Auto resulted.       Green Top (Gel) [[id number]] Collected: 06/02/23 1129    Specimen: Blood Updated: 06/02/23 1223     Extra Tube hide    Ethanol [[id number]] Collected: 06/02/23 1129    Specimen: Blood Updated: 06/02/23 1159     Ethanol % <0.010 %     Narrative:      Plasma Ethanol Clinical Symptoms:    ETOH (%)               Clinical Symptom  .01-.05              No apparent influence  .03-.12              Euphoria, Diminished judgment and attention   .09-.25              Impaired comprehension, Muscle incoordination  .18-.30              Confusion, Staggered gait, Slurred speech  .25-.40              Markedly decreased response to stimuli, unable to stand or                        walk, vomitting, sleep or stupor  .35-.50              Comatose, Anesthesia, Subnormal body temperature        Acetaminophen Level [[id number]]  (Normal) Collected: 06/02/23 1129    Specimen: Blood Updated: 06/02/23 1159     Acetaminophen <5.0 mcg/mL     Narrative:      Acetaminophen Therapeutic Range  5-20 ug/mL      Hours after ingestion            Toxic Value    4 Hours                           150 ug/mL    8 Hours                            70 ug/mL   12 Hours                            40 ug/mL   16 Hours                            20 ug/mL    These values apply to a single ingestion only.     Salicylate Level [[id number]]  (Normal) Collected: 06/02/23 1129    Specimen: Blood Updated: 06/02/23 1159     Salicylate <0.3 mg/dL     aPTT [[id number]]  (Abnormal) Collected: 06/02/23 1129    Specimen: Blood Updated: 06/02/23 1159     PTT 31.8 seconds     Protime-INR [[id number]]  (Normal)  Collected: 06/02/23 1129    Specimen: Blood Updated: 06/02/23 1159     Protime 10.3 Seconds      INR 0.96    CBC & Differential [[id number]]  (Abnormal) Collected: 06/02/23 1129    Specimen: Blood Updated: 06/02/23 1140    Narrative:      The following orders were created for panel order CBC & Differential.  Procedure                               Abnormality         Status                     ---------                               -----------         ------                     CBC Auto Differential[[id number]]        Abnormal            Final result                 Please view results for these tests on the individual orders.    CBC Auto Differential [[id number]]  (Abnormal) Collected: 06/02/23 1129    Specimen: Blood Updated: 06/02/23 1140     WBC 4.40 10*3/mm3      RBC 3.99 10*6/mm3      Hemoglobin 12.1 g/dL      Hematocrit 36.8 %      MCV 92.4 fL      MCH 30.4 pg      MCHC 32.9 g/dL      RDW 16.0 %      RDW-SD 54.7 fl      MPV 8.9 fL      Platelets 205 10*3/mm3      Neutrophil % 73.0 %      Lymphocyte % 16.9 %      Monocyte % 8.4 %      Eosinophil % 1.0 %      Basophil % 0.7 %      Neutrophils, Absolute 3.20 10*3/mm3      Lymphocytes, Absolute 0.70 10*3/mm3      Monocytes, Absolute 0.40 10*3/mm3      Eosinophils, Absolute 0.00 10*3/mm3      Basophils, Absolute 0.00 10*3/mm3      nRBC 0.1 /100 WBC            I reviewed the patient's new clinical results.    Assessment & Plan       Altered mental status, unspecified altered mental status type  - etiology unclear but concerning for TIA.  MRI will be helpful once device rep is available.  Carotid doppler pending.  Echo will not be repeated.   Will check TFT's, b12, RPR.      UTI - ? - may be causing confusion - starting Rocephin; follow culture    Permanent atrial fib - rate controlled; paced; recent pacemaker interrogation was normal per patient.  Continue amiodarone, anti-coagulation, reduce dose of metoprolol due to reports of hypotension at home.     Obesity -  holding Ozempic         I discussed the patient's findings and my recommendations with patient.     Tatiana Gilliland MD  06/02/23  18:27 EDT

## 2023-06-03 LAB
BACTERIA SPEC AEROBE CULT: NORMAL
VIT B12 BLD-MCNC: 1076 PG/ML (ref 211–946)

## 2023-06-03 PROCEDURE — 97162 PT EVAL MOD COMPLEX 30 MIN: CPT

## 2023-06-03 PROCEDURE — 25010000002 CEFTRIAXONE PER 250 MG: Performed by: INTERNAL MEDICINE

## 2023-06-03 PROCEDURE — 99222 1ST HOSP IP/OBS MODERATE 55: CPT | Performed by: PSYCHIATRY & NEUROLOGY

## 2023-06-03 PROCEDURE — 82607 VITAMIN B-12: CPT | Performed by: INTERNAL MEDICINE

## 2023-06-03 PROCEDURE — G0378 HOSPITAL OBSERVATION PER HR: HCPCS

## 2023-06-03 RX ORDER — LEVOTHYROXINE SODIUM 0.03 MG/1
25 TABLET ORAL
Status: DISCONTINUED | OUTPATIENT
Start: 2023-06-04 | End: 2023-06-06 | Stop reason: HOSPADM

## 2023-06-03 RX ADMIN — SENNOSIDES AND DOCUSATE SODIUM 2 TABLET: 50; 8.6 TABLET ORAL at 09:09

## 2023-06-03 RX ADMIN — CEFTRIAXONE 2 G: 2 INJECTION, POWDER, FOR SOLUTION INTRAMUSCULAR; INTRAVENOUS at 16:32

## 2023-06-03 RX ADMIN — ACETAMINOPHEN 650 MG: 325 TABLET, FILM COATED ORAL at 07:15

## 2023-06-03 RX ADMIN — APIXABAN 5 MG: 5 TABLET, FILM COATED ORAL at 09:10

## 2023-06-03 RX ADMIN — AMIODARONE HYDROCHLORIDE 200 MG: 200 TABLET ORAL at 09:09

## 2023-06-03 RX ADMIN — ATORVASTATIN CALCIUM 40 MG: 40 TABLET, FILM COATED ORAL at 21:40

## 2023-06-03 RX ADMIN — FAMOTIDINE 40 MG: 20 TABLET, FILM COATED ORAL at 09:10

## 2023-06-03 RX ADMIN — OXYCODONE 10 MG: 5 TABLET ORAL at 21:40

## 2023-06-03 RX ADMIN — SERTRALINE 100 MG: 100 TABLET, FILM COATED ORAL at 16:32

## 2023-06-03 RX ADMIN — Medication 10 ML: at 21:42

## 2023-06-03 RX ADMIN — Medication 10 ML: at 09:11

## 2023-06-03 RX ADMIN — OXYCODONE 10 MG: 5 TABLET ORAL at 16:32

## 2023-06-03 RX ADMIN — SODIUM CHLORIDE 500 ML: 9 INJECTION, SOLUTION INTRAVENOUS at 00:49

## 2023-06-03 RX ADMIN — OXYCODONE 10 MG: 5 TABLET ORAL at 07:15

## 2023-06-03 RX ADMIN — SENNOSIDES AND DOCUSATE SODIUM 2 TABLET: 50; 8.6 TABLET ORAL at 21:40

## 2023-06-03 RX ADMIN — AMIODARONE HYDROCHLORIDE 200 MG: 200 TABLET ORAL at 21:40

## 2023-06-03 RX ADMIN — Medication 5 MG: at 21:40

## 2023-06-03 RX ADMIN — OXYCODONE 10 MG: 5 TABLET ORAL at 12:09

## 2023-06-03 RX ADMIN — APIXABAN 5 MG: 5 TABLET, FILM COATED ORAL at 21:40

## 2023-06-03 RX ADMIN — OXYCODONE 10 MG: 5 TABLET ORAL at 02:45

## 2023-06-03 NOTE — PLAN OF CARE
Goal Outcome Evaluation:     Pt is an 84 y/o female who presents to [facility] with reports of feeling off balance and dizziness with low blood pressure. Pt is being seen for AMS, and dizziness. CT head showed no acute abnormality and previous L frontal lobe infarct. MRI pending. PMH significant for vertigo and PM. At this time pt is A/Ox4. She lives with her son and granddght in a three story home with 5-6 steps to enter. Pt reports she is able to stay on the main level if needed. She did not utilize an AD previously, though has a RW or cane if needed. She becomes lightheaded upon sitting, though this improves with a short amount of time. BP upon sitting was 118/51 which was elevated from supine. Pt ambulates with [id number] w/ w/out an AD. She presents with some instability with turns, increased low back pain with ambulation, and decreased activity tolerance. Recommend use of RW vs cane upon returning home and pt was agreeable. Discussed benefits of HHPT vs OPPT for her low back pain, though pt declines. Pt plans to return home with her son.

## 2023-06-03 NOTE — PLAN OF CARE
Problem: Adult Inpatient Plan of Care  Goal: Plan of Care Review  Outcome: Ongoing, Progressing  Flowsheets (Taken 6/3/2023 0513)  Progress: no change  Plan of Care Reviewed With: patient  Goal: Patient-Specific Goal (Individualized)  Outcome: Ongoing, Progressing  Goal: Absence of Hospital-Acquired Illness or Injury  Outcome: Ongoing, Progressing  Intervention: Identify and Manage Fall Risk  Recent Flowsheet Documentation  Taken 6/3/2023 0400 by Sima Mckeon RN  Safety Promotion/Fall Prevention:   activity supervised   assistive device/personal items within reach   clutter free environment maintained   fall prevention program maintained   safety round/check completed   room organization consistent  Taken 6/3/2023 0200 by Sima Mckeon RN  Safety Promotion/Fall Prevention:   activity supervised   assistive device/personal items within reach   clutter free environment maintained   fall prevention program maintained   safety round/check completed   room organization consistent  Intervention: Prevent and Manage VTE (Venous Thromboembolism) Risk  Recent Flowsheet Documentation  Taken 6/3/2023 0400 by Sima Mckeon RN  VTE Prevention/Management:   bilateral   sequential compression devices off   patient refused intervention  Range of Motion: active ROM (range of motion) encouraged  Intervention: Prevent Infection  Recent Flowsheet Documentation  Taken 6/3/2023 0400 by Sima Mckeon RN  Infection Prevention:   hand hygiene promoted   personal protective equipment utilized   rest/sleep promoted   single patient room provided  Taken 6/3/2023 0200 by Sima Mckeon RN  Infection Prevention:   hand hygiene promoted   personal protective equipment utilized   single patient room provided   rest/sleep promoted  Goal: Optimal Comfort and Wellbeing  Outcome: Ongoing, Progressing  Intervention: Provide Person-Centered Care  Recent Flowsheet Documentation  Taken 6/3/2023 0400 by Sima Mckeon RN  Trust Relationship/Rapport:   care explained    thoughts/feelings acknowledged  Goal: Readiness for Transition of Care  Outcome: Ongoing, Progressing     Problem: Fall Injury Risk  Goal: Absence of Fall and Fall-Related Injury  Outcome: Ongoing, Progressing  Intervention: Identify and Manage Contributors  Recent Flowsheet Documentation  Taken 6/3/2023 0400 by Sima Mckeon RN  Medication Review/Management: medications reviewed  Taken 6/3/2023 0200 by Sima Mckeon RN  Medication Review/Management: medications reviewed  Intervention: Promote Injury-Free Environment  Recent Flowsheet Documentation  Taken 6/3/2023 0400 by Sima Mckeon RN  Safety Promotion/Fall Prevention:   activity supervised   assistive device/personal items within reach   clutter free environment maintained   fall prevention program maintained   safety round/check completed   room organization consistent  Taken 6/3/2023 0200 by Sima Mckeon RN  Safety Promotion/Fall Prevention:   activity supervised   assistive device/personal items within reach   clutter free environment maintained   fall prevention program maintained   safety round/check completed   room organization consistent     Problem: Heart Failure Comorbidity  Goal: Maintenance of Heart Failure Symptom Control  Outcome: Ongoing, Progressing  Intervention: Maintain Heart Failure-Management  Recent Flowsheet Documentation  Taken 6/3/2023 0400 by Sima Mckeon RN  Medication Review/Management: medications reviewed  Taken 6/3/2023 0200 by Sima Mckeon RN  Medication Review/Management: medications reviewed     Problem: Hypertension Comorbidity  Goal: Blood Pressure in Desired Range  Outcome: Ongoing, Progressing  Intervention: Maintain Blood Pressure Management  Recent Flowsheet Documentation  Taken 6/3/2023 0400 by Sima Mckeon RN  Medication Review/Management: medications reviewed  Taken 6/3/2023 0200 by Sima Mckeon RN  Medication Review/Management: medications reviewed     Problem: Obstructive Sleep Apnea Risk or Actual Comorbidity Management  Goal:  Unobstructed Breathing During Sleep  Outcome: Ongoing, Progressing     Problem: Osteoarthritis Comorbidity  Goal: Maintenance of Osteoarthritis Symptom Control  Outcome: Ongoing, Progressing  Intervention: Maintain Osteoarthritis Symptom Control  Recent Flowsheet Documentation  Taken 6/3/2023 0400 by Sima Mckeon RN  Medication Review/Management: medications reviewed  Taken 6/3/2023 0200 by Sima Mckeon RN  Medication Review/Management: medications reviewed     Problem: Pain Chronic (Persistent) (Comorbidity Management)  Goal: Acceptable Pain Control and Functional Ability  Outcome: Ongoing, Progressing  Intervention: Manage Persistent Pain  Recent Flowsheet Documentation  Taken 6/3/2023 0400 by Sima Mckeon RN  Medication Review/Management: medications reviewed  Taken 6/3/2023 0200 by Siam Mckeon RN  Medication Review/Management: medications reviewed   Goal Outcome Evaluation:  Plan of Care Reviewed With: patient        Progress: no change

## 2023-06-03 NOTE — PROGRESS NOTES
"     LOS: 0 days   Patient Care Team:  Anish Lew FNP as PCP - General  Evgeny Gregory MD as Consulting Physician (Cardiology)  Leeanne Pleitez MD as Consulting Physician (Cardiology)  Radha Owusu APRN as Nurse Practitioner (Nurse Practitioner)  Kathe Muñoz MD as Consulting Physician (Nephrology)    Subjective     Interval History: Patient remains dizzy and off balance.    Patient Complaints: \"Something big happened in my head\" (prior to admission); dizzy, off-balance,     History taken from: patient    Review of Systems   Constitutional:  Positive for activity change. Negative for appetite change, chills, diaphoresis, fatigue and fever.   HENT:  Negative for facial swelling.    Eyes:  Negative for visual disturbance.   Respiratory:  Negative for cough, shortness of breath and stridor.    Cardiovascular:  Negative for chest pain, palpitations and leg swelling.   Gastrointestinal:  Negative for abdominal pain, constipation, diarrhea, nausea and vomiting.   Genitourinary:  Negative for dysuria.   Musculoskeletal:  Negative for arthralgias.   Skin:  Negative for rash and wound.   Neurological:  Positive for dizziness and light-headedness. Negative for tremors and speech difficulty.   Psychiatric/Behavioral:  Negative for confusion.          Objective     Vital Signs  Temp:  [97.5 °F (36.4 °C)-98.4 °F (36.9 °C)] 97.5 °F (36.4 °C)  Heart Rate:  [60-64] 60  Resp:  [13-18] 14  BP: ()/(36-69) 123/57    Physical Exam:     General Appearance:    Alert, cooperative, in no acute distress,   Head:    Normocephalic, without obvious abnormality, atraumatic   Eyes:            Lids and lashes normal, conjunctivae and sclerae normal, no   icterus, no pallor, corneas clear, PERRLA   Ears:    Ears appear intact with no abnormalities noted   Throat:   No oral lesions, no thrush, oral mucosa moist   Neck:   No adenopathy, supple, trachea midline, no thyromegaly, no   carotid bruit, no JVD "   Lungs:     Clear to auscultation,respirations regular, even and                  unlabored    Heart:    Regular rhythm and normal rate, normal S1 and S2, no            murmur, no gallop, no rub, no click   Chest Wall:    No abnormalities observed   Abdomen:     Normal bowel sounds, no masses, no organomegaly, soft        Non-tender non-distended, no guarding,   Extremities:   Moves all extremities well, no edema, no cyanosis, no             Redness   Pulses:   Pulses palpable and equal bilaterally   Skin:   No bleeding, bruising or rash   Lymph nodes:   No palpable adenopathy   Neurologic:   Cranial nerves 2 - 12 grossly intact, sensation intact, DTR       present and equal bilaterally        Results Review:    Lab Results (last 24 hours)       Procedure Component Value Units Date/Time    Vitamin B12 [[id number]] Collected: 06/03/23 1106    Specimen: Blood Updated: 06/03/23 1133    Lipid Panel [[id number]] Collected: 06/02/23 1414    Specimen: Blood Updated: 06/02/23 1914     Total Cholesterol 82 mg/dL      Triglycerides 94 mg/dL      HDL Cholesterol 45 mg/dL      LDL Cholesterol  19 mg/dL      VLDL Cholesterol 18 mg/dL      LDL/HDL Ratio 0.40    Narrative:      Cholesterol Reference Ranges  (U.S. Department of Health and Human Services ATP III Classifications)    Desirable          <200 mg/dL  Borderline High    200-239 mg/dL  High Risk          >240 mg/dL      Triglyceride Reference Ranges  (U.S. Department of Health and Human Services ATP III Classifications)    Normal           <150 mg/dL  Borderline High  150-199 mg/dL  High             200-499 mg/dL  Very High        >500 mg/dL    HDL Reference Ranges  (U.S. Department of Health and Human Services ATP III Classifications)    Low     <40 mg/dl (major risk factor for CHD)  High    >60 mg/dl ('negative' risk factor for CHD)        LDL Reference Ranges  (U.S. Department of Health and Human Services ATP III Classifications)    Optimal          <100 mg/dL  Near  Optimal     100-129 mg/dL  Borderline High  130-159 mg/dL  High             160-189 mg/dL  Very High        >189 mg/dL    TSH [[id number]]  (Abnormal) Collected: 06/02/23 1414    Specimen: Blood Updated: 06/02/23 1902     TSH 9.420 uIU/mL     T4, Free [[id number]]  (Abnormal) Collected: 06/02/23 1414    Specimen: Blood Updated: 06/02/23 1902     Free T4 0.86 ng/dL     Narrative:      Results may be falsely increased if patient taking Biotin.      High Sensitivity Troponin T 2Hr [[id number]]  (Abnormal) Collected: 06/02/23 1414    Specimen: Blood Updated: 06/02/23 1442     HS Troponin T 11 ng/L      Troponin T Delta -1 ng/L     Narrative:      High Sensitive Troponin T Reference Range:  <10.0 ng/L- Negative Female for AMI  <15.0 ng/L- Negative Male for AMI  >=10 - Abnormal Female indicating possible myocardial injury.  >=15 - Abnormal Male indicating possible myocardial injury.   Clinicians would have to utilize clinical acumen, EKG, Troponin, and serial changes to determine if it is an Acute Myocardial Infarction or myocardial injury due to an underlying chronic condition.                  Imaging Results (Last 24 Hours)       ** No results found for the last 24 hours. **                 I reviewed the patient's new clinical results.    Medication Review:   Scheduled Meds:amiodarone, 200 mg, Oral, BID  apixaban, 5 mg, Oral, BID  atorvastatin, 40 mg, Oral, Nightly  cefTRIAXone, 2 g, Intravenous, Q24H  famotidine, 40 mg, Oral, Daily  senna-docusate sodium, 2 tablet, Oral, BID  sertraline, 100 mg, Oral, Q PM  sodium chloride, 10 mL, Intravenous, Q12H      Continuous Infusions:   PRN Meds:.  acetaminophen    senna-docusate sodium **AND** polyethylene glycol **AND** bisacodyl **AND** bisacodyl    ipratropium-albuterol    melatonin    nitroglycerin    ondansetron    oxyCODONE    [COMPLETED] Insert Peripheral IV **AND** sodium chloride    [COMPLETED] Insert Peripheral IV **AND** sodium chloride    sodium chloride    sodium  chloride     Assessment & Plan       Altered mental status, unspecified altered mental status type  Dizziness - concerning for subacute CVA - unable to get MRI until Monday; neuro consult pending; vitamin b12 level pending; PT eval  UTI - continue Rocephin, follow urine culture  Hypothyroidism - new problem, likely related to amiodarone - starting low dose replacement  PAF - currently in sinus rhythm on amiodarone; continue anticoagulation  Mood disorder - sertraliine       Plan for disposition:Home at discharge    Tatiana Gilliland MD  06/03/23  13:20 EDT

## 2023-06-03 NOTE — NURSING NOTE
Due to epic upgrades, epic is not working in patient rooms. I witnessed the primary nurse administer oxycodone 10mg to the patient at this time.

## 2023-06-03 NOTE — CONSULTS
"Primary Care Provider: Anish Lew, FNWALTER     Consult requested by:  Dr. Gilliland    Reason for Consultation: Neurological evaluation for acute vertigo, ataxia    History of present illness: Domonique See is a 83 y.o. year old female with history of hypertrophic cardiomyopathy, dual-chamber pacemaker, and atrial fibrillation   who presented with sudden onset vertigo, confusion, and ataxia.  She has a history of vertigo, but feels that the sensation of dizzy weakness is different from before.    She has an associated unusual weird feeling in her head, and felt \"off\".  The patient reports that she was talking and then suddenly her brain \"stopped\" and went blank for a few seconds.    When she came back, she was unable to remember what had happened.  This is unusual for her because she usually has a \"sharp\" memory.    She reports that she was unable to talk properly, and not able to remember what was said.  She could \"not thinking properly\".    The patient was very tired after this event.    CT head 6/2/23 neg for acute changes, except for left frontal encephalomalacia from previous stroke.     - Portions of the above HPI were copied from previous encounters and edited as appropriate. PMH as detailed below.     Review of Systems  10 pt ROS -ve except for activity change, dizziness, light headedness, confusion, episodic memory impairment       PATIENT HISTORY:  Past Medical History:   Diagnosis Date    Anemia     CHF (congestive heart failure)     Congenital heart disease     Coronary artery disease 2013    GERD (gastroesophageal reflux disease)     Heart murmur Don't know    Hx of hypertrophic cardiomyopathy     Hypertension     Peptic ulceration     Persistent atrial fibrillation 01/10/2023    Primary osteoarthritis of both knees 02/08/2021    Primary osteoarthritis of right knee 09/21/2020    Sleep apnea 2017   ,   Past Surgical History:   Procedure Laterality Date    ABDOMINAL HERNIA REPAIR      ABLATION OF " DYSRHYTHMIC FOCUS  2023    BACK SURGERY      BREAST AUGMENTATION      CARDIAC CATHETERIZATION      CARDIAC ELECTROPHYSIOLOGY PROCEDURE N/A 2023    Procedure: Ablation atrial fibrillation and flutter, cryo Duong and Ramón aware;  Surgeon: Leeanne Pleitez MD;  Location: [facility] CATH INVASIVE LOCATION;  Service: Cardiovascular;  Laterality: N/A;    CARDIAC PACEMAKER PLACEMENT  2017    Dual Chamber    CERVICAL RIB RESECTION Bilateral     CERVICAL RIB RESECTION Bilateral 1963    bilateral cervical ribs removed - extra ribs located and causing pain    CHOLECYSTECTOMY      COLONOSCOPY      CORONARY ARTERY BYPASS GRAFT      THORACIC DECOMPRESSION POSTERIOR FUSION  2014    L3-5 & S1    UPPER GASTROINTESTINAL ENDOSCOPY     ,   Family History   Problem Relation Age of Onset    Colon cancer Mother     Colon cancer Brother     Heart attack Brother     Colon cancer Daughter    ,   Social History     Tobacco Use    Smoking status: Former     Packs/day: 1.00     Years: 8.00     Pack years: 8.00     Types: Cigarettes     Start date: 1977     Quit date: 1985     Years since quittin.4     Passive exposure: Past    Smokeless tobacco: Never    Tobacco comments:     Quit    Vaping Use    Vaping Use: Never used   Substance Use Topics    Alcohol use: Not Currently     Comment: 6 drinks a year in social settings    Drug use: Never   ,   Prior to Admission medications    Medication Sig Start Date End Date Taking? Authorizing Provider   amiodarone (PACERONE) 200 MG tablet TAKE 1 TABLET BY MOUTH TWICE DAILY 23  Yes Leeanne Pleitez MD   apixaban (ELIQUIS) 5 MG tablet tablet Take 1 tablet by mouth 2 (Two) Times a Day. 22  Yes Evgeny Gregory MD   aspirin 81 MG EC tablet Take 1 tablet by mouth Every Night. 23  Yes Radha Owusu APRN   atorvastatin (LIPITOR) 40 MG tablet Take 1 tablet by mouth Every Night. 21  Yes Michael Ocampo,    ipratropium-albuterol (DUO-NEB)  0.5-2.5 mg/3 ml nebulizer Take 3 mL by nebulization Every 4 (Four) Hours As Needed for Wheezing.   Yes Jojo Ortega MD   metoprolol succinate XL (TOPROL-XL) 25 MG 24 hr tablet TAKE 1 TABLET BY MOUTH DAILY 2/9/23  Yes Radha Owusu APRN   oxyCODONE-acetaminophen (PERCOCET)  MG per tablet Take 0.5 tablets by mouth Every 4 (Four) Hours As Needed for Moderate Pain. Indications: Pain   Yes Jojo Ortega MD   Semaglutide, 1 MG/DOSE, (Ozempic, 1 MG/DOSE,) 4 MG/3ML solution pen-injector Inject 1 mg under the skin into the appropriate area as directed 1 (One) Time Per Week. On Fridays   Yes Jojo Ortega MD   sertraline (ZOLOFT) 100 MG tablet Take 1 tablet by mouth Every Evening. Indications: Major Depressive Disorder 11/11/20  Yes Jojo Ortega MD    Allergies:  Patient has no known allergies.    Current Facility-Administered Medications   Medication Dose Route Frequency Provider Last Rate Last Admin    acetaminophen (TYLENOL) tablet 650 mg  650 mg Oral Q4H PRN Tatiana Gilliland MD   650 mg at 06/03/23 0715    amiodarone (PACERONE) tablet 200 mg  200 mg Oral BID Tatiana Gilliland MD   200 mg at 06/03/23 0909    apixaban (ELIQUIS) tablet 5 mg  5 mg Oral BID Tatiana Gilliland MD   5 mg at 06/03/23 0910    atorvastatin (LIPITOR) tablet 40 mg  40 mg Oral Nightly Tatiana Gilliland MD   40 mg at 06/02/23 2029    sennosides-docusate (PERICOLACE) 8.6-50 MG per tablet 2 tablet  2 tablet Oral BID Tatiana Gilliland MD   2 tablet at 06/03/23 0909    And    polyethylene glycol (MIRALAX) packet 17 g  17 g Oral Daily PRN Tatiana Gilliland MD        And    bisacodyl (DULCOLAX) EC tablet 5 mg  5 mg Oral Daily PRN Tatiana Gilliland MD        And    bisacodyl (DULCOLAX) suppository 10 mg  10 mg Rectal Daily PRN Tatiana Gilliland MD        cefTRIAXone (ROCEPHIN) 2 g in sodium chloride 0.9 % 100 mL IVPB  2 g Intravenous Q24H Tatiana Gilliland  mL/hr at 06/02/23 1719 2 g at 06/02/23 1719    famotidine (PEPCID) tablet 40 mg  40 mg Oral Daily  Tatiana Gilliland MD   40 mg at 06/03/23 0910    ipratropium-albuterol (DUO-NEB) nebulizer solution 3 mL  3 mL Nebulization Q4H PRN Tatiana Gilliland MD        [START ON 6/4/2023] levothyroxine (SYNTHROID, LEVOTHROID) tablet 25 mcg  25 mcg Oral Q AM Tatiana Gilliland MD        melatonin tablet 5 mg  5 mg Oral Nightly PRN Tatiana Gilliland MD        nitroglycerin (NITROSTAT) SL tablet 0.4 mg  0.4 mg Sublingual Q5 Min PRN Tatiana Gilliland MD        ondansetron (ZOFRAN) injection 4 mg  4 mg Intravenous Q6H PRN Tatiana Gilliland MD        oxyCODONE (ROXICODONE) immediate release tablet 10 mg  10 mg Oral Q4H PRN Tatiana Gilliland MD   10 mg at 06/03/23 1209    sertraline (ZOLOFT) tablet 100 mg  100 mg Oral Q PM Tatiana Gilliland MD   100 mg at 06/02/23 2030    sodium chloride 0.9 % flush 10 mL  10 mL Intravenous PRN Tatiana Gilliland MD        sodium chloride 0.9 % flush 10 mL  10 mL Intravenous PRN Tatiana Gilliland MD        sodium chloride 0.9 % flush 10 mL  10 mL Intravenous Q12H Tatiana Gilliland MD   10 mL at 06/03/23 0911    sodium chloride 0.9 % flush 10 mL  10 mL Intravenous PRN Tatiana Gilliland MD        sodium chloride 0.9 % infusion 40 mL  40 mL Intravenous PRN Tatiana Gilliland MD            ________________________________________________________        OBJECTIVE:    VITAL SIGNS:   Temp:  [97.5 °F (36.4 °C)-98.4 °F (36.9 °C)] 97.8 °F (36.6 °C)  Heart Rate:  [60-64] 60  Resp:  [13-18] 15  BP: ()/(36-69) 113/46        General Exam:     Constitutional: The patient is in no apparent distress, bright awake and alert.    Head: Normocephalic, atraumatic.   Neck: No nucchal rigidity, ROM normal, supple  Resp: Breathing unlabored, Breath sounds are normal  Cardiac: Regular rate and rhythm.   Extremities/MSK:  No clubbing, cyanosis or edema.  Psychiatric: Mood/affect normal, judgement normal, appropriate     Neuro exam:    Cognition:   Alert and oriented.  Fund of knowledge preserved.  Concentration and attention normal.   Language normal with normal comprehension, fluent  speech     Cranial nerves:     II - pupils bilaterally briskly reactive to light  No new Visual field deficits;  III,IV,VI: Intact  V: Normal facial sensations  VII: Intact   VIII: No New hearing changes  IX, X, XI: normal gag and shoulder shrug;  XII: tongue is in the midline.     Sensory:  Intact to light touch in all extremities.      Motor: Strength 5/5 bilaterally upper and lower extremities. No involuntary movements present. Normal tone and bulk.  Reflexes: Plantars are flexor.     Cerebellar: FTN intact     Gait and balance: deferred       ________________________________________________________   RESULTS REVIEW:      LABS:      Lab 06/02/23  1129   WBC 4.40   HEMOGLOBIN 12.1   HEMATOCRIT 36.8   PLATELETS 205   NEUTROS ABS 3.20   LYMPHS ABS 0.70   MONOS ABS 0.40   EOS ABS 0.00   MCV 92.4   PROTIME 10.3   APTT 31.8*         Lab 06/02/23  1414 06/02/23  1209   SODIUM  --  134*   POTASSIUM  --  4.0   CHLORIDE  --  102   CO2  --  20.0*   ANION GAP  --  12.0   BUN  --  16   CREATININE  --  0.91   EGFR  --  62.3   GLUCOSE  --  99   CALCIUM  --  8.7   MAGNESIUM  --  1.6   TSH 9.420*  --          Lab 06/02/23  1209   TOTAL PROTEIN 5.8*   ALBUMIN 3.7   GLOBULIN 2.1   ALT (SGPT) 30   AST (SGOT) 29   BILIRUBIN 0.5   ALK PHOS 83         Lab 06/02/23  1414 06/02/23  1209 06/02/23  1129   HSTROP T 11* 12*  --    PROTIME  --   --  10.3   INR  --   --  0.96         Lab 06/02/23  1414   CHOLESTEROL 82   LDL CHOL 19   HDL CHOL 45   TRIGLYCERIDES 94             UA          4/28/2023    21:01 6/2/2023    12:21   Urinalysis   Squamous Epithelial Cells, UA 0-2  0-2    Specific [place], UA 1.011  1.011    Ketones, UA Negative  Negative    Blood, UA Negative  Moderate (2+)    Leukocytes, UA Trace  Trace    Nitrite, UA Negative  Negative    RBC, UA 0-2  0-2    WBC, UA 3-5  6-12    Bacteria, UA Trace  Trace        Lab Results   Component Value Date    TSH 9.420 (H) 06/02/2023    LDL 19 06/02/2023    HGBA1C 5.8 (H) 01/19/2023     [id number] 1,349 (H) 03/08/2022       IMAGING STUDIES:  CT Head Without Contrast    Result Date: 6/2/2023  Impression: 1. No intracranial hemorrhage or acute intracranial abnormality. 2. 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 Electronically Signed: Mark Yarbroughr  6/2/2023 12:42 PM EDT  Workstation ID: [id number]     I reviewed the patient's new clinical results.          ________________________________________________________     PROBLEM LIST:    Altered mental status, unspecified altered mental status type            ASSESSMENT/PLAN:    Domonique See is a 83 y.o. year old female with history of hypertrophic cardiomyopathy, dual-chamber pacemaker, and atrial fibrillation (on Eliquis and bASA)  who presented with sudden onset vertigo, confusion, aphasia and retrograde amnesia, now resolved.     IMPRESSION:  Acute MS change/confusion, vertigo and ataxia.  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    Will start keppra 250 mg BID (per MESS trial)  Seizure precautions.   Continue Eliquis and bASA for secondary CVA prevention in light of old cardioembolic L frontal CVA  5.   VTE ppx -> already on AC      I discussed the patient's findings and my recommendations with patientwho agreed with plan above.    Izzy Forrest MD  06/03/23  15:17 EDT

## 2023-06-03 NOTE — PLAN OF CARE
Problem: Fall Injury Risk  Goal: Absence of Fall and Fall-Related Injury  Outcome: Ongoing, Progressing  Intervention: Promote Injury-Free Environment  Recent Flowsheet Documentation  Taken 6/3/2023 1632 by Debra Sarmiento RN  Safety Promotion/Fall Prevention:   safety round/check completed   activity supervised   assistive device/personal items within reach   clutter free environment maintained   fall prevention program maintained   gait belt   nonskid shoes/slippers when out of bed     Problem: Heart Failure Comorbidity  Goal: Maintenance of Heart Failure Symptom Control  Outcome: Ongoing, Progressing     Problem: Hypertension Comorbidity  Goal: Blood Pressure in Desired Range  Outcome: Ongoing, Progressing  Intervention: Maintain Blood Pressure Management  Recent Flowsheet Documentation  Taken 6/3/2023 1632 by Debra Sarmiento RN  Syncope Management: position changed slowly   Goal Outcome Evaluation: patient resting in bed. Denies needs at this time.

## 2023-06-03 NOTE — THERAPY EVALUATION
Patient Name: Domonique See  : 1940    MRN: [id number]                              Today's Date: 6/3/2023       Admit Date: 2023    Visit Dx:     ICD-10-CM ICD-9-CM   1. Altered mental status, unspecified altered mental status type  R41.82 780.97   2. Dizziness  R42 780.4     Patient Active Problem List   Diagnosis    Iron deficiency anemia    Anemia, unspecified    Pacemaker    Atrial fibrillation (HCC)    Bradycardia    Abnormal result of cardiovascular function study    Cervical disc disorder with radiculopathy    Cervical radiculopathy    Cervical stenosis of spinal canal    Cholecystitis    CHF (congestive heart failure) (HCC)    Deep vein thrombosis (DVT)    Dyspnea    Family history of diabetes mellitus    Fibromyositis    Hyperlipidemia    Essential hypertension    Hypertrophic obstructive cardiomyopathy    Lumbar radiculopathy    Primary localized osteoarthritis    Sacroiliitis, not elsewhere classified    Spondylolisthesis, acquired    Primary osteoarthritis of right knee    Morbid obesity    Primary osteoarthritis of both knees    Obesity (BMI 30.0-34.9)    Persistent atrial fibrillation    Hx of hypertrophic cardiomyopathy    Coronary artery disease    Sleep apnea    CHF exacerbation    Acute midline low back pain without sciatica    Hypertensive retinopathy    Altered mental status, unspecified altered mental status type     Past Medical History:   Diagnosis Date    Anemia     CHF (congestive heart failure)     Congenital heart disease     Coronary artery disease     GERD (gastroesophageal reflux disease)     Heart murmur Don't know    Hx of hypertrophic cardiomyopathy     Hypertension     Peptic ulceration     Persistent atrial fibrillation 01/10/2023    Primary osteoarthritis of both knees 2021    Primary osteoarthritis of right knee 2020    Sleep apnea      Past Surgical History:   Procedure Laterality Date    ABDOMINAL HERNIA REPAIR      ABLATION OF DYSRHYTHMIC  FOCUS  01-    BACK SURGERY      BREAST AUGMENTATION      CARDIAC CATHETERIZATION      CARDIAC ELECTROPHYSIOLOGY PROCEDURE N/A 01/16/2023    Procedure: Ablation atrial fibrillation and flutter, cryo Duong and Ramón aware;  Surgeon: Leeanne Pleitez MD;  Location: [facility] CATH INVASIVE LOCATION;  Service: Cardiovascular;  Laterality: N/A;    CARDIAC PACEMAKER PLACEMENT  03/16/2017    Dual Chamber    CERVICAL RIB RESECTION Bilateral     CERVICAL RIB RESECTION Bilateral 1963    bilateral cervical ribs removed - extra ribs located and causing pain    CHOLECYSTECTOMY      COLONOSCOPY      CORONARY ARTERY BYPASS GRAFT  2013    THORACIC DECOMPRESSION POSTERIOR FUSION  06/04/2014    L3-5 & S1    UPPER GASTROINTESTINAL ENDOSCOPY        General Information       Row Name 06/03/23 1424          Physical Therapy Time and Intention    Document Type evaluation  -     Mode of Treatment physical therapy  -       Row Name 06/03/23 1424          General Information    Prior Level of Function independent:;all household mobility;gait;transfer;driving;using stairs  reports two falls in the past six months, does not utilize an AD; DME available RW, cane  -     Existing Precautions/Restrictions fall  -       Row Name 06/03/23 1423          Living Environment    People in Home other (see comments);child(su), adult  son and granddaughter with down syndrome. Son is able to assist as needed  -       Row Name 06/03/23 1424          Home Main Entrance    Number of Stairs, Main Entrance five  -CHERYL     Stair Railings, Main Entrance railings safe and in good condition  -       Row Name 06/03/23 1424          Stairs Within Home, Primary    Stairs, Within Home, Primary three story home; pt is able to stay on the main level if needed  -     Number of Stairs, Within Home, Primary six  -       Row Name 06/03/23 1427          Cognition    Orientation Status (Cognition) oriented x 4  -[facility] Name 06/03/23 1424           Safety Issues, Functional Mobility    Impairments Affecting Function (Mobility) endurance/activity tolerance;pain;balance;strength;shortness of breath  -               User Key  (r) = Recorded By, (t) = Taken By, (c) = Cosigned By      Initials Name Provider Type    Irene Kearney, PT Physical Therapist                   Mobility       Row Name 06/03/23 1426          Bed Mobility    Bed Mobility supine-sit;sit-supine  -CHERYL     Supine-Sit [place] (Bed Mobility) independent  -CHERYL     Sit-Supine [place] (Bed Mobility) independent  -CHERYL       Row Name 06/03/23 1426          Sit-Stand Transfer    Sit-Stand [place] (Transfers) standby assist  -CHERYL     Comment, (Sit-Stand Transfer) STS from EOB  -       Row Name 06/03/23 1426          Gait/Stairs (Locomotion)    [place] Level (Gait) contact guard  -     Distance in Feet (Gait) 125 ft; minimal instability with turns; increased lateral sway due to low back pain; unable to ambulate further due to low back pain  -CHERYL     Deviations/Abnormal Patterns (Gait) gait speed decreased  -               User Key  (r) = Recorded By, (t) = Taken By, (c) = Cosigned By      Initials Name Provider Type    Irene Kearney, CASE Physical Therapist                   Obj/Interventions       Row Name 06/03/23 1427          Range of Motion Comprehensive    General Range of Motion no range of motion deficits identified  -       Row Name 06/03/23 1427          Strength Comprehensive (MMT)    General Manual Muscle Testing (MMT) Assessment no strength deficits identified  -     Comment, General Manual Muscle Testing (MMT) Assessment BLE grossly 4/5; BUE grossly 4/5  -       Row Name 06/03/23 1427          Balance    Balance Assessment sitting static balance;standing static balance;standing dynamic balance  -     Static Sitting Balance independent  -     Position, Sitting Balance unsupported;sitting edge of bed  -     Static Standing Balance supervision  -      Dynamic Standing Balance contact guard  -       Row Name 06/03/23 1427          Sensory Assessment (Somatosensory)    Sensory Assessment (Somatosensory) sensation intact  -               User Key  (r) = Recorded By, (t) = Taken By, (c) = Cosigned By      Initials Name Provider Type    Irene Kearney, PT Physical Therapist                   Goals/Plan       Row Name 06/03/23 1429          Transfer Goal 1 (PT)    Activity/Assistive Device (Transfer Goal 1, PT) sit-to-stand/stand-to-sit;bed-to-chair/chair-to-bed  -     [place] Level/Cues Needed (Transfer Goal 1, PT) independent  -CHERYL     Time Frame (Transfer Goal 1, PT) long term goal (LTG);2 weeks  -       Row Name 06/03/23 1429          Gait Training Goal 1 (PT)    Activity/Assistive Device (Gait Training Goal 1, PT) gait (walking locomotion)  -CHERYL     [place] Level (Gait Training Goal 1, PT) independent  -CHERYL     Distance (Gait Training Goal 1, PT) 150ft  -CHERYL     Time Frame (Gait Training Goal 1, PT) long term goal (LTG);2 weeks  -       Row Name 06/03/23 1429          Stairs Goal 1 (PT)    Activity/Assistive Device (Stairs Goal 1, PT) stairs, all skills  -     [place] Level/Cues Needed (Stairs Goal 1, PT) modified independence  -CHERYL     Number of Stairs (Stairs Goal 1, PT) 6 steps  -CHERYL     Time Frame (Stairs Goal 1, PT) long term goal (LTG);2 weeks  -       Row Name 06/03/23 1429          Patient Education Goal (PT)    Activity (Patient Education Goal, PT) Pt to understand HEP to improve low back pain.  -CHERYL     [place]/Cues/Accuracy (Memory Goal 2, PT) demonstrates adequately;independent  -CHERYL     Time Frame (Patient Education Goal, PT) long term goal (LTG);2 weeks  -       Row Name 06/03/23 1429          Therapy Assessment/Plan (PT)    Planned Therapy Interventions (PT) balance training;bed mobility training;gait training;neuromuscular re-education;stair training;strengthening;home exercise program;transfer training  -                User Key  (r) = Recorded By, (t) = Taken By, (c) = Cosigned By      Initials Name Provider Type    Irene Kearney, PT Physical Therapist                   Clinical Impression       Row Name 06/03/23 1428          Pain    Pretreatment Pain Rating 0/10 - no pain  -CHERYL     Posttreatment Pain Rating 0/10 - no pain  -CHERYL       Row Name 06/03/23 1428          Plan of Care Review    Plan of Care Reviewed With patient  -     Outcome Evaluation Pt is an 84 y/o female who presents to [facility] with reports of feeling off balance and dizziness with low blood pressure. Pt is being seen for AMS, and dizziness. CT head showed no acute abnormality and previous L frontal lobe infarct. MRI pending. PMH significant for vertigo and PM. At this time pt is A/Ox4. She lives with her son and granddght in a three story home with 5-6 steps to enter. Pt reports she is able to stay on the main level if needed. She did not utilize an AD previously, though has a RW or cane if needed. She becomes lightheaded upon sitting, though this improves with a short amount of time. BP upon sitting was 118/51 which was elevated from supine. Pt ambulates with [id number] w/ w/out an AD. She presents with some instability with turns, increased low back pain with ambulation, and decreased activity tolerance. Recommend use of RW vs cane upon returning home and pt was agreeable. Discussed benefits of HHPT vs OPPT for her low back pain, though pt declines. Pt plans to return home with her son.  -CHERYL       Row Name 06/03/23 1428          Therapy Assessment/Plan (PT)    Rehab Potential (PT) good, to achieve stated therapy goals  -     Criteria for Skilled Interventions Met (PT) yes;meets criteria  -     Therapy Frequency (PT) 3 times/wk  -CHERYL     Predicted Duration of Therapy Intervention (PT) Until d/c  -       Row Name 06/03/23 1428          Vital Signs    Pre Systolic BP Rehab 110  supine  -CHERYL     Pre Treatment Diastolic BP 49  -CHERYL     Intra Systolic BP  Rehab 118  -CHERYL     Intra Treatment Diastolic BP 51  -CHERYL     Post Systolic BP Rehab 159  -CHERYL     Post Treatment Diastolic BP 51  -CHERYL     Pre SpO2 (%) 95  -CHERYL     O2 Delivery Pre Treatment room air  -CHERYL     Intra SpO2 (%) 89  -CHERYL     O2 Delivery Intra Treatment room air  -CHERYL     Post SpO2 (%) 93  -CHERYL     O2 Delivery Post Treatment room air  -CHERYL     Recovery Time pt has a pulse oximeter at home and monitors daily  -       Row Name 06/03/23 1428          Positioning and Restraints    Pre-Treatment Position in bed  -CHERYL     Post Treatment Position bed  -CHERYL     In Bed notified nsg;fowlers;call light within reach;encouraged to call for assist;exit alarm on  -               User Key  (r) = Recorded By, (t) = Taken By, (c) = Cosigned By      Initials Name Provider Type    Irene Kearney, CASE Physical Therapist                   Outcome Measures       Row Name 06/03/23 1430          How much help from another person do you currently need...    Turning from your back to your side while in flat bed without using bedrails? 4  -CHERYL     Moving from lying on back to sitting on the side of a flat bed without bedrails? 4  -CHERYL     Moving to and from a bed to a chair (including a wheelchair)? 4  -CHERYL     Standing up from a chair using your arms (e.g., wheelchair, bedside chair)? 4  -CHERYL     Climbing 3-5 steps with a railing? 3  -CHERYL     To walk in hospital room? 3  -CHERYL     AM-PAC 6 Clicks Score (PT) 22  -     Highest level of mobility 7 --> Walked 25 feet or more  -       Row Name 06/03/23 1430          Modified [place] Scale    Pre-Stroke Modified [place] Scale 6 - Unable to determine (UTD) from the medical record documentation  -     Modified [place] Scale 3 - Moderate disability.  Requiring some help, but able to walk without assistance.  -       Row Name 06/03/23 1430          Functional Assessment    Outcome Measure Options AM-PAC 6 Clicks Basic Mobility (PT);Modified [place]  -               User Key  (r) = Recorded By,  (t) = Taken By, (c) = Cosigned By      Initials Name Provider Type    CHERYL Irene Cruz PT Physical Therapist                                 Physical Therapy Education       Title: PT OT SLP Therapies (In Progress)       Topic: Physical Therapy (In Progress)       Point: Mobility training (Done)       Learning Progress Summary             Patient Acceptance, E,TB, VU by  at 6/3/2023 1431                         Point: Home exercise program (Not Started)       Learner Progress:  Not documented in this visit.              Point: Body mechanics (Not Started)       Learner Progress:  Not documented in this visit.              Point: Precautions (Done)       Learning Progress Summary             Patient Acceptance, E,TB, VU by  at 6/3/2023 1431                                         User Key       Initials Effective Dates Name Provider Type Discipline     08/23/21 -  Irene Cruz PT Physical Therapist PT                  PT Recommendation and Plan  Planned Therapy Interventions (PT): balance training, bed mobility training, gait training, neuromuscular re-education, stair training, strengthening, home exercise program, transfer training  Plan of Care Reviewed With: patient  Outcome Evaluation: Pt is an 82 y/o female who presents to [facility] with reports of feeling off balance and dizziness with low blood pressure. Pt is being seen for AMS, and dizziness. CT head showed no acute abnormality and previous L frontal lobe infarct. MRI pending. PMH significant for vertigo and PM. At this time pt is A/Ox4. She lives with her son and granddght in a three story home with 5-6 steps to enter. Pt reports she is able to stay on the main level if needed. She did not utilize an AD previously, though has a RW or cane if needed. She becomes lightheaded upon sitting, though this improves with a short amount of time. BP upon sitting was 118/51 which was elevated from supine. Pt ambulates with [id number] w/ w/out an AD. She presents  with some instability with turns, increased low back pain with ambulation, and decreased activity tolerance. Recommend use of RW vs cane upon returning home and pt was agreeable. Discussed benefits of HHPT vs OPPT for her low back pain, though pt declines. Pt plans to return home with her son.     Time Calculation:    PT Charges       Row Name 06/03/23 1431             Time Calculation    Start Time 1106  -CHERYL      Stop Time 1131  -CHREYL      Time Calculation (min) 25 min  -CHERYL      PT Received On 06/03/23  -CHERYL      PT - Next Appointment 06/05/23  -CHERYL      PT Goal Re-Cert Due Date 06/17/23  -CHERYL                User Key  (r) = Recorded By, (t) = Taken By, (c) = Cosigned By      Initials Name Provider Type    Irene Kearney, PT Physical Therapist                  Therapy Charges for Today       Code Description Service Date Service Provider Modifiers Qty    [id number]  PT EVAL MOD COMPLEXITY 4 6/3/2023 Irene Cruz, PT GP 1            PT G-Codes  Outcome Measure Options: AM-PAC 6 Clicks Basic Mobility (PT), Modified [place]ib  AM-PAC 6 Clicks Score (PT): 22  Modified [place] Scale: 3 - Moderate disability.  Requiring some help, but able to walk without assistance.  PT Discharge Summary  Anticipated Discharge Disposition (PT): home with assist, home with outpatient therapy services    Irene Cruz, CASE  6/3/2023

## 2023-06-04 PROCEDURE — 97165 OT EVAL LOW COMPLEX 30 MIN: CPT

## 2023-06-04 RX ORDER — LEVETIRACETAM 250 MG/1
250 TABLET ORAL EVERY 12 HOURS SCHEDULED
Status: DISCONTINUED | OUTPATIENT
Start: 2023-06-04 | End: 2023-06-06 | Stop reason: HOSPADM

## 2023-06-04 RX ORDER — ASPIRIN 81 MG/1
81 TABLET ORAL DAILY
Status: DISCONTINUED | OUTPATIENT
Start: 2023-06-04 | End: 2023-06-06 | Stop reason: HOSPADM

## 2023-06-04 RX ADMIN — OXYCODONE 10 MG: 5 TABLET ORAL at 15:50

## 2023-06-04 RX ADMIN — AMIODARONE HYDROCHLORIDE 200 MG: 200 TABLET ORAL at 08:24

## 2023-06-04 RX ADMIN — FAMOTIDINE 40 MG: 20 TABLET, FILM COATED ORAL at 08:24

## 2023-06-04 RX ADMIN — LEVOTHYROXINE SODIUM 25 MCG: 0.03 TABLET ORAL at 05:43

## 2023-06-04 RX ADMIN — ATORVASTATIN CALCIUM 40 MG: 40 TABLET, FILM COATED ORAL at 20:35

## 2023-06-04 RX ADMIN — APIXABAN 5 MG: 5 TABLET, FILM COATED ORAL at 20:35

## 2023-06-04 RX ADMIN — POLYETHYLENE GLYCOL 3350 17 G: 17 POWDER, FOR SOLUTION ORAL at 20:40

## 2023-06-04 RX ADMIN — ASPIRIN 81 MG: 81 TABLET, COATED ORAL at 14:02

## 2023-06-04 RX ADMIN — SERTRALINE 100 MG: 100 TABLET, FILM COATED ORAL at 17:28

## 2023-06-04 RX ADMIN — SENNOSIDES AND DOCUSATE SODIUM 2 TABLET: 50; 8.6 TABLET ORAL at 20:34

## 2023-06-04 RX ADMIN — OXYCODONE 10 MG: 5 TABLET ORAL at 08:27

## 2023-06-04 RX ADMIN — Medication 10 ML: at 20:35

## 2023-06-04 RX ADMIN — SENNOSIDES AND DOCUSATE SODIUM 2 TABLET: 50; 8.6 TABLET ORAL at 08:24

## 2023-06-04 RX ADMIN — AMIODARONE HYDROCHLORIDE 200 MG: 200 TABLET ORAL at 20:35

## 2023-06-04 RX ADMIN — LEVETIRACETAM 250 MG: 250 TABLET, FILM COATED ORAL at 13:49

## 2023-06-04 RX ADMIN — Medication 10 ML: at 08:24

## 2023-06-04 RX ADMIN — APIXABAN 5 MG: 5 TABLET, FILM COATED ORAL at 08:24

## 2023-06-04 RX ADMIN — LEVETIRACETAM 250 MG: 250 TABLET, FILM COATED ORAL at 20:42

## 2023-06-04 NOTE — THERAPY EVALUATION
Patient Name: Domonique See  : 1940    MRN: [id number]                              Today's Date: 2023       Admit Date: 2023    Visit Dx:     ICD-10-CM ICD-9-CM   1. Altered mental status, unspecified altered mental status type  R41.82 780.97   2. Dizziness  R42 780.4     Patient Active Problem List   Diagnosis    Iron deficiency anemia    Anemia, unspecified    Pacemaker    Atrial fibrillation (HCC)    Bradycardia    Abnormal result of cardiovascular function study    Cervical disc disorder with radiculopathy    Cervical radiculopathy    Cervical stenosis of spinal canal    Cholecystitis    CHF (congestive heart failure) (HCC)    Deep vein thrombosis (DVT)    Dyspnea    Family history of diabetes mellitus    Fibromyositis    Hyperlipidemia    Essential hypertension    Hypertrophic obstructive cardiomyopathy    Lumbar radiculopathy    Primary localized osteoarthritis    Sacroiliitis, not elsewhere classified    Spondylolisthesis, acquired    Primary osteoarthritis of right knee    Morbid obesity    Primary osteoarthritis of both knees    Obesity (BMI 30.0-34.9)    Persistent atrial fibrillation    Hx of hypertrophic cardiomyopathy    Coronary artery disease    Sleep apnea    CHF exacerbation    Acute midline low back pain without sciatica    Hypertensive retinopathy    Altered mental status, unspecified altered mental status type     Past Medical History:   Diagnosis Date    Anemia     CHF (congestive heart failure)     Congenital heart disease     Coronary artery disease     GERD (gastroesophageal reflux disease)     Heart murmur Don't know    Hx of hypertrophic cardiomyopathy     Hypertension     Peptic ulceration     Persistent atrial fibrillation 01/10/2023    Primary osteoarthritis of both knees 2021    Primary osteoarthritis of right knee 2020    Sleep apnea      Past Surgical History:   Procedure Laterality Date    ABDOMINAL HERNIA REPAIR      ABLATION OF DYSRHYTHMIC  FOCUS  01-    BACK SURGERY      BREAST AUGMENTATION      CARDIAC CATHETERIZATION      CARDIAC ELECTROPHYSIOLOGY PROCEDURE N/A 01/16/2023    Procedure: Ablation atrial fibrillation and flutter, cryo Duong and Ramón aware;  Surgeon: Leeanne Pleitez MD;  Location: [facility] CATH INVASIVE LOCATION;  Service: Cardiovascular;  Laterality: N/A;    CARDIAC PACEMAKER PLACEMENT  03/16/2017    Dual Chamber    CERVICAL RIB RESECTION Bilateral     CERVICAL RIB RESECTION Bilateral 1963    bilateral cervical ribs removed - extra ribs located and causing pain    CHOLECYSTECTOMY      COLONOSCOPY      CORONARY ARTERY BYPASS GRAFT  2013    THORACIC DECOMPRESSION POSTERIOR FUSION  06/04/2014    L3-5 & S1    UPPER GASTROINTESTINAL ENDOSCOPY        General Information       Row Name 06/04/23 1215          General Information    Existing Precautions/Restrictions no known precautions/restrictions  -       Row Name 06/04/23 1215          Living Environment    People in Home child(su), adult;grandchild(su)  son & grndtr. Son can help  -       Row Name 06/04/23 1215          Home Main Entrance    Number of Stairs, Main Entrance five  -     Stair Railings, Main Entrance railings safe and in good condition  -       Row Name 06/04/23 1215          Stairs Within Home, Primary    Stairs Comment, Within Home, Primary option to stay on main lvl of tri-story home  -       Row Name 06/04/23 1215          Cognition    Orientation Status (Cognition) oriented x 4  -[facility] Name 06/04/23 1215          Safety Issues, Functional Mobility    Impairments Affecting Function (Mobility) balance;endurance/activity tolerance  -     Comment, Safety Issues/Impairments (Mobility) Hx recurrent Rt central canal cannalithiasis BPPV per pt and her son has learned the appropriate treating maneuver to assist her when she gets flare-ups. Pt states she would like to have son assist her at home with this.  -               User Key  (r) = Recorded  By, (t) = Taken By, (c) = Cosigned By      Initials Name Provider Type     Jeri Quinn OT Occupational Therapist                     Mobility/ADL's       Row Name 06/04/23 1226          Bed Mobility    Bed Mobility bed mobility (all) activities  -     All Activities, [place] (Bed Mobility) independent  -       Row Name 06/04/23 1226          Functional Mobility    Functional Mobility- Ind. Level independent  -     Functional Mobility- Comment pt is up ad danis and states she has cane, RW at home if needed.  -       Row Name 06/04/23 1226          Activities of Daily Living    BADL Assessment/Intervention feeding;grooming  -[facility] Name 06/04/23 1226          Self-Feeding Assessment/Training    [place] Level (Feeding) feeding skills;independent  -     Position (Self-Feeding) sitting up in bed  -[facility] Name 06/04/23 1226          Grooming Assessment/Training    [place] Level (Grooming) grooming skills;set up  -     Position (Grooming) sitting up in bed  -               User Key  (r) = Recorded By, (t) = Taken By, (c) = Cosigned By      Initials Name Provider Type     Jeri Quinn OT Occupational Therapist                   Obj/Interventions       Row Name 06/04/23 1227          Vision Assessment/Intervention    Visual Impairment/Limitations WFL  -[facility] Name 06/04/23 1227          Range of Motion Comprehensive    General Range of Motion no range of motion deficits identified  -[facility] Name 06/04/23 1227          Strength Comprehensive (MMT)    Comment, General Manual Muscle Testing (MMT) Assessment [facility]               User Key  (r) = Recorded By, (t) = Taken By, (c) = Cosigned By      Initials Name Provider Type     Jeri Quinn, OT Occupational Therapist                   Goals/Plan    No documentation.                  Clinical Impression       Row Name 06/04/23 1229          Pain Assessment    Pretreatment Pain Rating 4/10  -     Posttreatment  Pain Rating 4/10  -     Pain Location lower  -     Pain Location - back  -     Pre/Posttreatment Pain Comment low back pain somewhat limits pt functional strength. She states she is to have Dr Fatima do a spinal MRI and assess her needs later this week as an outpatient.  -       Row Name 06/04/23 1229          Plan of Care Review    Plan of Care Reviewed With patient  -     Progress improving  -     Outcome Evaluation Pt is an 84 y/o female who presents to [facility] with reports of feeling off balance and dizziness with low blood pressure. Pt is being seen for AMS, and dizziness. CT head showed no acute abnormality and previous L frontal lobe infarct. MRI pending tomorrow. Pt has pacemaker. PMH significant for vertigo but pt states her son has learned how to perform the correct maneuver to treat it. At this time pt is A/Ox4. She lives with her son and granddghter in a three story home with 5-6 steps to enter. Pt reports she is able to stay on the main level if needed. She did not utilize an AD previously, though has a RW or cane if needed. Declines OP or HHC therapies. Pt states she is due to see Dr fatima for spinal MRIs later this week and he may determin her OP PT needs pending imaging results. Her son can drive her there if needed. She is up ad danis but is using the call light for line setup and safety. Recommend D/C home with family assist.  -       Row Name 06/04/23 1229          Therapy Assessment/Plan (OT)    Therapy Frequency (OT) evaluation only  -       Row Name 06/04/23 1229          Therapy Plan Review/Discharge Plan (OT)    Anticipated Discharge Disposition (OT) home with assist  -       Row Name 06/04/23 1229          Positioning and Restraints    Pre-Treatment Position in bed  -     Post Treatment Position bed  -     In Bed notified nsg;fowlers;encouraged to call for assist;call light within reach  -               User Key  (r) = Recorded By, (t) = Taken By, (c) = Cosigned By       Initials Name Provider Type     Jeri Quinn OT Occupational Therapist                   Outcome Measures       Row Name 06/04/23 1233          Modified [place] Scale    Pre-Stroke Modified [place] Scale 0 - No Symptoms at all.  -     Modified [place] Scale 2 - Slight disability.  Unable to carry out all previous activities but able to look after own affairs without assistance.  -       Row Name 06/04/23 1233          Functional Assessment    Outcome Measure Options Modified [place]  -               User Key  (r) = Recorded By, (t) = Taken By, (c) = Cosigned By      Initials Name Provider Type     Jeri Quinn OT Occupational Therapist                    Occupational Therapy Education       Title: PT OT SLP Therapies (In Progress)       Topic: Occupational Therapy (In Progress)       Point: ADL training (Done)       Description:   Instruct learner(s) on proper safety adaptation and remediation techniques during self care or transfers.   Instruct in proper use of assistive devices.                  Learning Progress Summary             Patient Acceptance, E,TB, VU by  at 6/4/2023 1234                         Point: Home exercise program (Not Started)       Description:   Instruct learner(s) on appropriate technique for monitoring, assisting and/or progressing therapeutic exercises/activities.                  Learner Progress:  Not documented in this visit.              Point: Precautions (Done)       Description:   Instruct learner(s) on prescribed precautions during self-care and functional transfers.                  Learning Progress Summary             Patient Acceptance, E,TB, VU by  at 6/4/2023 1234                         Point: Body mechanics (Done)       Description:   Instruct learner(s) on proper positioning and spine alignment during self-care, functional mobility activities and/or exercises.                  Learning Progress Summary             Patient Acceptance, E,TB, VU by  at  6/4/2023 1234                                         User Key       Initials Effective Dates Name Provider Type Discipline     06/16/21 -  Jeri Quinn OT Occupational Therapist OT                  OT Recommendation and Plan  Therapy Frequency (OT): evaluation only  Plan of Care Review  Plan of Care Reviewed With: patient  Progress: improving  Outcome Evaluation: Pt is an 84 y/o female who presents to [facility] with reports of feeling off balance and dizziness with low blood pressure. Pt is being seen for AMS, and dizziness. CT head showed no acute abnormality and previous L frontal lobe infarct. MRI pending tomorrow. Pt has pacemaker. PMH significant for vertigo but pt states her son has learned how to perform the correct maneuver to treat it. At this time pt is A/Ox4. She lives with her son and granddghter in a three story home with 5-6 steps to enter. Pt reports she is able to stay on the main level if needed. She did not utilize an AD previously, though has a RW or cane if needed. Declines OP or HHC therapies. Pt states she is due to see Dr fatima for spinal MRIs later this week and he may determin her OP PT needs pending imaging results. Her son can drive her there if needed. She is up ad danis but is using the call light for line setup and safety. Recommend D/C home with family assist.     Time Calculation:    Time Calculation- OT       Row Name 06/04/23 1234             Time Calculation-     OT Start Time 0830  -      OT Stop Time 0842  -      OT Time Calculation (min) 12 min  -      Total Timed Code Minutes- OT 0 minute(s)  -      OT Received On 06/04/23  -                User Key  (r) = Recorded By, (t) = Taken By, (c) = Cosigned By      Initials Name Provider Type     Jeri Quinn OT Occupational Therapist                  Therapy Charges for Today       Code Description Service Date Service Provider Modifiers Qty    [id number] HC OT EVAL LOW COMPLEXITY 2 6/4/2023 Jeri Quinn OT GO 1                  Jeri Quinn, OT  6/4/2023

## 2023-06-04 NOTE — PLAN OF CARE
Goal Outcome Evaluation:         No acute events overnight.    Problem: Adult Inpatient Plan of Care  Goal: Plan of Care Review  Outcome: Ongoing, Progressing  Goal: Absence of Hospital-Acquired Illness or Injury  Outcome: Ongoing, Progressing  Intervention: Identify and Manage Fall Risk  Description: Perform standard risk assessment on admission using a validated tool or comprehensive approach appropriate to the patient; reassess fall risk frequently, with change in status or transfer to another level of care.  Communicate fall injury risk to interprofessional healthcare team.  Determine need for increased observation, equipment and environmental modification, such as low bed, signage and supportive, nonskid footwear.  Adjust safety measures to individual developmental age, stage and identified risk factors.  Reinforce the importance of safety and physical activity with patient and family.  Perform regular intentional rounding to assess need for position change, pain assessment and personal needs, including assistance with toileting.  Recent Flowsheet Documentation  Taken 6/4/2023 0540 by Jackie Barnhart, RN  Safety Promotion/Fall Prevention:   activity supervised   assistive device/personal items within reach   clutter free environment maintained   fall prevention program maintained   nonskid shoes/slippers when out of bed   room organization consistent   safety round/check completed  Taken 6/4/2023 0400 by Jackie Barnhart, RN  Safety Promotion/Fall Prevention:   activity supervised   assistive device/personal items within reach   clutter free environment maintained   fall prevention program maintained   nonskid shoes/slippers when out of bed   room organization consistent  Taken 6/4/2023 0230 by Jackie Barnhart, RN  Safety Promotion/Fall Prevention:   activity supervised   assistive device/personal items within reach   clutter free environment maintained   fall prevention program maintained   nonskid  shoes/slippers when out of bed   room organization consistent   safety round/check completed  Taken 6/4/2023 0100 by Jackie Barnhart, RN  Safety Promotion/Fall Prevention:   activity supervised   assistive device/personal items within reach   clutter free environment maintained   fall prevention program maintained   nonskid shoes/slippers when out of bed   room organization consistent   safety round/check completed  Taken 6/4/2023 0050 by Jackie Barnhart, RN  Safety Promotion/Fall Prevention:   activity supervised   assistive device/personal items within reach   clutter free environment maintained   fall prevention program maintained   nonskid shoes/slippers when out of bed   room organization consistent   safety round/check completed  Taken 6/3/2023 2140 by Jackie Barnhart, RN  Safety Promotion/Fall Prevention:   activity supervised   assistive device/personal items within reach   clutter free environment maintained   fall prevention program maintained   nonskid shoes/slippers when out of bed   room organization consistent   safety round/check completed  Taken 6/3/2023 1850 by Jackie Barnhart, RN  Safety Promotion/Fall Prevention:   activity supervised   assistive device/personal items within reach   clutter free environment maintained   fall prevention program maintained   nonskid shoes/slippers when out of bed   room organization consistent   safety round/check completed  Intervention: Prevent Skin Injury  Description: Perform a screening for skin injury risk, such as pressure or moisture associated skin damage on admission and at regular intervals throughout hospital stay.  Keep all areas of skin (especially folds) clean and dry.  Maintain adequate skin hydration.  Relieve and redistribute pressure and protect bony prominences; implement measures based on patient-specific risk factors.  Match turning and repositioning schedule to clinical condition.  Encourage weight shift frequently; assist with reposition  if unable to complete independently.  Float heels off bed; avoid pressure on the Achilles tendon.  Keep skin free from extended contact with medical devices.  Encourage functional activity and mobility, as early as tolerated.  Use aids (e.g., slide boards, mechanical lift) during transfer.  Recent Flowsheet Documentation  Taken 6/4/2023 0540 by Jackie Barnhart RN  Body Position: position changed independently  Taken 6/4/2023 0400 by Jackie Barnhart RN  Body Position: position changed independently  Taken 6/4/2023 0230 by Jackie Barnhart RN  Body Position: position changed independently  Taken 6/4/2023 0100 by Jackie Barnhart RN  Body Position: position changed independently  Taken 6/4/2023 0050 by Jackie Barnhart RN  Body Position: position changed independently  Taken 6/3/2023 2140 by Jackie Barnhart RN  Body Position: position changed independently  Skin Protection:   adhesive use limited   tubing/devices free from skin contact  Taken 6/3/2023 1850 by Jackie Barnhart RN  Body Position: position changed independently  Intervention: Prevent and Manage VTE (Venous Thromboembolism) Risk  Description: Assess for VTE (venous thromboembolism) risk.  Encourage and assist with early ambulation.  Initiate and maintain compression or other therapy, as indicated, based on identified risk in accordance with organizational protocol and provider order.  Encourage both active and passive leg exercises while in bed, if unable to ambulate.  Recent Flowsheet Documentation  Taken 6/4/2023 0540 by Jackie Barnhart RN  VTE Prevention/Management:   sequential compression devices off   patient refused intervention  Taken 6/4/2023 0100 by Jackie Barnhart RN  Activity Management:   ambulated to bathroom   back to bed  VTE Prevention/Management:   sequential compression devices off   patient refused intervention  Taken 6/3/2023 2140 by Jackie Barnhart RN  VTE Prevention/Management:   patient refused intervention    sequential compression devices off  Intervention: Prevent Infection  Description: Maintain skin and mucous membrane integrity; promote hand, oral and pulmonary hygiene.  Optimize fluid balance, nutrition, sleep and glycemic control to maximize infection resistance.  Identify potential sources of infection early to prevent or mitigate progression of infection (e.g., wound, lines, devices).  Evaluate ongoing need for invasive devices; remove promptly when no longer indicated.  Recent Flowsheet Documentation  Taken 6/4/2023 0540 by Jackie Barnhart RN  Infection Prevention:   hand hygiene promoted   personal protective equipment utilized   rest/sleep promoted   single patient room provided  Taken 6/4/2023 0400 by Jackie Barnhart RN  Infection Prevention:   hand hygiene promoted   personal protective equipment utilized   rest/sleep promoted   single patient room provided  Taken 6/4/2023 0230 by Jackie Barnhart RN  Infection Prevention:   hand hygiene promoted   personal protective equipment utilized   rest/sleep promoted   single patient room provided  Taken 6/4/2023 0100 by Jackie Barnhart RN  Infection Prevention:   hand hygiene promoted   personal protective equipment utilized   rest/sleep promoted   single patient room provided  Taken 6/4/2023 0050 by Jackie Barnhart RN  Infection Prevention:   hand hygiene promoted   personal protective equipment utilized   rest/sleep promoted   single patient room provided  Taken 6/3/2023 2140 by Jackie Barnhart RN  Infection Prevention:   hand hygiene promoted   personal protective equipment utilized   rest/sleep promoted   single patient room provided  Taken 6/3/2023 1850 by Jackie Barnhart RN  Infection Prevention:   hand hygiene promoted   rest/sleep promoted   personal protective equipment utilized   single patient room provided  Goal: Optimal Comfort and Wellbeing  Outcome: Ongoing, Progressing  Intervention: Provide Person-Centered Care  Description: Use a  family-focused approach to care.  Develop trust and rapport by proactively providing information, encouraging questions, addressing concerns and offering reassurance.  Acknowledge emotional response to hospitalization.  Recognize and utilize personal coping strategies.  Honor spiritual and cultural preferences.  Recent Flowsheet Documentation  Taken 6/4/2023 0540 by Jackie Barnhart RN  Trust Relationship/Rapport:   care explained   choices provided   thoughts/feelings acknowledged  Taken 6/4/2023 0100 by Jackie Barnhart RN  Trust Relationship/Rapport:   care explained   choices provided  Taken 6/3/2023 2140 by Jackie Barnhart RN  Trust Relationship/Rapport:   care explained   choices provided   thoughts/feelings acknowledged  Goal: Readiness for Transition of Care  Outcome: Ongoing, Progressing     Problem: Fall Injury Risk  Goal: Absence of Fall and Fall-Related Injury  Outcome: Ongoing, Progressing  Intervention: Identify and Manage Contributors  Description: Develop a fall prevention plan with the patient and caregiver/family.  Provide reorientation, appropriate sensory stimulation and routines with changes in mental status to decrease risk of fall.  Promote use of personal vision and auditory aids.  Assess assistance level required for safe and effective self-care; provide support as needed, such as toileting, mobilization. For age 65 and older, implement timed toileting with assistance.  Encourage physical activity, such as performance of mobility and self-care at highest level of patient ability, multicomponent exercise program and provision of appropriate assistive devices.  If fall occurs, assess the severity of injury; implement fall injury protocol. Determine the cause and revise fall injury prevention plan.  Regularly review medication contribution to fall risk; adjust medication administration times to minimize risk of falling.  Consider risk related to polypharmacy and age.  Balance adequate pain  management with potential for oversedation.  Recent Flowsheet Documentation  Taken 6/4/2023 0100 by Jackie Barnhart, RN  Medication Review/Management: medications reviewed  Taken 6/4/2023 0050 by Jackie Barnhart RN  Medication Review/Management: medications reviewed  Intervention: Promote Injury-Free Environment  Description: Provide a safe, barrier-free environment that encourages independent activity.  Keep care area uncluttered and well-lighted.  Determine need for increased observation or monitoring.  Avoid use of devices that minimize mobility, such as restraints or indwelling urinary catheter.  Recent Flowsheet Documentation  Taken 6/4/2023 0540 by Jackie Barnhart, RN  Safety Promotion/Fall Prevention:   activity supervised   assistive device/personal items within reach   clutter free environment maintained   fall prevention program maintained   nonskid shoes/slippers when out of bed   room organization consistent   safety round/check completed  Taken 6/4/2023 0400 by Jackie Barnhart, RN  Safety Promotion/Fall Prevention:   activity supervised   assistive device/personal items within reach   clutter free environment maintained   fall prevention program maintained   nonskid shoes/slippers when out of bed   room organization consistent  Taken 6/4/2023 0230 by Jackie Barnhart, RN  Safety Promotion/Fall Prevention:   activity supervised   assistive device/personal items within reach   clutter free environment maintained   fall prevention program maintained   nonskid shoes/slippers when out of bed   room organization consistent   safety round/check completed  Taken 6/4/2023 0100 by Jackie Barnhart, RN  Safety Promotion/Fall Prevention:   activity supervised   assistive device/personal items within reach   clutter free environment maintained   fall prevention program maintained   nonskid shoes/slippers when out of bed   room organization consistent   safety round/check completed  Taken 6/4/2023 0050 by  Jackie Barnhart, RN  Safety Promotion/Fall Prevention:   activity supervised   assistive device/personal items within reach   clutter free environment maintained   fall prevention program maintained   nonskid shoes/slippers when out of bed   room organization consistent   safety round/check completed  Taken 6/3/2023 2140 by Jackie Barnhart, RN  Safety Promotion/Fall Prevention:   activity supervised   assistive device/personal items within reach   clutter free environment maintained   fall prevention program maintained   nonskid shoes/slippers when out of bed   room organization consistent   safety round/check completed  Taken 6/3/2023 1850 by Jackie Barnhart, RN  Safety Promotion/Fall Prevention:   activity supervised   assistive device/personal items within reach   clutter free environment maintained   fall prevention program maintained   nonskid shoes/slippers when out of bed   room organization consistent   safety round/check completed     Problem: Obstructive Sleep Apnea Risk or Actual Comorbidity Management  Goal: Unobstructed Breathing During Sleep  Outcome: Ongoing, Progressing     Problem: Pain Chronic (Persistent) (Comorbidity Management)  Goal: Acceptable Pain Control and Functional Ability  Outcome: Ongoing, Progressing  Intervention: Manage Persistent Pain  Description: Evaluate pain level, effect of treatment and patient response at regular intervals.  Minimize pain stimuli; coordinate care and adjust environment (e.g., light, noise, unnecessary movement); promote sleep/rest.  Match pharmacologic analgesia to severity and type of pain mechanism (e.g., neuropathic, muscle, inflammatory); consider multimodal approach (e.g., nonopioid, opioid, adjuvant).  Provide medication at regular intervals; titrate to patient response.  Manage breakthrough pain with additional doses; consider rotation or switching medication.  Monitor for signs of substance tolerance (increased dose to reach desired effect,  decreased effect with same dose).  Avoid abrupt withdrawal of medication, especially agents capable of causing physical dependence.  Manage medication-induced effects, such as constipation, nausea, pruritus, urinary retention, somnolence and dizziness.  Provide multimodal treatment interventions, such as physical activity, therapeutic exercise, yoga, TENS (transcutaneous electrical nerve stimulation) and manual therapy.  Train in functional activity modifications, such as body mechanics, posture, ergonomics, energy conservation and activity pacing.  Consider addition of complementary or alternative therapy, such as acupuncture, hypnosis or therapeutic touch.  Recent Flowsheet Documentation  Taken 6/4/2023 0100 by Jackie Barnhart RN  Medication Review/Management: medications reviewed  Taken 6/4/2023 0050 by Jackie Barnhart RN  Medication Review/Management: medications reviewed  Intervention: Optimize Psychosocial Wellbeing  Description: Facilitate patient’s self-control over pain by providing pain information and allowing choices in treatment.  Consider and address emotional response to pain.  Explore and promote use of coping strategies; address barriers to successful coping.  Evaluate and assist with psychosocial, cultural and spiritual factors impacting pain.  Modify pain perception by using techniques, such as distraction, mindfulness, guided imagery, meditation or music.  Assess and monitor for signs and symptoms of behavioral health concerns, such as unhealthy substance use, depression and suicidal ideation.  Consider referral for ongoing coping support, such as cognitive behavioral therapy and mindfulness-based stress reduction.  Recent Flowsheet Documentation  Taken 6/4/2023 0540 by Jackie Barnhart RN  Diversional Activities:   television   smartphone  Taken 6/4/2023 0100 by Jackie Barnhart RN  Supportive Measures: active listening utilized  Diversional Activities:   television   smartphone  Taken  6/3/2023 2140 by Jackie Barnhart, RN  Supportive Measures: active listening utilized  Diversional Activities:   television   smartphone

## 2023-06-04 NOTE — PROGRESS NOTES
LOS: 0 days   Patient Care Team:  Anish Lew FNP as PCP - General  Evgeny Gregory MD as Consulting Physician (Cardiology)  Leeanne Pleitez MD as Consulting Physician (Cardiology)  Radha Owusu APRN as Nurse Practitioner (Nurse Practitioner)  Kathe Muñoz MD as Consulting Physician (Nephrology)    Subjective:  Pt seen and examined at bedside.  No family present.   She denies any new complaints.   Has chronic back pain- had pain medication recently and drowsy.      Review of Systems:   Review of Systems   Constitutional:  Positive for activity change and fatigue. Negative for appetite change, chills, diaphoresis and fever.   HENT:  Negative for trouble swallowing.    Eyes:  Negative for visual disturbance.   Respiratory: Negative.     Cardiovascular: Negative.    Gastrointestinal: Negative.    Endocrine: Negative.    Genitourinary:  Negative for dysuria.   Musculoskeletal:  Positive for back pain. Negative for gait problem.   Skin: Negative.    Neurological:  Positive for light-headedness.   Hematological: Negative.    Psychiatric/Behavioral: Negative.           Vital Signs  Temp:  [97.7 °F (36.5 °C)-98.5 °F (36.9 °C)] 97.7 °F (36.5 °C)  Heart Rate:  [60-63] 63  Resp:  [12-17] 17  BP: (113-128)/() 128/103    Physical Exam:  Physical Exam  Constitutional:       General: She is not in acute distress.     Appearance: She is obese.   HENT:      Head: Normocephalic and atraumatic.      Nose: Nose normal.      Mouth/Throat:      Mouth: Mucous membranes are moist.      Pharynx: Oropharynx is clear.   Eyes:      General: No scleral icterus.     Conjunctiva/sclera: Conjunctivae normal.   Cardiovascular:      Rate and Rhythm: Normal rate and regular rhythm.      Pulses: Normal pulses.      Heart sounds: Normal heart sounds. No murmur heard.     Comments: BLE mild varicosities  Pulmonary:      Effort: Pulmonary effort is normal. No respiratory distress.      Breath sounds: Normal  breath sounds. No wheezing, rhonchi or rales.      Comments: CTA bilaterally  No conversational dyspnea  Abdominal:      General: Bowel sounds are normal. There is no distension.      Palpations: Abdomen is soft.      Tenderness: There is no abdominal tenderness. There is no guarding or rebound.   Musculoskeletal:      Cervical back: Normal range of motion and neck supple.      Right lower leg: No edema.      Left lower leg: No edema.      Comments: Able to move all extremities in bed   Skin:     General: Skin is warm and dry.      Capillary Refill: Capillary refill takes less than 2 seconds.      Coloration: Skin is pale.   Neurological:      Mental Status: She is alert and oriented to person, place, and time.      Cranial Nerves: No cranial nerve deficit.   Psychiatric:         Mood and Affect: Mood normal.         Behavior: Behavior normal.        Radiology:  CT Head Without Contrast    Result Date: 6/2/2023  Impression: 1. No intracranial hemorrhage or acute intracranial abnormality. 2. 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 Electronically Signed: Mark Talamantes  6/2/2023 12:42 PM EDT  Workstation ID: [id number]        Results Review:     I reviewed the patient's new clinical results.  I reviewed the patient's new imaging results and agree with the interpretation.    Medication Review:   Scheduled Meds:amiodarone, 200 mg, Oral, BID  apixaban, 5 mg, Oral, BID  atorvastatin, 40 mg, Oral, Nightly  cefTRIAXone, 2 g, Intravenous, Q24H  famotidine, 40 mg, Oral, Daily  levothyroxine, 25 mcg, Oral, Q AM  senna-docusate sodium, 2 tablet, Oral, BID  sertraline, 100 mg, Oral, Q PM  sodium chloride, 10 mL, Intravenous, Q12H      Continuous Infusions:   PRN Meds:.  acetaminophen    senna-docusate sodium **AND** polyethylene glycol **AND** bisacodyl **AND** bisacodyl    ipratropium-albuterol    melatonin    nitroglycerin    ondansetron     oxyCODONE    [COMPLETED] Insert Peripheral IV **AND** sodium chloride    [COMPLETED] Insert Peripheral IV **AND** sodium chloride    sodium chloride    sodium chloride    Labs:    CBC    Results from last 7 days   Lab Units 06/02/23  1129   WBC 10*3/mm3 4.40   HEMOGLOBIN g/dL 12.1   PLATELETS 10*3/mm3 205     BMP   Results from last 7 days   Lab Units 06/02/23  1209   SODIUM mmol/L 134*   POTASSIUM mmol/L 4.0   CHLORIDE mmol/L 102   CO2 mmol/L 20.0*   BUN mg/dL 16   CREATININE mg/dL 0.91   GLUCOSE mg/dL 99   MAGNESIUM mg/dL 1.6     Cr Clearance Estimated Creatinine Clearance: 44 mL/min (by C-G formula based on SCr of 0.91 mg/dL).  Coag   Results from last 7 days   Lab Units 06/02/23  1129   INR  0.96   APTT seconds 31.8*     HbA1C   Lab Results   Component Value Date    HGBA1C 5.8 (H) 01/19/2023    HGBA1C 5.9 (H) 03/08/2022    HGBA1C 6.1 (H) 06/30/2021     Blood Glucose No results found for: POCGLU  Infection   Results from last 7 days   Lab Units 06/02/23  1221   URINECX  25,000 CFU/mL Mixed Jaciel Isolated     CMP   Results from last 7 days   Lab Units 06/02/23  1209   SODIUM mmol/L 134*   POTASSIUM mmol/L 4.0   CHLORIDE mmol/L 102   CO2 mmol/L 20.0*   BUN mg/dL 16   CREATININE mg/dL 0.91   GLUCOSE mg/dL 99   ALBUMIN g/dL 3.7   BILIRUBIN mg/dL 0.5   ALK PHOS U/L 83   AST (SGOT) U/L 29   ALT (SGPT) U/L 30     UA    Results from last 7 days   Lab Units 06/02/23  1221   NITRITE UA  Negative   WBC UA /HPF 6-12*   BACTERIA UA /HPF Trace*   SQUAM EPITHEL UA /HPF 0-2   URINECX  25,000 CFU/mL Mixed Jaicel Isolated     Radiology(recent) No radiology results for the last day   Assessment:  Altered Mental Status  Dizziness  UTI  New Acquired Hypothyroidism  PAF  Mood Disorder  Fall Risk    Plan:  Neuro consult is pending- MRI cannot be performed  until Monday 6/5/2023 due to pacer    Continue Eliquis for anticoagulation therapy related to Afib.     Stopped IV Rocephin due to urine cultures show mixed jaciel    Tolerating  levothyroxine- will need to repeat thyroid function testing in 6 weeks as outpatient.     Has not had BM since Thursday 6/1/2023- has bowel protocol ordered.     Will see Dr Dewey outpatient as scheduled- MRI scheduled for later this week due to chronic back pain.     Continue Pepcid for GI prophylaxis  Continue Eliquis therapy for anticoagulation for treatment of Afib.       DC PLAN:  Home with family        Nat HUSAIN Greyson, WOODY  06/04/23  12:44 EDT

## 2023-06-04 NOTE — PLAN OF CARE
Problem: Adult Inpatient Plan of Care  Goal: Plan of Care Review  Outcome: Ongoing, Progressing  Flowsheets (Taken 6/3/2023 1428 by Irene Cruz, PT)  Plan of Care Reviewed With: patient  Goal: Patient-Specific Goal (Individualized)  Outcome: Ongoing, Progressing  Goal: Absence of Hospital-Acquired Illness or Injury  Outcome: Ongoing, Progressing  Intervention: Identify and Manage Fall Risk  Recent Flowsheet Documentation  Taken 6/4/2023 0827 by Kareen Prakash RN  Safety Promotion/Fall Prevention:   assistive device/personal items within reach   clutter free environment maintained   activity supervised   fall prevention program maintained   lighting adjusted   nonskid shoes/slippers when out of bed   room organization consistent  Taken 6/4/2023 0800 by Kareen Prakash RN  Safety Promotion/Fall Prevention:   activity supervised   assistive device/personal items within reach   clutter free environment maintained   fall prevention program maintained   lighting adjusted   nonskid shoes/slippers when out of bed   room organization consistent   safety round/check completed  Intervention: Prevent Skin Injury  Recent Flowsheet Documentation  Taken 6/4/2023 0827 by Kareen Prakash RN  Body Position: position changed independently  Skin Protection:   adhesive use limited   incontinence pads utilized   transparent dressing maintained   tubing/devices free from skin contact  Intervention: Prevent and Manage VTE (Venous Thromboembolism) Risk  Recent Flowsheet Documentation  Taken 6/4/2023 0827 by Kareen Prakash RN  Activity Management: ambulated in room  VTE Prevention/Management: (coumadin) other (see comments)  Intervention: Prevent Infection  Recent Flowsheet Documentation  Taken 6/4/2023 0827 by Kareen Prakash RN  Infection Prevention: personal protective equipment utilized  Taken 6/4/2023 0800 by Kareen Prakash RN  Infection Prevention:   personal protective equipment utilized   hand hygiene promoted  Goal:  Optimal Comfort and Wellbeing  Outcome: Ongoing, Progressing  Intervention: Monitor Pain and Promote Comfort  Recent Flowsheet Documentation  Taken 6/4/2023 0827 by Kareen Prakash RN  Pain Management Interventions: see MAR  Intervention: Provide Person-Centered Care  Recent Flowsheet Documentation  Taken 6/4/2023 0827 by Kareen Prakash RN  Trust Relationship/Rapport:   choices provided   emotional support provided   care explained   questions answered   questions encouraged   thoughts/feelings acknowledged  Goal: Readiness for Transition of Care  Outcome: Ongoing, Progressing     Problem: Fall Injury Risk  Goal: Absence of Fall and Fall-Related Injury  Outcome: Ongoing, Progressing  Intervention: Identify and Manage Contributors  Recent Flowsheet Documentation  Taken 6/4/2023 0827 by Kareen Prakash RN  Medication Review/Management:   medications reviewed   high-risk medications identified  Self-Care Promotion: independence encouraged  Taken 6/4/2023 0800 by Kareen Prakash RN  Medication Review/Management:   medications reviewed   high-risk medications identified  Intervention: Promote Injury-Free Environment  Recent Flowsheet Documentation  Taken 6/4/2023 0827 by Kareen Prakash RN  Safety Promotion/Fall Prevention:   assistive device/personal items within reach   clutter free environment maintained   activity supervised   fall prevention program maintained   lighting adjusted   nonskid shoes/slippers when out of bed   room organization consistent  Taken 6/4/2023 0800 by Kareen Prakash RN  Safety Promotion/Fall Prevention:   activity supervised   assistive device/personal items within reach   clutter free environment maintained   fall prevention program maintained   lighting adjusted   nonskid shoes/slippers when out of bed   room organization consistent   safety round/check completed     Problem: Heart Failure Comorbidity  Goal: Maintenance of Heart Failure Symptom Control  Outcome: Ongoing,  Progressing  Intervention: Maintain Heart Failure-Management  Recent Flowsheet Documentation  Taken 6/4/2023 0827 by Kareen Prakash RN  Medication Review/Management:   medications reviewed   high-risk medications identified  Taken 6/4/2023 0800 by Kareen Prakash RN  Medication Review/Management:   medications reviewed   high-risk medications identified     Problem: Hypertension Comorbidity  Goal: Blood Pressure in Desired Range  Outcome: Ongoing, Progressing  Intervention: Maintain Blood Pressure Management  Recent Flowsheet Documentation  Taken 6/4/2023 0827 by Kareen Prakash RN  Syncope Management: position changed slowly  Medication Review/Management:   medications reviewed   high-risk medications identified  Taken 6/4/2023 0800 by Kareen Prakash RN  Medication Review/Management:   medications reviewed   high-risk medications identified     Problem: Obstructive Sleep Apnea Risk or Actual Comorbidity Management  Goal: Unobstructed Breathing During Sleep  Outcome: Ongoing, Progressing     Problem: Osteoarthritis Comorbidity  Goal: Maintenance of Osteoarthritis Symptom Control  Outcome: Ongoing, Progressing  Intervention: Maintain Osteoarthritis Symptom Control  Recent Flowsheet Documentation  Taken 6/4/2023 0827 by Kareen Prakash RN  Activity Management: ambulated in room  Assistive Device Utilized: walker  Medication Review/Management:   medications reviewed   high-risk medications identified  Taken 6/4/2023 0800 by Kareen Prakash RN  Medication Review/Management:   medications reviewed   high-risk medications identified     Problem: Pain Chronic (Persistent) (Comorbidity Management)  Goal: Acceptable Pain Control and Functional Ability  Outcome: Ongoing, Progressing  Intervention: Manage Persistent Pain  Recent Flowsheet Documentation  Taken 6/4/2023 0827 by Kareen Prakash RN  Medication Review/Management:   medications reviewed   high-risk medications identified  Taken 6/4/2023 0800 by Dwain  Kareen RN  Medication Review/Management:   medications reviewed   high-risk medications identified  Intervention: Develop Pain Management Plan  Recent Flowsheet Documentation  Taken 6/4/2023 0827 by Kareen Prakash RN  Pain Management Interventions: see MAR  Intervention: Optimize Psychosocial Wellbeing  Recent Flowsheet Documentation  Taken 6/4/2023 0827 by Kareen Prakash, RN  Supportive Measures:   active listening utilized   self-reflection promoted  Diversional Activities: television  Family/Support System Care:   self-care encouraged   support provided   Goal Outcome Evaluation:

## 2023-06-04 NOTE — PLAN OF CARE
Goal Outcome Evaluation:  Plan of Care Reviewed With: patient        Progress: improving  Outcome Evaluation: Pt is an 84 y/o female who presents to [facility] with reports of feeling off balance and dizziness with low blood pressure. Pt is being seen for AMS, and dizziness. Pt reports a couple of falls in the past 6 mos but not related to dizziness or BP dropping. CT head showed no acute abnormality and previous L frontal lobe infarct. MRI pending tomorrow. Pt has pacemaker. PMH significant for vertigo but pt states her son has learned how to perform the correct maneuver to treat it. At this time pt is A/Ox4. She lives with her son and granddghter in a three story home with 5-6 steps to enter. Pt reports she is able to stay on the main level if needed. She did not utilize an AD previously, though has a RW or cane if needed. Declines OP or HHC therapies. Pt reports significant limitations in her activity tolerance because of back pain. Pt states she is due to see Dr Olvera for spinal MRIs later this week and he may determine her OP PT needs pending imaging results. Her son can drive her there if needed. She is up ad danis but is using the call light for line setup and safety. Recommend D/C home with family assist.

## 2023-06-05 LAB — QT INTERVAL: 447 MS

## 2023-06-05 PROCEDURE — 97112 NEUROMUSCULAR REEDUCATION: CPT

## 2023-06-05 PROCEDURE — 97116 GAIT TRAINING THERAPY: CPT

## 2023-06-05 PROCEDURE — 99232 SBSQ HOSP IP/OBS MODERATE 35: CPT | Performed by: PSYCHIATRY & NEUROLOGY

## 2023-06-05 PROCEDURE — 97530 THERAPEUTIC ACTIVITIES: CPT

## 2023-06-05 RX ADMIN — ATORVASTATIN CALCIUM 40 MG: 40 TABLET, FILM COATED ORAL at 20:02

## 2023-06-05 RX ADMIN — SENNOSIDES AND DOCUSATE SODIUM 2 TABLET: 50; 8.6 TABLET ORAL at 08:53

## 2023-06-05 RX ADMIN — Medication 5 MG: at 20:01

## 2023-06-05 RX ADMIN — APIXABAN 5 MG: 5 TABLET, FILM COATED ORAL at 08:53

## 2023-06-05 RX ADMIN — Medication 10 ML: at 08:53

## 2023-06-05 RX ADMIN — OXYCODONE 10 MG: 5 TABLET ORAL at 04:56

## 2023-06-05 RX ADMIN — SERTRALINE 100 MG: 100 TABLET, FILM COATED ORAL at 17:13

## 2023-06-05 RX ADMIN — AMIODARONE HYDROCHLORIDE 200 MG: 200 TABLET ORAL at 08:53

## 2023-06-05 RX ADMIN — LEVETIRACETAM 250 MG: 250 TABLET, FILM COATED ORAL at 08:53

## 2023-06-05 RX ADMIN — OXYCODONE 10 MG: 5 TABLET ORAL at 11:31

## 2023-06-05 RX ADMIN — ASPIRIN 81 MG: 81 TABLET, COATED ORAL at 08:53

## 2023-06-05 RX ADMIN — LEVOTHYROXINE SODIUM 25 MCG: 0.03 TABLET ORAL at 04:56

## 2023-06-05 RX ADMIN — OXYCODONE 10 MG: 5 TABLET ORAL at 22:08

## 2023-06-05 RX ADMIN — APIXABAN 5 MG: 5 TABLET, FILM COATED ORAL at 20:01

## 2023-06-05 RX ADMIN — Medication 10 ML: at 20:03

## 2023-06-05 RX ADMIN — AMIODARONE HYDROCHLORIDE 200 MG: 200 TABLET ORAL at 20:02

## 2023-06-05 RX ADMIN — OXYCODONE 10 MG: 5 TABLET ORAL at 17:13

## 2023-06-05 RX ADMIN — SENNOSIDES AND DOCUSATE SODIUM 2 TABLET: 50; 8.6 TABLET ORAL at 20:01

## 2023-06-05 RX ADMIN — LEVETIRACETAM 250 MG: 250 TABLET, FILM COATED ORAL at 20:02

## 2023-06-05 RX ADMIN — ACETAMINOPHEN 650 MG: 325 TABLET, FILM COATED ORAL at 20:01

## 2023-06-05 RX ADMIN — FAMOTIDINE 40 MG: 20 TABLET, FILM COATED ORAL at 08:53

## 2023-06-05 NOTE — PROGRESS NOTES
LOS: 1 day     Subjective     Pt feeling better, and thinking more clearly.     Objective     Vital Signs  Temp:  [97.5 °F (36.4 °C)-98.7 °F (37.1 °C)] 98.6 °F (37 °C)  Heart Rate:  [60] 60  Resp:  [13-19] 13  BP: (131-155)/(78) 131/78  Intake & Output (last day)         06/04 0701  06/05 0700 06/05 0701  06/06 0700    P.O. 240 240    IV Piggyback      Total Intake(mL/kg) 240 (2.9) 240 (2.9)    Net +240 +240                   Physical Exam:      General Exam:     Constitutional: The patient is in no apparent distress, bright awake and alert.    Head: Normocephalic, atraumatic.   Neck: No nucchal rigidity, ROM normal, supple  Resp: Breathing unlabored, Breath sounds are normal  Cardiac: Regular rate and rhythm.   Extremities/MSK:  No clubbing, cyanosis or edema.  Psychiatric: Mood/affect normal, judgement normal, appropriate     Neuro exam:    Cognition:   Alert and oriented.  Fund of knowledge preserved.  Concentration and attention normal.   Language normal with normal comprehension, fluent speech     Cranial nerves:     II - pupils bilaterally briskly reactive to light  No new Visual field deficits;  III,IV,VI: Intact  V: Normal facial sensations  VII: Intact   VIII: No New hearing changes  IX, X, XI: normal gag and shoulder shrug;  XII: tongue is in the midline.     Sensory:  Intact to light touch in all extremities.      Motor: Strength 5/5 bilaterally upper and lower extremities. No involuntary movements present. Normal tone and bulk.  Reflexes: Plantars are flexor.     Cerebellar: FTN intact     Gait and balance: deferred         Results Review:     I reviewed the patient's new clinical results.    Lab Results (last 24 hours)       ** No results found for the last 24 hours. **           Imaging Results (Last 24 Hours)       ** No results found for the last 24 hours. **               Medication Review:     Assessment & Plan             Altered mental status, unspecified altered mental status type    Domonique  ELEANOR See is a 83 y.o. year old female with history of hypertrophic cardiomyopathy, dual-chamber pacemaker, and atrial fibrillation (on Eliquis and bASA)  who presented with sudden onset vertigo, confusion, aphasia and retrograde amnesia, now resolved.      IMPRESSION:  Acute MS change/confusion, vertigo and ataxia.  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    Will start keppra 250 mg BID (per MESS trial)  Seizure precautions.   Continue Eliquis and bASA for secondary CVA prevention in light of old cardioembolic L frontal CVA  5.   VTE ppx -> already on AC       ACTION PTS:   Await MRI    Discharge on keppra 250 mg BID, Cr 0.91   Seizure precautions   No driving x 3 months after any episode of LOC  5.    F/U with outpt neurologist for AED titration -> up/down or off as clinically indicated.     Plan for disposition: Discharge planning    Izzy Forrest MD  06/05/23  10:32 EDT

## 2023-06-05 NOTE — DISCHARGE PLACEMENT REQUEST
"Geoffrey See (83 y.o. Female)       Date of Birth   1940    Social Security Number       Address   [address] [place] [facility] [facility]    Home Phone   [phone]    MRN   [id number]       Synagogue   Orthodoxy    Marital Status                               Admission Date   6/2/23    Admission Type   Emergency    Admitting Provider   Tatiana Gilliland MD    Attending Provider   Tatiana Gilliland MD    Department, Room/Bed   [facility] NEURO HEART, 254/1       Discharge Date       Discharge Disposition       Discharge Destination                                 Attending Provider: Tatiana Gilliland MD    Allergies: No Known Allergies    Isolation: None   Infection: None   Code Status: CPR    Ht: 152.4 cm (60\")   Wt: 83.2 kg (183 lb 6.8 oz)    Admission Cmt: None   Principal Problem: Altered mental status, unspecified altered mental status type [R41.82]                   Active Insurance as of 6/2/2023       Primary Coverage       Payor Plan Insurance Group Employer/Plan Group    MEDICARE MEDICARE A & B        Payor Plan Address Payor Plan Phone Number Payor Plan Fax Number Effective Dates    [address] [phone]  1/1/2004 - None Entered    [facility] [id number]         Subscriber Name Subscriber Birth Date Member ID       GEOFFREY SEE 1940 [id number]               Secondary Coverage       Payor Plan Insurance Group Employer/Plan Group    AARP [employer] SUP AARP HEALTH CARE OPTIONS        Payor Plan Address Payor Plan Phone Number Payor Plan Fax Number Effective Dates    [facility] [phone]  7/19/2019 - None Entered    [address]       [facility] [address]         Subscriber Name Subscriber Birth Date Member ID       GEOFFREY SEE 1940 [id number]                     Emergency Contacts        (Rel.) Home Phone Work Phone Mobile Phone    Boone See (Son) -- -- [phone]                "

## 2023-06-05 NOTE — PLAN OF CARE
Goal Outcome Evaluation:     Bed mobility - SBA supine to sit to supine  Transfers - SBA, CGA, and with rolling walker and without roll walker  Ambulation - 250 + 250 feet SBA, CGA, and with rolling walker and without roll walker.  SBA with roll walker     Bed mobility - SBA supine to sit to supine  Transfers - SBA, CGA, and with rolling walker and without roll walker  Ambulation - 250 + 250 feet SBA, CGA, and with rolling walker and without roll walker.  SBA with roll walker

## 2023-06-05 NOTE — THERAPY TREATMENT NOTE
Subjective: Pt agreeable to therapeutic plan of care.    Objective:     Bed mobility - SBA supine to sit to supine  Transfers - SBA, CGA, and with rolling walker and without roll walker  Ambulation - 250 + 250 feet SBA, CGA, and with rolling walker and without roll walker.  SBA with roll walker    Vitals: WNL    Pain: 0 VAS   Location: pt reported her back pain was gone     Education: Provided education on the importance of mobility in the acute care setting, Verbal/Tactile Cues, Transfer Training, and Gait Training    Assessment: Domonique See presents with functional mobility impairments which indicate the need for skilled intervention. Tolerating session today without incident. Pt is transferring in and out of the bed without assist.  Pt is safer ambulating with roll walker. Pt Will continue to follow and progress as tolerated.     Plan/Recommendations:   Low Intensity Therapy recommended post-acute care - This is recommended as therapy feels this patient would require 2-3 visits per week. OP would be recommended.  Pt requires no DME at discharge.     Pt desires Home with family assist and Outpatient therapy at discharge. Pt cooperative; agreeable to therapeutic recommendations and plan of care.     Basic Mobility 6-click:  Rollin = Total, A lot = 2, A little = 3; 4 = None  Supine>Sit: 1 = Total, A lot = 2, A little = 3; 4 = None   Sit>Stand with arms: 1 = Total, A lot = 2, A little = 3; 4 = None  Bed>Chair: 1 = Total, A lot = 2, A little = 3; 4 = None  Ambulate in room: 1 = Total, A lot = 2, A little = 3; 4 = None  3-5 Steps with railin = Total, A lot = 2, A little = 3; 4 = None  Score: 18    Modified [place]: 3 = Moderate disability (Requires some help, but able to walk unassisted)    Post-Tx Position: Supine with HOB Elevated and Call light and personal items within reach  PPE: gloves

## 2023-06-05 NOTE — PLAN OF CARE
Problem: Adult Inpatient Plan of Care  Goal: Plan of Care Review  Outcome: Ongoing, Progressing  Flowsheets (Taken 6/5/2023 0221 by Crys Aguirre, RN)  Plan of Care Reviewed With: patient  Goal: Patient-Specific Goal (Individualized)  Outcome: Ongoing, Progressing  Goal: Absence of Hospital-Acquired Illness or Injury  Outcome: Ongoing, Progressing  Intervention: Identify and Manage Fall Risk  Recent Flowsheet Documentation  Taken 6/5/2023 0800 by Kareen Prakash RN  Safety Promotion/Fall Prevention:   activity supervised   assistive device/personal items within reach   clutter free environment maintained   fall prevention program maintained   lighting adjusted   nonskid shoes/slippers when out of bed   room organization consistent   safety round/check completed  Intervention: Prevent Skin Injury  Recent Flowsheet Documentation  Taken 6/5/2023 0800 by Kareen Prakash RN  Body Position: position changed independently  Skin Protection:   adhesive use limited   incontinence pads utilized   transparent dressing maintained   tubing/devices free from skin contact  Intervention: Prevent and Manage VTE (Venous Thromboembolism) Risk  Recent Flowsheet Documentation  Taken 6/5/2023 0800 by Kareen Prakash RN  Activity Management: ambulated in room  VTE Prevention/Management: (eliquis) other (see comments)  Intervention: Prevent Infection  Recent Flowsheet Documentation  Taken 6/5/2023 0800 by Kareen Prakash RN  Infection Prevention:   personal protective equipment utilized   hand hygiene promoted  Goal: Optimal Comfort and Wellbeing  Outcome: Ongoing, Progressing  Intervention: Provide Person-Centered Care  Recent Flowsheet Documentation  Taken 6/5/2023 0800 by Kareen Prakash RN  Trust Relationship/Rapport:   care explained   choices provided   emotional support provided   questions answered   questions encouraged   thoughts/feelings acknowledged  Goal: Readiness for Transition of Care  Outcome: Ongoing, Progressing      Problem: Fall Injury Risk  Goal: Absence of Fall and Fall-Related Injury  Outcome: Ongoing, Progressing  Intervention: Identify and Manage Contributors  Recent Flowsheet Documentation  Taken 6/5/2023 0800 by Kareen Prakash RN  Medication Review/Management:   medications reviewed   high-risk medications identified  Self-Care Promotion: independence encouraged  Intervention: Promote Injury-Free Environment  Recent Flowsheet Documentation  Taken 6/5/2023 0800 by Kareen Prakash RN  Safety Promotion/Fall Prevention:   activity supervised   assistive device/personal items within reach   clutter free environment maintained   fall prevention program maintained   lighting adjusted   nonskid shoes/slippers when out of bed   room organization consistent   safety round/check completed     Problem: Heart Failure Comorbidity  Goal: Maintenance of Heart Failure Symptom Control  Outcome: Ongoing, Progressing  Intervention: Maintain Heart Failure-Management  Recent Flowsheet Documentation  Taken 6/5/2023 0800 by Kareen Prakash RN  Medication Review/Management:   medications reviewed   high-risk medications identified     Problem: Heart Failure Comorbidity  Goal: Maintenance of Heart Failure Symptom Control  Outcome: Ongoing, Progressing  Intervention: Maintain Heart Failure-Management  Recent Flowsheet Documentation  Taken 6/5/2023 0800 by Kareen Prakash RN  Medication Review/Management:   medications reviewed   high-risk medications identified     Problem: Hypertension Comorbidity  Goal: Blood Pressure in Desired Range  Outcome: Ongoing, Progressing  Intervention: Maintain Blood Pressure Management  Recent Flowsheet Documentation  Taken 6/5/2023 0800 by Kareen Prakash RN  Syncope Management: position changed slowly  Medication Review/Management:   medications reviewed   high-risk medications identified     Problem: Obstructive Sleep Apnea Risk or Actual Comorbidity Management  Goal: Unobstructed Breathing During  Sleep  Outcome: Ongoing, Progressing     Problem: Osteoarthritis Comorbidity  Goal: Maintenance of Osteoarthritis Symptom Control  Outcome: Ongoing, Progressing  Intervention: Maintain Osteoarthritis Symptom Control  Recent Flowsheet Documentation  Taken 6/5/2023 0800 by Kareen Prakash RN  Activity Management: ambulated in room  Assistive Device Utilized: walker  Medication Review/Management:   medications reviewed   high-risk medications identified     Problem: Pain Chronic (Persistent) (Comorbidity Management)  Goal: Acceptable Pain Control and Functional Ability  Outcome: Ongoing, Progressing  Intervention: Manage Persistent Pain  Recent Flowsheet Documentation  Taken 6/5/2023 0800 by Kareen Prakash RN  Medication Review/Management:   medications reviewed   high-risk medications identified  Intervention: Optimize Psychosocial Wellbeing  Recent Flowsheet Documentation  Taken 6/5/2023 0800 by Kareen Prakash RN  Supportive Measures:   active listening utilized   self-care encouraged  Diversional Activities: television  Family/Support System Care:   self-care encouraged   support provided   Goal Outcome Evaluation:

## 2023-06-05 NOTE — PLAN OF CARE
Goal Outcome Evaluation:  Plan of Care Reviewed With: patient        Progress: improving  Outcome Evaluation: Awaiting MRI. Patient reported several days without BM. prn Miralx adm. Patient declines further interventions at present. States Miralax is usually eff. for her.

## 2023-06-05 NOTE — CASE MANAGEMENT/SOCIAL WORK
Discharge Planning Assessment  [facility]     Patient Name: Domonique See  MRN: [id number]  Today's Date: 6/5/2023    Admit Date: 6/2/2023    Plan: D/C plan: Return home with family. Denied OP Therapy Recommendation   Discharge Needs Assessment       Row Name 06/05/23 1402       Living Environment    People in Home child(su), adult    Name(s) of People in Home Bo[place]    Current Living Arrangements home    Potentially Unsafe Housing Conditions none    Primary Care Provided by self    Provides Primary Care For no one    Family Caregiver if Needed child(su), adult    Family Caregiver Names Boone    Quality of Family Relationships helpful;involved;supportive    Able to Return to Prior Arrangements yes       Resource/Environmental Concerns    Resource/Environmental Concerns none    Transportation Concerns none       Food Insecurity    Within the past 12 months, you worried that your food would run out before you got the money to buy more. Never true    Within the past 12 months, the food you bought just didn't last and you didn't have money to get more. Never true       Transition Planning    Patient/Family Anticipates Transition to home with family    Patient/Family Anticipated Services at Transition none    Transportation Anticipated family or friend will provide       Discharge Needs Assessment    Readmission Within the Last 30 Days no previous admission in last 30 days    Equipment Currently Used at Home walker, standard;walker, rolling;bp cuff;pulse ox;shower chair;cpap    Concerns to be Addressed denies needs/concerns at this time    Anticipated Changes Related to Illness none    Equipment Needed After Discharge none    Provided Post Acute Provider List? N/A    Provided Post Acute Provider Quality & Resource List? N/A                   Discharge Plan       Row Name 06/05/23 1403       Plan    Plan D/C plan: Return home with family. Denied OP Therapy Recommendation    Provided Post Acute Provider List? N/A     Provided Post Acute Provider Quality & Resource List? N/A    Plan Comments CM spoke with patient at bedside to discuss admission assessment and discharge planning. Patient confirms PCP and pharmacy. Patient already enrolled in meds to bed program. Patient denies any difficulty affording medications at this time. Patient denies any additional needs for services or DME at this time. The patient's son can transport at time of discharge. CM reviewed the IMM with the patient and the patient denied a copy. CM discussed the PT recommendation of OP therapy and the patient denied. The patient asked is she would be able to get a rollator through insurance. CM sent a referral to [facility] and talked to the liaison, who will look into it. D/C barries: 2 L NC O2.                  Continued Care and Services - Admitted Since 6/2/2023       Durable Medical Equipment       Service Provider Request Status Selected Services Address Phone Fax Patient Preferred    [facility] Pending - Request Sent N/A [address] [id number] [phone] --                           Expected Discharge Date and Time       Expected Discharge Date Expected Discharge Time    Jun 5, 2023            Demographic Summary       Row Name 06/05/23 1401       General Information    Admission Type inpatient    Arrived From emergency department    Required Notices Provided Important Message from Medicare    Referral Source admission list    Reason for Consult discharge planning    Preferred Language English       Contact Information    Permission Granted to Share Info With                    Functional Status       Row Name 06/05/23 1402       Functional Status    Usual Activity Tolerance good    Current Activity Tolerance good       Functional Status, IADL    Medications independent    Meal Preparation independent    Housekeeping independent    Laundry independent    Shopping independent       Mental  Status    General Appearance WDL WDL       Mental Status Summary    Recent Changes in Mental Status/Cognitive Functioning no changes       Employment/    Employment Status retired;, previous service           Current or Previous  Service none                 Met with patient in room wearing PPE: mask, room.       Anu Rivas RN     [address] [address]  Phone: [phone]  Fax: [phone]

## 2023-06-05 NOTE — PROGRESS NOTES
LOS: 1 day   Patient Care Team:  Anish Lew FNP as PCP - General  Evgeny Gregory MD as Consulting Physician (Cardiology)  Leeanne Pleitez MD as Consulting Physician (Cardiology)  Radha Owusu APRN as Nurse Practitioner (Nurse Practitioner)  Kathe Muñoz MD as Consulting Physician (Nephrology)    Subjective     Patient is feeling better and feels like she is back to baseline    Review of Systems   Constitutional:  Positive for activity change and fatigue.   HENT: Negative.     Respiratory: Negative.     Cardiovascular: Negative.    Gastrointestinal: Negative.    Genitourinary: Negative.    Musculoskeletal: Negative.    Neurological:  Positive for dizziness and weakness.   Psychiatric/Behavioral: Negative.           Objective     Vital Signs  Temp:  [97.5 °F (36.4 °C)-98.7 °F (37.1 °C)] 98.2 °F (36.8 °C)  Heart Rate:  [60] 60  Resp:  [13-19] 15  BP: (107-155)/(45-78) 107/45      Physical Exam  Vitals reviewed.   Constitutional:       Appearance: She is not ill-appearing.   HENT:      Head: Normocephalic and atraumatic.      Right Ear: External ear normal.      Left Ear: External ear normal.      Nose: Nose normal.      Mouth/Throat:      Mouth: Mucous membranes are moist.   Eyes:      General:         Right eye: No discharge.         Left eye: No discharge.   Cardiovascular:      Rate and Rhythm: Normal rate and regular rhythm.      Pulses: Normal pulses.      Heart sounds: Normal heart sounds.   Pulmonary:      Effort: Pulmonary effort is normal.      Breath sounds: Normal breath sounds.   Abdominal:      General: Bowel sounds are normal.      Palpations: Abdomen is soft.   Musculoskeletal:         General: Normal range of motion.      Cervical back: Normal range of motion.   Skin:     General: Skin is warm.      Coloration: Skin is pale.   Neurological:      Mental Status: She is alert and oriented to person, place, and time.   Psychiatric:         Behavior: Behavior normal.             Results Review:    Lab Results (last 24 hours)       ** No results found for the last 24 hours. **             Imaging Results (Last 24 Hours)       ** No results found for the last 24 hours. **                 I reviewed the patient's new clinical results.    Medication Review:   Scheduled Meds:amiodarone, 200 mg, Oral, BID  apixaban, 5 mg, Oral, BID  aspirin, 81 mg, Oral, Daily  atorvastatin, 40 mg, Oral, Nightly  famotidine, 40 mg, Oral, Daily  levETIRAcetam, 250 mg, Oral, Q12H  levothyroxine, 25 mcg, Oral, Q AM  senna-docusate sodium, 2 tablet, Oral, BID  sertraline, 100 mg, Oral, Q PM  sodium chloride, 10 mL, Intravenous, Q12H      Continuous Infusions:   PRN Meds:.  acetaminophen    senna-docusate sodium **AND** polyethylene glycol **AND** bisacodyl **AND** bisacodyl    ipratropium-albuterol    melatonin    nitroglycerin    ondansetron    oxyCODONE    [COMPLETED] Insert Peripheral IV **AND** sodium chloride    [COMPLETED] Insert Peripheral IV **AND** sodium chloride    sodium chloride    sodium chloride     Interval History:    Assessment & Plan     Altered mental status, unspecified altered mental  Vertigo/confusion/ataxia  -CT head negative for acute changes, except for left frontal and Cefol nausea from previous stroke  -MRI head today  -Possible localization-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 Olvera MRI  Persistent atrial fibrillation  Mood disorder          DVT prophylaxis: Eliquis  GI prophylaxis: Pepcid      Plan for disposition: Home    Zoey Sadleraver, APRN  06/05/23  10:49 EDT

## 2023-06-06 ENCOUNTER — READMISSION MANAGEMENT (OUTPATIENT)
Dept: CALL CENTER | Facility: HOSPITAL | Age: 83
End: 2023-06-06
Payer: MEDICARE

## 2023-06-06 ENCOUNTER — APPOINTMENT (OUTPATIENT)
Dept: MRI IMAGING | Facility: HOSPITAL | Age: 83
End: 2023-06-06
Payer: MEDICARE

## 2023-06-06 VITALS
DIASTOLIC BLOOD PRESSURE: 48 MMHG | SYSTOLIC BLOOD PRESSURE: 104 MMHG | BODY MASS INDEX: 35.36 KG/M2 | HEART RATE: 60 BPM | TEMPERATURE: 98.2 F | WEIGHT: 180.12 LBS | HEIGHT: 60 IN | RESPIRATION RATE: 14 BRPM | OXYGEN SATURATION: 95 %

## 2023-06-06 PROCEDURE — 72148 MRI LUMBAR SPINE W/O DYE: CPT

## 2023-06-06 PROCEDURE — 70551 MRI BRAIN STEM W/O DYE: CPT

## 2023-06-06 RX ORDER — LEVOTHYROXINE SODIUM 0.03 MG/1
25 TABLET ORAL
Qty: 30 TABLET | Refills: 1 | Status: SHIPPED | OUTPATIENT
Start: 2023-06-07

## 2023-06-06 RX ORDER — LEVETIRACETAM 250 MG/1
250 TABLET ORAL EVERY 12 HOURS SCHEDULED
Qty: 60 TABLET | Refills: 1 | Status: SHIPPED | OUTPATIENT
Start: 2023-06-06

## 2023-06-06 RX ADMIN — LEVETIRACETAM 250 MG: 250 TABLET, FILM COATED ORAL at 08:09

## 2023-06-06 RX ADMIN — AMIODARONE HYDROCHLORIDE 200 MG: 200 TABLET ORAL at 08:09

## 2023-06-06 RX ADMIN — APIXABAN 5 MG: 5 TABLET, FILM COATED ORAL at 08:09

## 2023-06-06 RX ADMIN — SENNOSIDES AND DOCUSATE SODIUM 2 TABLET: 50; 8.6 TABLET ORAL at 08:09

## 2023-06-06 RX ADMIN — OXYCODONE 10 MG: 5 TABLET ORAL at 08:09

## 2023-06-06 RX ADMIN — Medication 10 ML: at 08:09

## 2023-06-06 RX ADMIN — LEVOTHYROXINE SODIUM 25 MCG: 0.03 TABLET ORAL at 05:42

## 2023-06-06 RX ADMIN — FAMOTIDINE 40 MG: 20 TABLET, FILM COATED ORAL at 08:09

## 2023-06-06 RX ADMIN — OXYCODONE 10 MG: 5 TABLET ORAL at 12:05

## 2023-06-06 RX ADMIN — OXYCODONE 10 MG: 5 TABLET ORAL at 02:34

## 2023-06-06 RX ADMIN — ASPIRIN 81 MG: 81 TABLET, COATED ORAL at 08:09

## 2023-06-06 NOTE — PLAN OF CARE
Problem: Adult Inpatient Plan of Care  Goal: Plan of Care Review  Outcome: Ongoing, Progressing  Goal: Patient-Specific Goal (Individualized)  Outcome: Ongoing, Progressing  Goal: Absence of Hospital-Acquired Illness or Injury  Outcome: Ongoing, Progressing  Intervention: Identify and Manage Fall Risk  Recent Flowsheet Documentation  Taken 6/6/2023 1600 by Debra Ferrara RN  Safety Promotion/Fall Prevention:   assistive device/personal items within reach   clutter free environment maintained   fall prevention program maintained   nonskid shoes/slippers when out of bed   room organization consistent   safety round/check completed  Taken 6/6/2023 1400 by Debra Ferrara RN  Safety Promotion/Fall Prevention: safety round/check completed  Taken 6/6/2023 1204 by Debra Ferrara RN  Safety Promotion/Fall Prevention:   assistive device/personal items within reach   clutter free environment maintained   fall prevention program maintained   nonskid shoes/slippers when out of bed   room organization consistent   safety round/check completed  Taken 6/6/2023 1200 by Debra Ferrara RN  Safety Promotion/Fall Prevention: safety round/check completed  Taken 6/6/2023 1000 by Debra Ferrara RN  Safety Promotion/Fall Prevention: safety round/check completed  Taken 6/6/2023 0809 by Debra Ferrara RN  Safety Promotion/Fall Prevention:   assistive device/personal items within reach   clutter free environment maintained   fall prevention program maintained   nonskid shoes/slippers when out of bed   room organization consistent   safety round/check completed  Taken 6/6/2023 0800 by Debra Ferrara RN  Safety Promotion/Fall Prevention: safety round/check completed  Intervention: Prevent Skin Injury  Recent Flowsheet Documentation  Taken 6/6/2023 1600 by Debra Ferrara RN  Body Position: position changed independently  Skin Protection: adhesive use limited  Taken 6/6/2023 1204 by Debra Ferrara RN  Body  Position: position changed independently  Skin Protection: adhesive use limited  Taken 6/6/2023 0809 by Debra Ferrara RN  Body Position: position changed independently  Intervention: Prevent and Manage VTE (Venous Thromboembolism) Risk  Recent Flowsheet Documentation  Taken 6/6/2023 1600 by Debra Ferrara RN  Activity Management: activity encouraged  Taken 6/6/2023 1204 by Debra Ferrara RN  Activity Management: activity encouraged  Taken 6/6/2023 0809 by Debra Ferrara RN  Activity Management: activity encouraged  Intervention: Prevent Infection  Recent Flowsheet Documentation  Taken 6/6/2023 1600 by Debra Ferrara RN  Infection Prevention: hand hygiene promoted  Taken 6/6/2023 1204 by Debra Ferrara RN  Infection Prevention: hand hygiene promoted  Taken 6/6/2023 0809 by Debra Ferrara RN  Infection Prevention: hand hygiene promoted  Goal: Optimal Comfort and Wellbeing  Outcome: Ongoing, Progressing  Intervention: Monitor Pain and Promote Comfort  Recent Flowsheet Documentation  Taken 6/6/2023 0809 by Debra Ferrara RN  Pain Management Interventions:   see MAR   quiet environment facilitated  Goal: Readiness for Transition of Care  Outcome: Ongoing, Progressing   Goal Outcome Evaluation:      Patient had a MRI of the lumbar and the head. The patient did not have any other tests or procedures. The patient remains on room air. The patient complained of pain in her back and was given PRN pain medications. See MAR. The patient is discharging home. Will continue to monitor.

## 2023-06-06 NOTE — OUTREACH NOTE
Prep Survey      Flowsheet Row Responses   Congregational facility patient discharged from? [facility]   Is LACE score < 7 ? No   Eligibility Readm Mgmt   Discharge diagnosis Altered mental status   Does the patient have one of the following disease processes/diagnoses(primary or secondary)? Other   Does the patient have Home health ordered? No   Is there a DME ordered? No   Prep survey completed? Yes            Aspen CORLEY - Registered Nurse

## 2023-06-06 NOTE — PROGRESS NOTES
LOS: 2 days   Patient Care Team:  Anish Lew FNP as PCP - General  Evgeny Gregory MD as Consulting Physician (Cardiology)  Leeanne Pleitez MD as Consulting Physician (Cardiology)  Radha Owusu APRN as Nurse Practitioner (Nurse Practitioner)  Kathe Muñoz MD as Consulting Physician (Nephrology)    Subjective     Patient is feeling better and feels like she is back to baseline    Review of Systems   Constitutional:  Positive for activity change and fatigue.   HENT: Negative.     Respiratory: Negative.     Cardiovascular: Negative.    Gastrointestinal: Negative.    Genitourinary: Negative.    Musculoskeletal: Negative.    Neurological:  Positive for dizziness and weakness.   Psychiatric/Behavioral: Negative.           Objective     Vital Signs  Temp:  [98 °F (36.7 °C)-98.4 °F (36.9 °C)] 98.2 °F (36.8 °C)  Heart Rate:  [60-62] 60  Resp:  [14-16] 14  BP: (104-124)/(48-67) 104/48      Physical Exam  Vitals reviewed.   Constitutional:       Appearance: She is not ill-appearing.   HENT:      Head: Normocephalic and atraumatic.      Right Ear: External ear normal.      Left Ear: External ear normal.      Nose: Nose normal.      Mouth/Throat:      Mouth: Mucous membranes are moist.   Eyes:      General:         Right eye: No discharge.         Left eye: No discharge.   Cardiovascular:      Rate and Rhythm: Normal rate and regular rhythm.      Pulses: Normal pulses.      Heart sounds: Normal heart sounds.   Pulmonary:      Effort: Pulmonary effort is normal.      Breath sounds: Normal breath sounds.   Abdominal:      General: Bowel sounds are normal.      Palpations: Abdomen is soft.   Musculoskeletal:         General: Normal range of motion.      Cervical back: Normal range of motion.   Skin:     General: Skin is warm.      Coloration: Skin is pale.   Neurological:      Mental Status: She is alert and oriented to person, place, and time.   Psychiatric:         Behavior: Behavior  normal.            Results Review:    Lab Results (last 24 hours)       ** No results found for the last 24 hours. **             Imaging Results (Last 24 Hours)       Procedure Component Value Units Date/Time    MRI Brain Without Contrast [[id number]] Resulted: 06/06/23 1458     Updated: 06/06/23 1507    MRI Lumbar Spine Without Contrast [[id number]] Resulted: 06/06/23 1506     Updated: 06/06/23 1506                 I reviewed the patient's new clinical results.    Medication Review:   Scheduled Meds:amiodarone, 200 mg, Oral, BID  apixaban, 5 mg, Oral, BID  aspirin, 81 mg, Oral, Daily  atorvastatin, 40 mg, Oral, Nightly  famotidine, 40 mg, Oral, Daily  levETIRAcetam, 250 mg, Oral, Q12H  levothyroxine, 25 mcg, Oral, Q AM  senna-docusate sodium, 2 tablet, Oral, BID  sertraline, 100 mg, Oral, Q PM  sodium chloride, 10 mL, Intravenous, Q12H      Continuous Infusions:   PRN Meds:.  acetaminophen    senna-docusate sodium **AND** polyethylene glycol **AND** bisacodyl **AND** bisacodyl    ipratropium-albuterol    melatonin    nitroglycerin    ondansetron    oxyCODONE    [COMPLETED] Insert Peripheral IV **AND** sodium chloride    [COMPLETED] Insert Peripheral IV **AND** sodium chloride    sodium chloride    sodium chloride     Interval History:    Assessment & Plan     Altered mental status, unspecified altered mental  Vertigo/confusion/ataxia  -CT head negative for acute changes, except for left frontal and Cefol nausea from previous stroke  -MRI head today  -Possible localization-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 Olvera MRI  Persistent atrial fibrillation  Mood disorder          DVT prophylaxis: Eliquis  GI prophylaxis: Pepcid      Plan for disposition: Home has follow up  appointment with Vip[facility] 6/13 at 1pm    Zoey Acosta, WOODY  06/06/23  15:15 EDT

## 2023-06-06 NOTE — PLAN OF CARE
Goal Outcome Evaluation:  Plan of Care Reviewed With: patient   Patient resting abed with no distress observed.  She has had complaint of headache and backache but she states that is not anything that is a new onset.  She is alert and oriented and is able to voice her wants and needs to the staff. She tolerates medication well.  Appetite is adequate and she is adequately hydrated.  She has no concerns at this time.     Progress: improving

## 2023-06-07 NOTE — CASE MANAGEMENT/SOCIAL WORK
Case Management Discharge Note      Final Note: Home with RWx provided by [facility]    Provided Post Acute Provider List?: N/A  Provided Post Acute Provider Quality & Resource List?: N/A    Selected Continued Care - Discharged on 6/6/2023 Admission date: 6/2/2023 - Discharge disposition: Home or Self Care        Durable Medical Equipment       Service Provider Selected Services Address Phone Fax Patient Preferred    [facility] Medical [facility] [address] [facility] [address] [id number] [phone] --                   Transportation Services  Private: Car    Final Discharge Disposition Code: 01 - home or self-care

## 2023-06-08 NOTE — DISCHARGE SUMMARY
"  Date of Discharge:  6/6/23023    Discharge Diagnosis:   Mental status unspecified  Vertigo  Confusion  Ataxia  Vitamin B12 elevated  Urinary tract infection  New acquired hypothyroid  Low back pain  Persistent atrial fibs  Mood disorder    Presenting Problem/History of Present Illness  Active Hospital Problems    Diagnosis  POA    **Altered mental status, unspecified altered mental status type [R41.82]  Yes      Resolved Hospital Problems   No resolved problems to display.          Hospital Course    Patient is a 83 y.o. female presented with history of hypertrophic cardiomyopathy, dual-chamber pacemaker, and atrial fibrillation   who presented with sudden onset vertigo, confusion, and ataxia.  She has a history of vertigo, but feels that the sensation of dizzy weakness is different from before. She has an associated unusual weird feeling in her head, and felt \"off\".  The patient reports that she was talking and then suddenly her brain \"stopped\" and went blank for a few seconds. CT head 6/2/23 neg for acute changes, except for left frontal encephalomalacia from previous stroke.  Neurology was consulted and differential diagnosis seizure from apnea genic focus from gliosis/encephalomalacic changes from left frontal stroke which would account for the transient aphasia, confusion and memory impairment.  Started on Keppra Genter 50 mg twice daily and seizure can talk precautions.  Is encouraged to continue Eliquis and aspirin.  MRI brain was negative for any acute ischemia and she did go ahead and have CT lumbar spine due to back pain.  She has a follow-up appointment with PCP.  She is agreeable to this plan and discharge.    Procedures Performed         Consults:   Consults       Date and Time Order Name Status Description    6/3/2023 11:36 AM Inpatient Neurology Consult General Completed             Pertinent Test Results:    Lab Results (most recent)       Procedure Component Value Units Date/Time    Vitamin B12 " [[id number]]  (Abnormal) Collected: 06/03/23 1106    Specimen: Blood Updated: 06/03/23 1810     Vitamin B-12 1,076 pg/mL     Narrative:      Results may be falsely increased if patient taking Biotin.      Urine Culture - Urine, Urine, Clean Catch [[id number]] Collected: 06/02/23 1221    Specimen: Urine, Clean Catch Updated: 06/03/23 1402     Urine Culture 25,000 CFU/mL Mixed Bere Isolated    Narrative:      Specimen contains mixed organisms of questionable pathogenicity suggestive of contamination. If symptoms persist, suggest recollection.  Colonization of the urinary tract without infection is common. Treatment is discouraged unless the patient is symptomatic, pregnant, or undergoing an invasive urologic procedure.    Lipid Panel [[id number]] Collected: 06/02/23 1414    Specimen: Blood Updated: 06/02/23 1914     Total Cholesterol 82 mg/dL      Triglycerides 94 mg/dL      HDL Cholesterol 45 mg/dL      LDL Cholesterol  19 mg/dL      VLDL Cholesterol 18 mg/dL      LDL/HDL Ratio 0.40    Narrative:      Cholesterol Reference Ranges  (U.S. Department of Health and Human Services ATP III Classifications)    Desirable          <200 mg/dL  Borderline High    200-239 mg/dL  High Risk          >240 mg/dL      Triglyceride Reference Ranges  (U.S. Department of Health and Human Services ATP III Classifications)    Normal           <150 mg/dL  Borderline High  150-199 mg/dL  High             200-499 mg/dL  Very High        >500 mg/dL    HDL Reference Ranges  (U.S. Department of Health and Human Services ATP III Classifications)    Low     <40 mg/dl (major risk factor for CHD)  High    >60 mg/dl ('negative' risk factor for CHD)        LDL Reference Ranges  (U.S. Department of Health and Human Services ATP III Classifications)    Optimal          <100 mg/dL  Near Optimal     100-129 mg/dL  Borderline High  130-159 mg/dL  High             160-189 mg/dL  Very High        >189 mg/dL    TSH [[id number]]  (Abnormal) Collected: 06/02/23  1414    Specimen: Blood Updated: 06/02/23 1902     TSH 9.420 uIU/mL     T4, Free [[id number]]  (Abnormal) Collected: 06/02/23 1414    Specimen: Blood Updated: 06/02/23 1902     Free T4 0.86 ng/dL     Narrative:      Results may be falsely increased if patient taking Biotin.      High Sensitivity Troponin T 2Hr [[id number]]  (Abnormal) Collected: 06/02/23 1414    Specimen: Blood Updated: 06/02/23 1442     HS Troponin T 11 ng/L      Troponin T Delta -1 ng/L     Narrative:      High Sensitive Troponin T Reference Range:  <10.0 ng/L- Negative Female for AMI  <15.0 ng/L- Negative Male for AMI  >=10 - Abnormal Female indicating possible myocardial injury.  >=15 - Abnormal Male indicating possible myocardial injury.   Clinicians would have to utilize clinical acumen, EKG, Troponin, and serial changes to determine if it is an Acute Myocardial Infarction or myocardial injury due to an underlying chronic condition.         Urine Drug Screen - Urine, Clean Catch [[id number]]  (Abnormal) Collected: 06/02/23 1221    Specimen: Urine, Clean Catch Updated: 06/02/23 1257     Amphet/Methamphet, Screen Negative     Barbiturates Screen, Urine Negative     Benzodiazepine Screen, Urine Negative     Cocaine Screen, Urine Negative     Opiate Screen Negative     THC, Screen, Urine Positive     Methadone Screen, Urine Negative     Oxycodone Screen, Urine Positive    Narrative:      Negative Thresholds Per Drugs Screened:    Amphetamines                 500 ng/ml  Barbiturates                 200 ng/ml  Benzodiazepines              100 ng/ml  Cocaine                      300 ng/ml  Methadone                    300 ng/ml  Opiates                      300 ng/ml  Oxycodone                    100 ng/ml  THC                           50 ng/ml    The Normal Value for all drugs tested is negative. This report includes final unconfirmed screening results to be used for medical treatment purposes only. Unconfirmed results must not be used for non-medical  purposes such as employment or legal testing. Clinical consideration should be applied to any drug of abuse test, particularly when unconfirmed results are used.          All urine drugs of abuse requests without chain of custody are for medical screening purposes only.  False positives are possible.      Comprehensive Metabolic Panel [[id number]]  (Abnormal) Collected: 06/02/23 1209    Specimen: Blood Updated: 06/02/23 1253     Glucose 99 mg/dL      BUN 16 mg/dL      Creatinine 0.91 mg/dL      Sodium 134 mmol/L      Potassium 4.0 mmol/L      Comment: Slight hemolysis detected by analyzer. Results may be affected.        Chloride 102 mmol/L      CO2 20.0 mmol/L      Calcium 8.7 mg/dL      Total Protein 5.8 g/dL      Albumin 3.7 g/dL      ALT (SGPT) 30 U/L      AST (SGOT) 29 U/L      Comment: Slight hemolysis detected by analyzer. Results may be affected.        Alkaline Phosphatase 83 U/L      Total Bilirubin 0.5 mg/dL      Globulin 2.1 gm/dL      A/G Ratio 1.8 g/dL      BUN/Creatinine Ratio 17.6     Anion Gap 12.0 mmol/L      eGFR 62.3 mL/min/1.73     Narrative:      GFR Normal >60  Chronic Kidney Disease <60  Kidney Failure <15    The GFR formula is only valid for adults with stable renal function between ages 18 and 70.    Magnesium [[id number]]  (Normal) Collected: 06/02/23 1209    Specimen: Blood Updated: 06/02/23 1253     Magnesium 1.6 mg/dL     High Sensitivity Troponin T [[id number]]  (Abnormal) Collected: 06/02/23 1209    Specimen: Blood Updated: 06/02/23 1249     HS Troponin T 12 ng/L     Narrative:      High Sensitive Troponin T Reference Range:  <10.0 ng/L- Negative Female for AMI  <15.0 ng/L- Negative Male for AMI  >=10 - Abnormal Female indicating possible myocardial injury.  >=15 - Abnormal Male indicating possible myocardial injury.   Clinicians would have to utilize clinical acumen, EKG, Troponin, and serial changes to determine if it is an Acute Myocardial Infarction or myocardial injury due to an  underlying chronic condition.         Urinalysis, Microscopic Only - Urine, Clean Catch [[id number]]  (Abnormal) Collected: 06/02/23 1221    Specimen: Urine, Clean Catch Updated: 06/02/23 1247     RBC, UA 0-2 /HPF      WBC, UA 6-12 /HPF      Bacteria, UA Trace /HPF      Squamous Epithelial Cells, UA 0-2 /HPF      Transitional Epithelial Cells, UA 0-2 /HPF      Hyaline Casts, UA None Seen /LPF      Methodology Manual Light Microscopy    Urinalysis With Culture If Indicated - Urine, Clean Catch [[id number]]  (Abnormal) Collected: 06/02/23 1221    Specimen: Urine, Clean Catch Updated: 06/02/23 1239     Color, UA Yellow     Appearance, UA Clear     pH, UA 6.5     Specific Gravity, UA 1.011     Glucose, UA Negative     Ketones, UA Negative     Bilirubin, UA Negative     Blood, UA Moderate (2+)     Protein, UA Negative     Leuk Esterase, UA Trace     Nitrite, UA Negative     Urobilinogen, UA 1.0 E.U./dL    Narrative:      In absence of clinical symptoms, the presence of pyuria, bacteria, and/or nitrites on the urinalysis result does not correlate with infection.    [place] Draw [[id number]] Collected: 06/02/23 1129    Specimen: Blood Updated: 06/02/23 1230    Narrative:      The following orders were created for panel order [place] Draw.  Procedure                               Abnormality         Status                     ---------                               -----------         ------                     Green Top (Gel)[[id number]]                                  Final result               Lavender Top[[id number]]                                     Final result               Gold Top - SST[[id number]]                                   Final result               Light Blue Top[[id number]]                                   Final result                 Please view results for these tests on the individual orders.    Lavender Top [[id number]] Collected: 06/02/23 1129    Specimen: Blood Updated: 06/02/23 1230     Extra Tube  hold for add-on     Comment: Auto resulted       Light Blue Top [[id number]] Collected: 06/02/23 1129    Specimen: Blood Updated: 06/02/23 1230     Extra Tube Hold for add-ons.     Comment: Auto resulted       Gold Top - SST [[id number]] Collected: 06/02/23 1129    Specimen: Blood Updated: 06/02/23 1230     Extra Tube Hold for add-ons.     Comment: Auto resulted.       Green Top (Gel) [[id number]] Collected: 06/02/23 1129    Specimen: Blood Updated: 06/02/23 1223     Extra Tube hide    Ethanol [[id number]] Collected: 06/02/23 1129    Specimen: Blood Updated: 06/02/23 1159     Ethanol % <0.010 %     Narrative:      Plasma Ethanol Clinical Symptoms:    ETOH (%)               Clinical Symptom  .01-.05              No apparent influence  .03-.12              Euphoria, Diminished judgment and attention   .09-.25              Impaired comprehension, Muscle incoordination  .18-.30              Confusion, Staggered gait, Slurred speech  .25-.40              Markedly decreased response to stimuli, unable to stand or                        walk, vomitting, sleep or stupor  .35-.50              Comatose, Anesthesia, Subnormal body temperature        Acetaminophen Level [[id number]]  (Normal) Collected: 06/02/23 1129    Specimen: Blood Updated: 06/02/23 1159     Acetaminophen <5.0 mcg/mL     Narrative:      Acetaminophen Therapeutic Range  5-20 ug/mL      Hours after ingestion            Toxic Value    4 Hours                           150 ug/mL    8 Hours                            70 ug/mL   12 Hours                            40 ug/mL   16 Hours                            20 ug/mL    These values apply to a single ingestion only.     Salicylate Level [[id number]]  (Normal) Collected: 06/02/23 1129    Specimen: Blood Updated: 06/02/23 1159     Salicylate <0.3 mg/dL     aPTT [[id number]]  (Abnormal) Collected: 06/02/23 1129    Specimen: Blood Updated: 06/02/23 1159     PTT 31.8 seconds     Protime-INR [[id number]]  (Normal) Collected:  06/02/23 1129    Specimen: Blood Updated: 06/02/23 1159     Protime 10.3 Seconds      INR 0.96    CBC & Differential [[id number]]  (Abnormal) Collected: 06/02/23 1129    Specimen: Blood Updated: 06/02/23 1140    Narrative:      The following orders were created for panel order CBC & Differential.  Procedure                               Abnormality         Status                     ---------                               -----------         ------                     CBC Auto Differential[[id number]]        Abnormal            Final result                 Please view results for these tests on the individual orders.    CBC Auto Differential [[id number]]  (Abnormal) Collected: 06/02/23 1129    Specimen: Blood Updated: 06/02/23 1140     WBC 4.40 10*3/mm3      RBC 3.99 10*6/mm3      Hemoglobin 12.1 g/dL      Hematocrit 36.8 %      MCV 92.4 fL      MCH 30.4 pg      MCHC 32.9 g/dL      RDW 16.0 %      RDW-SD 54.7 fl      MPV 8.9 fL      Platelets 205 10*3/mm3      Neutrophil % 73.0 %      Lymphocyte % 16.9 %      Monocyte % 8.4 %      Eosinophil % 1.0 %      Basophil % 0.7 %      Neutrophils, Absolute 3.20 10*3/mm3      Lymphocytes, Absolute 0.70 10*3/mm3      Monocytes, Absolute 0.40 10*3/mm3      Eosinophils, Absolute 0.00 10*3/mm3      Basophils, Absolute 0.00 10*3/mm3      nRBC 0.1 /100 WBC              Results for orders placed during the hospital encounter of 01/18/23    Adult Transthoracic Echo Complete w/ Color, Spectral and Contrast if necessary per protocol    Interpretation Summary    Estimated right ventricular systolic pressure from tricuspid regurgitation is markedly elevated (>55 mmHg).      Normal LV size and contractility EF of 60 to 65%  Severe right ventricular and right atrial enlargement, moderate left atrial enlargement seen  Pulmonic valve is not well visualized.  Aortic valve, mitral valve, tricuspid valve appears structurally normal, moderate mitral and mild aortic regurgitation seen.  No  pericardial effusion seen.  Proximal aorta appears normal in size.           Condition on Discharge:  Stable    Vital Signs         Physical Exam  Constitutional:       Appearance: She is not ill-appearing.   HENT:      Head: Normocephalic and atraumatic.      Right Ear: External ear normal.      Left Ear: External ear normal.      Nose: Nose normal.      Mouth/Throat:      Mouth: Mucous membranes are moist.   Eyes:      General:         Right eye: No discharge.         Left eye: No discharge.   Cardiovascular:      Rate and Rhythm: Normal rate and regular rhythm.      Pulses: Normal pulses.      Heart sounds: Normal heart sounds.   Pulmonary:      Effort: Pulmonary effort is normal.      Breath sounds: Normal breath sounds.   Abdominal:      General: Bowel sounds are normal.      Palpations: Abdomen is soft.   Musculoskeletal:         General: Normal range of motion.      Cervical back: Normal range of motion.   Skin:     General: Skin is warm and dry.      Coloration: Skin is not pale.   Neurological:      Mental Status: She is alert and oriented to person, place, and time.   Psychiatric:         Behavior: Behavior normal.            Discharge Disposition  Home or Self Care    Discharge Medications     Discharge Medications        New Medications        Instructions Start Date   levETIRAcetam 250 MG tablet  Commonly known as: KEPPRA   250 mg, Oral, Every 12 Hours Scheduled      levothyroxine 25 MCG tablet  Commonly known as: SYNTHROID, LEVOTHROID   25 mcg, Oral, Every Early Morning             Continue These Medications        Instructions Start Date   amiodarone 200 MG tablet  Commonly known as: PACERONE   TAKE 1 TABLET BY MOUTH TWICE DAILY      apixaban 5 MG tablet tablet  Commonly known as: ELIQUIS   5 mg, Oral, 2 Times Daily      aspirin 81 MG EC tablet   81 mg, Oral, Nightly      atorvastatin 40 MG tablet  Commonly known as: LIPITOR   40 mg, Oral, Nightly      ipratropium-albuterol 0.5-2.5 mg/3 ml  nebulizer  Commonly known as: DUO-NEB   3 mL, Nebulization, Every 4 Hours PRN      oxyCODONE-acetaminophen  MG per tablet  Commonly known as: PERCOCET   0.5 tablets, Oral, Every 4 Hours PRN      Ozempic (1 MG/DOSE) 4 MG/3ML solution pen-injector  Generic drug: Semaglutide (1 MG/DOSE)  Notes to patient: Take as directed.   1 mg, Subcutaneous, Weekly, On Fridays       sertraline 100 MG tablet  Commonly known as: ZOLOFT   1 tablet, Oral, Every Evening             Stop These Medications      metoprolol succinate XL 25 MG 24 hr tablet  Commonly known as: TOPROL-XL              Discharge Diet:   Diet Instructions       Diet: Cardiac Diets; Healthy Heart (2-3 Na+); Regular Texture (IDDSI 7); Thin (IDDSI 0)      Discharge Diet: Cardiac Diets    Cardiac Diet: Healthy Heart (2-3 Na+)    Texture: Regular Texture (IDDSI 7)    Fluid Consistency: Thin (IDDSI 0)            Activity at Discharge:   Activity Instructions       Gradually Increase Activity Until at Pre-Hospitalization Level              Follow-up Appointments  Future Appointments   Date Time Provider Department Center   12/4/2023  1:30 PM [facility] [place] [facility]ICE [facility]   12/4/2023  1:50 PM Evgeny Gregory MD [facility]     Additional Instructions for the Follow-ups that You Need to Schedule       Discharge Follow-up with PCP   As directed       Currently Documented PCP:    Anish Lew FNP    PCP Phone Number:    [phone]     Follow Up Details: 6/13 at 1pm                 Test Results Pending at Discharge       WOODY Kaufman  06/08/23  08:44 EDT    Time: Discharge 25 min

## 2023-06-16 ENCOUNTER — READMISSION MANAGEMENT (OUTPATIENT)
Dept: CALL CENTER | Facility: HOSPITAL | Age: 83
End: 2023-06-16
Payer: MEDICARE

## 2023-06-16 NOTE — OUTREACH NOTE
Medical Week 2 Survey      Flowsheet Row Responses   [facility] facility patient discharged from? [facility]   Does the patient have one of the following disease processes/diagnoses(primary or secondary)? Other   Week 2 attempt successful? No   Unsuccessful attempts Attempt 1            Kia CORLEY - Registered Nurse

## 2023-08-14 PROCEDURE — 93294 REM INTERROG EVL PM/LDLS PM: CPT | Performed by: INTERNAL MEDICINE

## 2023-08-14 PROCEDURE — 93296 REM INTERROG EVL PM/IDS: CPT | Performed by: INTERNAL MEDICINE

## 2023-12-02 NOTE — PROGRESS NOTES
Subjective:     Encounter Date:12/04/2023      Patient ID: Domonique See is a 83 y.o. female.    Chief Complaint and history of present illness:    Follow-up for hypertrophic cardiomyopathy, pacemaker, CHF, dyslipidemia     History of Present Illness       Ms. Domonique See  has PMH of     Hypertrophic cardiomyopathy, history of septal myomectomy,      nonsustained VT  hypertension  SSS, [employer] permanent pacemaker 3/16/2017  Paroxysmal atrial fibrillation,  [id number] SCORE   SAS2CG2-YISq Score: 8 (5/22/2023  3:37 PM)     CAD, cath 12/2017 30% LAD  CHF due to hypertrophic cardiomyopathy  Hyperlipidemia  COPD  History of DAYLIN 4/28/2023  Peptic ulcer disease  Back surgery, breast augmentation  Former smoker     Here for follow-up.  Patient denies any chest pain.  Has occasional edema which is improved with Bumex.     Patient's arterial blood pressure is 136/40, heart rate 60, O2 sat of 97% on room air..  BMI is over 30.     Chart review:  Underwent A-fib ablation 1/16/2023  Admitted 1/18/2023 with CHF    Echocardiogram 6/29/2021 reveals EF of 50%   Echocardiogram 1/19/2023 EF of 60 to 65% with severe right ventricular and right atrial enlargement moderate left atrial enlargement, moderate mitral regurgitation and mild aortic regurgitation.    Labs from 3/8/2022 revealed normal ESR and CRP.  A1c improved to 5.9 from 6.1 previously, TSH 3.9.  CMP with a potassium of 5.3 creatinine of 1.05 and GFR 52.  Normal CBC.  Lipid profile with cholesterol 124 triglycerides 73 HDL 57 LDL 52.Labs from 5/2/2023 revealed normal BMP        Assessment:        Hypertrophic cardiomyopathy, myomectomy, NSVT  Chronic HFpEF due to diastolic dysfunction from HCM  Hypertension  Dyslipidemia  Prediabetes  Paroxysmal atrial fibrillation on long-term Eliquis  Obesity with BMI over 30  CKD  Hyperkalemia        Recommendations and plan:        CHADVASC2 SCORE     XGQ0VW6-WZLx Score: 8 (11/25/2022 10:42 PM)  Patient has a high  CHADS2 Vascor of 8 due to female gender, age over 75, history of stroke, CHF, hypertension making it 8, will benefit from long-term anticoagulation to prevent thromboembolic events.  Continue Eliquis as tolerated.  Continue aspirin, Eliquis, atorvastatin, amiodarone  as tolerated.  Will decrease amiodarone to 200 daily.  Will check labs before visit.    Reviewed BMI over 30, counseled on weight loss diet and exercise.    Patient's pacemaker check is revealing normal function.  Will follow-up in device clinic.     Advised patient to follow-up with PMD for oxycodone and sertraline.        Lexiscan Cardiolite performed 4/7/2022 reviewed/interpreted by me reveals normal perfusion EF of 64%     Procedures      EKG done 4/28/2023 reveals a paced rhythm with rate of 60 bpm with incomplete left bundle branch block      Procedures    Copied text in this portion of the note has been reviewed and is accurate as of 12/4/2023  The following portions of the patient's history were reviewed and updated as appropriate: allergies, current medications, past family history, past medical history, past social history, past surgical history and problem list.    Assessment:         MDM       Diagnosis Plan   1. Paroxysmal atrial fibrillation  Comprehensive Metabolic Panel    TSH    Lipid Panel      2. Long term (current) use of anticoagulants  Comprehensive Metabolic Panel    TSH    Lipid Panel      3. Hypertrophic obstructive cardiomyopathy  Comprehensive Metabolic Panel    TSH    Lipid Panel      4. Dyslipidemia  Comprehensive Metabolic Panel    TSH    Lipid Panel      5. Obesity (BMI 30-39.9)  Comprehensive Metabolic Panel    TSH    Lipid Panel      6. Pacemaker  Comprehensive Metabolic Panel    TSH    Lipid Panel             Plan:               Past Medical History:  Past Medical History:   Diagnosis Date    Anemia     CHF (congestive heart failure)     Congenital heart disease     Coronary artery disease 2013    Deep vein thrombosis  (DVT) 11/20/2017    GERD (gastroesophageal reflux disease)     Heart murmur Don't know    Hx of hypertrophic cardiomyopathy     Hypertension     Peptic ulceration     Persistent atrial fibrillation 01/10/2023    Primary osteoarthritis of both knees 02/08/2021    Primary osteoarthritis of right knee 09/21/2020    Sleep apnea 2017     Past Surgical History:  Past Surgical History:   Procedure Laterality Date    ABDOMINAL HERNIA REPAIR      ABLATION OF DYSRHYTHMIC FOCUS  01-    BACK SURGERY      BREAST AUGMENTATION      CARDIAC CATHETERIZATION      CARDIAC ELECTROPHYSIOLOGY PROCEDURE N/A 01/16/2023    Procedure: Ablation atrial fibrillation and flutter, cryo Duong and Ramón aware;  Surgeon: Leeanne Pleitez MD;  Location: [facility] CATH INVASIVE LOCATION;  Service: Cardiovascular;  Laterality: N/A;    CARDIAC PACEMAKER PLACEMENT  03/16/2017    Dual Chamber    CERVICAL RIB RESECTION Bilateral     CERVICAL RIB RESECTION Bilateral 1963    bilateral cervical ribs removed - extra ribs located and causing pain    CHOLECYSTECTOMY      COLONOSCOPY      CORONARY ARTERY BYPASS GRAFT  2013    THORACIC DECOMPRESSION POSTERIOR FUSION  06/04/2014    L3-5 & S1    UPPER GASTROINTESTINAL ENDOSCOPY        Allergies:  No Known Allergies  Home Meds:  Current Meds:     Current Outpatient Medications:     amiodarone (PACERONE) 200 MG tablet, Take 1 tablet by mouth Daily. Indications: Atrial Fibrillation, Disp: 90 tablet, Rfl: 3    apixaban (ELIQUIS) 5 MG tablet tablet, Take 1 tablet by mouth 2 (Two) Times a Day., Disp: 180 tablet, Rfl: 3    aspirin 81 MG EC tablet, Take 1 tablet by mouth Every Night., Disp: 90 tablet, Rfl: 3    atorvastatin (LIPITOR) 40 MG tablet, Take 1 tablet by mouth Every Night., Disp: 30 tablet, Rfl: 0    fluticasone (FLONASE) 50 MCG/ACT nasal spray, , Disp: , Rfl:     ipratropium-albuterol (DUO-NEB) 0.5-2.5 mg/3 ml nebulizer, Take 3 mL by nebulization Every 4 (Four) Hours As Needed for Wheezing., Disp: , Rfl:  "    levETIRAcetam (KEPPRA) 250 MG tablet, Take 1 tablet by mouth Every 12 (Twelve) Hours., Disp: 60 tablet, Rfl: 1    oxyCODONE-acetaminophen (PERCOCET) 7.5-325 MG per tablet, Take 1 tablet by mouth 6 (Six) Times a Day., Disp: , Rfl:     primidone (MYSOLINE) 50 MG tablet, Take 2 tablets by mouth Every Night., Disp: , Rfl:     Semaglutide, 1 MG/DOSE, (Ozempic, 1 MG/DOSE,) 4 MG/3ML solution pen-injector, Inject 1 mg under the skin into the appropriate area as directed 1 (One) Time Per Week. On , Disp: , Rfl:     sertraline (ZOLOFT) 100 MG tablet, Take 1 tablet by mouth Every Evening. Indications: Major Depressive Disorder, Disp: , Rfl:     Synthroid 75 MCG tablet, , Disp: , Rfl:   Social History:   Social History     Tobacco Use    Smoking status: Former     Packs/day: 1.00     Years: 8.00     Additional pack years: 0.00     Total pack years: 8.00     Types: Cigarettes     Start date: 1977     Quit date: 1985     Years since quittin.9     Passive exposure: Past    Smokeless tobacco: Never    Tobacco comments:     Quit    Substance Use Topics    Alcohol use: Never     Comment: 6 drinks a year in social settings      Family History:  Family History   Problem Relation Age of Onset    Colon cancer Mother     Colon cancer Brother     Heart attack Brother     Colon cancer Daughter               Review of Systems   Cardiovascular:  Positive for leg swelling. Negative for chest pain and palpitations.   Respiratory:  Negative for shortness of breath.    Neurological:  Positive for dizziness. Negative for numbness.     All other systems are negative         Objective:     Physical Exam  /40 (BP Location: Left arm, Patient Position: Sitting, Cuff Size: Large Adult)   Pulse 60   Ht 152.4 cm (60\")   Wt 79.4 kg (175 lb)   LMP  (LMP Unknown)   SpO2 97%   BMI 34.18 kg/m²   General:  Appears in no acute distress  Eyes: Sclera is anicteric,  conjunctiva is clear   HEENT:  No JVD.  No carotid " "bruits  Respiratory: Respirations regular and unlabored at rest.  Clear to auscultation  Cardiovascular: S1,S2 Regular rate and rhythm. .   Extremities: No digital clubbing or cyanosis, no edema  Skin: Color pink. Skin warm and dry to touch. No rashes  No xanthoma  Neuro: Alert and awake.    Lab Reviewed:         Evgeny Gregory MD  12/4/2023 14:18 EST      EMR Dragon/Transcription:   \"Dictated utilizing Dragon dictation\".        "

## 2023-12-04 ENCOUNTER — OFFICE VISIT (OUTPATIENT)
Dept: CARDIOLOGY | Facility: CLINIC | Age: 83
End: 2023-12-04
Payer: MEDICARE

## 2023-12-04 ENCOUNTER — CLINICAL SUPPORT NO REQUIREMENTS (OUTPATIENT)
Dept: CARDIOLOGY | Facility: CLINIC | Age: 83
End: 2023-12-04
Payer: MEDICARE

## 2023-12-04 VITALS
WEIGHT: 175 LBS | HEIGHT: 60 IN | BODY MASS INDEX: 34.36 KG/M2 | OXYGEN SATURATION: 97 % | HEART RATE: 60 BPM | SYSTOLIC BLOOD PRESSURE: 136 MMHG | DIASTOLIC BLOOD PRESSURE: 40 MMHG

## 2023-12-04 DIAGNOSIS — E66.9 OBESITY (BMI 30-39.9): ICD-10-CM

## 2023-12-04 DIAGNOSIS — Z79.01 LONG TERM (CURRENT) USE OF ANTICOAGULANTS: ICD-10-CM

## 2023-12-04 DIAGNOSIS — I42.1 HYPERTROPHIC OBSTRUCTIVE CARDIOMYOPATHY: ICD-10-CM

## 2023-12-04 DIAGNOSIS — Z95.0 PACEMAKER: ICD-10-CM

## 2023-12-04 DIAGNOSIS — I48.0 PAROXYSMAL ATRIAL FIBRILLATION: Primary | ICD-10-CM

## 2023-12-04 DIAGNOSIS — R00.1 BRADYCARDIA: Primary | ICD-10-CM

## 2023-12-04 DIAGNOSIS — E78.5 DYSLIPIDEMIA: ICD-10-CM

## 2023-12-04 PROCEDURE — 93280 PM DEVICE PROGR EVAL DUAL: CPT | Performed by: INTERNAL MEDICINE

## 2023-12-04 RX ORDER — OXYCODONE AND ACETAMINOPHEN 7.5; 325 MG/1; MG/1
1 TABLET ORAL
COMMUNITY
Start: 2023-11-27

## 2023-12-04 RX ORDER — LEVOTHYROXINE SODIUM 75 MCG
TABLET ORAL
COMMUNITY
Start: 2023-10-26

## 2023-12-04 RX ORDER — PRIMIDONE 50 MG/1
100 TABLET ORAL NIGHTLY
COMMUNITY
Start: 2023-10-29

## 2023-12-04 RX ORDER — FLUTICASONE PROPIONATE 50 MCG
SPRAY, SUSPENSION (ML) NASAL
COMMUNITY
Start: 2023-10-30

## 2023-12-04 RX ORDER — AMIODARONE HYDROCHLORIDE 200 MG/1
200 TABLET ORAL DAILY
Qty: 90 TABLET | Refills: 3 | Status: SHIPPED | OUTPATIENT
Start: 2023-12-04

## 2024-01-04 RX ORDER — APIXABAN 5 MG/1
5 TABLET, FILM COATED ORAL 2 TIMES DAILY
Qty: 180 TABLET | Refills: 3 | Status: SHIPPED | OUTPATIENT
Start: 2024-01-04

## 2024-01-04 NOTE — TELEPHONE ENCOUNTER
Rx Refill Note  Requested Prescriptions     Pending Prescriptions Disp Refills    Eliquis 5 MG tablet tablet [Pharmacy Med Name: ELIQUIS 5MG TABLETS] 180 tablet 3     Sig: TAKE 1 TABLET BY MOUTH TWICE DAILY      Last office visit with prescribing clinician: 12/4/2023   Last telemedicine visit with prescribing clinician: Visit date not found   Next office visit with prescribing clinician: 6/20/2024                         Would you like a call back once the refill request has been completed: [] Yes [] No    If the office needs to give you a call back, can they leave a voicemail: [] Yes [] No    Rita Louis MA  01/04/24, 12:28 EST

## 2024-01-26 PROCEDURE — 93294 REM INTERROG EVL PM/LDLS PM: CPT | Performed by: INTERNAL MEDICINE

## 2024-01-26 PROCEDURE — 93296 REM INTERROG EVL PM/IDS: CPT | Performed by: INTERNAL MEDICINE

## 2024-06-13 ENCOUNTER — TELEPHONE (OUTPATIENT)
Dept: CARDIOLOGY | Facility: CLINIC | Age: 84
End: 2024-06-13
Payer: MEDICARE

## 2024-06-14 NOTE — TELEPHONE ENCOUNTER
Called pt, she wants to get her labs done at [facility]  Explained to pt we may not have the results in time for appt.   Called [facility] , spoke to Amanda fax number [phone]    Faxed orders

## 2024-06-17 ENCOUNTER — LAB (OUTPATIENT)
Dept: LAB | Facility: HOSPITAL | Age: 84
End: 2024-06-17
Payer: MEDICARE

## 2024-06-17 DIAGNOSIS — Z79.01 LONG TERM (CURRENT) USE OF ANTICOAGULANTS: ICD-10-CM

## 2024-06-17 DIAGNOSIS — I42.1 HYPERTROPHIC OBSTRUCTIVE CARDIOMYOPATHY: ICD-10-CM

## 2024-06-17 DIAGNOSIS — E78.5 DYSLIPIDEMIA: ICD-10-CM

## 2024-06-17 DIAGNOSIS — E66.9 OBESITY (BMI 30-39.9): ICD-10-CM

## 2024-06-17 DIAGNOSIS — Z95.0 PACEMAKER: ICD-10-CM

## 2024-06-17 DIAGNOSIS — I48.0 PAROXYSMAL ATRIAL FIBRILLATION: ICD-10-CM

## 2024-06-17 LAB
ALBUMIN SERPL-MCNC: 4.4 G/DL (ref 3.5–5.2)
ALBUMIN/GLOB SERPL: 2 G/DL
ALP SERPL-CCNC: 85 U/L (ref 39–117)
ALT SERPL W P-5'-P-CCNC: 21 U/L (ref 1–33)
ANION GAP SERPL CALCULATED.3IONS-SCNC: 8.3 MMOL/L (ref 5–15)
AST SERPL-CCNC: 27 U/L (ref 1–32)
BILIRUB SERPL-MCNC: 0.4 MG/DL (ref 0–1.2)
BUN SERPL-MCNC: 18 MG/DL (ref 8–23)
BUN/CREAT SERPL: 18.6 (ref 7–25)
CALCIUM SPEC-SCNC: 9.5 MG/DL (ref 8.6–10.5)
CHLORIDE SERPL-SCNC: 103 MMOL/L (ref 98–107)
CHOLEST SERPL-MCNC: 155 MG/DL (ref 0–200)
CO2 SERPL-SCNC: 26.7 MMOL/L (ref 22–29)
CREAT SERPL-MCNC: 0.97 MG/DL (ref 0.57–1)
EGFRCR SERPLBLD CKD-EPI 2021: 57.4 ML/MIN/1.73
GLOBULIN UR ELPH-MCNC: 2.2 GM/DL
GLUCOSE SERPL-MCNC: 88 MG/DL (ref 65–99)
HDLC SERPL-MCNC: 77 MG/DL (ref 40–60)
LDLC SERPL CALC-MCNC: 65 MG/DL (ref 0–100)
LDLC/HDLC SERPL: 0.84 {RATIO}
POTASSIUM SERPL-SCNC: 5.3 MMOL/L (ref 3.5–5.2)
PROT SERPL-MCNC: 6.6 G/DL (ref 6–8.5)
SODIUM SERPL-SCNC: 138 MMOL/L (ref 136–145)
TRIGL SERPL-MCNC: 65 MG/DL (ref 0–150)
TSH SERPL DL<=0.05 MIU/L-ACNC: 4.38 UIU/ML (ref 0.27–4.2)
VLDLC SERPL-MCNC: 13 MG/DL (ref 5–40)

## 2024-06-17 PROCEDURE — 80061 LIPID PANEL: CPT

## 2024-06-17 PROCEDURE — 80053 COMPREHEN METABOLIC PANEL: CPT

## 2024-06-17 PROCEDURE — 84443 ASSAY THYROID STIM HORMONE: CPT

## 2024-06-17 PROCEDURE — 36415 COLL VENOUS BLD VENIPUNCTURE: CPT

## 2024-06-20 ENCOUNTER — OFFICE VISIT (OUTPATIENT)
Dept: CARDIOLOGY | Facility: CLINIC | Age: 84
End: 2024-06-20
Payer: MEDICARE

## 2024-06-20 ENCOUNTER — CLINICAL SUPPORT NO REQUIREMENTS (OUTPATIENT)
Dept: CARDIOLOGY | Facility: CLINIC | Age: 84
End: 2024-06-20
Payer: MEDICARE

## 2024-06-20 VITALS
HEART RATE: 60 BPM | DIASTOLIC BLOOD PRESSURE: 48 MMHG | WEIGHT: 171 LBS | HEIGHT: 60 IN | SYSTOLIC BLOOD PRESSURE: 139 MMHG | OXYGEN SATURATION: 92 % | BODY MASS INDEX: 33.57 KG/M2

## 2024-06-20 DIAGNOSIS — Z95.0 PACEMAKER: ICD-10-CM

## 2024-06-20 DIAGNOSIS — I42.1 HYPERTROPHIC OBSTRUCTIVE CARDIOMYOPATHY: ICD-10-CM

## 2024-06-20 DIAGNOSIS — I48.0 PAROXYSMAL ATRIAL FIBRILLATION: Primary | ICD-10-CM

## 2024-06-20 DIAGNOSIS — R00.1 BRADYCARDIA: Primary | ICD-10-CM

## 2024-06-20 DIAGNOSIS — E78.5 DYSLIPIDEMIA: ICD-10-CM

## 2024-06-20 DIAGNOSIS — E66.9 OBESITY (BMI 30-39.9): ICD-10-CM

## 2024-06-20 DIAGNOSIS — Z79.01 LONG TERM (CURRENT) USE OF ANTICOAGULANTS: ICD-10-CM

## 2024-06-20 NOTE — PROGRESS NOTES
Subjective:     Encounter Date:06/20/2024      Patient ID: Domonique See is a 84 y.o. female.    Chief Complaint and history of present illness:       Follow-up for hypertrophic cardiomyopathy, pacemaker, CHF, dyslipidemia     History of Present Illness       Ms. Domonique See  has PMH of     Hypertrophic cardiomyopathy, history of septal myomectomy,      nonsustained VT  hypertension  SSS, [employer] permanent pacemaker 3/16/2017  Paroxysmal atrial fibrillation  [id number] SCORE   WIO2RW2-EQQk Score: 8 (6/20/2024  2:06 PM)  [id number] Score: 8 (5/22/2023  3:37 PM)  Age greater than 75, CHF, hypertension, CVA, vascular disease    CAD, cath 12/2017 30% LAD  CHF due to hypertrophic cardiomyopathy  Hyperlipidemia  COPD  History of DAYLIN 4/28/2023  Peptic ulcer disease  Back surgery, breast augmentation  Former smoker     Here for follow-up.  Patient denies any chest pain.  Has occasional edema which is improved with Bumex.     Patient's arterial blood pressure i 212/50 7 repeat was 139/48, heart rate 60, O2 sat of 92% on room air..  BMI is over 30.     Chart review:  Underwent A-fib ablation 1/16/2023  Admitted 1/18/2023 with CHF     Echocardiogram 6/29/2021 reveals EF of 50%   Echocardiogram 1/19/2023 EF of 60 to 65% with severe right ventricular and right atrial enlargement moderate left atrial enlargement, moderate mitral regurgitation and mild aortic regurgitation.     Labs from 3/8/2022 revealed normal ESR and CRP.  A1c improved to 5.9 from 6.1 previously, TSH 3.9.  CMP with a potassium of 5.3 creatinine of 1.05 and GFR 52.  Normal CBC.  Lipid profile with cholesterol 124 triglycerides 73 HDL 57 LDL 52.Labs from 5/2/2023 revealed normal BMP        Assessment:        Hypertrophic cardiomyopathy, myomectomy, NSVT  Chronic HFpEF due to diastolic dysfunction from HCM  Hypertension  Dyslipidemia  Prediabetes  Paroxysmal atrial fibrillation on long-term Eliquis  Obesity with BMI over  30  CKD  Hyperkalemia        Recommendations and plan:     Will discontinue amiodarone since patient has been on it for a long time.  Risk benefits alternatives discussed.  Reviewed EKG results with patient.     IPN5TY5-YGVf Score: 8 (11/25/2022 10:42 PM)  Patient has a high CHADS2 Vascor of 8 due to female gender, age over 75, history of stroke, CHF, hypertension making it 8, will benefit from long-term anticoagulation to prevent thromboembolic events.  Continue Eliquis as tolerated.  Continue aspirin, Eliquis, atorvastatin   as tolerated.         Reviewed BMI over 30, counseled on weight loss diet and exercise.     Patient's pacemaker check is revealing normal function.  Will follow-up in device clinic.     Advised patient to follow-up with PMD .        Lexiscan Cardiolite performed 4/7/2022 reviewed/interpreted by me reveals normal perfusion EF of 64%     Procedures      EKG done 4/28/2023 reveals a paced rhythm with rate of 60 bpm with incomplete left bundle branch block              ECG 12 Lead    Date/Time: 6/20/2024 2:08 PM  Performed by: Evgeny Gregory MD    Authorized by: Evgeny Gregory MD  Comparison: compared with previous ECG from 4/28/2023  Comparison to previous ECG: EKG done today reviewed/interpreted by me reveals 100% AV paced rhythm at the rate of 60 bpm, no significant change compared EKG from 4/28/2023          Copied text in this portion of the note has been reviewed and is accurate as of 6/20/2024  The following portions of the patient's history were reviewed and updated as appropriate: allergies, current medications, past family history, past medical history, past social history, past surgical history and problem list.    Assessment:         [facility]       Diagnosis Plan   1. Paroxysmal atrial fibrillation        2. Long term (current) use of anticoagulants        3. Pacemaker        4. Hypertrophic obstructive cardiomyopathy        5. Dyslipidemia        6. Obesity (BMI  30-39.9)               Plan:               Past Medical History:  Past Medical History:   Diagnosis Date    Anemia     CHF (congestive heart failure)     Congenital heart disease     Coronary artery disease 2013    Deep vein thrombosis (DVT) 11/20/2017    GERD (gastroesophageal reflux disease)     Heart murmur Don't know    Hx of hypertrophic cardiomyopathy     Hypertension     Peptic ulceration     Persistent atrial fibrillation 01/10/2023    Primary osteoarthritis of both knees 02/08/2021    Primary osteoarthritis of right knee 09/21/2020    Sleep apnea 2017     Past Surgical History:  Past Surgical History:   Procedure Laterality Date    ABDOMINAL HERNIA REPAIR      ABLATION OF DYSRHYTHMIC FOCUS  01-    BACK SURGERY      BREAST AUGMENTATION      CARDIAC CATHETERIZATION      CARDIAC ELECTROPHYSIOLOGY PROCEDURE N/A 01/16/2023    Procedure: Ablation atrial fibrillation and flutter, cryo Duong and Ramón aware;  Surgeon: Leeanne Pleitez MD;  Location: [facility] INVASIVE LOCATION;  Service: Cardiovascular;  Laterality: N/A;    CARDIAC PACEMAKER PLACEMENT  03/16/2017    Dual Chamber    CERVICAL RIB RESECTION Bilateral     CERVICAL RIB RESECTION Bilateral 1963    bilateral cervical ribs removed - extra ribs located and causing pain    CHOLECYSTECTOMY      COLONOSCOPY      CORONARY ARTERY BYPASS GRAFT  2013    THORACIC DECOMPRESSION POSTERIOR FUSION  06/04/2014    L3-5 & S1    UPPER GASTROINTESTINAL ENDOSCOPY        Allergies:  No Known Allergies  Home Meds:  Current Meds:     Current Outpatient Medications:     aspirin 81 MG EC tablet, Take 1 tablet by mouth Every Night., Disp: 90 tablet, Rfl: 3    atorvastatin (LIPITOR) 40 MG tablet, Take 1 tablet by mouth Every Night., Disp: 30 tablet, Rfl: 0    diclofenac sodium (VOTAREN XR) 100 MG 24 hr tablet, , Disp: , Rfl:     Eliquis 5 MG tablet tablet, TAKE 1 TABLET BY MOUTH TWICE DAILY, Disp: 180 tablet, Rfl: 3    fluticasone (FLONASE) 50 MCG/ACT nasal spray, ,  "Disp: , Rfl:     ipratropium-albuterol (DUO-NEB) 0.5-2.5 mg/3 ml nebulizer, Take 3 mL by nebulization Every 4 (Four) Hours As Needed for Wheezing., Disp: , Rfl:     levETIRAcetam (KEPPRA) 250 MG tablet, Take 1 tablet by mouth Every 12 (Twelve) Hours., Disp: 60 tablet, Rfl: 1    oxyCODONE-acetaminophen (PERCOCET) 7.5-325 MG per tablet, Take 1 tablet by mouth 6 (Six) Times a Day., Disp: , Rfl:     primidone (MYSOLINE) 50 MG tablet, Take 2 tablets by mouth Every Night., Disp: , Rfl:     Semaglutide, 1 MG/DOSE, (Ozempic, 1 MG/DOSE,) 4 MG/3ML solution pen-injector, Inject 1 mg under the skin into the appropriate area as directed 1 (One) Time Per Week. On , Disp: , Rfl:     sertraline (ZOLOFT) 100 MG tablet, Take 1 tablet by mouth Every Evening. Indications: Major Depressive Disorder, Disp: , Rfl:     Synthroid 75 MCG tablet, , Disp: , Rfl:   Social History:   Social History     Tobacco Use    Smoking status: Former     Current packs/day: 0.00     Average packs/day: 1 pack/day for 8.0 years (8.0 ttl pk-yrs)     Types: Cigarettes     Start date: 1977     Quit date: 1985     Years since quittin.4     Passive exposure: Past    Smokeless tobacco: Never    Tobacco comments:     Quit    Substance Use Topics    Alcohol use: Never     Comment: 6 drinks a year in social settings      Family History:  Family History   Problem Relation Age of Onset    Colon cancer Mother     Colon cancer Brother     Heart attack Brother     Colon cancer Daughter               ROS  All other systems are negative         Objective:     Physical Exam  /48 (BP Location: Left arm, Patient Position: Sitting, Cuff Size: Large Adult)   Pulse 60   Ht 152.4 cm (60\")   Wt 77.6 kg (171 lb)   LMP  (LMP Unknown)   SpO2 92%   BMI 33.40 kg/m²   General:  Appears in no acute distress  Eyes: Sclera is anicteric,  conjunctiva is clear   HEENT:  No JVD.  No carotid bruits  Respiratory: Respirations regular and unlabored at rest.  " "Clear to auscultation  Cardiovascular: S1,S2 Regular rate and rhythm. .   Extremities: No digital clubbing or cyanosis, no edema  Skin: Color pink. Skin warm and dry to touch. No rashes  No xanthoma  Neuro: Alert and awake.    Lab Reviewed:         Evgeny Gregory MD  6/20/2024 14:09 EDT      EMR Dragon/Transcription:   \"Dictated utilizing Dragon dictation\".        "

## 2024-07-27 PROCEDURE — 93294 REM INTERROG EVL PM/LDLS PM: CPT | Performed by: INTERNAL MEDICINE

## 2024-07-27 PROCEDURE — 93296 REM INTERROG EVL PM/IDS: CPT | Performed by: INTERNAL MEDICINE

## 2024-10-31 ENCOUNTER — TELEPHONE (OUTPATIENT)
Dept: CARDIOLOGY | Facility: CLINIC | Age: 84
End: 2024-10-31
Payer: MEDICARE

## 2025-02-24 ENCOUNTER — OFFICE VISIT (OUTPATIENT)
Dept: CARDIOLOGY | Facility: CLINIC | Age: 85
End: 2025-02-24
Payer: MEDICARE

## 2025-02-24 ENCOUNTER — CLINICAL SUPPORT NO REQUIREMENTS (OUTPATIENT)
Dept: CARDIOLOGY | Facility: CLINIC | Age: 85
End: 2025-02-24
Payer: MEDICARE

## 2025-02-24 VITALS
WEIGHT: 169.25 LBS | SYSTOLIC BLOOD PRESSURE: 131 MMHG | OXYGEN SATURATION: 96 % | HEIGHT: 60 IN | HEART RATE: 72 BPM | DIASTOLIC BLOOD PRESSURE: 62 MMHG | BODY MASS INDEX: 33.23 KG/M2

## 2025-02-24 DIAGNOSIS — Z95.0 PACEMAKER: ICD-10-CM

## 2025-02-24 DIAGNOSIS — R53.83 FATIGUE, UNSPECIFIED TYPE: ICD-10-CM

## 2025-02-24 DIAGNOSIS — I48.0 PAROXYSMAL ATRIAL FIBRILLATION: ICD-10-CM

## 2025-02-24 DIAGNOSIS — R00.1 BRADYCARDIA: Primary | ICD-10-CM

## 2025-02-24 DIAGNOSIS — R06.09 DYSPNEA ON EXERTION: ICD-10-CM

## 2025-02-24 DIAGNOSIS — R00.1 BRADYCARDIA: ICD-10-CM

## 2025-02-24 DIAGNOSIS — Z86.79: Primary | Chronic | ICD-10-CM

## 2025-02-24 DIAGNOSIS — I10 ESSENTIAL HYPERTENSION: ICD-10-CM

## 2025-02-24 PROBLEM — I48.19 PERSISTENT ATRIAL FIBRILLATION: Status: RESOLVED | Noted: 2023-01-10 | Resolved: 2025-02-24

## 2025-02-24 PROCEDURE — 93280 PM DEVICE PROGR EVAL DUAL: CPT | Performed by: INTERNAL MEDICINE

## 2025-02-24 NOTE — PROGRESS NOTES
Subjective:     Encounter Date:02/24/2025      Patient ID: Domonique See is a 85 y.o. female.    Chief Complaint: 6 month follow up Device Check, P. Afib, CAD, CHF/HCM     History of Present Illness    Ms. Domonique See  has PMH of     Hypertrophic cardiomyopathy, history of septal myomectomy,      nonsustained VT  hypertension  SSS, [facility] permanent pacemaker 3/16/2017  Paroxysmal atrial fibrillation status post A-fib a flutter ablation (1/16/2023 Dr. Pleitez)  [id number] SCORE   [id number] Score: 6 (2/24/2025  8:27 AM)  Age greater than 75, CHF, hypertension, CVA, vascular disease     CAD, cath 12/2017 30% LAD  CHF due to hypertrophic cardiomyopathy  Hyperlipidemia  COPD  History of DAYLIN 4/28/2023  Peptic ulcer disease  Back surgery, breast augmentation  Former smoker    Patient here for 6-month follow-up and device check.  Patient denies any chest pain she does report significant fatigue fatigue over the last 6 months as well as some dyspnea on exertion, feeling like she has to slow down.  She reports a lot of burning and itching on her shoulders and arms that is not new but has been nerve damage which she started taking gabapentin again however that made her unsteady therefore she stopped it.  She is also trying to wean herself off her Keppra and primidone.  She reports her Keppra has made her shaky therefore she was started on the primidone to help with the shakiness.  Ever she feels like she did not actually have a seizure does not need to be on the Keppra.  Patient does feel like she has a whole lot going on as her sister is dying and she has a lot of other things going on as well.  Does not feel like eating at times but has to make herself.  Patient reports that she wants to do think she just does not have the energy to do things anymore.      Blood pressure in office today 131/62 heart rate 72 oxygen 96% on room air weight 169 pounds    Device check today working well with less  than 1% of A-fib burden       Chart review:  Underwent A-fib ablation 1/16/2023  Admitted 1/18/2023 with CHF     Echocardiogram 6/29/2021 reveals EF of 50%   Echocardiogram 1/19/2023 EF of 60 to 65% with severe right ventricular and right atrial enlargement moderate left atrial enlargement, moderate mitral regurgitation and mild aortic regurgitation.  6/2/2023 negative carotid US       Lab Review:     From 6/17/2024 potassium 5.3 EGFR 57.4 TSH 4.38 HDL 77 LDL 65    The following portions of the patient's history were reviewed and updated as appropriate: allergies, current medications, past family history, past medical history, past social history, past surgical history, and problem list.      Review of Systems   Constitutional: Positive for malaise/fatigue.   Cardiovascular:  Positive for dyspnea on exertion. Negative for chest pain, leg swelling and palpitations.   Respiratory:  Negative for cough and shortness of breath.    Gastrointestinal:  Negative for abdominal pain, nausea and vomiting.   Neurological:  Positive for dizziness and light-headedness. Negative for focal weakness, headaches and numbness.   All other systems reviewed and are negative.        Past Medical History:   Diagnosis Date   • Anemia    • CHF (congestive heart failure)    • Congenital heart disease    • Coronary artery disease 2013   • Deep vein thrombosis (DVT) 11/20/2017   • GERD (gastroesophageal reflux disease)    • Heart murmur Don't know   • Hx of hypertrophic cardiomyopathy    • Hypertension    • Peptic ulceration    • Persistent atrial fibrillation 01/10/2023   • Primary osteoarthritis of both knees 02/08/2021   • Primary osteoarthritis of right knee 09/21/2020   • Sleep apnea 2017     Past Surgical History:   Procedure Laterality Date   • ABDOMINAL HERNIA REPAIR     • ABLATION OF DYSRHYTHMIC FOCUS  01-   • BACK SURGERY     • BREAST AUGMENTATION     • CARDIAC CATHETERIZATION     • CARDIAC ELECTROPHYSIOLOGY PROCEDURE N/A  "01/16/2023    Procedure: Ablation atrial fibrillation and flutter, cryo Duong and Ramón aware;  Surgeon: Leeanne Pleitez MD;  Location: [facility] INVASIVE LOCATION;  Service: Cardiovascular;  Laterality: N/A;   • CARDIAC PACEMAKER PLACEMENT  03/16/2017    Dual Chamber   • CERVICAL RIB RESECTION Bilateral    • CERVICAL RIB RESECTION Bilateral 1963    bilateral cervical ribs removed - extra ribs located and causing pain   • CHOLECYSTECTOMY     • COLONOSCOPY     • CORONARY ARTERY BYPASS GRAFT  2013   • THORACIC DECOMPRESSION POSTERIOR FUSION  06/04/2014    L3-5 & S1   • UPPER GASTROINTESTINAL ENDOSCOPY       /62 (BP Location: Left arm, Patient Position: Sitting, Cuff Size: Adult)   Pulse 72   Ht 152.4 cm (60\")   Wt 76.8 kg (169 lb 4 oz)   LMP  (LMP Unknown)   SpO2 96%   BMI 33.05 kg/m²   Family History   Problem Relation Age of Onset   • Colon cancer Mother    • Colon cancer Brother    • Heart attack Brother    • Colon cancer Daughter        Current Outpatient Medications:   •  aspirin 81 MG EC tablet, Take 1 tablet by mouth Every Night., Disp: 90 tablet, Rfl: 3  •  diclofenac sodium (VOTAREN XR) 100 MG 24 hr tablet, , Disp: , Rfl:   •  Eliquis 5 MG tablet tablet, TAKE 1 TABLET BY MOUTH TWICE DAILY, Disp: 180 tablet, Rfl: 3  •  fluticasone (FLONASE) 50 MCG/ACT nasal spray, , Disp: , Rfl:   •  ipratropium-albuterol (DUO-NEB) 0.5-2.5 mg/3 ml nebulizer, Take 3 mL by nebulization Every 4 (Four) Hours As Needed for Wheezing., Disp: , Rfl:   •  levETIRAcetam (KEPPRA) 250 MG tablet, Take 1 tablet by mouth Every 12 (Twelve) Hours., Disp: 60 tablet, Rfl: 1  •  oxyCODONE-acetaminophen (PERCOCET) 7.5-325 MG per tablet, Take 1 tablet by mouth 6 (Six) Times a Day., Disp: , Rfl:   •  primidone (MYSOLINE) 50 MG tablet, Take 2 tablets by mouth Every Night., Disp: , Rfl:   •  Semaglutide, 1 MG/DOSE, (Ozempic, 1 MG/DOSE,) 4 MG/3ML solution pen-injector, Inject 1 mg under the skin into the appropriate area as directed 1 " (One) Time Per Week. On , Disp: , Rfl:   •  sertraline (ZOLOFT) 100 MG tablet, Take 1 tablet by mouth Every Evening. Indications: Major Depressive Disorder, Disp: , Rfl:   •  Synthroid 75 MCG tablet, , Disp: , Rfl:   No Known Allergies  Social History     Socioeconomic History   • Marital status:    Tobacco Use   • Smoking status: Former     Current packs/day: 0.00     Average packs/day: 1 pack/day for 8.0 years (8.0 ttl pk-yrs)     Types: Cigarettes     Start date: 1977     Quit date: 1985     Years since quittin.1     Passive exposure: Past   • Smokeless tobacco: Never   • Tobacco comments:     Quit    Vaping Use   • Vaping status: Never Used   Substance and Sexual Activity   • Alcohol use: Never     Comment: 6 drinks a year in social settings   • Drug use: Never   • Sexual activity: Not Currently     Partners: Male                Objective:     Vitals reviewed.   Constitutional:       Appearance: Healthy appearance. Not in distress. Frail.   Neck:      Vascular: No JVR. JVD normal.   Pulmonary:      Effort: Pulmonary effort is normal.      Breath sounds: Normal breath sounds. No wheezing. No rhonchi. No rales.   Chest:      Chest wall: Not tender to palpatation.   Cardiovascular:      PMI at left midclavicular line. Normal rate. Regular rhythm. Normal S1. Normal S2.       Murmurs: There is no murmur.      No gallop.  No click. No rub.   Pulses:     Intact distal pulses.   Edema:     Peripheral edema absent.   Abdominal:      General: Bowel sounds are normal.      Palpations: Abdomen is soft.      Tenderness: There is no abdominal tenderness.   Musculoskeletal: Normal range of motion.         General: No tenderness. Skin:     General: Skin is warm and dry.   Neurological:      General: No focal deficit present.      Mental Status: Alert and oriented to person, place and time.   Procedures                  Assessment:     [facility]       Diagnosis Plan   1. Hx of hypertrophic cardiomyopathy   Adult Transthoracic Echo Complete W/ Cont if Necessary Per Protocol      2. Essential hypertension        3. Pacemaker        4. Bradycardia        5. Paroxysmal atrial fibrillation        6. Dyspnea on exertion  Adult Transthoracic Echo Complete W/ Cont if Necessary Per Protocol      7. Fatigue, unspecified type  Adult Transthoracic Echo Complete W/ Cont if Necessary Per Protocol                     Plan:   Chart reviewed  Labs reviewed-none since June 2024  Patient has upcoming appointment with PCP where she will get labs.  Advised patient she may need thyroid rechecked    Will check echocardiogram to assess LV function and valve abnormalities patient has a history of hypertrophic cardiomyopathy patient is complaining of worsening dyspnea on exertion and fatigue    Advised patient to discuss with her PCP regarding possible depression causing her lack of interest in things/fatigue                            This document is intended for medical expert use only.  Reading of this document by patients and/or patient's family without participating medical staff guidance may result in misinterpretation and unintended morbidity. Any interpretation of such data is the responsibility of the patient and/or family member responsible for the patient in concert with their primary or specialist providers, not to be left for sources of online search as such as [employer], [employer] or similar queries.  Relying on these approaches to knowledge may result in misinterpretation, misguided goals of care and even death should patient or family members try recommendations outside of the realm of professional medical care in a supervised inpatient environment.

## 2025-03-21 ENCOUNTER — HOSPITAL ENCOUNTER (OUTPATIENT)
Dept: CARDIOLOGY | Facility: HOSPITAL | Age: 85
Discharge: HOME | End: 2025-03-21
Payer: MEDICARE

## 2025-03-21 VITALS
SYSTOLIC BLOOD PRESSURE: 156 MMHG | HEIGHT: 60 IN | BODY MASS INDEX: 33.18 KG/M2 | WEIGHT: 169 LBS | DIASTOLIC BLOOD PRESSURE: 68 MMHG

## 2025-03-21 DIAGNOSIS — R06.09 DYSPNEA ON EXERTION: ICD-10-CM

## 2025-03-21 DIAGNOSIS — Z86.79: Chronic | ICD-10-CM

## 2025-03-21 DIAGNOSIS — R53.83 FATIGUE, UNSPECIFIED TYPE: ICD-10-CM

## 2025-03-21 LAB
AORTIC DIMENSIONLESS INDEX: 0.65 (DI)
AV MEAN PRESS GRAD SYS DOP V1V2: 3.1 MMHG
AV VMAX SYS DOP: 124.8 CM/SEC
BH CV ECHO LEFT VENTRICLE GLOBAL LONGITUDINAL STRAIN: -9.4 %
BH CV ECHO MEAS - AI P1/2T: 667.8 MSEC
BH CV ECHO MEAS - AO MAX PG: 6.2 MMHG
BH CV ECHO MEAS - AO ROOT DIAM: 3.2 CM
BH CV ECHO MEAS - AO V2 VTI: 21.2 CM
BH CV ECHO MEAS - AVA(I,D): 2.09 CM2
BH CV ECHO MEAS - EDV(CUBED): 130.9 ML
BH CV ECHO MEAS - EDV(MOD-SP2): 58.8 ML
BH CV ECHO MEAS - EDV(MOD-SP4): 102.7 ML
BH CV ECHO MEAS - EF(MOD-SP2): 56.5 %
BH CV ECHO MEAS - EF(MOD-SP4): 59.8 %
BH CV ECHO MEAS - ESV(CUBED): 44.8 ML
BH CV ECHO MEAS - ESV(MOD-SP2): 25.6 ML
BH CV ECHO MEAS - ESV(MOD-SP4): 41.3 ML
BH CV ECHO MEAS - FS: 30 %
BH CV ECHO MEAS - IVS/LVPW: 1.1 CM
BH CV ECHO MEAS - IVSD: 1.22 CM
BH CV ECHO MEAS - LA DIMENSION: 4.9 CM
BH CV ECHO MEAS - LV MASS(C)D: 229.1 GRAMS
BH CV ECHO MEAS - LV MAX PG: 2.25 MMHG
BH CV ECHO MEAS - LV MEAN PG: 1.16 MMHG
BH CV ECHO MEAS - LV V1 MAX: 75 CM/SEC
BH CV ECHO MEAS - LV V1 VTI: 13.7 CM
BH CV ECHO MEAS - LVIDD: 5.1 CM
BH CV ECHO MEAS - LVIDS: 3.6 CM
BH CV ECHO MEAS - LVOT AREA: 3.2 CM2
BH CV ECHO MEAS - LVOT DIAM: 2.03 CM
BH CV ECHO MEAS - LVPWD: 1.11 CM
BH CV ECHO MEAS - MR MAX PG: 101.9 MMHG
BH CV ECHO MEAS - MR MAX VEL: 502.9 CM/SEC
BH CV ECHO MEAS - MR MEAN PG: 91.2 MMHG
BH CV ECHO MEAS - MR MEAN VEL: 462 CM/SEC
BH CV ECHO MEAS - MR VTI: 183.8 CM
BH CV ECHO MEAS - MV A MAX VEL: 47.5 CM/SEC
BH CV ECHO MEAS - MV DEC SLOPE: 924.7 CM/SEC2
BH CV ECHO MEAS - MV DEC TIME: 0.11 SEC
BH CV ECHO MEAS - MV E MAX VEL: 100.9 CM/SEC
BH CV ECHO MEAS - MV E/A: 2.12
BH CV ECHO MEAS - MV MAX PG: 6.1 MMHG
BH CV ECHO MEAS - MV MEAN PG: 2.3 MMHG
BH CV ECHO MEAS - MV V2 VTI: 17.7 CM
BH CV ECHO MEAS - MVA(VTI): 2.5 CM2
BH CV ECHO MEAS - PA ACC TIME: 0.12 SEC
BH CV ECHO MEAS - PA V2 MAX: 58.1 CM/SEC
BH CV ECHO MEAS - RAP SYSTOLE: 8 MMHG
BH CV ECHO MEAS - RV MAX PG: 2.8 MMHG
BH CV ECHO MEAS - RV V1 MAX: 84 CM/SEC
BH CV ECHO MEAS - RV V1 VTI: 15 CM
BH CV ECHO MEAS - RVSP: 33.8 MMHG
BH CV ECHO MEAS - SV(LVOT): 44.3 ML
BH CV ECHO MEAS - SV(MOD-SP2): 33.2 ML
BH CV ECHO MEAS - SV(MOD-SP4): 61.5 ML
BH CV ECHO MEAS - TAPSE (>1.6): 1.07 CM
BH CV ECHO MEAS - TR MAX PG: 25.8 MMHG
BH CV ECHO MEAS - TR MAX VEL: 247.5 CM/SEC
LV EF BIPLANE MOD: 58 %

## 2025-03-21 PROCEDURE — 93356 MYOCRD STRAIN IMG SPCKL TRCK: CPT

## 2025-03-21 PROCEDURE — 93356 MYOCRD STRAIN IMG SPCKL TRCK: CPT | Performed by: INTERNAL MEDICINE

## 2025-03-21 PROCEDURE — 93306 TTE W/DOPPLER COMPLETE: CPT

## 2025-03-21 PROCEDURE — 93306 TTE W/DOPPLER COMPLETE: CPT | Performed by: INTERNAL MEDICINE

## 2025-03-25 ENCOUNTER — HOSPITAL ENCOUNTER (OUTPATIENT)
Facility: HOSPITAL | Age: 85
Discharge: HOME | End: 2025-03-25
Attending: EMERGENCY MEDICINE
Payer: MEDICARE

## 2025-03-25 ENCOUNTER — APPOINTMENT (OUTPATIENT)
Dept: GENERAL RADIOLOGY | Facility: HOSPITAL | Age: 85
End: 2025-03-25
Payer: MEDICARE

## 2025-03-25 VITALS
SYSTOLIC BLOOD PRESSURE: 168 MMHG | RESPIRATION RATE: 18 BRPM | OXYGEN SATURATION: 95 % | BODY MASS INDEX: 33.2 KG/M2 | HEIGHT: 60 IN | HEART RATE: 98 BPM | WEIGHT: 169.09 LBS | TEMPERATURE: 97.9 F | DIASTOLIC BLOOD PRESSURE: 80 MMHG

## 2025-03-25 DIAGNOSIS — G89.29 CHRONIC NECK PAIN: Primary | ICD-10-CM

## 2025-03-25 DIAGNOSIS — M54.2 CHRONIC NECK PAIN: Primary | ICD-10-CM

## 2025-03-25 DIAGNOSIS — M54.10 RADICULOPATHY, UNSPECIFIED SPINAL REGION: ICD-10-CM

## 2025-03-25 PROCEDURE — G0463 HOSPITAL OUTPT CLINIC VISIT: HCPCS

## 2025-03-25 PROCEDURE — 72072 X-RAY EXAM THORAC SPINE 3VWS: CPT

## 2025-03-25 PROCEDURE — 99214 OFFICE O/P EST MOD 30 MIN: CPT

## 2025-03-25 PROCEDURE — 63710000001 PREDNISONE PER 1 MG

## 2025-03-25 PROCEDURE — 72050 X-RAY EXAM NECK SPINE 4/5VWS: CPT

## 2025-03-25 RX ORDER — PREDNISONE 20 MG/1
10 TABLET ORAL ONCE
Status: COMPLETED | OUTPATIENT
Start: 2025-03-25 | End: 2025-03-25

## 2025-03-25 RX ADMIN — PREDNISONE 10 MG: 20 TABLET ORAL at 17:29

## 2025-03-25 NOTE — FSED PROVIDER NOTE
Subjective   History of Present Illness  85-year-old female reports she has a chronic history of problems with her neck and spine, reports that she was on gabapentin but stopped it in March and is now having burning tingling sensation to her right arm.  She reports that if she moves her right arm the pain goes away.  She denies any injury or trauma.  She reports she called her primary care provider today and was advised to come to this facility for an MRI of her neck.  She reports that since stopping the gabapentin she has also had to use a walker with ambulation.  She reports that overall her symptoms have been present for greater than 6 weeks.  She additionally reports that at times she has been helped by prednisone.  She is denying any other acute symptoms including chest pain shortness of breath vomiting and diarrhea.        Review of Systems   All other systems reviewed and are negative.      Past Medical History:   Diagnosis Date    Anemia     CHF (congestive heart failure)     Congenital heart disease     Coronary artery disease 2013    Deep vein thrombosis (DVT) 11/20/2017    GERD (gastroesophageal reflux disease)     Heart murmur Don't know    Hx of hypertrophic cardiomyopathy     Hypertension     Peptic ulceration     Persistent atrial fibrillation 01/10/2023    Primary osteoarthritis of both knees 02/08/2021    Primary osteoarthritis of right knee 09/21/2020    Sleep apnea 2017       No Known Allergies    Past Surgical History:   Procedure Laterality Date    ABDOMINAL HERNIA REPAIR      ABLATION OF DYSRHYTHMIC FOCUS  01-    BACK SURGERY      BREAST AUGMENTATION      CARDIAC CATHETERIZATION      CARDIAC ELECTROPHYSIOLOGY PROCEDURE N/A 01/16/2023    Procedure: Ablation atrial fibrillation and flutter, sarthak Watters and Ramón aware;  Surgeon: Leeanne Pleitez MD;  Location: [facility] CATH INVASIVE LOCATION;  Service: Cardiovascular;  Laterality: N/A;    CARDIAC PACEMAKER PLACEMENT  03/16/2017    Dual  Chamber    CERVICAL RIB RESECTION Bilateral     CERVICAL RIB RESECTION Bilateral 1963    bilateral cervical ribs removed - extra ribs located and causing pain    CHOLECYSTECTOMY      COLONOSCOPY      CORONARY ARTERY BYPASS GRAFT  2013    THORACIC DECOMPRESSION POSTERIOR FUSION  2014    L3-5 & S1    UPPER GASTROINTESTINAL ENDOSCOPY         Family History   Problem Relation Age of Onset    Colon cancer Mother     Colon cancer Brother     Heart attack Brother     Colon cancer Daughter        Social History     Socioeconomic History    Marital status:    Tobacco Use    Smoking status: Former     Current packs/day: 0.00     Average packs/day: 1 pack/day for 8.0 years (8.0 ttl pk-yrs)     Types: Cigarettes     Start date: 1977     Quit date: 1985     Years since quittin.2     Passive exposure: Past    Smokeless tobacco: Never    Tobacco comments:     Quit    Vaping Use    Vaping status: Never Used   Substance and Sexual Activity    Alcohol use: Never     Comment: 6 drinks a year in social settings    Drug use: Never    Sexual activity: Not Currently     Partners: Male           Objective   Physical Exam  Vitals and nursing note reviewed.   Constitutional:       General: She is not in acute distress.     Appearance: Normal appearance. She is normal weight. She is not toxic-appearing.   HENT:      Head: Normocephalic and atraumatic.      Nose: Nose normal.      Mouth/Throat:      Mouth: Mucous membranes are moist.      Pharynx: Oropharynx is clear.   Eyes:      Extraocular Movements: Extraocular movements intact.      Conjunctiva/sclera: Conjunctivae normal.      Pupils: Pupils are equal, round, and reactive to light.   Cardiovascular:      Rate and Rhythm: Normal rate.      Pulses: Normal pulses.      Heart sounds: Normal heart sounds.   Pulmonary:      Effort: Pulmonary effort is normal. No respiratory distress.      Breath sounds: Normal breath sounds.   Abdominal:      General: Abdomen is  flat. Bowel sounds are normal.      Palpations: Abdomen is soft.   Musculoskeletal:         General: Normal range of motion.      Cervical back: Normal range of motion and neck supple.   Skin:     General: Skin is warm.      Capillary Refill: Capillary refill takes less than 2 seconds.   Neurological:      General: No focal deficit present.      Mental Status: She is alert and oriented to person, place, and time. Mental status is at baseline.      Sensory: No sensory deficit.      Motor: No weakness.         Procedures           ED Course  ED Course as of 03/25/25 1911 Tue Mar 25, 2025   1734 Thoracic Spine  Impression:  Diffuse osteopenia.  Mild malalignments of the mid to upper lumbar spine, unchanged since the previous lumbar spine CT dated 4/28/2023.  No acute fracture.  Mild to moderate multilevel degenerative disc disease   [WF]   1735 Cervical Spine:  Impression:  1.Anterior cervical plate and screw fixation spanning from C3-C7 with corpectomy at C6. No evidence of hardware fracture or loosening.  2.Anterolisthesis of C7 on T1 by 6 mm which appears worse since 2021.      [WF]      ED Course User Index  [WF] Damian Mitchell Jr., APRN                                           Medical Decision Making  Patient was told by her primary care office that she could receive MRI here today.  I explained to the patient currently that we do not have MRI capability at this building.    Patient is requesting prednisone to help with what she believes is a pinched nerve.  We discussed that she may need to go back on her gabapentin to help with her intermittent neuropathy.        Problems Addressed:  Chronic neck pain: complicated acute illness or injury  Radiculopathy, unspecified spinal region: complicated acute illness or injury    Amount and/or Complexity of Data Reviewed  Radiology: ordered.    Risk  Prescription drug management.        Final diagnoses:   Chronic neck pain   Radiculopathy, unspecified spinal region        ED Disposition  ED Disposition       ED Disposition   Discharge    Condition   Stable    Comment   --               [facility]  [address] [address] [phone]        Ansih Lew, WOODY  [facility] [place] [address] [phone]               Medication List      No changes were made to your prescriptions during this visit.

## 2025-03-25 NOTE — DISCHARGE INSTRUCTIONS
As we discussed recommend you begin gabapentin to help with neuropathy    Follow-up with your family doctor to discuss need for MRI    Follow-up with [facility] spine [place] if pain persist to your neck and arms    Return to ER for worsening symptoms

## 2025-03-28 ENCOUNTER — HOSPITAL ENCOUNTER (INPATIENT)
Facility: HOSPITAL | Age: 85
LOS: 10 days | Discharge: NURSING, REHAB OR LONG-TERM CARE | DRG: 552 | End: 2025-04-08
Attending: EMERGENCY MEDICINE | Admitting: INTERNAL MEDICINE
Payer: MEDICARE

## 2025-03-28 ENCOUNTER — APPOINTMENT (OUTPATIENT)
Dept: CT IMAGING | Facility: HOSPITAL | Age: 85
DRG: 552 | End: 2025-03-28
Payer: MEDICARE

## 2025-03-28 ENCOUNTER — TRANSCRIBE ORDERS (OUTPATIENT)
Dept: ADMINISTRATIVE | Facility: HOSPITAL | Age: 85
End: 2025-03-28
Payer: MEDICARE

## 2025-03-28 DIAGNOSIS — M50.30 DDD (DEGENERATIVE DISC DISEASE), CERVICAL: ICD-10-CM

## 2025-03-28 DIAGNOSIS — M54.16 LUMBAR RADICULOPATHY: Primary | ICD-10-CM

## 2025-03-28 DIAGNOSIS — M48.02 NEURAL FORAMINAL STENOSIS OF CERVICAL SPINE: ICD-10-CM

## 2025-03-28 DIAGNOSIS — M54.12 CERVICAL RADICULOPATHY: Primary | ICD-10-CM

## 2025-03-28 DIAGNOSIS — M48.02 CERVICAL STENOSIS OF SPINAL CANAL: ICD-10-CM

## 2025-03-28 DIAGNOSIS — M54.2 NECK PAIN: ICD-10-CM

## 2025-03-28 PROBLEM — N39.0 ACUTE URINARY TRACT INFECTION: Status: ACTIVE | Noted: 2025-03-28

## 2025-03-28 LAB
ALBUMIN SERPL-MCNC: 4.5 G/DL (ref 3.5–5.2)
ALBUMIN/GLOB SERPL: 1.7 G/DL
ALP SERPL-CCNC: 97 U/L (ref 39–117)
ALT SERPL W P-5'-P-CCNC: 20 U/L (ref 1–33)
ANION GAP SERPL CALCULATED.3IONS-SCNC: 11.5 MMOL/L (ref 5–15)
AST SERPL-CCNC: 25 U/L (ref 1–32)
BACTERIA UR QL AUTO: ABNORMAL /HPF
BASOPHILS # BLD AUTO: 0.02 10*3/MM3 (ref 0–0.2)
BASOPHILS NFR BLD AUTO: 0.5 % (ref 0–1.5)
BILIRUB SERPL-MCNC: 0.7 MG/DL (ref 0–1.2)
BILIRUB UR QL STRIP: ABNORMAL
BUN SERPL-MCNC: 17 MG/DL (ref 8–23)
BUN/CREAT SERPL: 19.8 (ref 7–25)
CALCIUM SPEC-SCNC: 9.8 MG/DL (ref 8.6–10.5)
CHLORIDE SERPL-SCNC: 102 MMOL/L (ref 98–107)
CK SERPL-CCNC: 55 U/L (ref 20–180)
CLARITY UR: ABNORMAL
CO2 SERPL-SCNC: 22.5 MMOL/L (ref 22–29)
COLOR UR: YELLOW
CREAT SERPL-MCNC: 0.86 MG/DL (ref 0.57–1)
DEPRECATED RDW RBC AUTO: 43.4 FL (ref 37–54)
EGFRCR SERPLBLD CKD-EPI 2021: 65.9 ML/MIN/1.73
EOSINOPHIL # BLD AUTO: 0.06 10*3/MM3 (ref 0–0.4)
EOSINOPHIL NFR BLD AUTO: 1.4 % (ref 0.3–6.2)
ERYTHROCYTE [DISTWIDTH] IN BLOOD BY AUTOMATED COUNT: 12.8 % (ref 12.3–15.4)
GLOBULIN UR ELPH-MCNC: 2.7 GM/DL
GLUCOSE SERPL-MCNC: 109 MG/DL (ref 65–99)
GLUCOSE UR STRIP-MCNC: NEGATIVE MG/DL
HCT VFR BLD AUTO: 39.1 % (ref 34–46.6)
HGB BLD-MCNC: 12.6 G/DL (ref 12–15.9)
HGB UR QL STRIP.AUTO: ABNORMAL
HOLD SPECIMEN: NORMAL
HYALINE CASTS UR QL AUTO: ABNORMAL /LPF
IMM GRANULOCYTES # BLD AUTO: 0.02 10*3/MM3 (ref 0–0.05)
IMM GRANULOCYTES NFR BLD AUTO: 0.5 % (ref 0–0.5)
KETONES UR QL STRIP: ABNORMAL
LEUKOCYTE ESTERASE UR QL STRIP.AUTO: ABNORMAL
LYMPHOCYTES # BLD AUTO: 0.99 10*3/MM3 (ref 0.7–3.1)
LYMPHOCYTES NFR BLD AUTO: 22.7 % (ref 19.6–45.3)
MAGNESIUM SERPL-MCNC: 1.9 MG/DL (ref 1.6–2.4)
MCH RBC QN AUTO: 29.7 PG (ref 26.6–33)
MCHC RBC AUTO-ENTMCNC: 32.2 G/DL (ref 31.5–35.7)
MCV RBC AUTO: 92.2 FL (ref 79–97)
MONOCYTES # BLD AUTO: 0.55 10*3/MM3 (ref 0.1–0.9)
MONOCYTES NFR BLD AUTO: 12.6 % (ref 5–12)
MUCOUS THREADS URNS QL MICRO: ABNORMAL /HPF
NEUTROPHILS NFR BLD AUTO: 2.72 10*3/MM3 (ref 1.7–7)
NEUTROPHILS NFR BLD AUTO: 62.3 % (ref 42.7–76)
NITRITE UR QL STRIP: POSITIVE
NRBC BLD AUTO-RTO: 0 /100 WBC (ref 0–0.2)
PH UR STRIP.AUTO: 5.5 [PH] (ref 5–8)
PHOSPHATE SERPL-MCNC: 3.7 MG/DL (ref 2.5–4.5)
PLATELET # BLD AUTO: 182 10*3/MM3 (ref 140–450)
PMV BLD AUTO: 11.1 FL (ref 6–12)
POTASSIUM SERPL-SCNC: 4.1 MMOL/L (ref 3.5–5.2)
PROT SERPL-MCNC: 7.2 G/DL (ref 6–8.5)
PROT UR QL STRIP: ABNORMAL
RBC # BLD AUTO: 4.24 10*6/MM3 (ref 3.77–5.28)
RBC # UR STRIP: ABNORMAL /HPF
REF LAB TEST METHOD: ABNORMAL
SODIUM SERPL-SCNC: 136 MMOL/L (ref 136–145)
SP GR UR STRIP: 1.02 (ref 1–1.03)
SQUAMOUS #/AREA URNS HPF: ABNORMAL /HPF
TRANS CELLS #/AREA URNS HPF: ABNORMAL /HPF
UROBILINOGEN UR QL STRIP: ABNORMAL
WBC # UR STRIP: ABNORMAL /HPF
WBC NRBC COR # BLD AUTO: 4.36 10*3/MM3 (ref 3.4–10.8)
WHOLE BLOOD HOLD COAG: NORMAL

## 2025-03-28 PROCEDURE — 84439 ASSAY OF FREE THYROXINE: CPT | Performed by: INTERNAL MEDICINE

## 2025-03-28 PROCEDURE — 72128 CT CHEST SPINE W/O DYE: CPT

## 2025-03-28 PROCEDURE — 84443 ASSAY THYROID STIM HORMONE: CPT | Performed by: INTERNAL MEDICINE

## 2025-03-28 PROCEDURE — 87077 CULTURE AEROBIC IDENTIFY: CPT | Performed by: EMERGENCY MEDICINE

## 2025-03-28 PROCEDURE — 87186 SC STD MICRODIL/AGAR DIL: CPT | Performed by: EMERGENCY MEDICINE

## 2025-03-28 PROCEDURE — 25010000002 ONDANSETRON PER 1 MG: Performed by: EMERGENCY MEDICINE

## 2025-03-28 PROCEDURE — 81001 URINALYSIS AUTO W/SCOPE: CPT | Performed by: EMERGENCY MEDICINE

## 2025-03-28 PROCEDURE — 84100 ASSAY OF PHOSPHORUS: CPT | Performed by: EMERGENCY MEDICINE

## 2025-03-28 PROCEDURE — 25010000002 HYDROMORPHONE 1 MG/ML SOLUTION: Performed by: EMERGENCY MEDICINE

## 2025-03-28 PROCEDURE — 99285 EMERGENCY DEPT VISIT HI MDM: CPT

## 2025-03-28 PROCEDURE — G0378 HOSPITAL OBSERVATION PER HR: HCPCS

## 2025-03-28 PROCEDURE — 72131 CT LUMBAR SPINE W/O DYE: CPT

## 2025-03-28 PROCEDURE — 25010000002 HYDROMORPHONE PER 4 MG: Performed by: INTERNAL MEDICINE

## 2025-03-28 PROCEDURE — 80053 COMPREHEN METABOLIC PANEL: CPT | Performed by: EMERGENCY MEDICINE

## 2025-03-28 PROCEDURE — 83735 ASSAY OF MAGNESIUM: CPT | Performed by: EMERGENCY MEDICINE

## 2025-03-28 PROCEDURE — 72125 CT NECK SPINE W/O DYE: CPT

## 2025-03-28 PROCEDURE — 87086 URINE CULTURE/COLONY COUNT: CPT | Performed by: EMERGENCY MEDICINE

## 2025-03-28 PROCEDURE — 85025 COMPLETE CBC W/AUTO DIFF WBC: CPT | Performed by: EMERGENCY MEDICINE

## 2025-03-28 PROCEDURE — 25010000002 CEFTRIAXONE PER 250 MG: Performed by: EMERGENCY MEDICINE

## 2025-03-28 PROCEDURE — 25810000003 SODIUM CHLORIDE 0.9 % SOLUTION: Performed by: EMERGENCY MEDICINE

## 2025-03-28 PROCEDURE — 82550 ASSAY OF CK (CPK): CPT | Performed by: EMERGENCY MEDICINE

## 2025-03-28 RX ORDER — SODIUM CHLORIDE 0.9 % (FLUSH) 0.9 %
10 SYRINGE (ML) INJECTION EVERY 12 HOURS SCHEDULED
Status: DISCONTINUED | OUTPATIENT
Start: 2025-03-28 | End: 2025-04-08 | Stop reason: HOSPADM

## 2025-03-28 RX ORDER — OXYCODONE HYDROCHLORIDE 5 MG/1
10 TABLET ORAL EVERY 4 HOURS PRN
Status: DISCONTINUED | OUTPATIENT
Start: 2025-03-28 | End: 2025-04-01

## 2025-03-28 RX ORDER — ONDANSETRON 2 MG/ML
4 INJECTION INTRAMUSCULAR; INTRAVENOUS EVERY 6 HOURS PRN
Status: DISCONTINUED | OUTPATIENT
Start: 2025-03-28 | End: 2025-04-08 | Stop reason: HOSPADM

## 2025-03-28 RX ORDER — LEVETIRACETAM 250 MG/1
250 TABLET ORAL EVERY 12 HOURS SCHEDULED
Status: DISCONTINUED | OUTPATIENT
Start: 2025-03-28 | End: 2025-03-29

## 2025-03-28 RX ORDER — ACETAMINOPHEN 325 MG/1
650 TABLET ORAL EVERY 4 HOURS PRN
Status: DISCONTINUED | OUTPATIENT
Start: 2025-03-28 | End: 2025-04-08 | Stop reason: HOSPADM

## 2025-03-28 RX ORDER — BISACODYL 5 MG/1
5 TABLET, DELAYED RELEASE ORAL DAILY PRN
Status: DISCONTINUED | OUTPATIENT
Start: 2025-03-28 | End: 2025-04-08 | Stop reason: HOSPADM

## 2025-03-28 RX ORDER — POLYETHYLENE GLYCOL 3350 17 G/17G
17 POWDER, FOR SOLUTION ORAL DAILY PRN
Status: DISCONTINUED | OUTPATIENT
Start: 2025-03-28 | End: 2025-04-08 | Stop reason: HOSPADM

## 2025-03-28 RX ORDER — NITROGLYCERIN 0.4 MG/1
0.4 TABLET SUBLINGUAL
Status: DISCONTINUED | OUTPATIENT
Start: 2025-03-28 | End: 2025-04-08 | Stop reason: HOSPADM

## 2025-03-28 RX ORDER — GABAPENTIN 100 MG/1
100-200 CAPSULE ORAL NIGHTLY
COMMUNITY
End: 2025-04-08 | Stop reason: HOSPADM

## 2025-03-28 RX ORDER — PRIMIDONE 50 MG/1
100 TABLET ORAL NIGHTLY
Status: DISCONTINUED | OUTPATIENT
Start: 2025-03-28 | End: 2025-03-29

## 2025-03-28 RX ORDER — AMOXICILLIN 250 MG
2 CAPSULE ORAL 2 TIMES DAILY PRN
Status: DISCONTINUED | OUTPATIENT
Start: 2025-03-28 | End: 2025-04-08 | Stop reason: HOSPADM

## 2025-03-28 RX ORDER — HYDRALAZINE HYDROCHLORIDE 20 MG/ML
10 INJECTION INTRAMUSCULAR; INTRAVENOUS EVERY 6 HOURS PRN
Status: DISCONTINUED | OUTPATIENT
Start: 2025-03-28 | End: 2025-04-08 | Stop reason: HOSPADM

## 2025-03-28 RX ORDER — SODIUM CHLORIDE 9 MG/ML
40 INJECTION, SOLUTION INTRAVENOUS AS NEEDED
Status: DISCONTINUED | OUTPATIENT
Start: 2025-03-28 | End: 2025-04-08 | Stop reason: HOSPADM

## 2025-03-28 RX ORDER — ONDANSETRON 2 MG/ML
4 INJECTION INTRAMUSCULAR; INTRAVENOUS ONCE
Status: COMPLETED | OUTPATIENT
Start: 2025-03-28 | End: 2025-03-28

## 2025-03-28 RX ORDER — SODIUM CHLORIDE 0.9 % (FLUSH) 0.9 %
10 SYRINGE (ML) INJECTION AS NEEDED
Status: DISCONTINUED | OUTPATIENT
Start: 2025-03-28 | End: 2025-04-08 | Stop reason: HOSPADM

## 2025-03-28 RX ORDER — HYDROMORPHONE HYDROCHLORIDE 1 MG/ML
0.5 INJECTION, SOLUTION INTRAMUSCULAR; INTRAVENOUS; SUBCUTANEOUS
Status: DISCONTINUED | OUTPATIENT
Start: 2025-03-28 | End: 2025-03-30

## 2025-03-28 RX ORDER — LEVOTHYROXINE SODIUM 75 UG/1
75 TABLET ORAL
Status: DISCONTINUED | OUTPATIENT
Start: 2025-03-29 | End: 2025-04-08 | Stop reason: HOSPADM

## 2025-03-28 RX ORDER — BISACODYL 10 MG
10 SUPPOSITORY, RECTAL RECTAL DAILY PRN
Status: DISCONTINUED | OUTPATIENT
Start: 2025-03-28 | End: 2025-04-08 | Stop reason: HOSPADM

## 2025-03-28 RX ORDER — IPRATROPIUM BROMIDE AND ALBUTEROL SULFATE 2.5; .5 MG/3ML; MG/3ML
3 SOLUTION RESPIRATORY (INHALATION) EVERY 4 HOURS PRN
Status: DISCONTINUED | OUTPATIENT
Start: 2025-03-28 | End: 2025-04-08 | Stop reason: HOSPADM

## 2025-03-28 RX ADMIN — HYDROMORPHONE HYDROCHLORIDE 0.25 MG: 1 INJECTION, SOLUTION INTRAMUSCULAR; INTRAVENOUS; SUBCUTANEOUS at 14:58

## 2025-03-28 RX ADMIN — Medication 10 ML: at 23:36

## 2025-03-28 RX ADMIN — ONDANSETRON 4 MG: 2 INJECTION, SOLUTION INTRAMUSCULAR; INTRAVENOUS at 14:58

## 2025-03-28 RX ADMIN — SODIUM CHLORIDE 500 ML: 9 INJECTION, SOLUTION INTRAVENOUS at 18:37

## 2025-03-28 RX ADMIN — CEFTRIAXONE 2000 MG: 2 INJECTION, POWDER, FOR SOLUTION INTRAMUSCULAR; INTRAVENOUS at 18:37

## 2025-03-28 RX ADMIN — HYDROMORPHONE HYDROCHLORIDE 0.5 MG: 1 INJECTION, SOLUTION INTRAMUSCULAR; INTRAVENOUS; SUBCUTANEOUS at 17:45

## 2025-03-28 RX ADMIN — APIXABAN 5 MG: 5 TABLET, FILM COATED ORAL at 23:34

## 2025-03-28 RX ADMIN — HYDROMORPHONE HYDROCHLORIDE 0.5 MG: 1 INJECTION, SOLUTION INTRAMUSCULAR; INTRAVENOUS; SUBCUTANEOUS at 23:56

## 2025-03-28 NOTE — ED PROVIDER NOTES
Subjective   History of Present Illness  85-year-old female complaining of increasing spinal pain and ataxia.  The patient states that she is having what sounds like radicular pain in her upper extremities left being worse than the right.  The patient states that she has pain in her lower back when she walks.  She states that she has felt hot and has had some chills recently.  She reports that she has had no vomiting or diarrhea.  She reports no dysuria.  The patient reports no recent cough.  She denies focal neurologic defects or lateralizing neurologic signs.  The patient is able to live independently although she lives with her son but she states that she has had increasing problems with motion recently      Review of Systems   Constitutional:  Positive for chills, fatigue and fever.   Neurological:  Positive for weakness, light-headedness and numbness. Negative for seizures.       Past Medical History:   Diagnosis Date    Anemia     CHF (congestive heart failure)     Congenital heart disease     Coronary artery disease 2013    Deep vein thrombosis (DVT) 11/20/2017    GERD (gastroesophageal reflux disease)     Heart murmur Don't know    Hx of hypertrophic cardiomyopathy     Hypertension     Peptic ulceration     Persistent atrial fibrillation 01/10/2023    Primary osteoarthritis of both knees 02/08/2021    Primary osteoarthritis of right knee 09/21/2020    Sleep apnea 2017       No Known Allergies    Past Surgical History:   Procedure Laterality Date    ABDOMINAL HERNIA REPAIR      ABLATION OF DYSRHYTHMIC FOCUS  01-    BACK SURGERY      BREAST AUGMENTATION      CARDIAC CATHETERIZATION      CARDIAC ELECTROPHYSIOLOGY PROCEDURE N/A 01/16/2023    Procedure: Ablation atrial fibrillation and flutter, sarthak Watters and Ramón aware;  Surgeon: Leeanne Pleitez MD;  Location: [facility] CATH INVASIVE LOCATION;  Service: Cardiovascular;  Laterality: N/A;    CARDIAC PACEMAKER PLACEMENT  03/16/2017    Dual Chamber     CERVICAL RIB RESECTION Bilateral     CERVICAL RIB RESECTION Bilateral 1963    bilateral cervical ribs removed - extra ribs located and causing pain    CHOLECYSTECTOMY      COLONOSCOPY      CORONARY ARTERY BYPASS GRAFT  2013    THORACIC DECOMPRESSION POSTERIOR FUSION  2014    L3-5 & S1    UPPER GASTROINTESTINAL ENDOSCOPY         Family History   Problem Relation Age of Onset    Colon cancer Mother     Colon cancer Brother     Heart attack Brother     Colon cancer Daughter        Social History     Socioeconomic History    Marital status:    Tobacco Use    Smoking status: Former     Current packs/day: 0.00     Average packs/day: 1 pack/day for 8.0 years (8.0 ttl pk-yrs)     Types: Cigarettes     Start date: 1977     Quit date: 1985     Years since quittin.2     Passive exposure: Past    Smokeless tobacco: Never    Tobacco comments:     Quit    Vaping Use    Vaping status: Never Used   Substance and Sexual Activity    Alcohol use: Never     Comment: 6 drinks a year in social settings    Drug use: Never    Sexual activity: Not Currently     Partners: Male       No recent unusual food water travel or activity    Objective   Physical Exam  Alert Ml Coma Scale 15   HEENT: Pupils equal and reactive to light. Conjunctivae are not injected. Normal tympanic membranes. Oropharynx and nares are normal.   Neck: Supple. Midline trachea. No JVD. No goiter.  There is posterior midline discomfort noted throughout most of the lower half of the cervical spine  Chest: Clear and equal breath sounds bilaterally, regular rate and rhythm without murmur or rub.   Abdomen: Positive bowel sounds, nontender, nondistended. No rebound or peritoneal signs. No CVA tenderness.   Back: The patient has extensive paraspinal tenderness in the upper half of the lumbar spine and the upper third of the thoracic spine.  Extremities no clubbing. cyanosis or edema. Motor sensory exam is normal. The full range of motion is  intact no definite focal or radicular distribution weakness is identified   Skin: Warm and dry, no rashes or petechia.   Lymphatic: No regional lymphadenopathy. No calf pain, swelling or Homans sign    Procedures           ED Course      Labs Reviewed   COMPREHENSIVE METABOLIC PANEL - Abnormal; Notable for the following components:       Result Value    Glucose 109 (*)     All other components within normal limits    Narrative:     GFR Categories in Chronic Kidney Disease (CKD)      GFR Category          GFR (mL/min/1.73)    Interpretation  G1                     90 or greater         Normal or high (1)  G2                      60-89                Mild decrease (1)  G3a                   45-59                Mild to moderate decrease  G3b                   30-44                Moderate to severe decrease  G4                    15-29                Severe decrease  G5                    14 or less           Kidney failure          (1)In the absence of evidence of kidney disease, neither GFR category G1 or G2 fulfill the criteria for CKD.    eGFR calculation 2021 CKD-EPI creatinine equation, which does not include race as a factor   URINALYSIS W/ CULTURE IF INDICATED - Abnormal; Notable for the following components:    Appearance, UA Slightly Cloudy (*)     Ketones, UA Trace (*)     Bilirubin, UA Small (1+) (*)     Blood, UA Large (3+) (*)     Protein, UA 30 mg/dL (1+) (*)     Leuk Esterase, UA Small (1+) (*)     Nitrite, UA Positive (*)     All other components within normal limits    Narrative:     In absence of clinical symptoms, the presence of pyuria, bacteria, and/or nitrites on the urinalysis result does not correlate with infection.   CBC WITH AUTO DIFFERENTIAL - Abnormal; Notable for the following components:    Monocyte % 12.6 (*)     All other components within normal limits   URINALYSIS, MICROSCOPIC ONLY - Abnormal; Notable for the following components:    RBC, UA 6-10 (*)     WBC, UA 21-50 (*)      Bacteria, UA 3+ (*)     Mucus, UA Small/1+ (*)     All other components within normal limits   CK - Normal   MAGNESIUM - Normal   PHOSPHORUS - Normal   URINE CULTURE   CBC AND DIFFERENTIAL    Narrative:     The following orders were created for panel order CBC & Differential.  Procedure                               Abnormality         Status                     ---------                               -----------         ------                     CBC Auto Differential[[id number]]        Abnormal            Final result                 Please view results for these tests on the individual orders.   EXTRA TUBES    Narrative:     The following orders were created for panel order Extra Tubes.  Procedure                               Abnormality         Status                     ---------                               -----------         ------                     Gold Top - SST[[id number]]                                   Final result               Light Blue Top[[id number]]                                   Final result                 Please view results for these tests on the individual orders.   GOLD TOP - SST   LIGHT BLUE TOP     Medications   cefTRIAXone (ROCEPHIN) 2,000 mg in sodium chloride 0.9 % 100 mL MBP (has no administration in time range)   sodium chloride 0.9 % bolus 500 mL (has no administration in time range)   ondansetron (ZOFRAN) injection 4 mg (4 mg Intravenous Given 3/28/25 1458)   HYDROmorphone (DILAUDID) injection 0.25 mg (0.25 mg Intravenous Given 3/28/25 1458)   HYDROmorphone (DILAUDID) injection 0.5 mg (0.5 mg Intravenous Given 3/28/25 1745)     CT Lumbar Spine Without Contrast  Result Date: 3/28/2025  Impression: 1.No acute osseous abnormality of the lumbar spine. 2.Postsurgical changes of L4-L5 lumbar fusion without evidence of hardware complication. 3.Multilevel degenerative disc disease and facet arthropathy with stepwise retrolisthesis of L1-L2, L2-L3, and L3-L4. 4.Osseous neuroforaminal  narrowing bilaterally at L3-L4 secondary to retrolisthesis, facet arthropathy and endplate osseous spurring. This is right worse than left. Electronically Signed: Ricardo Hebert  3/28/2025 4:34 PM EDT  Workstation ID: [id number]    CT Thoracic Spine Without Contrast  Result Date: 3/28/2025  Impression: 1.No acute fracture or traumatic malalignment identified. 2.Multilevel degenerative changes as described above. Electronically Signed: Shadi Billings MD  3/28/2025 4:29 PM EDT  Workstation ID: [id number]    CT Cervical Spine Without Contrast  Result Date: 3/28/2025  Impression: 1.Changes from C5 corpectomy with C3-C7 ACDF. Hardware appears intact. 2.Moderate multilevel degenerative changes as described above. Electronically Signed: Shadi Billings MD  3/28/2025 4:20 PM EDT  Workstation ID: [id number]                                      Due to significant overcrowding in the emergency department patient was evaluated by myself in a hallway bed.  This examination might be limited by privacy concerns, noise, exposure issues, and the patient not wearing a hospital gown.  Explained to the patient our limitations and our overcrowding.  They were in agreement to continue the exam and treatment at this time.              Medical Decision Making  Pain was controlled in the emergency department patient was cautiously hydrated and given ceftriaxone pending urine culture results the case was discussed with primary care provider on-call the patient will be admitted the patient will have physical and occupational therapy evaluations.  The patient was stable at time of dictation and agreeable with this plan of treatment    Amount and/or Complexity of Data Reviewed  Labs: ordered.  Radiology: ordered.    Risk  Prescription drug management.        Final diagnoses:   DDD (degenerative disc disease), cervical   Cervical osteophyte   Cervical spondylosis   Cervical stenosis of spinal canal   Spondylosis of thoracic region without  myelopathy or radiculopathy   Degeneration of intervertebral disc of lumbar region with discogenic back pain and lower extremity pain   Lumbar spondylosis   Neuroforaminal stenosis of lumbar spine   Neural foraminal stenosis of cervical spine   Acute urinary tract infection       ED Disposition  ED Disposition       ED Disposition   Decision to Admit    Condition   --    Comment   Level of Care: Telemetry [5]   Diagnosis: Acute urinary tract infection [[id number]]   Admitting Physician: ZAIDA DAVIS [[id number]]   Attending Physician: ZAIDA DAVIS [[id number]]                 No follow-up provider specified.       Medication List      No changes were made to your prescriptions during this visit.            Simeon Young MD  03/28/25 [id number]

## 2025-03-29 PROBLEM — M54.2 NECK PAIN: Status: ACTIVE | Noted: 2025-03-29

## 2025-03-29 PROCEDURE — 25010000002 CEFTRIAXONE PER 250 MG: Performed by: INTERNAL MEDICINE

## 2025-03-29 PROCEDURE — 25010000002 DEXAMETHASONE PER 1 MG: Performed by: INTERNAL MEDICINE

## 2025-03-29 PROCEDURE — 97162 PT EVAL MOD COMPLEX 30 MIN: CPT

## 2025-03-29 PROCEDURE — 25010000002 HYDROMORPHONE PER 4 MG: Performed by: INTERNAL MEDICINE

## 2025-03-29 RX ORDER — DEXAMETHASONE SODIUM PHOSPHATE 4 MG/ML
6 INJECTION, SOLUTION INTRA-ARTICULAR; INTRALESIONAL; INTRAMUSCULAR; INTRAVENOUS; SOFT TISSUE ONCE
Status: COMPLETED | OUTPATIENT
Start: 2025-03-29 | End: 2025-03-29

## 2025-03-29 RX ORDER — LIDOCAINE 4 G/G
1 PATCH TOPICAL
Status: DISCONTINUED | OUTPATIENT
Start: 2025-03-29 | End: 2025-04-08 | Stop reason: HOSPADM

## 2025-03-29 RX ORDER — SERTRALINE HYDROCHLORIDE 100 MG/1
100 TABLET, FILM COATED ORAL NIGHTLY
Status: DISCONTINUED | OUTPATIENT
Start: 2025-03-29 | End: 2025-04-08 | Stop reason: HOSPADM

## 2025-03-29 RX ADMIN — CEFTRIAXONE 2000 MG: 2 INJECTION, POWDER, FOR SOLUTION INTRAMUSCULAR; INTRAVENOUS at 12:21

## 2025-03-29 RX ADMIN — HYDROMORPHONE HYDROCHLORIDE 0.5 MG: 1 INJECTION, SOLUTION INTRAMUSCULAR; INTRAVENOUS; SUBCUTANEOUS at 15:05

## 2025-03-29 RX ADMIN — Medication 10 ML: at 21:35

## 2025-03-29 RX ADMIN — SERTRALINE HYDROCHLORIDE 100 MG: 100 TABLET, FILM COATED ORAL at 21:26

## 2025-03-29 RX ADMIN — HYDROMORPHONE HYDROCHLORIDE 0.5 MG: 1 INJECTION, SOLUTION INTRAMUSCULAR; INTRAVENOUS; SUBCUTANEOUS at 08:20

## 2025-03-29 RX ADMIN — DEXAMETHASONE SODIUM PHOSPHATE 6 MG: 4 INJECTION, SOLUTION INTRAMUSCULAR; INTRAVENOUS at 12:22

## 2025-03-29 RX ADMIN — SERTRALINE HYDROCHLORIDE 100 MG: 100 TABLET, FILM COATED ORAL at 00:23

## 2025-03-29 RX ADMIN — HYDROMORPHONE HYDROCHLORIDE 0.5 MG: 1 INJECTION, SOLUTION INTRAMUSCULAR; INTRAVENOUS; SUBCUTANEOUS at 04:51

## 2025-03-29 RX ADMIN — LIDOCAINE 1 PATCH: 4 PATCH TOPICAL at 12:22

## 2025-03-29 RX ADMIN — APIXABAN 5 MG: 5 TABLET, FILM COATED ORAL at 08:20

## 2025-03-29 RX ADMIN — HYDROMORPHONE HYDROCHLORIDE 0.5 MG: 1 INJECTION, SOLUTION INTRAMUSCULAR; INTRAVENOUS; SUBCUTANEOUS at 21:26

## 2025-03-29 RX ADMIN — LEVOTHYROXINE SODIUM 75 MCG: 0.07 TABLET ORAL at 05:10

## 2025-03-29 RX ADMIN — HYDROMORPHONE HYDROCHLORIDE 0.5 MG: 1 INJECTION, SOLUTION INTRAMUSCULAR; INTRAVENOUS; SUBCUTANEOUS at 18:17

## 2025-03-29 RX ADMIN — SENNOSIDES AND DOCUSATE SODIUM 2 TABLET: 8.6; 5 TABLET ORAL at 08:20

## 2025-03-29 RX ADMIN — HYDROMORPHONE HYDROCHLORIDE 0.5 MG: 1 INJECTION, SOLUTION INTRAMUSCULAR; INTRAVENOUS; SUBCUTANEOUS at 12:22

## 2025-03-29 RX ADMIN — Medication 10 ML: at 08:21

## 2025-03-29 RX ADMIN — OXYCODONE HYDROCHLORIDE 10 MG: 5 TABLET ORAL at 10:50

## 2025-03-29 NOTE — PLAN OF CARE
Goal Outcome Evaluation:      Pt alert and oriented. Pt complaints of pain treated with IV medication. Pt up to bathroom with two person assist, gait very unsteady. VS stable. Plan of care ongoing.

## 2025-03-29 NOTE — THERAPY EVALUATION
Patient Name: Domonique See  : 1940    MRN: [id number]                              Today's Date: 3/29/2025       Admit Date: 3/28/2025    Visit Dx:     ICD-10-CM ICD-9-CM   1. DDD (degenerative disc disease), cervical  M50.30 722.4   2. Cervical osteophyte  M25.78 721.8   3. Cervical spondylosis  M47.812 721.0   4. Cervical stenosis of spinal canal  M48.02 723.0   5. Spondylosis of thoracic region without myelopathy or radiculopathy  M47.814 721.2   6. Degeneration of intervertebral disc of lumbar region with discogenic back pain and lower extremity pain  M51.362 722.52   7. Lumbar spondylosis  M47.816 721.3   8. Neuroforaminal stenosis of lumbar spine  M48.061 724.02   9. Neural foraminal stenosis of cervical spine  M48.02 723.0   10. Acute urinary tract infection  N39.0 599.0     Patient Active Problem List   Diagnosis    Iron deficiency anemia    Anemia, unspecified    Pacemaker    Paroxysmal atrial fibrillation    Bradycardia    Abnormal result of cardiovascular function study    Cervical disc disorder with radiculopathy    Cervical radiculopathy    Cervical stenosis of spinal canal    Cholecystitis    CHF (congestive heart failure)    Deep vein thrombosis (DVT)    Dyspnea    Family history of diabetes mellitus    Fibromyositis    Hyperlipidemia    Essential hypertension    Hypertrophic obstructive cardiomyopathy    Lumbar radiculopathy    Primary localized osteoarthritis    Sacroiliitis, not elsewhere classified    Spondylolisthesis, acquired    Primary osteoarthritis of right knee    Morbid obesity    Primary osteoarthritis of both knees    Obesity (BMI 30.0-34.9)    Hx of hypertrophic cardiomyopathy    Coronary artery disease    Sleep apnea    CHF exacerbation    Acute midline low back pain without sciatica    Hypertensive retinopathy    Altered mental status, unspecified altered mental status type    Acute urinary tract infection    Neck pain     Past Medical History:   Diagnosis Date     Anemia     CHF (congestive heart failure)     Congenital heart disease     Coronary artery disease 2013    Deep vein thrombosis (DVT) 11/20/2017    GERD (gastroesophageal reflux disease)     Heart murmur Don't know    Hx of hypertrophic cardiomyopathy     Hypertension     Peptic ulceration     Persistent atrial fibrillation 01/10/2023    Primary osteoarthritis of both knees 02/08/2021    Primary osteoarthritis of right knee 09/21/2020    Sleep apnea 2017     Past Surgical History:   Procedure Laterality Date    ABDOMINAL HERNIA REPAIR      ABLATION OF DYSRHYTHMIC FOCUS  01-    BACK SURGERY      BREAST AUGMENTATION      CARDIAC CATHETERIZATION      CARDIAC ELECTROPHYSIOLOGY PROCEDURE N/A 01/16/2023    Procedure: Ablation atrial fibrillation and flutter, cryo Utong and Ramón aware;  Surgeon: Leeanne Pleitez MD;  Location: [facility] CATH INVASIVE LOCATION;  Service: Cardiovascular;  Laterality: N/A;    CARDIAC PACEMAKER PLACEMENT  03/16/2017    Dual Chamber    CERVICAL RIB RESECTION Bilateral     CERVICAL RIB RESECTION Bilateral 1963    bilateral cervical ribs removed - extra ribs located and causing pain    CHOLECYSTECTOMY      COLONOSCOPY      CORONARY ARTERY BYPASS GRAFT  2013    THORACIC DECOMPRESSION POSTERIOR FUSION  06/04/2014    L3-5 & S1    UPPER GASTROINTESTINAL ENDOSCOPY        General Information       Row Name 03/29/25 1917          Physical Therapy Time and Intention    Document Type evaluation  -EL     Mode of Treatment individual therapy;physical therapy  -EL       Row Name 03/29/25 1917          General Information    Prior Level of Function independent:;all household mobility;ADL's  Reports decline within the last month  -EL       Row Name 03/29/25 1917          Living Environment    Current Living Arrangements home  -EL     People in Home child(su), adult  -EL       Row Name 03/29/25 1917          Home Main Entrance    Number of Stairs, Main Entrance none  -EL       Row Name 03/29/25 1917           Stairs Within Home, Primary    Number of Stairs, Within Home, Primary seven;other (see comments)  can remain on main level if needed  -EL       Row Name 03/29/25 1917          Cognition    Orientation Status (Cognition) oriented x 4  -EL       Row Name 03/29/25 1917          Safety Issues/Impairments Affecting Functional Mobility    Impairments Affecting Function (Mobility) pain;strength;balance;endurance/activity tolerance  -EL               User Key  (r) = Recorded By, (t) = Taken By, (c) = Cosigned By      Initials Name Provider Type    Americo Dunn, CASE Physical Therapist                   Mobility       Row Name 03/29/25 1918          Bed Mobility    Bed Mobility supine-sit  -EL     Supine-Sit [place] (Bed Mobility) contact guard  -EL     Assistive Device (Bed Mobility) bed rails  -EL       Row Name 03/29/25 1918          Bed-Chair Transfer    Bed-Chair [place] (Transfers) minimum assist (75% patient effort)  -EL     Assistive Device (Bed-Chair Transfers) walker, front-wheeled  -EL       Row Name 03/29/25 1918          Sit-Stand Transfer    Sit-Stand [place] (Transfers) minimum assist (75% patient effort)  -EL     Assistive Device (Sit-Stand Transfers) walker, front-wheeled  -EL       Row Name 03/29/25 1918          Gait/Stairs (Locomotion)    [place] Level (Gait) minimum assist (75% patient effort)  -EL     Assistive Device (Gait) walker, front-wheeled  -EL     Patient was able to Ambulate yes  -EL     Distance in Feet (Gait) 30  -EL     Deviations/Abnormal Patterns (Gait) gait speed decreased  -EL     Bilateral Gait Deviations forward flexed posture  -EL     Comment, (Gait/Stairs) Mild balance deficits, reliant on RWx  -EL               User Key  (r) = Recorded By, (t) = Taken By, (c) = Cosigned By      Initials Name Provider Type    Americo Dunn PT Physical Therapist                   Obj/Interventions       Row Name 03/29/25 1919          Range of Motion Comprehensive    General  Range of Motion bilateral lower extremity ROM WFL  -EL       Row Name 03/29/25 1919          Strength Comprehensive (MMT)    General Manual Muscle Testing (MMT) Assessment lower extremity strength deficits identified  -EL     Comment, General Manual Muscle Testing (MMT) Assessment RLE 3+/5 gross, LLE 4-/5 gross  -EL       Row Name 03/29/25 1919          Sensory Assessment (Somatosensory)    Sensory Assessment (Somatosensory) other (see comments)  reports baseline radiculopathy in Dorian arms  -EL               User Key  (r) = Recorded By, (t) = Taken By, (c) = Cosigned By      Initials Name Provider Type    Americo Dunn, PT Physical Therapist                   Goals/Plan       Row Name 03/29/25 1922          Bed Mobility Goal 1 (PT)    Activity/Assistive Device (Bed Mobility Goal 1, PT) bed mobility activities, all  -EL     [place] Level/Cues Needed (Bed Mobility Goal 1, PT) modified independence  -EL     Time Frame (Bed Mobility Goal 1, PT) long term goal (LTG);2 weeks  -       Row Name 03/29/25 1922          Transfer Goal 1 (PT)    Activity/Assistive Device (Transfer Goal 1, PT) transfers, all  -EL     [place] Level/Cues Needed (Transfer Goal 1, PT) modified independence  -EL     Time Frame (Transfer Goal 1, PT) long term goal (LTG);2 weeks  -       Row Name 03/29/25 1922          Gait Training Goal 1 (PT)    Activity/Assistive Device (Gait Training Goal 1, PT) gait (walking locomotion);walker, rolling  -EL     [place] Level (Gait Training Goal 1, PT) modified independence  -EL     Distance (Gait Training Goal 1, PT) 150  -EL     Time Frame (Gait Training Goal 1, PT) long term goal (LTG);2 weeks  -EL       Row Name 03/29/25 1922          Therapy Assessment/Plan (PT)    Planned Therapy Interventions (PT) neuromuscular re-education;balance training;bed mobility training;transfer training;patient/family education;strengthening;gait training  -EL               User Key  (r) = Recorded By, (t) = Taken  By, (c) = Cosigned By      Initials Name Provider Type    Americo Dunn, PT Physical Therapist                   Clinical Impression       Row Name 03/29/25 1920          Pain    Pretreatment Pain Rating 8/10  -EL     Posttreatment Pain Rating 8/10  -EL     Pain Location back;neck;extremity  -EL     Pain Side/Orientation right;lower  -EL       Row Name 03/29/25 1920          Plan of Care Review    Plan of Care Reviewed With patient  -EL     Outcome Evaluation Pt is an 84 YO F admitted with pain, and gait disturbance. Pt reports significnat pain in neck and back, with hx of spinal sx. Pt states within the last month she has had a decline, from independent to difficulty with short distance ambualtion. Pt reports living at home wiht son, has tri level home with 7 steps to other levels but can remain on first floor if needed. Pt this date requiring MIN A for transfers and ambulation. Pt ambulating slowly and reliant on RWx. This is well below pt baseline and recommendaiton is acute IP rehab at d/c. PT to follow while admitted.  -EL       Row Name 03/29/25 1920          Therapy Assessment/Plan (PT)    Rehab Potential (PT) good  -EL     Criteria for Skilled Interventions Met (PT) yes  -EL     Therapy Frequency (PT) 5 times/wk  -EL     Predicted Duration of Therapy Intervention (PT) Until d/c  -EL       Row Name 03/29/25 1920          Vital Signs    O2 Delivery Pre Treatment room air  -EL     O2 Delivery Intra Treatment room air  -EL     O2 Delivery Post Treatment room air  -EL     Pre Patient Position Supine  -EL     Intra Patient Position Standing  -EL     Post Patient Position Sitting  -EL       Row Name 03/29/25 1920          Positioning and Restraints    Pre-Treatment Position in bed  -EL     Post Treatment Position chair  -EL     In Chair notified nsg;reclined;call light within reach;exit alarm on;encouraged to call for assist  -EL               User Key  (r) = Recorded By, (t) = Taken By, (c) = Cosigned By       Initials Name Provider Type    Americo Dunn, PT Physical Therapist                   Outcome Measures       Row Name 03/29/25 1922 03/29/25 0820       How much help from another person do you currently need...    Turning from your back to your side while in flat bed without using bedrails? 3  -EL 3  -HT    Moving from lying on back to sitting on the side of a flat bed without bedrails? 3  -EL 3  -HT    Moving to and from a bed to a chair (including a wheelchair)? 3  -EL 2  -HT    Standing up from a chair using your arms (e.g., wheelchair, bedside chair)? 3  -EL 2  -HT    Climbing 3-5 steps with a railing? 2  -EL 2  -HT    To walk in hospital room? 3  -EL 2  -HT    AM-PAC 6 Clicks Score (PT) 17  -EL 14  -HT    Highest Level of Mobility Goal 5 --> Static standing  -EL 4 --> Transfer to chair/commode  -HT      Row Name 03/29/25 1922          Functional Assessment    Outcome Measure Options AM-PAC 6 Clicks Basic Mobility (PT)  -               User Key  (r) = Recorded By, (t) = Taken By, (c) = Cosigned By      Initials Name Provider Type    Americo Dunn, CASE Physical Therapist     Dahlia Campo, RN Registered Nurse                                 Physical Therapy Education       Title: PT OT SLP Therapies (Done)       Topic: Physical Therapy (Done)       Point: Mobility training (Done)       Learning Progress Summary            Patient Acceptance, E,TB, VU by  at 3/29/2025 1923                      Point: Precautions (Done)       Learning Progress Summary            Patient Acceptance, E,TB, VU by  at 3/29/2025 1923                                      User Key       Initials Effective Dates Name Provider Type Discipline     06/23/20 -  Americo Rodriguez, PT Physical Therapist PT                  PT Recommendation and Plan  Planned Therapy Interventions (PT): neuromuscular re-education, balance training, bed mobility training, transfer training, patient/family education, strengthening, gait training  Outcome  Evaluation: Pt is an 84 YO F admitted with pain, and gait disturbance. Pt reports significnat pain in neck and back, with hx of spinal sx. Pt states within the last month she has had a decline, from independent to difficulty with short distance ambualtion. Pt reports living at home wiht son, has tri level home with 7 steps to other levels but can remain on first floor if needed. Pt this date requiring MIN A for transfers and ambulation. Pt ambulating slowly and reliant on RWx. This is well below pt baseline and recommendaiton is acute IP rehab at d/c. PT to follow while admitted.     Time Calculation:   PT Evaluation Complexity  History, PT Evaluation Complexity: 1-2 personal factors and/or comorbidities  Examination of Body Systems (PT Eval Complexity): total of 3 or more elements  Clinical Presentation (PT Evaluation Complexity): evolving  Clinical Decision Making (PT Evaluation Complexity): moderate complexity  Overall Complexity (PT Evaluation Complexity): moderate complexity     PT Charges       Row Name 03/29/25 1923             Time Calculation    Start Time 0930  -EL      Stop Time 0957  -EL      Time Calculation (min) 27 min  -EL      PT Received On 03/29/25  -EL      PT - Next Appointment 03/31/25  -EL      PT Goal Re-Cert Due Date 04/12/25  -EL                User Key  (r) = Recorded By, (t) = Taken By, (c) = Cosigned By      Initials Name Provider Type    Americo Dunn PT Physical Therapist                  Therapy Charges for Today       Code Description Service Date Service Provider Modifiers Qty    98125375695 HC PT EVAL MOD COMPLEXITY 4 3/29/2025 Americo Rodriguez PT GP 1            PT G-Codes  Outcome Measure Options: AM-PAC 6 Clicks Basic Mobility (PT)  AM-PAC 6 Clicks Score (PT): 17  PT Discharge Summary  Anticipated Discharge Disposition (PT): inpatient rehabilitation facility    Americo Rodriguez PT  3/29/2025

## 2025-03-29 NOTE — CONSULTS
[facility]   Consult Note    Patient Name: Domonique See  : 1940  MRN: [id number]  Primary Care Physician:  Anish Lew APRN  Referring Physician: No Known Provider  Date of admission: 3/28/2025    Subjective   Subjective     Reason for Consult/ Chief Complaint: Neck and upper extremity pain    HPI:  Domonique See is a 85 y.o. female with a history of cervical decompression and fusion as well as lumbar decompression and fusion performed over a decade ago who has about a 2-year history of worsening neck pain as well as upper extremity pain right worse than left.  She describes the pain as radiating from her neck into her shoulders and down her arms into her hands.  This is worsened over the course of time.  She denies any difficulty using her hands or dropping things.  No weakness.  Patient also complains of about a 1 to 2-month history of worsening balance issues and some feelings of her legs giving out from underneath her.  No overt weakness that she notes.  No bowel or bladder issues.  She has not undergone any recent conservative measures for her neck or low back    Review of Systems   All systems were reviewed and negative except for: Neck and arm pain balance issues    Personal History     Past Medical History:   Diagnosis Date    Anemia     CHF (congestive heart failure)     Congenital heart disease     Coronary artery disease     Deep vein thrombosis (DVT) 2017    GERD (gastroesophageal reflux disease)     Heart murmur Don't know    Hx of hypertrophic cardiomyopathy     Hypertension     Peptic ulceration     Persistent atrial fibrillation 01/10/2023    Primary osteoarthritis of both knees 2021    Primary osteoarthritis of right knee 2020    Sleep apnea        Past Surgical History:   Procedure Laterality Date    ABDOMINAL HERNIA REPAIR      ABLATION OF DYSRHYTHMIC FOCUS  2023    BACK SURGERY      BREAST AUGMENTATION      CARDIAC CATHETERIZATION       CARDIAC ELECTROPHYSIOLOGY PROCEDURE N/A 01/16/2023    Procedure: Ablation atrial fibrillation and flutter, cryo Duong and Ramón aware;  Surgeon: Leeanne Pleitez MD;  Location: [facility] INVASIVE LOCATION;  Service: Cardiovascular;  Laterality: N/A;    CARDIAC PACEMAKER PLACEMENT  03/16/2017    Dual Chamber    CERVICAL RIB RESECTION Bilateral     CERVICAL RIB RESECTION Bilateral 1963    bilateral cervical ribs removed - extra ribs located and causing pain    CHOLECYSTECTOMY      COLONOSCOPY      CORONARY ARTERY BYPASS GRAFT  2013    THORACIC DECOMPRESSION POSTERIOR FUSION  06/04/2014    L3-5 & S1    UPPER GASTROINTESTINAL ENDOSCOPY         Family History: family history includes Colon cancer in her brother, daughter, and mother; Heart attack in her brother. Otherwise pertinent FHx was reviewed and not pertinent to current issue.    Social History:  reports that she quit smoking about 40 years ago. Her smoking use included cigarettes. She started smoking about 48 years ago. She has a 8 pack-year smoking history. She has been exposed to tobacco smoke. She has never used smokeless tobacco. She reports that she does not drink alcohol and does not use drugs.    Home Medications:  NON FORMULARY, apixaban, aspirin, diclofenac sodium, fluticasone, gabapentin, levothyroxine, oxyCODONE-acetaminophen, and sertraline    Allergies:  No Known Allergies    Objective    Objective     Vitals:   Temp:  [97.6 °F (36.4 °C)-98.9 °F (37.2 °C)] 97.7 °F (36.5 °C)  Heart Rate:  [] 70  Resp:  [16-18] 18  BP: ()/(50-94) 153/88  Flow (L/min) (Oxygen Therapy):  [2] 2    Physical Exam:  Alert and oriented  Extraocular movements intact  Full strength bilateral upper and lower extremities  Sensation intact upper and lower extremities  No David sign  No clonus  No lower extremity edema  No respiratory distress  Skin intact    Result Review    Result Review:  I have personally reviewed the results from the time of this  admission to 3/29/2025 11:33 EDT and agree with these findings:  []  Laboratory list / accordion  []  Microbiology  [x]  Radiology  []  EKG/Telemetry   []  Cardiology/Vascular   []  Pathology  []  Old records  []  Other:  Most notable findings include: CT cervical thoracic and lumbar spine: Previous anterior instrumented fusion from C3-C7.  There is spondylolisthesis of C7-T1.  In comparison to previous MRI from 2021 this appears approximately stable.  No high-grade bony stenosis.  Multilevel foraminal stenosis.  Thoracic CT scan demonstrates multilevel degenerative changes without high-grade central or foraminal bony stenosis.  CT of the lumbar spine demonstrated instrumented fusion at L4-5 which appears to have a solid fusion.  There is adjacent segment disease above with retrolisthesis of L3 on 4.  No high-grade central bony stenosis.  Degenerative changes throughout.    Assessment & Plan   Assessment / Plan     Brief Patient Summary:  Domonique See is a 85 y.o. female who presents with cervical radiculopathy as well as possibly cervical or thoracic myelopathy    Active Hospital Problems:  Active Hospital Problems    Diagnosis     **Acute urinary tract infection      Plan:   -Patient is neurologically intact  -No hardware complications or obvious severe central stenosis on CT scans of the cervical thoracic and lumbar spine  -MRI cervical and thoracic spine to rule out spinal cord compression  -If no high-grade central stenosis on MRIs, would recommend cervical MICHELLE for treatment of neck pain and radiculopathy  -Follow-up after MRIs    Lake Bernal IV, MD

## 2025-03-29 NOTE — PLAN OF CARE
Goal Outcome Evaluation:  Plan of Care Reviewed With: patient           Outcome Evaluation: Pt is an 86 YO F admitted with pain, and gait disturbance. Pt reports significnat pain in neck and back, with hx of spinal sx. Pt states within the last month she has had a decline, from independent to difficulty with short distance ambualtion. Pt reports living at home wiht son, has tri level home with 7 steps to other levels but can remain on first floor if needed. Pt this date requiring MIN A for transfers and ambulation. Pt ambulating slowly and reliant on RWx. This is well below pt baseline and recommendaiton is acute IP rehab at d/c. PT to follow while admitted.    Anticipated Discharge Disposition (PT): inpatient rehabilitation facility

## 2025-03-29 NOTE — H&P
Patient Care Team:  Anish Lew APRN as PCP - General  Evgeny Gregory MD as Consulting Physician (Cardiology)  Leeanne Pleitez MD as Consulting Physician (Cardiology)  Radha Owusu APRN as Nurse Practitioner (Nurse Practitioner)  Kathe Muñoz MD as Consulting Physician (Nephrology)    Chief complaint neck and shoulder pain    Subjective     Patient is a 85 y.o. female with known degenerative disc disease, spinal stenosis, osteoarthritis who presents with worsening of neck pain with neuropathic symptoms radiating to both arms, right greater than left.  Symptoms have been progressively worse over the last 3 to 4 weeks..  She describes it as numbness, tingling, burning sensation that radiates from her neck down both arms primarily in an ulnar distribution.  She denies any weakness.  She has not had relief with oral pain medicines or gabapentin.  She has noted trouble with balance as well.  She denies any fever, chills, rash.  She has had previous fusion of C3-7 and L3-4.  No falls or other injury.    She denies any urinary symptoms.    Review of Systems   Constitutional:  Positive for activity change and fatigue. Negative for appetite change, chills, diaphoresis and fever.   HENT:  Negative for congestion and nosebleeds.    Eyes:  Negative for visual disturbance.   Respiratory:  Negative for cough and shortness of breath.    Cardiovascular:  Negative for chest pain, palpitations and leg swelling.   Gastrointestinal:  Negative for abdominal pain, constipation, diarrhea, nausea and vomiting.   Endocrine: Negative for polyuria.   Genitourinary:  Negative for dysuria.   Musculoskeletal:  Positive for arthralgias, back pain, gait problem, myalgias and neck pain.   Skin:  Negative for rash and wound.   Neurological:  Positive for numbness. Negative for dizziness, tremors, seizures, syncope, speech difficulty, weakness, light-headedness and headaches.   Psychiatric/Behavioral:  Negative  for confusion.           History  Past Medical History:   Diagnosis Date    Anemia     CHF (congestive heart failure)     Congenital heart disease     Coronary artery disease     Deep vein thrombosis (DVT) 2017    GERD (gastroesophageal reflux disease)     Heart murmur Don't know    Hx of hypertrophic cardiomyopathy     Hypertension     Peptic ulceration     Persistent atrial fibrillation 01/10/2023    Primary osteoarthritis of both knees 2021    Primary osteoarthritis of right knee 2020    Sleep apnea      Past Surgical History:   Procedure Laterality Date    ABDOMINAL HERNIA REPAIR      ABLATION OF DYSRHYTHMIC FOCUS  2023    BACK SURGERY      BREAST AUGMENTATION      CARDIAC CATHETERIZATION      CARDIAC ELECTROPHYSIOLOGY PROCEDURE N/A 2023    Procedure: Ablation atrial fibrillation and flutter, cryo Utong and Ramón aware;  Surgeon: Leeanne Pleitez MD;  Location: [facility] CATH INVASIVE LOCATION;  Service: Cardiovascular;  Laterality: N/A;    CARDIAC PACEMAKER PLACEMENT  2017    Dual Chamber    CERVICAL RIB RESECTION Bilateral     CERVICAL RIB RESECTION Bilateral 1963    bilateral cervical ribs removed - extra ribs located and causing pain    CHOLECYSTECTOMY      COLONOSCOPY      CORONARY ARTERY BYPASS GRAFT  2013    THORACIC DECOMPRESSION POSTERIOR FUSION  2014    L3-5 & S1    UPPER GASTROINTESTINAL ENDOSCOPY       Family History   Problem Relation Age of Onset    Colon cancer Mother     Colon cancer Brother     Heart attack Brother     Colon cancer Daughter      Social History     Tobacco Use    Smoking status: Former     Current packs/day: 0.00     Average packs/day: 1 pack/day for 8.0 years (8.0 ttl pk-yrs)     Types: Cigarettes     Start date: 1977     Quit date: 1985     Years since quittin.2     Passive exposure: Past    Smokeless tobacco: Never    Tobacco comments:     Quit    Vaping Use    Vaping status: Never Used   Substance Use Topics     Alcohol use: Never     Comment: 6 drinks a year in social settings    Drug use: Never     Medications Prior to Admission   Medication Sig Dispense Refill Last Dose/Taking    aspirin 81 MG EC tablet Take 1 tablet by mouth Every Night. 90 tablet 3 3/27/2025 Bedtime    diclofenac sodium (VOTAREN XR) 100 MG 24 hr tablet Take 1 tablet by mouth Daily.   Taking    Eliquis 5 MG tablet tablet TAKE 1 TABLET BY MOUTH TWICE DAILY 180 tablet 3 3/28/2025 Morning    fluticasone (FLONASE) 50 MCG/ACT nasal spray Administer 1 spray into the nostril(s) as directed by provider Daily.   Taking    gabapentin (NEURONTIN) 100 MG capsule Take 1-2 capsules by mouth Every Night.   Taking    NON FORMULARY 2 mg Every 7 (Seven) Days. Semaglutide 5mg/ml   3/24/2025    oxyCODONE-acetaminophen (PERCOCET) 7.5-325 MG per tablet Take 1 tablet by mouth 6 (Six) Times a Day.   Taking    sertraline (ZOLOFT) 100 MG tablet Take 1 tablet by mouth Every Evening. Indications: Major Depressive Disorder   3/27/2025    Synthroid 75 MCG tablet Take 1 tablet by mouth Every Morning.   Taking     Allergies:  Patient has no known allergies.    Objective     Vital Signs  Temp:  [97.3 °F (36.3 °C)-98.9 °F (37.2 °C)] 97.3 °F (36.3 °C)  Heart Rate:  [] 73  Resp:  [16-18] 16  BP: ()/(50-94) 141/80     Physical Exam:      General Appearance:    Alert, cooperative, in no acute distress, fully oriented, uncomfortable, sitting up in recliner   Head:    Normocephalic, without obvious abnormality, atraumatic   Eyes:            Lids and lashes normal, conjunctivae and sclerae normal, no   icterus, no pallor, corneas clear, PERRLA   Ears:    Ears appear intact with no abnormalities noted   Throat:   No oral lesions, no thrush, oral mucosa moist   Neck:   No adenopathy, supple, trachea midline, no thyromegaly, no   carotid bruit, no JVD   Lungs:     Clear to auscultation,respirations regular, even and                  unlabored    Heart:    Regular rhythm and normal  rate, normal S1 and S2, no            murmur, no gallop, no rub, no click   Chest Wall:    No abnormalities observed   Abdomen:     Normal bowel sounds, no masses, no organomegaly, soft        non-tender, non-distended, no guarding, no rebound                tenderness   Extremities:   Moves all extremities well, no edema, no cyanosis, no             redness   Pulses:   Pulses palpable and equal bilaterally   Skin:   No bleeding, bruising or rash   Lymph nodes:   No palpable adenopathy   Neurologic:   Cranial nerves 2 - 12 grossly intact, sensation intact, DTR       present and equal bilaterally       Results Review:     Imaging Results (Last 24 Hours)       Procedure Component Value Units Date/Time    CT Lumbar Spine Without Contrast [[id number]] Collected: 03/28/25 1627     Updated: 03/28/25 1636    Narrative:      CT LUMBAR SPINE WO CONTRAST    Date of Exam: 3/28/2025 3:36 PM EDT    Indication: Increasing mid and lower back pain history of recent falls.    Comparison: Lumbar spine MRI dated 6/6/2023, CT lumbar spine dated 4/28/2023    Technique: Axial CT images were obtained of the lumbar spine without contrast administration.  Sagittal and coronal reconstructions were performed.  Automated exposure control and iterative reconstruction methods were used.      Findings:  There is 3 mm retrolisthesis of L1 on L2 and 4 mm of retrolisthesis of L2 on L3 and 5 mm of retrolisthesis of L3 on L4 and stepwise fashion. This is not significantly changed from the prior examination. There are postsurgical changes of L4-L5 lumbar   fusion with bilateral pedicle screws and vertical stabilization rods. The intervertebral spacer is in expected position. There is no evidence of subsidence. The vertebral body heights are maintained without evidence of acute fracture. There is multilevel   severe disc height loss most notable at T12-L1, L1-L2 and L3-L4. There is mild disc height loss at L5-S1. There is extensive degenerative facet  arthropathy spanning from L3-L4 through L5-S1. There is osseous neuroforaminal narrowing bilaterally at L3-L4   secondary to retrolisthesis, facet arthropathy and endplate osseous spurring. This is right worse than left. There is no high-grade osseous spinal canal stenosis. The posterior paraspinal musculature appears symmetric with atrophy. Visualized portions   of the retroperitoneum appear normal.      Impression:      Impression:  1.No acute osseous abnormality of the lumbar spine.  2.Postsurgical changes of L4-L5 lumbar fusion without evidence of hardware complication.  3.Multilevel degenerative disc disease and facet arthropathy with stepwise retrolisthesis of L1-L2, L2-L3, and L3-L4.  4.Osseous neuroforaminal narrowing bilaterally at L3-L4 secondary to retrolisthesis, facet arthropathy and endplate osseous spurring. This is right worse than left.        Electronically Signed: Ricardo Hebert    3/28/2025 4:34 PM EDT    Workstation ID: [id number]    CT Thoracic Spine Without Contrast [[id number]] Collected: 03/28/25 1620     Updated: 03/28/25 1631    Narrative:      CT THORACIC SPINE WO CONTRAST    Date of Exam: 3/28/2025 3:36 PM EDT    Indication: History of fall and axial load upper half of dorsal spine pain.    Comparison: Thoracic spine radiographs from March 25, 2025    Technique: Axial CT images were obtained of the thoracic spine without contrast administration.  Sagittal and coronal reconstructions were performed.  Automated exposure control and iterative reconstruction methods were used.      Findings:  No acute fracture is identified. The alignment is anatomic. The vertebral body heights appear normal. There are moderate discogenic changes at T1-2, T2-3, T10-11, and T11-12. There is mild to moderate scattered facet arthropathy. The spinal canal is   widely patent throughout. There are scattered areas of neural foraminal stenosis most severe on the right at T8-9. The posterior paravertebral soft tissues  are unremarkable.      Impression:      Impression:  1.No acute fracture or traumatic malalignment identified.  2.Multilevel degenerative changes as described above.        Electronically Signed: Shadi Billings MD    3/28/2025 4:29 PM EDT    Workstation ID: [id number]    CT Cervical Spine Without Contrast [[id number]] Collected: 03/28/25 1607     Updated: 03/28/25 1622    Narrative:        CT CERVICAL SPINE WO CONTRAST    Date of Exam: 3/28/2025 3:36 PM EDT    Indication: Neck pain decreasing mobility radicular discomfort.    Comparison: Cervical spine radiographs from March 25, 2025 and MRI cervical spine from September 23, 2021    Technique: Axial CT images were obtained of the cervical spine without contrast administration.  Sagittal and coronal reconstructions were performed.  Automated exposure control and iterative reconstruction methods were used.      Findings:  No acute fracture is identified. The craniocervical junction appears intact. There is some anterolisthesis of C7 on T1. There are changes from C5 corpectomy with cage body graft with anterior cervical discectomy and fusion at C3-C7. The hardware appears   intact. There are advanced discogenic changes at T1-T2. The prevertebral soft tissues are unremarkable.    C2-3: Severe bilateral facet arthropathy. Moderate bilateral osteophytes. No spinal canal stenosis. Severe bilateral neural foraminal stenosis.    C3-4: Small central disc protrusion. Moderate bilateral facet arthropathy. Moderate bilateral uncovertebral hypertrophy. No spinal canal stenosis. Moderate bilateral neural foraminal stenosis.    C4-5: Moderate bilateral facet arthropathy. Mild right uncovertebral hypertrophy. No spinal canal stenosis. Mild right neural foraminal stenosis.    C5-6: Small right paracentral osteophyte. Moderate bilateral facet arthropathy. Moderate right uncovertebral hypertrophy. Mild spinal canal stenosis. Mild bilateral neural foraminal stenosis.    C6-7: Mild  diffuse osteophyte. Moderate bilateral facet arthropathy. Moderate right and severe left uncovertebral hypertrophy. No spinal canal stenosis. Moderate right and severe left neural foraminal stenosis.    C7-T1: Moderate bilateral facet arthropathy. Small left paracentral osteophyte. Moderate spinal canal stenosis. Mild bilateral neural foraminal stenosis.      Impression:      Impression:  1.Changes from C5 corpectomy with C3-C7 ACDF. Hardware appears intact.  2.Moderate multilevel degenerative changes as described above.        Electronically Signed: Shadi Billings MD    3/28/2025 4:20 PM EDT    Workstation ID: [id number]             Lab Results (last 24 hours)       Procedure Component Value Units Date/Time    Urine Culture - Urine, Urine, Clean Catch [[id number]]  (Normal) Collected: 03/28/25 1500    Specimen: Urine, Clean Catch Updated: 03/29/25 0809     Urine Culture Culture in progress    Urinalysis, Microscopic Only - Urine, Clean Catch [[id number]]  (Abnormal) Collected: 03/28/25 1500    Specimen: Urine, Clean Catch Updated: 03/28/25 1549     RBC, UA 6-10 /HPF      WBC, UA 21-50 /HPF      Bacteria, UA 3+ /HPF      Squamous Epithelial Cells, UA 0-2 /HPF      Transitional Epithelial Cells, UA 0-2 /HPF      Hyaline Casts, UA 3-6 /LPF      Mucus, UA Small/1+ /HPF      Methodology Manual Light Microscopy    Comprehensive Metabolic Panel [[id number]]  (Abnormal) Collected: 03/28/25 1436    Specimen: Blood from Arm, Right Updated: 03/28/25 1518     Glucose 109 mg/dL      BUN 17 mg/dL      Creatinine 0.86 mg/dL      Sodium 136 mmol/L      Potassium 4.1 mmol/L      Chloride 102 mmol/L      CO2 22.5 mmol/L      Calcium 9.8 mg/dL      Total Protein 7.2 g/dL      Albumin 4.5 g/dL      ALT (SGPT) 20 U/L      AST (SGOT) 25 U/L      Alkaline Phosphatase 97 U/L      Total Bilirubin 0.7 mg/dL      Globulin 2.7 gm/dL      A/G Ratio 1.7 g/dL      BUN/Creatinine Ratio 19.8     Anion Gap 11.5 mmol/L      eGFR 65.9 mL/min/1.73      Narrative:      GFR Categories in Chronic Kidney Disease (CKD)      GFR Category          GFR (mL/min/1.73)    Interpretation  G1                     90 or greater         Normal or high (1)  G2                      60-89                Mild decrease (1)  G3a                   45-59                Mild to moderate decrease  G3b                   30-44                Moderate to severe decrease  G4                    15-29                Severe decrease  G5                    14 or less           Kidney failure          (1)In the absence of evidence of kidney disease, neither GFR category G1 or G2 fulfill the criteria for CKD.    eGFR calculation 2021 CKD-EPI creatinine equation, which does not include race as a factor    CK [[id number]]  (Normal) Collected: 03/28/25 1436    Specimen: Blood from Arm, Right Updated: 03/28/25 1518     Creatine Kinase 55 U/L     Magnesium [[id number]]  (Normal) Collected: 03/28/25 1436    Specimen: Blood from Arm, Right Updated: 03/28/25 1518     Magnesium 1.9 mg/dL     Phosphorus [[id number]]  (Normal) Collected: 03/28/25 1436    Specimen: Blood from Arm, Right Updated: 03/28/25 1518     Phosphorus 3.7 mg/dL     Urinalysis With Culture If Indicated - Urine, Clean Catch [[id number]]  (Abnormal) Collected: 03/28/25 1500    Specimen: Urine, Clean Catch Updated: 03/28/25 1515     Color, UA Yellow     Appearance, UA Slightly Cloudy     pH, UA 5.5     Specific Gravity, UA 1.025     Glucose, UA Negative     Ketones, UA Trace     Bilirubin, UA Small (1+)     Comment: Confirmation testing is unavailable.  A serum bilirubin is recommended for further assessment.        Blood, UA Large (3+)     Protein, UA 30 mg/dL (1+)     Leuk Esterase, UA Small (1+)     Nitrite, UA Positive     Urobilinogen, UA 1.0 E.U./dL    Narrative:      In absence of clinical symptoms, the presence of pyuria, bacteria, and/or nitrites on the urinalysis result does not correlate with infection.    CBC & Differential  [[id number]]  (Abnormal) Collected: 03/28/25 1436    Specimen: Blood from Arm, Right Updated: 03/28/25 1447    Narrative:      The following orders were created for panel order CBC & Differential.  Procedure                               Abnormality         Status                     ---------                               -----------         ------                     CBC Auto Differential[[id number]]        Abnormal            Final result                 Please view results for these tests on the individual orders.    CBC Auto Differential [[id number]]  (Abnormal) Collected: 03/28/25 1436    Specimen: Blood from Arm, Right Updated: 03/28/25 1447     WBC 4.36 10*3/mm3      RBC 4.24 10*6/mm3      Hemoglobin 12.6 g/dL      Hematocrit 39.1 %      MCV 92.2 fL      MCH 29.7 pg      MCHC 32.2 g/dL      RDW 12.8 %      RDW-SD 43.4 fl      MPV 11.1 fL      Platelets 182 10*3/mm3      Neutrophil % 62.3 %      Lymphocyte % 22.7 %      Monocyte % 12.6 %      Eosinophil % 1.4 %      Basophil % 0.5 %      Immature Grans % 0.5 %      Neutrophils, Absolute 2.72 10*3/mm3      Lymphocytes, Absolute 0.99 10*3/mm3      Monocytes, Absolute 0.55 10*3/mm3      Eosinophils, Absolute 0.06 10*3/mm3      Basophils, Absolute 0.02 10*3/mm3      Immature Grans, Absolute 0.02 10*3/mm3      nRBC 0.0 /100 WBC     Extra Tubes [[id number]] Collected: 03/28/25 1436    Specimen: Blood from Arm, Right Updated: 03/28/25 1445    Narrative:      The following orders were created for panel order Extra Tubes.  Procedure                               Abnormality         Status                     ---------                               -----------         ------                     Gold Top - SST[[id number]]                                   Final result               Light Blue Top[[id number]]                                   Final result                 Please view results for these tests on the individual orders.    Gold Top - SST [[id number]] Collected:  03/28/25 1436    Specimen: Blood from Arm, Right Updated: 03/28/25 1445     Extra Tube Hold for add-ons.     Comment: Auto resulted.       Light Blue Top [[id number]] Collected: 03/28/25 1436    Specimen: Blood from Arm, Right Updated: 03/28/25 1445     Extra Tube Hold for add-ons.     Comment: Auto resulted                I reviewed the patient's new clinical results.    Assessment & Plan       Cervical stenosis of spinal canal    Pacemaker    Paroxysmal atrial fibrillation    Cervical radiculopathy    Deep vein thrombosis (DVT)    Hypertrophic obstructive cardiomyopathy    Spondylolisthesis, acquired    Obesity (BMI 30.0-34.9)    Coronary artery disease    Acute urinary tract infection    Primary problem appears to be worsening cervical neuralgia.  CT findings noted.  She does have a pacemaker, imaging with MRI will have to be cleared with the  of her device.  I have ordered 1 dose of dexamethasone to hopefully provide some symptom relief.  Neurosurgery has been consulted.  If there is nothing urgently that needs to be done surgically, I anticipate consulting pain management on Monday to consider facet or epidural injections.  Will hold anticoagulation in anticipation of possible intervention.    SCDs for DVT prophylaxis.  Holding aspirin and Eliquis.    Will give very short course of antibiotics for abnormal urine.  I suspect this is asymptomatic bacteriuria but she does have some trouble with balance and general malaise so it is reasonable to treat for 2 to 3 days.    Continue levothyroxine for hypothyroidism.            I discussed the patient's findings and my recommendations with patient.     Tatiana Gilliland MD  03/29/25  12:27 EDT

## 2025-03-30 LAB
GLUCOSE BLDC GLUCOMTR-MCNC: 131 MG/DL (ref 70–105)
T4 FREE SERPL-MCNC: 1.64 NG/DL (ref 0.93–1.7)
TSH SERPL DL<=0.05 MIU/L-ACNC: 1.19 UIU/ML (ref 0.27–4.2)

## 2025-03-30 PROCEDURE — 25010000002 ONDANSETRON PER 1 MG: Performed by: INTERNAL MEDICINE

## 2025-03-30 PROCEDURE — 82948 REAGENT STRIP/BLOOD GLUCOSE: CPT

## 2025-03-30 PROCEDURE — 25010000002 HYDROMORPHONE PER 4 MG: Performed by: INTERNAL MEDICINE

## 2025-03-30 PROCEDURE — 25010000002 HYDROMORPHONE 1 MG/ML SOLUTION: Performed by: INTERNAL MEDICINE

## 2025-03-30 PROCEDURE — 25010000002 DEXAMETHASONE PER 1 MG: Performed by: INTERNAL MEDICINE

## 2025-03-30 PROCEDURE — 25010000002 CEFTRIAXONE PER 250 MG: Performed by: INTERNAL MEDICINE

## 2025-03-30 RX ORDER — DEXAMETHASONE SODIUM PHOSPHATE 4 MG/ML
6 INJECTION, SOLUTION INTRA-ARTICULAR; INTRALESIONAL; INTRAMUSCULAR; INTRAVENOUS; SOFT TISSUE ONCE
Status: COMPLETED | OUTPATIENT
Start: 2025-03-30 | End: 2025-03-30

## 2025-03-30 RX ADMIN — DEXAMETHASONE SODIUM PHOSPHATE 6 MG: 4 INJECTION, SOLUTION INTRAMUSCULAR; INTRAVENOUS at 12:31

## 2025-03-30 RX ADMIN — LEVOTHYROXINE SODIUM 75 MCG: 0.07 TABLET ORAL at 05:55

## 2025-03-30 RX ADMIN — OXYCODONE HYDROCHLORIDE 10 MG: 5 TABLET ORAL at 20:45

## 2025-03-30 RX ADMIN — ONDANSETRON 4 MG: 2 INJECTION, SOLUTION INTRAMUSCULAR; INTRAVENOUS at 08:23

## 2025-03-30 RX ADMIN — HYDROMORPHONE HYDROCHLORIDE 1 MG: 1 INJECTION, SOLUTION INTRAMUSCULAR; INTRAVENOUS; SUBCUTANEOUS at 17:05

## 2025-03-30 RX ADMIN — HYDROMORPHONE HYDROCHLORIDE 0.5 MG: 1 INJECTION, SOLUTION INTRAMUSCULAR; INTRAVENOUS; SUBCUTANEOUS at 06:04

## 2025-03-30 RX ADMIN — SERTRALINE HYDROCHLORIDE 100 MG: 100 TABLET, FILM COATED ORAL at 20:45

## 2025-03-30 RX ADMIN — HYDROMORPHONE HYDROCHLORIDE 1 MG: 1 INJECTION, SOLUTION INTRAMUSCULAR; INTRAVENOUS; SUBCUTANEOUS at 12:37

## 2025-03-30 RX ADMIN — ACETAMINOPHEN 650 MG: 325 TABLET, FILM COATED ORAL at 20:45

## 2025-03-30 RX ADMIN — HYDROMORPHONE HYDROCHLORIDE 0.5 MG: 1 INJECTION, SOLUTION INTRAMUSCULAR; INTRAVENOUS; SUBCUTANEOUS at 09:14

## 2025-03-30 RX ADMIN — LIDOCAINE 1 PATCH: 4 PATCH TOPICAL at 08:23

## 2025-03-30 RX ADMIN — Medication 10 ML: at 20:46

## 2025-03-30 RX ADMIN — CEFTRIAXONE 2000 MG: 2 INJECTION, POWDER, FOR SOLUTION INTRAMUSCULAR; INTRAVENOUS at 12:31

## 2025-03-30 RX ADMIN — HYDROMORPHONE HYDROCHLORIDE 0.5 MG: 1 INJECTION, SOLUTION INTRAMUSCULAR; INTRAVENOUS; SUBCUTANEOUS at 00:43

## 2025-03-30 RX ADMIN — SENNOSIDES AND DOCUSATE SODIUM 2 TABLET: 8.6; 5 TABLET ORAL at 08:23

## 2025-03-30 RX ADMIN — Medication 10 ML: at 08:24

## 2025-03-30 NOTE — PLAN OF CARE
Goal Outcome Evaluation:    Patient is A/O x4 able to make needs known. Patient is still awaiting MRI to be done, questions were faxed to MRI yesterday and copy in chart; patient has pacemaker and implanted teeth. Pain treated per MAR, patient states pain and numbness/tingling/burning is improving with pain medication adjustments. Bowel protocol initiated yesterday, may need more intervention. Patient did have a hard time getting off of toilet due to loss of coordination/weakness/SOA/HR increased significantly, recommend BSC or x2 asst to bathroom, gait belt and walker utilized.

## 2025-03-30 NOTE — PROGRESS NOTES
LOS: 1 day   Patient Care Team:  Anish Lew APRN as PCP - General  Evgeny Gregory MD as Consulting Physician (Cardiology)  Leeanne Pleitez MD as Consulting Physician (Cardiology)  Radha Owusu APRN as Nurse Practitioner (Nurse Practitioner)  Kathe Muñoz MD as Consulting Physician (Nephrology)    Subjective     Interval History: Stable overnight    Patient Complaints: Continues to have severe throbbing, burning pain from her neck radiating to both arms, relieved with Dilaudid    History taken from: patient    Review of Systems   Constitutional:  Positive for activity change. Negative for appetite change, chills, diaphoresis, fatigue and fever.   HENT:  Negative for congestion.    Eyes:  Negative for visual disturbance.   Respiratory:  Negative for cough and shortness of breath.    Cardiovascular:  Negative for chest pain and leg swelling.   Gastrointestinal:  Negative for abdominal pain.   Endocrine: Negative for polyuria.   Genitourinary:  Negative for dysuria.   Musculoskeletal:  Positive for arthralgias and myalgias.   Skin:  Negative for rash and wound.   Neurological:  Negative for dizziness, light-headedness and headaches.   Psychiatric/Behavioral:  Negative for confusion.            Objective     Vital Signs  Temp:  [97.5 °F (36.4 °C)-98.4 °F (36.9 °C)] 97.5 °F (36.4 °C)  Heart Rate:  [65-74] 74  Resp:  [13-18] 13  BP: (129-143)/(81-85) 143/81    Physical Exam:     General Appearance:    Alert, cooperative, in no acute distress,   Head:    Normocephalic, without obvious abnormality, atraumatic   Eyes:            Lids and lashes normal, conjunctivae and sclerae normal, no   icterus, no pallor, corneas clear, PERRLA   Ears:    Ears appear intact with no abnormalities noted   Throat:   No oral lesions, no thrush, oral mucosa moist   Neck:   No adenopathy, supple, trachea midline, no thyromegaly, no   carotid bruit, no JVD   Lungs:     Clear to auscultation,respirations  regular, even and                  unlabored    Heart:    Regular rhythm and normal rate, normal S1 and S2, no            murmur, no gallop, no rub, no click   Chest Wall:    No abnormalities observed   Abdomen:     Normal bowel sounds, no masses, no organomegaly, soft        Non-tender non-distended, no guarding,   Extremities:   Moves all extremities well, no edema, no cyanosis, no             Redness   Pulses:   Pulses palpable and equal bilaterally   Skin:   No bleeding, bruising or rash   Lymph nodes:   No palpable adenopathy   Neurologic:   Cranial nerves 2 - 12 grossly intact, sensation intact, DTR       present and equal bilaterally        Results Review:    Lab Results (last 24 hours)       Procedure Component Value Units Date/Time    Urine Culture - Urine, Urine, Clean Catch [[id number]]  (Abnormal) Collected: 03/28/25 1500    Specimen: Urine, Clean Catch Updated: 03/30/25 1040     Urine Culture >100,000 CFU/mL Gram Negative Bacilli    Narrative:      Colonization of the urinary tract without infection is common. Treatment is discouraged unless the patient is symptomatic, pregnant, or undergoing an invasive urologic procedure.             Imaging Results (Last 24 Hours)       ** No results found for the last 24 hours. **                 I reviewed the patient's new clinical results.    Medication Review:   Scheduled Meds:cefTRIAXone, 2,000 mg, Intravenous, Q24H  levothyroxine, 75 mcg, Oral, Q AM  Lidocaine, 1 patch, Transdermal, Q24H  sertraline, 100 mg, Oral, Nightly  sodium chloride, 10 mL, Intravenous, Q12H      Continuous Infusions:   PRN Meds:.  acetaminophen    senna-docusate sodium **AND** polyethylene glycol **AND** bisacodyl **AND** bisacodyl    hydrALAZINE    HYDROmorphone    ipratropium-albuterol    nitroglycerin    ondansetron    oxyCODONE    sodium chloride    sodium chloride     Assessment & Plan       Cervical stenosis of spinal canal    Pacemaker    Paroxysmal atrial fibrillation    Cervical  radiculopathy    Deep vein thrombosis (DVT)    Hypertrophic obstructive cardiomyopathy    Spondylolisthesis, acquired    Obesity (BMI 30.0-34.9)    Coronary artery disease    Acute urinary tract infection    Neck pain    -Continue ceftriaxone for urinary tract infection versus asymptomatic bacteriuria  -Increased dose of Dilaudid for little bit better pain relief, pain management consulted in a.m., holding all anticoagulation.  MRI's are pending - complicated by presence of pacemaker.  MRI's may not be able to be completed until tomorrow.  -Sertraline for mood disorder  -Levothyroxine for hypothyroidism    Plan for disposition: To be determined    Tatiana Gilliland MD  03/30/25  12:42 EDT

## 2025-03-30 NOTE — PLAN OF CARE
Goal Outcome Evaluation:      Pt alert and oriented. Pt complaints of pain treated with IV medication. VS stable. Plan of care ongoing.

## 2025-03-31 ENCOUNTER — APPOINTMENT (OUTPATIENT)
Dept: MRI IMAGING | Facility: HOSPITAL | Age: 85
DRG: 552 | End: 2025-03-31
Payer: MEDICARE

## 2025-03-31 LAB — BACTERIA SPEC AEROBE CULT: ABNORMAL

## 2025-03-31 PROCEDURE — 72141 MRI NECK SPINE W/O DYE: CPT

## 2025-03-31 PROCEDURE — 25010000002 HYDROMORPHONE 1 MG/ML SOLUTION: Performed by: INTERNAL MEDICINE

## 2025-03-31 PROCEDURE — 25010000002 CEFTRIAXONE PER 250 MG: Performed by: INTERNAL MEDICINE

## 2025-03-31 PROCEDURE — 72146 MRI CHEST SPINE W/O DYE: CPT

## 2025-03-31 PROCEDURE — 72148 MRI LUMBAR SPINE W/O DYE: CPT

## 2025-03-31 RX ORDER — NYSTATIN 100000 [USP'U]/G
POWDER TOPICAL EVERY 12 HOURS SCHEDULED
Status: DISCONTINUED | OUTPATIENT
Start: 2025-03-31 | End: 2025-04-08 | Stop reason: HOSPADM

## 2025-03-31 RX ADMIN — OXYCODONE HYDROCHLORIDE 10 MG: 5 TABLET ORAL at 04:17

## 2025-03-31 RX ADMIN — NYSTATIN: 100000 POWDER TOPICAL at 20:31

## 2025-03-31 RX ADMIN — ACETAMINOPHEN 650 MG: 325 TABLET, FILM COATED ORAL at 20:30

## 2025-03-31 RX ADMIN — LIDOCAINE 1 PATCH: 4 PATCH TOPICAL at 08:44

## 2025-03-31 RX ADMIN — HYDROMORPHONE HYDROCHLORIDE 1 MG: 1 INJECTION, SOLUTION INTRAMUSCULAR; INTRAVENOUS; SUBCUTANEOUS at 16:06

## 2025-03-31 RX ADMIN — Medication 10 ML: at 08:44

## 2025-03-31 RX ADMIN — OXYCODONE HYDROCHLORIDE 10 MG: 5 TABLET ORAL at 20:30

## 2025-03-31 RX ADMIN — LEVOTHYROXINE SODIUM 75 MCG: 0.07 TABLET ORAL at 04:19

## 2025-03-31 RX ADMIN — SERTRALINE HYDROCHLORIDE 100 MG: 100 TABLET, FILM COATED ORAL at 20:30

## 2025-03-31 RX ADMIN — ACETAMINOPHEN 650 MG: 325 TABLET, FILM COATED ORAL at 04:17

## 2025-03-31 RX ADMIN — HYDROMORPHONE HYDROCHLORIDE 1 MG: 1 INJECTION, SOLUTION INTRAMUSCULAR; INTRAVENOUS; SUBCUTANEOUS at 08:43

## 2025-03-31 RX ADMIN — OXYCODONE HYDROCHLORIDE 10 MG: 5 TABLET ORAL at 13:07

## 2025-03-31 RX ADMIN — CEFTRIAXONE 2000 MG: 2 INJECTION, POWDER, FOR SOLUTION INTRAMUSCULAR; INTRAVENOUS at 11:29

## 2025-03-31 RX ADMIN — Medication 10 ML: at 20:31

## 2025-03-31 RX ADMIN — SENNOSIDES AND DOCUSATE SODIUM 2 TABLET: 8.6; 5 TABLET ORAL at 16:06

## 2025-03-31 NOTE — SIGNIFICANT NOTE
03/31/25 1447   OTHER   Discipline physical therapist   Rehab Time/Intention   Session Not Performed patient unavailable for treatment  (Pt off floor for MRI.)   Therapy Assessment/Plan (PT)   Criteria for Skilled Interventions Met (PT) yes   Recommendation   PT - Next Appointment 04/01/25

## 2025-03-31 NOTE — PLAN OF CARE
MRI cervical and thoracic spine to rule out spinal cord compression has been ordered.  Imaging still pending as of this morning.  Neurosurgery will follow-up once MRIs are complete.  Call with any questions or concerns.    Ricardo Adam PA-C  3/31/2025

## 2025-03-31 NOTE — PROGRESS NOTES
LOS: 2 days   Patient Care Team:  Anish Lew APRN as PCP - General  Evgeny Gregory MD as Consulting Physician (Cardiology)  Leeanne Pleitez MD as Consulting Physician (Cardiology)  Radha Owusu APRN as Nurse Practitioner (Nurse Practitioner)  Kathe Muñoz MD as Consulting Physician (Nephrology)    Subjective     Interval History: Stable    Patient Complaints: Continues to have pain in upper extremities, pain medication helping, was able to sleep last night    History taken from: patient    Review of Systems   Constitutional:  Positive for activity change. Negative for fever.   HENT:  Negative for trouble swallowing.    Eyes:  Negative for visual disturbance.   Respiratory:  Negative for cough, shortness of breath and wheezing.    Cardiovascular:  Negative for chest pain, palpitations and leg swelling.   Gastrointestinal:  Negative for abdominal pain, nausea and vomiting.   Endocrine: Negative for polyuria.   Genitourinary:  Negative for difficulty urinating.   Musculoskeletal:  Positive for arthralgias, back pain, gait problem and neck pain.   Skin:  Negative for rash.   Neurological:  Positive for weakness. Negative for dizziness, light-headedness and headaches.   Psychiatric/Behavioral:  Negative for confusion.            Objective     Vital Signs  Temp:  [97.4 °F (36.3 °C)-98.6 °F (37 °C)] 98.6 °F (37 °C)  Heart Rate:  [72-99] 73  Resp:  [12-18] 18  BP: (131-160)/(75-90) 137/75    Physical Exam:     General Appearance:    Alert, cooperative, in no acute distress,   Head:    Normocephalic, without obvious abnormality, atraumatic   Eyes:            Lids and lashes normal, conjunctivae and sclerae normal, no   icterus, no pallor, corneas clear   Ears:    Ears appear intact with no abnormalities noted       Neck:   No adenopathy, supple, trachea midline, no thyromegaly, no   carotid bruit, no JVD   Lungs:     Clear to auscultation,respirations regular, even and unlabored     Heart:    Regular rhythm and normal rate, normal S1 and S2, no  murmur, no gallop, no rub, no click   Chest Wall:    No abnormalities observed   Abdomen:     Normal bowel sounds, no masses, no organomegaly, soft  Non-tender non-distended, no guarding,   Extremities:   Moves all extremities well, no edema, no cyanosis, no Redness   Pulses:   Pulses palpable and equal bilaterally   Skin:   No bleeding, bruising or rash   Lymph nodes:   No palpable adenopathy            Results Review:    Lab Results (last 24 hours)       Procedure Component Value Units Date/Time    Urine Culture - Urine, Urine, Clean Catch [[id number]]  (Abnormal)  (Susceptibility) Collected: 03/28/25 1500    Specimen: Urine, Clean Catch Updated: 03/31/25 1049     Urine Culture >100,000 CFU/mL Escherichia coli    Narrative:      Colonization of the urinary tract without infection is common. Treatment is discouraged unless the patient is symptomatic, pregnant, or undergoing an invasive urologic procedure.    Susceptibility        Escherichia coli      WOOD      Amoxicillin + Clavulanate Susceptible      Ampicillin Intermediate      Ampicillin + Sulbactam Susceptible      Cefazolin (Urine) Susceptible      Cefepime Susceptible      Ceftazidime Susceptible      Ceftriaxone Susceptible      Gentamicin Susceptible      Levofloxacin Susceptible      Nitrofurantoin Susceptible      Piperacillin + Tazobactam Susceptible      Trimethoprim + Sulfamethoxazole Susceptible                           POC Glucose Once [[id number]]  (Abnormal) Collected: 03/30/25 2040    Specimen: Blood Updated: 03/30/25 2042     Glucose 131 mg/dL      Comment: Serial Number: [id number]:  [id number]       TSH [[id number]]  (Normal) Collected: 03/28/25 1436    Specimen: Blood from Arm, Right Updated: 03/30/25 1730     TSH 1.190 uIU/mL     T4, Free [[id number]]  (Normal) Collected: 03/28/25 1436    Specimen: Blood from Arm, Right Updated: 03/30/25 1730     Free T4 1.64 ng/dL               Imaging Results (Last 24 Hours)       ** No results found for the last 24 hours. **                 I reviewed the patient's new clinical results.    Medication Review:   Scheduled Meds:cefTRIAXone, 2,000 mg, Intravenous, Q24H  levothyroxine, 75 mcg, Oral, Q AM  Lidocaine, 1 patch, Transdermal, Q24H  nystatin, , Topical, Q12H  sertraline, 100 mg, Oral, Nightly  sodium chloride, 10 mL, Intravenous, Q12H      Continuous Infusions:   PRN Meds:.  acetaminophen    senna-docusate sodium **AND** polyethylene glycol **AND** bisacodyl **AND** bisacodyl    hydrALAZINE    HYDROmorphone    ipratropium-albuterol    nitroglycerin    ondansetron    oxyCODONE    sodium chloride    sodium chloride     Assessment & Plan       Cervical stenosis of spinal canal    Pacemaker    Paroxysmal atrial fibrillation    Cervical radiculopathy    Deep vein thrombosis (DVT)    Hypertrophic obstructive cardiomyopathy    Spondylolisthesis, acquired    Obesity (BMI 30.0-34.9)    Coronary artery disease    Acute urinary tract infection    Neck pain      MRI cervical and thoracic spine pending clearance due to pacemaker presence. Neurosurgery to follow up after MRIs complete.  Continue Dilaudid and oxycodone for pain relief.  Consult pain management pending results of MRI and consultation by neurosurgery.    Urine culture grew E. Coli, will continue Rocephin    Sertraline for mood disorder and levothyroxine for hypothyroidism    SCDs for DVT prophylaxis      Plan for disposition:Home    Shameka Zarate PA-C  03/31/25  13:31 EDT

## 2025-03-31 NOTE — SIGNIFICANT NOTE
03/31/25 1425   OTHER   Discipline occupational therapist   Rehab Time/Intention   Session Not Performed patient unavailable for evaluation;other (see comments)  ([facility] - MRI)   Therapy Assessment/Plan (PT)   Criteria for Skilled Interventions Met (PT) yes   Recommendation   OT - Next Appointment 04/01/25

## 2025-03-31 NOTE — PLAN OF CARE
Goal Outcome Evaluation:           Progress: no change        Rested well tonight with po pain meds. Up to the bathroom with one assist. Good appetite.   VSS. Plan of care on going.

## 2025-03-31 NOTE — CASE MANAGEMENT/SOCIAL WORK
Discharge Planning Assessment   [place]     Patient Name: Domonique See  MRN: [id number]  Today's Date: 3/31/2025    Admit Date: 3/28/2025    Plan: Home. Family can transport at discharge.  Pending MRI, PT OT arias.   Discharge Needs Assessment       Row Name 03/31/25 1351       Living Environment    People in Home child(su), adult    Name(s) of People in Home Boone, son    Current Living Arrangements home    Potentially Unsafe Housing Conditions none    In the past 12 months has the electric, gas, oil, or water company threatened to shut off services in your home? No    Primary Care Provided by self    Provides Primary Care For no one    Family Caregiver if Needed child(su), adult    Family Caregiver Names Boone    Quality of Family Relationships helpful;involved;supportive    Able to Return to Prior Arrangements yes       Resource/Environmental Concerns    Resource/Environmental Concerns none    Transportation Concerns none       Transportation Needs    In the past 12 months, has lack of transportation kept you from medical appointments or from getting medications? no    In the past 12 months, has lack of transportation kept you from meetings, work, or from getting things needed for daily living? No       Food Insecurity    Within the past 12 months, you worried that your food would run out before you got the money to buy more. Never true    Within the past 12 months, the food you bought just didn't last and you didn't have money to get more. Never true       Transition Planning    Patient/Family Anticipates Transition to home with family;home with help/services    Patient/Family Anticipated Services at Transition home health care    Transportation Anticipated family or friend will provide       Discharge Needs Assessment    Readmission Within the Last 30 Days no previous admission in last 30 days    Equipment Currently Used at Home cpap;walker, rolling;wheelchair  CPAP supplied by [employer]    Concerns to  "be Addressed care coordination/care conferences;discharge planning    Do you want help finding or keeping work or a job? I do not need or want help    Do you want help with school or training? For example, starting or completing job training or getting a high school diploma, GED or equivalent No    Anticipated Changes Related to Illness none    Equipment Needed After Discharge none    Outpatient/Agency/Support Group Needs homecare agency    Discharge Facility/Level of Care Needs home with home health    Provided Post Acute Provider List? Yes    Post Acute Provider List Home Health    Delivered To Patient    Method of Delivery In person    Patient's Choice of Community Agency(s) \"any that will take my insurance \"                   Discharge Plan       Row Name 03/31/25 4533       Plan    Plan Home. Family can transport at discharge.  Pending MRI, PT OT eval.    Patient/Family in Agreement with Plan yes    Plan Comments Patient lives at home.  Son Boone lives with her.  Patient reports she normally drives but has not recently Family  will transport at discharge. Patient performs ADL. PCP and pharmacy confirmed. Denies financial assistance needs for medication and/or food. Pending MRI, PT OT eval.  Patient is agreeable  to [facility] if needed.                    Expected Discharge Date and Time       Expected Discharge Date Expected Discharge Time    Apr 2, 2025            Demographic Summary       Row Name 03/31/25 4409       General Information    Admission Type inpatient    Arrived From emergency department    Required Notices Provided Important Message from Medicare    Referral Source admission list    Reason for Consult discharge planning    Preferred Language English       Contact Information    Permission Granted to Share Info With                    Functional Status       Row Name 03/31/25 1341       Functional Status    Usual Activity Tolerance good    Current Activity Tolerance moderate       " Functional Status, IADL    Medications independent    Meal Preparation independent    Housekeeping independent    Laundry independent    Shopping assistive person    If for any reason you need help with day-to-day activities such as bathing, preparing meals, shopping, managing finances, etc., do you get the help you need? I get all the help I need       Mental Status    General Appearance WDL WDL       Mental Status Summary    Recent Changes in Mental Status/Cognitive Functioning no changes                  Corin Baker RN    S[facility] 1  [email or web address]  Office [phone]  Cell [phone]

## 2025-03-31 NOTE — PLAN OF CARE
Goal Outcome Evaluation:  Plan of Care Reviewed With: patient        Progress: improving          Pt VSS, able to make needs known, pain controlled per MAR, MRI results in, x 1 assist to bathroom. Plan of care ongoing.

## 2025-04-01 LAB — TROPONIN T SERPL HS-MCNC: 17 NG/L

## 2025-04-01 PROCEDURE — 25010000002 HYDRALAZINE PER 20 MG: Performed by: INTERNAL MEDICINE

## 2025-04-01 PROCEDURE — 93010 ELECTROCARDIOGRAM REPORT: CPT | Performed by: INTERNAL MEDICINE

## 2025-04-01 PROCEDURE — 93005 ELECTROCARDIOGRAM TRACING: CPT | Performed by: INTERNAL MEDICINE

## 2025-04-01 PROCEDURE — 25010000002 CEFTRIAXONE PER 250 MG: Performed by: INTERNAL MEDICINE

## 2025-04-01 PROCEDURE — 25010000002 ENOXAPARIN PER 10 MG: Performed by: NURSE PRACTITIONER

## 2025-04-01 PROCEDURE — 97535 SELF CARE MNGMENT TRAINING: CPT

## 2025-04-01 PROCEDURE — 84484 ASSAY OF TROPONIN QUANT: CPT | Performed by: INTERNAL MEDICINE

## 2025-04-01 PROCEDURE — 25010000002 ONDANSETRON PER 1 MG: Performed by: INTERNAL MEDICINE

## 2025-04-01 PROCEDURE — 99231 SBSQ HOSP IP/OBS SF/LOW 25: CPT

## 2025-04-01 PROCEDURE — 97166 OT EVAL MOD COMPLEX 45 MIN: CPT

## 2025-04-01 RX ORDER — ENOXAPARIN SODIUM 100 MG/ML
1 INJECTION SUBCUTANEOUS EVERY 12 HOURS
Status: DISCONTINUED | OUTPATIENT
Start: 2025-04-01 | End: 2025-04-07

## 2025-04-01 RX ORDER — PREGABALIN 75 MG/1
75 CAPSULE ORAL EVERY 12 HOURS SCHEDULED
Status: DISCONTINUED | OUTPATIENT
Start: 2025-04-01 | End: 2025-04-08 | Stop reason: HOSPADM

## 2025-04-01 RX ORDER — HYDROMORPHONE HYDROCHLORIDE 2 MG/1
2 TABLET ORAL
Refills: 0 | Status: DISCONTINUED | OUTPATIENT
Start: 2025-04-01 | End: 2025-04-04

## 2025-04-01 RX ADMIN — LEVOTHYROXINE SODIUM 75 MCG: 0.07 TABLET ORAL at 04:51

## 2025-04-01 RX ADMIN — PREGABALIN 75 MG: 75 CAPSULE ORAL at 21:36

## 2025-04-01 RX ADMIN — HYDRALAZINE HYDROCHLORIDE 10 MG: 20 INJECTION INTRAMUSCULAR; INTRAVENOUS at 23:45

## 2025-04-01 RX ADMIN — NYSTATIN: 100000 POWDER TOPICAL at 21:38

## 2025-04-01 RX ADMIN — HYDROMORPHONE HYDROCHLORIDE 2 MG: 2 TABLET ORAL at 13:20

## 2025-04-01 RX ADMIN — Medication 10 ML: at 08:16

## 2025-04-01 RX ADMIN — Medication 10 ML: at 21:36

## 2025-04-01 RX ADMIN — SERTRALINE HYDROCHLORIDE 100 MG: 100 TABLET, FILM COATED ORAL at 21:36

## 2025-04-01 RX ADMIN — ACETAMINOPHEN 650 MG: 325 TABLET, FILM COATED ORAL at 04:49

## 2025-04-01 RX ADMIN — ACETAMINOPHEN 650 MG: 325 TABLET, FILM COATED ORAL at 00:35

## 2025-04-01 RX ADMIN — ONDANSETRON 4 MG: 2 INJECTION, SOLUTION INTRAMUSCULAR; INTRAVENOUS at 22:40

## 2025-04-01 RX ADMIN — HYDROMORPHONE HYDROCHLORIDE 2 MG: 2 TABLET ORAL at 21:36

## 2025-04-01 RX ADMIN — LIDOCAINE 1 PATCH: 4 PATCH TOPICAL at 08:16

## 2025-04-01 RX ADMIN — OXYCODONE HYDROCHLORIDE 10 MG: 5 TABLET ORAL at 04:49

## 2025-04-01 RX ADMIN — OXYCODONE HYDROCHLORIDE 10 MG: 5 TABLET ORAL at 00:35

## 2025-04-01 RX ADMIN — PREGABALIN 75 MG: 75 CAPSULE ORAL at 13:20

## 2025-04-01 RX ADMIN — SENNOSIDES AND DOCUSATE SODIUM 2 TABLET: 8.6; 5 TABLET ORAL at 08:16

## 2025-04-01 RX ADMIN — CEFTRIAXONE 2000 MG: 2 INJECTION, POWDER, FOR SOLUTION INTRAMUSCULAR; INTRAVENOUS at 10:27

## 2025-04-01 RX ADMIN — NYSTATIN: 100000 POWDER TOPICAL at 08:16

## 2025-04-01 RX ADMIN — OXYCODONE HYDROCHLORIDE 10 MG: 5 TABLET ORAL at 10:19

## 2025-04-01 RX ADMIN — NITROGLYCERIN 0.4 MG: 0.4 TABLET SUBLINGUAL at 23:10

## 2025-04-01 RX ADMIN — ENOXAPARIN SODIUM 80 MG: 100 INJECTION SUBCUTANEOUS at 21:36

## 2025-04-01 RX ADMIN — HYDROMORPHONE HYDROCHLORIDE 2 MG: 2 TABLET ORAL at 16:48

## 2025-04-01 NOTE — PROGRESS NOTES
LOS: 3 days   Patient Care Team:  Anish Lew APRN as PCP - General  Evgeny Gregory MD as Consulting Physician (Cardiology)  Leeanne Pleitez MD as Consulting Physician (Cardiology)  Radha Owusu APRN as Nurse Practitioner (Nurse Practitioner)  Kathe Muñoz MD as Consulting Physician (Nephrology)    Subjective     Patient continues with bilateral arm pain left worse than right    Review of Systems   Constitutional:  Positive for activity change.   HENT: Negative.     Respiratory: Negative.     Cardiovascular: Negative.    Gastrointestinal: Negative.    Genitourinary: Negative.    Musculoskeletal:  Positive for gait problem and neck pain.   Neurological:  Positive for weakness.   Psychiatric/Behavioral: Negative.             Objective     Vital Signs  Temp:  [97.9 °F (36.6 °C)-98.6 °F (37 °C)] 97.9 °F (36.6 °C)  Heart Rate:  [71-73] 72  Resp:  [16-20] 18  BP: (137-159)/(65-85) 159/85      Physical Exam  Vitals reviewed.   Constitutional:       Appearance: She is not ill-appearing.   HENT:      Head: Normocephalic and atraumatic.      Right Ear: External ear normal.      Left Ear: External ear normal.      Nose: Nose normal.      Mouth/Throat:      Mouth: Mucous membranes are moist.   Eyes:      General:         Right eye: No discharge.         Left eye: No discharge.   Cardiovascular:      Rate and Rhythm: Normal rate and regular rhythm.      Pulses: Normal pulses.      Heart sounds: Normal heart sounds.   Pulmonary:      Effort: Pulmonary effort is normal.      Breath sounds: Normal breath sounds.   Abdominal:      General: Bowel sounds are normal.      Palpations: Abdomen is soft.   Musculoskeletal:         General: Normal range of motion.      Cervical back: Normal range of motion.   Skin:     General: Skin is warm and dry.   Neurological:      Mental Status: She is alert and oriented to person, place, and time.   Psychiatric:         Behavior: Behavior normal.               Results Review:    Lab Results (last 24 hours)       ** No results found for the last 24 hours. **             Imaging Results (Last 24 Hours)       Procedure Component Value Units Date/Time    MRI Lumbar Spine Without Contrast [[id number]] Collected: 03/31/25 1617     Updated: 03/31/25 1626    Narrative:      MRI LUMBAR SPINE WO CONTRAST    Date of Exam: 3/31/2025 2:31 PM EDT    Indication: Lumbar pain.     Comparison: 6/6/2023    Technique:  Routine multiplanar/multisequence sequence images of the lumbar spine were obtained without contrast administration.        FINDINGS:  Post changes are noted status post prior fusion at the L4/5 level. There is associated susceptibility artifact.    The lumbar vertebral body alignment is stable. There is no evidence for edema or stress-related changes. There is loss of disc space signal at multiple levels related to disc desiccation from age-related changes. No abnormal signal is noted corresponding   to the distal cord or nerve roots of the lumbar spine. The distal conus is identified at the L1/2 vertebral level.    The T12/L1 level demonstrates evidence for a mild broad-based disc bulge. This finding contributes to mild impression on the ventral thecal sac and encroachment on the lateral recesses. Mild hypertrophic posterior facet changes are noted. These findings   contribute to mild central canal narrowing. Mild bilateral neural foraminal narrowing is seen.    The L1/2 level demonstrates evidence for a moderate broad-based disc bulge. This finding contributes to moderate impression on the ventral thecal sac and encroachment on the lateral recesses. Moderate hypertrophic posterior facet changes are noted. These   findings contribute to mild to moderate central canal narrowing. Moderate bilateral neural foraminal narrowing is seen.    The L2/3 level demonstrates evidence for a moderate broad-based disc bulge. This finding contributes to moderate impression on the  ventral thecal sac and encroachment on the lateral recesses. Moderate hypertrophic posterior facet changes are noted. There   is ligamentum flavum hypertrophy. These findings contribute to moderate central canal narrowing. Moderate bilateral neural foraminal narrowing is seen.    The L3/4 level demonstrates evidence for a moderate broad-based disc bulge. This finding contributes to moderate impression on the ventral thecal sac and encroachment on the lateral recesses. Moderate hypertrophic posterior facet changes are noted. There   is ligamentum flavum hypertrophy. These findings contribute to moderate central canal narrowing. Moderate to severe bilateral neural foraminal narrowing is seen.    The L4/5 level demonstrates postoperative changes. The central canal and neuroforamina are patent.    The L5/S1 level demonstrates evidence for a moderate broad-based disc bulge. This finding contributes to moderate impression on the ventral thecal sac and encroachment on the lateral recesses. Moderate hypertrophic posterior facet changes are noted.   There is ligamentum flavum hypertrophy. These findings contribute to moderate central canal narrowing. Moderate bilateral neural foraminal narrowing is seen.    No abnormal fluid collections are seen in the paraspinal soft tissues. No significant edema is observed. Renal cysts are noted.      Impression:      1.Postoperative changes status post fusion at the L4/5 level. The central canal and neuroforamina are patent at this level.  2.Discogenic degenerative changes throughout the lumbar spine. These findings contribute to central canal narrowing and neural foraminal narrowing at multiple levels.  3.Degenerative hypertrophic posterior facet changes throughout the lumbar spine. These findings contribute to neural foraminal narrowing at multiple levels.      Electronically Signed: Clement Fung MD    3/31/2025 4:24 PM EDT    Workstation ID: [id number]    MRI Thoracic Spine Without  Contrast [[id number]] Collected: 03/31/25 1613     Updated: 03/31/25 1624    Narrative:      MRI THORACIC SPINE WO CONTRAST    Date of Exam: 3/31/2025 2:14 PM EDT    Indication: Eval stenosis.     Comparison: CT thoracic spine from March 28, 2025    Technique:  Routine multiplanar/multisequence sequence images of the thoracic spine were obtained without contrast administration.      Findings: There is accentuation of the thoracic kyphosis inferiorly. The vertebral body heights appear normal. The bone marrow signal is within normal limits. There are moderate discogenic changes at T1-2, T2-3, T10-11, and T11-12. There is a mild disc   bulge with facet arthropathy at T1-2 resulting in moderate spinal canal stenosis. There is a mild disc bulge at T2-3. There is a small left paracentral disc protrusion at T7-8. There is a mild disc bulge with facet arthropathy at T10-11 resulting in mild   spinal canal stenosis. There is scattered mild to moderate facet arthropathy. There are scattered areas of mild to moderate neural foraminal stenosis most prominent on the right at T9-10. The spinal cord appears normal in signal throughout. The   posterior paravertebral soft tissues are unremarkable.      Impression:      Impression:  Mild to moderate multilevel degenerative changes of thoracic spine as described above.        Electronically Signed: Shadi Billings MD    3/31/2025 4:22 PM EDT    Workstation ID: [id number]    MRI Cervical Spine Without Contrast [[id number]] Collected: 03/31/25 1523     Updated: 03/31/25 1536    Narrative:      MRI CERVICAL SPINE WO CONTRAST    Date of Exam: 3/31/2025 1:48 PM EDT    Indication: Eval stenosis.     Comparison: CT of the cervical spine from May 28, 2025    Technique:  Routine multiplanar/multisequence sequence images of the cervical spine were obtained without contrast administration.        Findings:  There is anterolisthesis of C7 on T1. The craniocervical junction appears intact. The  vertebral body heights are normal. There are changes from C6 corpectomy with anterior cervical discectomy and fusion at C3-C7. There is some artifact from the hardware.   There are moderate discogenic changes at C2-3 and C7-T1. There are advanced discogenic changes at T1-2. The partially visualized posterior fossa contents are unremarkable. The spinal cord appears normal in signal throughout. The prevertebral soft   tissues are unremarkable.    C2-3: Mild disc osteophyte complex eccentric to the left. Severe bilateral facet arthropathy. Moderate bilateral osteophytes. Mild spinal canal stenosis. Severe bilateral neural foraminal stenosis.    C3-4: Small central osteophyte. Moderate bilateral facet arthropathy. Moderate bilateral uncovertebral hypertrophy. No spinal canal stenosis. Moderate bilateral neural foraminal stenosis.    C4-5: Moderate bilateral facet arthropathy. Mild right uncovertebral hypertrophy. No spinal canal stenosis. Mild right neural foraminal stenosis.    C5-6: Small right paracentral osteophyte. Moderate bilateral facet arthropathy. Mild bilateral uncovertebral hypertrophy. Mild spinal canal stenosis. Mild bilateral neural foraminal stenosis.    C6-7: Mild diffuse osteophyte. Moderate bilateral facet arthropathy. Moderate right and severe left uncovertebral hypertrophy. No spinal canal stenosis. Moderate right and severe left neural foraminal stenosis.    C7-T1: Moderate bilateral facet arthropathy with ligamentum flavum infolding. Small right paracentral osteophyte. Anterolisthesis of C7 on T1. Severe spinal canal stenosis. Moderate bilateral neural foraminal stenosis.      Impression:      Impression:  1.Changes from C6 corpectomy with C3-C7 ACDF.  2.Multilevel degenerative changes as described above, most prominent at C7-T1 where there is severe spinal canal stenosis.        Electronically Signed: Shadi Billings MD    3/31/2025 3:34 PM EDT    Workstation ID: [id number]                 I  reviewed the patient's new clinical results.    Medication Review:   Scheduled Meds:levothyroxine, 75 mcg, Oral, Q AM  Lidocaine, 1 patch, Transdermal, Q24H  nystatin, , Topical, Q12H  sertraline, 100 mg, Oral, Nightly  sodium chloride, 10 mL, Intravenous, Q12H      Continuous Infusions:   PRN Meds:.  acetaminophen    senna-docusate sodium **AND** polyethylene glycol **AND** bisacodyl **AND** bisacodyl    hydrALAZINE    HYDROmorphone    ipratropium-albuterol    nitroglycerin    ondansetron    oxyCODONE    sodium chloride    sodium chloride     Interval History:    Assessment & Plan     Cervical stenosis of spinal canal  Pacemaker  Paroxysmal atrial fibrillation  Cervical radiculopathy  Deep vein thrombosis (DVT)  Hypertrophic obstructive cardiomyopathy  Spondylolisthesis, acquired  Obesity (BMI 30.0-34.9)  Coronary artery disease  Acute urinary tract infection  Neck pain        MRI cervical and thoracic spine noted  Neurosurgery with no surgical intervention warranted  Continue Dilaudid and oxycodone for pain relief.  Consult pain management she need epidural for relief and ADL  Epidural not available til a week  Hold eliquis and will start lovenox  Adjust pain medication and ask PT to follow up      Urine culture grew E. Coli, Rocephin completed     Sertraline for mood disorder   Levothyroxine for hypothyroidism     SCDs for DVT prophylaxis    Plan for disposition: Patient needs to be up and moving after epidural to go home    WOODY Kaufman  04/01/25  11:00 EDT

## 2025-04-01 NOTE — SIGNIFICANT NOTE
04/01/25 1545   OTHER   Discipline physical therapist   Rehab Time/Intention   Session Not Performed other (see comments)  (Pt declined PT session this date reporting did not sleep well last night/ finally has relief in pain resting in bed. Will f/u as able.)   Recommendation   PT - Next Appointment 04/02/25

## 2025-04-01 NOTE — PROGRESS NOTES
Neurosurgery Progress Note    Date: 25     Patient: Domonique See   Sex: female   : 1940      SUBJECTIVE    CC: Upper and lower extremity pain and weakness    Interval history:  Patient doing okay this morning.  Patient continues to feel weak in her upper and lower extremities, especially on the right.        Current Medications:  Scheduled Meds:levothyroxine, 75 mcg, Oral, Q AM  Lidocaine, 1 patch, Transdermal, Q24H  nystatin, , Topical, Q12H  sertraline, 100 mg, Oral, Nightly  sodium chloride, 10 mL, Intravenous, Q12H      Continuous Infusions:   PRN Meds:   acetaminophen    senna-docusate sodium **AND** polyethylene glycol **AND** bisacodyl **AND** bisacodyl    hydrALAZINE    HYDROmorphone    ipratropium-albuterol    nitroglycerin    ondansetron    oxyCODONE    sodium chloride    sodium chloride      OBJECTIVE  Vitals:    25 1230 25 1640 25 0029 25 0917   BP: 137/75 158/82 151/65 159/85   BP Location: Right arm Right arm Right arm Right arm   Patient Position: Lying Lying Lying Lying   Pulse: 73 71 72 72   Resp: 18 16 20 18   Temp: 98.6 °F (37 °C) 98.1 °F (36.7 °C) 98.1 °F (36.7 °C) 97.9 °F (36.6 °C)   TempSrc: Oral Oral Oral Oral   SpO2: 94% 98% 98% 94%   Weight:       Height:           Physical exam    General  - WD/WN female, appears their stated age  - Awake, cooperative, in no acute distress  HEENT  - Normocephalic, atraumatic  - PERRLA, EOM intact  Respiratory  - Normal respiratory rate and effort  Musculoskeletal  - No joint redness, swelling, or tenderness  Skin  - Warm and dry, no bleeding, bruising, or rash  NEUROLOGIC  - A/O x3, GCS 4-6-5  - CN II-XII grossly intact  - Moves all extremities symmetrically and w/ 5/5 strength  - Sensation intact throughout on the left, patient feels more pain on the right        Results review  CBC          3/28/2025    14:36   CBC   WBC 4.36    RBC 4.24    Hemoglobin 12.6    Hematocrit 39.1    MCV 92.2    MCH 29.7    MCHC 32.2     RDW 12.8    Platelets 182        BMP          6/17/2024    15:46 3/28/2025    14:36   BMP   BUN 18  17    Creatinine 0.97  0.86    Sodium 138  136    Potassium 5.3  4.1    Chloride 103  102    CO2 26.7  22.5    Calcium 9.5  9.8          XR Spine Thoracic 3 View  Result Date: 3/25/2025  XR SPINE THORACIC 3 VW Date of Exam: 3/25/2025 4:57 PM EDT Indication: Chronic neck, upper spine pain Comparison: None available. Findings: There is a 0.5 cm retrolisthesis of L3 on L4 and a 0.3 cm retrolisthesis of L2 on L3. No fracture is demonstrated. No focal osseous lesion is evident. There is been previous fusion of the cervical spine. The bones appear diffusely osteopenic. Mild to moderate degenerative disc changes are noted in the mid to lower thoracic spine.     Impression: Impression: Diffuse osteopenia. Mild malalignments of the mid to upper lumbar spine, unchanged since the previous lumbar spine CT dated 4/28/2023. No acute fracture. Mild to moderate multilevel degenerative disc disease Electronically Signed: Gen Malcolm MD  3/25/2025 5:29 PM EDT  Workstation ID: [id number]    XR Spine Cervical Complete 4 or 5 View  Result Date: 3/25/2025  XR SPINE CERVICAL COMPLETE 4 OR 5 VW Date of Exam: 3/25/2025 4:57 PM EDT Indication: Chronic neck pain Comparison: Cervical spine MRI dated 9/23/2021 Findings: The lateral masses of C1 are symmetrically aligned on the lateral masses of C2. The dens appears intact. There is anterior cervical plate and screw fixation spanning from the C3-C7 with corpectomy at the C6 level. The hardware appears in expected position. No evidence of hardware fracture or loosening. There is anterolisthesis of C7 on T1 by 6 mm which appears worse since 2021. Prevertebral soft tissues appear normal in thickness. There is degenerative facet arthropathy. There is no significant osseous neuroforaminal narrowing on the oblique views. Visualized lung apices appear clear.     Impression: Impression: 1.Anterior  cervical plate and screw fixation spanning from C3-C7 with corpectomy at C6. No evidence of hardware fracture or loosening. 2.Anterolisthesis of C7 on T1 by 6 mm which appears worse since 2021. Electronically Signed: Ricardo Hernandezelor  3/25/2025 5:29 PM EDT  Workstation ID: [id number]    CT Lumbar Spine Without Contrast  Result Date: 3/28/2025  CT LUMBAR SPINE WO CONTRAST Date of Exam: 3/28/2025 3:36 PM EDT Indication: Increasing mid and lower back pain history of recent falls. Comparison: Lumbar spine MRI dated 6/6/2023, CT lumbar spine dated 4/28/2023 Technique: Axial CT images were obtained of the lumbar spine without contrast administration.  Sagittal and coronal reconstructions were performed.  Automated exposure control and iterative reconstruction methods were used. Findings: There is 3 mm retrolisthesis of L1 on L2 and 4 mm of retrolisthesis of L2 on L3 and 5 mm of retrolisthesis of L3 on L4 and stepwise fashion. This is not significantly changed from the prior examination. There are postsurgical changes of L4-L5 lumbar fusion with bilateral pedicle screws and vertical stabilization rods. The intervertebral spacer is in expected position. There is no evidence of subsidence. The vertebral body heights are maintained without evidence of acute fracture. There is multilevel  severe disc height loss most notable at T12-L1, L1-L2 and L3-L4. There is mild disc height loss at L5-S1. There is extensive degenerative facet arthropathy spanning from L3-L4 through L5-S1. There is osseous neuroforaminal narrowing bilaterally at L3-L4  secondary to retrolisthesis, facet arthropathy and endplate osseous spurring. This is right worse than left. There is no high-grade osseous spinal canal stenosis. The posterior paraspinal musculature appears symmetric with atrophy. Visualized portions of the retroperitoneum appear normal.     Impression: Impression: 1.No acute osseous abnormality of the lumbar spine. 2.Postsurgical changes of  L4-L5 lumbar fusion without evidence of hardware complication. 3.Multilevel degenerative disc disease and facet arthropathy with stepwise retrolisthesis of L1-L2, L2-L3, and L3-L4. 4.Osseous neuroforaminal narrowing bilaterally at L3-L4 secondary to retrolisthesis, facet arthropathy and endplate osseous spurring. This is right worse than left. Electronically Signed: Ricardo Anup  3/28/2025 4:34 PM EDT  Workstation ID: [id number]    CT Thoracic Spine Without Contrast  Result Date: 3/28/2025  CT THORACIC SPINE WO CONTRAST Date of Exam: 3/28/2025 3:36 PM EDT Indication: History of fall and axial load upper half of dorsal spine pain. Comparison: Thoracic spine radiographs from March 25, 2025 Technique: Axial CT images were obtained of the thoracic spine without contrast administration.  Sagittal and coronal reconstructions were performed.  Automated exposure control and iterative reconstruction methods were used. Findings: No acute fracture is identified. The alignment is anatomic. The vertebral body heights appear normal. There are moderate discogenic changes at T1-2, T2-3, T10-11, and T11-12. There is mild to moderate scattered facet arthropathy. The spinal canal is widely patent throughout. There are scattered areas of neural foraminal stenosis most severe on the right at T8-9. The posterior paravertebral soft tissues are unremarkable.     Impression: Impression: 1.No acute fracture or traumatic malalignment identified. 2.Multilevel degenerative changes as described above. Electronically Signed: Shadi Billings MD  3/28/2025 4:29 PM EDT  Workstation ID: [id number]    CT Cervical Spine Without Contrast  Result Date: 3/28/2025  CT CERVICAL SPINE WO CONTRAST Date of Exam: 3/28/2025 3:36 PM EDT Indication: Neck pain decreasing mobility radicular discomfort. Comparison: Cervical spine radiographs from March 25, 2025 and MRI cervical spine from September 23, 2021 Technique: Axial CT images were obtained of the cervical  spine without contrast administration.  Sagittal and coronal reconstructions were performed.  Automated exposure control and iterative reconstruction methods were used. Findings: No acute fracture is identified. The craniocervical junction appears intact. There is some anterolisthesis of C7 on T1. There are changes from C5 corpectomy with cage body graft with anterior cervical discectomy and fusion at C3-C7. The hardware appears intact. There are advanced discogenic changes at T1-T2. The prevertebral soft tissues are unremarkable. C2-3: Severe bilateral facet arthropathy. Moderate bilateral osteophytes. No spinal canal stenosis. Severe bilateral neural foraminal stenosis. C3-4: Small central disc protrusion. Moderate bilateral facet arthropathy. Moderate bilateral uncovertebral hypertrophy. No spinal canal stenosis. Moderate bilateral neural foraminal stenosis. C4-5: Moderate bilateral facet arthropathy. Mild right uncovertebral hypertrophy. No spinal canal stenosis. Mild right neural foraminal stenosis. C5-6: Small right paracentral osteophyte. Moderate bilateral facet arthropathy. Moderate right uncovertebral hypertrophy. Mild spinal canal stenosis. Mild bilateral neural foraminal stenosis. C6-7: Mild diffuse osteophyte. Moderate bilateral facet arthropathy. Moderate right and severe left uncovertebral hypertrophy. No spinal canal stenosis. Moderate right and severe left neural foraminal stenosis. C7-T1: Moderate bilateral facet arthropathy. Small left paracentral osteophyte. Moderate spinal canal stenosis. Mild bilateral neural foraminal stenosis.     Impression: Impression: 1.Changes from C5 corpectomy with C3-C7 ACDF. Hardware appears intact. 2.Moderate multilevel degenerative changes as described above. Electronically Signed: Shadi Billings MD  3/28/2025 4:20 PM EDT  Workstation ID: [id number]    MRI Lumbar Spine Without Contrast  Result Date: 3/31/2025  MRI LUMBAR SPINE WO CONTRAST Date of Exam: 3/31/2025  2:31 PM EDT Indication: Lumbar pain.  Comparison: 6/6/2023 Technique:  Routine multiplanar/multisequence sequence images of the lumbar spine were obtained without contrast administration.  FINDINGS: Post changes are noted status post prior fusion at the L4/5 level. There is associated susceptibility artifact. The lumbar vertebral body alignment is stable. There is no evidence for edema or stress-related changes. There is loss of disc space signal at multiple levels related to disc desiccation from age-related changes. No abnormal signal is noted corresponding  to the distal cord or nerve roots of the lumbar spine. The distal conus is identified at the L1/2 vertebral level. The T12/L1 level demonstrates evidence for a mild broad-based disc bulge. This finding contributes to mild impression on the ventral thecal sac and encroachment on the lateral recesses. Mild hypertrophic posterior facet changes are noted. These findings contribute to mild central canal narrowing. Mild bilateral neural foraminal narrowing is seen. The L1/2 level demonstrates evidence for a moderate broad-based disc bulge. This finding contributes to moderate impression on the ventral thecal sac and encroachment on the lateral recesses. Moderate hypertrophic posterior facet changes are noted. These  findings contribute to mild to moderate central canal narrowing. Moderate bilateral neural foraminal narrowing is seen. The L2/3 level demonstrates evidence for a moderate broad-based disc bulge. This finding contributes to moderate impression on the ventral thecal sac and encroachment on the lateral recesses. Moderate hypertrophic posterior facet changes are noted. There  is ligamentum flavum hypertrophy. These findings contribute to moderate central canal narrowing. Moderate bilateral neural foraminal narrowing is seen. The L3/4 level demonstrates evidence for a moderate broad-based disc bulge. This finding contributes to moderate impression on the  ventral thecal sac and encroachment on the lateral recesses. Moderate hypertrophic posterior facet changes are noted. There  is ligamentum flavum hypertrophy. These findings contribute to moderate central canal narrowing. Moderate to severe bilateral neural foraminal narrowing is seen. The L4/5 level demonstrates postoperative changes. The central canal and neuroforamina are patent. The L5/S1 level demonstrates evidence for a moderate broad-based disc bulge. This finding contributes to moderate impression on the ventral thecal sac and encroachment on the lateral recesses. Moderate hypertrophic posterior facet changes are noted. There is ligamentum flavum hypertrophy. These findings contribute to moderate central canal narrowing. Moderate bilateral neural foraminal narrowing is seen. No abnormal fluid collections are seen in the paraspinal soft tissues. No significant edema is observed. Renal cysts are noted.     Impression: 1.Postoperative changes status post fusion at the L4/5 level. The central canal and neuroforamina are patent at this level. 2.Discogenic degenerative changes throughout the lumbar spine. These findings contribute to central canal narrowing and neural foraminal narrowing at multiple levels. 3.Degenerative hypertrophic posterior facet changes throughout the lumbar spine. These findings contribute to neural foraminal narrowing at multiple levels. Electronically Signed: Clement Fung MD  3/31/2025 4:24 PM EDT  Workstation ID: [id number]    MRI Thoracic Spine Without Contrast  Result Date: 3/31/2025  MRI THORACIC SPINE WO CONTRAST Date of Exam: 3/31/2025 2:14 PM EDT Indication: Eval stenosis.  Comparison: CT thoracic spine from March 28, 2025 Technique:  Routine multiplanar/multisequence sequence images of the thoracic spine were obtained without contrast administration. Findings: There is accentuation of the thoracic kyphosis inferiorly. The vertebral body heights appear normal. The bone marrow  signal is within normal limits. There are moderate discogenic changes at T1-2, T2-3, T10-11, and T11-12. There is a mild disc bulge with facet arthropathy at T1-2 resulting in moderate spinal canal stenosis. There is a mild disc bulge at T2-3. There is a small left paracentral disc protrusion at T7-8. There is a mild disc bulge with facet arthropathy at T10-11 resulting in mild  spinal canal stenosis. There is scattered mild to moderate facet arthropathy. There are scattered areas of mild to moderate neural foraminal stenosis most prominent on the right at T9-10. The spinal cord appears normal in signal throughout. The posterior paravertebral soft tissues are unremarkable.     Impression: Impression: Mild to moderate multilevel degenerative changes of thoracic spine as described above. Electronically Signed: Shadi Billings MD  3/31/2025 4:22 PM EDT  Workstation ID: [id number]    MRI Cervical Spine Without Contrast  Result Date: 3/31/2025  MRI CERVICAL SPINE WO CONTRAST Date of Exam: 3/31/2025 1:48 PM EDT Indication: Eval stenosis.  Comparison: CT of the cervical spine from May 28, 2025 Technique:  Routine multiplanar/multisequence sequence images of the cervical spine were obtained without contrast administration.  Findings: There is anterolisthesis of C7 on T1. The craniocervical junction appears intact. The vertebral body heights are normal. There are changes from C6 corpectomy with anterior cervical discectomy and fusion at C3-C7. There is some artifact from the hardware.  There are moderate discogenic changes at C2-3 and C7-T1. There are advanced discogenic changes at T1-2. The partially visualized posterior fossa contents are unremarkable. The spinal cord appears normal in signal throughout. The prevertebral soft tissues are unremarkable. C2-3: Mild disc osteophyte complex eccentric to the left. Severe bilateral facet arthropathy. Moderate bilateral osteophytes. Mild spinal canal stenosis. Severe bilateral  neural foraminal stenosis. C3-4: Small central osteophyte. Moderate bilateral facet arthropathy. Moderate bilateral uncovertebral hypertrophy. No spinal canal stenosis. Moderate bilateral neural foraminal stenosis. C4-5: Moderate bilateral facet arthropathy. Mild right uncovertebral hypertrophy. No spinal canal stenosis. Mild right neural foraminal stenosis. C5-6: Small right paracentral osteophyte. Moderate bilateral facet arthropathy. Mild bilateral uncovertebral hypertrophy. Mild spinal canal stenosis. Mild bilateral neural foraminal stenosis. C6-7: Mild diffuse osteophyte. Moderate bilateral facet arthropathy. Moderate right and severe left uncovertebral hypertrophy. No spinal canal stenosis. Moderate right and severe left neural foraminal stenosis. C7-T1: Moderate bilateral facet arthropathy with ligamentum flavum infolding. Small right paracentral osteophyte. Anterolisthesis of C7 on T1. Severe spinal canal stenosis. Moderate bilateral neural foraminal stenosis.     Impression: Impression: 1.Changes from C6 corpectomy with C3-C7 ACDF. 2.Multilevel degenerative changes as described above, most prominent at C7-T1 where there is severe spinal canal stenosis. Electronically Signed: Shadi Billings MD  3/31/2025 3:34 PM EDT  Workstation ID: [id number]              ASSESSMENT/PLAN  This is a 85 y.o. female being followed by neurosurgery for upper and lower extremity pain and weakness.  Patient had an MRI of her cervical, thoracic and lumbar spine.  Patient has severe cervical stenosis at C7-T1.    Physical examination patient has good strength in her upper extremities.  She does endorse having more pain on her right compared to her left.  I did not appreciate David sign.  As for her lower extremities that she is slightly weaker in her right leg compared to her left.    Due to her age, we would like to hold off on surgery unless absolutely necessary.  We want to fail all conservative measures.  I put in a consult  for patient to have an epidural injection at C7-T1, but she is unable to get for the next week.  I will go ahead and have it done outpatient and make sure that she can have her Eliquis held for the next week.    I also recommend patient working with physical therapy.  Recommend oral steroid taper for a week.  Patient is to follow-up in clinic for further evaluation and treatment.    Nothing further needed from a neurosurgical standpoint.  Please reach out any questions or concerns.    Plan:  Cervical stenosis  2.  Cervical radiculopathy  -No urgent or emergent neurosurgical intervention indicated at this time  -Recommend oral steroid taper for a week  -Patient to follow-up in clinic once discharged  -Recommend patient work with physical therapy  -MICHELLE outpatient      I discussed my assessment and recommendations with Dr. Dolores Hayden PA-C  04/01/25  11:57 EDT      Part of this note may be an electronic transcription/translation of spoken language to printed text using the Dragon Dictation System.

## 2025-04-01 NOTE — THERAPY EVALUATION
Patient Name: Domonique See  : 1940    MRN: [id number]                              Today's Date: 2025       Admit Date: 3/28/2025    Visit Dx:     ICD-10-CM ICD-9-CM   1. DDD (degenerative disc disease), cervical  M50.30 722.4   2. Cervical osteophyte  M25.78 721.8   3. Cervical spondylosis  M47.812 721.0   4. Cervical stenosis of spinal canal  M48.02 723.0   5. Spondylosis of thoracic region without myelopathy or radiculopathy  M47.814 721.2   6. Degeneration of intervertebral disc of lumbar region with discogenic back pain and lower extremity pain  M51.362 722.52   7. Lumbar spondylosis  M47.816 721.3   8. Neuroforaminal stenosis of lumbar spine  M48.061 724.02   9. Neural foraminal stenosis of cervical spine  M48.02 723.0   10. Acute urinary tract infection  N39.0 599.0   11. Cervical radiculopathy  M54.12 723.4   12. Localized osteoporosis ([place])  M81.6 733.09     Patient Active Problem List   Diagnosis    Iron deficiency anemia    Anemia, unspecified    Pacemaker    Paroxysmal atrial fibrillation    Bradycardia    Abnormal result of cardiovascular function study    Cervical disc disorder with radiculopathy    Cervical radiculopathy    Cervical stenosis of spinal canal    Cholecystitis    CHF (congestive heart failure)    Deep vein thrombosis (DVT)    Dyspnea    Family history of diabetes mellitus    Fibromyositis    Hyperlipidemia    Essential hypertension    Hypertrophic obstructive cardiomyopathy    Lumbar radiculopathy    Primary localized osteoarthritis    Sacroiliitis, not elsewhere classified    Spondylolisthesis, acquired    Primary osteoarthritis of right knee    Morbid obesity    Primary osteoarthritis of both knees    Obesity (BMI 30.0-34.9)    Hx of hypertrophic cardiomyopathy    Coronary artery disease    Sleep apnea    CHF exacerbation    Acute midline low back pain without sciatica    Hypertensive retinopathy    Altered mental status, unspecified altered mental status type     Acute urinary tract infection    Neck pain     Past Medical History:   Diagnosis Date    Anemia     CHF (congestive heart failure)     Congenital heart disease     Coronary artery disease 2013    Deep vein thrombosis (DVT) 11/20/2017    GERD (gastroesophageal reflux disease)     Heart murmur Don't know    Hx of hypertrophic cardiomyopathy     Hypertension     Peptic ulceration     Persistent atrial fibrillation 01/10/2023    Primary osteoarthritis of both knees 02/08/2021    Primary osteoarthritis of right knee 09/21/2020    Sleep apnea 2017     Past Surgical History:   Procedure Laterality Date    ABDOMINAL HERNIA REPAIR      ABLATION OF DYSRHYTHMIC FOCUS  01-    BACK SURGERY      BREAST AUGMENTATION      CARDIAC CATHETERIZATION      CARDIAC ELECTROPHYSIOLOGY PROCEDURE N/A 01/16/2023    Procedure: Ablation atrial fibrillation and flutter, cryo Duong and Ramón aware;  Surgeon: Leeanne Pleitez MD;  Location: [facility] CATH INVASIVE LOCATION;  Service: Cardiovascular;  Laterality: N/A;    CARDIAC PACEMAKER PLACEMENT  03/16/2017    Dual Chamber    CERVICAL RIB RESECTION Bilateral     CERVICAL RIB RESECTION Bilateral 1963    bilateral cervical ribs removed - extra ribs located and causing pain    CHOLECYSTECTOMY      COLONOSCOPY      CORONARY ARTERY BYPASS GRAFT  2013    THORACIC DECOMPRESSION POSTERIOR FUSION  06/04/2014    L3-5 & S1    UPPER GASTROINTESTINAL ENDOSCOPY        General Information       Row Name 04/01/25 1610          OT Time and Intention    Subjective Information complains of;pain;fatigue  -MH     Patient Effort good  -MH     Symptoms Noted During/After Treatment fatigue;increased pain  -       Row Name 04/01/25 1610          General Information    Prior Level of Function independent:;ADL's;all household mobility  using RW for the last month and immobile for a couple of days.  -MH     Existing Precautions/Restrictions fall  -MH     Barriers to Rehab medically complex  -       Row Name  04/01/25 1610          Living Environment    Current Living Arrangements home  -     People in Home child(su), adult;grandchild(su)  26 y/o grndtr with Down's Sundrome is helpful to her at home. Dtr also present in the home.  -       Row Name 04/01/25 1610          Home Main Entrance    Number of Stairs, Main Entrance none  -       Row Name 04/01/25 1610          Stairs Within Home, Primary    Stairs, Within Home, Primary pt states she lives in a tri-level home but does not need to go up any steps to enter and has hospital bed on her main level so could live w/o the use of steps at all. SHe has had a little help the past few weeks going up/down stairs. All stairs have rails.  -     Number of Stairs, Within Home, Primary none  -       Row Name 04/01/25 1610          Cognition    Orientation Status (Cognition) oriented x 4  -       Row Name 04/01/25 1610          Safety Issues/Impairments Affecting Functional Mobility    Impairments Affecting Function (Mobility) balance;endurance/activity tolerance;strength  -     Comment, Safety Issues/Impairments (Mobility) no fall sn the last year though pt reports stumbles and RLE instability.  -               User Key  (r) = Recorded By, (t) = Taken By, (c) = Cosigned By      Initials Name Provider Type     Jeri Quinn, OT Occupational Therapist                     Mobility/ADL's       Row Name 04/01/25 1614          Bed Mobility    Bed Mobility supine-sit  -     Supine-Sit [place] (Bed Mobility) set up;standby assist  -     Assistive Device (Bed Mobility) head of bed elevated;bed rails  -       Row Name 04/01/25 1614          Transfers    Transfers sit-stand transfer;toilet transfer;stand-sit transfer;bed-chair transfer  -       Row Name 04/01/25 1614          Bed-Chair Transfer    Bed-Chair [place] (Transfers) contact guard  -     Assistive Device (Bed-Chair Transfers) walker, front-wheeled  -       Row Name 04/01/25 1614           Sit-Stand Transfer    Sit-Stand [place] (Transfers) minimum assist (75% patient effort)  -     Assistive Device (Sit-Stand Transfers) walker, front-wheeled  -       Row Name 04/01/25 1614          Stand-Sit Transfer    Stand-Sit [place] (Transfers) contact guard  -     Assistive Device (Stand-Sit Transfers) walker, front-wheeled  -       Row Name 04/01/25 1614          Toilet Transfer    Type (Toilet Transfer) stand-sit;sit-stand  -     [place] Level (Toilet Transfer) minimum assist (75% patient effort)  -     Assistive Device (Toilet Transfer) grab bars/safety frame;walker, front-wheeled  -       Row Name 04/01/25 1614          Functional Mobility    Functional Mobility- Ind. Level minimum assist (75% patient effort)  -     Functional Mobility-Distance (Feet) 15  then 20 after seated & standing ADL in the bathroom  -     Functional Mobility- Safety Issues step length decreased;other (see comments)  -     Functional Mobility- Comment rt foot drop.  -     Patient was able to Ambulate yes  -       Row Name 04/01/25 1614          Activities of Daily Living    BADL Assessment/Intervention lower body dressing;toileting;grooming  -       Row Name 04/01/25 1614          Lower Body Dressing Assessment/Training    [place] Level (Lower Body Dressing) don;socks;dependent (less than 25% patient effort)  -       Row Name 04/01/25 1614          Toileting Assessment/Training    [place] Level (Toileting) adjust/manage clothing;perform perineal hygiene;contact guard assist  -     Position (Toileting) unsupported sitting;supported standing  -       Row Name 04/01/25 1614          Grooming Assessment/Training    [place] Level (Grooming) hair care, combing/brushing;oral care regimen;wash face, hands;standby assist  -     Position (Grooming) unsupported standing;sink side  -               User Key  (r) = Recorded By, (t) = Taken By, (c) = Cosigned By      Initials Name  Provider Type     Jeri Quinn OT Occupational Therapist                   Obj/Interventions       Row Name 04/01/25 1617          Sensory Assessment (Somatosensory)    Sensory Subjective Reports paresthesia  -     Sensory Assessment BUE cervical radiculopathy, RLE as well but worse in arms.  -[facility] Name 04/01/25 1617          Vision Assessment/Intervention    Visual Impairment/Limitations WFL  -MH       Row Name 04/01/25 1617          Range of Motion Comprehensive    General Range of Motion no range of motion deficits identified  -[facility] Name 04/01/25 1617          Strength Comprehensive (MMT)    Comment, General Manual Muscle Testing (MMT) Assessment RLE 3/5, LLE 3+/5, BUE 4-/5  -               User Key  (r) = Recorded By, (t) = Taken By, (c) = Cosigned By      Initials Name Provider Type     Jeri Quinn OT Occupational Therapist                   Goals/Plan       [facility] Name 04/01/25 1621          Transfer Goal 1 (OT)    Activity/Assistive Device (Transfer Goal 1, OT) transfers, all;walker, rolling  -     [place] Level/Cues Needed (Transfer Goal 1, OT) modified independence  -     Time Frame (Transfer Goal 1, OT) 2 weeks  -[facility] Name 04/01/25 1621          Bathing Goal 1 (OT)    Activity/Device (Bathing Goal 1, OT) bathing skills, all  -     [place] Level/Cues Needed (Bathing Goal 1, OT) minimum assist (75% or more patient effort)  -     Time Frame (Bathing Goal 1, OT) 2 weeks  -MH       Row Name 04/01/25 1621          Dressing Goal 1 (OT)    Activity/Device (Dressing Goal 1, OT) dressing skills, all  -     [place]/Cues Needed (Dressing Goal 1, OT) minimum assist (75% or more patient effort)  -     Time Frame (Dressing Goal 1, OT) 2 weeks  -[facility] Name 04/01/25 1621          Therapy Assessment/Plan (OT)    Planned Therapy Interventions (OT) activity tolerance training;adaptive equipment training;BADL retraining;occupation/activity based  interventions;functional balance retraining;patient/caregiver education/training;transfer/mobility retraining  -               User Key  (r) = Recorded By, (t) = Taken By, (c) = Cosigned By      Initials Name Provider Type    Jeri Velazquez, BRIANDA Occupational Therapist                   Clinical Impression       Row Name 04/01/25 1618          Pain Assessment    Pretreatment Pain Rating 8/10  -     Posttreatment Pain Rating 8/10  -       Row Name 04/01/25 1618          Plan of Care Review    Plan of Care Reviewed With patient  -     Progress improving  -     Outcome Evaluation Pt is a 84 y/o female with a history of cervical decompression and fusion as well as lumbar decompression and fusion performed over a decade ago admitted to [facility] on 3/28/25 with c/o neck and shoulder pain with neuropathic symptoms radiating to both arms, R>L, in mainly ulnar distribution. Reports balance issues as well but no falls though has been using RW for a few weeks. Cervical MRI shows changes from C6 corpectomy with C3-C7 ACDF & multilevel degenerative changes most prominent at C7-T1 where there is severe spinal canal stenosis. Lumbar MRI shows central canal narrowing and neural foraminal narrowing at multiple levels.  Neurosurgery is not planning any surgical interventions. At baseline, pt resides  with a [facility] and [facility] who are very helpful to her. She states they would assist her with ADl if needed and they have been assisting her up & down the steps at home recently. Nut she would like to opportunity to go to rehab in order to recover her strength, mobility, and self care if possible. OT recommends SNF.  -       Row Name 04/01/25 1618          Therapy Assessment/Plan (OT)    Criteria for Skilled Therapeutic Interventions Met (OT) yes;skilled treatment is necessary  -     Therapy Frequency (OT) 3 times/wk  -       Row Name 04/01/25 1618          Therapy Plan Review/Discharge Plan (OT)    Anticipated Discharge  Disposition (OT) skilled nursing [facility]  -       Row Name 04/01/25 1618          Vital Signs    O2 Delivery Pre Treatment room air  -     O2 Delivery Intra Treatment room air  -     O2 Delivery Post Treatment room air  -     Pre Patient Position Supine  -     Intra Patient Position Standing  -     Post Patient Position Sitting  -       Row Name 04/01/25 1618          Positioning and Restraints    Pre-Treatment Position in bed  -     Post Treatment Position chair  -     In Chair notified nsg;sitting;call light within reach;encouraged to call for assist;exit alarm on;legs elevated  -               User Key  (r) = Recorded By, (t) = Taken By, (c) = Cosigned By      Initials Name Provider Type     Jeri Quinn OT Occupational Therapist                   Outcome Measures       Row Name 04/01/25 0800          How much help from another person do you currently need...    Turning from your back to your side while in flat bed without using bedrails? 3  -CC     Moving from lying on back to sitting on the side of a flat bed without bedrails? 3  -CC     Moving to and from a bed to a chair (including a wheelchair)? 3  -CC     Standing up from a chair using your arms (e.g., wheelchair, bedside chair)? 3  -CC     Climbing 3-5 steps with a railing? 2  -CC     To walk in hospital room? 2  -CC     AM-PAC 6 Clicks Score (PT) 16  -CC     Highest Level of Mobility Goal 5 --> Static standing  -CC               User Key  (r) = Recorded By, (t) = Taken By, (c) = Cosigned By      Initials Name Provider Type    Alex Loyd LPN Licensed Nurse                    Occupational Therapy Education       Title: PT OT SLP Therapies (In Progress)       Topic: Occupational Therapy (In Progress)       Point: ADL training (Done)       Learning Progress Summary            Patient Acceptance, E, VU,NR by  at 4/1/2025 1622                      Point: Precautions (Done)       Learning Progress Summary            Patient  Acceptance, E, VU,NR by  at 4/1/2025 1622                      Point: Body mechanics (Done)       Learning Progress Summary            Patient Acceptance, E, VU,NR by  at 4/1/2025 1622                                      User Key       Initials Effective Dates Name Provider Type Discipline     06/16/21 -  Jeri Quinn OT Occupational Therapist OT                  OT Recommendation and Plan  Planned Therapy Interventions (OT): activity tolerance training, adaptive equipment training, BADL retraining, occupation/activity based interventions, functional balance retraining, patient/caregiver education/training, transfer/mobility retraining  Therapy Frequency (OT): 3 times/wk  Plan of Care Review  Plan of Care Reviewed With: patient  Progress: improving  Outcome Evaluation: Pt is a 86 y/o female with a history of cervical decompression and fusion as well as lumbar decompression and fusion performed over a decade ago admitted to [facility] on 3/28/25 with c/o neck and shoulder pain with neuropathic symptoms radiating to both arms, R>L, in mainly ulnar distribution. Reports balance issues as well but no falls though has been using RW for a few weeks. Cervical MRI shows changes from C6 corpectomy with C3-C7 ACDF & multilevel degenerative changes most prominent at C7-T1 where there is severe spinal canal stenosis. Lumbar MRI shows central canal narrowing and neural foraminal narrowing at multiple levels.  Neurosurgery is not planning any surgical interventions. At baseline, pt resides  with a [facility] and [facility] who are very helpful to her. She states they would assist her with ADl if needed and they have been assisting her up & down the steps at home recently. Nut she would like to opportunity to go to rehab in order to recover her strength, mobility, and self care if possible. OT recommends SNF.     Time Calculation:         Time Calculation- OT       Row Name 04/01/25 1622             Time Calculation- OT    OT  Start Time 1336  -      OT Stop Time 1357  -      OT Time Calculation (min) 21 min  -      Total Timed Code Minutes- OT 10 minute(s)  -      OT Received On 04/01/25  -      OT - Next Appointment 04/03/25  -      OT Goal Re-Cert Due Date 04/15/25  -                User Key  (r) = Recorded By, (t) = Taken By, (c) = Cosigned By      Initials Name Provider Type     Jeri Quinn OT Occupational Therapist                  Therapy Charges for Today       Code Description Service Date Service Provider Modifiers Qty    [id number]  OT SELF CARE/MGMT/TRAIN EA 15 MIN 4/1/2025 Jeri Quinn OT GO 1    [id number]  OT EVAL MOD COMPLEXITY 3 4/1/2025 Jeri Quinn OT GO 1                 Jeri Quinn OT  4/1/2025

## 2025-04-01 NOTE — PLAN OF CARE
Goal Outcome Evaluation:  Plan of Care Reviewed With: patient        Progress: improving  Outcome Evaluation: Pt is a 86 y/o female with a history of cervical decompression and fusion as well as lumbar decompression and fusion performed over a decade ago admitted to [facility] on 3/28/25 with c/o neck and shoulder pain with neuropathic symptoms radiating to both arms, R>L, in mainly ulnar distribution. Reports balance issues as well but no falls though has been using RW for a few weeks. Cervical MRI shows changes from C6 corpectomy with C3-C7 ACDF & multilevel degenerative changes most prominent at C7-T1 where there is severe spinal canal stenosis. Lumbar MRI shows central canal narrowing and neural foraminal narrowing at multiple levels.  Neurosurgery is not planning any surgical interventions. At baseline, pt resides  with a moustaphater and mallory who are very helpful to her. She states they would assist her with ADl if needed and they have been assisting her up & down the steps at home recently. Nut she would like to opportunity to go to rehab in order to recover her strength, mobility, and self care if possible. OT recommends SNF.    Anticipated Discharge Disposition (OT): skilled nursing facility

## 2025-04-02 LAB
ANION GAP SERPL CALCULATED.3IONS-SCNC: 12.7 MMOL/L (ref 5–15)
BUN SERPL-MCNC: 16 MG/DL (ref 8–23)
BUN/CREAT SERPL: 21.3 (ref 7–25)
CALCIUM SPEC-SCNC: 9.2 MG/DL (ref 8.6–10.5)
CHLORIDE SERPL-SCNC: 103 MMOL/L (ref 98–107)
CO2 SERPL-SCNC: 25.3 MMOL/L (ref 22–29)
CREAT SERPL-MCNC: 0.75 MG/DL (ref 0.57–1)
EGFRCR SERPLBLD CKD-EPI 2021: 77.6 ML/MIN/1.73
GEN 5 1HR TROPONIN T REFLEX: 18 NG/L
GLUCOSE SERPL-MCNC: 99 MG/DL (ref 65–99)
MAGNESIUM SERPL-MCNC: 1.8 MG/DL (ref 1.6–2.4)
NT-PROBNP SERPL-MCNC: 1483 PG/ML (ref 0–1800)
POTASSIUM SERPL-SCNC: 3.9 MMOL/L (ref 3.5–5.2)
SODIUM SERPL-SCNC: 141 MMOL/L (ref 136–145)
TROPONIN T % DELTA: 6
TROPONIN T NUMERIC DELTA: 1 NG/L
TSH SERPL DL<=0.05 MIU/L-ACNC: 1.93 UIU/ML (ref 0.27–4.2)

## 2025-04-02 PROCEDURE — 97530 THERAPEUTIC ACTIVITIES: CPT

## 2025-04-02 PROCEDURE — 83735 ASSAY OF MAGNESIUM: CPT | Performed by: NURSE PRACTITIONER

## 2025-04-02 PROCEDURE — 84484 ASSAY OF TROPONIN QUANT: CPT | Performed by: INTERNAL MEDICINE

## 2025-04-02 PROCEDURE — 25010000002 ENOXAPARIN PER 10 MG: Performed by: NURSE PRACTITIONER

## 2025-04-02 PROCEDURE — 97116 GAIT TRAINING THERAPY: CPT

## 2025-04-02 PROCEDURE — 99222 1ST HOSP IP/OBS MODERATE 55: CPT | Performed by: INTERNAL MEDICINE

## 2025-04-02 PROCEDURE — 80048 BASIC METABOLIC PNL TOTAL CA: CPT | Performed by: NURSE PRACTITIONER

## 2025-04-02 PROCEDURE — 84443 ASSAY THYROID STIM HORMONE: CPT | Performed by: NURSE PRACTITIONER

## 2025-04-02 PROCEDURE — 83880 ASSAY OF NATRIURETIC PEPTIDE: CPT | Performed by: NURSE PRACTITIONER

## 2025-04-02 PROCEDURE — 97535 SELF CARE MNGMENT TRAINING: CPT

## 2025-04-02 RX ORDER — METOPROLOL SUCCINATE 25 MG/1
25 TABLET, EXTENDED RELEASE ORAL
Status: DISCONTINUED | OUTPATIENT
Start: 2025-04-02 | End: 2025-04-08 | Stop reason: HOSPADM

## 2025-04-02 RX ADMIN — NYSTATIN: 100000 POWDER TOPICAL at 09:27

## 2025-04-02 RX ADMIN — ENOXAPARIN SODIUM 80 MG: 100 INJECTION SUBCUTANEOUS at 09:26

## 2025-04-02 RX ADMIN — NYSTATIN: 100000 POWDER TOPICAL at 20:28

## 2025-04-02 RX ADMIN — PREGABALIN 75 MG: 75 CAPSULE ORAL at 09:26

## 2025-04-02 RX ADMIN — HYDROMORPHONE HYDROCHLORIDE 2 MG: 2 TABLET ORAL at 16:04

## 2025-04-02 RX ADMIN — HYDROMORPHONE HYDROCHLORIDE 2 MG: 2 TABLET ORAL at 01:29

## 2025-04-02 RX ADMIN — LEVOTHYROXINE SODIUM 75 MCG: 0.07 TABLET ORAL at 07:00

## 2025-04-02 RX ADMIN — HYDROMORPHONE HYDROCHLORIDE 2 MG: 2 TABLET ORAL at 09:26

## 2025-04-02 RX ADMIN — HYDROMORPHONE HYDROCHLORIDE 2 MG: 2 TABLET ORAL at 12:32

## 2025-04-02 RX ADMIN — SERTRALINE HYDROCHLORIDE 100 MG: 100 TABLET, FILM COATED ORAL at 20:17

## 2025-04-02 RX ADMIN — METOPROLOL SUCCINATE 25 MG: 25 TABLET, EXTENDED RELEASE ORAL at 16:03

## 2025-04-02 RX ADMIN — ENOXAPARIN SODIUM 80 MG: 100 INJECTION SUBCUTANEOUS at 20:17

## 2025-04-02 RX ADMIN — Medication 10 ML: at 09:27

## 2025-04-02 RX ADMIN — HYDROMORPHONE HYDROCHLORIDE 2 MG: 2 TABLET ORAL at 20:17

## 2025-04-02 RX ADMIN — Medication 10 ML: at 20:17

## 2025-04-02 RX ADMIN — LIDOCAINE 1 PATCH: 4 PATCH TOPICAL at 09:26

## 2025-04-02 RX ADMIN — PREGABALIN 75 MG: 75 CAPSULE ORAL at 20:17

## 2025-04-02 NOTE — PLAN OF CARE
Goal Outcome Evaluation:                 Domonique See presents with functional mobility impairments which indicate the need for skilled intervention. Pt is very motivated and wants to regain her independence.  Pain improved after meds today to allow pt to participate with OOB and walking to/from bathroom. Tolerating session today without incident. Will continue to follow and progress as tolerated.

## 2025-04-02 NOTE — NURSING NOTE
Patient complains of chest pain that is midsternal and dull feeling. Patient alert & oriented x 4. Charge nurse notified . Provider on call notified - Nettie Gerardo NP. STAT EKG completed, STAT Troponin completed. Nitro 0.4 mg sublingual given and chest pain resolved. Cardiac monitoring in place.  Will continue to monitor patient.

## 2025-04-02 NOTE — DISCHARGE PLACEMENT REQUEST
"Geoffrey See (85 y.o. Female)       Date of Birth   1940    Social Security Number       Address   [address] [place] [facility] [place]    Home Phone   [phone]    MRN   [id number]       Yarsani   [employer]    Marital Status                               Admission Date   3/28/2025    Admission Type   Emergency    Admitting Provider   Tatiana Gilliland MD    Attending Provider   Tatiana Gilliland MD    Department, Room/Bed   [facility] MEDICAL INPATIENT, 311/1       Discharge Date       Discharge Disposition       Discharge Destination                                 Attending Provider: Tatiana Gilliland MD    Allergies: No Known Allergies    Isolation: None   Infection: None   Code Status: CPR    Ht: 152.4 cm (60\")   Wt: 77.1 kg (170 lb)    Admission Cmt: None   Principal Problem: Cervical stenosis of spinal canal [M48.02]                   Active Insurance as of 3/28/2025       Primary Coverage       Payor Plan Insurance Group Employer/Plan Group    ANTHEM MEDICARE REPLACEMENT ANTHEM MEDICARE ADVANTAGE PPO [id number]       Payor Plan Address Payor Plan Phone Number Payor Plan Fax Number Effective Dates    [address] [phone]  4/1/2024 - None Entered    [facility] [id number]         Subscriber Name Subscriber Birth Date Member ID       GEOFFREY SEE 1940 [id number]                     Emergency Contacts        (Rel.) Home Phone Work Phone Mobile Phone    Boone See (Son) -- -- [phone]                "

## 2025-04-02 NOTE — CASE MANAGEMENT/SOCIAL WORK
Continued Stay Note   [place]     Patient Name: Domonique See  MRN: [id number]  Today's Date: 4/2/2025    Admit Date: 3/28/2025    Plan: To [facility], Pending. No [facility] or [facility]. From Home, family transport.   Discharge Plan       Row Name 04/02/25 1200       Plan    Plan To [facility], Pending. No [facility] or Precert. From Home, family transport.    Patient/Family in Agreement with Plan yes    Provided Post Acute Provider List? Yes    Post Acute Provider List Inpatient Rehab    Delivered To Patient    Method of Delivery In person    Plan Comments CM met with patient at bedside to discuss PT/OT recommendations and explained the difference in rehab types. Patient was agreeable to [facility] but concerned about her portion to pay. CM informed pt that they will run financials and follow up with the patient. D/C Barriers: Neuro Sx following, cards consult pending             Expected Discharge Date and Time       Expected Discharge Date Expected Discharge Time    Apr 3, 2025        Cecelia Hercules RN    [phone]  [email or web address]

## 2025-04-02 NOTE — PROGRESS NOTES
LOS: 4 days   Patient Care Team:  Anish Lew APRN as PCP - General  Evgeny Gregory MD as Consulting Physician (Cardiology)  Leeanne Pleitez MD as Consulting Physician (Cardiology)  Radha Owusu APRN as Nurse Practitioner (Nurse Practitioner)  Kathe Muñoz MD as Consulting Physician (Nephrology)    Subjective     Patient states arm pain is improved however she is concerned about the episode of chest pain last night accompanied with nausea    Review of Systems   Constitutional:  Positive for activity change.   HENT: Negative.     Respiratory:  Positive for chest tightness.    Cardiovascular:  Positive for chest pain.   Gastrointestinal: Negative.    Genitourinary: Negative.    Musculoskeletal:  Positive for back pain and neck pain.   Neurological:  Positive for weakness.   Psychiatric/Behavioral: Negative.             Objective     Vital Signs  Temp:  [97.5 °F (36.4 °C)-98 °F (36.7 °C)] 97.9 °F (36.6 °C)  Heart Rate:  [71-90] 78  Resp:  [13-21] 13  BP: (133-184)/() 155/80      Physical Exam  Vitals reviewed.   Constitutional:       Appearance: She is not ill-appearing.   HENT:      Head: Normocephalic and atraumatic.      Right Ear: External ear normal.      Left Ear: External ear normal.      Nose: Nose normal.   Eyes:      General:         Right eye: No discharge.         Left eye: No discharge.   Cardiovascular:      Rate and Rhythm: Normal rate and regular rhythm.      Pulses: Normal pulses.      Heart sounds: Normal heart sounds.   Pulmonary:      Effort: Pulmonary effort is normal.      Breath sounds: Normal breath sounds.   Abdominal:      General: Bowel sounds are normal.      Palpations: Abdomen is soft.   Musculoskeletal:         General: Normal range of motion.      Cervical back: Normal range of motion.   Skin:     General: Skin is warm.   Neurological:      Mental Status: She is alert and oriented to person, place, and time.   Psychiatric:         Behavior:  Behavior normal.              Results Review:    Lab Results (last 24 hours)       Procedure Component Value Units Date/Time    High Sensitivity Troponin T 1Hr [[id number]]  (Abnormal) Collected: 04/02/25 0059    Specimen: Blood from Arm, Right Updated: 04/02/25 0128     HS Troponin T 18 ng/L      Troponin T Numeric Delta 1 ng/L      Troponin T % Delta 6    Narrative:      High Sensitive Troponin T Reference Range:  <14.0 ng/L- Negative Female for AMI  <22.0 ng/L- Negative Male for AMI  >=14 - Abnormal Female indicating possible myocardial injury.  >=22 - Abnormal Male indicating possible myocardial injury.   Clinicians would have to utilize clinical acumen, EKG, Troponin, and serial changes to determine if it is an Acute Myocardial Infarction or myocardial injury due to an underlying chronic condition.         High Sensitivity Troponin T [[id number]]  (Abnormal) Collected: 04/01/25 2325    Specimen: Blood from Arm, Right Updated: 04/01/25 2358     HS Troponin T 17 ng/L     Narrative:      High Sensitive Troponin T Reference Range:  <14.0 ng/L- Negative Female for AMI  <22.0 ng/L- Negative Male for AMI  >=14 - Abnormal Female indicating possible myocardial injury.  >=22 - Abnormal Male indicating possible myocardial injury.   Clinicians would have to utilize clinical acumen, EKG, Troponin, and serial changes to determine if it is an Acute Myocardial Infarction or myocardial injury due to an underlying chronic condition.                  Imaging Results (Last 24 Hours)       ** No results found for the last 24 hours. **                 I reviewed the patient's new clinical results.    Medication Review:   Scheduled Meds:enoxaparin sodium, 1 mg/kg, Subcutaneous, Q12H  levothyroxine, 75 mcg, Oral, Q AM  Lidocaine, 1 patch, Transdermal, Q24H  nystatin, , Topical, Q12H  pregabalin, 75 mg, Oral, Q12H  sertraline, 100 mg, Oral, Nightly  sodium chloride, 10 mL, Intravenous, Q12H      Continuous Infusions:   PRN Meds:.   acetaminophen    senna-docusate sodium **AND** polyethylene glycol **AND** bisacodyl **AND** bisacodyl    hydrALAZINE    HYDROmorphone    ipratropium-albuterol    nitroglycerin    ondansetron    sodium chloride    sodium chloride     Interval History:    4/2 patient will have epidural in one week outpatient so eliquis on hold and will continue lovenox bid for therapy of afib. Last night patient had episode of chest pain midsternal with nausea. EKG and troponin's obtained 17 then repeat 18. Will ask cardiology to see for any further recommendations    Assessment & Plan     Cervical stenosis of spinal canal  Neck and bilateral arm pain  Spondylolisthesis, acquired  Cervical radiculopathy  UTI  Pacemaker  Paroxysmal atrial fibrillation  Deep vein thrombosis (DVT)  Hypertrophic obstructive cardiomyopathy  Obesity (BMI 30.0-34.9)  Coronary artery disease     Plan of care  Cervical stenosis of spinal canal  Neck and bilateral arm pain  Spondylolisthesis, acquired  Cervical radiculopathy  -neurosurgery following  -MRI of the cervical spine demonstrates worsening central stenosis and spondylolisthesis at C7-T1 with moderate to severe central stenosis. No cord signal change.   -patient would benefit from surgical decompression and fusion at this C7 T1 level, however at her advanced age   she would be at much higher risk of postoperative complications including hardware failure and pseudoarthrosis  -simple decompression is contraindicated due to the anterior fusion of the spondylolisthesis    -outpatient epidural injection as she is on anticoagulation and cannot get 1 while in the hospital   -f/u with neurosurgery after epidural and complete DEXA scan to discuss future plans  -lidocaine patch and oral dilaudid/lyrica  -monitor bowel regimen    UTI  -e.coli treated and completed    Mood disorder  -zoloft    Hypothyroid  -synthroid    Pacemaker  Paroxysmal atrial fibrillation  Hypertrophic obstructive cardiomyopathy  Obesity  (BMI 30.0-34.9)  Coronary artery disease   Chest pain  -eliquis/asa on hold       Diet:healthy heart  DVT prophylaxis:lovenox full dose BID (eliquis on hold)      CODE STATUS:Full   Code status (Patient has no pulse and is not breathing):full  Medical Interventions (Patient has pulse or is breathing):Full support  Level of Support Discussed with :patient    Admission Status:Inpatient    Expected length of Stay: discharge in day or 2     Plan for disposition:Home or inpt rehab    WOODY Kaufman  04/02/25  11:13 EDT

## 2025-04-02 NOTE — PLAN OF CARE
Goal Outcome Evaluation:              Outcome Evaluation: Patient resting overnight with complaint of chest pain around 2300. NP notified. EKG done, Troponins, Nitro given and pain resolved. patient does complain of back and neck pain treated per MAR. Ongoing plan of care.

## 2025-04-02 NOTE — THERAPY TREATMENT NOTE
Subjective: Pt agreeable to therapeutic plan of care.  Needs to use the bathroom    Objective:   Pt sitting up in bed, A&O x 4    Precautions - falls    Bed mobility - Min-A and Mod-A,  Mod I supine to sit with HOB elevated and bedrails  Sit to supine with HOB elevated and A for legs.  Pt was able to scoot self up in bed with bed flat  Transfers - Min-A, stand from commode in bathroom, placed elevated seat over commode after treatment session  Pt struggles getting on/off low commode, was able to perform self hygiene with supervision for balance.  Stood at sink with support of walker for hand hygeine  Ambulation - 10 feet x2 CGA, Min-A, and with rolling walker  Pt with slow gait speed, flexed posture    Pain: 6 VAS Location: neck/shoulders  Intervention for pain: RN provided medication    Education: Provided education on the importance of mobility in the acute care setting, Transfer Training, and Gait Training    Assessment: Domonique See presents with functional mobility impairments which indicate the need for skilled intervention. Pt is very motivated and wants to regain her independence.  Pain improved after meds today to allow pt to participate with OOB and walking to/from bathroom. Tolerating session today without incident. Will continue to follow and progress as tolerated.     Plan/Recommendations:   If medically appropriate, High Intensity Therapy recommended post-acute care. This is recommended as therapy feels the patient would require 5-6 days per week, 2-3 hours per day. At this time, inpatient rehabilitation (acute rehab) would be the first choice and SNF would be second. Pt requires no DME at discharge.     Pt desires Inpatient Rehabilitation placement at discharge. Pt cooperative; agreeable to therapeutic recommendations and plan of care.         Basic Mobility 6-click:  Rollin = Total, A lot = 2, A little = 3; 4 = None  Supine>Sit:   1 = Total, A lot = 2, A little = 3; 4 = None    Sit>Stand with arms:  1 = Total, A lot = 2, A little = 3; 4 = None  Bed>Chair:   1 = Total, A lot = 2, A little = 3; 4 = None  Ambulate in room:  1 = Total, A lot = 2, A little = 3; 4 = None  3-5 Steps with railin = Total, A lot = 2, A little = 3; 4 = None  Score: 17    Modified Shoaib: N/A = No pre-op stroke/TIA    Post-Tx Position: Supine with HOB Elevated, Alarms activated, and Call light and personal items within reach  PPE: gloves    Therapy Charges for Today       Code Description Service Date Service Provider Modifiers Qty    56552192084 HC PT THERAPEUTIC ACT EA 15 MIN 2025 Chantal Préez, PT GP 1    80717927291 HC GAIT TRAINING EA 15 MIN 2025 Chantal Pérez, PT GP 1    [id number]  PT SELF CARE/MGMT/TRAIN EA 15 MIN 2025 Chantal Pérez, PT GP 1           PT Charges       Row Name 25 1523             Time Calculation    Start Time 0955  -      Stop Time 1019  -      Time Calculation (min) 24 min  -      PT Received On 25  -      PT - Next Appointment 25  -         Time Calculation- PT    Total Timed Code Minutes- PT 24 minute(s)  -                User Key  (r) = Recorded By, (t) = Taken By, (c) = Cosigned By      Initials Name Provider Type    Chantal Pendleton, PT Physical Therapist

## 2025-04-02 NOTE — CONSULTS
Cardiology Consult Note    Patient Identification:  Name: Domonique See  Age: 85 y.o.  Sex: female  :  1940  MRN: [id number]             Requesting Physician :  WOODY Del Real     Reason for Consultation / Chief Complaint :   Chest pain     History of Present Illness:        Ms. Domonique See  has PMH of     Hypertrophic cardiomyopathy, history of septal myomectomy,      nonsustained VT  hypertension  SSS, [employer] permanent pacemaker 3/16/2017  Paroxysmal atrial fibrillation  [id number] SCORE   [id number] Score: 8 (2024  2:06 PM)  [id number] Score: 8 (2023  3:37 PM)  Age greater than 75, CHF, hypertension, CVA, vascular disease     CAD, cath 2017 30% LAD  CHF due to hypertrophic cardiomyopathy  Hyperlipidemia  COPD  History of DAYLIN 2023  Peptic ulcer disease  Back surgery (previous cervical decompression and fusion, lumbar decompression and fusion), breast augmentation  Former smoker    Patient presented to the ED on 3/28/2025 with worsening back pain and ataxia.  She had had significant neck pain with pain radiating to both arms with numbness, tingling and burning sensation.    Patient has been seen by neurosurgery and was noted to have significant cervical thoracic and lumbar stenosis, which patient has been recommended outpatient epidural injection.      Last evening patient complained of chest pain therefore cardiology has been consulted.   Patient reports pain started a few hours after she ate and was dull and achy.  The pain radiated into her back, neck and jaws. The pain was relieved with one nitroglycerin.  Patient reports her blood pressure spiked up when she had pain.  She denies any further pain today.   HS troponin was obtain and was 17 and 18 repeat labs today BMP okay, BNP normal, TSH normal.   EKG with Afib       NP PLAN:  Will proceed with nuclear stress this in AM to assess for ischemia  Discuss  recent outpatient echocardiogram with patient (as  below).   Will have pacemaker interrogated. (Last remote check 3/9/2025 noted increase in Afib from 1% to 26%)   Continue lovenox (on Eliquis at home)     Will start beta blocker to help with afib, valvular disease and blood pressure.     Further assessment and plan per Dr. Gregory    Electronically signed by WOODY Thomas, 04/02/25, 2:23 PM EDT.    Cardiology attending addendum :    I have personally performed a face-to-face diagnostic evaluation, physical exam and reviewed data on this patient.  I have reviewed documentation done by me and nurse practitioner  and corrected as needed.  And agree with the different components of documentation.Greater than 50% of the time spent in the care of this patient was provided by attending consultant/me.    Outpatient echocardiogram 3/21/2025    Left ventricular ejection fraction appears to be 61 - 65%.    Left ventricular diastolic function was normal.    The left atrial cavity is moderately dilated.    Left atrial volume is severely increased.    Moderate aortic valve regurgitation is present.    Moderate mitral valve regurgitation is present.    Estimated right ventricular systolic pressure from tricuspid regurgitation is normal (<35 mmHg)           Chart review:  Underwent A-fib ablation 1/16/2023  Admitted 1/18/2023 with CHF     Echocardiogram 6/29/2021 reveals EF of 50%   Echocardiogram 1/19/2023 EF of 60 to 65% with severe right ventricular and right atrial enlargement moderate left atrial enlargement, moderate mitral regurgitation and mild aortic regurgitation.               Assessment:     Chest pain  Bilateral arm pain  Hypertrophic cardiomyopathy, myomectomy, NSVT  Chronic HFpEF due to diastolic dysfunction from HCM  Hypertension  Dyslipidemia  Prediabetes  Paroxysmal atrial fibrillation, status post ablation 1/16/2023 on long-term Eliquis  Obesity with BMI over 30  CKD  Moderate mitral regurgitation  Mild aortic regurgitation  Severe right ventricular  enlargement        Recommendations and plan:     Patient transition to the emergency room 3/28/2025 with worsening back pain and ataxia neck pain radiating to both arms..  Patient has tingling and numbness in both arms.  Neurosurgery has seen patient needs clearance for spine surgery  Patient has elevated [id number] score of 8, due to age over 75, female gender CHF hypertension CVA and vascular disease.  Will benefit from long-term anticoagulation to prevent thromboembolic events.  Patient risk of stroke and other embolism range from 9.7% to 13.6 %/year.  If anticoagulation needs to be held.  Would advise holding Eliquis 2 to 3 days before surgical procedure and restart as soon as possible after the procedure when okay with surgeons.    HS troponin is mildly elevated at 17 but has no trend count up to 18.  proBNP is normal at 1483.  EKG done 4/1/2025 reviewed/interpreted by me reveals A-fib with rate of 92 bpm with right bundle branch block    Will schedule for stress test to evaluate for ischemia.  Patient says he cannot walk due to deconditioning and musculoskeletal reasons.  Will do Lexiscan Cardiolite.      Patient's recent pacemaker check revealed normal function.  Will follow-up in pacemaker clinic.  Patient had Lexiscan Cardiolite 4/7/2022 which revealed revealed normal perfusion EF of 64%    Will follow-up after stress testing and cleared patient for surgical intervention.  Patient is on Eliquis        Cardiographics  ECG: EKG tracing was  personally reviewed/interpreted by me  ECG 12 Lead Chest Pain   Preliminary Result   HEART RATE=92  bpm   RR Jqhmehep=114  ms   MA Interval=  ms   P Horizontal Axis=  deg   P Front Axis=  deg   QRSD Nilaehop=622  ms   QT Qdzygynj=757  ms   ADhW=001  ms   QRS Axis=-39  deg   T Wave Axis=39  deg   - ABNORMAL ECG -   Atrial fibrillation   Right bundle branch block   Left ventricular hypertrophy   Anterior Q waves, possibly due to LVH   Prolonged QT interval   Date and Time  of Study:2025-04-01 22:52:35      Telemetry Scan   Final Result      Telemetry Scan   Final Result      Telemetry Scan   Final Result      Telemetry Scan   Final Result      Telemetry Scan   Final Result      Telemetry Scan   Final Result      Telemetry Scan   Final Result      Telemetry Scan   Final Result      Telemetry Scan   Final Result      Telemetry Scan   Final Result      Telemetry Scan   Final Result          Telemetry: Afiib, rate controlled              Diagnosis Plan   1. DDD (degenerative disc disease), cervical        2. Cervical osteophyte        3. Cervical spondylosis        4. Cervical stenosis of spinal canal        5. Spondylosis of thoracic region without myelopathy or radiculopathy        6. Degeneration of intervertebral disc of lumbar region with discogenic back pain and lower extremity pain        7. Lumbar spondylosis        8. Neuroforaminal stenosis of lumbar spine        9. Neural foraminal stenosis of cervical spine        10. Acute urinary tract infection        11. Cervical radiculopathy  dexa bone density axial      12. Localized osteoporosis ([place])  dexa bone density axial                           Past Medical History:  Past Medical History:   Diagnosis Date    Anemia     CHF (congestive heart failure)     Congenital heart disease     Coronary artery disease 2013    Deep vein thrombosis (DVT) 11/20/2017    GERD (gastroesophageal reflux disease)     Heart murmur Don't know    Hx of hypertrophic cardiomyopathy     Hypertension     Peptic ulceration     Persistent atrial fibrillation 01/10/2023    Primary osteoarthritis of both knees 02/08/2021    Primary osteoarthritis of right knee 09/21/2020    Sleep apnea 2017     Past Surgical History:  Past Surgical History:   Procedure Laterality Date    ABDOMINAL HERNIA REPAIR      ABLATION OF DYSRHYTHMIC FOCUS  01-    BACK SURGERY      BREAST AUGMENTATION      CARDIAC CATHETERIZATION      CARDIAC ELECTROPHYSIOLOGY PROCEDURE N/A  01/16/2023    Procedure: Ablation atrial fibrillation and flutter, cryo Duong and Ramón aware;  Surgeon: Leeanne Pleitez MD;  Location: [facility] INVASIVE LOCATION;  Service: Cardiovascular;  Laterality: N/A;    CARDIAC PACEMAKER PLACEMENT  03/16/2017    Dual Chamber    CERVICAL RIB RESECTION Bilateral     CERVICAL RIB RESECTION Bilateral 1963    bilateral cervical ribs removed - extra ribs located and causing pain    CHOLECYSTECTOMY      COLONOSCOPY      CORONARY ARTERY BYPASS GRAFT  2013    THORACIC DECOMPRESSION POSTERIOR FUSION  06/04/2014    L3-5 & S1    UPPER GASTROINTESTINAL ENDOSCOPY        Allergies:  No Known Allergies  Home Meds:  Medications Prior to Admission   Medication Sig Dispense Refill Last Dose/Taking    aspirin 81 MG EC tablet Take 1 tablet by mouth Every Night. 90 tablet 3 3/27/2025 Bedtime    diclofenac sodium (VOTAREN XR) 100 MG 24 hr tablet Take 1 tablet by mouth Daily.   Taking    Eliquis 5 MG tablet tablet TAKE 1 TABLET BY MOUTH TWICE DAILY 180 tablet 3 3/28/2025 Morning    fluticasone (FLONASE) 50 MCG/ACT nasal spray Administer 1 spray into the nostril(s) as directed by provider Daily.   Taking    gabapentin (NEURONTIN) 100 MG capsule Take 1-2 capsules by mouth Every Night.   Taking    NON FORMULARY 2 mg Every 7 (Seven) Days. Semaglutide 5mg/ml   3/24/2025    oxyCODONE-acetaminophen (PERCOCET) 7.5-325 MG per tablet Take 1 tablet by mouth 6 (Six) Times a Day.   Taking    sertraline (ZOLOFT) 100 MG tablet Take 1 tablet by mouth Every Evening. Indications: Major Depressive Disorder   3/27/2025    Synthroid 75 MCG tablet Take 1 tablet by mouth Every Morning.   Taking     Current Meds:     Current Facility-Administered Medications:     acetaminophen (TYLENOL) tablet 650 mg, 650 mg, Oral, Q4H PRN, Tatiana Gilliland MD, 650 mg at 04/01/25 0449    sennosides-docusate (PERICOLACE) 8.6-50 MG per tablet 2 tablet, 2 tablet, Oral, BID PRN, 2 tablet at 04/01/25 0816 **AND** polyethylene glycol  (MIRALAX) packet 17 g, 17 g, Oral, Daily PRN **AND** bisacodyl (DULCOLAX) EC tablet 5 mg, 5 mg, Oral, Daily PRN **AND** bisacodyl (DULCOLAX) suppository 10 mg, 10 mg, Rectal, Daily PRN, Tatiana Gilliland MD    enoxaparin sodium (LOVENOX) syringe 80 mg, 1 mg/kg, Subcutaneous, Q12H, Zoey Acosta, APRN, 80 mg at 04/02/25 0926    hydrALAZINE (APRESOLINE) injection 10 mg, 10 mg, Intravenous, Q6H PRN, Tatiana Gilliland MD, 10 mg at 04/01/25 2345    HYDROmorphone (DILAUDID) tablet 2 mg, 2 mg, Oral, Q3H PRN, Tatiana Gilliland MD, 2 mg at 04/02/25 1604    ipratropium-albuterol (DUO-NEB) nebulizer solution 3 mL, 3 mL, Nebulization, Q4H PRN, Tatiana Gilliland MD    levothyroxine (SYNTHROID, LEVOTHROID) tablet 75 mcg, 75 mcg, Oral, Q AM, Tatiana Gilliland MD, 75 mcg at 04/02/25 0700    Lidocaine 4 % 1 patch, 1 patch, Transdermal, Q24H, Tatiana Gilliland MD, 1 patch at 04/02/25 0926    metoprolol succinate XL (TOPROL-XL) 24 hr tablet 25 mg, 25 mg, Oral, Q24H, Radha Owusu, APRN, 25 mg at 04/02/25 1603    nitroglycerin (NITROSTAT) SL tablet 0.4 mg, 0.4 mg, Sublingual, Q5 Min PRN, Tatiana Gilliland MD, 0.4 mg at 04/01/25 2310    nystatin (MYCOSTATIN) powder, , Topical, Q12H, Tatiana Gilliland MD, Given at 04/02/25 0927    ondansetron (ZOFRAN) injection 4 mg, 4 mg, Intravenous, Q6H PRN, Tatiana Gilliland MD, 4 mg at 04/01/25 2240    pregabalin (LYRICA) capsule 75 mg, 75 mg, Oral, Q12H, Tatiana Gilliland MD, 75 mg at 04/02/25 0926    sertraline (ZOLOFT) tablet 100 mg, 100 mg, Oral, Nightly, Jose Martin Aleman MD, 100 mg at 04/01/25 2136    sodium chloride 0.9 % flush 10 mL, 10 mL, Intravenous, Q12H, Tatiana Gilliland MD, 10 mL at 04/02/25 0927    sodium chloride 0.9 % flush 10 mL, 10 mL, Intravenous, PRN, Tatiana Gilliland MD    sodium chloride 0.9 % infusion 40 mL, 40 mL, Intravenous, PRN, Tatiana Gilliland MD  Social History:   Social History     Tobacco Use    Smoking status: Former     Current packs/day: 0.00     Average packs/day: 1 pack/day for 8.0 years (8.0 ttl pk-yrs)     Types:  "Cigarettes     Start date: 1977     Quit date: 1985     Years since quittin.2     Passive exposure: Past    Smokeless tobacco: Never    Tobacco comments:     Quit 1985   Substance Use Topics    Alcohol use: Never     Comment: 6 drinks a year in social settings      Family History:  Family History   Problem Relation Age of Onset    Colon cancer Mother     Colon cancer Brother     Heart attack Brother     Colon cancer Daughter         Review of Systems : Review of Systems   Constitutional: Positive for malaise/fatigue. Negative for fever.   Cardiovascular:  Positive for chest pain (last night). Negative for leg swelling, near-syncope and syncope.   Respiratory:  Negative for cough and shortness of breath.    Musculoskeletal:  Positive for back pain and neck pain.   Gastrointestinal:  Positive for nausea. Negative for vomiting.   Neurological:  Positive for loss of balance, numbness, paresthesias and weakness.   All other systems reviewed and are negative.         Constitutional:  Temp:  [97.2 °F (36.2 °C)-98 °F (36.7 °C)] 97.2 °F (36.2 °C)  Heart Rate:  [72-90] 78  Resp:  [11-21] 11  BP: (133-184)/() 141/67    Physical Exam   /67   Pulse 78   Temp 97.2 °F (36.2 °C) (Oral)   Resp 11   Ht 152.4 cm (60\")   Wt 77.1 kg (170 lb)   LMP  (LMP Unknown)   SpO2 94%   BMI 33.20 kg/m²   Physical Exam  General:  Appears in no acute distress  Eyes: Sclerae are anicteric,  conjunctivae are clear   HEENT:  No JVD. Thyroid not visibly enlarged. No mucosal pallor or cyanosis  Respiratory: Respirations regular and unlabored at rest.  Bilaterally good breath sounds with good air entry in all fields. No crackles, rubs or wheezes auscultated  Cardiovascular: S1,S2 irregularly irregular rhythm rate controlled  2/6 murmur, no rub or gallop auscultated. No pretibial pitting edema  Gastrointestinal: Abdomen nondistended.  Musculoskeletal:  No abnormal movements  Extremities: No digital clubbing or " cyanosis  Skin: Color pink. Skin warm and dry to touch.   Neuro: Alert and awake, no lateralizing deficits appreciated        Echocardiogram:   Results for orders placed during the hospital encounter of 25    Adult Transthoracic Echo Complete W/ Cont if Necessary Per Protocol    Interpretation Summary    Left ventricular ejection fraction appears to be 61 - 65%.    Left ventricular diastolic function was normal.    The left atrial cavity is moderately dilated.    Left atrial volume is severely increased.    Moderate aortic valve regurgitation is present.    Moderate mitral valve regurgitation is present.    Estimated right ventricular systolic pressure from tricuspid regurgitation is normal (<35 mmHg).      STRESS TEST  Results for orders placed during the hospital encounter of 22    Stress Test With Myocardial Perfusion One Day    Interpretation Summary  LEXISCAN CARDIOLITE REPORT    DATE OF PROCEDURE:   22    INDICATION FOR PROCEDURE:  Chest pain, dyspnea on exertion, HF PEF, prediabetes, obesity, hypertension    PROCEDURE PERFORMED: Lexiscan Cardiolite    PROCEDURE COMMENTS:    After informed consent was obtained.  Stress test was supervised by nurse practitioner Radha Owusu. Patient's resting heart rate was 63 bpm, resting blood pressure was 160/96, resting EKG revealed sinus rhythm with rate of 63 bpm with incomplete right bundle branch block.  Patient was given 0.4 mg of regadenosine for stress testing.  There was no significant change in heart rate, blood pressure, symptoms with regadenoson injection.  Patient tolerated procedure well.  Complications were none.    NUCLEAR IMAGIN.  There was  uniform uptake of Cardiolite both in resting and post stress images, no ischemia seen.  2.  Gated images reveal  normal LV size and contractility, LVEF of 64%.    CONCLUSION:  1.  Lexiscan Cardiolite with normal perfusion, negative for ischemia.  2.  Normal wall motion.  LVEF of  64%.    RECOMMENDATIONS:    Clinical correlation recommended.      Evgeny Gregory MD  04/07/22  19:14 EDT        Cardiolite (Tc-99m sestamibi) stress test    HEART CATHETERIZATION  No results found for this or any previous visit.        Imaging  Chest X-ray:   Imaging Results (Last 24 Hours)       ** No results found for the last 24 hours. **            Lab Review: I have reviewed the labs  Results from last 7 days   Lab Units 04/02/25  0059 04/01/25  2325 03/28/25  1436   CK TOTAL U/L  --   --  55   HSTROP T ng/L 18* 17*  --      Results from last 7 days   Lab Units 04/02/25  0059   MAGNESIUM mg/dL 1.8     Results from last 7 days   Lab Units 04/02/25  0059   SODIUM mmol/L 141   POTASSIUM mmol/L 3.9   BUN mg/dL 16   CREATININE mg/dL 0.75   CALCIUM mg/dL 9.2         Results from last 7 days   Lab Units 04/02/25  0059   PROBNP pg/mL 1,483.0     Results from last 7 days   Lab Units 03/28/25  1436   WBC 10*3/mm3 4.36   HEMOGLOBIN g/dL 12.6   HEMATOCRIT % 39.1   PLATELETS 10*3/mm3 182                 Evgeny Gregory MD  4/2/2025, 16:40 EDT      EMR Dragon/Transcription:   Dictated utilizing Dragon dictation

## 2025-04-02 NOTE — PLAN OF CARE
Problem: Adult Inpatient Plan of Care  Goal: Plan of Care Review  Outcome: Progressing  Flowsheets (Taken 4/2/2025 1814)  Progress: no change  Plan of Care Reviewed With: patient  Goal: Patient-Specific Goal (Individualized)  Outcome: Progressing  Goal: Absence of Hospital-Acquired Illness or Injury  Outcome: Progressing  Intervention: Identify and Manage Fall Risk  Recent Flowsheet Documentation  Taken 4/2/2025 1400 by Yudelka Roman RN  Safety Promotion/Fall Prevention: safety round/check completed  Taken 4/2/2025 1232 by Yudelka Roman RN  Safety Promotion/Fall Prevention: safety round/check completed  Taken 4/2/2025 0926 by Yudelka Roman RN  Safety Promotion/Fall Prevention: safety round/check completed  Taken 4/2/2025 0800 by Yudelka Roman RN  Safety Promotion/Fall Prevention: safety round/check completed  Intervention: Prevent Skin Injury  Recent Flowsheet Documentation  Taken 4/2/2025 0926 by Yudelka Roman RN  Body Position: position changed independently  Goal: Optimal Comfort and Wellbeing  Outcome: Progressing  Intervention: Provide Person-Centered Care  Recent Flowsheet Documentation  Taken 4/2/2025 0926 by Yudelka Roman RN  Trust Relationship/Rapport:   care explained   questions answered  Goal: Readiness for Transition of Care  Outcome: Progressing  Goal: Plan of Care Review  Outcome: Progressing  Flowsheets (Taken 4/2/2025 1814)  Progress: no change  Plan of Care Reviewed With: patient  Goal: Patient-Specific Goal (Individualized)  Outcome: Progressing  Goal: Absence of Hospital-Acquired Illness or Injury  Outcome: Progressing  Intervention: Identify and Manage Fall Risk  Recent Flowsheet Documentation  Taken 4/2/2025 1400 by Yudelka Roman RN  Safety Promotion/Fall Prevention: safety round/check completed  Taken 4/2/2025 1232 by Yudelka Roman RN  Safety Promotion/Fall Prevention: safety round/check completed  Taken 4/2/2025 0926 by Yudelka Roman RN  Safety  Promotion/Fall Prevention: safety round/check completed  Taken 4/2/2025 0800 by Yudelka Roman, RN  Safety Promotion/Fall Prevention: safety round/check completed  Intervention: Prevent Skin Injury  Recent Flowsheet Documentation  Taken 4/2/2025 0926 by Yudelka Roman RN  Body Position: position changed independently  Goal: Optimal Comfort and Wellbeing  Outcome: Progressing  Intervention: Provide Person-Centered Care  Recent Flowsheet Documentation  Taken 4/2/2025 0926 by Yudelka Roman RN  Trust Relationship/Rapport:   care explained   questions answered  Goal: Readiness for Transition of Care  Outcome: Progressing     Problem: Pain Acute  Goal: Optimal Pain Control and Function  Outcome: Progressing  Intervention: Optimize Psychosocial Wellbeing  Recent Flowsheet Documentation  Taken 4/2/2025 0926 by Yudelka Roman RN  Diversional Activities: television     Problem: Comorbidity Management  Goal: Maintenance of Osteoarthritis Symptom Control  Outcome: Progressing   Goal Outcome Evaluation:  Plan of Care Reviewed With: patient        Progress: no change

## 2025-04-03 ENCOUNTER — APPOINTMENT (OUTPATIENT)
Dept: NUCLEAR MEDICINE | Facility: HOSPITAL | Age: 85
DRG: 552 | End: 2025-04-03
Payer: MEDICARE

## 2025-04-03 PROBLEM — R07.89 CHEST PAIN, ATYPICAL: Status: ACTIVE | Noted: 2025-04-03

## 2025-04-03 LAB
BH CV NUCLEAR PRIOR STUDY: 3
BH CV REST NUCLEAR ISOTOPE DOSE: 11 MCI
BH CV STRESS BP STAGE 1: NORMAL
BH CV STRESS BP STAGE 2: NORMAL
BH CV STRESS BP STAGE 3: NORMAL
BH CV STRESS COMMENTS STAGE 1: NORMAL
BH CV STRESS COMMENTS STAGE 2: NORMAL
BH CV STRESS DOSE REGADENOSON STAGE 1: 0.4
BH CV STRESS DURATION MIN STAGE 1: 0
BH CV STRESS DURATION MIN STAGE 2: 4
BH CV STRESS DURATION SEC STAGE 1: 10
BH CV STRESS DURATION SEC STAGE 2: 0
BH CV STRESS HR STAGE 1: 86
BH CV STRESS HR STAGE 2: 97
BH CV STRESS HR STAGE 3: 98
BH CV STRESS NUCLEAR ISOTOPE DOSE: 33 MCI
BH CV STRESS PROTOCOL 1: NORMAL
BH CV STRESS RECOVERY BP: NORMAL MMHG
BH CV STRESS RECOVERY HR: 107 BPM
BH CV STRESS STAGE 1: 1
BH CV STRESS STAGE 2: 2
BH CV STRESS STAGE 3: 3
MAXIMAL PREDICTED HEART RATE: 134 BPM
PERCENT MAX PREDICTED HR: 79.85 %
SPECT HRT GATED+EF W RNC IV: 56 %
STRESS BASELINE BP: NORMAL MMHG
STRESS BASELINE HR: 74 BPM
STRESS PERCENT HR: 94 %
STRESS POST PEAK BP: NORMAL MMHG
STRESS POST PEAK HR: 107 BPM
STRESS TARGET HR: 114 BPM

## 2025-04-03 PROCEDURE — 97116 GAIT TRAINING THERAPY: CPT

## 2025-04-03 PROCEDURE — 25010000002 REGADENOSON 0.4 MG/5ML SOLUTION: Performed by: INTERNAL MEDICINE

## 2025-04-03 PROCEDURE — 99232 SBSQ HOSP IP/OBS MODERATE 35: CPT | Performed by: INTERNAL MEDICINE

## 2025-04-03 PROCEDURE — 34310000005 TECHNETIUM TETROFOSMIN KIT: Performed by: INTERNAL MEDICINE

## 2025-04-03 PROCEDURE — 93017 CV STRESS TEST TRACING ONLY: CPT

## 2025-04-03 PROCEDURE — A9502 TC99M TETROFOSMIN: HCPCS | Performed by: INTERNAL MEDICINE

## 2025-04-03 PROCEDURE — 99221 1ST HOSP IP/OBS SF/LOW 40: CPT | Performed by: INTERNAL MEDICINE

## 2025-04-03 PROCEDURE — 78452 HT MUSCLE IMAGE SPECT MULT: CPT | Performed by: INTERNAL MEDICINE

## 2025-04-03 PROCEDURE — 25010000002 ENOXAPARIN PER 10 MG: Performed by: NURSE PRACTITIONER

## 2025-04-03 PROCEDURE — 78452 HT MUSCLE IMAGE SPECT MULT: CPT

## 2025-04-03 PROCEDURE — 97530 THERAPEUTIC ACTIVITIES: CPT

## 2025-04-03 PROCEDURE — 93018 CV STRESS TEST I&R ONLY: CPT | Performed by: INTERNAL MEDICINE

## 2025-04-03 RX ORDER — REGADENOSON 0.08 MG/ML
0.4 INJECTION, SOLUTION INTRAVENOUS
Status: COMPLETED | OUTPATIENT
Start: 2025-04-03 | End: 2025-04-03

## 2025-04-03 RX ADMIN — LEVOTHYROXINE SODIUM 75 MCG: 0.07 TABLET ORAL at 03:25

## 2025-04-03 RX ADMIN — PREGABALIN 75 MG: 75 CAPSULE ORAL at 08:16

## 2025-04-03 RX ADMIN — REGADENOSON 0.4 MG: 0.08 INJECTION, SOLUTION INTRAVENOUS at 10:51

## 2025-04-03 RX ADMIN — NYSTATIN: 100000 POWDER TOPICAL at 08:18

## 2025-04-03 RX ADMIN — HYDROMORPHONE HYDROCHLORIDE 2 MG: 2 TABLET ORAL at 03:25

## 2025-04-03 RX ADMIN — ENOXAPARIN SODIUM 80 MG: 100 INJECTION SUBCUTANEOUS at 21:27

## 2025-04-03 RX ADMIN — HYDROMORPHONE HYDROCHLORIDE 2 MG: 2 TABLET ORAL at 17:06

## 2025-04-03 RX ADMIN — HYDROMORPHONE HYDROCHLORIDE 2 MG: 2 TABLET ORAL at 21:27

## 2025-04-03 RX ADMIN — Medication 10 ML: at 21:28

## 2025-04-03 RX ADMIN — SENNOSIDES AND DOCUSATE SODIUM 2 TABLET: 8.6; 5 TABLET ORAL at 03:24

## 2025-04-03 RX ADMIN — PREGABALIN 75 MG: 75 CAPSULE ORAL at 21:27

## 2025-04-03 RX ADMIN — TETROFOSMIN 1 DOSE: 1.38 INJECTION, POWDER, LYOPHILIZED, FOR SOLUTION INTRAVENOUS at 10:51

## 2025-04-03 RX ADMIN — ENOXAPARIN SODIUM 80 MG: 100 INJECTION SUBCUTANEOUS at 08:16

## 2025-04-03 RX ADMIN — BISACODYL 10 MG: 10 SUPPOSITORY RECTAL at 17:43

## 2025-04-03 RX ADMIN — METOPROLOL SUCCINATE 25 MG: 25 TABLET, EXTENDED RELEASE ORAL at 11:59

## 2025-04-03 RX ADMIN — Medication 10 ML: at 08:17

## 2025-04-03 RX ADMIN — NYSTATIN: 100000 POWDER TOPICAL at 21:28

## 2025-04-03 RX ADMIN — SERTRALINE HYDROCHLORIDE 100 MG: 100 TABLET, FILM COATED ORAL at 21:28

## 2025-04-03 RX ADMIN — TETROFOSMIN 1 DOSE: 1.38 INJECTION, POWDER, LYOPHILIZED, FOR SOLUTION INTRAVENOUS at 08:44

## 2025-04-03 RX ADMIN — LIDOCAINE 1 PATCH: 4 PATCH TOPICAL at 08:16

## 2025-04-03 RX ADMIN — POLYETHYLENE GLYCOL 3350 17 G: 17 POWDER, FOR SOLUTION ORAL at 21:40

## 2025-04-03 RX ADMIN — HYDROMORPHONE HYDROCHLORIDE 2 MG: 2 TABLET ORAL at 11:55

## 2025-04-03 RX ADMIN — HYDROMORPHONE HYDROCHLORIDE 2 MG: 2 TABLET ORAL at 08:16

## 2025-04-03 NOTE — PROGRESS NOTES
LOS: 5 days   Patient Care Team:  Anish Lew APRN as PCP - General  Evgeny Gregory MD as Consulting Physician (Cardiology)  Leeanne Pleitez MD as Consulting Physician (Cardiology)  Radha Owusu APRN as Nurse Practitioner (Nurse Practitioner)  Kathe Muñoz MD as Consulting Physician (Nephrology)    Subjective     Patient states arm pain is improved      Review of Systems   Constitutional:  Positive for activity change.   HENT: Negative.     Respiratory:  Positive for chest tightness.    Cardiovascular:  Positive for chest pain.   Gastrointestinal: Negative.    Genitourinary: Negative.    Musculoskeletal:  Positive for back pain and neck pain.   Neurological:  Positive for weakness.   Psychiatric/Behavioral: Negative.             Objective     Vital Signs  Temp:  [97.3 °F (36.3 °C)-98.6 °F (37 °C)] 97.3 °F (36.3 °C)  Heart Rate:  [71-96] 75  Resp:  [11-20] 11  BP: (134-153)/(67-81) 153/75      Physical Exam  Vitals reviewed.   Constitutional:       Appearance: She is not ill-appearing.   HENT:      Head: Normocephalic and atraumatic.      Right Ear: External ear normal.      Left Ear: External ear normal.      Nose: Nose normal.   Eyes:      General:         Right eye: No discharge.         Left eye: No discharge.   Cardiovascular:      Rate and Rhythm: Normal rate and regular rhythm.      Pulses: Normal pulses.      Heart sounds: Normal heart sounds.   Pulmonary:      Effort: Pulmonary effort is normal.      Breath sounds: Normal breath sounds.   Abdominal:      General: Bowel sounds are normal.      Palpations: Abdomen is soft.   Musculoskeletal:         General: Normal range of motion.      Cervical back: Normal range of motion.   Skin:     General: Skin is warm.   Neurological:      Mental Status: She is alert and oriented to person, place, and time.   Psychiatric:         Behavior: Behavior normal.              Results Review:    Lab Results (last 24 hours)       ** No  results found for the last 24 hours. **             Imaging Results (Last 24 Hours)       ** No results found for the last 24 hours. **                 I reviewed the patient's new clinical results.    Medication Review:   Scheduled Meds:enoxaparin sodium, 1 mg/kg, Subcutaneous, Q12H  levothyroxine, 75 mcg, Oral, Q AM  Lidocaine, 1 patch, Transdermal, Q24H  metoprolol succinate XL, 25 mg, Oral, Q24H  nystatin, , Topical, Q12H  pregabalin, 75 mg, Oral, Q12H  sertraline, 100 mg, Oral, Nightly  sodium chloride, 10 mL, Intravenous, Q12H      Continuous Infusions:   PRN Meds:.  acetaminophen    senna-docusate sodium **AND** polyethylene glycol **AND** bisacodyl **AND** bisacodyl    hydrALAZINE    HYDROmorphone    ipratropium-albuterol    nitroglycerin    ondansetron    sodium chloride    sodium chloride     Interval History:    4/2 patient will have epidural in one week outpatient so eliquis on hold and will continue lovenox bid for therapy of afib. Last night patient had episode of chest pain midsternal with nausea. EKG and troponin's obtained 17 then repeat 18. Will ask cardiology to see for any further recommendations    Assessment & Plan     Cervical stenosis of spinal canal  Neck and bilateral arm pain  Spondylolisthesis, acquired  Cervical radiculopathy  UTI  Pacemaker  Paroxysmal atrial fibrillation  Deep vein thrombosis (DVT)  Hypertrophic obstructive cardiomyopathy  Obesity (BMI 30.0-34.9)  Coronary artery disease     Plan of care  Cervical stenosis of spinal canal  Neck and bilateral arm pain  Spondylolisthesis, acquired  Cervical radiculopathy  -neurosurgery following  -MRI of the cervical spine demonstrates worsening central stenosis and spondylolisthesis at C7-T1 with moderate to severe central stenosis. No cord signal change.   -patient would benefit from surgical decompression and fusion at this C7 T1 level, however at her advanced age   she would be at much higher risk of postoperative complications  including hardware failure and pseudoarthrosis  -simple decompression is contraindicated due to the anterior fusion of the spondylolisthesis    -outpatient epidural injection as she is on anticoagulation and cannot get 1 while in the hospital   -f/u with neurosurgery after epidural and complete DEXA scan to discuss future plans  -lidocaine patch and oral dilaudid/lyrica  -monitor bowel regimen    Chest pain  -cardiology following  -stress test today    UTI  -e.coli treated and completed    Mood disorder  -zoloft    Hypothyroid  -synthroid    Pacemaker  Paroxysmal atrial fibrillation  Hypertrophic obstructive cardiomyopathy  Obesity (BMI 30.0-34.9)  Coronary artery disease   Chest pain  -eliquis/asa on hold       Diet:healthy heart  DVT prophylaxis:lovenox full dose BID (eliquis on hold)      CODE STATUS:Full   Code status (Patient has no pulse and is not breathing):full  Medical Interventions (Patient has pulse or is breathing):Full support  Level of Support Discussed with :patient    Admission Status:Inpatient    Expected length of Stay: discharge in day or 2     Plan for disposition:Home or inpt rehab    WOODY Kaufman  04/03/25  13:29 EDT

## 2025-04-03 NOTE — CONSULTS
HP      Name: Domonique See ADMIT: 3/28/2025   : 1940  PCP: Anish Lew APRN    MRN: [id number] LOS: 5 days   AGE/SEX: 85 y.o. female  ROOM: [facility]/     Chief Complaint   Patient presents with    Back Pain       Subjective        History of present illness  Domonique See is an 85-year-old female patient who has history of hypertrophic obstructive cardiomyopathy, has had myomectomy at [facility] in , has a [employer] dual-chamber pacemaker on 3/16/2017.  Patient had A-fib and flutter ablation on 2023.  She had been in sinus rhythm up until 2025.  Device tracings suggest atrial flutter.  Patient however has spinal stenosis which is causing her neck pain, paresthesias in her arms and legs and is being managed by neurosurgery.  As of now the plan seems to be physical therapy and injections.    Past Medical History:   Diagnosis Date    Anemia     CHF (congestive heart failure)     Congenital heart disease     Coronary artery disease     Deep vein thrombosis (DVT) 2017    GERD (gastroesophageal reflux disease)     Heart murmur Don't know    Hx of hypertrophic cardiomyopathy     Hypertension     Peptic ulceration     Persistent atrial fibrillation 01/10/2023    Primary osteoarthritis of both knees 2021    Primary osteoarthritis of right knee 2020    Sleep apnea      Past Surgical History:   Procedure Laterality Date    ABDOMINAL HERNIA REPAIR      ABLATION OF DYSRHYTHMIC FOCUS  2023    BACK SURGERY      BREAST AUGMENTATION      CARDIAC CATHETERIZATION      CARDIAC ELECTROPHYSIOLOGY PROCEDURE N/A 2023    Procedure: Ablation atrial fibrillation and flutter, sarthak Yanes aware;  Surgeon: Leeanne Pleitez MD;  Location: [facility] INVASIVE LOCATION;  Service: Cardiovascular;  Laterality: N/A;    CARDIAC PACEMAKER PLACEMENT  2017    Dual Chamber    CERVICAL RIB RESECTION Bilateral     CERVICAL RIB RESECTION Bilateral 1963     bilateral cervical ribs removed - extra ribs located and causing pain    CHOLECYSTECTOMY      COLONOSCOPY      CORONARY ARTERY BYPASS GRAFT  2013    THORACIC DECOMPRESSION POSTERIOR FUSION  2014    L3-5 & S1    UPPER GASTROINTESTINAL ENDOSCOPY       Family History   Problem Relation Age of Onset    Colon cancer Mother     Colon cancer Brother     Heart attack Brother     Colon cancer Daughter      Social History     Tobacco Use    Smoking status: Former     Current packs/day: 0.00     Average packs/day: 1 pack/day for 8.0 years (8.0 ttl pk-yrs)     Types: Cigarettes     Start date: 1977     Quit date: 1985     Years since quittin.2     Passive exposure: Past    Smokeless tobacco: Never    Tobacco comments:     Quit    Vaping Use    Vaping status: Never Used   Substance Use Topics    Alcohol use: Never     Comment: 6 drinks a year in social settings    Drug use: Never     Medications Prior to Admission   Medication Sig Dispense Refill Last Dose/Taking    aspirin 81 MG EC tablet Take 1 tablet by mouth Every Night. 90 tablet 3 3/27/2025 Bedtime    diclofenac sodium (VOTAREN XR) 100 MG 24 hr tablet Take 1 tablet by mouth Daily.   Taking    Eliquis 5 MG tablet tablet TAKE 1 TABLET BY MOUTH TWICE DAILY 180 tablet 3 3/28/2025 Morning    fluticasone (FLONASE) 50 MCG/ACT nasal spray Administer 1 spray into the nostril(s) as directed by provider Daily.   Taking    gabapentin (NEURONTIN) 100 MG capsule Take 1-2 capsules by mouth Every Night.   Taking    NON FORMULARY 2 mg Every 7 (Seven) Days. Semaglutide 5mg/ml   3/24/2025    oxyCODONE-acetaminophen (PERCOCET) 7.5-325 MG per tablet Take 1 tablet by mouth 6 (Six) Times a Day.   Taking    sertraline (ZOLOFT) 100 MG tablet Take 1 tablet by mouth Every Evening. Indications: Major Depressive Disorder   3/27/2025    Synthroid 75 MCG tablet Take 1 tablet by mouth Every Morning.   Taking     Allergies:  Patient has no known allergies.    Review of  systems    Constitutional: Negative.    Respiratory and cardiovascular: As detailed in HPI section.  Gastrointestinal: Negative for constipation, nausea and vomiting negative for abdominal distention, abdominal pain and diarrhea.   Genitourinary: Negative for difficulty urinating and flank pain.   Musculoskeletal: Negative for arthralgias, joint swelling and myalgias.   Skin: Negative for color change, rash and wound.   Neurological: Negative for dizziness, syncope, weakness and headaches.   Hematological: Negative for adenopathy.   Psychiatric/Behavioral: Negative for confusion.   All other systems reviewed and are negative.       Physical Exam  VITALS REVIEWED    General:      well developed, in no acute distress.    Head:      normocephalic and atraumatic.    Eyes:      PERRL/EOM intact, conjunctiva and sclera clear with out nystagmus.    Neck:      no masses, thyromegaly,  trachea central with normal respiratory effort and PMI displaced laterally  Lungs:      Clear to auscultation bilaterally  Heart:       Irregular rhythm  Msk:      no deformity or scoliosis noted of thoracic or lumbar spine.    Pulses:      pulses normal in all 4 extremities.    Extremities:       No lower extremity edema  Neurologic:      no focal deficits.   alert oriented x3  Skin:      intact without lesions or rashes.    Psych:      alert and cooperative; normal mood and affect; normal attention span and concentration.      Result Review :               Pertinent cardiac workup    Stress test 4/3/2025 low risk, EF 56%.  EKG 4/1/2025 A-fib 92 bpm.  Echo 3/21/2025 ejection fraction 60 to 65% moderate aortic and mitral regurgitation.        Assessment and Plan         Cervical stenosis of spinal canal    Pacemaker    Paroxysmal atrial fibrillation    Cervical radiculopathy    Deep vein thrombosis (DVT)    Hypertrophic obstructive cardiomyopathy    Spondylolisthesis, acquired    Obesity (BMI 30.0-34.9)    Coronary artery disease    Acute  urinary tract infection    Neck pain      Domonique See is an 85-year-old female patient who has history of hypertrophic obstructive cardiomyopathy status post myomectomy in 2015, dual-chamber pacemaker for sick sinus syndrome, has had PVI and flutter line on 1/16/2023.  She has been in sinus rhythm up until February 2025.  The patient is not too symptomatic from A-fib, her main symptoms are related to her neck and spinal stenosis.  Nevertheless however I did offer her rhythm control strategies.  We could perform cardioversion with antiarrhythmic or redo ablation.  Patient would like to think about these options and let me know.  In the meantime the rates are controlled and she is on anticoagulation with Eliquis at home.        No follow-ups on file.  Patient was given instructions and counseling regarding her condition or for health maintenance advice. Please see specific information pulled into the AVS if appropriate.        Electronically signed by Leeanne Pleitez MD, 04/04/25, 8:29 AM EDT.

## 2025-04-03 NOTE — PLAN OF CARE
Problem: Adult Inpatient Plan of Care  Goal: Plan of Care Review  Outcome: Not Progressing  Flowsheets (Taken 4/3/2025 0605)  Progress: no change  Plan of Care Reviewed With: patient  Goal: Patient-Specific Goal (Individualized)  Outcome: Not Progressing  Goal: Absence of Hospital-Acquired Illness or Injury  Outcome: Not Progressing  Intervention: Identify and Manage Fall Risk  Recent Flowsheet Documentation  Taken 4/3/2025 0618 by Vika Raya RN  Safety Promotion/Fall Prevention:   assistive device/personal items within reach   clutter free environment maintained   fall prevention program maintained   nonskid shoes/slippers when out of bed   room organization consistent   safety round/check completed  Taken 4/3/2025 0432 by Vika Raya RN  Safety Promotion/Fall Prevention:   assistive device/personal items within reach   clutter free environment maintained   fall prevention program maintained   nonskid shoes/slippers when out of bed   room organization consistent   safety round/check completed  Taken 4/3/2025 0232 by Vika Raya RN  Safety Promotion/Fall Prevention:   assistive device/personal items within reach   clutter free environment maintained   fall prevention program maintained   nonskid shoes/slippers when out of bed   room organization consistent   safety round/check completed  Intervention: Prevent Infection  Recent Flowsheet Documentation  Taken 4/3/2025 0618 by Vika Raya RN  Infection Prevention:   environmental surveillance performed   hand hygiene promoted   rest/sleep promoted   single patient room provided  Taken 4/3/2025 0432 by Vika Raya RN  Infection Prevention:   environmental surveillance performed   hand hygiene promoted   rest/sleep promoted   single patient room provided  Taken 4/3/2025 0232 by Vika Raya RN  Infection Prevention:   environmental surveillance performed   hand hygiene promoted   single patient room provided  Goal: Optimal Comfort and  Wellbeing  Outcome: Not Progressing  Goal: Readiness for Transition of Care  Outcome: Not Progressing  Goal: Plan of Care Review  Outcome: Not Progressing  Flowsheets (Taken 4/3/2025 0605)  Progress: no change  Plan of Care Reviewed With: patient  Goal: Patient-Specific Goal (Individualized)  Outcome: Not Progressing  Goal: Absence of Hospital-Acquired Illness or Injury  Outcome: Not Progressing  Intervention: Identify and Manage Fall Risk  Recent Flowsheet Documentation  Taken 4/3/2025 0618 by Vika Raya RN  Safety Promotion/Fall Prevention:   assistive device/personal items within reach   clutter free environment maintained   fall prevention program maintained   nonskid shoes/slippers when out of bed   room organization consistent   safety round/check completed  Taken 4/3/2025 0432 by Vika Raya RN  Safety Promotion/Fall Prevention:   assistive device/personal items within reach   clutter free environment maintained   fall prevention program maintained   nonskid shoes/slippers when out of bed   room organization consistent   safety round/check completed  Taken 4/3/2025 0232 by Vika Raya RN  Safety Promotion/Fall Prevention:   assistive device/personal items within reach   clutter free environment maintained   fall prevention program maintained   nonskid shoes/slippers when out of bed   room organization consistent   safety round/check completed  Intervention: Prevent Infection  Recent Flowsheet Documentation  Taken 4/3/2025 0618 by Vika Raya RN  Infection Prevention:   environmental surveillance performed   hand hygiene promoted   rest/sleep promoted   single patient room provided  Taken 4/3/2025 0432 by Vika Raya RN  Infection Prevention:   environmental surveillance performed   hand hygiene promoted   rest/sleep promoted   single patient room provided  Taken 4/3/2025 0232 by Vika Raya RN  Infection Prevention:   environmental surveillance performed   hand hygiene promoted    single patient room provided  Goal: Optimal Comfort and Wellbeing  Outcome: Not Progressing  Goal: Readiness for Transition of Care  Outcome: Not Progressing   Goal Outcome Evaluation:  Plan of Care Reviewed With: patient        Progress: no change     Alert and oriented x 4. Able to verbalize needs and wants. Takes medication whole and tolerates well. C/O pain, PRN Dilaudid administered with positive effect noted. Does not require O2 therapy at this time. C/O constipation, PRN pericolace administered. Currently in bed, eyes closed. Rise and fall of chest observed. Call bell in reach. Currently NPO for NST. No chest pain noted at this time.

## 2025-04-03 NOTE — THERAPY TREATMENT NOTE
Subjective: Pt agreeable to therapeutic plan of care.  Feels better today, states she is thinking clearly, pain not as bad.     Objective:   Pt supine in bed, ready to work with therapy.     Precautions - falls    Bed mobility - Modified-Independent, supine to sit, HOB elevated, bedrails  Transfers - CGA, Min-A, and with rolling walker, stand from EOB, on commode with elevated toilet seat  Ambulation - 40 feet CGA, Min-A, and with rolling walker, improved gait speed and standing posture this date.   Once back on EOB, pt asking to walk to bathroom, CGA to maneuver  walker sideways through bathroom door.      Pain: 5 VAS Location: neck/shoulders  Intervention for pain: Repositioned and Therapeutic Presence    Education: Provided education on the importance of mobility in the acute care setting and Transfer Training    Assessment: Domonique See presents with functional mobility impairments which indicate the need for skilled intervention. Tolerating session today without incident. Will continue to follow and progress as tolerated.     Plan/Recommendations:   If medically appropriate, High Intensity Therapy recommended post-acute care. This is recommended as therapy feels the patient would require 5-6 days per week, 2-3 hours per day. At this time, inpatient rehabilitation (acute rehab) would be the first choice and SNF would be second. Pt requires no DME at discharge.     Pt desires Inpatient Rehabilitation placement at discharge. Pt cooperative; agreeable to therapeutic recommendations and plan of care.         Basic Mobility 6-click:  Rollin = Total, A lot = 2, A little = 3; 4 = None  Supine>Sit:   1 = Total, A lot = 2, A little = 3; 4 = None   Sit>Stand with arms:  1 = Total, A lot = 2, A little = 3; 4 = None  Bed>Chair:   1 = Total, A lot = 2, A little = 3; 4 = None  Ambulate in room:  1 = Total, A lot = 2, A little = 3; 4 = None  3-5 Steps with railin = Total, A lot = 2, A little = 3; 4 =  None  Score: 18    Modified [place]: N/A = No pre-op stroke/TIA    Post-Tx Position: In bathroom and Call light and personal items within reach, aide aware pt wants time on commode and will pull call light when finished    PPE: gloves    Therapy Charges for Today       Code Description Service Date Service Provider Modifiers Qty    35284222269 HC PT THERAPEUTIC ACT EA 15 MIN 4/2/2025 Chantal Pérez, PT GP 1    41575858115 HC GAIT TRAINING EA 15 MIN 4/2/2025 Chantal Pérez, PT GP 1    [id number]  PT SELF CARE/MGMT/TRAIN EA 15 MIN 4/2/2025 Chantal Pérez, PT GP 1    93263741039 HC GAIT TRAINING EA 15 MIN 4/3/2025 Chantal Pérez, PT GP 1    [id number]  PT THERAPEUTIC ACT EA 15 MIN 4/3/2025 Chantal Pérez, PT GP 1           PT Charges       Row Name 04/03/25 1530             Time Calculation    Start Time 1445  -      Stop Time 1505  -      Time Calculation (min) 20 min  -      PT Received On 04/03/25  -      PT - Next Appointment 04/04/25  -         Time Calculation- PT    Total Timed Code Minutes- PT 20 minute(s)  -                User Key  (r) = Recorded By, (t) = Taken By, (c) = Cosigned By      Initials Name Provider Type    Chantal Pendleton PT Physical Therapist

## 2025-04-03 NOTE — PLAN OF CARE
Goal Outcome Evaluation:             Domonique See presents with functional mobility impairments which indicate the need for skilled intervention. Tolerating session today without incident. Will continue to follow and progress as tolerated.

## 2025-04-03 NOTE — PROGRESS NOTES
Subjective:     Encounter Date:03/28/2025      Patient ID: Domonique See is a 85 y.o. female.    Chief Complaint and history of present illness:       Ms. Domonique See  has PMH of     Hypertrophic cardiomyopathy, history of septal myomectomy,      nonsustained VT  hypertension  SSS, [employer] permanent pacemaker 3/16/2017  Paroxysmal atrial fibrillation  [id number] SCORE   [id number] Score: 8 (6/20/2024  2:06 PM)  [id number] Score: 8 (5/22/2023  3:37 PM)  Age greater than 75, CHF, hypertension, CVA, vascular disease     CAD, cath 12/2017 30% LAD  CHF due to hypertrophic cardiomyopathy  Hyperlipidemia  COPD  History of DAYLIN 4/28/2023  Peptic ulcer disease  Back surgery (previous cervical decompression and fusion, lumbar decompression and fusion), breast augmentation  Former smoker     Patient presented to the ED on 3/28/2025 with worsening back pain and ataxia.  She had had significant neck pain with pain radiating to both arms with numbness, tingling and burning sensation.    Patient has been seen by neurosurgery and was noted to have significant cervical thoracic and lumbar stenosis, which patient has been recommended outpatient epidural injection.       Last evening patient complained of chest pain therefore cardiology has been consulted.   Patient reports pain started a few hours after she ate and was dull and achy.  The pain radiated into her back, neck and jaws. The pain was relieved with one nitroglycerin.  Patient reports her blood pressure spiked up when she had pain.  She denies any further pain today.   HS troponin was obtain and was 17 and 18 repeat labs today BMP okay, BNP normal, TSH normal.   EKG with Afib         NP PLAN:  Will proceed with nuclear stress this in AM to assess for ischemia  Discuss  recent outpatient echocardiogram with patient (as below).   Will have pacemaker interrogated. (Last remote check 3/9/2025 noted increase in Afib from 1% to 26%)   Continue lovenox (on  Eliquis at home)      Will start beta blocker to help with afib, valvular disease and blood pressure.      Further assessment and plan per Dr. Gregory     Electronically signed by WOODY Thomas, 04/02/25, 2:23 PM EDT.     Cardiology attending addendum :     I have personally performed a face-to-face diagnostic evaluation, physical exam and reviewed data on this patient.  I have reviewed documentation done by me and nurse practitioner  and corrected as needed.  And agree with the different components of documentation.Greater than 50% of the time spent in the care of this patient was provided by attending consultant/me.     Outpatient echocardiogram 3/21/2025    Left ventricular ejection fraction appears to be 61 - 65%.    Left ventricular diastolic function was normal.    The left atrial cavity is moderately dilated.    Left atrial volume is severely increased.    Moderate aortic valve regurgitation is present.    Moderate mitral valve regurgitation is present.    Estimated right ventricular systolic pressure from tricuspid regurgitation is normal (<35 mmHg)              Chart review:  Underwent A-fib ablation 1/16/2023  Admitted 1/18/2023 with CHF     Echocardiogram 6/29/2021 reveals EF of 50%   Echocardiogram 1/19/2023 EF of 60 to 65% with severe right ventricular and right atrial enlargement moderate left atrial enlargement, moderate mitral regurgitation and mild aortic regurgitation.                 Assessment:     Chest pain  Bilateral arm pain  Hypertrophic cardiomyopathy, myomectomy, NSVT  Chronic HFpEF due to diastolic dysfunction from HCM  Hypertension  Dyslipidemia  Prediabetes  Paroxysmal atrial fibrillation, status post ablation 1/16/2023 on long-term Eliquis  Obesity with BMI over 30  CKD  Moderate mitral regurgitation  Mild aortic regurgitation  Severe right ventricular enlargement        Recommendations and plan:     Patient transition to the emergency room 3/28/2025 with worsening back pain and  ataxia neck pain radiating to both arms..  Patient has tingling and numbness in both arms.  Neurosurgery has seen patient needs clearance for spine surgery  Patient has elevated [id number] score of 8, due to age over 75, female gender CHF hypertension CVA and vascular disease.  Will benefit from long-term anticoagulation to prevent thromboembolic events.  Patient risk of stroke and other embolism range from 9.7% to 13.6 %/year.  If anticoagulation needs to be held for spine injections would advise holding Eliquis 2 to 3 days before surgical procedure and restart as soon as possible from surgical procedure standpoint.    Continue to monitor rhythm on telemetry.  Currently in sinus rhythm.  HS troponin is mildly elevated at 17 but has no trend count up to 18.  proBNP is normal at 1483.  EKG done 4/1/2025 reviewed/interpreted by me reveals A-fib with rate of 92 bpm with right bundle branch block  Underwent Lexiscan Cardiolite 4/3/2025 which was negative for ischemia.  Reviewed results with patient.  Patient's recent pacemaker check revealed normal function.  Will follow-up in pacemaker clinic as outpatient.  Will follow               Procedures    ECHOCARDIOGRAM:  Results for orders placed during the hospital encounter of 03/21/25    Adult Transthoracic Echo Complete W/ Cont if Necessary Per Protocol    Interpretation Summary    Left ventricular ejection fraction appears to be 61 - 65%.    Left ventricular diastolic function was normal.    The left atrial cavity is moderately dilated.    Left atrial volume is severely increased.    Moderate aortic valve regurgitation is present.    Moderate mitral valve regurgitation is present.    Estimated right ventricular systolic pressure from tricuspid regurgitation is normal (<35 mmHg).      STRESS TEST  Results for orders placed during the hospital encounter of 04/07/22    Stress Test With Myocardial Perfusion One Day    Interpretation Summary  LEXISCAN CARDIOLITE  REPORT    DATE OF PROCEDURE:   22    INDICATION FOR PROCEDURE:  Chest pain, dyspnea on exertion, HF PEF, prediabetes, obesity, hypertension    PROCEDURE PERFORMED: Lexiscan Cardiolite    PROCEDURE COMMENTS:    After informed consent was obtained.  Stress test was supervised by nurse practitioner Radha Owusu. Patient's resting heart rate was 63 bpm, resting blood pressure was 160/96, resting EKG revealed sinus rhythm with rate of 63 bpm with incomplete right bundle branch block.  Patient was given 0.4 mg of regadenosine for stress testing.  There was no significant change in heart rate, blood pressure, symptoms with regadenoson injection.  Patient tolerated procedure well.  Complications were none.    NUCLEAR IMAGIN.  There was  uniform uptake of Cardiolite both in resting and post stress images, no ischemia seen.  2.  Gated images reveal  normal LV size and contractility, LVEF of 64%.    CONCLUSION:  1.  Lexiscan Cardiolite with normal perfusion, negative for ischemia.  2.  Normal wall motion.  LVEF of 64%.    RECOMMENDATIONS:    Clinical correlation recommended.      Evgeny Gregory MD  22  19:14 EDT          HEART CATHETERIZATION  No results found for this or any previous visit.      Copied text in this portion of the note has been reviewed and is accurate as of 4/3/2025  The following portions of the patient's history were reviewed and updated as appropriate: allergies, current medications, past family history, past medical history, past social history, past surgical history and problem list.    Assessment:         [facility]       Diagnosis Plan   1. DDD (degenerative disc disease), cervical        2. Cervical osteophyte        3. Cervical spondylosis        4. Cervical stenosis of spinal canal        5. Spondylosis of thoracic region without myelopathy or radiculopathy        6. Degeneration of intervertebral disc of lumbar region with discogenic back pain and lower extremity pain        7.  Lumbar spondylosis        8. Neuroforaminal stenosis of lumbar spine        9. Neural foraminal stenosis of cervical spine        10. Acute urinary tract infection        11. Cervical radiculopathy  dexa bone density axial      12. Localized osteoporosis ([place])  dexa bone density axial             Plan:               Past Medical History:  Past Medical History:   Diagnosis Date    Anemia     CHF (congestive heart failure)     Congenital heart disease     Coronary artery disease 2013    Deep vein thrombosis (DVT) 11/20/2017    GERD (gastroesophageal reflux disease)     Heart murmur Don't know    Hx of hypertrophic cardiomyopathy     Hypertension     Peptic ulceration     Persistent atrial fibrillation 01/10/2023    Primary osteoarthritis of both knees 02/08/2021    Primary osteoarthritis of right knee 09/21/2020    Sleep apnea 2017     Past Surgical History:  Past Surgical History:   Procedure Laterality Date    ABDOMINAL HERNIA REPAIR      ABLATION OF DYSRHYTHMIC FOCUS  01-    BACK SURGERY      BREAST AUGMENTATION      CARDIAC CATHETERIZATION      CARDIAC ELECTROPHYSIOLOGY PROCEDURE N/A 01/16/2023    Procedure: Ablation atrial fibrillation and flutter, cryo Utong and Ramón aware;  Surgeon: Leeanne Pleitez MD;  Location: [facility] CATH INVASIVE LOCATION;  Service: Cardiovascular;  Laterality: N/A;    CARDIAC PACEMAKER PLACEMENT  03/16/2017    Dual Chamber    CERVICAL RIB RESECTION Bilateral     CERVICAL RIB RESECTION Bilateral 1963    bilateral cervical ribs removed - extra ribs located and causing pain    CHOLECYSTECTOMY      COLONOSCOPY      CORONARY ARTERY BYPASS GRAFT  2013    THORACIC DECOMPRESSION POSTERIOR FUSION  06/04/2014    L3-5 & S1    UPPER GASTROINTESTINAL ENDOSCOPY        Allergies:  No Known Allergies  Home Meds:  Current Meds:     Current Facility-Administered Medications:     acetaminophen (TYLENOL) tablet 650 mg, 650 mg, Oral, Q4H PRN, Tatiana Gilliland MD, 650 mg at 04/01/25 0443     sennosides-docusate (PERICOLACE) 8.6-50 MG per tablet 2 tablet, 2 tablet, Oral, BID PRN, 2 tablet at 04/03/25 0324 **AND** polyethylene glycol (MIRALAX) packet 17 g, 17 g, Oral, Daily PRN **AND** bisacodyl (DULCOLAX) EC tablet 5 mg, 5 mg, Oral, Daily PRN **AND** bisacodyl (DULCOLAX) suppository 10 mg, 10 mg, Rectal, Daily PRN, Tatiana Gilliland MD    enoxaparin sodium (LOVENOX) syringe 80 mg, 1 mg/kg, Subcutaneous, Q12H, Zoey Acosta, APRN, 80 mg at 04/03/25 0816    hydrALAZINE (APRESOLINE) injection 10 mg, 10 mg, Intravenous, Q6H PRN, Tatiana Gilliland MD, 10 mg at 04/01/25 2345    HYDROmorphone (DILAUDID) tablet 2 mg, 2 mg, Oral, Q3H PRN, Tatiana Gilliland MD, 2 mg at 04/03/25 1155    ipratropium-albuterol (DUO-NEB) nebulizer solution 3 mL, 3 mL, Nebulization, Q4H PRN, Tatiana Gilliland MD    levothyroxine (SYNTHROID, LEVOTHROID) tablet 75 mcg, 75 mcg, Oral, Q AM, Tatiana Gilliland MD, 75 mcg at 04/03/25 0325    Lidocaine 4 % 1 patch, 1 patch, Transdermal, Q24H, Tatiana Gilliland MD, 1 patch at 04/03/25 0816    metoprolol succinate XL (TOPROL-XL) 24 hr tablet 25 mg, 25 mg, Oral, Q24H, Radha Owusu, APRN, 25 mg at 04/03/25 1159    nitroglycerin (NITROSTAT) SL tablet 0.4 mg, 0.4 mg, Sublingual, Q5 Min PRN, Tatiana Gilliland MD, 0.4 mg at 04/01/25 2310    nystatin (MYCOSTATIN) powder, , Topical, Q12H, Tatiana Gilliland MD, Given at 04/03/25 0818    ondansetron (ZOFRAN) injection 4 mg, 4 mg, Intravenous, Q6H PRN, Tatiana Gilliland MD, 4 mg at 04/01/25 2240    pregabalin (LYRICA) capsule 75 mg, 75 mg, Oral, Q12H, Tatiana Gilliland MD, 75 mg at 04/03/25 0816    sertraline (ZOLOFT) tablet 100 mg, 100 mg, Oral, Nightly, Jose Martin Aleman MD, 100 mg at 04/02/25 2017    sodium chloride 0.9 % flush 10 mL, 10 mL, Intravenous, Q12H, Tatiana Gilliland MD, 10 mL at 04/03/25 0817    sodium chloride 0.9 % flush 10 mL, 10 mL, Intravenous, PRN, Tatiana Gilliland MD    sodium chloride 0.9 % infusion 40 mL, 40 mL, Intravenous, PRN, Tatiana Gilliland MD  Social History:   Social History  "    Tobacco Use    Smoking status: Former     Current packs/day: 0.00     Average packs/day: 1 pack/day for 8.0 years (8.0 ttl pk-yrs)     Types: Cigarettes     Start date: 1977     Quit date: 1985     Years since quittin.2     Passive exposure: Past    Smokeless tobacco: Never    Tobacco comments:     Quit    Substance Use Topics    Alcohol use: Never     Comment: 6 drinks a year in social settings      Family History:  Family History   Problem Relation Age of Onset    Colon cancer Mother     Colon cancer Brother     Heart attack Brother     Colon cancer Daughter               ROS  All other systems are negative         Objective:     Physical Exam  BP (!) 133/108   Pulse 110   Temp 97.3 °F (36.3 °C) (Oral)   Resp 21   Ht 152.4 cm (60\")   Wt 77.1 kg (170 lb)   LMP  (LMP Unknown)   SpO2 98%   BMI 33.20 kg/m²   General:  Appears in no acute distress  Eyes: Sclera is anicteric,  conjunctiva is clear   HEENT:  No JVD.  No carotid bruits  Respiratory: Respirations regular and unlabored at rest.  Clear to auscultation  Cardiovascular: S1,S2 Regular rate and rhythm. .   Extremities: No digital clubbing or cyanosis, no edema  Skin: Color pink. Skin warm and dry to touch. No rashes  No xanthoma  Neuro: Alert and awake.    Lab Reviewed:         Evgeny Gregory MD  4/3/2025 16:14 EDT      EMR Dragon/Transcription:   \"Dictated utilizing Dragon dictation\".        "

## 2025-04-04 ENCOUNTER — APPOINTMENT (OUTPATIENT)
Dept: GENERAL RADIOLOGY | Facility: HOSPITAL | Age: 85
DRG: 552 | End: 2025-04-04
Payer: MEDICARE

## 2025-04-04 PROCEDURE — 97530 THERAPEUTIC ACTIVITIES: CPT

## 2025-04-04 PROCEDURE — 97535 SELF CARE MNGMENT TRAINING: CPT

## 2025-04-04 PROCEDURE — 63710000001 PREDNISONE PER 1 MG: Performed by: INTERNAL MEDICINE

## 2025-04-04 PROCEDURE — 99232 SBSQ HOSP IP/OBS MODERATE 35: CPT | Performed by: INTERNAL MEDICINE

## 2025-04-04 PROCEDURE — 73521 X-RAY EXAM HIPS BI 2 VIEWS: CPT

## 2025-04-04 PROCEDURE — 97110 THERAPEUTIC EXERCISES: CPT

## 2025-04-04 PROCEDURE — 97112 NEUROMUSCULAR REEDUCATION: CPT

## 2025-04-04 PROCEDURE — 25010000002 ENOXAPARIN PER 10 MG: Performed by: NURSE PRACTITIONER

## 2025-04-04 PROCEDURE — 73562 X-RAY EXAM OF KNEE 3: CPT

## 2025-04-04 RX ORDER — PREDNISONE 20 MG/1
40 TABLET ORAL DAILY
Status: DISCONTINUED | OUTPATIENT
Start: 2025-04-04 | End: 2025-04-08

## 2025-04-04 RX ORDER — HYDROMORPHONE HYDROCHLORIDE 2 MG/1
2 TABLET ORAL EVERY 4 HOURS PRN
Refills: 0 | Status: DISCONTINUED | OUTPATIENT
Start: 2025-04-04 | End: 2025-04-08

## 2025-04-04 RX ADMIN — HYDROMORPHONE HYDROCHLORIDE 2 MG: 2 TABLET ORAL at 22:21

## 2025-04-04 RX ADMIN — SERTRALINE HYDROCHLORIDE 100 MG: 100 TABLET, FILM COATED ORAL at 20:44

## 2025-04-04 RX ADMIN — PREGABALIN 75 MG: 75 CAPSULE ORAL at 09:36

## 2025-04-04 RX ADMIN — LIDOCAINE 1 PATCH: 4 PATCH TOPICAL at 09:37

## 2025-04-04 RX ADMIN — ENOXAPARIN SODIUM 80 MG: 100 INJECTION SUBCUTANEOUS at 09:36

## 2025-04-04 RX ADMIN — PREDNISONE 40 MG: 20 TABLET ORAL at 16:56

## 2025-04-04 RX ADMIN — NYSTATIN: 100000 POWDER TOPICAL at 20:45

## 2025-04-04 RX ADMIN — NYSTATIN: 100000 POWDER TOPICAL at 09:37

## 2025-04-04 RX ADMIN — LEVOTHYROXINE SODIUM 75 MCG: 0.07 TABLET ORAL at 05:07

## 2025-04-04 RX ADMIN — Medication 10 ML: at 20:44

## 2025-04-04 RX ADMIN — ENOXAPARIN SODIUM 80 MG: 100 INJECTION SUBCUTANEOUS at 20:44

## 2025-04-04 RX ADMIN — METOPROLOL SUCCINATE 25 MG: 25 TABLET, EXTENDED RELEASE ORAL at 09:36

## 2025-04-04 RX ADMIN — HYDROMORPHONE HYDROCHLORIDE 2 MG: 2 TABLET ORAL at 18:10

## 2025-04-04 RX ADMIN — HYDROMORPHONE HYDROCHLORIDE 2 MG: 2 TABLET ORAL at 09:36

## 2025-04-04 RX ADMIN — Medication 10 ML: at 09:37

## 2025-04-04 RX ADMIN — HYDROMORPHONE HYDROCHLORIDE 2 MG: 2 TABLET ORAL at 13:38

## 2025-04-04 RX ADMIN — ACETAMINOPHEN 650 MG: 325 TABLET, FILM COATED ORAL at 20:48

## 2025-04-04 RX ADMIN — HYDROMORPHONE HYDROCHLORIDE 2 MG: 2 TABLET ORAL at 04:50

## 2025-04-04 RX ADMIN — PREGABALIN 75 MG: 75 CAPSULE ORAL at 20:44

## 2025-04-04 NOTE — THERAPY TREATMENT NOTE
Subjective: Pt agreeable to therapeutic plan of care.    Pt has had 2 falls today.  X-rays negative for any fractures.    Objective:     Precautions -  Falls     Bed mobility - Mod-A and Assist x 2  supine to sit was mod assist of one but mod of 2 for sit to supine  Transfers - Max-A, Assist x 2, and with rolling walker  sit to stand from edge of the bed  but after a few seconds pt bilateral knees buckled and pt unable to recover.  Kartik Stedy used for transfers to the bedside commode and back to edge of the bed. Pt improved with sit to stands using the kartik stedy.    Ambulation -   pt unable to ambulate due to knees buckling and pt unable to recover    Therapeutic Exercise - 5 Reps B LE AAROM lying supine    Vitals: WNL    Pain: 5 VAS Location: bilateral knees.  Left more painful than the right  Intervention for pain: Repositioned, RN provided medication, Increased Activity, and Therapeutic Presence    Education: Provided education on the importance of mobility in the acute care setting, Verbal/Tactile Cues, and Transfer Training    Assessment: Domonique See presents with functional mobility impairments which indicate the need for skilled intervention. Tolerating session today without incident. Pt's mobility has declined since pt had 2 falls today.  Pt unable to stand without significant support.  Pt is motivated to recover her mobility.  Cont ill continue to follow and progress as tolerated.     Plan/Recommendations:   If medically appropriate, High Intensity Therapy recommended post-acute care. This is recommended as therapy feels the patient would require 5-6 days per week, 2-3 hours per day. At this time, inpatient rehabilitation (acute rehab) would be recommended.  Pt requires no DME at discharge.     Pt desires Inpatient Rehabilitation placement at discharge. Pt cooperative; agreeable to therapeutic recommendations and plan of care.     Basic Mobility 6-click:  Rollin = Total, A lot = 2, A  little = 3; 4 = None  Supine>Sit:   1 = Total, A lot = 2, A little = 3; 4 = None   Sit>Stand with arms:  1 = Total, A lot = 2, A little = 3; 4 = None  Bed>Chair:   1 = Total, A lot = 2, A little = 3; 4 = None  Ambulate in room:  1 = Total, A lot = 2, A little = 3; 4 = None  3-5 Steps with railin = Total, A lot = 2, A little = 3; 4 = None  Score: 11    Modified [place]: 4 = Moderately severe disability (Unable to attend to own bodily needs without assistance, and unable to walk unassisted)     Post-Tx Position: Supine with HOB Elevated, Alarms activated, and Call light and personal items within reach  PPE: gloves    Therapy Charges for Today       Code Description Service Date Service Provider Modifiers Qty    39135019172  PT THERAPEUTIC ACT EA 15 MIN 2025 Gi Avila, PTA GP 1    41566800743 HC PT NEUROMUSC RE EDUCATION EA 15 MIN 2025 Gi Avila PTA GP 1    51868606971 HC PT THER PROC EA 15 MIN 2025 Gi Avila, FAUSTINO GP 1           PT Charges       Row Name 25 1509             Time Calculation    Start Time 1444  -SC      Stop Time 1508  -SC      Time Calculation (min) 24 min  -SC      PT Received On 25  -SC      PT - Next Appointment 25  -SC         Time Calculation- PT    Total Timed Code Minutes- PT 24 minute(s)  -SC                User Key  (r) = Recorded By, (t) = Taken By, (c) = Cosigned By      Initials Name Provider Type    SC Gi Avila PTA Physical Therapist Assistant

## 2025-04-04 NOTE — CASE MANAGEMENT/SOCIAL WORK
Continued Stay Note  SUSAN Goldstein     Patient Name: Domonique See  MRN: [id number]  Today's Date: 4/4/2025    Admit Date: 3/28/2025    Plan: [facility] (accepted, P2P denied. [facility] starting appeal per pt request.)No PASRR required. Will need precert.   Discharge Plan       Row Name 04/04/25 1510       Plan    Plan [facility] (accepted, P2P denied. [facility] starting appeal per pt request.)No PASRR required. Will need precert.    Plan Comments DC barriers: fall today in bathroom with xrays (negative for fracture.) Nuclear stress test pending in AM. P2P for [facility] with denial for [facility] therapy. [facility] to start appeal when updated PT/OT notes are in. Cardiology and neurosurgery following.               Beulah Ayoub RN     Office phone: [phone]  Office fax: [phone]

## 2025-04-04 NOTE — PLAN OF CARE
Goal Outcome Evaluation:     Domonique See presents with functional mobility impairments which indicate the need for skilled intervention. Tolerating session today without incident. Pt's mobility has declined since pt had 2 falls today.  Pt unable to stand without significant support.  Pt is motivated to recover her mobility.  Cont to recommend IP rehab at d/c. Will continue to follow and progress as tolerated.     Plan/Recommendations:   If medically appropriate, High Intensity Therapy recommended post-acute care. This is recommended as therapy feels the patient would require 5-6 days per week, 2-3 hours per day. inpatient rehabilitation (acute rehab) would be recommended. Pt requires no DME at discharge.     Pt desires Inpatient Rehabilitation placement at discharge. Pt cooperative; agreeable to therapeutic recommendations and plan of care.

## 2025-04-04 NOTE — NURSING NOTE
Patient called out to use bathroom around 0345; nursing assistant answered call and helped patient; patient refused bedside commode and wanted to use commode in the bathroom; This RN checked on patient around 4 am and found patient sitting on the floor in front of commode. Patient states she slid off the commode onto the floor and was unable to reach call light cord. Code 58 called; charge nurse notified; three nurses helped patient back in bed; patient denies hitting her head or any pain or injuries from the fall. Care plan updated; Provider Nettie Gerardo NP notified; patient's son Boone notified; yellow gown on patient; slipper socks on patients; fall risk armband on; bed alarm is on; fall risk sign present at patient room door; will continue to monitor patient.

## 2025-04-04 NOTE — PROGRESS NOTES
Subjective:     Encounter Date:03/28/2025      Patient ID: Domonique See is a 85 y.o. female.    Chief Complaint and history of present illness: Atrial fibrillation pacemaker    Interval change: Patient presented with chest pain.  Troponin was flat at 17 and 18.  Underwent Lexiscan Cardiolite 4/3/2025 which was negative for ischemia.  Patient is going in and out of A-fib.  EP was consulted.    Objective:  4/3/2025: Lexiscan Cardiolite negative for ischemia, LVEF of 56%    Subjective: Patient seen and examined.  Chart reviewed.  Labs reviewed.  Discussed with RN taking care of patient.  Patient denies any chest pain.      History of present illness:    Ms. Domonique See  has PMH of     Hypertrophic cardiomyopathy, history of septal myomectomy,      nonsustained VT  hypertension  SSS, [facility] permanent pacemaker 3/16/2017  Paroxysmal atrial fibrillation  [id number] SCORE   [id number]-BRCa Score: 8 (6/20/2024  2:06 PM)  [id number] Score: 8 (5/22/2023  3:37 PM)  Age greater than 75, CHF, hypertension, CVA, vascular disease     CAD, cath 12/2017 30% LAD  CHF due to hypertrophic cardiomyopathy  Hyperlipidemia  COPD  History of DAYLIN 4/28/2023  Peptic ulcer disease  Back surgery (previous cervical decompression and fusion, lumbar decompression and fusion), breast augmentation  Former smoker     Patient presented to the ED on 3/28/2025 with worsening back pain and ataxia.  She had had significant neck pain with pain radiating to both arms with numbness, tingling and burning sensation.    Patient has been seen by neurosurgery and was noted to have significant cervical thoracic and lumbar stenosis, which patient has been recommended outpatient epidural injection.       Last evening patient complained of chest pain therefore cardiology has been consulted.   Patient reports pain started a few hours after she ate and was dull and achy.  The pain radiated into her back, neck and jaws. The pain was relieved with  one nitroglycerin.  Patient reports her blood pressure spiked up when she had pain.  She denies any further pain today.   HS troponin was obtain and was 17 and 18 repeat labs today BMP okay, BNP normal, TSH normal.   EKG with Afib         NP PLAN:  Will proceed with nuclear stress this in AM to assess for ischemia  Discuss  recent outpatient echocardiogram with patient (as below).   Will have pacemaker interrogated. (Last remote check 3/9/2025 noted increase in Afib from 1% to 26%)   Continue lovenox (on Eliquis at home)      Will start beta blocker to help with afib, valvular disease and blood pressure.           Outpatient echocardiogram 3/21/2025    Left ventricular ejection fraction appears to be 61 - 65%.    Left ventricular diastolic function was normal.    The left atrial cavity is moderately dilated.    Left atrial volume is severely increased.    Moderate aortic valve regurgitation is present.    Moderate mitral valve regurgitation is present.    Estimated right ventricular systolic pressure from tricuspid regurgitation is normal (<35 mmHg)              Chart review:  Underwent A-fib ablation 1/16/2023  Admitted 1/18/2023 with CHF     Echocardiogram 6/29/2021 reveals EF of 50%   Echocardiogram 1/19/2023 EF of 60 to 65% with severe right ventricular and right atrial enlargement moderate left atrial enlargement, moderate mitral regurgitation and mild aortic regurgitation.                 Assessment:     Chest pain  Bilateral arm pain  Hypertrophic cardiomyopathy, myomectomy, NSVT  Chronic HFpEF due to diastolic dysfunction from HCM  Hypertension  Dyslipidemia  Prediabetes  Paroxysmal atrial fibrillation, status post ablation 1/16/2023 on long-term Eliquis  Obesity with BMI over 30  CKD  Moderate mitral regurgitation  Mild aortic regurgitation  Severe right ventricular enlargement        Recommendations and plan:     Patient transition to the emergency room 3/28/2025 with worsening back pain and ataxia neck  pain radiating to both arms..  Patient has tingling and numbness in both arms.  Neurosurgery has seen patient needs clearance for spine surgery  Patient has elevated [id number]-KVSp score of 8, due to age over 75, female gender CHF hypertension CVA and vascular disease.  Will benefit from long-term anticoagulation to prevent thromboembolic events.  Patient risk of stroke and other embolism range from 9.7% to 13.6 %/year.  If anticoagulation needs to be held for spine injections would advise holding Eliquis 2 to 3 days before surgical procedure and restart as soon as possible from surgical procedure standpoint.  Patient's Eliquis is on hold she is being transitioned with Lovenox to be held for 48 hours before her spinal procedure.  Patient presented with chest pain, troponins were flat at 17 and 18.  proBNP was normal at 1483.  EKG 4/1/2025 reviewed/interpreted by me reveals A-fib with a rate of 92 bpm with right bundle branch block.  Patient underwent Lexiscan Cardiolite 4/3/2025 which was negative for ischemia EF of 56%.  Reviewed stress test results with patient.  Patient had previous ablation.  Was seen by EP Dr. Naseem Pleitez, I am patient was offered ablation and cardioversion she is considering.  Will follow-up with EP as outpatient.  Will continue to monitor rhythm on telemetry  Will follow-up as outpatient in pacemaker clinic to follow rhythm.  Please call back if I can be of any further assistance        Procedures    ECHOCARDIOGRAM:  Results for orders placed during the hospital encounter of 03/21/25    Adult Transthoracic Echo Complete W/ Cont if Necessary Per Protocol    Interpretation Summary    Left ventricular ejection fraction appears to be 61 - 65%.    Left ventricular diastolic function was normal.    The left atrial cavity is moderately dilated.    Left atrial volume is severely increased.    Moderate aortic valve regurgitation is present.    Moderate mitral valve regurgitation is present.     Estimated right ventricular systolic pressure from tricuspid regurgitation is normal (<35 mmHg).      STRESS TEST  Results for orders placed during the hospital encounter of 03/28/25    Stress Test With Myocardial Perfusion One Day    Interpretation Summary    Myocardial perfusion imaging indicates a normal myocardial perfusion study with no evidence of ischemia. Impressions are consistent with a low risk study.    Left ventricular ejection fraction is normal (Calculated EF = 56%).          HEART CATHETERIZATION  No results found for this or any previous visit.      Copied text in this portion of the note has been reviewed and is accurate as of 4/4/2025  The following portions of the patient's history were reviewed and updated as appropriate: allergies, current medications, past family history, past medical history, past social history, past surgical history and problem list.    Assessment:         MDM       Diagnosis Plan   1. DDD (degenerative disc disease), cervical        2. Cervical osteophyte        3. Cervical spondylosis        4. Cervical stenosis of spinal canal        5. Spondylosis of thoracic region without myelopathy or radiculopathy        6. Degeneration of intervertebral disc of lumbar region with discogenic back pain and lower extremity pain        7. Lumbar spondylosis        8. Neuroforaminal stenosis of lumbar spine        9. Neural foraminal stenosis of cervical spine        10. Acute urinary tract infection        11. Cervical radiculopathy  dexa bone density axial      12. Localized osteoporosis ([place])  dexa bone density axial             Plan:               Past Medical History:  Past Medical History:   Diagnosis Date    Anemia     CHF (congestive heart failure)     Congenital heart disease     Coronary artery disease 2013    Deep vein thrombosis (DVT) 11/20/2017    GERD (gastroesophageal reflux disease)     Heart murmur Don't know    Hx of hypertrophic cardiomyopathy     Hypertension      Peptic ulceration     Persistent atrial fibrillation 01/10/2023    Primary osteoarthritis of both knees 02/08/2021    Primary osteoarthritis of right knee 09/21/2020    Sleep apnea 2017     Past Surgical History:  Past Surgical History:   Procedure Laterality Date    ABDOMINAL HERNIA REPAIR      ABLATION OF DYSRHYTHMIC FOCUS  01-    BACK SURGERY      BREAST AUGMENTATION      CARDIAC CATHETERIZATION      CARDIAC ELECTROPHYSIOLOGY PROCEDURE N/A 01/16/2023    Procedure: Ablation atrial fibrillation and flutter, cryo Utong and Ramón aware;  Surgeon: Leeanne Pleitez MD;  Location: [facility] INVASIVE LOCATION;  Service: Cardiovascular;  Laterality: N/A;    CARDIAC PACEMAKER PLACEMENT  03/16/2017    Dual Chamber    CERVICAL RIB RESECTION Bilateral     CERVICAL RIB RESECTION Bilateral 1963    bilateral cervical ribs removed - extra ribs located and causing pain    CHOLECYSTECTOMY      COLONOSCOPY      CORONARY ARTERY BYPASS GRAFT  2013    THORACIC DECOMPRESSION POSTERIOR FUSION  06/04/2014    L3-5 & S1    UPPER GASTROINTESTINAL ENDOSCOPY        Allergies:  No Known Allergies  Home Meds:  Current Meds:     Current Facility-Administered Medications:     acetaminophen (TYLENOL) tablet 650 mg, 650 mg, Oral, Q4H PRN, Tatiana Gilliland MD, 650 mg at 04/01/25 0449    sennosides-docusate (PERICOLACE) 8.6-50 MG per tablet 2 tablet, 2 tablet, Oral, BID PRN, 2 tablet at 04/03/25 0324 **AND** polyethylene glycol (MIRALAX) packet 17 g, 17 g, Oral, Daily PRN, 17 g at 04/03/25 2140 **AND** bisacodyl (DULCOLAX) EC tablet 5 mg, 5 mg, Oral, Daily PRN **AND** bisacodyl (DULCOLAX) suppository 10 mg, 10 mg, Rectal, Daily PRN, Tatiana Gilliland MD, 10 mg at 04/03/25 1743    enoxaparin sodium (LOVENOX) syringe 80 mg, 1 mg/kg, Subcutaneous, Q12H, Zoey Acosta APRN, 80 mg at 04/04/25 0936    hydrALAZINE (APRESOLINE) injection 10 mg, 10 mg, Intravenous, Q6H PRN, Tatiana Gilliland MD, 10 mg at 04/01/25 3670    HYDROmorphone (DILAUDID) tablet 2  mg, 2 mg, Oral, Q3H PRN, Tatiana Gilliland MD, 2 mg at 25 0936    ipratropium-albuterol (DUO-NEB) nebulizer solution 3 mL, 3 mL, Nebulization, Q4H PRN, Tatiana Gilliland MD    levothyroxine (SYNTHROID, LEVOTHROID) tablet 75 mcg, 75 mcg, Oral, Q AM, Tatiana Gilliland MD, 75 mcg at 25 0507    Lidocaine 4 % 1 patch, 1 patch, Transdermal, Q24H, Tatiana Gilliland MD, 1 patch at 25 0937    metoprolol succinate XL (TOPROL-XL) 24 hr tablet 25 mg, 25 mg, Oral, Q24H, Radha Owusu APRN, 25 mg at 25 0936    nitroglycerin (NITROSTAT) SL tablet 0.4 mg, 0.4 mg, Sublingual, Q5 Min PRN, Tatiana Gilliland MD, 0.4 mg at 25 2310    nystatin (MYCOSTATIN) powder, , Topical, Q12H, Tatiana Gilliland MD, Given at 25 0937    ondansetron (ZOFRAN) injection 4 mg, 4 mg, Intravenous, Q6H PRN, Tatiana Gilliland MD, 4 mg at 25 2240    pregabalin (LYRICA) capsule 75 mg, 75 mg, Oral, Q12H, Tatiana Gilliland MD, 75 mg at 25 0936    sertraline (ZOLOFT) tablet 100 mg, 100 mg, Oral, Nightly, Jose Martin Aleman MD, 100 mg at 25 2128    sodium chloride 0.9 % flush 10 mL, 10 mL, Intravenous, Q12H, Tatiana Gilliland MD, 10 mL at 25 0937    sodium chloride 0.9 % flush 10 mL, 10 mL, Intravenous, PRN, Tatiana Gilliland MD    sodium chloride 0.9 % infusion 40 mL, 40 mL, Intravenous, PRN, Tatiana Gilliland MD  Social History:   Social History     Tobacco Use    Smoking status: Former     Current packs/day: 0.00     Average packs/day: 1 pack/day for 8.0 years (8.0 ttl pk-yrs)     Types: Cigarettes     Start date: 1977     Quit date: 1985     Years since quittin.2     Passive exposure: Past    Smokeless tobacco: Never    Tobacco comments:     Quit    Substance Use Topics    Alcohol use: Never     Comment: 6 drinks a year in social settings      Family History:  Family History   Problem Relation Age of Onset    Colon cancer Mother     Colon cancer Brother     Heart attack Brother     Colon cancer Daughter               ROS  All other systems  "are negative         Objective:     Physical Exam  /86 (BP Location: Left arm, Patient Position: Sitting)   Pulse 91   Temp 97.4 °F (36.3 °C) (Oral)   Resp 15   Ht 152.4 cm (60\")   Wt 77.1 kg (170 lb)   LMP  (LMP Unknown)   SpO2 95%   BMI 33.20 kg/m²   General:  Appears in no acute distress  Eyes: Sclera is anicteric,  conjunctiva is clear   HEENT:  No JVD.  No carotid bruits  Respiratory: Respirations regular and unlabored at rest.  Clear to auscultation  Cardiovascular: S1,S2, irregularly irregular rate and rhythm  Extremities: No digital clubbing or cyanosis, no edema  Skin: Color pink. Skin warm and dry to touch. No rashes  No xanthoma  Neuro: Alert and awake.    Lab Reviewed:         Evgeny Gregory MD  4/4/2025 10:37 EDT      EMR Dragon/Transcription:   \"Dictated utilizing Dragon dictation\".        "

## 2025-04-04 NOTE — PLAN OF CARE
Goal Outcome Evaluation:              Outcome Evaluation: Patient alert & oriented 4; sleeping during this shift with complaints of chronic neck and shoulder pain treated per MAR; patient had fall while using the commode; patient denies hitting her head or any injuries; plan to go to [facility] at discharge. bed alarm on ; call bell in reach; ongoing plan of care.

## 2025-04-04 NOTE — PLAN OF CARE
Goal Outcome Evaluation:      Pt. W/o significant progress made this date. Pt. W/ multiple falls this AM, xrays (-) acute process. Pt. Requires min A x 2 for sit to stand [place] [facility] [place] kartik stedy [place]. Max A provided for toilet hygiene from supported standing position. Mod A x 2 for all supine to sit and sit to supine transfers. Pt. W/ legs buckling upon multiple stands, mod A to maintain static standing balance w/ multiple LOB posteriorly sitting at EOB. Pt. Not safe to d/c home at this time and will require IP rehab placement at d/c to address aforementioned deficits.

## 2025-04-04 NOTE — PROGRESS NOTES
LOS: 6 days   Patient Care Team:  Anish Lew APRN as PCP - General  Evgeny Gregory MD as Consulting Physician (Cardiology)  Leeanne Pleitez MD as Consulting Physician (Cardiology)  Radha Owusu APRN as Nurse Practitioner (Nurse Practitioner)  Kathe Muñoz MD as Consulting Physician (Nephrology)    Subjective     Interval History: fall x 2 - first was a 4 am when she was reaching for cleaning cloths while sitting on toilet and slipped to her knees with no significant injury.  Second was this afternoon while ambulating with walker and both legs became weak and she was assisted to floor.  Neck pain is unchanged, partially relieved by oral dilaudid.    Patient Complaints: Neck pain, bilateral leg weakness    History taken from: patient    Review of Systems   Constitutional:  Positive for activity change and fatigue. Negative for appetite change, chills, diaphoresis and fever.   HENT:  Negative for congestion and facial swelling.    Eyes:  Negative for visual disturbance.   Respiratory:  Negative for cough, shortness of breath, wheezing and stridor.    Cardiovascular:  Negative for chest pain, palpitations and leg swelling.   Gastrointestinal:  Negative for abdominal pain, constipation, diarrhea, nausea and vomiting.   Endocrine: Negative for polyuria.   Genitourinary:  Negative for dysuria.   Musculoskeletal:  Positive for arthralgias, back pain, gait problem, myalgias and neck pain.   Skin:  Negative for rash and wound.   Neurological:  Positive for weakness. Negative for tremors, light-headedness and numbness.   Psychiatric/Behavioral:  Negative for confusion.            Objective     Vital Signs  Temp:  [97.3 °F (36.3 °C)-97.8 °F (36.6 °C)] 97.5 °F (36.4 °C)  Heart Rate:  [70-91] 81  Resp:  [14-20] 14  BP: (110-140)/(59-86) 127/67    Physical Exam:     General Appearance:    Alert, cooperative, in no acute distress,   Head:    Normocephalic, without obvious abnormality,  atraumatic   Eyes:            Lids and lashes normal, conjunctivae and sclerae normal, no   icterus, no pallor, corneas clear, PERRLA   Ears:    Ears appear intact with no abnormalities noted   Throat:   No oral lesions, no thrush, oral mucosa moist   Neck:   No adenopathy, supple, trachea midline, no thyromegaly, no   carotid bruit, no JVD   Lungs:     Clear to auscultation,respirations regular, even and                  unlabored    Heart:    Regular rhythm and normal rate, normal S1 and S2, no            murmur, no gallop, no rub, no click   Chest Wall:    No abnormalities observed   Abdomen:     Normal bowel sounds, no masses, no organomegaly, soft        Non-tender non-distended, no guarding,   Extremities:   Moves all extremities well, no edema, no cyanosis, no             Redness   Pulses:   Pulses palpable and equal bilaterally   Skin:   No bleeding, bruising or rash   Lymph nodes:   No palpable adenopathy   Neurologic:   Cranial nerves 2 - 12 grossly intact, sensation intact, DTR       present and equal bilaterally        Results Review:    Lab Results (last 24 hours)       ** No results found for the last 24 hours. **             Imaging Results (Last 24 Hours)       Procedure Component Value Units Date/Time    XR Hips Bilateral With or Without Pelvis 2 View [[id number]] Collected: 04/04/25 1139     Updated: 04/04/25 1148    Narrative:      XR HIPS BILATERAL W OR WO PELVIS 2 VIEW, XR KNEE 3 VW BILATERAL    Date of Exam: 4/4/2025 11:19 AM EDT    Indication: fall    Comparison: None available.    Findings:  Hips: Osseous demineralization limiting detection for fractures. Within limitations no visualized displaced fracture or joint malalignment. Mild to moderate degenerative change of both hip joints. No erosions or chondrocalcinosis. Soft tissues grossly   unremarkable. Partially imaged spinal fusion hardware.    Bilateral knees: Osseous demineralization limiting detection for fractures. Within limitations no  visualized displaced fracture or joint malalignment. Tricompartmental degenerative osteoarthritis up to moderate severity within the left patellofemoral   compartment and moderate to severe within the right medial and patellofemoral compartments. Small joint effusions. No erosions or chondrocalcinosis.      Impression:      Impression:  1. Within limitations of osseous demineralization no visualized displaced hip or knee fracture in the setting of trauma. Maintained joint alignment.  2. Tricompartmental degenerative osteoarthritis of both knees with small joint effusions. Mild to moderate degenerative osteoarthritis of the hip joints.    If there is high clinical suspicion for a pelvic fracture or patient is unable to bear weight, dedicated CT or MRI could be considered.      Electronically Signed: Jay Jay Brennan MD    4/4/2025 11:46 AM EDT    Workstation ID: [id number]    XR Knee 3 View Bilateral [[id number]] Collected: 04/04/25 1139     Updated: 04/04/25 1148    Narrative:      XR HIPS BILATERAL W OR WO PELVIS 2 VIEW, XR KNEE 3 VW BILATERAL    Date of Exam: 4/4/2025 11:19 AM EDT    Indication: fall    Comparison: None available.    Findings:  Hips: Osseous demineralization limiting detection for fractures. Within limitations no visualized displaced fracture or joint malalignment. Mild to moderate degenerative change of both hip joints. No erosions or chondrocalcinosis. Soft tissues grossly   unremarkable. Partially imaged spinal fusion hardware.    Bilateral knees: Osseous demineralization limiting detection for fractures. Within limitations no visualized displaced fracture or joint malalignment. Tricompartmental degenerative osteoarthritis up to moderate severity within the left patellofemoral   compartment and moderate to severe within the right medial and patellofemoral compartments. Small joint effusions. No erosions or chondrocalcinosis.      Impression:      Impression:  1. Within limitations of osseous  demineralization no visualized displaced hip or knee fracture in the setting of trauma. Maintained joint alignment.  2. Tricompartmental degenerative osteoarthritis of both knees with small joint effusions. Mild to moderate degenerative osteoarthritis of the hip joints.    If there is high clinical suspicion for a pelvic fracture or patient is unable to bear weight, dedicated CT or MRI could be considered.      Electronically Signed: Jay Jay Brennan MD    4/4/2025 11:46 AM EDT    Workstation ID: [id number]                 I reviewed the patient's new clinical results.    Medication Review:   Scheduled Meds:enoxaparin sodium, 1 mg/kg, Subcutaneous, Q12H  levothyroxine, 75 mcg, Oral, Q AM  Lidocaine, 1 patch, Transdermal, Q24H  metoprolol succinate XL, 25 mg, Oral, Q24H  nystatin, , Topical, Q12H  predniSONE, 40 mg, Oral, Daily  pregabalin, 75 mg, Oral, Q12H  sertraline, 100 mg, Oral, Nightly  sodium chloride, 10 mL, Intravenous, Q12H      Continuous Infusions:   PRN Meds:.  acetaminophen    senna-docusate sodium **AND** polyethylene glycol **AND** bisacodyl **AND** bisacodyl    hydrALAZINE    HYDROmorphone    ipratropium-albuterol    nitroglycerin    ondansetron    sodium chloride    sodium chloride     Assessment & Plan       Cervical stenosis of spinal canal    Pacemaker    Paroxysmal atrial fibrillation    Cervical radiculopathy    Deep vein thrombosis (DVT)    Hypertrophic obstructive cardiomyopathy    Spondylolisthesis, acquired    Obesity (BMI 30.0-34.9)    Coronary artery disease    Acute urinary tract infection    Neck pain    Chest pain, atypical    -2 falls - second one may be related to nerve impingement; will restart steroids and anticipate slow taper until she can have epidural done.   -continue oral Lyrica and Dilaudid - extend interval to q 4 hours  - continue Lovenox - stay off of oral anticoagulation until after epidural; hold Lovenox prior to epidural  - working on acute rehab placement    CODE  Status:    Code status (Patient has no pulse and is not breathing):  CPR (Attempt to Resuscitate)  Medical Interventions (Patient has pulse or is breathing):  Full support  Level of support discussed with:  Patient    Admission status:  I believe this patient meets inpatient status  Expected length of stay:  2 midnights or greater  I discussed the patient's findings and my recommendations with the patient.    Plan for disposition:Acute rehab, will need 30 days or less.    Tatiana Gilliland MD  04/04/25  15:57 EDT

## 2025-04-04 NOTE — THERAPY TREATMENT NOTE
Subjective: Pt agreeable to therapeutic plan of care.  Cognition: oriented to Person, Place, Time, and Situation    Objective:     Bed Mobility: Mod-A and Assist x 2   Functional Transfers: Min-A, Mod-A, and Assist x 2     Balance: supported, static, and standing Min-A and Assist x 2  Functional Ambulation: N/A or Not attempted.    Toileting: Max-A  ADL Position: supported sitting and supported standing      Lower Body Dressing: Max-A  ADL Position: edge of bed sitting    Vitals: WNL    Pain: 6 VAS Location: LLE  Interventions for pain: Repositioned  Education: Provided education on the importance of mobility in the acute care setting    Assessment: Domonique See presents with ADL impairments affecting function including balance, endurance / activity tolerance, pain, sensation / sensory awareness, and strength. Demonstrated functioning below baseline abilities indicate the need for continued skilled intervention while inpatient. Tolerating session today without incident. Will continue to follow and progress as tolerated.     Plan/Recommendations:   High Intensity Therapy recommended post-acute care. This is recommended as therapy feels the patient would require 5-6 days per week, 2-3 hours per day. At this time, inpatient rehabilitation (acute rehab) would be the first choice and SNF would be second.. Pt requires no DME at discharge.     Pt desires Inpatient Rehabilitation placement at discharge. Pt cooperative; agreeable to therapeutic recommendations and plan of care.     Modified [place]: N/A = No pre-op stroke/TIA    Post-Tx Position: Supine with HOB Elevated  PPE: gloves    Therapy Charges for Today       Code Description Service Date Service Provider Modifiers Qty    33959913087  OT THERAPEUTIC ACT EA 15 MIN 4/4/2025 Kumar White OT GO 1    [id number]  OT SELF CARE/MGMT/TRAIN EA 15 MIN 4/4/2025 Kumar White OT GO 1           Time Calculation- OT       Row Name 04/04/25 5854              Time Calculation- OT    OT Start Time 1445  -MP      OT Stop Time 1506  -MP      OT Time Calculation (min) 21 min  -MP      Total Timed Code Minutes- OT 21 minute(s)  -MP      OT Received On 04/04/25  -      OT - Next Appointment 04/07/25  -                User Key  (r) = Recorded By, (t) = Taken By, (c) = Cosigned By      Initials Name Provider Type    Kumar Summers, OT Occupational Therapist

## 2025-04-05 LAB
QT INTERVAL: 409 MS
QTC INTERVAL: 506 MS

## 2025-04-05 PROCEDURE — 25010000002 ENOXAPARIN PER 10 MG: Performed by: NURSE PRACTITIONER

## 2025-04-05 PROCEDURE — 99232 SBSQ HOSP IP/OBS MODERATE 35: CPT | Performed by: INTERNAL MEDICINE

## 2025-04-05 PROCEDURE — 63710000001 PREDNISONE PER 1 MG: Performed by: INTERNAL MEDICINE

## 2025-04-05 RX ADMIN — ENOXAPARIN SODIUM 80 MG: 100 INJECTION SUBCUTANEOUS at 08:17

## 2025-04-05 RX ADMIN — Medication 10 ML: at 20:07

## 2025-04-05 RX ADMIN — PREDNISONE 40 MG: 20 TABLET ORAL at 08:17

## 2025-04-05 RX ADMIN — PREGABALIN 75 MG: 75 CAPSULE ORAL at 20:05

## 2025-04-05 RX ADMIN — NYSTATIN: 100000 POWDER TOPICAL at 08:25

## 2025-04-05 RX ADMIN — METOPROLOL SUCCINATE 25 MG: 25 TABLET, EXTENDED RELEASE ORAL at 08:17

## 2025-04-05 RX ADMIN — LEVOTHYROXINE SODIUM 75 MCG: 0.07 TABLET ORAL at 06:24

## 2025-04-05 RX ADMIN — HYDROMORPHONE HYDROCHLORIDE 2 MG: 2 TABLET ORAL at 20:05

## 2025-04-05 RX ADMIN — ENOXAPARIN SODIUM 80 MG: 100 INJECTION SUBCUTANEOUS at 20:05

## 2025-04-05 RX ADMIN — SENNOSIDES AND DOCUSATE SODIUM 2 TABLET: 8.6; 5 TABLET ORAL at 00:34

## 2025-04-05 RX ADMIN — Medication 10 ML: at 08:24

## 2025-04-05 RX ADMIN — LIDOCAINE 1 PATCH: 4 PATCH TOPICAL at 08:17

## 2025-04-05 RX ADMIN — HYDROMORPHONE HYDROCHLORIDE 2 MG: 2 TABLET ORAL at 06:24

## 2025-04-05 RX ADMIN — HYDROMORPHONE HYDROCHLORIDE 2 MG: 2 TABLET ORAL at 10:23

## 2025-04-05 RX ADMIN — PREGABALIN 75 MG: 75 CAPSULE ORAL at 08:17

## 2025-04-05 RX ADMIN — HYDROMORPHONE HYDROCHLORIDE 2 MG: 2 TABLET ORAL at 14:55

## 2025-04-05 RX ADMIN — SERTRALINE HYDROCHLORIDE 100 MG: 100 TABLET, FILM COATED ORAL at 20:05

## 2025-04-05 RX ADMIN — HYDROMORPHONE HYDROCHLORIDE 2 MG: 2 TABLET ORAL at 02:27

## 2025-04-05 NOTE — PLAN OF CARE
Goal Outcome Evaluation:  Pt resting in room. Abdomen distended, firm. Pt is receiving PRN oral Dilaudid to manage pain. Reports no nausea, was able to have a small bowel movement earlier today. Will continue to monitor.

## 2025-04-05 NOTE — SIGNIFICANT NOTE
04/05/25 1555   OTHER   Discipline physical therapy assistant   Rehab Time/Intention   Session Not Performed patient/family declined treatment  (patient wanted to wait til tomorrow, was too worno out from [facility] transfer earlier. Suggested to use kartik stedy with transfers due to patient already had 2 falls with nsg when knees buckled.)   Recommendation   PT - Next Appointment 04/06/25

## 2025-04-05 NOTE — PROGRESS NOTES
LOS: 7 days   Patient Care Team:  Anish Lew APRN as PCP - General  Evgeny Gregory MD as Consulting Physician (Cardiology)  Leeanne Pleitez MD as Consulting Physician (Cardiology)  Radha Owusu APRN as Nurse Practitioner (Nurse Practitioner)  Kathe Muñoz MD as Consulting Physician (Nephrology)    Subjective     Interval History: No interval change overnight    Patient Complaints: Continues to have neck pain    History taken from: patient    Review of Systems   Constitutional:  Positive for activity change. Negative for fatigue and fever.   HENT:  Negative for trouble swallowing.    Eyes:  Negative for visual disturbance.   Respiratory:  Negative for cough and shortness of breath.    Cardiovascular:  Positive for chest pain. Negative for palpitations and leg swelling.   Gastrointestinal:  Negative for abdominal pain and nausea.   Endocrine: Negative for polyuria.   Genitourinary:  Negative for difficulty urinating.   Musculoskeletal:  Positive for arthralgias, gait problem, myalgias and neck pain.   Skin:  Negative for rash.   Neurological:  Positive for weakness. Negative for dizziness and headaches.   Psychiatric/Behavioral:  Negative for confusion.            Objective     Vital Signs  Temp:  [97.4 °F (36.3 °C)-98.3 °F (36.8 °C)] 98.3 °F (36.8 °C)  Heart Rate:  [62-81] 81  Resp:  [12-20] 18  BP: (103-157)/(51-85) 103/51    Physical Exam:     General Appearance:    Alert, cooperative, in no acute distress,   Head:    Normocephalic, without obvious abnormality, atraumatic   Eyes:            Lids and lashes normal, conjunctivae and sclerae normal, no   icterus, no pallor, corneas clear   Ears:    Ears appear intact with no abnormalities noted   Throat:   No oral lesions, no thrush, oral mucosa moist   Neck:   No adenopathy, supple, trachea midline, no thyromegaly, no   carotid bruit, no JVD   Lungs:     Clear to auscultation,respirations regular, even and unlabored    Heart:     Regular rhythm and normal rate, normal S1 and S2, no murmur, no gallop, no rub, no click   Chest Wall:    No abnormalities observed   Abdomen:     Normal bowel sounds, no masses, no organomegaly, soft Non-tender non-distended, no guarding,   Extremities:   Moves all extremities well, no edema, no cyanosis, no Redness   Pulses:   Pulses palpable and equal bilaterally   Skin:   No bleeding, bruising or rash   Lymph nodes:   No palpable adenopathy            Results Review:    Lab Results (last 24 hours)       ** No results found for the last 24 hours. **             Imaging Results (Last 24 Hours)       ** No results found for the last 24 hours. **                 I reviewed the patient's new clinical results.    Medication Review:   Scheduled Meds:enoxaparin sodium, 1 mg/kg, Subcutaneous, Q12H  levothyroxine, 75 mcg, Oral, Q AM  Lidocaine, 1 patch, Transdermal, Q24H  metoprolol succinate XL, 25 mg, Oral, Q24H  nystatin, , Topical, Q12H  predniSONE, 40 mg, Oral, Daily  pregabalin, 75 mg, Oral, Q12H  sertraline, 100 mg, Oral, Nightly  sodium chloride, 10 mL, Intravenous, Q12H      Continuous Infusions:   PRN Meds:.  acetaminophen    senna-docusate sodium **AND** polyethylene glycol **AND** bisacodyl **AND** bisacodyl    hydrALAZINE    HYDROmorphone    ipratropium-albuterol    nitroglycerin    ondansetron    sodium chloride    sodium chloride     Assessment & Plan       Cervical stenosis of spinal canal    Pacemaker    Paroxysmal atrial fibrillation    Cervical radiculopathy    Deep vein thrombosis (DVT)    Hypertrophic obstructive cardiomyopathy    Spondylolisthesis, acquired    Obesity (BMI 30.0-34.9)    Coronary artery disease    Acute urinary tract infection    Neck pain    Chest pain, atypical      -Continue Dilaudid and Lyrica   -Lovenox until after epidural then transition to oral anticoagulation  -Neurosurgery will perform epidural as outpatient  -Cardiology consulted patient for cardioversion versus ablation  for atrial fibrillation/flutter. Patient returned to sinus rhythm today.  -Waiting for acute rehab placement    CODE Status:    Code status (Patient has no pulse and is not breathing):  CPR (Attempt to Resuscitate)  Medical Interventions (Patient has pulse or is breathing):  Full support  Level of support discussed with:  Patient    Admission status:  I believe this patient meets inpatient status  Expected length of stay:  2 midnights or greater  I discussed the patient's findings and my recommendations with the patient.    Plan for disposition:acute rehab, 30 days or less    Shameka Zarate PA-C  04/05/25  18:15 EDT

## 2025-04-05 NOTE — PROGRESS NOTES
Referring Provider: Tatiana Gilliland MD    Reason for follow-up:  Paroxysmal atrial fibrillation  Status post pacemaker     Patient Care Team:  Anish Lew APRN as PCP - Evgeny Machuca MD as Consulting Physician (Cardiology)  Leeanne Pleitez MD as Consulting Physician (Cardiology)  Radha Owusu APRN as Nurse Practitioner (Nurse Practitioner)  Kathe Muñoz MD as Consulting Physician (Nephrology)    Subjective .      ROS    Today, the patient has been without any chest discomfort ,shortness of breath, palpitations, dizziness or syncope.  Denies having any headache ,abdominal pain ,nausea, vomiting , diarrhea constipation, loss of weight or loss of appetite.  Denies having any excessive bruising ,hematuria or blood in the stool.    Review of all systems negative except as indicated.    Reviewed ROS.  History  Past Medical History:   Diagnosis Date    Anemia     CHF (congestive heart failure)     Congenital heart disease     Coronary artery disease 2013    Deep vein thrombosis (DVT) 11/20/2017    GERD (gastroesophageal reflux disease)     Heart murmur Don't know    Hx of hypertrophic cardiomyopathy     Hypertension     Peptic ulceration     Persistent atrial fibrillation 01/10/2023    Primary osteoarthritis of both knees 02/08/2021    Primary osteoarthritis of right knee 09/21/2020    Sleep apnea 2017       Past Surgical History:   Procedure Laterality Date    ABDOMINAL HERNIA REPAIR      ABLATION OF DYSRHYTHMIC FOCUS  01-    BACK SURGERY      BREAST AUGMENTATION      CARDIAC CATHETERIZATION      CARDIAC ELECTROPHYSIOLOGY PROCEDURE N/A 01/16/2023    Procedure: Ablation atrial fibrillation and flutter, sarthak Watters and Ramón aware;  Surgeon: Leeanne Pleitez MD;  Location: [facility] CATH INVASIVE LOCATION;  Service: Cardiovascular;  Laterality: N/A;    CARDIAC PACEMAKER PLACEMENT  03/16/2017    Dual Chamber    CERVICAL RIB RESECTION Bilateral     CERVICAL RIB RESECTION  Bilateral 1963    bilateral cervical ribs removed - extra ribs located and causing pain    CHOLECYSTECTOMY      COLONOSCOPY      CORONARY ARTERY BYPASS GRAFT  2013    THORACIC DECOMPRESSION POSTERIOR FUSION  2014    L3-5 & S1    UPPER GASTROINTESTINAL ENDOSCOPY         Family History   Problem Relation Age of Onset    Colon cancer Mother     Colon cancer Brother     Heart attack Brother     Colon cancer Daughter        Social History     Tobacco Use    Smoking status: Former     Current packs/day: 0.00     Average packs/day: 1 pack/day for 8.0 years (8.0 ttl pk-yrs)     Types: Cigarettes     Start date: 1977     Quit date: 1985     Years since quittin.2     Passive exposure: Past    Smokeless tobacco: Never    Tobacco comments:     Quit    Vaping Use    Vaping status: Never Used   Substance Use Topics    Alcohol use: Never     Comment: 6 drinks a year in social settings    Drug use: Never        Medications Prior to Admission   Medication Sig Dispense Refill Last Dose/Taking    aspirin 81 MG EC tablet Take 1 tablet by mouth Every Night. 90 tablet 3 3/27/2025 Bedtime    diclofenac sodium (VOTAREN XR) 100 MG 24 hr tablet Take 1 tablet by mouth Daily.   Taking    Eliquis 5 MG tablet tablet TAKE 1 TABLET BY MOUTH TWICE DAILY 180 tablet 3 3/28/2025 Morning    fluticasone (FLONASE) 50 MCG/ACT nasal spray Administer 1 spray into the nostril(s) as directed by provider Daily.   Taking    gabapentin (NEURONTIN) 100 MG capsule Take 1-2 capsules by mouth Every Night.   Taking    NON FORMULARY 2 mg Every 7 (Seven) Days. Semaglutide 5mg/ml   3/24/2025    oxyCODONE-acetaminophen (PERCOCET) 7.5-325 MG per tablet Take 1 tablet by mouth 6 (Six) Times a Day.   Taking    sertraline (ZOLOFT) 100 MG tablet Take 1 tablet by mouth Every Evening. Indications: Major Depressive Disorder   3/27/2025    Synthroid 75 MCG tablet Take 1 tablet by mouth Every Morning.   Taking       Allergies  Patient has no known  "allergies.    Scheduled Meds:enoxaparin sodium, 1 mg/kg, Subcutaneous, Q12H  levothyroxine, 75 mcg, Oral, Q AM  Lidocaine, 1 patch, Transdermal, Q24H  metoprolol succinate XL, 25 mg, Oral, Q24H  nystatin, , Topical, Q12H  predniSONE, 40 mg, Oral, Daily  pregabalin, 75 mg, Oral, Q12H  sertraline, 100 mg, Oral, Nightly  sodium chloride, 10 mL, Intravenous, Q12H      Continuous Infusions:   PRN Meds:.  acetaminophen    senna-docusate sodium **AND** polyethylene glycol **AND** bisacodyl **AND** bisacodyl    hydrALAZINE    HYDROmorphone    ipratropium-albuterol    nitroglycerin    ondansetron    sodium chloride    sodium chloride    Objective     VITAL SIGNS  Vitals:    04/05/25 0025 04/05/25 0430 04/05/25 0756 04/05/25 1213   BP: 114/85 132/72 157/85 120/67   BP Location: Left arm Left arm Left arm Left arm   Patient Position: Lying Lying Lying Lying   Pulse: 71 78 70 70   Resp: 16 15 12 16   Temp: 98 °F (36.7 °C) 98 °F (36.7 °C) 97.4 °F (36.3 °C) 98.1 °F (36.7 °C)   TempSrc: Oral Oral Oral Oral   SpO2: (!) 85% 92% 95% 95%   Weight:       Height:           Flowsheet Rows      Flowsheet Row First Filed Value   Admission Height 152.4 cm (60\") Documented at 03/28/2025 1346   Admission Weight 77.1 kg (170 lb) Documented at 03/28/2025 1346              Intake/Output Summary (Last 24 hours) at 4/5/2025 1241  Last data filed at 4/5/2025 0800  Gross per 24 hour   Intake 240 ml   Output 1100 ml   Net -860 ml        TELEMETRY: Sinus rhythm     Physical Exam:  The patient is alert, oriented and in no distress.  Vital signs as noted above.  Head and neck revealed no carotid bruits or jugular venous distention.  No thyromegaly or lymphadenopathy is present  Lungs clear.  No wheezing.  Breath sounds are normal bilaterally.  Heart normal first and second heart sounds.  No murmur. No precordial rub is present.  No gallop is present.  Abdomen soft and nontender.  No organomegaly is present.  Extremities with good peripheral pulses " without any pedal edema.  Skin warm and dry.  Musculoskeletal system is grossly normal  CNS grossly normal    Pacemaker site looks normal.    Results Review:   I reviewed the patient's new clinical results.  Lab Results (last 24 hours)       ** No results found for the last 24 hours. **            Imaging Results (Last 24 Hours)       ** No results found for the last 24 hours. **        LAB RESULTS (LAST 7 DAYS)    CBC        BMP  Results from last 7 days   Lab Units 04/02/25  0059   SODIUM mmol/L 141   POTASSIUM mmol/L 3.9   CHLORIDE mmol/L 103   CO2 mmol/L 25.3   BUN mg/dL 16   CREATININE mg/dL 0.75   GLUCOSE mg/dL 99   MAGNESIUM mg/dL 1.8       CMP   Results from last 7 days   Lab Units 04/02/25  0059   SODIUM mmol/L 141   POTASSIUM mmol/L 3.9   CHLORIDE mmol/L 103   CO2 mmol/L 25.3   BUN mg/dL 16   CREATININE mg/dL 0.75   GLUCOSE mg/dL 99         BNP        TROPONIN  Results from last 7 days   Lab Units 04/02/25  0059   HSTROP T ng/L 18*       CoAg        Creatinine Clearance  Estimated Creatinine Clearance: 49.4 mL/min (by C-G formula based on SCr of 0.75 mg/dL).    ABG        Radiology  XR Hips Bilateral With or Without Pelvis 2 View  Result Date: 4/4/2025  Impression: 1. Within limitations of osseous demineralization no visualized displaced hip or knee fracture in the setting of trauma. Maintained joint alignment. 2. Tricompartmental degenerative osteoarthritis of both knees with small joint effusions. Mild to moderate degenerative osteoarthritis of the hip joints. If there is high clinical suspicion for a pelvic fracture or patient is unable to bear weight, dedicated CT or MRI could be considered. Electronically Signed: Jay Jay Brennan MD  4/4/2025 11:46 AM EDT  Workstation ID: [id number]    XR Knee 3 View Bilateral  Result Date: 4/4/2025  Impression: 1. Within limitations of osseous demineralization no visualized displaced hip or knee fracture in the setting of trauma. Maintained joint alignment. 2.  Tricompartmental degenerative osteoarthritis of both knees with small joint effusions. Mild to moderate degenerative osteoarthritis of the hip joints. If there is high clinical suspicion for a pelvic fracture or patient is unable to bear weight, dedicated CT or MRI could be considered. Electronically Signed: Jay Jay Brennan MD  4/4/2025 11:46 AM EDT  Workstation ID: [id number]          EKG            I personally viewed and interpreted the patient's EKG/Telemetry data:    ECHOCARDIOGRAM:    Results for orders placed during the hospital encounter of 03/21/25    Adult Transthoracic Echo Complete W/ Cont if Necessary Per Protocol    Interpretation Summary    Left ventricular ejection fraction appears to be 61 - 65%.    Left ventricular diastolic function was normal.    The left atrial cavity is moderately dilated.    Left atrial volume is severely increased.    Moderate aortic valve regurgitation is present.    Moderate mitral valve regurgitation is present.    Estimated right ventricular systolic pressure from tricuspid regurgitation is normal (<35 mmHg).          STRESS TEST  Results for orders placed during the hospital encounter of 03/28/25    Stress Test With Myocardial Perfusion One Day    Interpretation Summary    Myocardial perfusion imaging indicates a normal myocardial perfusion study with no evidence of ischemia. Impressions are consistent with a low risk study.    Left ventricular ejection fraction is normal (Calculated EF = 56%).        Cardiolite (Tc-99m sestamibi) stress test    CARDIAC CATHETERIZATION  No results found for this or any previous visit.                OTHER:         Assessment & Plan     Principal Problem:    Cervical stenosis of spinal canal  Active Problems:    Pacemaker    Paroxysmal atrial fibrillation    Cervical radiculopathy    Deep vein thrombosis (DVT)    Hypertrophic obstructive cardiomyopathy    Spondylolisthesis, acquired    Obesity (BMI 30.0-34.9)    Coronary artery disease     Acute urinary tract infection    Neck pain    Chest pain, atypical    Primary cardiologist note was reviewed.    Chief Complaint and history of present illness: Atrial fibrillation pacemaker     Interval change: Patient presented with chest pain.  Troponin was flat at 17 and 18.  Underwent Lexiscan Cardiolite 4/3/2025 which was negative for ischemia.  Patient is going in and out of A-fib.  EP was consulted.     Objective:  4/3/2025: Lexiscan Cardiolite negative for ischemia, LVEF of 56%     Subjective: Patient seen and examined.  Chart reviewed.  Labs reviewed.  Discussed with RN taking care of patient.  Patient denies any chest pain.        History of present illness:    Ms. Domonique See  has PMH of     Hypertrophic cardiomyopathy, history of septal myomectomy,      nonsustained VT  hypertension  SSS, [employer] permanent pacemaker 3/16/2017  Paroxysmal atrial fibrillation  [id number] SCORE   [id number] Score: 8 (6/20/2024  2:06 PM)  [id number] Score: 8 (5/22/2023  3:37 PM)  Age greater than 75, CHF, hypertension, CVA, vascular disease     CAD, cath 12/2017 30% LAD  CHF due to hypertrophic cardiomyopathy  Hyperlipidemia  COPD  History of DAYLIN 4/28/2023  Peptic ulcer disease  Back surgery (previous cervical decompression and fusion, lumbar decompression and fusion), breast augmentation  Former smoker     Patient presented to the ED on 3/28/2025 with worsening back pain and ataxia.  She had had significant neck pain with pain radiating to both arms with numbness, tingling and burning sensation.    Patient has been seen by neurosurgery and was noted to have significant cervical thoracic and lumbar stenosis, which patient has been recommended outpatient epidural injection.       Last evening patient complained of chest pain therefore cardiology has been consulted.   Patient reports pain started a few hours after she ate and was dull and achy.  The pain radiated into her back, neck and jaws. The pain was  relieved with one nitroglycerin.  Patient reports her blood pressure spiked up when she had pain.  She denies any further pain today.   HS troponin was obtain and was 17 and 18 repeat labs today BMP okay, BNP normal, TSH normal.   EKG with Afib         NP PLAN:  Will proceed with nuclear stress this in AM to assess for ischemia  Discuss  recent outpatient echocardiogram with patient (as below).   Will have pacemaker interrogated. (Last remote check 3/9/2025 noted increase in Afib from 1% to 26%)   Continue lovenox (on Eliquis at home)      Will start beta blocker to help with afib, valvular disease and blood pressure.            Outpatient echocardiogram 3/21/2025    Left ventricular ejection fraction appears to be 61 - 65%.    Left ventricular diastolic function was normal.    The left atrial cavity is moderately dilated.    Left atrial volume is severely increased.    Moderate aortic valve regurgitation is present.    Moderate mitral valve regurgitation is present.    Estimated right ventricular systolic pressure from tricuspid regurgitation is normal (<35 mmHg)              Chart review:  Underwent A-fib ablation 1/16/2023  Admitted 1/18/2023 with CHF     Echocardiogram 6/29/2021 reveals EF of 50%   Echocardiogram 1/19/2023 EF of 60 to 65% with severe right ventricular and right atrial enlargement moderate left atrial enlargement, moderate mitral regurgitation and mild aortic regurgitation.                 Assessment:     Chest pain  Bilateral arm pain  Hypertrophic cardiomyopathy, myomectomy, NSVT  Chronic HFpEF due to diastolic dysfunction from HCM  Hypertension  Dyslipidemia  Prediabetes  Paroxysmal atrial fibrillation, status post ablation 1/16/2023 on long-term Eliquis  Obesity with BMI over 30  CKD  Moderate mitral regurgitation  Mild aortic regurgitation  Severe right ventricular enlargement        Recommendations and plan:     Patient transition to the emergency room 3/28/2025 with worsening back pain and  ataxia neck pain radiating to both arms..  Patient has tingling and numbness in both arms.  Neurosurgery has seen patient needs clearance for spine surgery  Patient has elevated [id number] score of 8, due to age over 75, female gender CHF hypertension CVA and vascular disease.  Will benefit from long-term anticoagulation to prevent thromboembolic events.  Patient risk of stroke and other embolism range from 9.7% to 13.6 %/year.  If anticoagulation needs to be held for spine injections would advise holding Eliquis 2 to 3 days before surgical procedure and restart as soon as possible from surgical procedure standpoint.  Patient's Eliquis is on hold she is being transitioned with Lovenox to be held for 48 hours before her spinal procedure.  Patient presented with chest pain, troponins were flat at 17 and 18.  proBNP was normal at 1483.  EKG 4/1/2025 reviewed/interpreted by me reveals A-fib with a rate of 92 bpm with right bundle branch block.  Patient underwent Lexiscan Cardiolite 4/3/2025 which was negative for ischemia EF of 56%.  Reviewed stress test results with patient.  Patient had previous ablation.  Was seen by EP Dr. Naseem Pleitez, I am patient was offered ablation and cardioversion she is considering.  Will follow-up with EP as outpatient.  Will continue to monitor rhythm on telemetry  Will follow-up as outpatient in pacemaker clinic to follow rhythm.  Please call back if I can be of any further assistance           Procedures     ECHOCARDIOGRAM:  Results for orders placed during the hospital encounter of 03/21/25     Adult Transthoracic Echo Complete W/ Cont if Necessary Per Protocol     Interpretation Summary    Left ventricular ejection fraction appears to be 61 - 65%.    Left ventricular diastolic function was normal.    The left atrial cavity is moderately dilated.    Left atrial volume is severely increased.    Moderate aortic valve regurgitation is present.    Moderate mitral valve regurgitation  is present.    Estimated right ventricular systolic pressure from tricuspid regurgitation is normal (<35 mmHg).        STRESS TEST  Results for orders placed during the hospital encounter of 03/28/25     Stress Test With Myocardial Perfusion One Day     Interpretation Summary    Myocardial perfusion imaging indicates a normal myocardial perfusion study with no evidence of ischemia. Impressions are consistent with a low risk study.    Left ventricular ejection fraction is normal (Calculated EF = 56%).     Dr. Pleitez    Domonique See is an 85-year-old female patient who has history of hypertrophic obstructive cardiomyopathy, has had myomectomy at [facility] in 2013, has a [employer] dual-chamber pacemaker on 3/16/2017.  Patient had A-fib and flutter ablation on 1/16/2023.  She had been in sinus rhythm up until February 2025.  Device tracings suggest atrial flutter.  Patient however has spinal stenosis which is causing her neck pain, paresthesias in her arms and legs and is being managed by neurosurgery.  As of now the plan seems to be physical therapy and injections.    Domonique See is an 85-year-old female patient who has history of hypertrophic obstructive cardiomyopathy status post myomectomy in 2015, dual-chamber pacemaker for sick sinus syndrome, has had PVI and flutter line on 1/16/2023.  She has been in sinus rhythm up until February 2025.  The patient is not too symptomatic from A-fib, her main symptoms are related to her neck and spinal stenosis.  Nevertheless however I did offer her rhythm control strategies.  We could perform cardioversion with antiarrhythmic or redo ablation.  Patient would like to think about these options and let me know.  In the meantime the rates are controlled and she is on anticoagulation with Eliquis at home.    ]]]]]]]]]]]]]]]]]]]]]  4/5/2025  History of hypertrophic obstructive cardiomyopathy.  Status post myomectomy at [facility] in 2013.    Status post  ablation for atrial fibrillation 2023    Status post dual-chamber pacemaker implantation    Recent atrial fibrillation flutter.  Patient has spinal stenosis.  Patient was seen by Dr. Pleitez.  Options were discussed that included cardioversion versus ablation.  Patient is thinking about the options.  Patient is in sinus rhythm 4/5/2025    Medications were reviewed and updated.  Patient is on subcu Lovenox (therapeutic) levothyroxine metoprolol nystatin.    Switch to oral anticoagulants in the near future.    Further plan will depend on patient's progress.    ]]]]]]]]]]]]]]]]]]]]]]]]             Thierry Doty MD  04/05/25  12:41 EDT

## 2025-04-06 PROCEDURE — 99232 SBSQ HOSP IP/OBS MODERATE 35: CPT | Performed by: INTERNAL MEDICINE

## 2025-04-06 PROCEDURE — 25010000002 ENOXAPARIN PER 10 MG: Performed by: NURSE PRACTITIONER

## 2025-04-06 PROCEDURE — 63710000001 PREDNISONE PER 1 MG: Performed by: INTERNAL MEDICINE

## 2025-04-06 RX ADMIN — HYDROMORPHONE HYDROCHLORIDE 2 MG: 2 TABLET ORAL at 00:45

## 2025-04-06 RX ADMIN — HYDROMORPHONE HYDROCHLORIDE 2 MG: 2 TABLET ORAL at 05:07

## 2025-04-06 RX ADMIN — PREGABALIN 75 MG: 75 CAPSULE ORAL at 19:52

## 2025-04-06 RX ADMIN — ENOXAPARIN SODIUM 80 MG: 100 INJECTION SUBCUTANEOUS at 19:52

## 2025-04-06 RX ADMIN — HYDROMORPHONE HYDROCHLORIDE 2 MG: 2 TABLET ORAL at 19:52

## 2025-04-06 RX ADMIN — Medication 10 ML: at 19:53

## 2025-04-06 RX ADMIN — METOPROLOL SUCCINATE 25 MG: 25 TABLET, EXTENDED RELEASE ORAL at 08:49

## 2025-04-06 RX ADMIN — ENOXAPARIN SODIUM 80 MG: 100 INJECTION SUBCUTANEOUS at 08:49

## 2025-04-06 RX ADMIN — PREGABALIN 75 MG: 75 CAPSULE ORAL at 08:49

## 2025-04-06 RX ADMIN — POLYETHYLENE GLYCOL 3350 17 G: 17 POWDER, FOR SOLUTION ORAL at 22:57

## 2025-04-06 RX ADMIN — LIDOCAINE 1 PATCH: 4 PATCH TOPICAL at 08:49

## 2025-04-06 RX ADMIN — HYDROMORPHONE HYDROCHLORIDE 2 MG: 2 TABLET ORAL at 10:21

## 2025-04-06 RX ADMIN — PREDNISONE 40 MG: 20 TABLET ORAL at 08:49

## 2025-04-06 RX ADMIN — Medication 10 ML: at 08:50

## 2025-04-06 RX ADMIN — SERTRALINE HYDROCHLORIDE 100 MG: 100 TABLET, FILM COATED ORAL at 19:53

## 2025-04-06 RX ADMIN — HYDROMORPHONE HYDROCHLORIDE 2 MG: 2 TABLET ORAL at 15:34

## 2025-04-06 RX ADMIN — LEVOTHYROXINE SODIUM 75 MCG: 0.07 TABLET ORAL at 05:07

## 2025-04-06 NOTE — PROGRESS NOTES
LOS: 8 days   Patient Care Team:  Anish Lew APRN as PCP - General  Evgeny Gregory MD as Consulting Physician (Cardiology)  Leeanne Pleitez MD as Consulting Physician (Cardiology)  Radha Owusu APRN as Nurse Practitioner (Nurse Practitioner)  Kathe Muñoz MD as Consulting Physician (Nephrology)    Subjective     Interval History:Stable, no change    Patient Complaints: Feels that her pain is better controlled. Believes Lyrica is helping.    History taken from: patient    Review of Systems   Constitutional:  Positive for activity change.   HENT:  Negative for trouble swallowing.    Eyes:  Negative for visual disturbance.   Respiratory:  Negative for cough, shortness of breath and wheezing.    Cardiovascular:  Negative for chest pain, palpitations and leg swelling.   Gastrointestinal:  Positive for abdominal pain.   Endocrine: Negative for polyuria.   Genitourinary:  Negative for difficulty urinating.   Musculoskeletal:  Positive for arthralgias, gait problem, myalgias and neck pain.   Skin:  Negative for rash.   Neurological:  Positive for weakness.   Psychiatric/Behavioral:  Negative for confusion.            Objective     Vital Signs  Temp:  [96.8 °F (36 °C)-98.3 °F (36.8 °C)] 97.5 °F (36.4 °C)  Heart Rate:  [70-81] 71  Resp:  [10-18] 12  BP: (102-134)/(51-74) 123/74    Physical Exam:     General Appearance:    Alert, cooperative, in no acute distress,   Head:    Normocephalic, without obvious abnormality, atraumatic   Eyes:            Lids and lashes normal, conjunctivae and sclerae normal, no   icterus, no pallor, corneas clear   Ears:    Ears appear intact with no abnormalities noted   Throat:   No oral lesions, no thrush, oral mucosa moist   Neck:   No adenopathy, supple, trachea midline, no thyromegaly, no   carotid bruit, no JVD   Lungs:     Clear to auscultation,respirations regular, even and  unlabored    Heart:    Regular rhythm and normal rate, normal S1 and S2,  no murmur, no gallop, no rub, no click   Chest Wall:    No abnormalities observed   Abdomen:     Normal bowel sounds, no masses, no organomegaly, soft Non-tender non-distended, no guarding,   Extremities:   Moves all extremities well, no edema, no cyanosis, no Redness   Pulses:   Pulses palpable and equal bilaterally   Skin:   No bleeding, bruising or rash   Lymph nodes:   No palpable adenopathy            Results Review:    Lab Results (last 24 hours)       ** No results found for the last 24 hours. **             Imaging Results (Last 24 Hours)       ** No results found for the last 24 hours. **                 I reviewed the patient's new clinical results.    Medication Review:   Scheduled Meds:enoxaparin sodium, 1 mg/kg, Subcutaneous, Q12H  levothyroxine, 75 mcg, Oral, Q AM  Lidocaine, 1 patch, Transdermal, Q24H  metoprolol succinate XL, 25 mg, Oral, Q24H  nystatin, , Topical, Q12H  predniSONE, 40 mg, Oral, Daily  pregabalin, 75 mg, Oral, Q12H  sertraline, 100 mg, Oral, Nightly  sodium chloride, 10 mL, Intravenous, Q12H      Continuous Infusions:   PRN Meds:.  acetaminophen    senna-docusate sodium **AND** polyethylene glycol **AND** bisacodyl **AND** bisacodyl    hydrALAZINE    HYDROmorphone    ipratropium-albuterol    nitroglycerin    ondansetron    sodium chloride    sodium chloride     Assessment & Plan       Cervical stenosis of spinal canal    Pacemaker    Paroxysmal atrial fibrillation    Cervical radiculopathy    Deep vein thrombosis (DVT)    Hypertrophic obstructive cardiomyopathy    Spondylolisthesis, acquired    Obesity (BMI 30.0-34.9)    Coronary artery disease    Acute urinary tract infection    Neck pain    Chest pain, atypical      -Continue Dilaudid and Lyrica   -Lovenox until after epidural then transition to oral anticoagulation  -Neurosurgery will perform epidural as outpatient  -Cardiology consulted patient for cardioversion versus ablation for atrial fibrillation/flutter. Patient currently  in sinus rhythm today.  -Waiting for acute rehab placement       CODE Status:    Code status (Patient has no pulse and is not breathing):  CPR (Attempt to Resuscitate)  Medical Interventions (Patient has pulse or is breathing):  Full support  Level of support discussed with:  Patient    Admission status:  I believe this patient meets inpatient status  Expected length of stay:  2 midnights or greater  I discussed the patient's findings and my recommendations with the patient.    Plan for disposition:acute rehab, 30 days or less    Shameka Zarate PA-C  04/06/25  09:42 EDT

## 2025-04-06 NOTE — PROGRESS NOTES
Referring Provider: Tatiana Gilliland MD    Reason for follow-up:  Paroxysmal atrial fibrillation  Status post pacemaker    Patient Care Team:  Anish Lew APRN as PCP - Evgeny Machuca MD as Consulting Physician (Cardiology)  Leeanne Pleitez MD as Consulting Physician (Cardiology)  Radha Owusu APRN as Nurse Practitioner (Nurse Practitioner)  Kathe Muñoz MD as Consulting Physician (Nephrology)    Subjective .      ROS    Since I have last seen, the patient has been without any chest discomfort ,shortness of breath, palpitations, dizziness or syncope.  Denies having any headache ,abdominal pain ,nausea, vomiting , diarrhea constipation, loss of weight or loss of appetite.  Denies having any excessive bruising ,hematuria or blood in the stool.    Review of all systems negative except as indicated.    Reviewed ROS.  History  Past Medical History:   Diagnosis Date    Anemia     CHF (congestive heart failure)     Congenital heart disease     Coronary artery disease 2013    Deep vein thrombosis (DVT) 11/20/2017    GERD (gastroesophageal reflux disease)     Heart murmur Don't know    Hx of hypertrophic cardiomyopathy     Hypertension     Peptic ulceration     Persistent atrial fibrillation 01/10/2023    Primary osteoarthritis of both knees 02/08/2021    Primary osteoarthritis of right knee 09/21/2020    Sleep apnea 2017       Past Surgical History:   Procedure Laterality Date    ABDOMINAL HERNIA REPAIR      ABLATION OF DYSRHYTHMIC FOCUS  01-    BACK SURGERY      BREAST AUGMENTATION      CARDIAC CATHETERIZATION      CARDIAC ELECTROPHYSIOLOGY PROCEDURE N/A 01/16/2023    Procedure: Ablation atrial fibrillation and flutter, sarthak Watters and Ramón aware;  Surgeon: Leeanne Pleitez MD;  Location: [facility] CATH INVASIVE LOCATION;  Service: Cardiovascular;  Laterality: N/A;    CARDIAC PACEMAKER PLACEMENT  03/16/2017    Dual Chamber    CERVICAL RIB RESECTION Bilateral     CERVICAL RIB  RESECTION Bilateral 1963    bilateral cervical ribs removed - extra ribs located and causing pain    CHOLECYSTECTOMY      COLONOSCOPY      CORONARY ARTERY BYPASS GRAFT  2013    THORACIC DECOMPRESSION POSTERIOR FUSION  2014    L3-5 & S1    UPPER GASTROINTESTINAL ENDOSCOPY         Family History   Problem Relation Age of Onset    Colon cancer Mother     Colon cancer Brother     Heart attack Brother     Colon cancer Daughter        Social History     Tobacco Use    Smoking status: Former     Current packs/day: 0.00     Average packs/day: 1 pack/day for 8.0 years (8.0 ttl pk-yrs)     Types: Cigarettes     Start date: 1977     Quit date: 1985     Years since quittin.2     Passive exposure: Past    Smokeless tobacco: Never    Tobacco comments:     Quit    Vaping Use    Vaping status: Never Used   Substance Use Topics    Alcohol use: Never     Comment: 6 drinks a year in social settings    Drug use: Never        Medications Prior to Admission   Medication Sig Dispense Refill Last Dose/Taking    aspirin 81 MG EC tablet Take 1 tablet by mouth Every Night. 90 tablet 3 3/27/2025 Bedtime    diclofenac sodium (VOTAREN XR) 100 MG 24 hr tablet Take 1 tablet by mouth Daily.   Taking    Eliquis 5 MG tablet tablet TAKE 1 TABLET BY MOUTH TWICE DAILY 180 tablet 3 3/28/2025 Morning    fluticasone (FLONASE) 50 MCG/ACT nasal spray Administer 1 spray into the nostril(s) as directed by provider Daily.   Taking    gabapentin (NEURONTIN) 100 MG capsule Take 1-2 capsules by mouth Every Night.   Taking    NON FORMULARY 2 mg Every 7 (Seven) Days. Semaglutide 5mg/ml   3/24/2025    oxyCODONE-acetaminophen (PERCOCET) 7.5-325 MG per tablet Take 1 tablet by mouth 6 (Six) Times a Day.   Taking    sertraline (ZOLOFT) 100 MG tablet Take 1 tablet by mouth Every Evening. Indications: Major Depressive Disorder   3/27/2025    Synthroid 75 MCG tablet Take 1 tablet by mouth Every Morning.   Taking       Allergies  Patient has no known  "allergies.    Scheduled Meds:enoxaparin sodium, 1 mg/kg, Subcutaneous, Q12H  levothyroxine, 75 mcg, Oral, Q AM  Lidocaine, 1 patch, Transdermal, Q24H  metoprolol succinate XL, 25 mg, Oral, Q24H  nystatin, , Topical, Q12H  predniSONE, 40 mg, Oral, Daily  pregabalin, 75 mg, Oral, Q12H  sertraline, 100 mg, Oral, Nightly  sodium chloride, 10 mL, Intravenous, Q12H      Continuous Infusions:   PRN Meds:.  acetaminophen    senna-docusate sodium **AND** polyethylene glycol **AND** bisacodyl **AND** bisacodyl    hydrALAZINE    HYDROmorphone    ipratropium-albuterol    nitroglycerin    ondansetron    sodium chloride    sodium chloride    Objective     VITAL SIGNS  Vitals:    04/05/25 2342 04/06/25 0356 04/06/25 0804 04/06/25 1154   BP: 112/55 102/72 123/74 121/72   BP Location: Left arm Left arm Left arm Left arm   Patient Position: Lying Lying Lying Lying   Pulse: 72 71 71 72   Resp: 18 10 12 17   Temp: 96.8 °F (36 °C) 97.5 °F (36.4 °C) 97.5 °F (36.4 °C) 98.2 °F (36.8 °C)   TempSrc: Axillary Oral Oral Oral   SpO2: 98% 100% 98% 97%   Weight:       Height:           Flowsheet Rows      Flowsheet Row First Filed Value   Admission Height 152.4 cm (60\") Documented at 03/28/2025 1346   Admission Weight 77.1 kg (170 lb) Documented at 03/28/2025 1346              Intake/Output Summary (Last 24 hours) at 4/6/2025 1514  Last data filed at 4/6/2025 1018  Gross per 24 hour   Intake 580 ml   Output --   Net 580 ml        TELEMETRY: Sinus rhythm     Physical Exam:  The patient is alert, oriented and in no distress.  Vital signs as noted above.  Head and neck revealed no carotid bruits or jugular venous distention.  No thyromegaly or lymphadenopathy is present  Lungs clear.  No wheezing.  Breath sounds are normal bilaterally.  Heart normal first and second heart sounds.  No murmur. No precordial rub is present.  No gallop is present.  Abdomen soft and nontender.  No organomegaly is present.  Extremities with good peripheral pulses without " any pedal edema.  Skin warm and dry.  Musculoskeletal system is grossly normal  CNS grossly normal    Reviewed and updated 4/6/2025.    Results Review:   I reviewed the patient's new clinical results.  Lab Results (last 24 hours)       ** No results found for the last 24 hours. **            Imaging Results (Last 24 Hours)       ** No results found for the last 24 hours. **        LAB RESULTS (LAST 7 DAYS)    CBC        BMP  Results from last 7 days   Lab Units 04/02/25  0059   SODIUM mmol/L 141   POTASSIUM mmol/L 3.9   CHLORIDE mmol/L 103   CO2 mmol/L 25.3   BUN mg/dL 16   CREATININE mg/dL 0.75   GLUCOSE mg/dL 99   MAGNESIUM mg/dL 1.8       CMP   Results from last 7 days   Lab Units 04/02/25  0059   SODIUM mmol/L 141   POTASSIUM mmol/L 3.9   CHLORIDE mmol/L 103   CO2 mmol/L 25.3   BUN mg/dL 16   CREATININE mg/dL 0.75   GLUCOSE mg/dL 99         BNP        TROPONIN  Results from last 7 days   Lab Units 04/02/25  0059   HSTROP T ng/L 18*       CoAg        Creatinine Clearance  Estimated Creatinine Clearance: 49.4 mL/min (by C-G formula based on SCr of 0.75 mg/dL).    ABG        Radiology  No radiology results for the last day          EKG            I personally viewed and interpreted the patient's EKG/Telemetry data:    ECHOCARDIOGRAM:    Results for orders placed during the hospital encounter of 03/21/25    Adult Transthoracic Echo Complete W/ Cont if Necessary Per Protocol    Interpretation Summary    Left ventricular ejection fraction appears to be 61 - 65%.    Left ventricular diastolic function was normal.    The left atrial cavity is moderately dilated.    Left atrial volume is severely increased.    Moderate aortic valve regurgitation is present.    Moderate mitral valve regurgitation is present.    Estimated right ventricular systolic pressure from tricuspid regurgitation is normal (<35 mmHg).          STRESS TEST  Results for orders placed during the hospital encounter of 03/28/25    Stress Test With  Myocardial Perfusion One Day    Interpretation Summary    Myocardial perfusion imaging indicates a normal myocardial perfusion study with no evidence of ischemia. Impressions are consistent with a low risk study.    Left ventricular ejection fraction is normal (Calculated EF = 56%).        Cardiolite (Tc-99m sestamibi) stress test    CARDIAC CATHETERIZATION  No results found for this or any previous visit.                OTHER:         Assessment & Plan     Principal Problem:    Cervical stenosis of spinal canal  Active Problems:    Pacemaker    Paroxysmal atrial fibrillation    Cervical radiculopathy    Deep vein thrombosis (DVT)    Hypertrophic obstructive cardiomyopathy    Spondylolisthesis, acquired    Obesity (BMI 30.0-34.9)    Coronary artery disease    Acute urinary tract infection    Neck pain    Chest pain, atypical    Primary cardiologist note was reviewed.    Chief Complaint and history of present illness: Atrial fibrillation pacemaker     Interval change: Patient presented with chest pain.  Troponin was flat at 17 and 18.  Underwent Lexiscan Cardiolite 4/3/2025 which was negative for ischemia.  Patient is going in and out of A-fib.  EP was consulted.     Objective:  4/3/2025: Lexiscan Cardiolite negative for ischemia, LVEF of 56%     Subjective: Patient seen and examined.  Chart reviewed.  Labs reviewed.  Discussed with RN taking care of patient.  Patient denies any chest pain.        History of present illness:    Ms. Domonique See  has PMH of     Hypertrophic cardiomyopathy, history of septal myomectomy,      nonsustained VT  hypertension  SSS, [employer] permanent pacemaker 3/16/2017  Paroxysmal atrial fibrillation  [id number] SCORE   [id number] Score: 8 (6/20/2024  2:06 PM)  [id number] Score: 8 (5/22/2023  3:37 PM)  Age greater than 75, CHF, hypertension, CVA, vascular disease     CAD, cath 12/2017 30% LAD  CHF due to hypertrophic cardiomyopathy  Hyperlipidemia  COPD  History of DAYLIN  4/28/2023  Peptic ulcer disease  Back surgery (previous cervical decompression and fusion, lumbar decompression and fusion), breast augmentation  Former smoker     Patient presented to the ED on 3/28/2025 with worsening back pain and ataxia.  She had had significant neck pain with pain radiating to both arms with numbness, tingling and burning sensation.    Patient has been seen by neurosurgery and was noted to have significant cervical thoracic and lumbar stenosis, which patient has been recommended outpatient epidural injection.       Last evening patient complained of chest pain therefore cardiology has been consulted.   Patient reports pain started a few hours after she ate and was dull and achy.  The pain radiated into her back, neck and jaws. The pain was relieved with one nitroglycerin.  Patient reports her blood pressure spiked up when she had pain.  She denies any further pain today.   HS troponin was obtain and was 17 and 18 repeat labs today BMP okay, BNP normal, TSH normal.   EKG with Afib         NP PLAN:  Will proceed with nuclear stress this in AM to assess for ischemia  Discuss  recent outpatient echocardiogram with patient (as below).   Will have pacemaker interrogated. (Last remote check 3/9/2025 noted increase in Afib from 1% to 26%)   Continue lovenox (on Eliquis at home)      Will start beta blocker to help with afib, valvular disease and blood pressure.            Outpatient echocardiogram 3/21/2025    Left ventricular ejection fraction appears to be 61 - 65%.    Left ventricular diastolic function was normal.    The left atrial cavity is moderately dilated.    Left atrial volume is severely increased.    Moderate aortic valve regurgitation is present.    Moderate mitral valve regurgitation is present.    Estimated right ventricular systolic pressure from tricuspid regurgitation is normal (<35 mmHg)              Chart review:  Underwent A-fib ablation 1/16/2023  Admitted 1/18/2023 with CHF      Echocardiogram 6/29/2021 reveals EF of 50%   Echocardiogram 1/19/2023 EF of 60 to 65% with severe right ventricular and right atrial enlargement moderate left atrial enlargement, moderate mitral regurgitation and mild aortic regurgitation.                 Assessment:     Chest pain  Bilateral arm pain  Hypertrophic cardiomyopathy, myomectomy, NSVT  Chronic HFpEF due to diastolic dysfunction from HCM  Hypertension  Dyslipidemia  Prediabetes  Paroxysmal atrial fibrillation, status post ablation 1/16/2023 on long-term Eliquis  Obesity with BMI over 30  CKD  Moderate mitral regurgitation  Mild aortic regurgitation  Severe right ventricular enlargement        Recommendations and plan:     Patient transition to the emergency room 3/28/2025 with worsening back pain and ataxia neck pain radiating to both arms..  Patient has tingling and numbness in both arms.  Neurosurgery has seen patient needs clearance for spine surgery  Patient has elevated [id number] score of 8, due to age over 75, female gender CHF hypertension CVA and vascular disease.  Will benefit from long-term anticoagulation to prevent thromboembolic events.  Patient risk of stroke and other embolism range from 9.7% to 13.6 %/year.  If anticoagulation needs to be held for spine injections would advise holding Eliquis 2 to 3 days before surgical procedure and restart as soon as possible from surgical procedure standpoint.  Patient's Eliquis is on hold she is being transitioned with Lovenox to be held for 48 hours before her spinal procedure.  Patient presented with chest pain, troponins were flat at 17 and 18.  proBNP was normal at 1483.  EKG 4/1/2025 reviewed/interpreted by me reveals A-fib with a rate of 92 bpm with right bundle branch block.  Patient underwent Lexiscan Cardiolite 4/3/2025 which was negative for ischemia EF of 56%.  Reviewed stress test results with patient.  Patient had previous ablation.  Was seen by EP Dr. Naseem Pleitez, I am patient  was offered ablation and cardioversion she is considering.  Will follow-up with EP as outpatient.  Will continue to monitor rhythm on telemetry  Will follow-up as outpatient in pacemaker clinic to follow rhythm.  Please call back if I can be of any further assistance           Procedures     ECHOCARDIOGRAM:  Results for orders placed during the hospital encounter of 03/21/25     Adult Transthoracic Echo Complete W/ Cont if Necessary Per Protocol     Interpretation Summary    Left ventricular ejection fraction appears to be 61 - 65%.    Left ventricular diastolic function was normal.    The left atrial cavity is moderately dilated.    Left atrial volume is severely increased.    Moderate aortic valve regurgitation is present.    Moderate mitral valve regurgitation is present.    Estimated right ventricular systolic pressure from tricuspid regurgitation is normal (<35 mmHg).        STRESS TEST  Results for orders placed during the hospital encounter of 03/28/25     Stress Test With Myocardial Perfusion One Day     Interpretation Summary    Myocardial perfusion imaging indicates a normal myocardial perfusion study with no evidence of ischemia. Impressions are consistent with a low risk study.    Left ventricular ejection fraction is normal (Calculated EF = 56%).     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 in 2013, has a [employer] dual-chamber pacemaker on 3/16/2017.  Patient had A-fib and flutter ablation on 1/16/2023.  She had been in sinus rhythm up until February 2025.  Device tracings suggest atrial flutter.  Patient however has spinal stenosis which is causing her neck pain, paresthesias in her arms and legs and is being managed by neurosurgery.  As of now the plan seems to be physical therapy and injections.    Domonique See is an 85-year-old female patient who has history of  hypertrophic obstructive cardiomyopathy status post myomectomy in 2015, dual-chamber pacemaker for sick sinus syndrome, has had PVI and flutter line on 1/16/2023.  She has been in sinus rhythm up until February 2025.  The patient is not too symptomatic from A-fib, her main symptoms are related to her neck and spinal stenosis.  Nevertheless however I did offer her rhythm control strategies.  We could perform cardioversion with antiarrhythmic or redo ablation.  Patient would like to think about these options and let me know.  In the meantime the rates are controlled and she is on anticoagulation with Eliquis at home.    ]]]]]]]]]]]]]]]]]]]]]  4/6/2025  History of hypertrophic obstructive cardiomyopathy.  Status post myomectomy at [facility] in 2013.    Status post ablation for atrial fibrillation 2023    Status post dual-chamber pacemaker implantation    Recent atrial fibrillation flutter.  Patient has spinal stenosis.  Patient was seen by Dr. Pleitez.  Options were discussed that included cardioversion versus ablation.  Patient is thinking about the options.  Patient is in sinus rhythm 4/5/2025    Stress Myoview 4/3/2025  Myocardial perfusion imaging indicates a normal myocardial perfusion study with no evidence of ischemia. Impressions are consistent with a low risk study.    Left ventricular ejection fraction is normal (Calculated EF = 56%).     Medications were reviewed and updated.  Patient is on subcu Lovenox (therapeutic) levothyroxine metoprolol nystatin.    Switch to oral anticoagulants in the near future.    Dr. Gregory primary cardiologist will see the patient tomorrow.    Further plan will depend on patient's progress.    Reviewed updated 4/6/2025.    ]]]]]]]]]]]]]]]]]]]]]]]]             Thierry Doty MD  04/06/25  15:14 EDT

## 2025-04-06 NOTE — PLAN OF CARE
Goal Outcome Evaluation:   POC reviewed with patient. Pain managed effectively per pt report. Per CM: Pt would like to dc to [facility] (DC barriers: fall in hospital with negative x rays, nuclear stress test pending, P2P for IRH with denial for IRH therapy. [facility] to start appeal when updated PT/OT notes are in. [facility] will perform epidural as OPT. As of now the plan seems to be physical therapy and injections.

## 2025-04-06 NOTE — PLAN OF CARE
Goal Outcome Evaluation:  Pt resting in bed. Complains of pain in neck and back managed by PRN dilaudid and scheduled lidocaine patches. No reports of nausea. Pt received bed bath today. Will continue to monitor.

## 2025-04-07 PROCEDURE — 97535 SELF CARE MNGMENT TRAINING: CPT | Performed by: OCCUPATIONAL THERAPIST

## 2025-04-07 PROCEDURE — 99232 SBSQ HOSP IP/OBS MODERATE 35: CPT | Performed by: INTERNAL MEDICINE

## 2025-04-07 PROCEDURE — 97112 NEUROMUSCULAR REEDUCATION: CPT

## 2025-04-07 PROCEDURE — 25010000002 ENOXAPARIN PER 10 MG: Performed by: NURSE PRACTITIONER

## 2025-04-07 PROCEDURE — 63710000001 PREDNISONE PER 1 MG: Performed by: INTERNAL MEDICINE

## 2025-04-07 PROCEDURE — 97110 THERAPEUTIC EXERCISES: CPT

## 2025-04-07 PROCEDURE — 97530 THERAPEUTIC ACTIVITIES: CPT | Performed by: OCCUPATIONAL THERAPIST

## 2025-04-07 RX ADMIN — PREGABALIN 75 MG: 75 CAPSULE ORAL at 19:41

## 2025-04-07 RX ADMIN — Medication 10 ML: at 19:42

## 2025-04-07 RX ADMIN — HYDROMORPHONE HYDROCHLORIDE 2 MG: 2 TABLET ORAL at 05:56

## 2025-04-07 RX ADMIN — HYDROMORPHONE HYDROCHLORIDE 2 MG: 2 TABLET ORAL at 19:41

## 2025-04-07 RX ADMIN — METOPROLOL SUCCINATE 25 MG: 25 TABLET, EXTENDED RELEASE ORAL at 09:33

## 2025-04-07 RX ADMIN — LEVOTHYROXINE SODIUM 75 MCG: 0.07 TABLET ORAL at 05:56

## 2025-04-07 RX ADMIN — HYDROMORPHONE HYDROCHLORIDE 2 MG: 2 TABLET ORAL at 14:29

## 2025-04-07 RX ADMIN — HYDROMORPHONE HYDROCHLORIDE 2 MG: 2 TABLET ORAL at 00:46

## 2025-04-07 RX ADMIN — SERTRALINE HYDROCHLORIDE 100 MG: 100 TABLET, FILM COATED ORAL at 19:41

## 2025-04-07 RX ADMIN — PREGABALIN 75 MG: 75 CAPSULE ORAL at 09:33

## 2025-04-07 RX ADMIN — HYDROMORPHONE HYDROCHLORIDE 2 MG: 2 TABLET ORAL at 10:28

## 2025-04-07 RX ADMIN — PREDNISONE 40 MG: 20 TABLET ORAL at 09:33

## 2025-04-07 RX ADMIN — ENOXAPARIN SODIUM 80 MG: 100 INJECTION SUBCUTANEOUS at 09:32

## 2025-04-07 RX ADMIN — Medication 10 ML: at 09:39

## 2025-04-07 RX ADMIN — LIDOCAINE 1 PATCH: 4 PATCH TOPICAL at 09:33

## 2025-04-07 NOTE — PLAN OF CARE
Problem: Adult Inpatient Plan of Care  Goal: Plan of Care Review  Outcome: Progressing  Goal: Patient-Specific Goal (Individualized)  Outcome: Progressing  Goal: Absence of Hospital-Acquired Illness or Injury  Outcome: Progressing  Intervention: Identify and Manage Fall Risk  Recent Flowsheet Documentation  Taken 4/7/2025 1000 by Gi Aguilar RN  Safety Promotion/Fall Prevention:   safety round/check completed   room organization consistent   nonskid shoes/slippers when out of bed   fall prevention program maintained   clutter free environment maintained   assistive device/personal items within reach  Taken 4/7/2025 0844 by Gi Aguilar RN  Safety Promotion/Fall Prevention:   safety round/check completed   room organization consistent   nonskid shoes/slippers when out of bed   fall prevention program maintained   clutter free environment maintained   assistive device/personal items within reach  Intervention: Prevent Skin Injury  Recent Flowsheet Documentation  Taken 4/7/2025 0900 by Gi Aguilar RN  Body Position: position changed independently  Skin Protection: incontinence pads utilized  Intervention: Prevent and Manage VTE (Venous Thromboembolism) Risk  Recent Flowsheet Documentation  Taken 4/7/2025 0900 by Gi Aguilar RN  VTE Prevention/Management:   SCDs (sequential compression devices) on   bilateral  Goal: Optimal Comfort and Wellbeing  Outcome: Progressing  Intervention: Monitor Pain and Promote Comfort  Recent Flowsheet Documentation  Taken 4/7/2025 0900 by Gi Aguilar RN  Pain Management Interventions:   pillow support provided   position adjusted  Intervention: Provide Person-Centered Care  Recent Flowsheet Documentation  Taken 4/7/2025 0900 by Gi Aguilar RN  Trust Relationship/Rapport:   care explained   choices provided   emotional support provided   empathic listening provided   questions answered   questions encouraged   reassurance provided   thoughts/feelings  acknowledged  Goal: Readiness for Transition of Care  Outcome: Progressing  Goal: Plan of Care Review  Outcome: Progressing  Goal: Patient-Specific Goal (Individualized)  Outcome: Progressing  Goal: Absence of Hospital-Acquired Illness or Injury  Outcome: Progressing  Intervention: Identify and Manage Fall Risk  Recent Flowsheet Documentation  Taken 4/7/2025 1000 by Gi Aguilar RN  Safety Promotion/Fall Prevention:   safety round/check completed   room organization consistent   nonskid shoes/slippers when out of bed   fall prevention program maintained   clutter free environment maintained   assistive device/personal items within reach  Taken 4/7/2025 0844 by Gi Aguilar RN  Safety Promotion/Fall Prevention:   safety round/check completed   room organization consistent   nonskid shoes/slippers when out of bed   fall prevention program maintained   clutter free environment maintained   assistive device/personal items within reach  Intervention: Prevent Skin Injury  Recent Flowsheet Documentation  Taken 4/7/2025 0900 by Gi Aguilar RN  Body Position: position changed independently  Skin Protection: incontinence pads utilized  Intervention: Prevent and Manage VTE (Venous Thromboembolism) Risk  Recent Flowsheet Documentation  Taken 4/7/2025 0900 by Gi Aguilar RN  VTE Prevention/Management:   SCDs (sequential compression devices) on   bilateral  Goal: Optimal Comfort and Wellbeing  Outcome: Progressing  Intervention: Monitor Pain and Promote Comfort  Recent Flowsheet Documentation  Taken 4/7/2025 0900 by Gi Aguilar RN  Pain Management Interventions:   pillow support provided   position adjusted  Intervention: Provide Person-Centered Care  Recent Flowsheet Documentation  Taken 4/7/2025 0900 by Gi Aguilar RN  Trust Relationship/Rapport:   care explained   choices provided   emotional support provided   empathic listening provided   questions answered   questions encouraged   reassurance  provided   thoughts/feelings acknowledged  Goal: Readiness for Transition of Care  Outcome: Progressing     Problem: Fall Injury Risk  Goal: Absence of Fall and Fall-Related Injury  Outcome: Progressing  Intervention: Identify and Manage Contributors  Recent Flowsheet Documentation  Taken 4/7/2025 1000 by Gi Aguilar RN  Medication Review/Management: medications reviewed  Taken 4/7/2025 0900 by Gi Agiular RN  Self-Care Promotion:   BADL personal objects within reach   independence encouraged  Taken 4/7/2025 0844 by Gi Aguilar RN  Medication Review/Management: medications reviewed  Intervention: Promote Injury-Free Environment  Recent Flowsheet Documentation  Taken 4/7/2025 1000 by Gi Aguilar RN  Safety Promotion/Fall Prevention:   safety round/check completed   room organization consistent   nonskid shoes/slippers when out of bed   fall prevention program maintained   clutter free environment maintained   assistive device/personal items within reach  Taken 4/7/2025 0844 by iG Aguilar RN  Safety Promotion/Fall Prevention:   safety round/check completed   room organization consistent   nonskid shoes/slippers when out of bed   fall prevention program maintained   clutter free environment maintained   assistive device/personal items within reach     Problem: Pain Acute  Goal: Optimal Pain Control and Function  Outcome: Progressing  Intervention: Optimize Psychosocial Wellbeing  Recent Flowsheet Documentation  Taken 4/7/2025 0900 by Gi Aguilar RN  Supportive Measures:   active listening utilized   self-care encouraged  Diversional Activities:   smartphone   television  Spiritual Activities Assistance: affirmation provided  Intervention: Develop Pain Management Plan  Recent Flowsheet Documentation  Taken 4/7/2025 0900 by Gi Aguilar RN  Pain Management Interventions:   pillow support provided   position adjusted  Intervention: Prevent or Manage Pain  Recent Flowsheet  Documentation  Taken 4/7/2025 1000 by Gi Aguilar RN  Medication Review/Management: medications reviewed  Taken 4/7/2025 0900 by Gi Aguilar RN  Sensory Stimulation Regulation: care clustered  Taken 4/7/2025 0844 by Gi Aguilar RN  Medication Review/Management: medications reviewed     Problem: Comorbidity Management  Goal: Maintenance of Osteoarthritis Symptom Control  Outcome: Progressing  Intervention: Maintain Osteoarthritis Symptom Control  Recent Flowsheet Documentation  Taken 4/7/2025 1000 by Gi Aguilar RN  Medication Review/Management: medications reviewed  Taken 4/7/2025 0900 by Gi Aguilar RN  Activity Management: activity encouraged  Assistive Device Utilized:   walker   gait belt  Taken 4/7/2025 0844 by Gi Aguilar RN  Medication Review/Management: medications reviewed     Problem: Skin Injury Risk Increased  Goal: Skin Health and Integrity  Outcome: Progressing  Intervention: Optimize Skin Protection  Recent Flowsheet Documentation  Taken 4/7/2025 0900 by Gi Aguilar RN  Activity Management: activity encouraged  Pressure Reduction Techniques: frequent weight shift encouraged  Head of Bed (HOB) Positioning: HOB elevated  Pressure Reduction Devices:   alternating pressure pump (ANGELICA)   feet on footrest/footstool   foam padding utilized   heel offloading device utilized   positioning supports utilized   pressure-redistributing mattress utilized  Skin Protection: incontinence pads utilized   Goal Outcome Evaluation:

## 2025-04-07 NOTE — CASE MANAGEMENT/SOCIAL WORK
Continued Stay Note  SUSAN Goldstein     Patient Name: Domonique See  MRN: [id number]  Today's Date: 4/7/2025    Admit Date: 3/28/2025    Plan: [facility] (accepted, P2P denied. [facility] starting appeal per pt request.)No PASRR required. Will need precert.   Discharge Plan       Row Name 04/07/25 1549       Plan    Plan Comments DC barriers: Pending [facility] appeal, pain control, recent inpt falls. Cardiology and neuro surgery following.                     Beulah Ayoub RN     Office phone: [phone]  Office fax: [phone]

## 2025-04-07 NOTE — PROGRESS NOTES
LOS: 9 days   Patient Care Team:  Anish Lew APRN as PCP - General  Evgeny Gregory MD as Consulting Physician (Cardiology)  Leeanne Pleitez MD as Consulting Physician (Cardiology)  Radha Owusu APRN as Nurse Practitioner (Nurse Practitioner)  Kathe Muñoz MD as Consulting Physician (Nephrology)    Subjective     Patient states she continues with pain    Review of Systems   Constitutional:  Positive for activity change.   HENT: Negative.     Respiratory: Negative.  Negative for chest tightness.    Cardiovascular: Negative.  Negative for chest pain.   Gastrointestinal: Negative.    Genitourinary: Negative.    Musculoskeletal:  Positive for back pain and neck pain.   Neurological:  Positive for weakness.   Psychiatric/Behavioral: Negative.             Objective     Vital Signs  Temp:  [97.5 °F (36.4 °C)-98.3 °F (36.8 °C)] 97.8 °F (36.6 °C)  Heart Rate:  [71-90] 73  Resp:  [15-18] 16  BP: ()/(48-76) 100/48      Physical Exam  Vitals reviewed.   Constitutional:       Appearance: She is not ill-appearing.   HENT:      Head: Normocephalic and atraumatic.      Right Ear: External ear normal.      Left Ear: External ear normal.      Nose: Nose normal.   Eyes:      General:         Right eye: No discharge.         Left eye: No discharge.   Cardiovascular:      Rate and Rhythm: Normal rate and regular rhythm.      Pulses: Normal pulses.      Heart sounds: Normal heart sounds.   Pulmonary:      Effort: Pulmonary effort is normal.      Breath sounds: Normal breath sounds.   Abdominal:      General: Bowel sounds are normal.      Palpations: Abdomen is soft.   Musculoskeletal:         General: Normal range of motion.      Cervical back: Normal range of motion.   Skin:     General: Skin is warm.   Neurological:      Mental Status: She is alert and oriented to person, place, and time.   Psychiatric:         Behavior: Behavior normal.              Results Review:    Lab Results (last 24  hours)       ** No results found for the last 24 hours. **             Imaging Results (Last 24 Hours)       ** No results found for the last 24 hours. **                 I reviewed the patient's new clinical results.    Medication Review:   Scheduled Meds:enoxaparin sodium, 1 mg/kg, Subcutaneous, Q12H  levothyroxine, 75 mcg, Oral, Q AM  Lidocaine, 1 patch, Transdermal, Q24H  metoprolol succinate XL, 25 mg, Oral, Q24H  nystatin, , Topical, Q12H  predniSONE, 40 mg, Oral, Daily  pregabalin, 75 mg, Oral, Q12H  sertraline, 100 mg, Oral, Nightly  sodium chloride, 10 mL, Intravenous, Q12H      Continuous Infusions:   PRN Meds:.  acetaminophen    senna-docusate sodium **AND** polyethylene glycol **AND** bisacodyl **AND** bisacodyl    hydrALAZINE    HYDROmorphone    ipratropium-albuterol    nitroglycerin    ondansetron    sodium chloride    sodium chloride     Interval History:    4/2 patient will have epidural in one week outpatient so eliquis on hold and will continue lovenox bid for therapy of afib. Last night patient had episode of chest pain midsternal with nausea. EKG and troponin's obtained 17 then repeat 18. Will ask cardiology to see for any further recommendations    Assessment & Plan     Cervical stenosis of spinal canal  Neck and bilateral arm pain  Spondylolisthesis, acquired  Cervical radiculopathy  UTI  Pacemaker  Paroxysmal atrial fibrillation  Deep vein thrombosis (DVT)  Hypertrophic obstructive cardiomyopathy  Obesity (BMI 30.0-34.9)  Coronary artery disease     Plan of care  Cervical stenosis of spinal canal  Neck and bilateral arm pain  Spondylolisthesis, acquired  Cervical radiculopathy  -neurosurgery following  -MRI of the cervical spine demonstrates worsening central stenosis and spondylolisthesis at C7-T1 with moderate to severe central stenosis. No cord signal change.   -patient would benefit from surgical decompression and fusion at this C7 T1 level, however at her advanced age   she would be at  much higher risk of postoperative complications including hardware failure and pseudoarthrosis  -simple decompression is contraindicated due to the anterior fusion of the spondylolisthesis    -outpatient epidural injection as she is on anticoagulation and cannot get 1 while in the hospital   -f/u with neurosurgery after epidural and complete DEXA scan to discuss future plans  -lidocaine patch and oral dilaudid/lyrica  -monitor bowel regimen    Chest pain  -cardiology following  -stress test today    UTI  -e.coli treated and completed    Mood disorder  -zoloft    Hypothyroid  -synthroid    Pacemaker  Paroxysmal atrial fibrillation  Hypertrophic obstructive cardiomyopathy  Obesity (BMI 30.0-34.9)  Coronary artery disease   Chest pain  -eliquis/asa on hold       Diet:healthy heart  DVT prophylaxis:lovenox full dose BID (eliquis on hold)      CODE STATUS:Full   Code status (Patient has no pulse and is not breathing):full  Medical Interventions (Patient has pulse or is breathing):Full support  Level of Support Discussed with :patient    Admission Status:Inpatient    Expected length of Stay: discharge in day or 2     Plan for disposition:Home or inpt rehab    WOODY Kaufman  04/07/25  12:43 EDT

## 2025-04-07 NOTE — PLAN OF CARE
Goal Outcome Evaluation:      Assessment: Domonique See presents with ADL impairments affecting function including balance, endurance / activity tolerance, pain, postural / trunk control, sensation / sensory awareness, and strength. Pt has decreased LE sensation/proprioception, not realizing her legs were crossed in bed. She is demo inc progress participating with toileting ADLs & requiring less assist for bed mobility & ADL transfers. She continues to have inc levels of pain requiring pain mgmt & education on body mechanics/back safety. Pt remains an excellent candidate for inpt acute rehab.  Demonstrated functioning below baseline abilities indicate the need for continued skilled intervention while inpatient. Tolerating session today without incident. Will continue to follow and progress as tolerated.     Plan/Recommendations:   High Intensity Therapy recommended post-acute care. This is recommended as therapy feels the patient would require 5-6 days per week, 2-3 hours per day. At this time, inpatient rehabilitation (acute rehab) would be the first choice and SNF would be second.. Pt requires no DME at discharge.     Pt desires Inpatient Rehabilitation placement at discharge. Pt cooperative; agreeable to therapeutic recommendations and plan of care.

## 2025-04-07 NOTE — PROGRESS NOTES
Subjective:     Encounter Date:03/28/2025      Patient ID: Domonique eSe is a 85 y.o. female.    Chief Complaint and history of present illness: Atrial fibrillation pacemaker    Interval change: Patient presented with chest pain.  Troponin was flat at 17 and 18.  Underwent Lexiscan Cardiolite 4/3/2025 which was negative for ischemia.  Patient is going in and out of A-fib.  EP was consulted.    NP NOTE: Patient had 2 falls on Friday.  Continues to have neck pain.  No chest pain.  Intermittent Afib.  Rate controlled with PO metoprolol.  Will follow up as outpatient.     Electronically signed by WOODY Thomas, 04/07/25, 1:11 PM EDT.    Cardiology attending addendum :    I have personally performed a face-to-face diagnostic evaluation, physical exam and reviewed data on this patient.  I have reviewed documentation done by me and nurse practitioner  and corrected as needed.  And agree with the different components of documentation.Greater than 50% of the time spent in the care of this patient was provided by attending consultant/me.        Subjective: Patient seen and examined.  Chart reviewed.  Labs reviewed.  Discussed with RN taking care of patient.  Events noted.  Patient reportedly had 2 falls on Friday 3 days ago.    Objective:  4/3/2025: Lexiscan Cardiolite negative for ischemia, LVEF of 56%    History of present illness:    Ms. Domonique See  has PMH of     Hypertrophic cardiomyopathy, history of septal myomectomy,      nonsustained VT  hypertension  SSS, [employer] permanent pacemaker 3/16/2017  Paroxysmal atrial fibrillation  [id number] SCORE   [id number] Score: 8 (6/20/2024  2:06 PM)  [id number] Score: 8 (5/22/2023  3:37 PM)  Age greater than 75, CHF, hypertension, CVA, vascular disease     CAD, cath 12/2017 30% LAD  CHF due to hypertrophic cardiomyopathy  Hyperlipidemia  COPD  History of DAYLIN 4/28/2023  Peptic ulcer disease  Back surgery (previous cervical decompression and fusion,  lumbar decompression and fusion), breast augmentation  Former smoker     Patient presented to the ED on 3/28/2025 with worsening back pain and ataxia.  She had had significant neck pain with pain radiating to both arms with numbness, tingling and burning sensation.    Patient has been seen by neurosurgery and was noted to have significant cervical thoracic and lumbar stenosis, which patient has been recommended outpatient epidural injection.       Last evening patient complained of chest pain therefore cardiology has been consulted.   Patient reports pain started a few hours after she ate and was dull and achy.  The pain radiated into her back, neck and jaws. The pain was relieved with one nitroglycerin.  Patient reports her blood pressure spiked up when she had pain.  She denies any further pain today.   HS troponin was obtain and was 17 and 18 repeat labs today BMP okay, BNP normal, TSH normal.   EKG with Afib             Outpatient echocardiogram 3/21/2025    Left ventricular ejection fraction appears to be 61 - 65%.    Left ventricular diastolic function was normal.    The left atrial cavity is moderately dilated.    Left atrial volume is severely increased.    Moderate aortic valve regurgitation is present.    Moderate mitral valve regurgitation is present.    Estimated right ventricular systolic pressure from tricuspid regurgitation is normal (<35 mmHg)              Chart review:  Underwent A-fib ablation 1/16/2023  Admitted 1/18/2023 with CHF     Echocardiogram 6/29/2021 reveals EF of 50%   Echocardiogram 1/19/2023 EF of 60 to 65% with severe right ventricular and right atrial enlargement moderate left atrial enlargement, moderate mitral regurgitation and mild aortic regurgitation.                 Assessment:     Chest pain  Bilateral arm pain  Hypertrophic cardiomyopathy, myomectomy, NSVT  Chronic HFpEF due to diastolic dysfunction from HCM  Hypertension  Dyslipidemia  Prediabetes  Paroxysmal atrial  fibrillation, status post ablation 1/16/2023 on long-term Eliquis  Obesity with BMI over 30  CKD  Moderate mitral regurgitation  Mild aortic regurgitation  Severe right ventricular enlargement        Recommendations and plan:     Patient transition to the emergency room 3/28/2025 with worsening back pain and ataxia neck pain radiating to both arms..  Patient has tingling and numbness in both arms.  Neurosurgery has seen patient needs clearance for spine surgery  Patient has elevated YDF4CY4-BYMp score of 8, due to age over 75, female gender CHF hypertension CVA and vascular disease.  Will benefit from long-term anticoagulation to prevent thromboembolic events.  Patient risk of stroke and other embolism range from 9.7% to 13.6 %/year.  If anticoagulation needs to be held for spine injections would advise holding Eliquis 2 to 3 days before surgical procedure and restart as soon as possible from surgical procedure standpoint.  Patient has elevated HTY7MJ4-VQYd score of 8, due to age over 75, female gender, CHF, hypertension, CVA and vascular disease, will benefit from long-term anticoagulation to prevent thromboembolic events.  Patient is having issues with her spine and is supposed to get injections.  Eliquis is on hold.  Will continue metoprolol for rate control.      Data:    EKG 4/1/2025 reviewed/interpreted by me reveals A-fib with a rate of 92 bpm with right bundle branch block.  Patient underwent Lexiscan Cardiolite 4/3/2025 which was negative for ischemia EF of 56%.  Reviewed stress test results with patient.  Patient had previous ablation.  Was seen by EP Dr. Naseem Pleitez, I am patient was offered ablation and cardioversion she is considering.  Will follow-up with EP as outpatient.  Will continue to monitor rhythm on telemetry  Will follow-up as outpatient in pacemaker clinic to follow rhythm.        Procedures    ECHOCARDIOGRAM:  Results for orders placed during the hospital encounter of 03/21/25    Adult  Transthoracic Echo Complete W/ Cont if Necessary Per Protocol    Interpretation Summary    Left ventricular ejection fraction appears to be 61 - 65%.    Left ventricular diastolic function was normal.    The left atrial cavity is moderately dilated.    Left atrial volume is severely increased.    Moderate aortic valve regurgitation is present.    Moderate mitral valve regurgitation is present.    Estimated right ventricular systolic pressure from tricuspid regurgitation is normal (<35 mmHg).      STRESS TEST  Results for orders placed during the hospital encounter of 03/28/25    Stress Test With Myocardial Perfusion One Day    Interpretation Summary    Myocardial perfusion imaging indicates a normal myocardial perfusion study with no evidence of ischemia. Impressions are consistent with a low risk study.    Left ventricular ejection fraction is normal (Calculated EF = 56%).          HEART CATHETERIZATION  No results found for this or any previous visit.      Copied text in this portion of the note has been reviewed and is accurate as of 4/7/2025  The following portions of the patient's history were reviewed and updated as appropriate: allergies, current medications, past family history, past medical history, past social history, past surgical history and problem list.    Assessment:         MDM       Diagnosis Plan   1. DDD (degenerative disc disease), cervical        2. Cervical osteophyte        3. Cervical spondylosis        4. Cervical stenosis of spinal canal        5. Spondylosis of thoracic region without myelopathy or radiculopathy        6. Degeneration of intervertebral disc of lumbar region with discogenic back pain and lower extremity pain        7. Lumbar spondylosis        8. Neuroforaminal stenosis of lumbar spine        9. Neural foraminal stenosis of cervical spine        10. Acute urinary tract infection        11. Cervical radiculopathy  dexa bone density axial      12. Localized osteoporosis    dexa bone density axial             Plan:               Past Medical History:  Past Medical History:   Diagnosis Date    Anemia     CHF (congestive heart failure)     Congenital heart disease     Coronary artery disease 2013    Deep vein thrombosis (DVT) 11/20/2017    GERD (gastroesophageal reflux disease)     Heart murmur Don't know    Hx of hypertrophic cardiomyopathy     Hypertension     Peptic ulceration     Persistent atrial fibrillation 01/10/2023    Primary osteoarthritis of both knees 02/08/2021    Primary osteoarthritis of right knee 09/21/2020    Sleep apnea 2017     Past Surgical History:  Past Surgical History:   Procedure Laterality Date    ABDOMINAL HERNIA REPAIR      ABLATION OF DYSRHYTHMIC FOCUS  01-    BACK SURGERY      BREAST AUGMENTATION      CARDIAC CATHETERIZATION      CARDIAC ELECTROPHYSIOLOGY PROCEDURE N/A 01/16/2023    Procedure: Ablation atrial fibrillation and flutter, cryo Duong and Ramón aware;  Surgeon: Leeanne Pleitez MD;  Location: [facility] CATH INVASIVE LOCATION;  Service: Cardiovascular;  Laterality: N/A;    CARDIAC PACEMAKER PLACEMENT  03/16/2017    Dual Chamber    CERVICAL RIB RESECTION Bilateral     CERVICAL RIB RESECTION Bilateral 1963    bilateral cervical ribs removed - extra ribs located and causing pain    CHOLECYSTECTOMY      COLONOSCOPY      CORONARY ARTERY BYPASS GRAFT  2013    THORACIC DECOMPRESSION POSTERIOR FUSION  06/04/2014    L3-5 & S1    UPPER GASTROINTESTINAL ENDOSCOPY        Allergies:  No Known Allergies  Home Meds:  Current Meds:     Current Facility-Administered Medications:     acetaminophen (TYLENOL) tablet 650 mg, 650 mg, Oral, Q4H PRN, Tatiana Gilliland MD, 650 mg at 04/04/25 2048    sennosides-docusate (PERICOLACE) 8.6-50 MG per tablet 2 tablet, 2 tablet, Oral, BID PRN, 2 tablet at 04/05/25 0034 **AND** polyethylene glycol (MIRALAX) packet 17 g, 17 g, Oral, Daily PRN, 17 g at 04/06/25 7547 **AND** bisacodyl (DULCOLAX) EC tablet 5 mg, 5 mg,  Oral, Daily PRN **AND** bisacodyl (DULCOLAX) suppository 10 mg, 10 mg, Rectal, Daily PRN, Tatiana Gilliland MD, 10 mg at 25 1743    hydrALAZINE (APRESOLINE) injection 10 mg, 10 mg, Intravenous, Q6H PRN, Tatiana Gilliland MD, 10 mg at 25 2345    HYDROmorphone (DILAUDID) tablet 2 mg, 2 mg, Oral, Q4H PRN, Tatiana Gilliland MD, 2 mg at 25 1429    ipratropium-albuterol (DUO-NEB) nebulizer solution 3 mL, 3 mL, Nebulization, Q4H PRN, Tatiana Gilliland MD    levothyroxine (SYNTHROID, LEVOTHROID) tablet 75 mcg, 75 mcg, Oral, Q AM, Tatiana Gilliland MD, 75 mcg at 25 0556    Lidocaine 4 % 1 patch, 1 patch, Transdermal, Q24H, Tatiana Gilliland MD, 1 patch at 25 0933    metoprolol succinate XL (TOPROL-XL) 24 hr tablet 25 mg, 25 mg, Oral, Q24H, Radha Owusu, APRN, 25 mg at 25    nitroglycerin (NITROSTAT) SL tablet 0.4 mg, 0.4 mg, Sublingual, Q5 Min PRN, Tatiana Gilliland MD, 0.4 mg at 25 2310    nystatin (MYCOSTATIN) powder, , Topical, Q12H, Tatiana Gilliland MD, Given at 25 0825    ondansetron (ZOFRAN) injection 4 mg, 4 mg, Intravenous, Q6H PRN, Tatiana Gilliland MD, 4 mg at 25 2240    predniSONE (DELTASONE) tablet 40 mg, 40 mg, Oral, Daily, Tatiana Gilliland MD, 40 mg at 25 0933    pregabalin (LYRICA) capsule 75 mg, 75 mg, Oral, Q12H, Tatiana Gilliland MD, 75 mg at 25 0933    sertraline (ZOLOFT) tablet 100 mg, 100 mg, Oral, Nightly, Jose Martin Aleman MD, 100 mg at 25 1953    sodium chloride 0.9 % flush 10 mL, 10 mL, Intravenous, Q12H, Tatiana Gilliland MD, 10 mL at 25 0939    sodium chloride 0.9 % flush 10 mL, 10 mL, Intravenous, PRN, Tatiana Gilliland MD    sodium chloride 0.9 % infusion 40 mL, 40 mL, Intravenous, PRN, Tatiana Gilliland MD  Social History:   Social History     Tobacco Use    Smoking status: Former     Current packs/day: 0.00     Average packs/day: 1 pack/day for 8.0 years (8.0 ttl pk-yrs)     Types: Cigarettes     Start date: 1977     Quit date: 1985     Years since quittin.2      "Passive exposure: Past    Smokeless tobacco: Never    Tobacco comments:     Quit 1985   Substance Use Topics    Alcohol use: Never     Comment: 6 drinks a year in social settings      Family History:  Family History   Problem Relation Age of Onset    Colon cancer Mother     Colon cancer Brother     Heart attack Brother     Colon cancer Daughter               ROS  All other systems are negative         Objective:     Physical Exam  /48 (BP Location: Left arm, Patient Position: Lying)   Pulse 73   Temp 97.8 °F (36.6 °C) (Oral)   Resp 16   Ht 152.4 cm (60\")   Wt 77.1 kg (170 lb)   LMP  (LMP Unknown)   SpO2 94%   BMI 33.20 kg/m²   General:  Appears in no acute distress  Eyes: Sclera is anicteric,  conjunctiva is clear   HEENT:  No JVD.  No carotid bruits  Respiratory: Respirations regular and unlabored at rest.  Clear to auscultation  Cardiovascular: S1,S2, irregularly irregular rate and rhythm  Extremities: No digital clubbing or cyanosis, no edema  Skin: Color pink. Skin warm and dry to touch. No rashes  No xanthoma  Neuro: Alert and awake.    Lab Reviewed:         Evgeny Gregory MD  4/7/2025 14:30 EDT      EMR Dragon/Transcription:   \"Dictated utilizing Dragon dictation\".        "

## 2025-04-07 NOTE — PLAN OF CARE
Assessment: Domonique See presents with functional mobility impairments which indicate the need for skilled intervention. Tolerating session today without incident. Pt progressed to Ax1 with second person CGA for safety due to repeated falls; continued significant BLE instability with PT blocking TRACI knees with stance. Will continue to follow and progress as tolerated.     Plan/Recommendations:   If medically appropriate, High Intensity Therapy recommended post-acute care. This is recommended as therapy feels the patient would require 5-6 days per week, 2-3 hours per day. At this time, inpatient rehabilitation (acute rehab) would be the first choice and SNF would be second. Pt requires no DME at discharge.     Pt desires Inpatient Rehabilitation placement at discharge. Pt cooperative; agreeable to therapeutic recommendations and plan of care.

## 2025-04-07 NOTE — THERAPY TREATMENT NOTE
Subjective: Pt agreeable to therapeutic plan of care.    Objective:     Precautions - HIGH FALL RISK - 2 encounters this LOS; block LE with transfers / single leg stance.     Bed mobility - Min-A; MIN A for static sitting balance EOB when unsupported (posterior lean);  Transfers - Mod-A and Max-A MAX A with SPS transfer towards R bed to [facility] with PT blocking TRACI knees during stance; to allow for PT proximity to pt, Rwx not used and pt instead had BUE support on PT's forearms/elbows. MOD A with sit to stand from [facility]. Cues for quad and glut activation for increased (I)/safety.  Ambulation -  N/A or Not attempted.    Therapeutic Exercise - 10 Reps B LE AROM supported sitting / chair    Vitals: WNL    Pain: 6 Myers-Sam Location: low back  Intervention for pain: Repositioned, RN provided medication, RN notified, and Therapeutic Presence    Education: Provided education on the importance of mobility in the acute care setting, Verbal/Tactile Cues, ADL training, Transfer Training, and HEP    Assessment: Domonique See presents with functional mobility impairments which indicate the need for skilled intervention. Tolerating session today without incident. Pt progressed to Ax1 with second person CGA for safety due to repeated falls; continued significant BLE instability with PT blocking TRACI knees with stance. Will continue to follow and progress as tolerated.     Plan/Recommendations:   If medically appropriate, High Intensity Therapy recommended post-acute care. This is recommended as therapy feels the patient would require 5-6 days per week, 2-3 hours per day. At this time, inpatient rehabilitation (acute rehab) would be the first choice and SNF would be second. Pt requires no DME at discharge.     Pt desires Inpatient Rehabilitation placement at discharge. Pt cooperative; agreeable to therapeutic recommendations and plan of care.           Post-Tx Position: Up in Chair, Staff Present, Alarms activated, and Call light  and personal items within reach  PPE: gloves, surgical mask, and gown    Therapy Charges for Today       Code Description Service Date Service Provider Modifiers Qty    75346113447 HC PT THER PROC EA 15 MIN 4/7/2025 Renetta Last GP 1    90597130269 HC PT NEUROMUSC RE EDUCATION EA 15 MIN 4/7/2025 Renetta Last GP 1           PT Charges       Row Name 04/07/25 1340             Time Calculation    Start Time 1307  -JV      Stop Time 1330  -JV      Time Calculation (min) 23 min  -JV      PT Received On 04/07/25  -JV      PT - Next Appointment 04/08/25  -JV         Time Calculation- PT    Total Timed Code Minutes- PT 23 minute(s)  -JV                User Key  (r) = Recorded By, (t) = Taken By, (c) = Cosigned By      Initials Name Provider Type    Renetta Mendez Physical Therapist

## 2025-04-07 NOTE — PLAN OF CARE
Goal Outcome Evaluation:      Patient complained of pain this shift, see MAR . Patient stable at this time.

## 2025-04-07 NOTE — CONSULTS
"Nutrition Services    Patient Name: Domonique See  YOB: 1940  MRN: [id number]  Admission date: 3/28/2025    NUTRITION SCREENING      Encounter Information: 4/7: Nutrition screening r/t LOS x 10 days. During RD visit, pt stated she had a decent appetite and ate consistently. She mentions consuming largest meal during later hours of the day. Pt has been eating % of recent meals. Pt noted constipation while admitted in hospital and associated abdominal pressure, but states this is resolving. No problems at present that could inhibit continued adequate intake.        PO Diet: Diet: Cardiac; Healthy Heart (2-3 Na+); Fluid Consistency: Thin (IDDSI 0)   PO Supplements: None   PO Intake:  Eating % of documented meals       Labs (reviewed below): Reviewed and unremarkable.         GI Function:  Last BM 4/7       Skin: Intact       Weight Hx Review: Maintained stable weight over past 1 year.   Estimated body mass index is 33.2 kg/m² as calculated from the following:    Height as of this encounter: 152.4 cm (60\").    Weight as of this encounter: 77.1 kg (170 lb).         Nutrition Intervention: Continue current PO diet   Encourage good PO intake     Results from last 7 days   Lab Units 04/02/25  0059   SODIUM mmol/L 141   POTASSIUM mmol/L 3.9   CHLORIDE mmol/L 103   CO2 mmol/L 25.3   BUN mg/dL 16   CREATININE mg/dL 0.75   CALCIUM mg/dL 9.2   GLUCOSE mg/dL 99     Results from last 7 days   Lab Units 04/02/25  0059   MAGNESIUM mg/dL 1.8     COVID19   Date Value Ref Range Status   05/01/2023 Not Detected Not Detected - Ref. Range Final     Lab Results   Component Value Date    HGBA1C 5.8 (H) 01/19/2023       RD to follow up per protocol.    Electronically signed by:  Samantha Reyes  04/07/25 14:21 EDT    "

## 2025-04-07 NOTE — THERAPY TREATMENT NOTE
Subjective: Pt agreeable to therapeutic plan of care. Pt pleasant & cooperative. Pt anxious to get up to BSC & agreeable to sit up in recliner for a while.   Cognition: oriented to Person, Place, Time, and Situation    Objective:     Precautions - High fall risk due to LE buckling/weakness.    Bed Mobility: Min-A   Functional Transfers: Mod-A for sit<>stand & max A for bed<>BSC t/f blocking knees PRN during transition.      Balance: Sitting EOB = static = CGA, dynamic = min A; Standing static= mod - max A.   Functional Ambulation: N/A or Not attempted.    Toileting: CGA for frontal neetu-hygiene with pt sitting on BSC. Pt requires mod A for rear neetu-care.    ADL Position: supported sitting  ADL Comments:     Vitals: WNL    Pain: 6 VAS Location: low back   Interventions for pain: Increased Activity and Therapeutic Presence, emotional support, body mech for bed mobility   Education: Provided education on the importance of mobility in the acute care setting, Verbal/Tactile Cues, ADL training, and Transfer Training, back ergonomics & body mechanics    Assessment: Domonique See presents with ADL impairments affecting function including balance, endurance / activity tolerance, pain, postural / trunk control, sensation / sensory awareness, and strength. Pt has decreased LE sensation/proprioception, not realizing her legs were crossed in bed. She is demo inc progress participating with toileting ADLs & requiring less assist for bed mobility & ADL transfers. She continues to have inc levels of pain requiring pain mgmt & education on body mechanics/back safety. Pt remains an excellent candidate for inpt acute rehab.  Demonstrated functioning below baseline abilities indicate the need for continued skilled intervention while inpatient. Tolerating session today without incident. Will continue to follow and progress as tolerated.     Plan/Recommendations:   High Intensity Therapy recommended post-acute care. This is  recommended as therapy feels the patient would require 5-6 days per week, 2-3 hours per day. At this time, inpatient rehabilitation (acute rehab) would be the first choice and SNF would be second.. Pt requires no DME at discharge.     Pt desires Inpatient Rehabilitation placement at discharge. Pt cooperative; agreeable to therapeutic recommendations and plan of care.     Modified Shoaib: N/A = No pre-op stroke/TIA    Post-Tx Position: Up in Chair, Staff Present, Alarms activated, and Call light and personal items within reach  PPE: gloves       Time Calculation- OT       Row Name 04/07/25 1407             Time Calculation- OT    OT Start Time 1307  -DT      OT Stop Time 1331  -DT      OT Time Calculation (min) 24 min  -DT      Total Timed Code Minutes- OT 24 minute(s)  -DT      OT Received On 04/07/25  -DT      OT - Next Appointment 04/09/25  -DT                User Key  (r) = Recorded By, (t) = Taken By, (c) = Cosigned By      Initials Name Provider Type    Carmen Brand, OT Occupational Therapist

## 2025-04-08 ENCOUNTER — APPOINTMENT (OUTPATIENT)
Dept: PAIN MEDICINE | Facility: HOSPITAL | Age: 85
DRG: 552 | End: 2025-04-08
Payer: MEDICARE

## 2025-04-08 VITALS
TEMPERATURE: 97.1 F | HEIGHT: 60 IN | OXYGEN SATURATION: 98 % | WEIGHT: 170 LBS | DIASTOLIC BLOOD PRESSURE: 89 MMHG | RESPIRATION RATE: 16 BRPM | SYSTOLIC BLOOD PRESSURE: 130 MMHG | BODY MASS INDEX: 33.38 KG/M2 | HEART RATE: 70 BPM

## 2025-04-08 PROCEDURE — 77003 FLUOROGUIDE FOR SPINE INJECT: CPT

## 2025-04-08 PROCEDURE — 99221 1ST HOSP IP/OBS SF/LOW 40: CPT | Performed by: STUDENT IN AN ORGANIZED HEALTH CARE EDUCATION/TRAINING PROGRAM

## 2025-04-08 PROCEDURE — 3E0R3BZ INTRODUCTION OF ANESTHETIC AGENT INTO SPINAL CANAL, PERCUTANEOUS APPROACH: ICD-10-PCS | Performed by: STUDENT IN AN ORGANIZED HEALTH CARE EDUCATION/TRAINING PROGRAM

## 2025-04-08 PROCEDURE — 99232 SBSQ HOSP IP/OBS MODERATE 35: CPT | Performed by: INTERNAL MEDICINE

## 2025-04-08 PROCEDURE — 25010000002 DEXAMETHASONE SODIUM PHOSPHATE 10 MG/ML SOLUTION: Performed by: STUDENT IN AN ORGANIZED HEALTH CARE EDUCATION/TRAINING PROGRAM

## 2025-04-08 PROCEDURE — 63710000001 PREDNISONE PER 1 MG: Performed by: INTERNAL MEDICINE

## 2025-04-08 PROCEDURE — 25510000001 IOPAMIDOL 41 % SOLUTION: Performed by: STUDENT IN AN ORGANIZED HEALTH CARE EDUCATION/TRAINING PROGRAM

## 2025-04-08 PROCEDURE — 62321 NJX INTERLAMINAR CRV/THRC: CPT | Performed by: STUDENT IN AN ORGANIZED HEALTH CARE EDUCATION/TRAINING PROGRAM

## 2025-04-08 RX ORDER — POLYETHYLENE GLYCOL 3350 17 G/17G
17 POWDER, FOR SOLUTION ORAL DAILY PRN
Start: 2025-04-08

## 2025-04-08 RX ORDER — OXYCODONE HYDROCHLORIDE 10 MG/1
10 TABLET ORAL EVERY 4 HOURS PRN
Start: 2025-04-08 | End: 2025-04-13

## 2025-04-08 RX ORDER — PREGABALIN 75 MG/1
75 CAPSULE ORAL EVERY 12 HOURS SCHEDULED
Start: 2025-04-08

## 2025-04-08 RX ORDER — PREDNISONE 20 MG/1
20 TABLET ORAL
Start: 2025-04-09 | End: 2025-04-11

## 2025-04-08 RX ORDER — PREDNISONE 20 MG/1
20 TABLET ORAL
Status: DISCONTINUED | OUTPATIENT
Start: 2025-04-09 | End: 2025-04-08 | Stop reason: HOSPADM

## 2025-04-08 RX ORDER — DEXAMETHASONE SODIUM PHOSPHATE 10 MG/ML
10 INJECTION, SOLUTION INTRAMUSCULAR; INTRAVENOUS ONCE
Status: COMPLETED | OUTPATIENT
Start: 2025-04-08 | End: 2025-04-08

## 2025-04-08 RX ORDER — HYDROMORPHONE HYDROCHLORIDE 2 MG/1
1 TABLET ORAL EVERY 6 HOURS PRN
Refills: 0 | Status: DISCONTINUED | OUTPATIENT
Start: 2025-04-08 | End: 2025-04-08

## 2025-04-08 RX ORDER — IPRATROPIUM BROMIDE AND ALBUTEROL SULFATE 2.5; .5 MG/3ML; MG/3ML
3 SOLUTION RESPIRATORY (INHALATION) EVERY 4 HOURS PRN
Qty: 360 ML | Refills: 1 | Status: SHIPPED | OUTPATIENT
Start: 2025-04-08

## 2025-04-08 RX ORDER — OXYCODONE HYDROCHLORIDE 5 MG/1
10 TABLET ORAL EVERY 4 HOURS PRN
Refills: 0 | Status: DISCONTINUED | OUTPATIENT
Start: 2025-04-08 | End: 2025-04-08 | Stop reason: HOSPADM

## 2025-04-08 RX ORDER — NYSTATIN 100000 [USP'U]/G
POWDER TOPICAL EVERY 12 HOURS SCHEDULED
Start: 2025-04-08

## 2025-04-08 RX ORDER — METOPROLOL SUCCINATE 25 MG/1
25 TABLET, EXTENDED RELEASE ORAL
Start: 2025-04-09

## 2025-04-08 RX ORDER — IOPAMIDOL 408 MG/ML
3 INJECTION, SOLUTION INTRATHECAL
Status: COMPLETED | OUTPATIENT
Start: 2025-04-08 | End: 2025-04-08

## 2025-04-08 RX ORDER — AMOXICILLIN 250 MG
2 CAPSULE ORAL DAILY
Start: 2025-04-08

## 2025-04-08 RX ADMIN — LIDOCAINE 1 PATCH: 4 PATCH TOPICAL at 10:15

## 2025-04-08 RX ADMIN — HYDROMORPHONE HYDROCHLORIDE 2 MG: 2 TABLET ORAL at 00:26

## 2025-04-08 RX ADMIN — HYDROMORPHONE HYDROCHLORIDE 2 MG: 2 TABLET ORAL at 10:24

## 2025-04-08 RX ADMIN — DEXAMETHASONE SODIUM PHOSPHATE 10 MG: 10 INJECTION, SOLUTION INTRAMUSCULAR; INTRAVENOUS at 13:31

## 2025-04-08 RX ADMIN — OXYCODONE HYDROCHLORIDE 10 MG: 5 TABLET ORAL at 14:53

## 2025-04-08 RX ADMIN — PREDNISONE 40 MG: 20 TABLET ORAL at 10:15

## 2025-04-08 RX ADMIN — LEVOTHYROXINE SODIUM 75 MCG: 0.07 TABLET ORAL at 05:24

## 2025-04-08 RX ADMIN — Medication 10 ML: at 10:17

## 2025-04-08 RX ADMIN — IOPAMIDOL 1 ML: 408 INJECTION, SOLUTION INTRATHECAL at 13:30

## 2025-04-08 RX ADMIN — METOPROLOL SUCCINATE 25 MG: 25 TABLET, EXTENDED RELEASE ORAL at 10:15

## 2025-04-08 RX ADMIN — PREGABALIN 75 MG: 75 CAPSULE ORAL at 10:15

## 2025-04-08 NOTE — PROGRESS NOTES
LOS: 10 days   Patient Care Team:  Anish Lew APRN as PCP - General  Evgeny Gregory MD as Consulting Physician (Cardiology)  Leeanne Pleitez MD as Consulting Physician (Cardiology)  Radha Owusu APRN as Nurse Practitioner (Nurse Practitioner)  Kathe Muñoz MD as Consulting Physician (Nephrology)    Subjective     Patient states she continues with pain    Review of Systems   Constitutional:  Positive for activity change.   HENT: Negative.     Respiratory: Negative.  Negative for chest tightness.    Cardiovascular: Negative.  Negative for chest pain.   Gastrointestinal: Negative.    Genitourinary: Negative.    Musculoskeletal:  Positive for back pain and neck pain.   Neurological:  Positive for weakness.   Psychiatric/Behavioral: Negative.             Objective     Vital Signs  Temp:  [96.9 °F (36.1 °C)-98 °F (36.7 °C)] 97.5 °F (36.4 °C)  Heart Rate:  [71-87] 71  Resp:  [11-18] 11  BP: (100-135)/(48-77) 129/61      Physical Exam  Vitals reviewed.   Constitutional:       Appearance: She is not ill-appearing.   HENT:      Head: Normocephalic and atraumatic.      Right Ear: External ear normal.      Left Ear: External ear normal.      Nose: Nose normal.   Eyes:      General:         Right eye: No discharge.         Left eye: No discharge.   Cardiovascular:      Rate and Rhythm: Normal rate and regular rhythm.      Pulses: Normal pulses.      Heart sounds: Normal heart sounds.   Pulmonary:      Effort: Pulmonary effort is normal.      Breath sounds: Normal breath sounds.   Abdominal:      General: Bowel sounds are normal.      Palpations: Abdomen is soft.   Musculoskeletal:         General: Normal range of motion.      Cervical back: Normal range of motion.   Skin:     General: Skin is warm.   Neurological:      Mental Status: She is alert and oriented to person, place, and time.   Psychiatric:         Behavior: Behavior normal.              Results Review:    Lab Results (last 24  hours)       ** No results found for the last 24 hours. **             Imaging Results (Last 24 Hours)       ** No results found for the last 24 hours. **                 I reviewed the patient's new clinical results.    Medication Review:   Scheduled Meds:levothyroxine, 75 mcg, Oral, Q AM  Lidocaine, 1 patch, Transdermal, Q24H  metoprolol succinate XL, 25 mg, Oral, Q24H  nystatin, , Topical, Q12H  predniSONE, 40 mg, Oral, Daily  pregabalin, 75 mg, Oral, Q12H  sertraline, 100 mg, Oral, Nightly  sodium chloride, 10 mL, Intravenous, Q12H      Continuous Infusions:   PRN Meds:.  acetaminophen    senna-docusate sodium **AND** polyethylene glycol **AND** bisacodyl **AND** bisacodyl    hydrALAZINE    HYDROmorphone    ipratropium-albuterol    nitroglycerin    ondansetron    sodium chloride    sodium chloride     Interval History:    4/2 patient will have epidural in one week outpatient so eliquis on hold and will continue lovenox bid for therapy of afib. Last night patient had episode of chest pain midsternal with nausea. EKG and troponin's obtained 17 then repeat 18. Will ask cardiology to see for any further recommendations    4/7 epidural today and then will restart eliquis tomorrow; will decrease pain medication     Assessment & Plan     Cervical stenosis of spinal canal  Neck and bilateral arm pain  Spondylolisthesis, acquired  Cervical radiculopathy  UTI  Pacemaker  Paroxysmal atrial fibrillation  Deep vein thrombosis (DVT)  Hypertrophic obstructive cardiomyopathy  Obesity (BMI 30.0-34.9)  Coronary artery disease     Plan of care  Cervical stenosis of spinal canal  Neck and bilateral arm pain  Spondylolisthesis, acquired  Cervical radiculopathy  -neurosurgery following  -MRI of the cervical spine demonstrates worsening central stenosis and spondylolisthesis at C7-T1 with moderate to severe central stenosis. No cord signal change.   -patient would benefit from surgical decompression and fusion at this C7 T1 level,  however at her advanced age   she would be at much higher risk of postoperative complications including hardware failure and pseudoarthrosis  -simple decompression is contraindicated due to the anterior fusion of the spondylolisthesis    -f/u with neurosurgery after epidural and complete DEXA scan to discuss future plans  -lidocaine patch and oral dilaudid/lyrica  -monitor bowel regimen    Chest pain  -cardiology following  -stress test negative non further work up     UTI  -e.coli treated and completed    Mood disorder  -zoloft    Hypothyroid  -synthroid    Pacemaker  Paroxysmal atrial fibrillation  Hypertrophic obstructive cardiomyopathy  Obesity (BMI 30.0-34.9)  Coronary artery disease   Chest pain  -eliquis/asa on hold       Diet:healthy heart  DVT prophylaxis:lovenox full dose BID (eliquis on hold)      CODE STATUS:Full   Code status (Patient has no pulse and is not breathing):full  Medical Interventions (Patient has pulse or is breathing):Full support  Level of Support Discussed with :patient    Admission Status:Inpatient    Expected length of Stay: discharge in day or 2     Plan for disposition:Home or inpt rehab    WOODY Kaufman  04/08/25  10:50 EDT

## 2025-04-08 NOTE — PROGRESS NOTES
Subjective:     Encounter Date:03/28/2025      Patient ID: Domonique See is a 85 y.o. female.    Chief Complaint and history of present illness: Atrial fibrillation pacemaker    Interval change: Patient presented with chest pain.  Troponin was flat at 17 and 18.  Underwent Lexiscan Cardiolite 4/3/2025 which was negative for ischemia.  Patient is going in and out of A-fib.  EP was consulted.    NP NOTE: Patient seen and examined.  Patient denies any further chest pain.  Patient in sinus rhythm today.  Nurses at bedside trying to get patient up to chair.  Hoping for epidural injection today with pain management then discharge to rehab.     Electronically signed by WOODY Thomas, 04/08/25, 11:26 AM EDT.    Cardiology attending addendum :    I have personally performed a face-to-face diagnostic evaluation, physical exam and reviewed data on this patient.  I have reviewed documentation done by me and nurse practitioner  and corrected as needed.  And agree with the different components of documentation.Greater than 50% of the time spent in the care of this patient was provided by attending consultant/me.      Subjective: Patient seen and examined.  Chart reviewed.  Labs reviewed.  Patient is hoping to get epidural injections today.    Objective:  4/3/2025: Lexiscan Cardiolite negative for ischemia, LVEF of 56%    History of present illness:    Ms. Domonique See  has PMH of     Hypertrophic cardiomyopathy, history of septal myomectomy,      nonsustained VT  hypertension  SSS, [employer] permanent pacemaker 3/16/2017  Paroxysmal atrial fibrillation  [id number] SCORE   [id number] Score: 8 (6/20/2024  2:06 PM)  [id number] Score: 8 (5/22/2023  3:37 PM)  Age greater than 75, CHF, hypertension, CVA, vascular disease     CAD, cath 12/2017 30% LAD  CHF due to hypertrophic cardiomyopathy  Hyperlipidemia  COPD  History of DAYLIN 4/28/2023  Peptic ulcer disease  Back surgery (previous cervical decompression  and fusion, lumbar decompression and fusion), breast augmentation  Former smoker     Patient presented to the ED on 3/28/2025 with worsening back pain and ataxia.  She had had significant neck pain with pain radiating to both arms with numbness, tingling and burning sensation.    Patient has been seen by neurosurgery and was noted to have significant cervical thoracic and lumbar stenosis, which patient has been recommended outpatient epidural injection.       Last evening patient complained of chest pain therefore cardiology has been consulted.   Patient reports pain started a few hours after she ate and was dull and achy.  The pain radiated into her back, neck and jaws. The pain was relieved with one nitroglycerin.  Patient reports her blood pressure spiked up when she had pain.  She denies any further pain today.   HS troponin was obtain and was 17 and 18 repeat labs today BMP okay, BNP normal, TSH normal.   EKG with Afib             Outpatient echocardiogram 3/21/2025    Left ventricular ejection fraction appears to be 61 - 65%.    Left ventricular diastolic function was normal.    The left atrial cavity is moderately dilated.    Left atrial volume is severely increased.    Moderate aortic valve regurgitation is present.    Moderate mitral valve regurgitation is present.    Estimated right ventricular systolic pressure from tricuspid regurgitation is normal (<35 mmHg)              Chart review:  Underwent A-fib ablation 1/16/2023  Admitted 1/18/2023 with CHF     Echocardiogram 6/29/2021 reveals EF of 50%   Echocardiogram 1/19/2023 EF of 60 to 65% with severe right ventricular and right atrial enlargement moderate left atrial enlargement, moderate mitral regurgitation and mild aortic regurgitation.                 Assessment:     Chest pain  Bilateral arm pain  Hypertrophic cardiomyopathy, myomectomy, NSVT  Chronic HFpEF due to diastolic dysfunction from HCM  Hypertension  Dyslipidemia  Prediabetes  Paroxysmal  atrial fibrillation, status post ablation 1/16/2023 on long-term Eliquis  Obesity with BMI over 30  CKD  Moderate mitral regurgitation  Mild aortic regurgitation  Severe right ventricular enlargement        Recommendations and plan:     Patient transition to the emergency room 3/28/2025 with worsening back pain and ataxia neck pain radiating to both arms..  Patient has tingling and numbness in both arms.  Patient is needing spinal injections for pain.  Patient has elevated CHADS2 Vascor of 8, due to age over 75, female gender, CHF, hypertension, CVA, vascular disease.  Will benefit from long-term anticoagulation to prevent thromboembolic events.  Patient's risk of stroke and other embolism ranged from 9.7 to 13.6 %/year.  Patient's anticoagulation is currently held for spinal injections.  Would recommend restart, when safe from spinal injection standpoint.  Continue metoprolol for rate control.  Will follow as outpatient.      Data:    EKG 4/1/2025 reviewed/interpreted by me reveals A-fib with a rate of 92 bpm with right bundle branch block.  Patient underwent Lexiscan Cardiolite 4/3/2025 which was negative for ischemia EF of 56%.  Reviewed stress test results with patient.  Patient had previous ablation.  Was seen by EP Dr. Naseem Pleitez, I am patient was offered ablation and cardioversion she is considering.  Will follow-up with EP as outpatient.  Will continue to monitor rhythm on telemetry  Will follow-up as outpatient in pacemaker clinic to follow rhythm.        Procedures    ECHOCARDIOGRAM:  Results for orders placed during the hospital encounter of 03/21/25    Adult Transthoracic Echo Complete W/ Cont if Necessary Per Protocol    Interpretation Summary    Left ventricular ejection fraction appears to be 61 - 65%.    Left ventricular diastolic function was normal.    The left atrial cavity is moderately dilated.    Left atrial volume is severely increased.    Moderate aortic valve regurgitation is  present.    Moderate mitral valve regurgitation is present.    Estimated right ventricular systolic pressure from tricuspid regurgitation is normal (<35 mmHg).      STRESS TEST  Results for orders placed during the hospital encounter of 03/28/25    Stress Test With Myocardial Perfusion One Day    Interpretation Summary    Myocardial perfusion imaging indicates a normal myocardial perfusion study with no evidence of ischemia. Impressions are consistent with a low risk study.    Left ventricular ejection fraction is normal (Calculated EF = 56%).          HEART CATHETERIZATION  No results found for this or any previous visit.      Copied text in this portion of the note has been reviewed and is accurate as of 4/8/2025  The following portions of the patient's history were reviewed and updated as appropriate: allergies, current medications, past family history, past medical history, past social history, past surgical history and problem list.    Assessment:         MDM       Diagnosis Plan   1. Cervical radiculopathy  dexa bone density axial    dexAMETHasone sodium phosphate injection 10 mg    iopamidol (ISOVUE-M 200) injection 41%      2. DDD (degenerative disc disease), cervical  [place] Guided Pain Management (Auto Finalize)    [place] Guided Pain Management (Auto Finalize)      3. Neural foraminal stenosis of cervical spine        4. Cervical stenosis of spinal canal        5. Neck pain  oxyCODONE (ROXICODONE) 10 MG tablet    pregabalin (LYRICA) 75 MG capsule             Plan:               Past Medical History:  Past Medical History:   Diagnosis Date    Anemia     CHF (congestive heart failure)     Congenital heart disease     Coronary artery disease 2013    Deep vein thrombosis (DVT) 11/20/2017    GERD (gastroesophageal reflux disease)     Heart murmur Don't know    Hx of hypertrophic cardiomyopathy     Hypertension     Peptic ulceration     Persistent atrial fibrillation 01/10/2023    Primary osteoarthritis of both knees  02/08/2021    Primary osteoarthritis of right knee 09/21/2020    Sleep apnea 2017     Past Surgical History:  Past Surgical History:   Procedure Laterality Date    ABDOMINAL HERNIA REPAIR      ABLATION OF DYSRHYTHMIC FOCUS  01-    BACK SURGERY      BREAST AUGMENTATION      CARDIAC CATHETERIZATION      CARDIAC ELECTROPHYSIOLOGY PROCEDURE N/A 01/16/2023    Procedure: Ablation atrial fibrillation and flutter, cryo Duong and Ramón aware;  Surgeon: Leeanne Pleitez MD;  [facility]: [facility] CATH INVASIVE LOC[facility];  Service: Cardiovascular;  Laterality: N/A;    CARDIAC PACEMAKER PLACEMENT  03/16/2017    Dual Chamber    CERVICAL RIB RESECTION Bilateral     CERVICAL RIB RESECTION Bilateral 1963    bilateral cervical ribs removed - extra ribs located and causing pain    CHOLECYSTECTOMY      COLONOSCOPY      CORONARY ARTERY BYPASS GRAFT  2013    THORACIC DECOMPRESSION POSTERIOR FUSION  06/04/2014    L3-5 & S1    UPPER GASTROINTESTINAL ENDOSCOPY        Allergies:  No Known Allergies  Home Meds:  Current Meds:     Current Facility-Administered Medications:     acetaminophen (TYLENOL) tablet 650 mg, 650 mg, Oral, Q4H PRN, Tatiana Gilliland MD, 650 mg at 04/04/25 2048    sennosides-docusate (PERICOLACE) 8.6-50 MG per tablet 2 tablet, 2 tablet, Oral, BID PRN, 2 tablet at 04/05/25 0034 **AND** polyethylene glycol (MIRALAX) packet 17 g, 17 g, Oral, Daily PRN, 17 g at 04/06/25 2257 **AND** bisacodyl (DULCOLAX) EC tablet 5 mg, 5 mg, Oral, Daily PRN **AND** bisacodyl (DULCOLAX) suppository 10 mg, 10 mg, Rectal, Daily PRN, Tatiana Gilliland MD, 10 mg at 04/03/25 1743    hydrALAZINE (APRESOLINE) injection 10 mg, 10 mg, Intravenous, Q6H PRN, Tatiana Gilliland MD, 10 mg at 04/01/25 2345    ipratropium-albuterol (DUO-NEB) nebulizer solution 3 mL, 3 mL, Nebulization, Q4H PRN, Tatiana Gilliland MD    levothyroxine (SYNTHROID, LEVOTHROID) tablet 75 mcg, 75 mcg, Oral, Q AM, Tatiana Gilliland MD, 75 mcg at 04/08/25 0524    Lidocaine 4 % 1 patch, 1 patch,  Transdermal, Q24H, Tatiana Gilliland MD, 1 patch at 25 1015    metoprolol succinate XL (TOPROL-XL) 24 hr tablet 25 mg, 25 mg, Oral, Q24H, Radha Owusu APRN, 25 mg at 25 1015    nitroglycerin (NITROSTAT) SL tablet 0.4 mg, 0.4 mg, Sublingual, Q5 Min PRN, Tatiana Gilliland MD, 0.4 mg at 25 2310    nystatin (MYCOSTATIN) powder, , Topical, Q12H, Tatiana Gilliland MD, Given at 25 0825    ondansetron (ZOFRAN) injection 4 mg, 4 mg, Intravenous, Q6H PRN, Tatiana Gilliland MD, 4 mg at 25 2240    oxyCODONE (ROXICODONE) immediate release tablet 10 mg, 10 mg, Oral, Q4H PRN, Zoey Acosta APRN, 10 mg at 25 1453    [START ON 2025] predniSONE (DELTASONE) tablet 20 mg, 20 mg, Oral, Daily With Breakfast, Zoey Acosta APRN    pregabalin (LYRICA) capsule 75 mg, 75 mg, Oral, Q12H, Tatiana Gilliland MD, 75 mg at 25 1015    sertraline (ZOLOFT) tablet 100 mg, 100 mg, Oral, Nightly, Jose Martin Aleman MD, 100 mg at 25 194    sodium chloride 0.9 % flush 10 mL, 10 mL, Intravenous, Q12H, Tatiana Gilliland MD, 10 mL at 25 1017    sodium chloride 0.9 % flush 10 mL, 10 mL, Intravenous, PRN, Tatiana Gilliland MD    sodium chloride 0.9 % infusion 40 mL, 40 mL, Intravenous, PRAMARILIS, Tatiana Gilliland MD  Social History:   Social History     Tobacco Use    Smoking status: Former     Current packs/day: 0.00     Average packs/day: 1 pack/day for 8.0 years (8.0 ttl pk-yrs)     Types: Cigarettes     Start date: 1977     Quit date: 1985     Years since quittin.2     Passive exposure: Past    Smokeless tobacco: Never    Tobacco comments:     Quit 1985   Substance Use Topics    Alcohol use: Never     Comment: 6 drinks a year in social settings      Family History:  Family History   Problem Relation Age of Onset    Colon cancer Mother     Colon cancer Brother     Heart attack Brother     Colon cancer Daughter               ROS  All other systems are negative         Objective:     Physical Exam  /89 (BP Location: Left  "arm, Patient Position: Sitting)   Pulse 70   Temp 97.1 °F (36.2 °C) (Skin)   Resp 16   Ht 152.4 cm (60\")   Wt 77.1 kg (170 lb)   LMP  (LMP Unknown)   SpO2 98%   BMI 33.20 kg/m²   General:  Appears in no acute distress  Eyes: Sclera is anicteric,  conjunctiva is clear   HEENT:  No JVD.  No carotid bruits  Respiratory: Respirations regular and unlabored at rest.  Clear to auscultation  Cardiovascular: S1,S2, irregularly irregular rate and rhythm  Extremities: No digital clubbing or cyanosis, no edema  Skin: Color pink. Skin warm and dry to touch. No rashes  No xanthoma  Neuro: Alert and awake.    Lab Reviewed:         Evgeny Gregory MD  4/8/2025 15:24 EDT      EMR Dragon/Transcription:   \"Dictated utilizing Dragon dictation\".        "

## 2025-04-08 NOTE — ACP (ADVANCE CARE PLANNING)
Patient reports to have completed AD forms, but needs to update. Education over HCR and POST forms. Gave patient a copy of both forms. Patient to contact if needing further assistance.    Chaplain Jose J Sanchez

## 2025-04-08 NOTE — PROCEDURES
PREOPERATIVE DIAGNOSIS:    1. Cervical radiculopathy    POSTOPERATIVE DIAGNOSIS:  Same.    PROCEDURE PERFORMED: Cervical MICHELLE T1-T2     PROCEDURE IN DETAIL:    Written informed consent was taken from the patient. Pre-procedure blood pressure and pulse were stable and recorded in patients clinic chart. The patient was brought to the procedure room and placed in the prone position on fluoroscopy table. The patient’s neck was prepped with chloraprep and draped in the usual sterile fashion.   The skin over the T1-T2 space was identified under fluoroscopic guidance and infiltrated with 1% lidocaine for local anesthesia via 25 gauge needle.  A 17 gauge Tuohy needle was inserted through the skin under fluoroscopic guidance until we got to the epidural space with loss of resistance technique.  Negative for CSF and heme.  2 ml of omnipaque contrast was injected under continuous fluoroscopic guidance and good spread was noted from C5-7 space bilaterally. 10 mg of dexamethasone mixed with 4 ml of preservative free normal saline was injected with minimal pressure. The needle was cleared with preservative free normal saline and removed. Band aid was applied.  Following the procedure the patient's vital signs were stable. The patient was discharged home in good condition after being given discharge instructions.    COMPLICATIONS: None    Navneet Gary DO  Pain Management   [facility]

## 2025-04-08 NOTE — PLAN OF CARE
Goal Outcome Evaluation:         Patient complained of pain this shift, see MAR. Patient stable at this time.

## 2025-04-08 NOTE — NURSING NOTE
Attempted to call report to [facility], no nurse available to take report. Phone number given for them to return call.

## 2025-04-08 NOTE — PLAN OF CARE
Goal Outcome Evaluation:              Outcome Evaluation: Pt. discharging to [facility]

## 2025-04-08 NOTE — DISCHARGE INSTRUCTIONS

## 2025-04-08 NOTE — PROGRESS NOTES
CHIEF COMPLAINT  Neck pain with bilateral upper extremity radiculopathy      Subjective   Domonique See is a 85 y.o. female who  presented to ED on 3/28/2025 with increased neck pain with upper extremity radiculopathy.  History of cervical and lumbar decompression fusion about decade ago with worsening to history of neck pain as well as upper extremity pain worse on the right side.  Denies any difficulty using her hands or dropping things.  Also has worsening balance issues and some feelings of her legs giving out.  No bowel bladder incontinence.  Neurosurgery has evaluated patient and recommended CSI.  Unfortunately due to her advanced age not a good candidate for decompression.  She has been off of Eliquis for at least 3 days and off of Lovenox for 24 hours    Neck pain with radiculopathy to left shoulder and right arm. R>L.    PMH:   CHF, CAD, DVT, HOCM, pacemaker, hypertension, peptic ulcer, persistent A-fib, lumbar decompression posterior fusion L3-5 and S1, ACDF and fusion C3-7.    Current Medications:         Past Medications:    Past Modalities:  TENS:       no          Physical Therapy Within The Last 6 Months     no  Psychotherapy     no  Massage Therapy      no    Patient Complains Of:  Uro-Fecal Incontinence no  Weight Gain/Loss  no  Fever/Chills   no  Weakness   yes      PEG Assessment   What number best describes your pain on average in the past week?8  What number best describes how, during the past week, pain has interfered with your enjoyment of life?8  What number best describes how, during the past week, pain has interfered with your general activity?  8    PHQ-9 Depression Screening  Little interest or pleasure in doing things?     Feeling down, depressed, or hopeless?     PHQ-2 Total Score     Trouble falling or staying asleep, or sleeping too much?     Feeling tired or having little energy?     Poor appetite or overeating?     Feeling bad about yourself - or that you are a failure or have  let yourself or your family down?     Trouble concentrating on things, such as reading the newspaper or watching television?     Moving or speaking so slowly that other people could have noticed? Or the opposite - being so fidgety or restless that you have been moving around a lot more than usual?     Thoughts that you would be better off dead, or of hurting yourself in some way?     PHQ-9 Total Score     If you checked off any problems, how difficult have these problems made it for you to do your work, take care of things at home, or get along with other people?             [unfilled]        Current Facility-Administered Medications:     acetaminophen (TYLENOL) tablet 650 mg, 650 mg, Oral, Q4H PRN, Tatiana Gilliland MD, 650 mg at 04/04/25 2048    sennosides-docusate (PERICOLACE) 8.6-50 MG per tablet 2 tablet, 2 tablet, Oral, BID PRN, 2 tablet at 04/05/25 0034 **AND** polyethylene glycol (MIRALAX) packet 17 g, 17 g, Oral, Daily PRN, 17 g at 04/06/25 2257 **AND** bisacodyl (DULCOLAX) EC tablet 5 mg, 5 mg, Oral, Daily PRN **AND** bisacodyl (DULCOLAX) suppository 10 mg, 10 mg, Rectal, Daily PRN, Tatiana Gilliland MD, 10 mg at 04/03/25 1743    dexAMETHasone sodium phosphate injection 10 mg, 10 mg, Epidural, Once, Navneet Gary,     hydrALAZINE (APRESOLINE) injection 10 mg, 10 mg, Intravenous, Q6H PRN, Tatiana Gilliland MD, 10 mg at 04/01/25 2345    HYDROmorphone (DILAUDID) tablet 1 mg, 1 mg, Oral, Q6H PRN, Zoey Acosta APRN    iopamidol (ISOVUE-M 200) injection 41%, 3 mL, Injection, Once in imaging, Navneet Gary, DO    ipratropium-albuterol (DUO-NEB) nebulizer solution 3 mL, 3 mL, Nebulization, Q4H PRN, Tatiana Gilliland MD    levothyroxine (SYNTHROID, LEVOTHROID) tablet 75 mcg, 75 mcg, Oral, Q AM, Tatiana Gilliland MD, 75 mcg at 04/08/25 0524    Lidocaine 4 % 1 patch, 1 patch, Transdermal, Q24H, Tatiana Gilliland MD, 1 patch at 04/08/25 1015    metoprolol succinate XL (TOPROL-XL) 24 hr tablet 25 mg, 25 mg, Oral, Q24H, Leezer, Radha L,  APRN, 25 mg at 04/08/25 1015    nitroglycerin (NITROSTAT) SL tablet 0.4 mg, 0.4 mg, Sublingual, Q5 Min PRN, Tatiana Gilliland MD, 0.4 mg at 04/01/25 2310    nystatin (MYCOSTATIN) powder, , Topical, Q12H, Tatiana Gilliland MD, Given at 04/05/25 0825    ondansetron (ZOFRAN) injection 4 mg, 4 mg, Intravenous, Q6H PRN, Tatiana Gilliland MD, 4 mg at 04/01/25 2240    predniSONE (DELTASONE) tablet 40 mg, 40 mg, Oral, Daily, Tatiana Gilliland MD, 40 mg at 04/08/25 1015    pregabalin (LYRICA) capsule 75 mg, 75 mg, Oral, Q12H, Tatiana Gilliland MD, 75 mg at 04/08/25 1015    sertraline (ZOLOFT) tablet 100 mg, 100 mg, Oral, Nightly, Jose Martin Aleman MD, 100 mg at 04/07/25 1941    sodium chloride 0.9 % flush 10 mL, 10 mL, Intravenous, Q12H, Tatiana Gilliland MD, 10 mL at 04/08/25 1017    sodium chloride 0.9 % flush 10 mL, 10 mL, Intravenous, PRN, Tatiana Gilliland MD    sodium chloride 0.9 % infusion 40 mL, 40 mL, Intravenous, PRN, Tatiana Gilliland MD    The following portions of the patient's history were reviewed and updated as appropriate: allergies, current medications, past family history, past medical history, past social history, past surgical history, and problem list.      REVIEW OF PERTINENT MEDICAL DATA    Past Medical History:   Diagnosis Date    Anemia     CHF (congestive heart failure)     Congenital heart disease     Coronary artery disease 2013    Deep vein thrombosis (DVT) 11/20/2017    GERD (gastroesophageal reflux disease)     Heart murmur Don't know    Hx of hypertrophic cardiomyopathy     Hypertension     Peptic ulceration     Persistent atrial fibrillation 01/10/2023    Primary osteoarthritis of both knees 02/08/2021    Primary osteoarthritis of right knee 09/21/2020    Sleep apnea 2017     Past Surgical History:   Procedure Laterality Date    ABDOMINAL HERNIA REPAIR      ABLATION OF DYSRHYTHMIC FOCUS  01-    BACK SURGERY      BREAST AUGMENTATION      CARDIAC CATHETERIZATION      CARDIAC ELECTROPHYSIOLOGY PROCEDURE N/A 01/16/2023     "Procedure: Ablation atrial fibrillation and flutter, cryo Duong and Ramón aware;  Surgeon: Leeanne Pleitez MD;  Location: [facility] CATH INVASIVE LOCATION;  Service: Cardiovascular;  Laterality: N/A;    CARDIAC PACEMAKER PLACEMENT  2017    Dual Chamber    CERVICAL RIB RESECTION Bilateral     CERVICAL RIB RESECTION Bilateral 1963    bilateral cervical ribs removed - extra ribs located and causing pain    CHOLECYSTECTOMY      COLONOSCOPY      CORONARY ARTERY BYPASS GRAFT  2013    THORACIC DECOMPRESSION POSTERIOR FUSION  2014    L3-5 & S1    UPPER GASTROINTESTINAL ENDOSCOPY       Family History   Problem Relation Age of Onset    Colon cancer Mother     Colon cancer Brother     Heart attack Brother     Colon cancer Daughter      Social History     Socioeconomic History    Marital status:    Tobacco Use    Smoking status: Former     Current packs/day: 0.00     Average packs/day: 1 pack/day for 8.0 years (8.0 ttl pk-yrs)     Types: Cigarettes     Start date: 1977     Quit date: 1985     Years since quittin.2     Passive exposure: Past    Smokeless tobacco: Never    Tobacco comments:     Quit    Vaping Use    Vaping status: Never Used   Substance and Sexual Activity    Alcohol use: Never     Comment: 6 drinks a year in social settings    Drug use: Never    Sexual activity: Not Currently     Partners: Male         Review of Systems   Musculoskeletal:  Positive for arthralgias, back pain, neck pain and neck stiffness.         Vitals:    25 0700 25 1015 25 1207 25 1311   BP: 135/68 129/61 125/67 112/53   BP Location: Left arm  Left arm    Patient Position: Lying  Sitting Sitting   Pulse: 71 71 74 86   Resp: 11  13 16   Temp: 97.5 °F (36.4 °C)  97.9 °F (36.6 °C) 97.1 °F (36.2 °C)   TempSrc: Oral  Oral Skin   SpO2: 95%  95% 96%   Weight:    77.1 kg (170 lb)   Height:    152.4 cm (60\")         Objective   Physical Exam  Musculoskeletal:         General: Tenderness " present.        Arms:            Imaging Reviewed:  Cervical MRI-3/29/2025  - Changes of C6 corpectomy and ACDF and fusion C3 7  - Moderate discogenic changes at C2-3 and C7-T1  Advanced discogenic changes at T1 to  C2-3 there is severe bilateral facet arthropathy, moderate osteophytes and severe bilateral neuroforaminal stenosis  C3-4-moderate bilateral facet arthropathy, moderate bilateral neuroforaminal stenosis  C4-5-moderate facet arthropathy  C5-C6-moderate bilateral facet arthropathy  C6-7-moderate bilateral facet arthropathy, moderate right and severe left uncovertebral hypertrophy with severe left neuroforaminal stenosis and moderate right  C7-T1-moderate facet arthropathy, ligamentum flavum infolding, anterolisthesis of C7 on T1 causing severe spinal canal stenosis and moderate bilateral neuroforaminal stenosis.    Lumbar MRI without contrast-3/31/2025  - Prior fusion L4-5  - L1-2-moderate disc bulge, moderate facet changes, moderate bilateral neuroforaminal narrowing  L2-3-moderate disc bulge, moderate facet arthritis with ligamentum flavum hypertrophy causing moderate central canal stenosis and moderate bilateral neuroforaminal narrowing  L3-4-moderate disc bulge, moderate facet arthritis causing moderate central canal narrowing and moderate to severe bilateral neuroforaminal narrowing  L4-5-postop changes  5 S1-moderate disc bulge, moderate facet arthritis with ligamentum flavum hypertrophy causing moderate central canal stenosis.  Moderate bilateral neuroforaminal narrowing.    MRI thoracic spine-3/29/2025  - Mild to moderate multilevel degenerative changes of thoracic spine.    Bilateral knee x-ray-tricompartmental degenerative osteoarthritis of both knees and small joint effusion.  Mild to moderate degenerative changes of hip joints    Assessment:    1. Cervical radiculopathy    2. DDD (degenerative disc disease), cervical    3. Neural foraminal stenosis of cervical spine         Plan:   1. Patient  has pain in the neck with radicular pain in the arm, patient has failed conservative therapy including PT and pharmacological management for more than 6 weeks and pain interferes with activities of daily living. Spurling’s test is positive. MRI shows severe central canal stenosis at C7-T1 discussed cervical MICHELLE T1-2  (R)due to hardware C3-7 obstructing view for the epidural space posteriorly. Discussed the possibility of infection, bleeding, nerve damage, post dural puncture headache, increased pain, paraplegia. Patient understands and agrees.   Patient is off of Eliquis for more than 3 days and off of Lovenox for 24 hours.  No restriction to activities after the procedure.  Okay to restart PT and OT.  Okay to restar Lovenox 12 hours after the procedure.  Okay to restart Eliquis 24 hours after procedure.  2.  She can follow-up with me outpatient for lower back pain issues if needed.      Navneet Gary DO  Pain Management   [facility]         INSPECT REPORT    As part of the patient's treatment plan, I may be prescribing controlled substances. The patient has been made aware of appropriate use of such medications, including potential risk of somnolence, limited ability to drive and/or work safely, and the potential for dependence or overdose. It has also been made clear that these medications are for use by this patient only, without concomitant use of alcohol or other substances unless prescribed.     Patient has completed prescribing agreement detailing terms of continued prescribing of controlled substances, including monitoring INSPECT reports, urine drug screening, and pill counts if necessary. The patient is aware that inappropriate use will results in cessation of prescribing such medications.    INSPECT report has been reviewed and scanned into the patient's chart.      EMR Dragon/Transcription Disclaimer:   Much of this encounter note is an electronic transcription/translation of spoken language to  printed text. The electronic translation of spoken language may permit erroneous, or at times, nonsensical words or phrases to be inadvertently transcribed; Although I have reviewed the note for such errors, some may still exist.

## 2025-04-08 NOTE — CONSULTS
CHIEF COMPLAINT  Neck pain with bilateral arm radiculopathy right more than left      Subjective   Domonique See is a 85 y.o. female who presented to ED on 3/20/2025 with increased neck pain with upper extremity radiculopathy right greater than left.  History of cervical and lumbar decompression and fusion about decade ago with worsening history of neck pain as well as upper extremity pain.  Denies any difficulty using her hands or dropping things.  Also has worsening balance issues and some feelings of her legs giving out.  No bowel or bladder incontinence.  Neurosurgery has evaluated patient and recommended DMITRIY.  Unfortunately due to her advanced age she is not a good candidate for decompression.  She has been off of Eliquis for at least 3 days and off of Lovenox for 24 hours.    Neck pain with radiculopathy to left shoulder and right arm.      PMH:   CHF, CVD, DVT, hokum, pacemaker, hypertension, peptic ulcer, persistent A-fib, lumbar decompression posterior fusion L3-5 and S1, ACDF C3-7    Current Medications:         Past Medications:    Past Modalities:  TENS:       no          Physical Therapy Within The Last 6 Months     yes  Psychotherapy     no  Massage Therapy      no    Patient Complains Of:  Uro-Fecal Incontinence no  Weight Gain/Loss  no  Fever/Chills   no  Weakness   no      PEG Assessment   What number best describes your pain on average in the past week?5  What number best describes how, during the past week, pain has interfered with your enjoyment of life?5  What number best describes how, during the past week, pain has interfered with your general activity?  5`    PHQ-9 Depression Screening  Little interest or pleasure in doing things?     Feeling down, depressed, or hopeless?     PHQ-2 Total Score     Trouble falling or staying asleep, or sleeping too much?     Feeling tired or having little energy?     Poor appetite or overeating?     Feeling bad about yourself - or that you are a failure  or have let yourself or your family down?     Trouble concentrating on things, such as reading the newspaper or watching television?     Moving or speaking so slowly that other people could have noticed? Or the opposite - being so fidgety or restless that you have been moving around a lot more than usual?     Thoughts that you would be better off dead, or of hurting yourself in some way?     PHQ-9 Total Score     If you checked off any problems, how difficult have these problems made it for you to do your work, take care of things at home, or get along with other people?             [unfilled]        Current Facility-Administered Medications:     acetaminophen (TYLENOL) tablet 650 mg, 650 mg, Oral, Q4H PRN, Tatiana Gilliland MD, 650 mg at 04/04/25 2048    sennosides-docusate (PERICOLACE) 8.6-50 MG per tablet 2 tablet, 2 tablet, Oral, BID PRN, 2 tablet at 04/05/25 0034 **AND** polyethylene glycol (MIRALAX) packet 17 g, 17 g, Oral, Daily PRN, 17 g at 04/06/25 2257 **AND** bisacodyl (DULCOLAX) EC tablet 5 mg, 5 mg, Oral, Daily PRN **AND** bisacodyl (DULCOLAX) suppository 10 mg, 10 mg, Rectal, Daily PRN, Tatiana Gilliland MD, 10 mg at 04/03/25 1743    hydrALAZINE (APRESOLINE) injection 10 mg, 10 mg, Intravenous, Q6H PRN, Tatiana Gilliland MD, 10 mg at 04/01/25 2345    HYDROmorphone (DILAUDID) tablet 1 mg, 1 mg, Oral, Q6H PRN, oZey Acosta APRN    ipratropium-albuterol (DUO-NEB) nebulizer solution 3 mL, 3 mL, Nebulization, Q4H PRN, Tatiana Gilliland MD    levothyroxine (SYNTHROID, LEVOTHROID) tablet 75 mcg, 75 mcg, Oral, Q AM, Tatiana Gilliland MD, 75 mcg at 04/08/25 0524    Lidocaine 4 % 1 patch, 1 patch, Transdermal, Q24H, Tatiana Gilliland MD, 1 patch at 04/08/25 1015    metoprolol succinate XL (TOPROL-XL) 24 hr tablet 25 mg, 25 mg, Oral, Q24H, Radha Owusu APRN, 25 mg at 04/08/25 1015    nitroglycerin (NITROSTAT) SL tablet 0.4 mg, 0.4 mg, Sublingual, Q5 Min PRN, Tatiana Gilliland MD, 0.4 mg at 04/01/25 2310    nystatin (MYCOSTATIN)  powder, , Topical, Q12H, Tatiana Gilliland MD, Given at 04/05/25 0825    ondansetron (ZOFRAN) injection 4 mg, 4 mg, Intravenous, Q6H PRN, Tatiana Gilliland MD, 4 mg at 04/01/25 2240    predniSONE (DELTASONE) tablet 40 mg, 40 mg, Oral, Daily, Tatiana Gilliland MD, 40 mg at 04/08/25 1015    pregabalin (LYRICA) capsule 75 mg, 75 mg, Oral, Q12H, Tatiana Gilliland MD, 75 mg at 04/08/25 1015    sertraline (ZOLOFT) tablet 100 mg, 100 mg, Oral, Nightly, Jose Martin Aleman MD, 100 mg at 04/07/25 1941    sodium chloride 0.9 % flush 10 mL, 10 mL, Intravenous, Q12H, Tatiana Gilliland MD, 10 mL at 04/08/25 1017    sodium chloride 0.9 % flush 10 mL, 10 mL, Intravenous, PRN, Tatiana Gilliland MD    sodium chloride 0.9 % infusion 40 mL, 40 mL, Intravenous, PRN, Tatiana Gilliland MD    The following portions of the patient's history were reviewed and updated as appropriate: allergies, current medications, past family history, past medical history, past social history, past surgical history, and problem list.      REVIEW OF PERTINENT MEDICAL DATA    Past Medical History:   Diagnosis Date    Anemia     CHF (congestive heart failure)     Congenital heart disease     Coronary artery disease 2013    Deep vein thrombosis (DVT) 11/20/2017    GERD (gastroesophageal reflux disease)     Heart murmur Don't know    Hx of hypertrophic cardiomyopathy     Hypertension     Peptic ulceration     Persistent atrial fibrillation 01/10/2023    Primary osteoarthritis of both knees 02/08/2021    Primary osteoarthritis of right knee 09/21/2020    Sleep apnea 2017     Past Surgical History:   Procedure Laterality Date    ABDOMINAL HERNIA REPAIR      ABLATION OF DYSRHYTHMIC FOCUS  01-    BACK SURGERY      BREAST AUGMENTATION      CARDIAC CATHETERIZATION      CARDIAC ELECTROPHYSIOLOGY PROCEDURE N/A 01/16/2023    Procedure: Ablation atrial fibrillation and flutter, cryo Duong and Ramón aware;  Surgeon: Leeanne Pleitez MD;  Location: [facility] INVASIVE LOCATION;  Service:  "Cardiovascular;  Laterality: N/A;    CARDIAC PACEMAKER PLACEMENT  2017    Dual Chamber    CERVICAL RIB RESECTION Bilateral     CERVICAL RIB RESECTION Bilateral 1963    bilateral cervical ribs removed - extra ribs located and causing pain    CHOLECYSTECTOMY      COLONOSCOPY      CORONARY ARTERY BYPASS GRAFT  2013    THORACIC DECOMPRESSION POSTERIOR FUSION  2014    L3-5 & S1    UPPER GASTROINTESTINAL ENDOSCOPY       Family History   Problem Relation Age of Onset    Colon cancer Mother     Colon cancer Brother     Heart attack Brother     Colon cancer Daughter      Social History     Socioeconomic History    Marital status:    Tobacco Use    Smoking status: Former     Current packs/day: 0.00     Average packs/day: 1 pack/day for 8.0 years (8.0 ttl pk-yrs)     Types: Cigarettes     Start date: 1977     Quit date: 1985     Years since quittin.2     Passive exposure: Past    Smokeless tobacco: Never    Tobacco comments:     Quit    Vaping Use    Vaping status: Never Used   Substance and Sexual Activity    Alcohol use: Never     Comment: 6 drinks a year in social settings    Drug use: Never    Sexual activity: Not Currently     Partners: Male         Review of Systems   Musculoskeletal:  Positive for arthralgias, back pain, neck pain and neck stiffness.         Vitals:    25 1015 25 1207 25 1311 25 1324   BP: 129/61 125/67 112/53    BP Location:  Left arm     Patient Position:  Sitting Sitting    Pulse: 71 74 86 77   Resp:  13 16    Temp:  97.9 °F (36.6 °C) 97.1 °F (36.2 °C)    TempSrc:  Oral Skin    SpO2:  95% 96% 97%   Weight:   77.1 kg (170 lb)    Height:   152.4 cm (60\")          Objective   Physical Exam  Musculoskeletal:         General: Tenderness present.        Arms:            Imaging Reviewed:  Cervical MRI-3/29/2025  - Changes of C6 corpectomy and ACDF and fusion C3 7  - Moderate discogenic changes at C2-3 and C7-T1  Advanced discogenic changes at T1 " to  C2-3 there is severe bilateral facet arthropathy, moderate osteophytes and severe bilateral neuroforaminal stenosis  C3-4-moderate bilateral facet arthropathy, moderate bilateral neuroforaminal stenosis  C4-5-moderate facet arthropathy  C5-C6-moderate bilateral facet arthropathy  C6-7-moderate bilateral facet arthropathy, moderate right and severe left uncovertebral hypertrophy with severe left neuroforaminal stenosis and moderate right  C7-T1-moderate facet arthropathy, ligamentum flavum infolding, anterolisthesis of C7 on T1 causing severe spinal canal stenosis and moderate bilateral neuroforaminal stenosis.    Lumbar MRI without contrast-3/31/2025  - Prior fusion L4-5  - L1-2-moderate disc bulge, moderate facet changes, moderate bilateral neuroforaminal narrowing  L2-3-moderate disc bulge, moderate facet arthritis with ligamentum flavum hypertrophy causing moderate central canal stenosis and moderate bilateral neuroforaminal narrowing  L3-4-moderate disc bulge, moderate facet arthritis causing moderate central canal narrowing and moderate to severe bilateral neuroforaminal narrowing  L4-5-postop changes  5 S1-moderate disc bulge, moderate facet arthritis with ligamentum flavum hypertrophy causing moderate central canal stenosis.  Moderate bilateral neuroforaminal narrowing.    MRI thoracic spine-3/29/2025  - Mild to moderate multilevel degenerative changes of thoracic spine.    Bilateral knee x-ray-tricompartmental degenerative osteoarthritis of both knees and small joint effusion.  Mild to moderate degenerative changes of hip joints    Assessment:    1. Cervical radiculopathy    2. DDD (degenerative disc disease), cervical    3. Neural foraminal stenosis of cervical spine         Plan:   1. Patient has pain in the neck with radicular pain in the arm, patient has failed conservative therapy including PT and pharmacological management for more than 6 weeks and pain interferes with activities of daily living.  Spurling’s test is positive. MRI shows severe canal stenosis at C7-T1.  Discussed cervical epidural T1-T2 on right side due to hardware at C3 7 obstructing view of the epidural space posteriorly.  Discussed c Discussed the possibility of infection, bleeding, nerve damage, post dural puncture headache, increased pain, paraplegia. Patient understands and agrees.  Patient has been off of Eliquis for more than 3 days and off of Lovenox for more than 24 hours.  2. No restrictions with activities after the procedure.  Okay to restart PT and OT.  Okay to restart Lovenox 12 hours after procedure and okay to restart Eliquis 24-hour after procedure.  3.  She can follow-up with me outpatient for lower back pain issues if needed.      Navneet Gary DO  Pain Management   [facility]         INSPECT REPORT    As part of the patient's treatment plan, I may be prescribing controlled substances. The patient has been made aware of appropriate use of such medications, including potential risk of somnolence, limited ability to drive and/or work safely, and the potential for dependence or overdose. It has also been made clear that these medications are for use by this patient only, without concomitant use of alcohol or other substances unless prescribed.     Patient has completed prescribing agreement detailing terms of continued prescribing of controlled substances, including monitoring INSPECT reports, urine drug screening, and pill counts if necessary. The patient is aware that inappropriate use will results in cessation of prescribing such medications.    INSPECT report has been reviewed and scanned into the patient's chart.      EMR Dragon/Transcription Disclaimer:   Much of this encounter note is an electronic transcription/translation of spoken language to printed text. The electronic translation of spoken language may permit erroneous, or at times, nonsensical words or phrases to be inadvertently transcribed; Although I have  reviewed the note for such errors, some may still exist.

## 2025-04-08 NOTE — SIGNIFICANT NOTE
04/08/25 1625   OTHER   Discipline physical therapist   Rehab Time/Intention   Session Not Performed other (see comments)  (pt d/c pending)   Recommendation   PT - Next Appointment 04/09/25

## 2025-04-08 NOTE — DISCHARGE SUMMARY
Date of Discharge:  4/8/2025    Discharge Diagnosis:   Cervical stenosis of spinal canal  Neck and bilateral arm pain  Spondylolisthesis, acquired  Cervical radiculopathy  UTI  Pacemaker  Paroxysmal atrial fibrillation  Deep vein thrombosis (DVT)  Hypertrophic obstructive cardiomyopathy  Obesity (BMI 30.0-34.9)  Coronary artery disease    Presenting Problem/History of Present Illness  Active Hospital Problems    Diagnosis  POA    **Cervical stenosis of spinal canal [M48.02]  Yes    Chest pain, atypical [R07.89]  Unknown    Neck pain [M54.2]  Yes    Acute urinary tract infection [N39.0]  Yes    Obesity (BMI 30.0-34.9) [E66.811]  Yes    Pacemaker [Z95.0]  Yes    Paroxysmal atrial fibrillation [I48.0]  Yes    Deep vein thrombosis (DVT) [I82.409]  Yes    Hypertrophic obstructive cardiomyopathy [I42.1]  Yes    Spondylolisthesis, acquired [M43.10]  Yes    Cervical radiculopathy [M54.12]  Yes    Coronary artery disease [I25.10]  Yes      Resolved Hospital Problems   No resolved problems to display.          Hospital Course    Patient is a 85 y.o. female presented with known degenerative disc disease, spinal stenosis, osteoarthritis who presents with worsening of neck pain with neuropathic symptoms radiating to both arms, right greater than left.  Symptoms have been progressively worse over the last 3 to 4 weeks. She has had previous fusion of C3-7 and L3-4. Neurosurgery followed with the recommendation of patient would benefit from surgical decompression and fusion at this C7. She received an epidural today and will resume eliquis tomorrow evening. She will need to follow up with neurosurgery after she finishes rehab and completes a DEXA scan to discuss future plans.     Procedures Performed    04/08 1342 CERVICAL THORACIC LUMBAR OR COCCYX EPIDURAL BLOCK    Consults:   Consults       Date and Time Order Name Status Description    4/2/2025 11:13 AM Inpatient Cardiology Consult      3/29/2025 10:15 AM Inpatient  Neurosurgery Consult Completed             Pertinent Test Results:    Lab Results (most recent)       Procedure Component Value Units Date/Time    Basic Metabolic Panel [[id number]]  (Normal) Collected: 04/02/25 0059    Specimen: Blood from Arm, Right Updated: 04/02/25 1303     Glucose 99 mg/dL      BUN 16 mg/dL      Creatinine 0.75 mg/dL      Sodium 141 mmol/L      Potassium 3.9 mmol/L      Comment: Specimen hemolyzed.  Result may be falsely elevated.        Chloride 103 mmol/L      CO2 25.3 mmol/L      Calcium 9.2 mg/dL      BUN/Creatinine Ratio 21.3     Anion Gap 12.7 mmol/L      eGFR 77.6 mL/min/1.73     Narrative:      GFR Categories in Chronic Kidney Disease (CKD)      GFR Category          GFR (mL/min/1.73)    Interpretation  G1                     90 or greater         Normal or high (1)  G2                      60-89                Mild decrease (1)  G3a                   45-59                Mild to moderate decrease  G3b                   30-44                Moderate to severe decrease  G4                    15-29                Severe decrease  G5                    14 or less           Kidney failure          (1)In the absence of evidence of kidney disease, neither GFR category G1 or G2 fulfill the criteria for CKD.    eGFR calculation 2021 CKD-EPI creatinine equation, which does not include race as a factor    Magnesium [[id number]]  (Normal) Collected: 04/02/25 0059    Specimen: Blood from Arm, Right Updated: 04/02/25 1303     Magnesium 1.8 mg/dL     TSH Rfx On Abnormal To Free T4 [[id number]]  (Normal) Collected: 04/02/25 0059    Specimen: Blood from Arm, Right Updated: 04/02/25 1302     TSH 1.930 uIU/mL     BNP [[id number]]  (Normal) Collected: 04/02/25 0059    Specimen: Blood from Arm, Right Updated: 04/02/25 1302     proBNP 1,483.0 pg/mL     Narrative:      This assay is used as an aid in the diagnosis of individuals suspected of having heart failure. It can be used as an aid in the diagnosis of  acute decompensated heart failure (ADHF) in patients presenting with signs and symptoms of ADHF to the emergency department (ED). In addition, NT-proBNP of <300 pg/mL indicates ADHF is not likely.    Age Range Result Interpretation  NT-proBNP Concentration (pg/mL:      <50             Positive            >450                   Gray                 300-450                    Negative             <300    50-75           Positive            >900                  Gray                300-900                  Negative            <300      >75             Positive            >1800                  Gray                300-1800                  Negative            <300    High Sensitivity Troponin T 1Hr [[id number]]  (Abnormal) Collected: 04/02/25 0059    Specimen: Blood from Arm, Right Updated: 04/02/25 0128     HS Troponin T 18 ng/L      Troponin T Numeric Delta 1 ng/L      Troponin T % Delta 6    Narrative:      High Sensitive Troponin T Reference Range:  <14.0 ng/L- Negative Female for AMI  <22.0 ng/L- Negative Male for AMI  >=14 - Abnormal Female indicating possible myocardial injury.  >=22 - Abnormal Male indicating possible myocardial injury.   Clinicians would have to utilize clinical acumen, EKG, Troponin, and serial changes to determine if it is an Acute Myocardial Infarction or myocardial injury due to an underlying chronic condition.         High Sensitivity Troponin T [[id number]]  (Abnormal) Collected: 04/01/25 2325    Specimen: Blood from Arm, Right Updated: 04/01/25 2358     HS Troponin T 17 ng/L     Narrative:      High Sensitive Troponin T Reference Range:  <14.0 ng/L- Negative Female for AMI  <22.0 ng/L- Negative Male for AMI  >=14 - Abnormal Female indicating possible myocardial injury.  >=22 - Abnormal Male indicating possible myocardial injury.   Clinicians would have to utilize clinical acumen, EKG, Troponin, and serial changes to determine if it is an Acute Myocardial Infarction or myocardial injury due  to an underlying chronic condition.         Urine Culture - Urine, Urine, Clean Catch [[id number]]  (Abnormal)  (Susceptibility) Collected: 03/28/25 1500    Specimen: Urine, Clean Catch Updated: 03/31/25 1049     Urine Culture >100,000 CFU/mL Escherichia coli    Narrative:      Colonization of the urinary tract without infection is common. Treatment is discouraged unless the patient is symptomatic, pregnant, or undergoing an invasive urologic procedure.    Susceptibility        Escherichia coli      WOOD      Amoxicillin + Clavulanate Susceptible      Ampicillin Intermediate      Ampicillin + Sulbactam Susceptible      Cefazolin (Urine) Susceptible      Cefepime Susceptible      Ceftazidime Susceptible      Ceftriaxone Susceptible      Gentamicin Susceptible      Levofloxacin Susceptible      Nitrofurantoin Susceptible      Piperacillin + Tazobactam Susceptible      Trimethoprim + Sulfamethoxazole Susceptible                           POC Glucose Once [[id number]]  (Abnormal) Collected: 03/30/25 2040    Specimen: Blood Updated: 03/30/25 2042     Glucose 131 mg/dL      Comment: Serial Number: [id number]:  [id number]       TSH [[id number]]  (Normal) Collected: 03/28/25 1436    Specimen: Blood from Arm, Right Updated: 03/30/25 1730     TSH 1.190 uIU/mL     T4, Free [[id number]]  (Normal) Collected: 03/28/25 1436    Specimen: Blood from Arm, Right Updated: 03/30/25 1730     Free T4 1.64 ng/dL     Urinalysis, Microscopic Only - Urine, Clean Catch [[id number]]  (Abnormal) Collected: 03/28/25 1500    Specimen: Urine, Clean Catch Updated: 03/28/25 1549     RBC, UA 6-10 /HPF      WBC, UA 21-50 /HPF      Bacteria, UA 3+ /HPF      Squamous Epithelial Cells, UA 0-2 /HPF      Transitional Epithelial Cells, UA 0-2 /HPF      Hyaline Casts, UA 3-6 /LPF      Mucus, UA Small/1+ /HPF      Methodology Manual Light Microscopy    Comprehensive Metabolic Panel [[id number]]  (Abnormal) Collected: 03/28/25 1436    Specimen: Blood from Arm,  Right Updated: 03/28/25 1518     Glucose 109 mg/dL      BUN 17 mg/dL      Creatinine 0.86 mg/dL      Sodium 136 mmol/L      Potassium 4.1 mmol/L      Chloride 102 mmol/L      CO2 22.5 mmol/L      Calcium 9.8 mg/dL      Total Protein 7.2 g/dL      Albumin 4.5 g/dL      ALT (SGPT) 20 U/L      AST (SGOT) 25 U/L      Alkaline Phosphatase 97 U/L      Total Bilirubin 0.7 mg/dL      Globulin 2.7 gm/dL      A/G Ratio 1.7 g/dL      BUN/Creatinine Ratio 19.8     Anion Gap 11.5 mmol/L      eGFR 65.9 mL/min/1.73     Narrative:      GFR Categories in Chronic Kidney Disease (CKD)      GFR Category          GFR (mL/min/1.73)    Interpretation  G1                     90 or greater         Normal or high (1)  G2                      60-89                Mild decrease (1)  G3a                   45-59                Mild to moderate decrease  G3b                   30-44                Moderate to severe decrease  G4                    15-29                Severe decrease  G5                    14 or less           Kidney failure          (1)In the absence of evidence of kidney disease, neither GFR category G1 or G2 fulfill the criteria for CKD.    eGFR calculation 2021 CKD-EPI creatinine equation, which does not include race as a factor    CK [[id number]]  (Normal) Collected: 03/28/25 1436    Specimen: Blood from Arm, Right Updated: 03/28/25 1518     Creatine Kinase 55 U/L     Magnesium [[id number]]  (Normal) Collected: 03/28/25 1436    Specimen: Blood from Arm, Right Updated: 03/28/25 1518     Magnesium 1.9 mg/dL     Phosphorus [[id number]]  (Normal) Collected: 03/28/25 1436    Specimen: Blood from Arm, Right Updated: 03/28/25 1518     Phosphorus 3.7 mg/dL     Urinalysis With Culture If Indicated - Urine, Clean Catch [[id number]]  (Abnormal) Collected: 03/28/25 1500    Specimen: Urine, Clean Catch Updated: 03/28/25 1515     Color, UA Yellow     Appearance, UA Slightly Cloudy     pH, UA 5.5     Specific Gravity, UA 1.025     Glucose, UA  Negative     Ketones, UA Trace     Bilirubin, UA Small (1+)     Comment: Confirmation testing is unavailable.  A serum bilirubin is recommended for further assessment.        Blood, UA Large (3+)     Protein, UA 30 mg/dL (1+)     Leuk Esterase, UA Small (1+)     Nitrite, UA Positive     Urobilinogen, UA 1.0 E.U./dL    Narrative:      In absence of clinical symptoms, the presence of pyuria, bacteria, and/or nitrites on the urinalysis result does not correlate with infection.    CBC & Differential [[id number]]  (Abnormal) Collected: 03/28/25 1436    Specimen: Blood from Arm, Right Updated: 03/28/25 1447    Narrative:      The following orders were created for panel order CBC & Differential.  Procedure                               Abnormality         Status                     ---------                               -----------         ------                     CBC Auto Differential[[id number]]        Abnormal            Final result                 Please view results for these tests on the individual orders.    CBC Auto Differential [[id number]]  (Abnormal) Collected: 03/28/25 1436    Specimen: Blood from Arm, Right Updated: 03/28/25 1447     WBC 4.36 10*3/mm3      RBC 4.24 10*6/mm3      Hemoglobin 12.6 g/dL      Hematocrit 39.1 %      MCV 92.2 fL      MCH 29.7 pg      MCHC 32.2 g/dL      RDW 12.8 %      RDW-SD 43.4 fl      MPV 11.1 fL      Platelets 182 10*3/mm3      Neutrophil % 62.3 %      Lymphocyte % 22.7 %      Monocyte % 12.6 %      Eosinophil % 1.4 %      Basophil % 0.5 %      Immature Grans % 0.5 %      Neutrophils, Absolute 2.72 10*3/mm3      Lymphocytes, Absolute 0.99 10*3/mm3      Monocytes, Absolute 0.55 10*3/mm3      Eosinophils, Absolute 0.06 10*3/mm3      Basophils, Absolute 0.02 10*3/mm3      Immature Grans, Absolute 0.02 10*3/mm3      nRBC 0.0 /100 WBC     Extra Tubes [[id number]] Collected: 03/28/25 1436    Specimen: Blood from Arm, Right Updated: 03/28/25 1445    Narrative:      The following orders  were created for panel order Extra Tubes.  Procedure                               Abnormality         Status                     ---------                               -----------         ------                     Gold Top - SST[[id number]]                                   Final result               Light Blue Top[[id number]]                                   Final result                 Please view results for these tests on the individual orders.    Gold Top - SST [[id number]] Collected: 03/28/25 1436    Specimen: Blood from Arm, Right Updated: 03/28/25 1445     Extra Tube Hold for add-ons.     Comment: Auto resulted.       Light Blue Top [[id number]] Collected: 03/28/25 1436    Specimen: Blood from Arm, Right Updated: 03/28/25 1445     Extra Tube Hold for add-ons.     Comment: Auto resulted                Results for orders placed during the hospital encounter of 03/21/25    Adult Transthoracic Echo Complete W/ Cont if Necessary Per Protocol    Interpretation Summary    Left ventricular ejection fraction appears to be 61 - 65%.    Left ventricular diastolic function was normal.    The left atrial cavity is moderately dilated.    Left atrial volume is severely increased.    Moderate aortic valve regurgitation is present.    Moderate mitral valve regurgitation is present.    Estimated right ventricular systolic pressure from tricuspid regurgitation is normal (<35 mmHg).       Condition on Discharge:  Stable    Vital Signs  Temp:  [96.9 °F (36.1 °C)-98 °F (36.7 °C)] 97.1 °F (36.2 °C)  Heart Rate:  [61-87] 70  Resp:  [11-18] 16  BP: (104-135)/(53-90) 130/89      Physical Exam  Vitals reviewed.   Constitutional:       Appearance: She is not ill-appearing.   HENT:      Head: Normocephalic.      Right Ear: External ear normal.      Left Ear: External ear normal.      Nose: Nose normal.      Mouth/Throat:      Mouth: Mucous membranes are moist.   Eyes:      General:         Right eye: No discharge.         Left eye: No  discharge.   Cardiovascular:      Rate and Rhythm: Normal rate and regular rhythm.      Pulses: Normal pulses.      Heart sounds: Normal heart sounds.   Pulmonary:      Effort: Pulmonary effort is normal.      Breath sounds: Normal breath sounds.   Abdominal:      Palpations: Abdomen is soft.   Musculoskeletal:         General: Normal range of motion.      Cervical back: Normal range of motion.   Skin:     General: Skin is warm and dry.   Neurological:      Mental Status: She is alert and oriented to person, place, and time.   Psychiatric:         Behavior: Behavior normal.              Discharge Disposition  Rehab Facility or Unit (DC - External)    Discharge Medications     Discharge Medications        New Medications        Instructions Start Date   metoprolol succinate XL 25 MG 24 hr tablet  Commonly known as: TOPROL-XL   25 mg, Oral, Every 24 Hours Scheduled   Start Date: April 9, 2025     nystatin 255004 UNIT/GM powder  Commonly known as: MYCOSTATIN   Topical, Every 12 Hours Scheduled      oxyCODONE 10 MG tablet  Commonly known as: ROXICODONE   10 mg, Oral, Every 4 Hours PRN      polyethylene glycol 17 g packet  Commonly known as: MIRALAX   17 g, Oral, Daily PRN      predniSONE 20 MG tablet  Commonly known as: DELTASONE   20 mg, Oral, Daily With Breakfast   Start Date: April 9, 2025     pregabalin 75 MG capsule  Commonly known as: LYRICA   75 mg, Oral, Every 12 Hours Scheduled      sennosides-docusate 8.6-50 MG per tablet  Commonly known as: PERICOLACE   2 tablets, Oral, Daily             Continue These Medications        Instructions Start Date   apixaban 5 MG tablet tablet  Commonly known as: Eliquis   5 mg, Oral, 2 Times Daily      aspirin 81 MG EC tablet   81 mg, Oral, Nightly      ipratropium-albuterol 0.5-2.5 mg/3 ml nebulizer  Commonly known as: DUO-NEB   3 mL, Nebulization, Every 4 Hours PRN      sertraline 100 MG tablet  Commonly known as: ZOLOFT   1 tablet, Every Evening      Synthroid 75 MCG  tablet  Generic drug: levothyroxine   Take 1 tablet by mouth Every Morning.             Stop These Medications      diclofenac sodium 100 MG 24 hr tablet  Commonly known as: VOTAREN XR     fluticasone 50 MCG/ACT nasal spray  Commonly known as: FLONASE     gabapentin 100 MG capsule  Commonly known as: NEURONTIN     NON FORMULARY     oxyCODONE-acetaminophen 7.5-325 MG per tablet  Commonly known as: PERCOCET              Discharge Diet:   Diet Instructions       Diet: Regular/House Diet; Regular (IDDSI 7); Thin (IDDSI 0)      Discharge Diet: Regular/House Diet    Texture: Regular (IDDSI 7)    Fluid Consistency: Thin (IDDSI 0)            Activity at Discharge:   Activity Instructions       Activity as Tolerated              Follow-up Appointments  Future Appointments   Date Time Provider Department Center   8/28/2025  1:00 PM [facility] DEVICE [facility]   8/28/2025  1:30 PM Evgeny Gregory MD [facility]     Additional Instructions for the Follow-ups that You Need to Schedule       dexa bone density axial   Apr 04, 2025      Dexa Steroids: No   arthritis: No   Dexa Osteoporosis: No   Exam reason: Check for osteoporosis   Release to patient: Routine Release        Discharge Follow-up with PCP   As directed       Currently Documented PCP:    Anish Lew APRN    PCP Phone Number:    [phone]     Follow Up Details: after rehab                Test Results Pending at Discharge  Pending Results       None             WOODY Kaufman  04/08/25  14:32 EDT    Time: Discharge 35  minutes spent face to face, coordination of care and chart review

## 2025-04-09 NOTE — CASE MANAGEMENT/SOCIAL WORK
Case Management Discharge Note      Final Note: [facility]    Provided Post Acute Provider List?: Yes  Post Acute Provider List: Inpatient Rehab  Delivered To: Patient  Method of Delivery: In person    Selected Continued Care - Discharged on 4/8/2025 Admission date: 3/28/2025 - Discharge disposition: Rehab Facility or Unit (DC - External)      Destination Coordination complete.      Service Provider Services Address Phone Fax Patient Preferred    [facility] [address] [phone] --               Transportation Services  W/C Van: Other (AAYUSH sepulveda)    Final Discharge Disposition Code: 62 - inpatient rehab facility

## 2025-04-22 ENCOUNTER — TELEPHONE (OUTPATIENT)
Dept: PAIN MEDICINE | Facility: CLINIC | Age: 85
End: 2025-04-22
Payer: MEDICARE

## 2025-04-22 NOTE — TELEPHONE ENCOUNTER
Caller: NILTON GENAO    Relationship to patient: Other    Best call back number: [phone] (home)     Patient is needing: PATIENTS DAUGHTER IN LAW CALLED TO GET PATIENT SCHEDULED FOR AN EPIDURAL - PLEASE ADVISE PATIENT ON GETTING SCHEDULED

## 2025-04-29 NOTE — PROGRESS NOTES
Subjective   Domonique See is a 85 y.o. female is here for follow up for neck pain and lower back pain. Patient was last seen as inpatient for DMITRIY T1-2 with significant improvement in severe pain.  She still has some neck pain with intermittent radiculopathy to right arm her pain is mostly tolerable.  Her main complaint is lower back with bilateral radiculopathy and weakness in bilateral legs.  Also has numbness and tingling.  She has been working with PT at home.  She is here with her daughter-in-law.  Patient states that she was independent before recent hospitalization.  Currently walking with a walker.    On last visit:     Neck pain is 4/10 on VAS, at maximum is 5/10. Pain is burning in nature. Pain is referred right upper extremity. The pain is intermittent. The pain is improved by DMITRIY. The pain is worse with nothing in particular.    Lower back pain is 6/10 on VAS, at maximum is 7/10. Pain is sharp, stabbing, burning, aching in nature. Pain is referred to bilateral lower extremities. The pain is constant. The pain is improved by nothing. The pain is worse with walking, standing.      Previous Injection:   4/8/2025-DMITRIY T1-T2- significant pain relief burning pain and 60% pain relief.       Hx: Presented to ED on 3/20/2025 with increased neck pain with upper extremity radiculopathy right greater than left. History of cervical and lumbar decompression and fusion about decade ago with worsening history of neck pain as well as upper extremity pain. Denies any difficulty using her hands or dropping things. Also has worsening balance issues and some feelings of her legs giving out. No bowel or bladder incontinence. Neurosurgery has evaluated patient and recommended DMITRIY. Unfortunately due to her advanced age she is not a good candidate for decompression.     PMH:   CHF, CVD, DVT, hokum, pacemaker, hypertension, peptic ulcer, persistent A-fib, lumbar decompression posterior fusion L4-5, ACDF C3-7      Current Medications:   Lyrica 75 mg  Zoloft  Aspirin 81 mg  Eliquis 5 mg        Past Medications:     Past Modalities:  TENS:                                                                          no                                                  Physical Therapy Within The Last 6 Months              yes  Psychotherapy                                                            no  Massage Therapy                                                       no     Patient Complains Of:  Uro-Fecal Incontinence          no  Weight Gain/Loss                   no  Fever/Chills                             no  Weakness                               no        PEG Assessment   What number best describes your pain on average in the past week?5  What number best describes how, during the past week, pain has interfered with your enjoyment of life?5  What number best describes how, during the past week, pain has interfered with your general activity?  5`    Current Outpatient Medications:     apixaban (Eliquis) 5 MG tablet tablet, Take 1 tablet by mouth 2 (Two) Times a Day., Disp: 180 tablet, Rfl: 3    aspirin 81 MG EC tablet, Take 1 tablet by mouth Every Night., Disp: 90 tablet, Rfl: 3    ipratropium-albuterol (DUO-NEB) 0.5-2.5 mg/3 ml nebulizer, Take 3 mL by nebulization Every 4 (Four) Hours As Needed for Wheezing., Disp: 360 mL, Rfl: 1    metoprolol succinate XL (TOPROL-XL) 25 MG 24 hr tablet, Take 1 tablet by mouth Daily., Disp: , Rfl:     nystatin (MYCOSTATIN) 305266 UNIT/GM powder, Apply  topically to the appropriate area as directed Every 12 (Twelve) Hours., Disp: , Rfl:     polyethylene glycol (MIRALAX) 17 g packet, Take 17 g by mouth Daily As Needed (Use if senna-docusate is ineffective)., Disp: , Rfl:     pregabalin (LYRICA) 75 MG capsule, Take 1 capsule by mouth Every 12 (Twelve) Hours., Disp: , Rfl:     sennosides-docusate (PERICOLACE) 8.6-50 MG per tablet, Take 2 tablets by mouth Daily., Disp: , Rfl:     sertraline  (ZOLOFT) 100 MG tablet, Take 1 tablet by mouth Every Evening. Indications: Major Depressive Disorder, Disp: , Rfl:     Synthroid 75 MCG tablet, Take 1 tablet by mouth Every Morning., Disp: , Rfl:     zolpidem (AMBIEN) 10 MG tablet, Take 1 tablet by mouth At Night As Needed for Sleep., Disp: , Rfl:     Ibuprofen 3 %, Gabapentin 10 %, Baclofen 2 %, lidocaine 4 %, Ketamine HCl 4 %, Apply 1-2 g topically to the appropriate area as directed 3 (Three) to 4 (Four) times daily., Disp: 90 g, Rfl: 5    The following portions of the patient's history were reviewed and updated as appropriate: allergies, current medications, past family history, past medical history, past social history, past surgical history, and problem list.      REVIEW OF PERTINENT MEDICAL DATA    Past Medical History:   Diagnosis Date    Anemia     CHF (congestive heart failure)     Congenital heart disease     Coronary artery disease 2013    Deep vein thrombosis (DVT) 11/20/2017    GERD (gastroesophageal reflux disease)     Heart murmur Don't know    Hx of hypertrophic cardiomyopathy     Hypertension     Peptic ulceration     Persistent atrial fibrillation 01/10/2023    Primary osteoarthritis of both knees 02/08/2021    Primary osteoarthritis of right knee 09/21/2020    Sleep apnea 2017     Past Surgical History:   Procedure Laterality Date    ABDOMINAL HERNIA REPAIR      ABLATION OF DYSRHYTHMIC FOCUS  01-    BACK SURGERY      BREAST AUGMENTATION      CARDIAC CATHETERIZATION      CARDIAC ELECTROPHYSIOLOGY PROCEDURE N/A 01/16/2023    Procedure: Ablation atrial fibrillation and flutter, sarthak Watters and Ramón aware;  Surgeon: Leeanne Pleitez MD;  Location: [facility] INVASIVE LOCATION;  Service: Cardiovascular;  Laterality: N/A;    CARDIAC PACEMAKER PLACEMENT  03/16/2017    Dual Chamber    CERVICAL RIB RESECTION Bilateral     CERVICAL RIB RESECTION Bilateral 1963    bilateral cervical ribs removed - extra ribs located and causing pain     CHOLECYSTECTOMY      COLONOSCOPY      CORONARY ARTERY BYPASS GRAFT  2013    THORACIC DECOMPRESSION POSTERIOR FUSION  2014    L3-5 & S1    UPPER GASTROINTESTINAL ENDOSCOPY       Family History   Problem Relation Age of Onset    Colon cancer Mother     Colon cancer Brother     Heart attack Brother     Colon cancer Daughter      Social History     Socioeconomic History    Marital status:    Tobacco Use    Smoking status: Former     Current packs/day: 0.00     Average packs/day: 1 pack/day for 8.0 years (8.0 ttl pk-yrs)     Types: Cigarettes     Start date: 1977     Quit date: 1985     Years since quittin.3     Passive exposure: Past    Smokeless tobacco: Never    Tobacco comments:     Quit    Vaping Use    Vaping status: Never Used   Substance and Sexual Activity    Alcohol use: Never     Comment: 6 drinks a year in social settings    Drug use: Never    Sexual activity: Not Currently     Partners: Male         Review of Systems   Musculoskeletal:  Positive for arthralgias, back pain, neck pain and neck stiffness.         Vitals:    25 1540   BP: 100/59   Pulse: 88   Resp: 16   SpO2: 98%   Weight: 77.1 kg (170 lb)   PainSc: 6          Objective   Physical Exam  Musculoskeletal:         General: Tenderness present.        Arms:         Legs:            Imaging Reviewed:  Cervical MRI-3/29/2025  - Changes of C6 corpectomy and ACDF and fusion C3 7  - Moderate discogenic changes at C2-3 and C7-T1  Advanced discogenic changes at T1 to  C2-3 there is severe bilateral facet arthropathy, moderate osteophytes and severe bilateral neuroforaminal stenosis  C3-4-moderate bilateral facet arthropathy, moderate bilateral neuroforaminal stenosis  C4-5-moderate facet arthropathy  C5-C6-moderate bilateral facet arthropathy  C6-7-moderate bilateral facet arthropathy, moderate right and severe left uncovertebral hypertrophy with severe left neuroforaminal stenosis and moderate right  C7-T1-moderate facet  arthropathy, ligamentum flavum infolding, anterolisthesis of C7 on T1 causing severe spinal canal stenosis and moderate bilateral neuroforaminal stenosis.     Lumbar MRI without contrast-3/31/2025  - Prior fusion L4-5  - L1-2-moderate disc bulge, moderate facet changes, moderate bilateral neuroforaminal narrowing  L2-3-moderate disc bulge, moderate facet arthritis with ligamentum flavum hypertrophy causing moderate central canal stenosis and moderate bilateral neuroforaminal narrowing  L3-4-moderate disc bulge, moderate facet arthritis causing moderate central canal narrowing and moderate to severe bilateral neuroforaminal narrowing  L4-5-postop changes  L5 S1-moderate disc bulge, moderate facet arthritis with ligamentum flavum hypertrophy causing moderate central canal stenosis.  Moderate bilateral neuroforaminal narrowing.     MRI thoracic spine-3/29/2025  - Mild to moderate multilevel degenerative changes of thoracic spine.     Bilateral knee x-ray-tricompartmental degenerative osteoarthritis of both knees and small joint effusion.  Mild to moderate degenerative changes of hip joints    Assessment:    1. Cervical radiculopathy    2. Lumbar radiculopathy    3. Post laminectomy syndrome         Plan:   Recommend continuing physical therapy at home.  Patient has significant neuropathic pain which is somewhat improved with Lyrica.  May consider increasing Lyrica to 100 mg BID.  I would leave it up to PCP as it is prescribed by PCP currently.  Severe stenosis at C7-T1 due to anterolisthesis with significant improvement with DMITRIY T1-2.  May consider repeating in future.  Advised patient that if she has increased bowel or bladder incontinence or more significant weakness, recommend going to ER.  Patient has history of L4-5 fusion with significantly increased lower back pain with bilateral leg radiculopathy.  MRI shows moderate canal stenosis at multiple levels with bilateral neuroforaminal narrowing.  Discussed with  patient that she may benefit from caudal MICHELLE.  Benefits and risks were discussed with patient.  She agreed to proceed.  Continue topical biomed cream with 5% Ketamine. Apply three times per day as needed as needed to painful areas, do not apply to open skin, genitalia or mucosa. Possible side effects may include irritation at the application site- burning, itching, rash and redness or allergic reaction. Patient understands and agrees with the plan.    Navneet Gary DO  Pain Management   [facility]       INSPECT REPORT    As part of the patient's treatment plan, I may be prescribing controlled substances. The patient has been made aware of appropriate use of such medications, including potential risk of somnolence, limited ability to drive and/or work safely, and the potential for dependence or overdose. It has also been made clear that these medications are for use by this patient only, without concomitant use of alcohol or other substances unless prescribed.     Patient has completed prescribing agreement detailing terms of continued prescribing of controlled substances, including monitoring INSPECT reports, urine drug screening, and pill counts if necessary. The patient is aware that inappropriate use will results in cessation of prescribing such medications.    INSPECT report has been reviewed and scanned into the patient's chart.

## 2025-04-30 ENCOUNTER — OFFICE VISIT (OUTPATIENT)
Dept: PAIN MEDICINE | Facility: CLINIC | Age: 85
End: 2025-04-30
Payer: MEDICARE

## 2025-04-30 VITALS
WEIGHT: 170 LBS | OXYGEN SATURATION: 98 % | HEART RATE: 88 BPM | SYSTOLIC BLOOD PRESSURE: 100 MMHG | BODY MASS INDEX: 33.2 KG/M2 | RESPIRATION RATE: 16 BRPM | DIASTOLIC BLOOD PRESSURE: 59 MMHG

## 2025-04-30 DIAGNOSIS — M54.12 CERVICAL RADICULOPATHY: ICD-10-CM

## 2025-04-30 DIAGNOSIS — M54.16 LUMBAR RADICULOPATHY: Primary | ICD-10-CM

## 2025-04-30 DIAGNOSIS — M96.1 POST LAMINECTOMY SYNDROME: ICD-10-CM

## 2025-04-30 RX ORDER — ZOLPIDEM TARTRATE 10 MG/1
10 TABLET ORAL NIGHTLY PRN
COMMUNITY

## 2025-05-05 DIAGNOSIS — M54.16 LUMBAR RADICULOPATHY: ICD-10-CM

## 2025-05-05 DIAGNOSIS — M54.12 CERVICAL RADICULOPATHY: Primary | ICD-10-CM

## 2025-05-06 ENCOUNTER — TELEPHONE (OUTPATIENT)
Dept: PAIN MEDICINE | Facility: CLINIC | Age: 85
End: 2025-05-06
Payer: MEDICARE

## 2025-05-06 DIAGNOSIS — M54.16 LUMBAR RADICULOPATHY: ICD-10-CM

## 2025-05-06 DIAGNOSIS — M54.12 CERVICAL RADICULOPATHY: Primary | ICD-10-CM

## 2025-05-06 NOTE — TELEPHONE ENCOUNTER
Pt is scheduled for a Caudal Thurs 5/8 and pt's daughter wants to be sure you want to move forward with it after reviewing Physical Therapy's notes.    PT notes have been requested- will index them in chart once we receive them.

## 2025-05-14 ENCOUNTER — HOSPITAL ENCOUNTER (OUTPATIENT)
Dept: BONE DENSITY | Facility: HOSPITAL | Age: 85
Discharge: HOME | End: 2025-05-14
Payer: MEDICARE

## 2025-05-14 DIAGNOSIS — M54.12 CERVICAL RADICULOPATHY: ICD-10-CM

## 2025-05-14 PROCEDURE — 77080 DXA BONE DENSITY AXIAL: CPT

## 2025-05-15 NOTE — PROGRESS NOTES
Subjective     Chief Complaint   Patient presents with    Neck Pain         Previous Treatment: Compound cream,aspirin, Epidural block 2025    HPI: Domonique See is a 85 y.o. female who presents to clinic after being seen in the hospital for neck pain and cervical radiculopathy.  Patient had an MRI while in the hospital that shows severe canal stenosis at C7-T1.  She has a prior corpectomy at C6.  Patient underwent an injection outpatient with Dr. Gary at that level and got great relief, but only for short amount of time unfortunately.  She reports 75% betterment.  Patient states that her symptoms have severely gotten worse.  Patient states that her shoulder pain is worse and she states that her neck pain, shoulder pain and underarm pain is very intense.  She reports a burning sensation and it hurts to the touch.  She also reports numbness and pain that radiates from her neck down her right arm.  She does not report any symptoms in her left arm at this time.  Patient was undergoing physical therapy at home for her lower extremities, but has gotten to the point where she is in a wheelchair 2 to not being able to move her legs.  Patient states that 3 weeks ago she was able to use her walker and now she is in a wheelchair.  Patient and her family member state that her left leg drags and her right leg bothers her the most.  Patient states that she has not dropped anything with her right hand but states that it is definitely weaker than her left.  She states that when she is holding things in her right hand she has to be more cognizant.  Patient reports numbness around her anus but denies any vaginal numbness.  She denies any bowel or bladder incontinence at this time        PMH:  Past Medical History:   Diagnosis Date    Anemia     CHF (congestive heart failure)     Congenital heart disease     Coronary artery disease 2013    Deep vein thrombosis (DVT) 11/20/2017    GERD (gastroesophageal reflux disease)      Heart murmur Don't know    Hx of hypertrophic cardiomyopathy     Hypertension     Peptic ulceration     Persistent atrial fibrillation 01/10/2023    Primary osteoarthritis of both knees 02/08/2021    Primary osteoarthritis of right knee 09/21/2020    Sleep apnea 2017         Current Outpatient Medications:     albuterol sulfate  (90 Base) MCG/ACT inhaler, Inhale., Disp: , Rfl:     apixaban (Eliquis) 5 MG tablet tablet, Take 1 tablet by mouth 2 (Two) Times a Day., Disp: 180 tablet, Rfl: 3    aspirin 81 MG EC tablet, Take 1 tablet by mouth Every Night., Disp: 90 tablet, Rfl: 3    atorvastatin (LIPITOR) 40 MG tablet, , Disp: , Rfl:     budesonide-formoterol (Symbicort) 80-4.5 MCG/ACT inhaler, Inhale 2 puffs., Disp: , Rfl:     diclofenac (VOLTAREN) 50 MG EC tablet, Take 2 tablets by mouth 2 (Two) Times a Day., Disp: , Rfl:     esomeprazole (nexIUM) 20 MG capsule, Take 1 capsule by mouth., Disp: , Rfl:     fluticasone (FLONASE) 50 MCG/ACT nasal spray, Administer 1 spray into the nostril(s) as directed by provider., Disp: , Rfl:     gabapentin (NEURONTIN) 300 MG capsule, Take 1 capsule by mouth Daily., Disp: , Rfl:     guaiFENesin (MUCINEX) 600 MG 12 hr tablet, Take 1 tablet by mouth., Disp: , Rfl:     Ibuprofen 3 %, Gabapentin 10 %, Baclofen 2 %, lidocaine 4 %, Ketamine HCl 4 %, Apply 1-2 g topically to the appropriate area as directed 3 (Three) to 4 (Four) times daily., Disp: 90 g, Rfl: 5    ipratropium-albuterol (DUO-NEB) 0.5-2.5 mg/3 ml nebulizer, Take 3 mL by nebulization Every 4 (Four) Hours As Needed for Wheezing., Disp: 360 mL, Rfl: 1    levETIRAcetam (KEPPRA) 250 MG tablet, , Disp: , Rfl:     lisinopril (PRINIVIL,ZESTRIL) 10 MG tablet, Take 0.5 tablets by mouth Daily., Disp: , Rfl:     metoprolol succinate XL (TOPROL-XL) 25 MG 24 hr tablet, Take 1 tablet by mouth Daily., Disp: , Rfl:     nystatin (MYCOSTATIN) 559464 UNIT/GM powder, Apply  topically to the appropriate area as directed Every 12 (Twelve)  Hours., Disp: , Rfl:     oxyCODONE-acetaminophen (PERCOCET)  MG per tablet, Take 1 tablet by mouth Every 4 (Four) Hours As Needed for Moderate Pain., Disp: , Rfl:     polyethylene glycol (MIRALAX) 17 g packet, Take 17 g by mouth Daily As Needed (Use if senna-docusate is ineffective)., Disp: , Rfl:     pregabalin (LYRICA) 75 MG capsule, Take 1 capsule by mouth Every 12 (Twelve) Hours., Disp: , Rfl:     sennosides-docusate (PERICOLACE) 8.6-50 MG per tablet, Take 2 tablets by mouth Daily., Disp: , Rfl:     sertraline (ZOLOFT) 100 MG tablet, Take 1 tablet by mouth Every Evening. Indications: Major Depressive Disorder, Disp: , Rfl:     simvastatin (ZOCOR) 5 MG tablet, Take 2 tablets by mouth., Disp: , Rfl:     Synthroid 75 MCG tablet, Take 1 tablet by mouth Every Morning., Disp: , Rfl:     zolpidem (AMBIEN) 10 MG tablet, Take 1 tablet by mouth At Night As Needed for Sleep., Disp: , Rfl:      No Known Allergies     Past Surgical History:   Procedure Laterality Date    ABDOMINAL HERNIA REPAIR      ABLATION OF DYSRHYTHMIC FOCUS  01-    BACK SURGERY      BREAST AUGMENTATION      CARDIAC CATHETERIZATION      CARDIAC ELECTROPHYSIOLOGY PROCEDURE N/A 01/16/2023    Procedure: Ablation atrial fibrillation and flutter, sarthak Watters and Ramón aware;  Surgeon: Leeanne Pleitez MD;  Location: [facility] INVASIVE LOCATION;  Service: Cardiovascular;  Laterality: N/A;    CARDIAC PACEMAKER PLACEMENT  03/16/2017    Dual Chamber    CERVICAL RIB RESECTION Bilateral     CERVICAL RIB RESECTION Bilateral 1963    bilateral cervical ribs removed - extra ribs located and causing pain    CHOLECYSTECTOMY      COLONOSCOPY      CORONARY ARTERY BYPASS GRAFT  2013    THORACIC DECOMPRESSION POSTERIOR FUSION  06/04/2014    L3-5 & S1    UPPER GASTROINTESTINAL ENDOSCOPY          Family History   Problem Relation Age of Onset    Colon cancer Mother     Colon cancer Brother     Heart attack Brother     Colon cancer Daughter          Social  "Hx:  Social History     Tobacco Use   Smoking Status Former    Current packs/day: 0.00    Average packs/day: 1 pack/day for 8.0 years (8.0 ttl pk-yrs)    Types: Cigarettes    Start date: 1977    Quit date: 1985    Years since quittin.3    Passive exposure: Past   Smokeless Tobacco Never   Tobacco Comments    Quit       Alcohol Use: Not At Risk (3/29/2025)    AUDIT-C     Frequency of Alcohol Consumption: Never     Average Number of Drinks: Patient does not drink     Frequency of Binge Drinking: Never      Social History     Substance and Sexual Activity   Drug Use Never          Review of Systems   Constitutional:  Positive for activity change.   HENT: Negative.     Eyes: Negative.    Respiratory: Negative.     Cardiovascular:  Positive for leg swelling (leg and feet).   Gastrointestinal:  Positive for constipation (anal pain).   Endocrine: Negative.    Genitourinary: Negative.    Musculoskeletal:  Positive for arthralgias, back pain (lump), myalgias, neck pain (throb,ache,burn-arms) and neck stiffness.        Left foot drags and truns inward    Skin: Negative.    Neurological:  Positive for weakness (leg,hip,cant bare weight) and numbness (+tingling arms).        Neck and shoulder under arm popping     Psychiatric/Behavioral:  Positive for sleep disturbance.          Objective     /66   Pulse 62   Resp 18   Ht 152.4 cm (60\")   Wt 77.1 kg (170 lb)   LMP  (LMP Unknown)   SpO2 96%   BMI 33.20 kg/m²    Body mass index is 33.2 kg/m².      Physical Exam  Vitals reviewed.   Musculoskeletal:      Cervical back: Normal range of motion.      Comments: Patient has difficulty moving her lower extremities   Skin:     General: Skin is warm and dry.   Neurological:      General: No focal deficit present.      Mental Status: She is alert.   Psychiatric:         Mood and Affect: Mood normal.          Neurological Exam  Mental Status  Alert.    Motor    Patient has good strength in her upper " extremities  Patient has very little mobility in her lower extremities.  She was able to lift up her left leg.  Unable to move her right leg.    Sensory  Patient reports decrease sensation in her right arm.    Reflexes    Right pathological reflexes: David's absent.  Left pathological reflexes: David's absent.          Results Review  I personally reviewed and interpreted the images from the following studies:     MRI CERVICAL SPINE WO CONTRAST     Date of Exam: 3/31/2025 1:48 PM EDT     Indication: Eval stenosis.     Comparison: CT of the cervical spine from May 28, 2025     Technique:  Routine multiplanar/multisequence sequence images of the cervical spine were obtained without contrast administration.          Findings:  There is anterolisthesis of C7 on T1. The craniocervical junction appears intact. The vertebral body heights are normal. There are changes from C6 corpectomy with anterior cervical discectomy and fusion at C3-C7. There is some artifact from the hardware.   There are moderate discogenic changes at C2-3 and C7-T1. There are advanced discogenic changes at T1-2. The partially visualized posterior fossa contents are unremarkable. The spinal cord appears normal in signal throughout. The prevertebral soft   tissues are unremarkable.     C2-3: Mild disc osteophyte complex eccentric to the left. Severe bilateral facet arthropathy. Moderate bilateral osteophytes. Mild spinal canal stenosis. Severe bilateral neural foraminal stenosis.     C3-4: Small central osteophyte. Moderate bilateral facet arthropathy. Moderate bilateral uncovertebral hypertrophy. No spinal canal stenosis. Moderate bilateral neural foraminal stenosis.     C4-5: Moderate bilateral facet arthropathy. Mild right uncovertebral hypertrophy. No spinal canal stenosis. Mild right neural foraminal stenosis.     C5-6: Small right paracentral osteophyte. Moderate bilateral facet arthropathy. Mild bilateral uncovertebral hypertrophy. Mild  spinal canal stenosis. Mild bilateral neural foraminal stenosis.     C6-7: Mild diffuse osteophyte. Moderate bilateral facet arthropathy. Moderate right and severe left uncovertebral hypertrophy. No spinal canal stenosis. Moderate right and severe left neural foraminal stenosis.     C7-T1: Moderate bilateral facet arthropathy with ligamentum flavum infolding. Small right paracentral osteophyte. Anterolisthesis of C7 on T1. Severe spinal canal stenosis. Moderate bilateral neural foraminal stenosis.     IMPRESSION:  Impression:  1.Changes from C6 corpectomy with C3-C7 ACDF.  2.Multilevel degenerative changes as described above, most prominent at C7-T1 where there is severe spinal canal stenosis.               Assessment & Plan     MDM: Domonique See is a 85 y.o. female who presents back to clinic after being seen in the hospital for neck pain and cervical radiculopathy in her upper extremities.  Patient unfortunately returns to clinic stating that she is feeling worse than before.  Patient states that her symptoms are much worse in her upper extremities and she is now unable to walk.  Patient states that 3 weeks ago she was able to use her walker but is now in a wheelchair.  Her left leg drags but her right leg is more bothersome.    Patient had an MRI of her cervical spine performed while in the hospital that shows severe spinal canal stenosis at C7-T1.  She also has a C6 corpectomy with a prior ACDF at C3-7.    On physical examination patient has good strength in her upper extremities, endorses right-sided sensory deficit and I did not appreciate David's.  In her lower extremities patient is very weak.  She was able to lift up her left leg with some resistance but unable to lift up her right leg.    When we saw the patient in the hospital due to her age and her prior fusion makes difficult to do a simple decompression.  However, given her worsening symptoms I am having her follow-up with  to discuss  surgery.  I told the patient or family that her should her symptoms get worse to please call the office right away.    Patient is to follow-up with  this upcoming Wednesday.    Patient and her family are agreeable to this plan.       Diagnosis Plan   1. Spinal stenosis in cervical region        2. Cervical radiculopathy            No follow-ups on file.      Domonique See  reports that she quit smoking about 40 years ago. Her smoking use included cigarettes. She started smoking about 48 years ago. She has a 8 pack-year smoking history. She has been exposed to tobacco smoke. She has never used smokeless tobacco.       This patient was examined wearing appropriate personal protective equipment.            Pau Hayden PA-C    05/16/25  15:11 EDT      Part of this note may be an electronic transcription/translation of spoken language to printed text using the Dragon Dictation System.

## 2025-05-16 ENCOUNTER — OFFICE VISIT (OUTPATIENT)
Dept: NEUROSURGERY | Facility: CLINIC | Age: 85
End: 2025-05-16
Payer: MEDICARE

## 2025-05-16 VITALS
HEART RATE: 62 BPM | OXYGEN SATURATION: 96 % | WEIGHT: 170 LBS | BODY MASS INDEX: 33.38 KG/M2 | RESPIRATION RATE: 18 BRPM | DIASTOLIC BLOOD PRESSURE: 66 MMHG | SYSTOLIC BLOOD PRESSURE: 109 MMHG | HEIGHT: 60 IN

## 2025-05-16 DIAGNOSIS — M54.12 CERVICAL RADICULOPATHY: ICD-10-CM

## 2025-05-16 DIAGNOSIS — M48.02 SPINAL STENOSIS IN CERVICAL REGION: Primary | ICD-10-CM

## 2025-05-16 RX ORDER — GABAPENTIN 300 MG/1
300 CAPSULE ORAL DAILY
COMMUNITY

## 2025-05-16 RX ORDER — LISINOPRIL 10 MG/1
5 TABLET ORAL DAILY
COMMUNITY

## 2025-05-16 RX ORDER — BUDESONIDE AND FORMOTEROL FUMARATE DIHYDRATE 80; 4.5 UG/1; UG/1
2 AEROSOL RESPIRATORY (INHALATION)
COMMUNITY

## 2025-05-16 RX ORDER — ATORVASTATIN CALCIUM 40 MG/1
TABLET, FILM COATED ORAL
COMMUNITY
Start: 2025-05-12

## 2025-05-16 RX ORDER — OXYCODONE AND ACETAMINOPHEN 10; 325 MG/1; MG/1
1 TABLET ORAL EVERY 4 HOURS PRN
COMMUNITY

## 2025-05-16 RX ORDER — FLUTICASONE PROPIONATE 50 MCG
1 SPRAY, SUSPENSION (ML) NASAL
COMMUNITY

## 2025-05-16 RX ORDER — LEVETIRACETAM 250 MG/1
TABLET ORAL
COMMUNITY
Start: 2025-05-15

## 2025-05-16 RX ORDER — GUAIFENESIN 600 MG/1
600 TABLET, EXTENDED RELEASE ORAL
COMMUNITY

## 2025-05-16 RX ORDER — SIMVASTATIN 5 MG
10 TABLET ORAL
COMMUNITY

## 2025-05-16 RX ORDER — ALBUTEROL SULFATE 90 UG/1
INHALANT RESPIRATORY (INHALATION)
COMMUNITY

## 2025-05-20 NOTE — PROGRESS NOTES
"Subjective   History of Present Illness: Domonique See is a 85 y.o. female is here today for follow-up. Today patient reports continued neck pain into her shoulders.She also reports neck popping.  She was seen in the hospital about 2 months ago.  At that time she was complaining neck pain and some radicular symptoms but was full strength in her lower extremities.  Beginning about 6 weeks ago the patient had declining function in her lower extremities and then she has not been able to stand or walk independently for at least a month.  No bowel or bladder issues.      Chief Complaint   Patient presents with    Neck Pain          Previous treatment:   NSAID'S, Muscle Relaxants, Greater than 6 weeks of physical therapy, Home exercise program, Narcotic's, Rest, Topical anesthetics, Corticosteroids, and Nerve pain medications    Previous neurosurgery:      Previous injections:   4/8/2025-DMITRIY T1-T2- significant pain relief burning pain and 60% pain relief.        The following portions of the patient's history were reviewed and updated as appropriate: allergies, current medications, past family history, past medical history, past social history, past surgical history, and problem list.    Review of Systems   Constitutional:  Positive for activity change.   HENT: Negative.     Eyes: Negative.    Respiratory: Negative.     Cardiovascular: Negative.    Gastrointestinal: Negative.    Endocrine: Negative.    Genitourinary: Negative.    Musculoskeletal:  Positive for arthralgias, myalgias and neck pain (bilateral/shoulders/arms/armpits).   Skin: Negative.    Allergic/Immunologic: Negative.    Neurological:  Positive for weakness (legs/hips) and numbness (tingling/right hand/legs and feet).   Hematological: Negative.    Psychiatric/Behavioral:  Positive for sleep disturbance.        Objective      BP (!) 126/37 (BP Location: Left arm, Patient Position: Sitting)   Pulse 60   Ht 152.4 cm (60\")   Wt 77.1 kg (170 lb)   LMP "  (LMP Unknown)   SpO2 97%   BMI 33.20 kg/m²    Body mass index is 33.2 kg/m².  Vitals:    05/21/25 1226   PainSc: 7          Neurological Exam  Mental Status  Awake, alert and oriented to person, place and time.    Motor   Strength is 5/5 in all four extremities except as noted.  Full strength bilateral upper extremities  IP and quadricep 3 out of 5, dorsiflexion and plantarflexion 2-3 out of 5.    Sensory  Sensory loss from thoracic region through the lower extremity.    Reflexes    Right pathological reflexes: David's absent.  Left pathological reflexes: David's absent.          Assessment & Plan   Independent Review of Radiographic Studies:      I personally reviewed and interpreted the images from the following studies.    DEXA: Consistent with osteoporosis primarily due to low bone density in the forearm    Medical Decision Making:      Domonique See is a 85 y.o. female with a history of C3-7 ACDF and severe adjacent segment disease with spondylolisthesis of C7-T1 and severe central stenosis.  Patient's neurological function has worsened over the course of time and she now has significant weakness and sensory loss in her lower extremities.  I discussed options with the family and patient.  Surgical intervention would require posterior decompression and fusion.  I believe the patient is at high risk of postoperative complications with fusion surgery given her low bone density and advanced age.  Additionally her advanced age puts her at higher risk of perioperative complications.  Family understands that there are significant risks with surgery and that there is no guarantee that the patient will regain any loss of function in her lower extremities following surgery.  Given progressive neurodeficit, I am willing to proceed with surgery despite the risk to prevent complete paraplegia and loss of bowel and bladder function.  The patient would like to consider her options with her family.  We will also  reach out to her cardiologist to see if she would be cleared for surgery.  If the patient is at high risk for surgery from a cardiac perspective, I think this would push the wrist too high and we would not proceed with surgery.  Patient will follow-up with the decision once we have cardiac clearance      Diagnoses and all orders for this visit:    1. Cervical myelopathy (Primary)    2. Spinal stenosis in cervical region    3. Acquired spondylolisthesis of cervical vertebra      No follow-ups on file.    This patient was examined wearing appropriate personal protective equipment.                      Dr. Lake Bernal IV    05/21/25  14:52 EDT

## 2025-05-21 ENCOUNTER — TELEPHONE (OUTPATIENT)
Dept: CARDIOLOGY | Facility: CLINIC | Age: 85
End: 2025-05-21
Payer: MEDICARE

## 2025-05-21 ENCOUNTER — OFFICE VISIT (OUTPATIENT)
Dept: NEUROSURGERY | Facility: CLINIC | Age: 85
End: 2025-05-21
Payer: MEDICARE

## 2025-05-21 VITALS
HEART RATE: 60 BPM | HEIGHT: 60 IN | OXYGEN SATURATION: 97 % | SYSTOLIC BLOOD PRESSURE: 126 MMHG | DIASTOLIC BLOOD PRESSURE: 37 MMHG | BODY MASS INDEX: 33.38 KG/M2 | WEIGHT: 170 LBS

## 2025-05-21 DIAGNOSIS — M43.12 ACQUIRED SPONDYLOLISTHESIS OF CERVICAL VERTEBRA: ICD-10-CM

## 2025-05-21 DIAGNOSIS — M48.02 SPINAL STENOSIS IN CERVICAL REGION: ICD-10-CM

## 2025-05-21 DIAGNOSIS — G95.9 CERVICAL MYELOPATHY: Primary | ICD-10-CM

## 2025-05-21 RX ORDER — PREGABALIN 100 MG/1
200 CAPSULE ORAL 3 TIMES DAILY
Status: ON HOLD | COMMUNITY

## 2025-05-21 NOTE — TELEPHONE ENCOUNTER
FACILITY: [facility]  DR: Dolores  PHONE: [phone]  FAX: [phone] Atten: Marisela  PROCEDURE: cervical spine surgery  SCHEDULED: -  MEDS TO HOLD: ASA and Eliquis

## 2025-05-23 ENCOUNTER — HOSPITAL ENCOUNTER (INPATIENT)
Facility: HOSPITAL | Age: 85
LOS: 9 days | Discharge: NURSING, REHAB OR LONG-TERM CARE | End: 2025-06-05
Attending: EMERGENCY MEDICINE | Admitting: INTERNAL MEDICINE
Payer: MEDICARE

## 2025-05-23 ENCOUNTER — APPOINTMENT (OUTPATIENT)
Dept: CT IMAGING | Facility: HOSPITAL | Age: 85
End: 2025-05-23
Payer: MEDICARE

## 2025-05-23 DIAGNOSIS — K59.00 CONSTIPATION, UNSPECIFIED CONSTIPATION TYPE: ICD-10-CM

## 2025-05-23 DIAGNOSIS — I95.9 HYPOTENSION, UNSPECIFIED HYPOTENSION TYPE: Primary | ICD-10-CM

## 2025-05-23 DIAGNOSIS — M48.00 SPINAL STENOSIS, UNSPECIFIED SPINAL REGION: ICD-10-CM

## 2025-05-23 DIAGNOSIS — M54.12 CERVICAL RADICULOPATHY: ICD-10-CM

## 2025-05-23 DIAGNOSIS — F51.01 PRIMARY INSOMNIA: ICD-10-CM

## 2025-05-23 PROBLEM — G40.909 SEIZURE DISORDER: Status: ACTIVE | Noted: 2025-05-23

## 2025-05-23 LAB
ALBUMIN SERPL-MCNC: 3.6 G/DL (ref 3.5–5.2)
ALBUMIN/GLOB SERPL: 1.8 G/DL
ALP SERPL-CCNC: 94 U/L (ref 39–117)
ALT SERPL W P-5'-P-CCNC: 9 U/L (ref 1–33)
ANION GAP SERPL CALCULATED.3IONS-SCNC: 8.6 MMOL/L (ref 5–15)
AST SERPL-CCNC: 20 U/L (ref 1–32)
BASOPHILS # BLD AUTO: 0.01 10*3/MM3 (ref 0–0.2)
BASOPHILS NFR BLD AUTO: 0.2 % (ref 0–1.5)
BILIRUB SERPL-MCNC: 0.4 MG/DL (ref 0–1.2)
BILIRUB UR QL STRIP: NEGATIVE
BUN SERPL-MCNC: 22 MG/DL (ref 8–23)
BUN/CREAT SERPL: 28.6 (ref 7–25)
CALCIUM SPEC-SCNC: 9.2 MG/DL (ref 8.6–10.5)
CHLORIDE SERPL-SCNC: 104 MMOL/L (ref 98–107)
CLARITY UR: CLEAR
CO2 SERPL-SCNC: 26.4 MMOL/L (ref 22–29)
COLOR UR: YELLOW
CREAT SERPL-MCNC: 0.77 MG/DL (ref 0.57–1)
D-LACTATE SERPL-SCNC: 0.3 MMOL/L (ref 0.3–2)
DEPRECATED RDW RBC AUTO: 45.4 FL (ref 37–54)
EGFRCR SERPLBLD CKD-EPI 2021: 75.2 ML/MIN/1.73
EOSINOPHIL # BLD AUTO: 0.09 10*3/MM3 (ref 0–0.4)
EOSINOPHIL NFR BLD AUTO: 1.4 % (ref 0.3–6.2)
ERYTHROCYTE [DISTWIDTH] IN BLOOD BY AUTOMATED COUNT: 13.4 % (ref 12.3–15.4)
GEN 5 1HR TROPONIN T REFLEX: 16 NG/L
GLOBULIN UR ELPH-MCNC: 2 GM/DL
GLUCOSE SERPL-MCNC: 86 MG/DL (ref 65–99)
GLUCOSE UR STRIP-MCNC: NEGATIVE MG/DL
HCT VFR BLD AUTO: 34.4 % (ref 34–46.6)
HGB BLD-MCNC: 10.8 G/DL (ref 12–15.9)
HGB UR QL STRIP.AUTO: NEGATIVE
HOLD SPECIMEN: NORMAL
IMM GRANULOCYTES # BLD AUTO: 0.84 10*3/MM3 (ref 0–0.05)
IMM GRANULOCYTES NFR BLD AUTO: 12.7 % (ref 0–0.5)
KETONES UR QL STRIP: NEGATIVE
LEUKOCYTE ESTERASE UR QL STRIP.AUTO: NEGATIVE
LYMPHOCYTES # BLD AUTO: 1.18 10*3/MM3 (ref 0.7–3.1)
LYMPHOCYTES NFR BLD AUTO: 17.8 % (ref 19.6–45.3)
MCH RBC QN AUTO: 29.3 PG (ref 26.6–33)
MCHC RBC AUTO-ENTMCNC: 31.4 G/DL (ref 31.5–35.7)
MCV RBC AUTO: 93.2 FL (ref 79–97)
MONOCYTES # BLD AUTO: 0.82 10*3/MM3 (ref 0.1–0.9)
MONOCYTES NFR BLD AUTO: 12.3 % (ref 5–12)
NEUTROPHILS NFR BLD AUTO: 3.7 10*3/MM3 (ref 1.7–7)
NEUTROPHILS NFR BLD AUTO: 55.6 % (ref 42.7–76)
NITRITE UR QL STRIP: NEGATIVE
NRBC BLD AUTO-RTO: 0 /100 WBC (ref 0–0.2)
PH UR STRIP.AUTO: 6 [PH] (ref 5–8)
PLAT MORPH BLD: NORMAL
PLATELET # BLD AUTO: 115 10*3/MM3 (ref 140–450)
PMV BLD AUTO: 12.2 FL (ref 6–12)
POTASSIUM SERPL-SCNC: 4.7 MMOL/L (ref 3.5–5.2)
PROT SERPL-MCNC: 5.6 G/DL (ref 6–8.5)
PROT UR QL STRIP: NEGATIVE
RBC # BLD AUTO: 3.69 10*6/MM3 (ref 3.77–5.28)
RBC MORPH BLD: NORMAL
SODIUM SERPL-SCNC: 139 MMOL/L (ref 136–145)
SP GR UR STRIP: 1.01 (ref 1–1.03)
TROPONIN T % DELTA: -20
TROPONIN T NUMERIC DELTA: -4 NG/L
TROPONIN T SERPL HS-MCNC: 20 NG/L
UROBILINOGEN UR QL STRIP: NORMAL
WBC MORPH BLD: NORMAL
WBC NRBC COR # BLD AUTO: 6.64 10*3/MM3 (ref 3.4–10.8)

## 2025-05-23 PROCEDURE — 99285 EMERGENCY DEPT VISIT HI MDM: CPT

## 2025-05-23 PROCEDURE — 93005 ELECTROCARDIOGRAM TRACING: CPT | Performed by: EMERGENCY MEDICINE

## 2025-05-23 PROCEDURE — 84484 ASSAY OF TROPONIN QUANT: CPT | Performed by: EMERGENCY MEDICINE

## 2025-05-23 PROCEDURE — P9612 CATHETERIZE FOR URINE SPEC: HCPCS

## 2025-05-23 PROCEDURE — G0378 HOSPITAL OBSERVATION PER HR: HCPCS

## 2025-05-23 PROCEDURE — 83605 ASSAY OF LACTIC ACID: CPT

## 2025-05-23 PROCEDURE — 25810000003 SODIUM CHLORIDE 0.9 % SOLUTION: Performed by: EMERGENCY MEDICINE

## 2025-05-23 PROCEDURE — 25010000002 DEXAMETHASONE PER 1 MG: Performed by: INTERNAL MEDICINE

## 2025-05-23 PROCEDURE — 74176 CT ABD & PELVIS W/O CONTRAST: CPT

## 2025-05-23 PROCEDURE — 85007 BL SMEAR W/DIFF WBC COUNT: CPT | Performed by: EMERGENCY MEDICINE

## 2025-05-23 PROCEDURE — 81003 URINALYSIS AUTO W/O SCOPE: CPT | Performed by: EMERGENCY MEDICINE

## 2025-05-23 PROCEDURE — 85025 COMPLETE CBC W/AUTO DIFF WBC: CPT | Performed by: EMERGENCY MEDICINE

## 2025-05-23 PROCEDURE — 25810000003 SODIUM CHLORIDE 0.9 % SOLUTION: Performed by: FAMILY MEDICINE

## 2025-05-23 PROCEDURE — 36415 COLL VENOUS BLD VENIPUNCTURE: CPT

## 2025-05-23 PROCEDURE — 87040 BLOOD CULTURE FOR BACTERIA: CPT | Performed by: FAMILY MEDICINE

## 2025-05-23 PROCEDURE — 80053 COMPREHEN METABOLIC PANEL: CPT | Performed by: EMERGENCY MEDICINE

## 2025-05-23 RX ORDER — METOPROLOL SUCCINATE 25 MG/1
25 TABLET, EXTENDED RELEASE ORAL
Status: DISCONTINUED | OUTPATIENT
Start: 2025-05-24 | End: 2025-05-28

## 2025-05-23 RX ORDER — BISACODYL 10 MG
10 SUPPOSITORY, RECTAL RECTAL DAILY PRN
Status: DISCONTINUED | OUTPATIENT
Start: 2025-05-23 | End: 2025-06-05 | Stop reason: HOSPADM

## 2025-05-23 RX ORDER — POLYETHYLENE GLYCOL 3350 17 G/17G
17 POWDER, FOR SOLUTION ORAL DAILY PRN
Status: DISCONTINUED | OUTPATIENT
Start: 2025-05-23 | End: 2025-06-05 | Stop reason: HOSPADM

## 2025-05-23 RX ORDER — ATORVASTATIN CALCIUM 40 MG/1
40 TABLET, FILM COATED ORAL DAILY
Status: DISCONTINUED | OUTPATIENT
Start: 2025-05-24 | End: 2025-06-05 | Stop reason: HOSPADM

## 2025-05-23 RX ORDER — PREGABALIN 100 MG/1
200 CAPSULE ORAL EVERY 8 HOURS SCHEDULED
Status: DISCONTINUED | OUTPATIENT
Start: 2025-05-23 | End: 2025-06-05 | Stop reason: HOSPADM

## 2025-05-23 RX ORDER — NITROGLYCERIN 0.4 MG/1
0.4 TABLET SUBLINGUAL
Status: DISCONTINUED | OUTPATIENT
Start: 2025-05-23 | End: 2025-06-05 | Stop reason: HOSPADM

## 2025-05-23 RX ORDER — BUDESONIDE AND FORMOTEROL FUMARATE DIHYDRATE 160; 4.5 UG/1; UG/1
2 AEROSOL RESPIRATORY (INHALATION)
Status: DISCONTINUED | OUTPATIENT
Start: 2025-05-23 | End: 2025-05-24

## 2025-05-23 RX ORDER — SODIUM CHLORIDE 0.9 % (FLUSH) 0.9 %
10 SYRINGE (ML) INJECTION EVERY 12 HOURS SCHEDULED
Status: DISCONTINUED | OUTPATIENT
Start: 2025-05-23 | End: 2025-06-05 | Stop reason: HOSPADM

## 2025-05-23 RX ORDER — LISINOPRIL 5 MG/1
5 TABLET ORAL DAILY
Status: DISCONTINUED | OUTPATIENT
Start: 2025-05-24 | End: 2025-06-05 | Stop reason: HOSPADM

## 2025-05-23 RX ORDER — LEVETIRACETAM 250 MG/1
250 TABLET ORAL EVERY 12 HOURS SCHEDULED
Status: DISCONTINUED | OUTPATIENT
Start: 2025-05-23 | End: 2025-05-28

## 2025-05-23 RX ORDER — OXYCODONE HYDROCHLORIDE 5 MG/1
10 TABLET ORAL EVERY 4 HOURS PRN
Refills: 0 | Status: DISCONTINUED | OUTPATIENT
Start: 2025-05-23 | End: 2025-06-05 | Stop reason: HOSPADM

## 2025-05-23 RX ORDER — ASPIRIN 81 MG/1
81 TABLET ORAL NIGHTLY
Status: DISCONTINUED | OUTPATIENT
Start: 2025-05-23 | End: 2025-06-05 | Stop reason: HOSPADM

## 2025-05-23 RX ORDER — LEVOTHYROXINE SODIUM 75 UG/1
75 TABLET ORAL
Status: DISCONTINUED | OUTPATIENT
Start: 2025-05-24 | End: 2025-06-05 | Stop reason: HOSPADM

## 2025-05-23 RX ORDER — ZOLPIDEM TARTRATE 5 MG/1
5 TABLET ORAL NIGHTLY PRN
Status: DISCONTINUED | OUTPATIENT
Start: 2025-05-23 | End: 2025-06-05 | Stop reason: HOSPADM

## 2025-05-23 RX ORDER — SODIUM CHLORIDE 0.9 % (FLUSH) 0.9 %
10 SYRINGE (ML) INJECTION AS NEEDED
Status: DISCONTINUED | OUTPATIENT
Start: 2025-05-23 | End: 2025-06-05 | Stop reason: HOSPADM

## 2025-05-23 RX ORDER — SERTRALINE HYDROCHLORIDE 100 MG/1
100 TABLET, FILM COATED ORAL EVERY EVENING
Status: DISCONTINUED | OUTPATIENT
Start: 2025-05-23 | End: 2025-06-05 | Stop reason: HOSPADM

## 2025-05-23 RX ORDER — SODIUM CHLORIDE 9 MG/ML
40 INJECTION, SOLUTION INTRAVENOUS AS NEEDED
Status: DISCONTINUED | OUTPATIENT
Start: 2025-05-23 | End: 2025-06-05 | Stop reason: HOSPADM

## 2025-05-23 RX ORDER — PANTOPRAZOLE SODIUM 40 MG/1
40 TABLET, DELAYED RELEASE ORAL
Status: DISCONTINUED | OUTPATIENT
Start: 2025-05-24 | End: 2025-05-24

## 2025-05-23 RX ORDER — DEXAMETHASONE SODIUM PHOSPHATE 4 MG/ML
6 INJECTION, SOLUTION INTRA-ARTICULAR; INTRALESIONAL; INTRAMUSCULAR; INTRAVENOUS; SOFT TISSUE ONCE
Status: COMPLETED | OUTPATIENT
Start: 2025-05-23 | End: 2025-05-23

## 2025-05-23 RX ORDER — ACETAMINOPHEN 325 MG/1
650 TABLET ORAL EVERY 4 HOURS PRN
Status: DISCONTINUED | OUTPATIENT
Start: 2025-05-23 | End: 2025-06-05 | Stop reason: HOSPADM

## 2025-05-23 RX ORDER — MIDODRINE HYDROCHLORIDE 5 MG/1
10 TABLET ORAL EVERY 8 HOURS
Status: DISCONTINUED | OUTPATIENT
Start: 2025-05-23 | End: 2025-05-27

## 2025-05-23 RX ORDER — AMOXICILLIN 250 MG
2 CAPSULE ORAL 2 TIMES DAILY
Status: DISCONTINUED | OUTPATIENT
Start: 2025-05-23 | End: 2025-06-05 | Stop reason: HOSPADM

## 2025-05-23 RX ORDER — ONDANSETRON 2 MG/ML
4 INJECTION INTRAMUSCULAR; INTRAVENOUS EVERY 6 HOURS PRN
Status: DISCONTINUED | OUTPATIENT
Start: 2025-05-23 | End: 2025-06-05 | Stop reason: HOSPADM

## 2025-05-23 RX ORDER — IPRATROPIUM BROMIDE AND ALBUTEROL SULFATE 2.5; .5 MG/3ML; MG/3ML
3 SOLUTION RESPIRATORY (INHALATION) EVERY 4 HOURS PRN
Status: DISCONTINUED | OUTPATIENT
Start: 2025-05-23 | End: 2025-05-24

## 2025-05-23 RX ORDER — BISACODYL 5 MG/1
5 TABLET, DELAYED RELEASE ORAL DAILY PRN
Status: DISCONTINUED | OUTPATIENT
Start: 2025-05-23 | End: 2025-06-05 | Stop reason: HOSPADM

## 2025-05-23 RX ADMIN — OXYCODONE 5 MG: 5 TABLET ORAL at 22:46

## 2025-05-23 RX ADMIN — SENNOSIDES AND DOCUSATE SODIUM 2 TABLET: 8.6; 5 TABLET ORAL at 21:21

## 2025-05-23 RX ADMIN — LEVETIRACETAM 250 MG: 250 TABLET, FILM COATED ORAL at 21:21

## 2025-05-23 RX ADMIN — SODIUM CHLORIDE 1000 ML: 9 INJECTION, SOLUTION INTRAVENOUS at 15:07

## 2025-05-23 RX ADMIN — SODIUM CHLORIDE 1000 ML: 900 INJECTION, SOLUTION INTRAVENOUS at 21:20

## 2025-05-23 RX ADMIN — Medication 10 ML: at 21:22

## 2025-05-23 RX ADMIN — MIDODRINE HYDROCHLORIDE 10 MG: 5 TABLET ORAL at 21:21

## 2025-05-23 RX ADMIN — ASPIRIN 81 MG: 81 TABLET, COATED ORAL at 21:21

## 2025-05-23 RX ADMIN — SERTRALINE HYDROCHLORIDE 100 MG: 100 TABLET, FILM COATED ORAL at 21:21

## 2025-05-23 RX ADMIN — APIXABAN 5 MG: 5 TABLET, FILM COATED ORAL at 21:21

## 2025-05-23 RX ADMIN — DEXAMETHASONE SODIUM PHOSPHATE 6 MG: 4 INJECTION, SOLUTION INTRAMUSCULAR; INTRAVENOUS at 19:05

## 2025-05-23 NOTE — H&P
Patient Care Team:  Anish Lew APRN as PCP - General  Evgeny Gregory MD as Consulting Physician (Cardiology)  Leeanne Pleitez MD as Consulting Physician (Cardiology)  Radha Owusu APRN as Nurse Practitioner (Nurse Practitioner)  Kathe Muñoz MD as Consulting Physician (Nephrology)    Chief complaint leg weakness    Subjective     Patient is a 85 y.o. female with cervical spinal stenosis, permanent atrial fib, chronic coronary artery disease and hypertrophic cardiomyopathy who presents with worsening bilateral lower extremity weakness.  She is followed by Dr. Bernal for her cervical disc disease.  Surgery has been considered, but her complicated cardiac history has presented a barrier.  She lives with family, who provides 24 hour care.  Over the last 3 days her mobility has worsened so that she has severe weakness in her legs - can not walk.  She complains of constipation.  No incontinence.      Review of Systems   Constitutional:  Positive for activity change. Negative for appetite change, chills, fatigue and fever.   HENT:  Negative for congestion and facial swelling.    Eyes:  Negative for visual disturbance.   Respiratory:  Negative for cough, shortness of breath, wheezing and stridor.    Cardiovascular:  Negative for chest pain, palpitations and leg swelling.   Gastrointestinal:  Positive for constipation. Negative for abdominal pain, diarrhea and nausea.   Endocrine: Negative for polyuria.   Genitourinary:  Negative for dysuria.   Musculoskeletal:  Positive for arthralgias, gait problem, myalgias and neck pain.   Skin:  Negative for rash and wound.   Neurological:  Negative for light-headedness and numbness.   Psychiatric/Behavioral:  Negative for confusion.           History  Past Medical History:   Diagnosis Date    Anemia     Asthma     CHF (congestive heart failure)     Congenital heart disease     Coronary artery disease 2013    Deep vein thrombosis (DVT) 11/20/2017     GERD (gastroesophageal reflux disease)     Heart murmur Don't know    Hx of hypertrophic cardiomyopathy     Hypertension     Low back pain     Lumbosacral disc disease     Peptic ulceration     Persistent atrial fibrillation 01/10/2023    Primary osteoarthritis of both knees 02/08/2021    Primary osteoarthritis of right knee 09/21/2020    Seizures     Sleep apnea 2017    TIA (transient ischemic attack)      Past Surgical History:   Procedure Laterality Date    ABDOMINAL HERNIA REPAIR      ABLATION OF DYSRHYTHMIC FOCUS  01-    BACK SURGERY      BREAST AUGMENTATION      CARDIAC CATHETERIZATION      CARDIAC ELECTROPHYSIOLOGY PROCEDURE N/A 01/16/2023    Procedure: Ablation atrial fibrillation and flutter, cryo Duong and Ramón aware;  Surgeon: Leeanne Pleitez MD;  Location: [facility] CATH INVASIVE LOCATION;  Service: Cardiovascular;  Laterality: N/A;    CARDIAC PACEMAKER PLACEMENT  03/16/2017    Dual Chamber    CERVICAL RIB RESECTION Bilateral     CERVICAL RIB RESECTION Bilateral 1963    bilateral cervical ribs removed - extra ribs located and causing pain    CHOLECYSTECTOMY      COLONOSCOPY      CORONARY ARTERY BYPASS GRAFT  2013    EPIDURAL BLOCK      NECK SURGERY      SPINAL FUSION      THORACIC DECOMPRESSION POSTERIOR FUSION  06/04/2014    L3-5 & S1    TRIGGER POINT INJECTION      UPPER GASTROINTESTINAL ENDOSCOPY       Family History   Problem Relation Age of Onset    Colon cancer Mother     Heart disease Mother     Hypertension Mother     Arthritis Mother     Cancer Mother     Diabetes Mother     Kidney disease Mother     Colon cancer Brother     Heart attack Brother     Colon cancer Daughter     Arthritis Daughter     Arthritis Father     Hearing loss Father     Arthritis Sister     Hypertension Brother     Arthritis Brother     Cancer Brother     Stroke Brother     Arthritis Son     Arthritis Son     Cancer Sister     Diabetes Sister     Cancer Brother     Hypertension Sister     Kidney disease Sister       Social History     Tobacco Use    Smoking status: Former     Current packs/day: 0.00     Average packs/day: 1 pack/day for 8.0 years (8.0 ttl pk-yrs)     Types: Cigarettes     Start date: 1977     Quit date: 1985     Years since quittin.4     Passive exposure: Past    Smokeless tobacco: Never    Tobacco comments:     Quit    Vaping Use    Vaping status: Never Used   Substance Use Topics    Alcohol use: Never     Comment: 6 drinks a year in social settings    Drug use: Never     Medications Prior to Admission   Medication Sig Dispense Refill Last Dose/Taking    albuterol sulfate  (90 Base) MCG/ACT inhaler Inhale.       apixaban (Eliquis) 5 MG tablet tablet Take 1 tablet by mouth 2 (Two) Times a Day. 180 tablet 3     aspirin 81 MG EC tablet Take 1 tablet by mouth Every Night. 90 tablet 3     atorvastatin (LIPITOR) 40 MG tablet        budesonide-formoterol (Symbicort) 80-4.5 MCG/ACT inhaler Inhale 2 puffs.       diclofenac (VOLTAREN) 50 MG EC tablet Take 2 tablets by mouth 2 (Two) Times a Day.       esomeprazole (nexIUM) 20 MG capsule Take 1 capsule by mouth.       fluticasone (FLONASE) 50 MCG/ACT nasal spray Administer 1 spray into the nostril(s) as directed by provider.       guaiFENesin (MUCINEX) 600 MG 12 hr tablet Take 1 tablet by mouth.       Ibuprofen 3 %, Gabapentin 10 %, Baclofen 2 %, lidocaine 4 %, Ketamine HCl 4 % Apply 1-2 g topically to the appropriate area as directed 3 (Three) to 4 (Four) times daily. 90 g 5     ipratropium-albuterol (DUO-NEB) 0.5-2.5 mg/3 ml nebulizer Take 3 mL by nebulization Every 4 (Four) Hours As Needed for Wheezing. 360 mL 1     levETIRAcetam (KEPPRA) 250 MG tablet        lisinopril (PRINIVIL,ZESTRIL) 10 MG tablet Take 0.5 tablets by mouth Daily.       metoprolol succinate XL (TOPROL-XL) 25 MG 24 hr tablet Take 1 tablet by mouth Daily.       nystatin (MYCOSTATIN) 165583 UNIT/GM powder Apply  topically to the appropriate area as directed Every 12 (Twelve)  Hours.       oxyCODONE-acetaminophen (PERCOCET)  MG per tablet Take 1 tablet by mouth Every 4 (Four) Hours As Needed for Moderate Pain.       polyethylene glycol (MIRALAX) 17 g packet Take 17 g by mouth Daily As Needed (Use if senna-docusate is ineffective).       pregabalin (LYRICA) 100 MG capsule Take 2 capsules by mouth 3 (Three) Times a Day.       sennosides-docusate (PERICOLACE) 8.6-50 MG per tablet Take 2 tablets by mouth Daily.       sertraline (ZOLOFT) 100 MG tablet Take 1 tablet by mouth Every Evening. Indications: Major Depressive Disorder       simvastatin (ZOCOR) 5 MG tablet Take 2 tablets by mouth.       Synthroid 75 MCG tablet Take 1 tablet by mouth Every Morning.       zolpidem (AMBIEN) 10 MG tablet Take 1 tablet by mouth At Night As Needed for Sleep.        Allergies:  Patient has no known allergies.    Objective     Vital Signs  Temp:  [98 °F (36.7 °C)] 98 °F (36.7 °C)  Heart Rate:  [61-79] 74  Resp:  [18] 18  BP: ()/(37-69) 91/67     Physical Exam:      General Appearance:    Alert, cooperative, in no acute distress, overweight, younger than stated age   Head:    Normocephalic, without obvious abnormality, atraumatic   Eyes:            Lids and lashes normal, conjunctivae and sclerae normal, no   icterus, no pallor, corneas clear, PERRLA   Ears:    Ears appear intact with no abnormalities noted   Throat:   No oral lesions, no thrush, oral mucosa moist   Neck:   No adenopathy, supple, trachea midline, no thyromegaly, no   carotid bruit, no JVD   Lungs:     Clear to auscultation,respirations regular, even and                  unlabored    Heart:    Regular rhythm and normal rate, normal S1 and S2, no            murmur, no gallop, no rub, no click   Chest Wall:    No abnormalities observed   Abdomen:     Normal bowel sounds, no masses, no organomegaly, soft        non-tender, non-distended, no guarding, no rebound                tenderness   Extremities: 2+ strength in bilateral lower  extremities, nonpitting edema   Pulses:   Pulses palpable and equal bilaterally   Skin:   No bleeding, bruising or   Lymph nodes:   No palpable adenopathy   Neurologic:   Cranial nerves 2 - 12 grossly intact, sensation intact, DTR       present and equal bilaterally       Results Review:     Imaging Results (Last 24 Hours)       Procedure Component Value Units Date/Time    CT Abdomen Pelvis Without Contrast [[id number]] Collected: 05/23/25 1344     Updated: 05/23/25 1352    Narrative:      CT ABDOMEN PELVIS WO CONTRAST    Date of Exam: 5/23/2025 1:30 PM EDT    Indication: conspitation, pain.    Comparison: None available.    Technique: Axial CT images were obtained of the abdomen and pelvis without the administration of contrast. Sagittal and coronal reconstructions were performed.  Automated exposure control and iterative reconstruction methods were used.      Findings:    Liver: The liver is unremarkable in morphology. Evaluation for focal liver lesions is limited without IV contrast. No biliary dilation is seen.    Gallbladder: Surgically absent.    Pancreas: Unremarkable.    Spleen: Unremarkable.    Adrenal glands: 13 mm low-density nodule of the lateral limb of the left adrenal gland, likely an adenoma. Right adrenal gland is unremarkable.    Genitourinary tract: Kidneys are unremarkable. No hydronephrosis is seen. No urinary tract calculi are seen. The visualized portions of the ureters and urinary bladder appear unremarkable. Limited evaluation of the pelvic organs due to lack of IV   contrast administration.    Gastrointestinal tract: Limited evaluation of the hollow viscera due to lack of IV contrast administration. There is no evidence of bowel obstruction. Moderate stool within the proximal/mid colon.    Appendix: The appendix is borderline prominent in caliber, measuring approximately 8 mm distally. No periappendiceal stranding is seen. This is felt to be within normal limits.    Other findings: No free  air or free fluid is identified. No pathologically enlarged lymph nodes are seen. Vascular calcifications are seen.    Bones and soft tissues: No acute osseous lesion is identified. Bones are demineralized. There are degenerative changes within the spine with grade 1 retrolisthesis at L3-L4. Status post PLIF at L4-L5. Right-sided fat-containing inguinal hernia noted.   Bilateral breast implants are partially visualized.    Lung bases: The visualized lung bases are clear. Cardiac pacemaker leads are noted.      Impression:      Impression:  1.Examination is limited due to lack of IV contrast administration.  2.No acute abnormality identified within the abdomen or pelvis.  3.The appendix is borderline prominent in caliber, measuring approximately 8 mm distally. No periappendiceal stranding is seen. This is felt to be within normal limits.  4.Moderate stool within the proximal/mid colon.  5.13 mm low-density nodule of the lateral limb of the left adrenal gland, likely an adenoma.  6.Additional findings as detailed above.      Electronically Signed: Isaac Pressley MD    5/23/2025 1:50 PM EDT    Workstation ID: [id number]             Lab Results (last 24 hours)       Procedure Component Value Units Date/Time    High Sensitivity Troponin T 1Hr [[id number]]  (Abnormal) Collected: 05/23/25 1511    Specimen: Blood Updated: 05/23/25 1541     HS Troponin T 16 ng/L      Troponin T Numeric Delta -4 ng/L      Troponin T % Delta -20    Narrative:      High Sensitive Troponin T Reference Range:  <14.0 ng/L- Negative Female for AMI  <22.0 ng/L- Negative Male for AMI  >=14 - Abnormal Female indicating possible myocardial injury.  >=22 - Abnormal Male indicating possible myocardial injury.   Clinicians would have to utilize clinical acumen, EKG, Troponin, and serial changes to determine if it is an Acute Myocardial Infarction or myocardial injury due to an underlying chronic condition.         Comprehensive Metabolic Panel [[id number]]   (Abnormal) Collected: 05/23/25 1403    Specimen: Blood Updated: 05/23/25 1437     Glucose 86 mg/dL      BUN 22 mg/dL      Creatinine 0.77 mg/dL      Sodium 139 mmol/L      Potassium 4.7 mmol/L      Chloride 104 mmol/L      CO2 26.4 mmol/L      Calcium 9.2 mg/dL      Total Protein 5.6 g/dL      Albumin 3.6 g/dL      ALT (SGPT) 9 U/L      AST (SGOT) 20 U/L      Alkaline Phosphatase 94 U/L      Total Bilirubin 0.4 mg/dL      Globulin 2.0 gm/dL      A/G Ratio 1.8 g/dL      BUN/Creatinine Ratio 28.6     Anion Gap 8.6 mmol/L      eGFR 75.2 mL/min/1.73     Narrative:      GFR Categories in Chronic Kidney Disease (CKD)              GFR Category          GFR (mL/min/1.73)    Interpretation  G1                    90 or greater        Normal or high (1)  G2                    60-89                Mild decrease (1)  G3a                   45-59                Mild to moderate decrease  G3b                   30-44                Moderate to severe decrease  G4                    15-29                Severe decrease  G5                    14 or less           Kidney failure    (1)In the absence of evidence of kidney disease, neither GFR category G1 or G2 fulfill the criteria for CKD.    eGFR calculation 2021 CKD-EPI creatinine equation, which does not include race as a factor    High Sensitivity Troponin T [[id number]]  (Abnormal) Collected: 05/23/25 1403    Specimen: Blood Updated: 05/23/25 1437     HS Troponin T 20 ng/L     Narrative:      High Sensitive Troponin T Reference Range:  <14.0 ng/L- Negative Female for AMI  <22.0 ng/L- Negative Male for AMI  >=14 - Abnormal Female indicating possible myocardial injury.  >=22 - Abnormal Male indicating possible myocardial injury.   Clinicians would have to utilize clinical acumen, EKG, Troponin, and serial changes to determine if it is an Acute Myocardial Infarction or myocardial injury due to an underlying chronic condition.         CBC & Differential [[id number]]  (Abnormal)  Collected: 05/23/25 1403    Specimen: Blood Updated: 05/23/25 1427    Narrative:      The following orders were created for panel order CBC & Differential.  Procedure                               Abnormality         Status                     ---------                               -----------         ------                     CBC Auto Differential[[id number]]        Abnormal            Final result               Scan Slide[[id number]]                   Normal              Final result                 Please view results for these tests on the individual orders.    CBC Auto Differential [[id number]]  (Abnormal) Collected: 05/23/25 1403    Specimen: Blood Updated: 05/23/25 1427     WBC 6.64 10*3/mm3      RBC 3.69 10*6/mm3      Hemoglobin 10.8 g/dL      Hematocrit 34.4 %      MCV 93.2 fL      MCH 29.3 pg      MCHC 31.4 g/dL      RDW 13.4 %      RDW-SD 45.4 fl      MPV 12.2 fL      Platelets 115 10*3/mm3      Neutrophil % 55.6 %      Lymphocyte % 17.8 %      Monocyte % 12.3 %      Eosinophil % 1.4 %      Basophil % 0.2 %      Immature Grans % 12.7 %      Neutrophils, Absolute 3.70 10*3/mm3      Lymphocytes, Absolute 1.18 10*3/mm3      Monocytes, Absolute 0.82 10*3/mm3      Eosinophils, Absolute 0.09 10*3/mm3      Basophils, Absolute 0.01 10*3/mm3      Immature Grans, Absolute 0.84 10*3/mm3      nRBC 0.0 /100 WBC     Scan Slide [[id number]]  (Normal) Collected: 05/23/25 1403    Specimen: Blood Updated: 05/23/25 1427     RBC Morphology Normal     WBC Morphology Normal     Platelet Morphology Normal    Narrative:      Slide Reviewed      Extra Tubes [[id number]] Collected: 05/23/25 1403    Specimen: Blood, Venous Line Updated: 05/23/25 1415    Narrative:      The following orders were created for panel order Extra Tubes.  Procedure                               Abnormality         Status                     ---------                               -----------         ------                     Gold Top - [facility][[id number]]                                    Final result                 Please view results for these tests on the individual orders.    Gold Top - SST [[id number]] Collected: 05/23/25 1403    Specimen: Blood Updated: 05/23/25 1415     Extra Tube Hold for add-ons.     Comment: Auto resulted.       POC Lactate [[id number]]  (Normal) Collected: 05/23/25 1407    Specimen: Blood Updated: 05/23/25 1409     Lactate 0.3 mmol/L      Comment: Serial Number: [id number]:  [id number]       Urinalysis With Microscopic If Indicated (No Culture) - Urine, Catheter [[id number]]  (Normal) Collected: 05/23/25 1312    Specimen: Urine, Catheter Updated: 05/23/25 1335     Color, UA Yellow     Appearance, UA Clear     pH, UA 6.0     Specific Gravity, UA 1.010     Glucose, UA Negative     Ketones, UA Negative     Bilirubin, UA Negative     Blood, UA Negative     Protein, UA Negative     Leuk Esterase, UA Negative     Nitrite, UA Negative     Urobilinogen, UA 1.0 E.U./dL    Narrative:      Urine microscopic not indicated.             I reviewed the patient's new clinical results.    Assessment & Plan       Cervical neuralgia    Pacemaker    Paroxysmal atrial fibrillation    Cervical radiculopathy    Hypertrophic obstructive cardiomyopathy    Spondylolisthesis, acquired    Obesity (BMI 30.0-34.9)    Coronary artery disease    Neck pain    Seizure disorder    -Progressive leg weakness likely related to worsening of cervical spine disease.  1 dose of IV dexamethasone will be given.  -Neurosurgery to consult to see if she is a surgical candidate  -Cardiology consult for preop clearance  -Home medications for other chronic medical conditions will be continued  CODE STATUS:  Code status (Patient has no pulse and is not breathing):  CPR (Attempt to Resuscitate)  Medical Interventions (Patient has pulse or is breathing):  Full Support  Level of Support Discussed with:  Patient    Admission Status:  I believe this patient meets sedation status    Expected length  of stay:  1 midnights or greater    I discussed the patient's findings and my recommendations with patient.     Tatiana Gilliland MD  05/23/25  18:05 EDT

## 2025-05-23 NOTE — PLAN OF CARE
Problem: Adult Inpatient Plan of Care  Goal: Plan of Care Review  Outcome: Not Progressing  Flowsheets (Taken 5/23/2025 1828)  Progress: no change  Outcome Evaluation: Patient admitted to [facility]. Admission complete. Family supportive and at bedside. Safety measures in place. Call light within reach. Patient able to make needs known. Plan of care ongoing.  Plan of Care Reviewed With:   patient   family  Goal: Patient-Specific Goal (Individualized)  Outcome: Not Progressing  Goal: Absence of Hospital-Acquired Illness or Injury  Outcome: Not Progressing  Goal: Optimal Comfort and Wellbeing  Outcome: Not Progressing  Goal: Readiness for Transition of Care  Outcome: Not Progressing  Intervention: Mutually Develop Transition Plan  Recent Flowsheet Documentation  Taken 5/23/2025 1809 by Jenelle Young, RN  Transportation Anticipated: family or friend will provide  Patient/Family Anticipated Services at Transition:   durable medical equipment   home health care   rehabilitation services  Patient/Family Anticipates Transition to: home with family  Taken 5/23/2025 1805 by Jenelle Young, RN  Equipment Currently Used at Home:   bath bench   wheelchair   Goal Outcome Evaluation:  Plan of Care Reviewed With: patient, family        Progress: no change  Outcome Evaluation: Patient admitted to [facility]. Admission complete. Family supportive and at bedside. Safety measures in place. Call light within reach. Patient able to make needs known. Plan of care ongoing.

## 2025-05-23 NOTE — CASE MANAGEMENT/SOCIAL WORK
Discharge Planning Assessment   [place]     Patient Name: Domonique See  MRN: [id number]  Today's Date: 5/23/2025    Admit Date: 5/23/2025    Plan: Home with son, needs VNA HH, lift, shower chair upper portion tilts back, ramp (If the patient has surgery this plan will change.  Family working on getting a ramp.)   Discharge Needs Assessment       Row Name 05/23/25 1825       Living Environment    People in Home child(su), adult;other relative(s)    Name(s) of People in Home Woodrow(Son) and Corrina(Daughter in Law    Unique Family Situation Moved in with Woodrow and Corrina since Easter.  Pt has her own house but unable to live independently at this time.    Current Living Arrangements home    Potentially Unsafe Housing Conditions none    In the past 12 months has the electric, gas, oil, or water company threatened to shut off services in your home? No    Primary Care Provided by self;child(su)    Provides Primary Care For no one    Family Caregiver if Needed child(su), adult;other relative(s)    Family Caregiver Names Woodrow(Son) and Corrina(Daughter in Law    Quality of Family Relationships helpful;involved;supportive    Able to Return to Prior Arrangements yes       Resource/Environmental Concerns    Resource/Environmental Concerns none    Transportation Concerns other (see comments)  It has become difficult for family to get patient transported in private vehicle due to patient has lost use of legs.       Transportation Needs    In the past 12 months, has lack of transportation kept you from medical appointments or from getting medications? no  Transporting patient has gotten more difficult.  Woodrow picks up his Mom.    In the past 12 months, has lack of transportation kept you from meetings, work, or from getting things needed for daily living? No       Food Insecurity    Within the past 12 months, you worried that your food would run out before you got the money to buy more. Never true    Within the past 12 months, the  food you bought just didn't last and you didn't have money to get more. Never true       Transition Planning    Patient/Family Anticipates Transition to home with family  This may change with the course of this hospitalization.  If pt. has surgery on her back pt will need SNF or Acute Rehab.    Patient/Family Anticipated Services at Transition --  Continue to monitor    Transportation Anticipated other (see comments)  May need a w/c van for transport.       Discharge Needs Assessment    Equipment Currently Used at Home wheelchair;commode    Concerns to be Addressed discharge planning    Anticipated Changes Related to Illness inability to care for self    Equipment Needed After Discharge lift device;ramp;shower chair  Equipment needed if pt. goes home.    Outpatient/Agency/Support Group Needs homecare agency    Discharge Facility/Level of Care Needs home with home health    Provided Post Acute Provider List? Yes    Post Acute Provider List Home Health    Delivered To Patient;Support Person    Support Person Woodrow    Method of Delivery In person    Offered/Gave Vendor List yes    Patient's Choice of Community Agency(s) VNA    Current Discharge Risk chronically ill;dependent with mobility/activities of daily living;physical impairment              Discharge Plan       Row Name 05/23/25 2021       Plan    Plan Home with son, needs VNA HH, lift, shower chair upper portion tilts back, ramp  If the patient has surgery this plan will change.  Family working on getting a ramp.    Patient/Family in Agreement with Plan yes    Provided Post Acute Provider List? Yes    Post Acute Provider List Home Health    Delivered To Patient;Support Person    Support Person Woodrow & Corrina    Method of Delivery In person    Plan Comments CM met with patient at the bedside to review discharge planning. Patient lives at her son's home and is dependent with IADLs. Pt would benefit from being transported by a w/c van.  Confirmed PCP, insurance, and  pharmacy. [facility] was not offiered since they are not open on the weekend or holiday. Patient denies any difficulty affording food, utilities, or medications. Patient is not current with any HHC/PT services. Pt. would like VNA HH.  DC Barriers: Telemetry, Neurosurgery consult, Cardiology consult.             Continued Care and Services - Admitted Since 5/23/2025       Home Medical Care       Service Provider Request Status Services Address Phone Fax Patient Preferred    [facility] Pending - Request Sent -- [id number], [place] [address] [phone] --                   Demographic Summary       Row Name 05/23/25 1820       General Information    Admission Type observation    Arrived From home    Required Notices Provided other (see comments)  Needs MOON-Registration did not give.    Referral Source admission list    Reason for Consult discharge planning    Preferred Language English       Contact Information    Permission Granted to Share Info With               Functional Status       Row Name 05/23/25 1821       Functional Status    Usual Activity Tolerance poor    Current Activity Tolerance poor       Functional Status, IADL    Medications assistive person    Meal Preparation completely dependent    Housekeeping completely dependent    Laundry completely dependent    Shopping completely dependent    IADL Comments Woodrow(Son) and Corrina(Daughter in Law) provides all IADl's for patient due to Spinal Stenosis.              Patient Forms       Row Name 05/23/25 2807       Patient Forms    Important Message from Medicare (IMM) --  NEEDS JONES.             Kym Wells RN    [place] [id number] [facility] [place] [address]  Phone: [phone]  Fax: [phone]

## 2025-05-23 NOTE — DISCHARGE PLACEMENT REQUEST
"Geoffrey See (85 y.o. Female)       Date of Birth   1940    Social Security Number       Address   [address] [place] [facility]    Home Phone   [phone]    MRN   [id number]       Holiness   Tenriism    Marital Status                               Admission Date   5/23/2025    Admission Type   Emergency    Admitting Provider   Tatiana Gilliland MD    Attending Provider   Tatiana Gilliland MD    Department, Room/Bed   [facility] SURGICAL INPATIENT, 4105/1       Discharge Date       Discharge Disposition       Discharge Destination                                 Attending Provider: Tatiana Gilliland MD    Allergies: No Known Allergies    Isolation: None   Infection: None   Code Status: Prior    Ht: 152.4 cm (60\")   Wt: 79.4 kg (175 lb)    Admission Cmt: None   Principal Problem: Cervical neuralgia [M54.12]                   Active Insurance as of 5/23/2025       Primary Coverage       Payor Plan Insurance Group Employer/Plan Group    ANTHEM MEDICARE REPLACEMENT ANTHEM MEDICARE ADVANTAGE PPO [id number]       Payor Plan Address Payor Plan Phone Number Payor Plan Fax Number Effective Dates    [address] [phone]  4/1/2024 - None Entered    [facility] [phone]         Subscriber Name Subscriber Birth Date Member ID       GEOFFREY SEE 1940 [id number]                     Emergency Contacts        (Rel.) Home Phone Work Phone Mobile Phone    Boone See (Son) -- -- [phone]    NILTON SEE (Relative) [phone]    AMADEO SEE (Son) [phone]                "

## 2025-05-23 NOTE — ED PROVIDER NOTES
Subjective   History of Present Illness  Patient is a 84y/o female with a history of spinal stenosis who presents with acute worsening of constipation, rectal bleeding, and burning with urination since last night. She reports sitting on the toilet for prolonged periods (up to four hours at a time) without relief, followed by additional attempts later in the night. She has a history of chronic rectal numbness for approximately three months, which is unchanged. The patient is non-ambulatory with significant lower extremity immobility but retains some sensation. Notably, after prolonged straining, her legs became cold to the touch despite being under multiple blankets, and she experienced a new episode of lower extremity spasm. She also reports chronic burning and aching under both arms, with radiation down the right arm to the fingers and some involvement of the left arm, attributed to cervical spinal stenosis with known cervical bridge deterioration. Last night, her right arm pain acutely worsened, possibly related to increased movement.  She is currently being evaluated for possible surgical intervention for her spinal stenosis, pending cardiology clearance.  Review of Systems  See HPI.  Past Medical History:   Diagnosis Date    Anemia     Asthma     CHF (congestive heart failure)     Congenital heart disease     Coronary artery disease 2013    Deep vein thrombosis (DVT) 11/20/2017    GERD (gastroesophageal reflux disease)     Heart murmur Don't know    Hx of hypertrophic cardiomyopathy     Hypertension     Low back pain     Lumbosacral disc disease     Peptic ulceration     Persistent atrial fibrillation 01/10/2023    Primary osteoarthritis of both knees 02/08/2021    Primary osteoarthritis of right knee 09/21/2020    Seizures     Sleep apnea 2017    TIA (transient ischemic attack)        No Known Allergies    Past Surgical History:   Procedure Laterality Date    ABDOMINAL HERNIA REPAIR      ABLATION OF DYSRHYTHMIC  FOCUS  2023    BACK SURGERY      BREAST AUGMENTATION      CARDIAC CATHETERIZATION      CARDIAC ELECTROPHYSIOLOGY PROCEDURE N/A 2023    Procedure: Ablation atrial fibrillation and flutter, cryo Duong and Ramón aware;  Surgeon: Leeanne Pleitez MD;  Location: [facility] INVASIVE LOCATION;  Service: Cardiovascular;  Laterality: N/A;    CARDIAC PACEMAKER PLACEMENT  2017    Dual Chamber    CERVICAL RIB RESECTION Bilateral     CERVICAL RIB RESECTION Bilateral 1963    bilateral cervical ribs removed - extra ribs located and causing pain    CHOLECYSTECTOMY      COLONOSCOPY      CORONARY ARTERY BYPASS GRAFT      EPIDURAL BLOCK      NECK SURGERY      SPINAL FUSION      THORACIC DECOMPRESSION POSTERIOR FUSION  2014    L3-5 & S1    TRIGGER POINT INJECTION      UPPER GASTROINTESTINAL ENDOSCOPY         Family History   Problem Relation Age of Onset    Colon cancer Mother     Heart disease Mother     Hypertension Mother     Arthritis Mother     Cancer Mother     Diabetes Mother     Kidney disease Mother     Colon cancer Brother     Heart attack Brother     Colon cancer Daughter     Arthritis Daughter     Arthritis Father     Hearing loss Father     Arthritis Sister     Hypertension Brother     Arthritis Brother     Cancer Brother     Stroke Brother     Arthritis Son     Arthritis Son     Cancer Sister     Diabetes Sister     Cancer Brother     Hypertension Sister     Kidney disease Sister        Social History     Socioeconomic History    Marital status:    Tobacco Use    Smoking status: Former     Current packs/day: 0.00     Average packs/day: 1 pack/day for 8.0 years (8.0 ttl pk-yrs)     Types: Cigarettes     Start date: 1977     Quit date: 1985     Years since quittin.4     Passive exposure: Past    Smokeless tobacco: Never    Tobacco comments:     Quit    Vaping Use    Vaping status: Never Used   Substance and Sexual Activity    Alcohol use: Never     Comment: 6 drinks a  "year in social settings    Drug use: Never    Sexual activity: Not Currently     Partners: Male           Objective   Physical Exam  No acute distress. Patient is cooperative, non-ambulatory, and alert. There is bruising on the legs.  Rectal exam reveals external hemorrhoids, evidence of recent bleeding, brown stool present, no prolapse, and no black stool. Rectal tone present.  Decreased mobility in the lower extremities is observed, with some sensation preserved. Doppler exam shows good PT and DP pulses. Lungs are auscultated and clear.  Normocephalic. No scleral icterus. Moist oral mucosa. No observable neck masses on external visualization. No respiratory distress, tachypnea, or increased work of breathing. Normal heart rate. Intact distal pulses. Abdomen soft and nontender without peritoneal signs. Normal speech.  Procedures           ED Course      BP 98/46   Pulse 68   Temp 95.3 °F (35.2 °C) (Rectal)   Resp 13   Ht 152.4 cm (60\")   Wt 79.4 kg (175 lb)   LMP  (LMP Unknown)   SpO2 92%   BMI 34.18 kg/m²   Labs Reviewed   COMPREHENSIVE METABOLIC PANEL - Abnormal; Notable for the following components:       Result Value    Total Protein 5.6 (*)     BUN/Creatinine Ratio 28.6 (*)     All other components within normal limits    Narrative:     GFR Categories in Chronic Kidney Disease (CKD)              GFR Category          GFR (mL/min/1.73)    Interpretation  G1                    90 or greater        Normal or high (1)  G2                    60-89                Mild decrease (1)  G3a                   45-59                Mild to moderate decrease  G3b                   30-44                Moderate to severe decrease  G4                    15-29                Severe decrease  G5                    14 or less           Kidney failure    (1)In the absence of evidence of kidney disease, neither GFR category G1 or G2 fulfill the criteria for CKD.    eGFR calculation 2021 CKD-EPI creatinine equation, which " does not include race as a factor   TROPONIN - Abnormal; Notable for the following components:    HS Troponin T 20 (*)     All other components within normal limits    Narrative:     High Sensitive Troponin T Reference Range:  <14.0 ng/L- Negative Female for AMI  <22.0 ng/L- Negative Male for AMI  >=14 - Abnormal Female indicating possible myocardial injury.  >=22 - Abnormal Male indicating possible myocardial injury.   Clinicians would have to utilize clinical acumen, EKG, Troponin, and serial changes to determine if it is an Acute Myocardial Infarction or myocardial injury due to an underlying chronic condition.        CBC WITH AUTO DIFFERENTIAL - Abnormal; Notable for the following components:    RBC 3.69 (*)     Hemoglobin 10.8 (*)     MCHC 31.4 (*)     MPV 12.2 (*)     Platelets 115 (*)     Lymphocyte % 17.8 (*)     Monocyte % 12.3 (*)     Immature Grans % 12.7 (*)     Immature Grans, Absolute 0.84 (*)     All other components within normal limits   HIGH SENSITIVITIY TROPONIN T 1HR - Abnormal; Notable for the following components:    HS Troponin T 16 (*)     All other components within normal limits    Narrative:     High Sensitive Troponin T Reference Range:  <14.0 ng/L- Negative Female for AMI  <22.0 ng/L- Negative Male for AMI  >=14 - Abnormal Female indicating possible myocardial injury.  >=22 - Abnormal Male indicating possible myocardial injury.   Clinicians would have to utilize clinical acumen, EKG, Troponin, and serial changes to determine if it is an Acute Myocardial Infarction or myocardial injury due to an underlying chronic condition.        URINALYSIS W/ MICROSCOPIC IF INDICATED (NO CULTURE) - Normal    Narrative:     Urine microscopic not indicated.   SCAN SLIDE - Normal    Narrative:     Slide Reviewed     POC LACTATE - Normal   BLOOD CULTURE   BLOOD CULTURE   PROCALCITONIN   POC LACTATE   CBC AND DIFFERENTIAL    Narrative:     The following orders were created for panel order CBC &  Differential.  Procedure                               Abnormality         Status                     ---------                               -----------         ------                     CBC Auto Differential[[id number]]        Abnormal            Final result               Scan Slide[[id number]]                   Normal              Final result                 Please view results for these tests on the individual orders.   EXTRA TUBES    Narrative:     The following orders were created for panel order Extra Tubes.  Procedure                               Abnormality         Status                     ---------                               -----------         ------                     Gold Top - SST[[id number]]                                   Final result                 Please view results for these tests on the individual orders.   GOLD TOP - SST     CT Abdomen Pelvis Without Contrast   Final Result   Impression:   1.Examination is limited due to lack of IV contrast administration.   2.No acute abnormality identified within the abdomen or pelvis.   3.The appendix is borderline prominent in caliber, measuring approximately 8 mm distally. No periappendiceal stranding is seen. This is felt to be within normal limits.   4.Moderate stool within the proximal/mid colon.   5.13 mm low-density nodule of the lateral limb of the left adrenal gland, likely an adenoma.   6.Additional findings as detailed above.         Electronically Signed: Isaac Pressley MD     5/23/2025 1:50 PM EDT     Workstation ID: [id number]                                                         Medical Decision Making  Problems Addressed:  Constipation, unspecified constipation type: complicated acute illness or injury  Hypotension, unspecified hypotension type: complicated acute illness or injury  Spinal stenosis, unspecified spinal region: complicated acute illness or injury    Amount and/or Complexity of Data Reviewed  Labs: ordered.  Radiology:  ordered.  ECG/medicine tests: ordered.    Risk  Prescription drug management.  Decision regarding hospitalization.    EKG Int: 1305, Rate 68, V-paced, good capture    My interpretation of CT abd pelvis is no bowel obstruction, see system for radiology interpretation.    BP initially soft, but improved w/ fluids.  Pt w/ significant weakness, progressing some, but subacutely per pt and family.  UA clean.  Trop negative.  Needs cardiac clearance to consider surgery.  Unclear etiology of hypotension.  Admitted to optum service.    Final diagnoses:   Hypotension, unspecified hypotension type   Constipation, unspecified constipation type   Spinal stenosis, unspecified spinal region       ED Disposition  ED Disposition       ED Disposition   Decision to Admit    Condition   --    Comment   Level of Care: Telemetry [5]   Admitting Physician: ZAIDA DAVIS [[id number]]   Attending Physician: ZAIDA DAVIS [[id number]]                 No follow-up provider specified.       Medication List      No changes were made to your prescriptions during this visit.            Manav Whitt MD  05/23/25 8505

## 2025-05-23 NOTE — LETTER
"    EMS Transport Request  For use at [facility], [place], [place], [place], and [place] only   Patient Name: Domonique See : 1940   Weight:73.4 kg (161 lb 13.1 oz) Pick-up Location: [facility] BLS/ALS: BLS/ALS: BLS   Insurance: [employer] MEDICARE REPLACEMENT Auth End Date: 2025   Pre-Cert #: D/C Summary complete:    Destination: Other [facility] [address]  [place] [address]  ROOM [address]  report to [facility]    Contact Precautions: None   Equipment (O2, Fluids, etc.): O2, settings 1L   Arrive By Date/Time: 2025 1600 Stretcher/WC: Stretcher   CM Requesting: Kaylan Baker RN Ext: [phone]   Notes/Medical Necessity: UNABLE TO AMBULATE, POOR TRUNK CONTROL 02 1L NC ALERT AND ORIENTED      ______________________________________________________________________    *Only 2 patient bags OR 1 carry-on size bag are permitted.  Wheelchairs and walkers CANNOT transported with the patient. Acknowledge: Yes      Physicians Statement of Medical Necessity for  Ambulance Transportation    GENERAL INFORMATION     Name: Domonique See  YOB: 1940  Medicare #:     [id number]     Transport Date: 2025 (Valid for round trips this date, or for scheduled repetitive trips for 60 days from the date signed below.)  Origin: [facility]   Destination: [facility] [address] [place] [address]   Is the Patient's stay covered under Medicare Part A (PPS/DRG?)Yes  Closest appropriate facility? Yes  If this a hosp-hosp transfer? No  Is this a hospice patient? No    MEDICAL NECESSITY QUESTIONAIRE    Ambulance Transportation is medically necessary only if other means of transportation are contraindicated or would be potentially harmful to the patient.  To meet this requirement, the patient must be either \"bed confined\" or suffer from a condition such that transport by means other than an ambulance is contraindicated by the patient's condition.  The following " questions must be answered by the healthcare professional signing below for this form to be valid:     1) Describe the MEDICAL CONDITION (physical and/or mental) of this patient AT THE TIME OF AMBULANCE TRANSPORT that requires the patient to be transported in an ambulance, and why transport by other means is contraindicated by the patient's condition: UNABLE TO AMBULATE,, POOR TRUNK CONTROL  Past Medical History:   Diagnosis Date   • Anemia    • Aortic insufficiency 03/21/2025   • Asthma    • CHF (congestive heart failure)    • Cholecystitis 09/02/2011   • Congenital heart disease    • Coronary artery disease 2013   • Deep vein thrombosis (DVT) 11/20/2017   • GERD (gastroesophageal reflux disease)    • Heart murmur Don't know   • Hx of hypertrophic cardiomyopathy    • Hypertension    • Hypertrophic obstructive cardiomyopathy 11/10/2017   • Low back pain    • Lumbosacral disc disease    • Mitral regurgitation 03/21/2025   • Mixed hyperlipidemia 10/06/2017   • Morbid obesity 09/21/2020   • Peptic ulceration    • Persistent atrial fibrillation 01/10/2023   • Primary osteoarthritis of both knees 02/08/2021   • Primary osteoarthritis of right knee 09/21/2020   • Seizures    • Sleep apnea 2017   • TIA (transient ischemic attack)       Past Surgical History:   Procedure Laterality Date   • ABDOMINAL HERNIA REPAIR     • ABLATION OF DYSRHYTHMIC FOCUS  01-   • BACK SURGERY     • BREAST AUGMENTATION     • CARDIAC CATHETERIZATION     • CARDIAC ELECTROPHYSIOLOGY PROCEDURE N/A 01/16/2023    Procedure: Ablation atrial fibrillation and flutter, sarthak menchaca;  Surgeon: Leeanne Pleitez MD;  Location: [facility] INVASIVE LOCATION;  Service: Cardiovascular;  Laterality: N/A;   • CARDIAC PACEMAKER PLACEMENT  03/16/2017    Dual Chamber   • CERVICAL RIB RESECTION Bilateral    • CERVICAL RIB RESECTION Bilateral 1963    bilateral cervical ribs removed - extra ribs located and causing pain   • CHOLECYSTECTOMY     •  "COLONOSCOPY     • CORONARY ARTERY BYPASS GRAFT  2013   • EPIDURAL BLOCK     • NECK SURGERY     • SPINAL FUSION     • THORACIC DECOMPRESSION POSTERIOR FUSION  06/04/2014    L3-5 & S1   • TRIGGER POINT INJECTION     • UPPER GASTROINTESTINAL ENDOSCOPY        2) Is this patient \"bed confined\" as defined below?Yes   To be \"bed confined\" the patient must satisfy all three of the following criteria:  (1) unable to get up from bed without assistance; AND (2) unable to ambulate;  AND (3) unable to sit in a chair or wheelchair.  3) Can this patient safely be transported by car or wheelchair van (I.e., may safely sit during transport, without an attendant or monitoring?)No   4. In addition to completing questions 1-3 above, please check any of the following conditions that apply*:          *Note: supporting documentation for any boxes checked must be maintained in the patient's medical records Moderate/severe pain on movement, Requires oxygen - unable to self administer, Hemodynamic monitoring required en route, and Other UNABLE TO AMBULATE. POOR TRUNK CONTROL       SIGNATURE OF PHYSICIAN OR OTHER AUTHORIZED HEALTHCARE PROFESSIONAL    I certify that the above information is true and correct based on my evaluation of this patient, and represent that the patient requires transport by ambulance and that other forms of transport are contraindicated.  I understand that this information will be used by the Centers for Medicare and Medicaid Services (CMS) to support the determiniation of medical necessity for ambulance services, and I represent that I have personal knowledge of the patient's condition at the time of transport.    X   If this box is checked, I also certify that the patient is physically or mentally incapable of signing the ambulance service's claim form and that the institution with which I am affiliated has furnished care, services or assistance to the patient.  My signature below is made on behalf of the patient " pursuant to 42 .36(b)(4). In accordance with 42 .37, the specific reason(s) that the patient is physically or mentally incapable of signing the claim for is as follows: NA    Signature of Physician or Healthcare Professional   ZAIDA DAVIS MD/GONZALO NAZARIO RN CASE MANAGER X Date/Time:   6/5/2025 1600     (For Scheduled repetitive transport, this form is not valid for transports performed more than 60 days after this date).                                                                                                                                            --------------------------------------------------------------------------------------------  Printed Name and Credentials of Physician or Authorized Healthcare Professional     *Form must be signed by patient's attending physician for scheduled, repetitive transports,.  For non-repetitive ambulance transports, if unable to obtain the signature of the attending physician, any of the following may sign (please select below):     Physician  Clinical Nurse Specialist  Registered Nurse     Physician Assistant  Discharge Planner  Licensed Practical Nurse     Nurse Practitioner X

## 2025-05-24 PROBLEM — I42.1 HYPERTROPHIC OBSTRUCTIVE CARDIOMYOPATHY: Chronic | Status: RESOLVED | Noted: 2017-11-10 | Resolved: 2025-05-24

## 2025-05-24 PROBLEM — R07.89 CHEST PAIN, ATYPICAL: Status: RESOLVED | Noted: 2025-04-03 | Resolved: 2025-05-24

## 2025-05-24 PROBLEM — I34.0 MITRAL REGURGITATION: Chronic | Status: ACTIVE | Noted: 2025-03-21

## 2025-05-24 PROBLEM — Z01.810 ENCOUNTER FOR PRE-OPERATIVE CARDIOVASCULAR CLEARANCE: Status: ACTIVE | Noted: 2025-05-24

## 2025-05-24 PROBLEM — E66.811 OBESITY (BMI 30.0-34.9): Chronic | Status: ACTIVE | Noted: 2022-06-09

## 2025-05-24 PROBLEM — R00.1 BRADYCARDIA: Status: RESOLVED | Noted: 2019-10-30 | Resolved: 2025-05-24

## 2025-05-24 PROBLEM — E78.2 MIXED HYPERLIPIDEMIA: Status: ACTIVE | Noted: 2017-10-06

## 2025-05-24 PROBLEM — I50.9 CHF EXACERBATION: Status: RESOLVED | Noted: 2023-01-18 | Resolved: 2025-05-24

## 2025-05-24 PROBLEM — R94.30 ABNORMAL RESULT OF CARDIOVASCULAR FUNCTION STUDY: Status: RESOLVED | Noted: 2017-12-04 | Resolved: 2025-05-24

## 2025-05-24 PROBLEM — I48.0 PAROXYSMAL ATRIAL FIBRILLATION: Chronic | Status: ACTIVE | Noted: 2019-10-30

## 2025-05-24 PROBLEM — R41.82 ALTERED MENTAL STATUS, UNSPECIFIED ALTERED MENTAL STATUS TYPE: Status: RESOLVED | Noted: 2023-06-02 | Resolved: 2025-05-24

## 2025-05-24 PROBLEM — I10 ESSENTIAL HYPERTENSION: Chronic | Status: ACTIVE | Noted: 2020-09-21

## 2025-05-24 PROBLEM — I35.1 AORTIC INSUFFICIENCY: Chronic | Status: ACTIVE | Noted: 2025-03-21

## 2025-05-24 PROBLEM — Z95.0 PACEMAKER: Chronic | Status: ACTIVE | Noted: 2019-10-30

## 2025-05-24 PROBLEM — E78.2 MIXED HYPERLIPIDEMIA: Status: RESOLVED | Noted: 2017-10-06 | Resolved: 2025-05-24

## 2025-05-24 PROBLEM — E66.01 MORBID OBESITY: Status: RESOLVED | Noted: 2020-09-21 | Resolved: 2025-05-24

## 2025-05-24 PROBLEM — G40.909 SEIZURE DISORDER: Chronic | Status: ACTIVE | Noted: 2025-05-23

## 2025-05-24 PROBLEM — N39.0 ACUTE URINARY TRACT INFECTION: Status: RESOLVED | Noted: 2025-03-28 | Resolved: 2025-05-24

## 2025-05-24 PROBLEM — I42.1 HYPERTROPHIC OBSTRUCTIVE CARDIOMYOPATHY: Chronic | Status: ACTIVE | Noted: 2017-11-10

## 2025-05-24 LAB
PROCALCITONIN SERPL-MCNC: 0.04 NG/ML (ref 0–0.25)
PROCALCITONIN SERPL-MCNC: 0.04 NG/ML (ref 0–0.25)
QT INTERVAL: 434 MS
QTC INTERVAL: 463 MS

## 2025-05-24 PROCEDURE — 99213 OFFICE O/P EST LOW 20 MIN: CPT

## 2025-05-24 PROCEDURE — 25010000002 ENOXAPARIN PER 10 MG: Performed by: NURSE PRACTITIONER

## 2025-05-24 PROCEDURE — G0378 HOSPITAL OBSERVATION PER HR: HCPCS

## 2025-05-24 PROCEDURE — 99214 OFFICE O/P EST MOD 30 MIN: CPT | Performed by: INTERNAL MEDICINE

## 2025-05-24 PROCEDURE — 87040 BLOOD CULTURE FOR BACTERIA: CPT | Performed by: FAMILY MEDICINE

## 2025-05-24 PROCEDURE — 84145 PROCALCITONIN (PCT): CPT | Performed by: FAMILY MEDICINE

## 2025-05-24 RX ORDER — ENOXAPARIN SODIUM 100 MG/ML
1 INJECTION SUBCUTANEOUS EVERY 12 HOURS
Status: DISCONTINUED | OUTPATIENT
Start: 2025-05-24 | End: 2025-05-29

## 2025-05-24 RX ADMIN — PREGABALIN 200 MG: 100 CAPSULE ORAL at 21:17

## 2025-05-24 RX ADMIN — PREGABALIN 200 MG: 100 CAPSULE ORAL at 06:24

## 2025-05-24 RX ADMIN — Medication 10 ML: at 09:06

## 2025-05-24 RX ADMIN — OXYCODONE 5 MG: 5 TABLET ORAL at 03:06

## 2025-05-24 RX ADMIN — LEVETIRACETAM 250 MG: 250 TABLET, FILM COATED ORAL at 21:17

## 2025-05-24 RX ADMIN — Medication 10 ML: at 21:17

## 2025-05-24 RX ADMIN — METOPROLOL SUCCINATE 25 MG: 25 TABLET, EXTENDED RELEASE ORAL at 09:05

## 2025-05-24 RX ADMIN — OXYCODONE 10 MG: 5 TABLET ORAL at 09:08

## 2025-05-24 RX ADMIN — SENNOSIDES AND DOCUSATE SODIUM 2 TABLET: 8.6; 5 TABLET ORAL at 09:05

## 2025-05-24 RX ADMIN — ENOXAPARIN SODIUM 80 MG: 100 INJECTION SUBCUTANEOUS at 21:17

## 2025-05-24 RX ADMIN — APIXABAN 5 MG: 5 TABLET, FILM COATED ORAL at 09:05

## 2025-05-24 RX ADMIN — PREGABALIN 200 MG: 100 CAPSULE ORAL at 14:56

## 2025-05-24 RX ADMIN — SENNOSIDES AND DOCUSATE SODIUM 2 TABLET: 8.6; 5 TABLET ORAL at 21:17

## 2025-05-24 RX ADMIN — LEVETIRACETAM 250 MG: 250 TABLET, FILM COATED ORAL at 09:05

## 2025-05-24 RX ADMIN — MIDODRINE HYDROCHLORIDE 10 MG: 5 TABLET ORAL at 06:24

## 2025-05-24 RX ADMIN — OXYCODONE 10 MG: 5 TABLET ORAL at 17:55

## 2025-05-24 RX ADMIN — LEVOTHYROXINE SODIUM 75 MCG: 0.07 TABLET ORAL at 06:24

## 2025-05-24 RX ADMIN — SERTRALINE HYDROCHLORIDE 100 MG: 100 TABLET, FILM COATED ORAL at 21:17

## 2025-05-24 RX ADMIN — MIDODRINE HYDROCHLORIDE 10 MG: 5 TABLET ORAL at 21:17

## 2025-05-24 NOTE — PLAN OF CARE
Goal Outcome Evaluation:      C/o neck/shoulder/back pain, medication given per MAR. Last temperature check this morning was 97.8 orally, body warmer-discontinued. Vital signs taken. Moves extremities. No other complaints. SCDs on. Plan of care is ongoing.

## 2025-05-24 NOTE — CONSULTS
[facility] NEUROSURGERY CONSULT NOTE    Patient name: Domonique See  Referring Provider: Tatiana Gilliland MD   Reason for Consultation: Cervical spinal stenosis     Patient Care Team:  Anish Lew APRN as PCP - Evgeny Machuca MD as Consulting Physician (Cardiology)  Leeanne Pleitez MD as Consulting Physician (Cardiology)  Radha Owusu APRN as Nurse Practitioner (Nurse Practitioner)  Kathe Muñoz MD as Consulting Physician (Nephrology)    Chief complaint: Leg weakness     Subjective .     History of present illness:    Patient is a 85 y.o. female who was recently seen by Dr. Bernal in clinic for surgical evaluation who presents to [facility] for worsening leg weakness. The patient was originally seen by our group when she first came to the hospital and then followed up in clinic. She has severe stenosis in her cervical spine causing her to become weak in her legs. At one point she was able to use a walker, but now she is wheelchair bound. When I saw the patient in clinic last week she was able to move her feet and this morning she was unable to.     Review of Systems  Review of Systems   Constitutional:  Positive for activity change.   Musculoskeletal:  Positive for back pain.   Neurological:  Positive for weakness.       History  PAST MEDICAL HISTORY  Past Medical History:   Diagnosis Date    Anemia     Aortic insufficiency 03/21/2025    Asthma     CHF (congestive heart failure)     Cholecystitis 09/02/2011    Congenital heart disease     Coronary artery disease 2013    Deep vein thrombosis (DVT) 11/20/2017    GERD (gastroesophageal reflux disease)     Heart murmur Don't know    Hx of hypertrophic cardiomyopathy     Hypertension     Hypertrophic obstructive cardiomyopathy 11/10/2017    Low back pain     Lumbosacral disc disease     Mitral regurgitation 03/21/2025    Mixed hyperlipidemia 10/06/2017    Morbid obesity 09/21/2020    Peptic ulceration     Persistent atrial  fibrillation 01/10/2023    Primary osteoarthritis of both knees 02/08/2021    Primary osteoarthritis of right knee 09/21/2020    Seizures     Sleep apnea 2017    TIA (transient ischemic attack)        PAST SURGICAL HISTORY  Past Surgical History:   Procedure Laterality Date    ABDOMINAL HERNIA REPAIR      ABLATION OF DYSRHYTHMIC FOCUS  01-    BACK SURGERY      BREAST AUGMENTATION      CARDIAC CATHETERIZATION      CARDIAC ELECTROPHYSIOLOGY PROCEDURE N/A 01/16/2023    Procedure: Ablation atrial fibrillation and flutter, cryo Utong and Ramón aware;  Surgeon: Leeanne Pleitez MD;  Location: [facility] CATH INVASIVE LOCATION;  Service: Cardiovascular;  Laterality: N/A;    CARDIAC PACEMAKER PLACEMENT  03/16/2017    Dual Chamber    CERVICAL RIB RESECTION Bilateral     CERVICAL RIB RESECTION Bilateral 1963    bilateral cervical ribs removed - extra ribs located and causing pain    CHOLECYSTECTOMY      COLONOSCOPY      CORONARY ARTERY BYPASS GRAFT  2013    EPIDURAL BLOCK      NECK SURGERY      SPINAL FUSION      THORACIC DECOMPRESSION POSTERIOR FUSION  06/04/2014    L3-5 & S1    TRIGGER POINT INJECTION      UPPER GASTROINTESTINAL ENDOSCOPY         FAMILY HISTORY  Family History   Problem Relation Age of Onset    Colon cancer Mother     Heart disease Mother     Hypertension Mother     Arthritis Mother     Cancer Mother     Diabetes Mother     Kidney disease Mother     Colon cancer Brother     Heart attack Brother     Colon cancer Daughter     Arthritis Daughter     Arthritis Father     Hearing loss Father     Arthritis Sister     Hypertension Brother     Arthritis Brother     Cancer Brother     Stroke Brother     Arthritis Son     Arthritis Son     Cancer Sister     Diabetes Sister     Cancer Brother     Hypertension Sister     Kidney disease Sister        SOCIAL HISTORY  Social History     Tobacco Use    Smoking status: Former     Current packs/day: 0.00     Average packs/day: 1 pack/day for 8.0 years (8.0 ttl pk-yrs)      Types: Cigarettes     Start date: 1977     Quit date: 1985     Years since quittin.4     Passive exposure: Past    Smokeless tobacco: Never    Tobacco comments:     Quit 1985   Vaping Use    Vaping status: Never Used   Substance Use Topics    Alcohol use: Never     Comment: 6 drinks a year in social settings    Drug use: Never         Allergies:  Patient has no known allergies.    MEDICATIONS:  Medications Prior to Admission   Medication Sig Dispense Refill Last Dose/Taking    apixaban (Eliquis) 5 MG tablet tablet Take 1 tablet by mouth 2 (Two) Times a Day. 180 tablet 3 2025 Morning    aspirin 81 MG EC tablet Take 1 tablet by mouth Every Night. 90 tablet 3 2025 Evening    diclofenac (VOLTAREN) 50 MG EC tablet Take 2 tablets by mouth 2 (Two) Times a Day.   2025 Morning    Ibuprofen 3 %, Gabapentin 10 %, Baclofen 2 %, lidocaine 4 %, Ketamine HCl 4 % Apply 1-2 g topically to the appropriate area as directed 3 (Three) to 4 (Four) times daily. 90 g 5 Past Week    lisinopril (PRINIVIL,ZESTRIL) 10 MG tablet Take 0.5 tablets by mouth Daily.   2025 Morning    metoprolol succinate XL (TOPROL-XL) 25 MG 24 hr tablet Take 1 tablet by mouth Daily.   2025 Morning    nystatin (MYCOSTATIN) 444524 UNIT/GM powder Apply  topically to the appropriate area as directed Every 12 (Twelve) Hours.   Past Week    oxyCODONE-acetaminophen (PERCOCET)  MG per tablet Take 1 tablet by mouth Every 4 (Four) Hours As Needed for Moderate Pain.   2025 Morning    polyethylene glycol (MIRALAX) 17 g packet Take 17 g by mouth Daily As Needed (Use if senna-docusate is ineffective).   Past Month    pregabalin (LYRICA) 100 MG capsule Take 2 capsules by mouth 3 (Three) Times a Day.   2025 Morning    sertraline (ZOLOFT) 100 MG tablet Take 1 tablet by mouth Every Evening. Indications: Major Depressive Disorder   2025 Evening    Synthroid 75 MCG tablet Take 1 tablet by mouth Every Morning.   2025  Morning    zolpidem (AMBIEN) 10 MG tablet Take 1 tablet by mouth At Night As Needed for Sleep.   Past Month Bedtime    albuterol sulfate  (90 Base) MCG/ACT inhaler Inhale.   Unknown    guaiFENesin (MUCINEX) 600 MG 12 hr tablet Take 1 tablet by mouth.   Unknown    sennosides-docusate (PERICOLACE) 8.6-50 MG per tablet Take 2 tablets by mouth Daily.   More than a month         Current Facility-Administered Medications:     acetaminophen (TYLENOL) tablet 650 mg, 650 mg, Oral, Q4H PRN, Tatiana Gilliland MD    apixaban (ELIQUIS) tablet 5 mg, 5 mg, Oral, BID, Tatiana Gilliland MD, 5 mg at 05/24/25 0905    aspirin EC tablet 81 mg, 81 mg, Oral, Nightly, Tatiana Gilliland MD, 81 mg at 05/23/25 2121    atorvastatin (LIPITOR) tablet 40 mg, 40 mg, Oral, Daily, Tatiana Gilliland MD    sennosides-docusate (PERICOLACE) 8.6-50 MG per tablet 2 tablet, 2 tablet, Oral, BID, 2 tablet at 05/24/25 0905 **AND** polyethylene glycol (MIRALAX) packet 17 g, 17 g, Oral, Daily PRN **AND** bisacodyl (DULCOLAX) EC tablet 5 mg, 5 mg, Oral, Daily PRN **AND** bisacodyl (DULCOLAX) suppository 10 mg, 10 mg, Rectal, Daily PRN, Tatiana Gilliland MD    levETIRAcetam (KEPPRA) tablet 250 mg, 250 mg, Oral, Q12H, Tatiana Gilliland MD, 250 mg at 05/24/25 0905    levothyroxine (SYNTHROID, LEVOTHROID) tablet 75 mcg, 75 mcg, Oral, Q AM, Tatiana Gilliland MD, 75 mcg at 05/24/25 0624    [Held by provider] lisinopril (PRINIVIL,ZESTRIL) tablet 5 mg, 5 mg, Oral, Daily, Tatiana Gilliland MD    metoprolol succinate XL (TOPROL-XL) 24 hr tablet 25 mg, 25 mg, Oral, Q24H, Tatiana Gilliland MD, 25 mg at 05/24/25 0905    midodrine (PROAMATINE) tablet 10 mg, 10 mg, Oral, Q8H, Jose Martin Aleman MD, 10 mg at 05/24/25 0624    nitroglycerin (NITROSTAT) SL tablet 0.4 mg, 0.4 mg, Sublingual, Q5 Min PRN, Tatiana Gilliland MD    ondansetron (ZOFRAN) injection 4 mg, 4 mg, Intravenous, Q6H PRN, Tatiana Gilliland MD    oxyCODONE (ROXICODONE) immediate release tablet 10 mg, 10 mg, Oral, Q4H PRN, Tatiana Gilliland MD, 10 mg at 05/24/25 0908     pregabalin (LYRICA) capsule 200 mg, 200 mg, Oral, Q8H, Tatiana Gilliland MD, 200 mg at 05/24/25 0624    sertraline (ZOLOFT) tablet 100 mg, 100 mg, Oral, Q PM, Tatiana Gilliland MD, 100 mg at 05/23/25 2121    [COMPLETED] Insert Peripheral IV, , , Once **AND** sodium chloride 0.9 % flush 10 mL, 10 mL, Intravenous, PRN, Manav Whitt MD    sodium chloride 0.9 % flush 10 mL, 10 mL, Intravenous, Q12H, Tatiana Gilliland MD, 10 mL at 05/24/25 0906    sodium chloride 0.9 % flush 10 mL, 10 mL, Intravenous, PRN, Tatiana Gilliland MD    sodium chloride 0.9 % infusion 40 mL, 40 mL, Intravenous, PRN, Tatiana Gilliland MD    zolpidem (AMBIEN) tablet 5 mg, 5 mg, Oral, Nightly PRN, Tatiana Gilliland MD      Objective     Results Review:  LABS:  Results from last 7 days   Lab Units 05/23/25  1403   WBC 10*3/mm3 6.64   HEMOGLOBIN g/dL 10.8*   HEMATOCRIT % 34.4   PLATELETS 10*3/mm3 115*     Results from last 7 days   Lab Units 05/23/25  1403   SODIUM mmol/L 139   POTASSIUM mmol/L 4.7   CHLORIDE mmol/L 104   CO2 mmol/L 26.4   BUN mg/dL 22   CREATININE mg/dL 0.77   CALCIUM mg/dL 9.2   BILIRUBIN mg/dL 0.4   ALK PHOS U/L 94   ALT (SGPT) U/L 9   AST (SGOT) U/L 20   GLUCOSE mg/dL 86         DIAGNOSTICS:  MRI CERVICAL SPINE WO CONTRAST     Date of Exam: 3/31/2025 1:48 PM EDT     Indication: Eval stenosis.     Comparison: CT of the cervical spine from May 28, 2025     Technique:  Routine multiplanar/multisequence sequence images of the cervical spine were obtained without contrast administration.          Findings:  There is anterolisthesis of C7 on T1. The craniocervical junction appears intact. The vertebral body heights are normal. There are changes from C6 corpectomy with anterior cervical discectomy and fusion at C3-C7. There is some artifact from the hardware.   There are moderate discogenic changes at C2-3 and C7-T1. There are advanced discogenic changes at T1-2. The partially visualized posterior fossa contents are unremarkable. The spinal cord appears normal  in signal throughout. The prevertebral soft   tissues are unremarkable.     C2-3: Mild disc osteophyte complex eccentric to the left. Severe bilateral facet arthropathy. Moderate bilateral osteophytes. Mild spinal canal stenosis. Severe bilateral neural foraminal stenosis.     C3-4: Small central osteophyte. Moderate bilateral facet arthropathy. Moderate bilateral uncovertebral hypertrophy. No spinal canal stenosis. Moderate bilateral neural foraminal stenosis.     C4-5: Moderate bilateral facet arthropathy. Mild right uncovertebral hypertrophy. No spinal canal stenosis. Mild right neural foraminal stenosis.     C5-6: Small right paracentral osteophyte. Moderate bilateral facet arthropathy. Mild bilateral uncovertebral hypertrophy. Mild spinal canal stenosis. Mild bilateral neural foraminal stenosis.     C6-7: Mild diffuse osteophyte. Moderate bilateral facet arthropathy. Moderate right and severe left uncovertebral hypertrophy. No spinal canal stenosis. Moderate right and severe left neural foraminal stenosis.     C7-T1: Moderate bilateral facet arthropathy with ligamentum flavum infolding. Small right paracentral osteophyte. Anterolisthesis of C7 on T1. Severe spinal canal stenosis. Moderate bilateral neural foraminal stenosis.     IMPRESSION:  Impression:  1.Changes from C6 corpectomy with C3-C7 ACDF.  2.Multilevel degenerative changes as described above, most prominent at C7-T1 where there is severe spinal canal stenosis.       Results Review:   I reviewed the patient's new clinical results.  I personally viewed patient's chart and imaging     Vital Signs   Temp:  [94.7 °F (34.8 °C)-98.4 °F (36.9 °C)] 97.7 °F (36.5 °C)  Heart Rate:  [60-84] 60  Resp:  [13-19] 13  BP: ()/(37-73) 131/66    Physical Exam:  Physical Exam  Vitals reviewed.   Musculoskeletal:      Cervical back: Normal range of motion.      Comments: Patient unable to move her lower extremities    Skin:     General: Skin is warm and dry.    Neurological:      General: No focal deficit present.   Psychiatric:         Speech: Speech normal.       Neurological Exam  Mental Status  Awake, alert and oriented to person, place and time. Oriented to person, place and time. Speech is normal. Language is fluent with no aphasia.    Motor    Patient able to move her upper extremities and  my fingers, but she is now unable to move her lower extremities. She was barely able to move her toes this morning. .      Assessment & Plan       Cervical neuralgia    Pacemaker    Paroxysmal atrial fibrillation    Cervical radiculopathy    Essential hypertension    Spondylolisthesis, acquired    Obesity (BMI 30.0-34.9)    Hx of hypertrophic cardiomyopathy    Coronary artery disease    Neck pain    Seizure disorder    Encounter for pre-operative cardiovascular clearance    Aortic insufficiency    Mitral regurgitation      Problem List Items Addressed This Visit    None  Visit Diagnoses         Hypotension, unspecified hypotension type    -  Primary      Constipation, unspecified constipation type          Spinal stenosis, unspecified spinal region                 COMORBID CONDITIONS:  Pacemaker, A Fib, hypertension, CAD    Patient is an 85 year old female who presents to [facility] for progressive weakness in her lower extremities.     The patient was recently seen in clinic by Dr. Bernal to discuss surgery. Although she is 85, due to her severe weakness he would consider surgery as long as she is cleared by Cardiology. The surgery she would need would be extensive.    Cardiology has been consulted. At this time we are waiting to see if she is healthy enough to undergo surgery. If she is, we will regroup and discuss with her and her family how she would like to move forward.     We will continue to monitor. Please reach out with any questions or concerns.     PLAN:   Leg weakness  Cervical stenosis  - Cardiology consult  - Surgery depending on cardiac clearance  -  "Medical management per primary  - Pain management per primary     I discussed the patient's findings and my recommendations with patient and Dr. Castellano    During patient visit, I utilized appropriate personal protective equipment including gloves and mask.  Mask used was standard procedure mask. Appropriate PPE was worn during the entire visit.  Hand hygiene was completed before and after.     Pau Hayden PA-C  05/24/25  10:52 EDT    \"Dictated utilizing Dragon dictation\".    "

## 2025-05-24 NOTE — PLAN OF CARE
Goal Outcome Evaluation:  Plan of Care Reviewed With: patient        Progress: no change  Outcome Evaluation: Pt stable and abed. Awaiting cardiology clearance. WC bound at baseline. AOx4, call light within reach and able to make needs known.

## 2025-05-24 NOTE — NURSING NOTE
This RN and WOODY Conrad were asked to see this patient while we were assessing another patient.  Patient BP low and temp 94.7.  Patient A&O x4 asymptomatic BP 90s/40s.  Rechecked rectal temp up to 95.3 patient on bare hugger.  Checked on labs and looked over patient chart, recommendation to give patient fluids from APRN.  Spoke with Dr Aleman on phone orders placed.

## 2025-05-24 NOTE — PROGRESS NOTES
LOS: 0 days   Patient Care Team:  Anish Lew APRN as PCP - General  Evgeny Gregory MD as Consulting Physician (Cardiology)  Leeanne Pleitez MD as Consulting Physician (Cardiology)  Radha Owusu APRN as Nurse Practitioner (Nurse Practitioner)  Kathe Muñoz MD as Consulting Physician (Nephrology)    Subjective     Interval History: Stable    Patient Complaints: No voiced complaints.     History taken from: patient    Review of Systems   Constitutional:  Positive for activity change. Negative for fatigue and fever.   HENT:  Negative for trouble swallowing.    Eyes:  Negative for visual disturbance.   Respiratory:  Negative for cough, shortness of breath and wheezing.    Cardiovascular:  Negative for chest pain, palpitations and leg swelling.   Gastrointestinal:  Negative for abdominal pain, diarrhea, nausea and vomiting.   Endocrine: Negative for polyuria.   Genitourinary:  Negative for difficulty urinating.   Musculoskeletal:  Positive for back pain, gait problem and myalgias.   Skin:  Negative for rash.   Neurological:  Positive for weakness.   Psychiatric/Behavioral:  Negative for confusion.            Objective     Vital Signs  Temp:  [94.7 °F (34.8 °C)-98.4 °F (36.9 °C)] 98.1 °F (36.7 °C)  Heart Rate:  [60-84] 60  Resp:  [13-19] 18  BP: ()/(40-73) 113/65    Physical Exam:     General Appearance:    Alert, cooperative, in no acute distress,   Head:    Normocephalic, without obvious abnormality, atraumatic   Eyes:            Lids and lashes normal, conjunctivae and sclerae normal, no   icterus, no pallor, corneas clear   Ears:    Ears appear intact with no abnormalities noted   Throat:   No oral lesions, no thrush, oral mucosa moist   Neck:   No adenopathy, supple, trachea midline, no thyromegaly, no   carotid bruit, no JVD   Lungs:     Clear to auscultation,respirations regular, even and  unlabored    Heart:    Regular rhythm and normal rate, normal S1 and S2, no       "   murmur, no gallop, no rub, no click   Chest Wall:    No abnormalities observed   Abdomen:     Normal bowel sounds, no masses, no organomegaly, soft      Non-tender non-distended, no guarding,   Extremities:  No edema, no cyanosis, no redness   Pulses:   Pulses palpable and equal bilaterally   Skin:   No bleeding, bruising or rash   Lymph nodes:   No palpable adenopathy            Results Review:    Lab Results (last 24 hours)       Procedure Component Value Units Date/Time    Procalcitonin [[id number]]  (Normal) Collected: 05/24/25 0931    Specimen: Blood Updated: 05/24/25 1007     Procalcitonin 0.04 ng/mL     Narrative:      As a Marker for Sepsis (Non-Neonates):    1. <0.5 ng/mL represents a low risk of severe sepsis and/or septic shock.  2. >2 ng/mL represents a high risk of severe sepsis and/or septic shock.    As a Marker for Lower Respiratory Tract Infections that require antibiotic therapy:    PCT on Admission    Antibiotic Therapy       6-12 Hrs later    >0.5                Strongly Recommended  >0.25 - <0.5        Recommended   0.1 - 0.25          Discouraged              Remeasure/reassess PCT  <0.1                Strongly Discouraged     Remeasure/reassess PCT    As 28 day mortality risk marker: \"Change in Procalcitonin Result\" (>80% or <=80%) if Day 0 (or Day 1) and Day 4 values are available. Refer to http://[email or web address] [email or web address]    Change in PCT <=80%  A decrease of PCT levels below or equal to 80% defines a positive change in PCT test result representing a higher risk for 28-day all-cause mortality of patients diagnosed with severe sepsis for septic shock.    Change in PCT >80%  A decrease of PCT levels of more than 80% defines a negative change in PCT result representing a lower risk for 28-day all-cause mortality of patients diagnosed with severe sepsis or septic shock.       Procalcitonin [[id number]]  (Normal) Collected: 05/24/25 0011    Specimen: Blood Updated: 05/24/25 0059     " "Procalcitonin 0.04 ng/mL     Narrative:      As a Marker for Sepsis (Non-Neonates):    1. <0.5 ng/mL represents a low risk of severe sepsis and/or septic shock.  2. >2 ng/mL represents a high risk of severe sepsis and/or septic shock.    As a Marker for Lower Respiratory Tract Infections that require antibiotic therapy:    PCT on Admission    Antibiotic Therapy       6-12 Hrs later    >0.5                Strongly Recommended  >0.25 - <0.5        Recommended   0.1 - 0.25          Discouraged              Remeasure/reassess PCT  <0.1                Strongly Discouraged     Remeasure/reassess PCT    As 28 day mortality risk marker: \"Change in Procalcitonin Result\" (>80% or <=80%) if Day 0 (or Day 1) and Day 4 values are available. Refer to [email or web address]-[email or web address]    Change in PCT <=80%  A decrease of PCT levels below or equal to 80% defines a positive change in PCT test result representing a higher risk for 28-day all-cause mortality of patients diagnosed with severe sepsis for septic shock.    Change in PCT >80%  A decrease of PCT levels of more than 80% defines a negative change in PCT result representing a lower risk for 28-day all-cause mortality of patients diagnosed with severe sepsis or septic shock.       Blood Culture - Blood, Arm, Right [[id number]] Collected: 05/24/25 0021    Specimen: Blood from Arm, Right Updated: 05/24/25 0028    Blood Culture - Blood, Arm, Left [[id number]] Collected: 05/23/25 2037    Specimen: Blood from Arm, Left Updated: 05/23/25 2045             Imaging Results (Last 24 Hours)       ** No results found for the last 24 hours. **                 I reviewed the patient's new clinical results.    Medication Review:   Scheduled Meds:[Held by provider] apixaban, 5 mg, Oral, BID  [Held by provider] aspirin, 81 mg, Oral, Nightly  atorvastatin, 40 mg, Oral, Daily  enoxaparin sodium, 1 mg/kg, Subcutaneous, Q12H  levETIRAcetam, 250 mg, Oral, Q12H  levothyroxine, 75 mcg, Oral, Q " AM  [Held by provider] lisinopril, 5 mg, Oral, Daily  metoprolol succinate XL, 25 mg, Oral, Q24H  midodrine, 10 mg, Oral, Q8H  pregabalin, 200 mg, Oral, Q8H  senna-docusate sodium, 2 tablet, Oral, BID  sertraline, 100 mg, Oral, Q PM  sodium chloride, 10 mL, Intravenous, Q12H      Continuous Infusions:   PRN Meds:.  acetaminophen    senna-docusate sodium **AND** polyethylene glycol **AND** bisacodyl **AND** bisacodyl    nitroglycerin    ondansetron    oxyCODONE    [COMPLETED] Insert Peripheral IV **AND** sodium chloride    sodium chloride    sodium chloride    zolpidem     Assessment & Plan       Cervical neuralgia    Pacemaker    Paroxysmal atrial fibrillation    Cervical radiculopathy    Essential hypertension    Spondylolisthesis, acquired    Obesity (BMI 30.0-34.9)    Hx of hypertrophic cardiomyopathy    Coronary artery disease    Neck pain    Seizure disorder    Encounter for pre-operative cardiovascular clearance    Aortic insufficiency    Mitral regurgitation    -Neurosurgery consultation, waiting on cardiology to determine if she is clear for surgery. If granted clearance, will discuss with patient and family planning surgery.  -Waiting on cardiology for determination on clearance for surgery  -home medications for chronic medical conditions  -holding Eliquis and aspirin for possible surgery  -Lovenox for DVT prophylaxis as well as paroxysmal atrial fibrillation  -pain control with oxycodone    CODE Status:    Code status (Patient has no pulse and is not breathing):  CPR (Attempt to Resuscitate)  Medical Interventions (Patient has pulse or is breathing):  Full support  Level of support discussed with:  Patient    Admission status:  I believe this patient meets observation status  Expected length of stay:  2 midnights or greater  I discussed the patient's findings and my recommendations with the patient.    Plan for disposition:MARLA Zarate PA-C  05/24/25  16:16 EDT

## 2025-05-24 NOTE — CONSULTS
Referring Provider: Tatiana Gilliland MD  Reason for Consultation: preoperative cardiac clearance    Patient Care Team:  Anish Lew APRN as PCP - Evgeny Machuca MD as Consulting Physician (Cardiology)  Leeanne Pleitez MD as Consulting Physician (Cardiology)  Radha Owusu APRN as Nurse Practitioner (Nurse Practitioner)  Kathe Muñoz MD as Consulting Physician (Nephrology)    Chief complaint:  leg weakness    Subjective .     History of present illness:  Domonique See is a 85 y.o. female patient of Dr. Gregory with a history of HOCM status post septal myomectomy (2013), nonsustained VT, sick sinus syndrome status [employer] dual-chamber pacemaker placement (3/16/17), atrial fibrillation/flutter, hypertension, coronary artery disease, hyperlipidemia, valvular heart disease, COPD, and chronic back pain who presented to the ER at [facility] on 5/23/2025 complaining of worsening leg weakness, numbness and constipation. Of note, the patient was recently seen by neurosurgery, Dr. Bernal, for severe stenosis of the cervical spine. She is now wheelchair-bound.     Workup in the ER revealed hypotension, which responded to IV fluids. Neurosurgery was consulted and she was admitted for further treatment. Cardiology was consulted for preoperative cardiac clearance.       ROS    Review of all systems negative except as indicated above.       History  Past Medical History:   Diagnosis Date    Anemia     Asthma     CHF (congestive heart failure)     Congenital heart disease     Coronary artery disease 2013    Deep vein thrombosis (DVT) 11/20/2017    GERD (gastroesophageal reflux disease)     Heart murmur Don't know    Hx of hypertrophic cardiomyopathy     Hypertension     Low back pain     Lumbosacral disc disease     Peptic ulceration     Persistent atrial fibrillation 01/10/2023    Primary osteoarthritis of both knees 02/08/2021    Primary osteoarthritis of right knee  2020    Seizures     Sleep apnea 2017    TIA (transient ischemic attack)        Past Surgical History:   Procedure Laterality Date    ABDOMINAL HERNIA REPAIR      ABLATION OF DYSRHYTHMIC FOCUS  2023    BACK SURGERY      BREAST AUGMENTATION      CARDIAC CATHETERIZATION      CARDIAC ELECTROPHYSIOLOGY PROCEDURE N/A 2023    Procedure: Ablation atrial fibrillation and flutter, cryo Duong and Ramón aware;  Surgeon: Leeanne Pleitez MD;  Location: [facility] INVASIVE LOCATION;  Service: Cardiovascular;  Laterality: N/A;    CARDIAC PACEMAKER PLACEMENT  2017    Dual Chamber    CERVICAL RIB RESECTION Bilateral     CERVICAL RIB RESECTION Bilateral 1963    bilateral cervical ribs removed - extra ribs located and causing pain    CHOLECYSTECTOMY      COLONOSCOPY      CORONARY ARTERY BYPASS GRAFT      EPIDURAL BLOCK      NECK SURGERY      SPINAL FUSION      THORACIC DECOMPRESSION POSTERIOR FUSION  2014    L3-5 & S1    TRIGGER POINT INJECTION      UPPER GASTROINTESTINAL ENDOSCOPY         Family History   Problem Relation Age of Onset    Colon cancer Mother     Heart disease Mother     Hypertension Mother     Arthritis Mother     Cancer Mother     Diabetes Mother     Kidney disease Mother     Colon cancer Brother     Heart attack Brother     Colon cancer Daughter     Arthritis Daughter     Arthritis Father     Hearing loss Father     Arthritis Sister     Hypertension Brother     Arthritis Brother     Cancer Brother     Stroke Brother     Arthritis Son     Arthritis Son     Cancer Sister     Diabetes Sister     Cancer Brother     Hypertension Sister     Kidney disease Sister        Social History     Tobacco Use    Smoking status: Former     Current packs/day: 0.00     Average packs/day: 1 pack/day for 8.0 years (8.0 ttl pk-yrs)     Types: Cigarettes     Start date: 1977     Quit date: 1985     Years since quittin.4     Passive exposure: Past    Smokeless tobacco: Never    Tobacco  comments:     Quit 1985   Vaping Use    Vaping status: Never Used   Substance Use Topics    Alcohol use: Never     Comment: 6 drinks a year in social settings    Drug use: Never        Medications Prior to Admission   Medication Sig Dispense Refill Last Dose/Taking    apixaban (Eliquis) 5 MG tablet tablet Take 1 tablet by mouth 2 (Two) Times a Day. 180 tablet 3 5/23/2025 Morning    aspirin 81 MG EC tablet Take 1 tablet by mouth Every Night. 90 tablet 3 5/22/2025 Evening    diclofenac (VOLTAREN) 50 MG EC tablet Take 2 tablets by mouth 2 (Two) Times a Day.   5/23/2025 Morning    Ibuprofen 3 %, Gabapentin 10 %, Baclofen 2 %, lidocaine 4 %, Ketamine HCl 4 % Apply 1-2 g topically to the appropriate area as directed 3 (Three) to 4 (Four) times daily. 90 g 5 Past Week    lisinopril (PRINIVIL,ZESTRIL) 10 MG tablet Take 0.5 tablets by mouth Daily.   5/23/2025 Morning    metoprolol succinate XL (TOPROL-XL) 25 MG 24 hr tablet Take 1 tablet by mouth Daily.   5/23/2025 Morning    nystatin (MYCOSTATIN) 696466 UNIT/GM powder Apply  topically to the appropriate area as directed Every 12 (Twelve) Hours.   Past Week    oxyCODONE-acetaminophen (PERCOCET)  MG per tablet Take 1 tablet by mouth Every 4 (Four) Hours As Needed for Moderate Pain.   5/23/2025 Morning    polyethylene glycol (MIRALAX) 17 g packet Take 17 g by mouth Daily As Needed (Use if senna-docusate is ineffective).   Past Month    pregabalin (LYRICA) 100 MG capsule Take 2 capsules by mouth 3 (Three) Times a Day.   5/23/2025 Morning    sertraline (ZOLOFT) 100 MG tablet Take 1 tablet by mouth Every Evening. Indications: Major Depressive Disorder   5/22/2025 Evening    Synthroid 75 MCG tablet Take 1 tablet by mouth Every Morning.   5/23/2025 Morning    zolpidem (AMBIEN) 10 MG tablet Take 1 tablet by mouth At Night As Needed for Sleep.   Past Month Bedtime    albuterol sulfate  (90 Base) MCG/ACT inhaler Inhale.   Unknown    guaiFENesin (MUCINEX) 600 MG 12 hr  "tablet Take 1 tablet by mouth.   Unknown    sennosides-docusate (PERICOLACE) 8.6-50 MG per tablet Take 2 tablets by mouth Daily.   More than a month         Patient has no known allergies.    Scheduled Meds:apixaban, 5 mg, Oral, BID  aspirin, 81 mg, Oral, Nightly  atorvastatin, 40 mg, Oral, Daily  budesonide-formoterol, 2 puff, Inhalation, BID - RT  levETIRAcetam, 250 mg, Oral, Q12H  levothyroxine, 75 mcg, Oral, Q AM  lisinopril, 5 mg, Oral, Daily  metoprolol succinate XL, 25 mg, Oral, Q24H  midodrine, 10 mg, Oral, Q8H  pantoprazole, 40 mg, Oral, Q AM  pregabalin, 200 mg, Oral, Q8H  senna-docusate sodium, 2 tablet, Oral, BID  sertraline, 100 mg, Oral, Q PM  sodium chloride, 10 mL, Intravenous, Q12H      Continuous Infusions:   PRN Meds:.  acetaminophen    senna-docusate sodium **AND** polyethylene glycol **AND** bisacodyl **AND** bisacodyl    ipratropium-albuterol    nitroglycerin    ondansetron    oxyCODONE    [COMPLETED] Insert Peripheral IV **AND** sodium chloride    sodium chloride    sodium chloride    zolpidem    Objective     VITAL SIGNS  Vitals:    05/24/25 0030 05/24/25 0258 05/24/25 0301 05/24/25 0407   BP: 114/73  94/53 106/57   BP Location: Left arm  Left arm Left arm   Patient Position: Lying  Lying Lying   Pulse: 83 81 84 60   Resp: 17 16  14   Temp: 97.9 °F (36.6 °C) 98.4 °F (36.9 °C)  97.8 °F (36.6 °C)   TempSrc: Oral Oral  Oral   SpO2: 98% 94% 95% 94%   Weight:       Height:           Flowsheet Rows      Flowsheet Row First Filed Value   Admission Height 152.4 cm (60\") Documented at 05/23/2025 1228   Admission Weight 79.4 kg (175 lb) Documented at 05/23/2025 1228              Intake/Output Summary (Last 24 hours) at 5/24/2025 0633  Last data filed at 5/24/2025 0407  Gross per 24 hour   Intake --   Output 500 ml   Net -500 ml        TELEMETRY:  a-fib with paced beats    Physical Exam:  The patient is alert, oriented and in no distress.  Vital signs as noted above.  Head and neck revealed no carotid " "bruits or jugular venous distention.  No thyromegaly or lymphadenopathy is present  Lungs clear.  No wheezing.  Breath sounds are normal bilaterally.  Heart normal first and second heart sounds. + murmur.  No precordial rub is present.  No gallop is present.  Abdomen soft and nontender.  No organomegaly is present.  Extremities with good peripheral pulses with 2+ pretibial edema and 4+ pitting pedal edema.  Skin warm and dry.  Musculoskeletal system: she cannot move BLE  CNS: she reports numbness in BLE      Results Review:   I reviewed the patient's new clinical results.  Lab Results (last 24 hours)       Procedure Component Value Units Date/Time    Procalcitonin [[id number]]  (Normal) Collected: 05/24/25 0011    Specimen: Blood Updated: 05/24/25 0059     Procalcitonin 0.04 ng/mL     Narrative:      As a Marker for Sepsis (Non-Neonates):    1. <0.5 ng/mL represents a low risk of severe sepsis and/or septic shock.  2. >2 ng/mL represents a high risk of severe sepsis and/or septic shock.    As a Marker for Lower Respiratory Tract Infections that require antibiotic therapy:    PCT on Admission    Antibiotic Therapy       6-12 Hrs later    >0.5                Strongly Recommended  >0.25 - <0.5        Recommended   0.1 - 0.25          Discouraged              Remeasure/reassess PCT  <0.1                Strongly Discouraged     Remeasure/reassess PCT    As 28 day mortality risk marker: \"Change in Procalcitonin Result\" (>80% or <=80%) if Day 0 (or Day 1) and Day 4 values are available. Refer to [email or web address]    Change in PCT <=80%  A decrease of PCT levels below or equal to 80% defines a positive change in PCT test result representing a higher risk for 28-day all-cause mortality of patients diagnosed with severe sepsis for septic shock.    Change in PCT >80%  A decrease of PCT levels of more than 80% defines a negative change in PCT result representing a lower risk for 28-day all-cause mortality of " patients diagnosed with severe sepsis or septic shock.       Blood Culture - Blood, Arm, Right [[id number]] Collected: 05/24/25 0021    Specimen: Blood from Arm, Right Updated: 05/24/25 0028    Blood Culture - Blood, Arm, Left [[id number]] Collected: 05/23/25 2037    Specimen: Blood from Arm, Left Updated: 05/23/25 2045    High Sensitivity Troponin T 1Hr [[id number]]  (Abnormal) Collected: 05/23/25 1511    Specimen: Blood Updated: 05/23/25 1541     HS Troponin T 16 ng/L      Troponin T Numeric Delta -4 ng/L      Troponin T % Delta -20    Narrative:      High Sensitive Troponin T Reference Range:  <14.0 ng/L- Negative Female for AMI  <22.0 ng/L- Negative Male for AMI  >=14 - Abnormal Female indicating possible myocardial injury.  >=22 - Abnormal Male indicating possible myocardial injury.   Clinicians would have to utilize clinical acumen, EKG, Troponin, and serial changes to determine if it is an Acute Myocardial Infarction or myocardial injury due to an underlying chronic condition.         Comprehensive Metabolic Panel [[id number]]  (Abnormal) Collected: 05/23/25 1403    Specimen: Blood Updated: 05/23/25 1437     Glucose 86 mg/dL      BUN 22 mg/dL      Creatinine 0.77 mg/dL      Sodium 139 mmol/L      Potassium 4.7 mmol/L      Chloride 104 mmol/L      CO2 26.4 mmol/L      Calcium 9.2 mg/dL      Total Protein 5.6 g/dL      Albumin 3.6 g/dL      ALT (SGPT) 9 U/L      AST (SGOT) 20 U/L      Alkaline Phosphatase 94 U/L      Total Bilirubin 0.4 mg/dL      Globulin 2.0 gm/dL      A/G Ratio 1.8 g/dL      BUN/Creatinine Ratio 28.6     Anion Gap 8.6 mmol/L      eGFR 75.2 mL/min/1.73     Narrative:      GFR Categories in Chronic Kidney Disease (CKD)              GFR Category          GFR (mL/min/1.73)    Interpretation  G1                    90 or greater        Normal or high (1)  G2                    60-89                Mild decrease (1)  G3a                   45-59                Mild to moderate decrease  G3b                    30-44                Moderate to severe decrease  G4                    15-29                Severe decrease  G5                    14 or less           Kidney failure    (1)In the absence of evidence of kidney disease, neither GFR category G1 or G2 fulfill the criteria for CKD.    eGFR calculation 2021 CKD-EPI creatinine equation, which does not include race as a factor    High Sensitivity Troponin T [[id number]]  (Abnormal) Collected: 05/23/25 1403    Specimen: Blood Updated: 05/23/25 1437     HS Troponin T 20 ng/L     Narrative:      High Sensitive Troponin T Reference Range:  <14.0 ng/L- Negative Female for AMI  <22.0 ng/L- Negative Male for AMI  >=14 - Abnormal Female indicating possible myocardial injury.  >=22 - Abnormal Male indicating possible myocardial injury.   Clinicians would have to utilize clinical acumen, EKG, Troponin, and serial changes to determine if it is an Acute Myocardial Infarction or myocardial injury due to an underlying chronic condition.         CBC & Differential [[id number]]  (Abnormal) Collected: 05/23/25 1403    Specimen: Blood Updated: 05/23/25 1427    Narrative:      The following orders were created for panel order CBC & Differential.  Procedure                               Abnormality         Status                     ---------                               -----------         ------                     CBC Auto Differential[[id number]]        Abnormal            Final result               Scan Slide[[id number]]                   Normal              Final result                 Please view results for these tests on the individual orders.    CBC Auto Differential [[id number]]  (Abnormal) Collected: 05/23/25 1403    Specimen: Blood Updated: 05/23/25 1427     WBC 6.64 10*3/mm3      RBC 3.69 10*6/mm3      Hemoglobin 10.8 g/dL      Hematocrit 34.4 %      MCV 93.2 fL      MCH 29.3 pg      MCHC 31.4 g/dL      RDW 13.4 %      RDW-SD 45.4 fl      MPV 12.2 fL      Platelets 115  10*3/mm3      Neutrophil % 55.6 %      Lymphocyte % 17.8 %      Monocyte % 12.3 %      Eosinophil % 1.4 %      Basophil % 0.2 %      Immature Grans % 12.7 %      Neutrophils, Absolute 3.70 10*3/mm3      Lymphocytes, Absolute 1.18 10*3/mm3      Monocytes, Absolute 0.82 10*3/mm3      Eosinophils, Absolute 0.09 10*3/mm3      Basophils, Absolute 0.01 10*3/mm3      Immature Grans, Absolute 0.84 10*3/mm3      nRBC 0.0 /100 WBC     Scan Slide [[id number]]  (Normal) Collected: 05/23/25 1403    Specimen: Blood Updated: 05/23/25 1427     RBC Morphology Normal     WBC Morphology Normal     Platelet Morphology Normal    Narrative:      Slide Reviewed      Extra Tubes [[id number]] Collected: 05/23/25 1403    Specimen: Blood, Venous Line Updated: 05/23/25 1415    Narrative:      The following orders were created for panel order Extra Tubes.  Procedure                               Abnormality         Status                     ---------                               -----------         ------                     Gold Top - SST[[id number]]                                   Final result                 Please view results for these tests on the individual orders.    Gold Top - SST [[id number]] Collected: 05/23/25 1403    Specimen: Blood Updated: 05/23/25 1415     Extra Tube Hold for add-ons.     Comment: Auto resulted.       POC Lactate [[id number]]  (Normal) Collected: 05/23/25 1407    Specimen: Blood Updated: 05/23/25 1409     Lactate 0.3 mmol/L      Comment: Serial Number: [id number]:  [id number]       Urinalysis With Microscopic If Indicated (No Culture) - Urine, Catheter [[id number]]  (Normal) Collected: 05/23/25 1312    Specimen: Urine, Catheter Updated: 05/23/25 1335     Color, UA Yellow     Appearance, UA Clear     pH, UA 6.0     Specific Gravity, UA 1.010     Glucose, UA Negative     Ketones, UA Negative     Bilirubin, UA Negative     Blood, UA Negative     Protein, UA Negative     Leuk Esterase, UA Negative     Nitrite,  UA Negative     Urobilinogen, UA 1.0 E.U./dL    Narrative:      Urine microscopic not indicated.            Imaging Results (Last 24 Hours)       Procedure Component Value Units Date/Time    CT Abdomen Pelvis Without Contrast [[id number]] Collected: 05/23/25 1344     Updated: 05/23/25 1352    Narrative:      CT ABDOMEN PELVIS WO CONTRAST    Date of Exam: 5/23/2025 1:30 PM EDT    Indication: conspitation, pain.    Comparison: None available.    Technique: Axial CT images were obtained of the abdomen and pelvis without the administration of contrast. Sagittal and coronal reconstructions were performed.  Automated exposure control and iterative reconstruction methods were used.      Findings:    Liver: The liver is unremarkable in morphology. Evaluation for focal liver lesions is limited without IV contrast. No biliary dilation is seen.    Gallbladder: Surgically absent.    Pancreas: Unremarkable.    Spleen: Unremarkable.    Adrenal glands: 13 mm low-density nodule of the lateral limb of the left adrenal gland, likely an adenoma. Right adrenal gland is unremarkable.    Genitourinary tract: Kidneys are unremarkable. No hydronephrosis is seen. No urinary tract calculi are seen. The visualized portions of the ureters and urinary bladder appear unremarkable. Limited evaluation of the pelvic organs due to lack of IV   contrast administration.    Gastrointestinal tract: Limited evaluation of the hollow viscera due to lack of IV contrast administration. There is no evidence of bowel obstruction. Moderate stool within the proximal/mid colon.    Appendix: The appendix is borderline prominent in caliber, measuring approximately 8 mm distally. No periappendiceal stranding is seen. This is felt to be within normal limits.    Other findings: No free air or free fluid is identified. No pathologically enlarged lymph nodes are seen. Vascular calcifications are seen.    Bones and soft tissues: No acute osseous lesion is identified.  Bones are demineralized. There are degenerative changes within the spine with grade 1 retrolisthesis at L3-L4. Status post PLIF at L4-L5. Right-sided fat-containing inguinal hernia noted.   Bilateral breast implants are partially visualized.    Lung bases: The visualized lung bases are clear. Cardiac pacemaker leads are noted.      Impression:      Impression:  1.Examination is limited due to lack of IV contrast administration.  2.No acute abnormality identified within the abdomen or pelvis.  3.The appendix is borderline prominent in caliber, measuring approximately 8 mm distally. No periappendiceal stranding is seen. This is felt to be within normal limits.  4.Moderate stool within the proximal/mid colon.  5.13 mm low-density nodule of the lateral limb of the left adrenal gland, likely an adenoma.  6.Additional findings as detailed above.      Electronically Signed: Isaac Pressley MD    5/23/2025 1:50 PM EDT    Workstation ID: [id number]        LAB RESULTS (LAST 7 DAYS)    CBC  Results from last 7 days   Lab Units 05/23/25  1403   WBC 10*3/mm3 6.64   RBC 10*6/mm3 3.69*   HEMOGLOBIN g/dL 10.8*   HEMATOCRIT % 34.4   MCV fL 93.2   PLATELETS 10*3/mm3 115*       BMP  Results from last 7 days   Lab Units 05/23/25  1403   SODIUM mmol/L 139   POTASSIUM mmol/L 4.7   CHLORIDE mmol/L 104   CO2 mmol/L 26.4   BUN mg/dL 22   CREATININE mg/dL 0.77   GLUCOSE mg/dL 86       CMP   Results from last 7 days   Lab Units 05/23/25  1403   SODIUM mmol/L 139   POTASSIUM mmol/L 4.7   CHLORIDE mmol/L 104   CO2 mmol/L 26.4   BUN mg/dL 22   CREATININE mg/dL 0.77   GLUCOSE mg/dL 86   ALBUMIN g/dL 3.6   BILIRUBIN mg/dL 0.4   ALK PHOS U/L 94   AST (SGOT) U/L 20   ALT (SGPT) U/L 9         BNP        TROPONIN  Results from last 7 days   Lab Units 05/23/25  1511   HSTROP T ng/L 16*       CoAg        Creatinine Clearance  Estimated Creatinine Clearance: 48.9 mL/min (by C-G formula based on SCr of 0.77 mg/dL).    ABG        Radiology  CT Abdomen Pelvis  Without Contrast  Result Date: 5/23/2025  Impression: 1.Examination is limited due to lack of IV contrast administration. 2.No acute abnormality identified within the abdomen or pelvis. 3.The appendix is borderline prominent in caliber, measuring approximately 8 mm distally. No periappendiceal stranding is seen. This is felt to be within normal limits. 4.Moderate stool within the proximal/mid colon. 5.13 mm low-density nodule of the lateral limb of the left adrenal gland, likely an adenoma. 6.Additional findings as detailed above. Electronically Signed: Isaac Pressley MD  5/23/2025 1:50 PM EDT  Workstation ID: [id number]        EKG      I personally viewed and interpreted the patient's EKG/Telemetry data:    ECHOCARDIOGRAM:    Results for orders placed during the hospital encounter of 03/21/25    Adult Transthoracic Echo Complete W/ Cont if Necessary Per Protocol    Interpretation Summary    Left ventricular ejection fraction appears to be 61 - 65%.    Left ventricular diastolic function was normal.    The left atrial cavity is moderately dilated.    Left atrial volume is severely increased.    Moderate aortic valve regurgitation is present.    Moderate mitral valve regurgitation is present.    Estimated right ventricular systolic pressure from tricuspid regurgitation is normal (<35 mmHg).      STRESS TEST  Results for orders placed during the hospital encounter of 03/28/25    Stress Test With Myocardial Perfusion One Day    Interpretation Summary    Myocardial perfusion imaging indicates a normal myocardial perfusion study with no evidence of ischemia. Impressions are consistent with a low risk study.    Left ventricular ejection fraction is normal (Calculated EF = 56%).        Cardiolite (Tc-99m sestamibi) stress test    HEART CATHETERIZATION  No results found for this or any previous visit.      OTHER:     Assessment & Plan     Principal Problem:    Cervical neuralgia  Active Problems:    Pacemaker    Paroxysmal  atrial fibrillation    Cervical radiculopathy    Hypertrophic obstructive cardiomyopathy    Spondylolisthesis, acquired    Obesity (BMI 30.0-34.9)    Coronary artery disease    Neck pain    Seizure disorder      Encounter for pre-operative cardiovascular clearance  Cardiac cath in 2017 showed 30% stenosis of the LAD.   Nuclear stress test done 4/2/25 showed no evidence of ischemia.  Echocardiogram done 3/21/25 showed preserved LVEF with moderate aortic and mitral valve regurgitation.   The patient denies any chest pain or shortness of breath.   Serial high sensitivity troponin 20, 16  EKG reviewed    Preoperative cardiovascular risk assessment:    [place] Perioperative Risk for Myocardial Infarction or Cardiac Arrest (BEBE):  0.4% Risk of myocardial infarction or cardiac arrest, intraoperatively or up to 30 days post-op    Revised Cardiac Risk Index for Pre-Operative Risk:  6.0% 30-day risk of death, MI, or cardiac arrest      Cervical neuralgia  Neck pain  Cervical radiculopathy  Spondylolisthesis, acquired  Neurosurgery following    Hx of hypertrophic cardiomyopathy  Status post septal myomectomy (2013)    Aortic insufficiency / Mitral regurgitation   Moderate per recent echocardiogram  The patient has BLE edema.   She received IVF for hypotension, which has improved.   She may need intermittent diuresis.   Consider LUISA for further assessment of VHD.     Coronary artery disease  Non-obstructive per cardiac cath in 2017.  The patient denies any chest pain.  Hold aspirin for possible surgery   Continue high intensity statin and beta blocker.     Pacemaker  History of sick sinus syndrome     Paroxysmal atrial fibrillation  The patient was recently seen by cardiac electrophysiology, Dr. Pleitez, and offered ablation and cardioversion.  She is supposed to follow-up with EP as outpatient.  [id number] score is 6  I will hold Eliquis and start Lovenox 1 mg/kg sq q12h for possible surgery.     Essential hypertension,  chronic  The patient presented with hypotension.  She received 2 liters of IVF and was started on midodrine.  Blood pressure has improved.  Continue to hold lisinopril  Closely monitor blood pressure.     Obesity (BMI 30.0-34.9)  BMI is 34.18. She weighs 175 lbs.  Lifestyle modifications recommended.     Seizure disorder  On Keppra      Further assessment and plan per Dr. Doty.     Electronically signed by Maritza Gerardo, WOODY, 05/24/25, 1:08 PM EDT.            Domonique See is an 85-year-old female patient who has history of hypertrophic obstructive cardiomyopathy status post myomectomy in 2015, dual-chamber pacemaker for sick sinus syndrome, has had PVI and flutter line on 1/16/2023.  She has been in sinus rhythm up until February 2025.  The patient is not too symptomatic from A-fib, her main symptoms are related to her neck and spinal stenosis.  Nevertheless however I did offer her rhythm control strategies.  We could perform cardioversion with antiarrhythmic or redo ablation.  Patient would like to think about these options and let me know.  In the meantime the rates are controlled and she is on anticoagulation with Eliquis at home.     ]]]]]]]]]]]]]]]]]]]]]  4/6/2025  History of hypertrophic obstructive cardiomyopathy.  Status post myomectomy at [facility] in 2013.     Status post ablation for atrial fibrillation 2023     Status post dual-chamber pacemaker implantation     Recent atrial fibrillation flutter.  Patient has spinal stenosis.  Patient was seen by Dr. Pleitez.  Options were discussed that included cardioversion versus ablation.  Patient is thinking about the options.  Patient is in sinus rhythm 4/5/2025     Stress Myoview 4/3/2025  Myocardial perfusion imaging indicates a normal myocardial perfusion study with no evidence of ischemia. Impressions are consistent with a low risk study.    Left ventricular ejection fraction is normal (Calculated EF = 56%).     Medications were reviewed and  updated.  Patient is on subcu Lovenox (therapeutic) levothyroxine metoprolol nystatin.     Switch to oral anticoagulants in the near future.     Dr. Gregory primary cardiologist will see the patient tomorrow.     Further plan will depend on patient's progress.     Reviewed updated 4/6/2025.     ]]]]]]]]]]]]]]]]]]]]]]]]    Patient was seen around 8 AM today.  Chart was reviewed in entirety.  From me in the past was reviewed.  Reviewed and agree with the assessment and plan as documented by Maritza Gerardo nurse practitioner.  Patient has history of hypertrophic cardiomyopathy and pacemaker.  GERD the MDM was performed by me.  Patient is seen for preoperative cardiovascular evaluation.  Patient does not have any angina pectoris or congestive heart failure.    Further plan will depend on patient's progress.    Reviewed and updated 5/24/2025  ]]]]]]]]]]]]]]]]]]  Electronically signed by Thierry Doty MD, 05/24/25, 2:24 PM EDT.   Thierry Dtoy MD  05/24/25  06:33 EDT

## 2025-05-25 PROCEDURE — 99213 OFFICE O/P EST LOW 20 MIN: CPT | Performed by: NURSE PRACTITIONER

## 2025-05-25 PROCEDURE — 99214 OFFICE O/P EST MOD 30 MIN: CPT | Performed by: INTERNAL MEDICINE

## 2025-05-25 PROCEDURE — G0378 HOSPITAL OBSERVATION PER HR: HCPCS

## 2025-05-25 PROCEDURE — 25010000002 ENOXAPARIN PER 10 MG: Performed by: NURSE PRACTITIONER

## 2025-05-25 RX ADMIN — PREGABALIN 200 MG: 100 CAPSULE ORAL at 13:17

## 2025-05-25 RX ADMIN — ATORVASTATIN CALCIUM 40 MG: 40 TABLET, FILM COATED ORAL at 09:14

## 2025-05-25 RX ADMIN — SENNOSIDES AND DOCUSATE SODIUM 2 TABLET: 8.6; 5 TABLET ORAL at 09:13

## 2025-05-25 RX ADMIN — Medication 10 ML: at 09:15

## 2025-05-25 RX ADMIN — SENNOSIDES AND DOCUSATE SODIUM 2 TABLET: 8.6; 5 TABLET ORAL at 20:18

## 2025-05-25 RX ADMIN — MIDODRINE HYDROCHLORIDE 10 MG: 5 TABLET ORAL at 06:24

## 2025-05-25 RX ADMIN — PREGABALIN 200 MG: 100 CAPSULE ORAL at 20:18

## 2025-05-25 RX ADMIN — OXYCODONE 10 MG: 5 TABLET ORAL at 17:19

## 2025-05-25 RX ADMIN — ACETAMINOPHEN 650 MG: 325 TABLET ORAL at 12:06

## 2025-05-25 RX ADMIN — OXYCODONE 10 MG: 5 TABLET ORAL at 21:20

## 2025-05-25 RX ADMIN — METOPROLOL SUCCINATE 25 MG: 25 TABLET, EXTENDED RELEASE ORAL at 09:14

## 2025-05-25 RX ADMIN — SERTRALINE HYDROCHLORIDE 100 MG: 100 TABLET, FILM COATED ORAL at 20:18

## 2025-05-25 RX ADMIN — LEVOTHYROXINE SODIUM 75 MCG: 0.07 TABLET ORAL at 06:24

## 2025-05-25 RX ADMIN — LEVETIRACETAM 250 MG: 250 TABLET, FILM COATED ORAL at 09:14

## 2025-05-25 RX ADMIN — PREGABALIN 200 MG: 100 CAPSULE ORAL at 06:24

## 2025-05-25 RX ADMIN — MIDODRINE HYDROCHLORIDE 10 MG: 5 TABLET ORAL at 20:18

## 2025-05-25 RX ADMIN — ENOXAPARIN SODIUM 80 MG: 100 INJECTION SUBCUTANEOUS at 09:13

## 2025-05-25 RX ADMIN — MIDODRINE HYDROCHLORIDE 10 MG: 5 TABLET ORAL at 13:17

## 2025-05-25 RX ADMIN — ENOXAPARIN SODIUM 80 MG: 100 INJECTION SUBCUTANEOUS at 20:18

## 2025-05-25 RX ADMIN — OXYCODONE 10 MG: 5 TABLET ORAL at 13:17

## 2025-05-25 RX ADMIN — Medication 10 ML: at 20:26

## 2025-05-25 RX ADMIN — OXYCODONE 10 MG: 5 TABLET ORAL at 09:13

## 2025-05-25 NOTE — PLAN OF CARE
Goal Outcome Evaluation:            C/o pain on shoulder and neck, med given per MAR. Rested better last night. Vital signs taken and recorded. Moves upper extremities, unable to lift both legs,  able to do mild dorsiflexion of both feet.  Heart/oxygen monitor in place. CPAP at bedtime.

## 2025-05-25 NOTE — CASE MANAGEMENT/SOCIAL WORK
Continued Stay Note  [facility]     Patient Name: Domonique See  MRN: [id number]  Today's Date: 5/25/2025    Admit Date: 5/23/2025       Discharge Plan       Row Name 05/25/25 1832       Plan    Plan Comments DC barriers:: neuro surgery and cardiology following, pain management             Gabriela Thorne RN BSN  Weekend   [facility]  Phone: [phone]  Fax: [phone]

## 2025-05-25 NOTE — PROGRESS NOTES
NEUROSURGERY PROGRESS NOTE     LOS: 0 days   Patient Care Team:  Anish Lew APRN as PCP - General  Evgeny Gregory MD as Consulting Physician (Cardiology)  Leeanne Pleitez MD as Consulting Physician (Cardiology)  Radha Owusu APRN as Nurse Practitioner (Nurse Practitioner)  Kathe Muñoz MD as Consulting Physician (Nephrology)    Chief Complaint:    Chief Complaint   Patient presents with    Rectal Pain     Constipated last night, ws on the toilet for 4 hours without getting up, pt took a break and went back for another 2 hours. Pt is worried she prolapsed her rectum. Pt has cold legs and feet - spinal stenosis may be worsening. Partial paralysis from waist down has rectal numbness x 3 months.        Subjective     Interval History: NAEO.  Patient sleeping but arousable.  When she did awake and stretch there was some movement in her legs and toes.  I did get her to move her lower extremities and her exam seems fairly similar to her exam 4 days ago.  She was evaluated with cardiology perioperative cardiac clearance.  No definitive clearance given that I could gather.        History taken from: patient chart    Objective      Vital Signs  Temp:  [97.4 °F (36.3 °C)-98.1 °F (36.7 °C)] 97.5 °F (36.4 °C)  Heart Rate:  [60-65] 64  Resp:  [14-18] 14  BP: (100-163)/(47-87) 163/87  Body mass index is 34.18 kg/m².    Intake/Output last 3 shifts:  I/O last 3 completed shifts:  In: 800 [P.O.:800]  Out: 1000 [Urine:1000]    Intake/Output this shift:  No intake/output data recorded.    Physical    Consistent command following lower extremities but did get patient to move her distal lower extremities with 2/ 5 on dorsiflexion plantarflexion.  No movement on hip flexor, 3 -/5 quadriceps  Sensation decreased from mid thoracic downward  Negative David.  No clonus    Results Review:  I reviewed the patient's new clinical results.    Labs:    Lab Results (last 24 hours)       ** No results found for  the last 24 hours. **            Imaging:    No new neuroimaging.    Current Medications:   Scheduled Meds:[Held by provider] apixaban, 5 mg, Oral, BID  [Held by provider] aspirin, 81 mg, Oral, Nightly  atorvastatin, 40 mg, Oral, Daily  enoxaparin sodium, 1 mg/kg, Subcutaneous, Q12H  levETIRAcetam, 250 mg, Oral, Q12H  levothyroxine, 75 mcg, Oral, Q AM  [Held by provider] lisinopril, 5 mg, Oral, Daily  metoprolol succinate XL, 25 mg, Oral, Q24H  midodrine, 10 mg, Oral, Q8H  pregabalin, 200 mg, Oral, Q8H  senna-docusate sodium, 2 tablet, Oral, BID  sertraline, 100 mg, Oral, Q PM  sodium chloride, 10 mL, Intravenous, Q12H      Continuous Infusions:     Assessment & Plan       Cervical neuralgia    Pacemaker    Paroxysmal atrial fibrillation    Cervical radiculopathy    Essential hypertension    Spondylolisthesis, acquired    Obesity (BMI 30.0-34.9)    Hx of hypertrophic cardiomyopathy    Coronary artery disease    Neck pain    Seizure disorder    Encounter for pre-operative cardiovascular clearance    Aortic insufficiency    Mitral regurgitation    Platelets 115    Assessment/Plan:  Domonique See is a 85 y.o. 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 She does have significant weakness and sensory deficits but her exam seems fairly stable from prior exam with Dr. Bernal 4 days ago symptoms have been going on for quite some time so I do not believe there is any emergent intervention needed.  Patient again has high risk of postoperative complications giving her age, bone quality and cardiac issues.  Cardiology is following for cardiac clearance.  Dr. Bernal will be by tomorrow to speak with patient and family regarding any potential surgery.    Cervical stenosis    -Pending cardiology recommendations  - Monitor thrombocytopenia  -Dr. Bernal to speak with patient and family tomorrow  -Please call for  any questions or concerns    Assessment findings were discussed with patient, family and Dr. Castellano who agree with plan of care.    Carmen Salamanca, WOODY  05/25/25  09:49 EDT

## 2025-05-25 NOTE — PROGRESS NOTES
Referring Provider: Tatiana Gilliland MD    Reason for follow-up: Leg weakness     Patient Care Team:  Anish Lew APRN as PCP - General  Evgeny Gregory MD as Consulting Physician (Cardiology)  Leeanne Pleitez MD as Consulting Physician (Cardiology)  Radha Owusu APRN as Nurse Practitioner (Nurse Practitioner)  Kathe Muñoz MD as Consulting Physician (Nephrology)    Subjective .      ROS    Since I have last seen, the patient has been without any chest discomfort ,shortness of breath, palpitations, dizziness or syncope.  Denies having any headache ,abdominal pain ,nausea, vomiting , diarrhea constipation, loss of weight or loss of appetite.  Denies having any excessive bruising ,hematuria or blood in the stool.    Review of all systems negative except as indicated.    Reviewed ROS.  History  Past Medical History:   Diagnosis Date    Anemia     Aortic insufficiency 03/21/2025    Asthma     CHF (congestive heart failure)     Cholecystitis 09/02/2011    Congenital heart disease     Coronary artery disease 2013    Deep vein thrombosis (DVT) 11/20/2017    GERD (gastroesophageal reflux disease)     Heart murmur Don't know    Hx of hypertrophic cardiomyopathy     Hypertension     Hypertrophic obstructive cardiomyopathy 11/10/2017    Low back pain     Lumbosacral disc disease     Mitral regurgitation 03/21/2025    Mixed hyperlipidemia 10/06/2017    Morbid obesity 09/21/2020    Peptic ulceration     Persistent atrial fibrillation 01/10/2023    Primary osteoarthritis of both knees 02/08/2021    Primary osteoarthritis of right knee 09/21/2020    Seizures     Sleep apnea 2017    TIA (transient ischemic attack)        Past Surgical History:   Procedure Laterality Date    ABDOMINAL HERNIA REPAIR      ABLATION OF DYSRHYTHMIC FOCUS  01-    BACK SURGERY      BREAST AUGMENTATION      CARDIAC CATHETERIZATION      CARDIAC ELECTROPHYSIOLOGY PROCEDURE N/A 01/16/2023    Procedure: Ablation atrial  fibrillation and flutter, cryo Duong and Ramón aware;  Surgeon: Leeanne Pleitez MD;  Location: [facility] INVASIVE LOCATION;  Service: Cardiovascular;  Laterality: N/A;    CARDIAC PACEMAKER PLACEMENT  2017    Dual Chamber    CERVICAL RIB RESECTION Bilateral     CERVICAL RIB RESECTION Bilateral 1963    bilateral cervical ribs removed - extra ribs located and causing pain    CHOLECYSTECTOMY      COLONOSCOPY      CORONARY ARTERY BYPASS GRAFT      EPIDURAL BLOCK      NECK SURGERY      SPINAL FUSION      THORACIC DECOMPRESSION POSTERIOR FUSION  2014    L3-5 & S1    TRIGGER POINT INJECTION      UPPER GASTROINTESTINAL ENDOSCOPY         Family History   Problem Relation Age of Onset    Colon cancer Mother     Heart disease Mother     Hypertension Mother     Arthritis Mother     Cancer Mother     Diabetes Mother     Kidney disease Mother     Colon cancer Brother     Heart attack Brother     Colon cancer Daughter     Arthritis Daughter     Arthritis Father     Hearing loss Father     Arthritis Sister     Hypertension Brother     Arthritis Brother     Cancer Brother     Stroke Brother     Arthritis Son     Arthritis Son     Cancer Sister     Diabetes Sister     Cancer Brother     Hypertension Sister     Kidney disease Sister        Social History     Tobacco Use    Smoking status: Former     Current packs/day: 0.00     Average packs/day: 1 pack/day for 8.0 years (8.0 ttl pk-yrs)     Types: Cigarettes     Start date: 1977     Quit date: 1985     Years since quittin.4     Passive exposure: Past    Smokeless tobacco: Never    Tobacco comments:     Quit    Vaping Use    Vaping status: Never Used   Substance Use Topics    Alcohol use: Never     Comment: 6 drinks a year in social settings    Drug use: Never        Medications Prior to Admission   Medication Sig Dispense Refill Last Dose/Taking    apixaban (Eliquis) 5 MG tablet tablet Take 1 tablet by mouth 2 (Two) Times a Day. 180 tablet 3  5/23/2025 Morning    aspirin 81 MG EC tablet Take 1 tablet by mouth Every Night. 90 tablet 3 5/22/2025 Evening    diclofenac (VOLTAREN) 50 MG EC tablet Take 2 tablets by mouth 2 (Two) Times a Day.   5/23/2025 Morning    Ibuprofen 3 %, Gabapentin 10 %, Baclofen 2 %, lidocaine 4 %, Ketamine HCl 4 % Apply 1-2 g topically to the appropriate area as directed 3 (Three) to 4 (Four) times daily. 90 g 5 Past Week    lisinopril (PRINIVIL,ZESTRIL) 10 MG tablet Take 0.5 tablets by mouth Daily.   5/23/2025 Morning    metoprolol succinate XL (TOPROL-XL) 25 MG 24 hr tablet Take 1 tablet by mouth Daily.   5/23/2025 Morning    nystatin (MYCOSTATIN) 426752 UNIT/GM powder Apply  topically to the appropriate area as directed Every 12 (Twelve) Hours.   Past Week    oxyCODONE-acetaminophen (PERCOCET)  MG per tablet Take 1 tablet by mouth Every 4 (Four) Hours As Needed for Moderate Pain.   5/23/2025 Morning    polyethylene glycol (MIRALAX) 17 g packet Take 17 g by mouth Daily As Needed (Use if senna-docusate is ineffective).   Past Month    pregabalin (LYRICA) 100 MG capsule Take 2 capsules by mouth 3 (Three) Times a Day.   5/23/2025 Morning    sertraline (ZOLOFT) 100 MG tablet Take 1 tablet by mouth Every Evening. Indications: Major Depressive Disorder   5/22/2025 Evening    Synthroid 75 MCG tablet Take 1 tablet by mouth Every Morning.   5/23/2025 Morning    zolpidem (AMBIEN) 10 MG tablet Take 1 tablet by mouth At Night As Needed for Sleep.   Past Month Bedtime    albuterol sulfate  (90 Base) MCG/ACT inhaler Inhale.   Unknown    guaiFENesin (MUCINEX) 600 MG 12 hr tablet Take 1 tablet by mouth.   Unknown    sennosides-docusate (PERICOLACE) 8.6-50 MG per tablet Take 2 tablets by mouth Daily.   More than a month       Allergies  Patient has no known allergies.    Scheduled Meds:[Held by provider] apixaban, 5 mg, Oral, BID  [Held by provider] aspirin, 81 mg, Oral, Nightly  atorvastatin, 40 mg, Oral, Daily  enoxaparin sodium, 1  "mg/kg, Subcutaneous, Q12H  levETIRAcetam, 250 mg, Oral, Q12H  levothyroxine, 75 mcg, Oral, Q AM  [Held by provider] lisinopril, 5 mg, Oral, Daily  metoprolol succinate XL, 25 mg, Oral, Q24H  midodrine, 10 mg, Oral, Q8H  pregabalin, 200 mg, Oral, Q8H  senna-docusate sodium, 2 tablet, Oral, BID  sertraline, 100 mg, Oral, Q PM  sodium chloride, 10 mL, Intravenous, Q12H      Continuous Infusions:   PRN Meds:.  acetaminophen    senna-docusate sodium **AND** polyethylene glycol **AND** bisacodyl **AND** bisacodyl    nitroglycerin    ondansetron    oxyCODONE    [COMPLETED] Insert Peripheral IV **AND** sodium chloride    sodium chloride    sodium chloride    zolpidem    Objective     VITAL SIGNS  Vitals:    05/24/25 2031 05/25/25 0034 05/25/25 0447 05/25/25 0805   BP: 100/47 123/61 127/67 163/87   BP Location: Left arm Left arm Left arm Left arm   Patient Position: Lying Lying Lying Lying   Pulse:  65 64    Resp: 17 14 14 14   Temp: 97.9 °F (36.6 °C) 97.6 °F (36.4 °C) 97.4 °F (36.3 °C) 97.5 °F (36.4 °C)   TempSrc: Oral Oral Oral Oral   SpO2: 97% 95% 98%    Weight:       Height:           Flowsheet Rows      Flowsheet Row First Filed Value   Admission Height 152.4 cm (60\") Documented at 05/23/2025 1228   Admission Weight 79.4 kg (175 lb) Documented at 05/23/2025 1228              Intake/Output Summary (Last 24 hours) at 5/25/2025 0827  Last data filed at 5/25/2025 0447  Gross per 24 hour   Intake 800 ml   Output 500 ml   Net 300 ml        TELEMETRY: Pacemaker rhythm    Physical Exam:  The patient is alert, oriented and in no distress.  Vital signs as noted above.  Head and neck revealed no carotid bruits or jugular venous distention.  No thyromegaly or lymphadenopathy is present  Lungs clear.  No wheezing.  Breath sounds are normal bilaterally.  Heart normal first and second heart sounds.  No murmur. No precordial rub is present.  No gallop is present.  Abdomen soft and nontender.  No organomegaly is present.  Extremities " "with good peripheral pulses without any pedal edema.  Skin warm and dry.  Pacemaker site looks normal.  Musculoskeletal system is grossly normal  CNS grossly normal    Gait.    Results Review:   I reviewed the patient's new clinical results.  Lab Results (last 24 hours)       Procedure Component Value Units Date/Time    Blood Culture - Blood, Arm, Right [[id number]]  (Normal) Collected: 05/24/25 0021    Specimen: Blood from Arm, Right Updated: 05/25/25 0030     Blood Culture No growth at 24 hours    Narrative:      Less than seven (7) mL's of blood was collected.  Insufficient quantity may yield false negative results.    Blood Culture - Blood, Arm, Left [[id number]]  (Normal) Collected: 05/23/25 2037    Specimen: Blood from Arm, Left Updated: 05/24/25 2046     Blood Culture No growth at 24 hours    Procalcitonin [[id number]]  (Normal) Collected: 05/24/25 0931    Specimen: Blood Updated: 05/24/25 1007     Procalcitonin 0.04 ng/mL     Narrative:      As a Marker for Sepsis (Non-Neonates):    1. <0.5 ng/mL represents a low risk of severe sepsis and/or septic shock.  2. >2 ng/mL represents a high risk of severe sepsis and/or septic shock.    As a Marker for Lower Respiratory Tract Infections that require antibiotic therapy:    PCT on Admission    Antibiotic Therapy       6-12 Hrs later    >0.5                Strongly Recommended  >0.25 - <0.5        Recommended   0.1 - 0.25          Discouraged              Remeasure/reassess PCT  <0.1                Strongly Discouraged     Remeasure/reassess PCT    As 28 day mortality risk marker: \"Change in Procalcitonin Result\" (>80% or <=80%) if Day 0 (or Day 1) and Day 4 values are available. Refer to http://www.[facility]-pct-calculator.com    Change in PCT <=80%  A decrease of PCT levels below or equal to 80% defines a positive change in PCT test result representing a higher risk for 28-day all-cause mortality of patients diagnosed with severe sepsis for septic shock.    Change in PCT " >80%  A decrease of PCT levels of more than 80% defines a negative change in PCT result representing a lower risk for 28-day all-cause mortality of patients diagnosed with severe sepsis or septic shock.               Imaging Results (Last 24 Hours)       ** No results found for the last 24 hours. **        LAB RESULTS (LAST 7 DAYS)    CBC  Results from last 7 days   Lab Units 05/23/25  1403   WBC 10*3/mm3 6.64   RBC 10*6/mm3 3.69*   HEMOGLOBIN g/dL 10.8*   HEMATOCRIT % 34.4   MCV fL 93.2   PLATELETS 10*3/mm3 115*       BMP  Results from last 7 days   Lab Units 05/23/25  1403   SODIUM mmol/L 139   POTASSIUM mmol/L 4.7   CHLORIDE mmol/L 104   CO2 mmol/L 26.4   BUN mg/dL 22   CREATININE mg/dL 0.77   GLUCOSE mg/dL 86       CMP   Results from last 7 days   Lab Units 05/23/25  1403   SODIUM mmol/L 139   POTASSIUM mmol/L 4.7   CHLORIDE mmol/L 104   CO2 mmol/L 26.4   BUN mg/dL 22   CREATININE mg/dL 0.77   GLUCOSE mg/dL 86   ALBUMIN g/dL 3.6   BILIRUBIN mg/dL 0.4   ALK PHOS U/L 94   AST (SGOT) U/L 20   ALT (SGPT) U/L 9         BNP        TROPONIN  Results from last 7 days   Lab Units 05/23/25  1511   HSTROP T ng/L 16*       CoAg        Creatinine Clearance  Estimated Creatinine Clearance: 48.9 mL/min (by C-G formula based on SCr of 0.77 mg/dL).    ABG        Radiology  CT Abdomen Pelvis Without Contrast  Result Date: 5/23/2025  Impression: 1.Examination is limited due to lack of IV contrast administration. 2.No acute abnormality identified within the abdomen or pelvis. 3.The appendix is borderline prominent in caliber, measuring approximately 8 mm distally. No periappendiceal stranding is seen. This is felt to be within normal limits. 4.Moderate stool within the proximal/mid colon. 5.13 mm low-density nodule of the lateral limb of the left adrenal gland, likely an adenoma. 6.Additional findings as detailed above. Electronically Signed: Isaac Pressley MD  5/23/2025 1:50 PM EDT  Workstation ID: [id number]          EKG      I  personally viewed and interpreted the patient's EKG/Telemetry data:    ECHOCARDIOGRAM:    Results for orders placed during the hospital encounter of 03/21/25    Adult Transthoracic Echo Complete W/ Cont if Necessary Per Protocol    Interpretation Summary    Left ventricular ejection fraction appears to be 61 - 65%.    Left ventricular diastolic function was normal.    The left atrial cavity is moderately dilated.    Left atrial volume is severely increased.    Moderate aortic valve regurgitation is present.    Moderate mitral valve regurgitation is present.    Estimated right ventricular systolic pressure from tricuspid regurgitation is normal (<35 mmHg).          STRESS TEST  Results for orders placed during the hospital encounter of 03/28/25    Stress Test With Myocardial Perfusion One Day    Interpretation Summary    Myocardial perfusion imaging indicates a normal myocardial perfusion study with no evidence of ischemia. Impressions are consistent with a low risk study.    Left ventricular ejection fraction is normal (Calculated EF = 56%).        Cardiolite (Tc-99m sestamibi) stress test    CARDIAC CATHETERIZATION  No results found for this or any previous visit.                OTHER:         Assessment & Plan     Principal Problem:    Cervical neuralgia  Active Problems:    Pacemaker    Paroxysmal atrial fibrillation    Cervical radiculopathy    Essential hypertension    Spondylolisthesis, acquired    Obesity (BMI 30.0-34.9)    Hx of hypertrophic cardiomyopathy    Coronary artery disease    Neck pain    Seizure disorder    Encounter for pre-operative cardiovascular clearance    Aortic insufficiency    Mitral regurgitation      Assessment & Plan  Principal Problem:    Cervical neuralgia  Active Problems:    Pacemaker    Paroxysmal atrial fibrillation    Cervical radiculopathy    Hypertrophic obstructive cardiomyopathy    Spondylolisthesis, acquired    Obesity (BMI 30.0-34.9)    Coronary artery disease    Neck  pain    Seizure disorder        Encounter for pre-operative cardiovascular clearance  Cardiac cath in 2017 showed 30% stenosis of the LAD.   Nuclear stress test done 4/2/25 showed no evidence of ischemia.  Echocardiogram done 3/21/25 showed preserved LVEF with moderate aortic and mitral valve regurgitation.   The patient denies any chest pain or shortness of breath.   Serial high sensitivity troponin 20, 16  EKG reviewed     Preoperative cardiovascular risk assessment:     Teri Perioperative Risk for Myocardial Infarction or Cardiac Arrest (BEBE):  0.4% Risk of myocardial infarction or cardiac arrest, intraoperatively or up to 30 days post-op     Revised Cardiac Risk Index for Pre-Operative Risk:  6.0% 30-day risk of death, MI, or cardiac arrest        Cervical neuralgia  Neck pain  Cervical radiculopathy  Spondylolisthesis, acquired  Neurosurgery following     Hx of hypertrophic cardiomyopathy  Status post septal myomectomy (2013)     Aortic insufficiency / Mitral regurgitation   Moderate per recent echocardiogram  The patient has BLE edema.   She received IVF for hypotension, which has improved.   She may need intermittent diuresis.   Consider LUISA for further assessment of VHD.      Coronary artery disease  Non-obstructive per cardiac cath in 2017.  The patient denies any chest pain.  Hold aspirin for possible surgery   Continue high intensity statin and beta blocker.      Pacemaker  History of sick sinus syndrome      Paroxysmal atrial fibrillation  The patient was recently seen by cardiac electrophysiology, Dr. Pleitez, and offered ablation and cardioversion.  She is supposed to follow-up with EP as outpatient.  [id number] score is 6  I will hold Eliquis and start Lovenox 1 mg/kg sq q12h for possible surgery.      Essential hypertension, chronic  The patient presented with hypotension.  She received 2 liters of IVF and was started on midodrine.  Blood pressure has improved.  Continue to hold  lisinopril  Closely monitor blood pressure.      Obesity (BMI 30.0-34.9)  BMI is 34.18. She weighs 175 lbs.  Lifestyle modifications recommended.      Seizure disorder  On Keppra     Patient has hypertrophic cardiomyopathy.  Status post myomectomy.  2015  Status post dual-chamber pacemaker implantation for sick sinus syndrome.  History of symptomatic atrial fibrillation.  Status post ablation for atrial flutter 2023.  Patient is being considered for ablation for atrial fibrillation     History of hypertrophic obstructive cardiomyopathy.  Status post myomectomy at [facility] in 2013.     Status post ablation for atrial fibrillation 2023     Status post dual-chamber pacemaker implantation     Recent atrial fibrillation flutter.  Patient has spinal stenosis.  Patient was seen by Dr. Pleitez.  Options were discussed that included cardioversion versus ablation.  Patient is thinking about the options.  Patient is in sinus rhythm 4/5/2025     Stress Myoview 4/3/2025  Myocardial perfusion imaging indicates a normal myocardial perfusion study with no evidence of ischemia. Impressions are consistent with a low risk study.    Left ventricular ejection fraction is normal (Calculated EF = 56%).     Medications were reviewed and updated.  Eliquis is on hold.  Patient is on atorvastatin subcu Lovenox and levothyroxine metoprolol midodrine.    Further plan will depend on patient's progress.    Reviewed and updated 5/25/2025.  ]]]]]]]]]]]]]]]]]]]]]]]]                Thierry Doty MD  05/25/25  08:27 EDT

## 2025-05-25 NOTE — PLAN OF CARE
Goal Outcome Evaluation:  Plan of Care Reviewed With: patient        Progress: no change  Outcome Evaluation: no acute events. awaiting cardiology clearance

## 2025-05-25 NOTE — PROGRESS NOTES
LOS: 0 days   Patient Care Team:  Anish Lew APRN as PCP - General  Evgeny Gregory MD as Consulting Physician (Cardiology)  Leeanne Pleitez MD as Consulting Physician (Cardiology)  Radha Owusu APRN as Nurse Practitioner (Nurse Practitioner)  Kathe Muñoz MD as Consulting Physician (Nephrology)    Subjective     Interval History: Stable, waiting on cardiac clearance for surgery    Patient Complaints: No voiced complaints    History taken from: patient    Review of Systems   Constitutional:  Positive for activity change. Negative for fatigue and fever.   HENT:  Negative for trouble swallowing.    Eyes:  Negative for visual disturbance.   Respiratory:  Negative for cough, shortness of breath and wheezing.    Cardiovascular:  Negative for chest pain, palpitations and leg swelling.   Gastrointestinal:  Negative for abdominal pain, nausea and vomiting.   Endocrine: Negative for polyuria.   Genitourinary:  Negative for difficulty urinating.   Musculoskeletal:  Positive for arthralgias, back pain and gait problem.   Skin:  Negative for rash.   Neurological:  Positive for weakness.   Psychiatric/Behavioral:  Negative for confusion.            Objective     Vital Signs  Temp:  [97.4 °F (36.3 °C)-98.1 °F (36.7 °C)] 97.5 °F (36.4 °C)  Heart Rate:  [60-65] 64  Resp:  [13-18] 14  BP: (100-163)/(47-87) 163/87    Physical Exam:     General Appearance:    Alert, cooperative, in no acute distress,   Head:    Normocephalic, without obvious abnormality, atraumatic   Eyes:            Lids and lashes normal, conjunctivae and sclerae normal, no   icterus, no pallor, corneas clear   Ears:    Ears appear intact with no abnormalities noted   Throat:   No oral lesions, no thrush, oral mucosa moist   Neck:   No adenopathy, supple, trachea midline, no thyromegaly, no   carotid bruit, no JVD   Lungs:     Clear to auscultation,respirations regular, even and                unlabored    Heart:    Regular  "rhythm and normal rate, normal S1 and S2, no          murmur, no gallop, no rub, no click   Chest Wall:    No abnormalities observed   Abdomen:     Normal bowel sounds, no masses, no organomegaly, soft      Non-tender non-distended, no guarding,   Extremities:   No edema, no cyanosis, no redness   Pulses:   Pulses palpable and equal bilaterally   Skin:   No bleeding, bruising or rash   Lymph nodes:   No palpable adenopathy            Results Review:    Lab Results (last 24 hours)       Procedure Component Value Units Date/Time    Blood Culture - Blood, Arm, Right [[id number]]  (Normal) Collected: 05/24/25 0021    Specimen: Blood from Arm, Right Updated: 05/25/25 0030     Blood Culture No growth at 24 hours    Narrative:      Less than seven (7) mL's of blood was collected.  Insufficient quantity may yield false negative results.    Blood Culture - Blood, Arm, Left [[id number]]  (Normal) Collected: 05/23/25 2037    Specimen: Blood from Arm, Left Updated: 05/24/25 2046     Blood Culture No growth at 24 hours    Procalcitonin [[id number]]  (Normal) Collected: 05/24/25 0931    Specimen: Blood Updated: 05/24/25 1007     Procalcitonin 0.04 ng/mL     Narrative:      As a Marker for Sepsis (Non-Neonates):    1. <0.5 ng/mL represents a low risk of severe sepsis and/or septic shock.  2. >2 ng/mL represents a high risk of severe sepsis and/or septic shock.    As a Marker for Lower Respiratory Tract Infections that require antibiotic therapy:    PCT on Admission    Antibiotic Therapy       6-12 Hrs later    >0.5                Strongly Recommended  >0.25 - <0.5        Recommended   0.1 - 0.25          Discouraged              Remeasure/reassess PCT  <0.1                Strongly Discouraged     Remeasure/reassess PCT    As 28 day mortality risk marker: \"Change in Procalcitonin Result\" (>80% or <=80%) if Day 0 (or Day 1) and Day 4 values are available. Refer to http://www.[facility]-pct-calculator.com    Change in PCT <=80%  A decrease " of PCT levels below or equal to 80% defines a positive change in PCT test result representing a higher risk for 28-day all-cause mortality of patients diagnosed with severe sepsis for septic shock.    Change in PCT >80%  A decrease of PCT levels of more than 80% defines a negative change in PCT result representing a lower risk for 28-day all-cause mortality of patients diagnosed with severe sepsis or septic shock.                Imaging Results (Last 24 Hours)       ** No results found for the last 24 hours. **                 I reviewed the patient's new clinical results.    Medication Review:   Scheduled Meds:[Held by provider] apixaban, 5 mg, Oral, BID  [Held by provider] aspirin, 81 mg, Oral, Nightly  atorvastatin, 40 mg, Oral, Daily  enoxaparin sodium, 1 mg/kg, Subcutaneous, Q12H  levETIRAcetam, 250 mg, Oral, Q12H  levothyroxine, 75 mcg, Oral, Q AM  [Held by provider] lisinopril, 5 mg, Oral, Daily  metoprolol succinate XL, 25 mg, Oral, Q24H  midodrine, 10 mg, Oral, Q8H  pregabalin, 200 mg, Oral, Q8H  senna-docusate sodium, 2 tablet, Oral, BID  sertraline, 100 mg, Oral, Q PM  sodium chloride, 10 mL, Intravenous, Q12H      Continuous Infusions:   PRN Meds:.  acetaminophen    senna-docusate sodium **AND** polyethylene glycol **AND** bisacodyl **AND** bisacodyl    nitroglycerin    ondansetron    oxyCODONE    [COMPLETED] Insert Peripheral IV **AND** sodium chloride    sodium chloride    sodium chloride    zolpidem     Assessment & Plan       Cervical neuralgia    Pacemaker    Paroxysmal atrial fibrillation    Cervical radiculopathy    Essential hypertension    Spondylolisthesis, acquired    Obesity (BMI 30.0-34.9)    Hx of hypertrophic cardiomyopathy    Coronary artery disease    Neck pain    Seizure disorder    Encounter for pre-operative cardiovascular clearance    Aortic insufficiency    Mitral regurgitation      -Neurosurgery consultation, waiting on cardiology to determine if she is clear for surgery. If  granted clearance, will discuss with patient and family planning surgery.  -Waiting on cardiology for determination on clearance for surgery  -home medications for chronic medical conditions  -holding Eliquis and aspirin for possible surgery  -Lovenox for DVT prophylaxis as well as paroxysmal atrial fibrillation  -pain control with oxycodone    CODE Status:    Code status (Patient has no pulse and is not breathing):  CPR (Attempt to Resuscitate)  Medical Interventions (Patient has pulse or is breathing):  Full support  Level of support discussed with:  Patient    Admission status:  I believe this patient meets inpatient status  Expected length of stay:  2 midnights or greater  I discussed the patient's findings and my recommendations with the patient.    Plan for disposition:MARLA Zarate PA-C  05/25/25  08:15 EDT

## 2025-05-26 PROCEDURE — 99214 OFFICE O/P EST MOD 30 MIN: CPT | Performed by: INTERNAL MEDICINE

## 2025-05-26 PROCEDURE — 25010000002 ENOXAPARIN PER 10 MG: Performed by: NURSE PRACTITIONER

## 2025-05-26 PROCEDURE — G0378 HOSPITAL OBSERVATION PER HR: HCPCS

## 2025-05-26 RX ADMIN — OXYCODONE 10 MG: 5 TABLET ORAL at 03:30

## 2025-05-26 RX ADMIN — SENNOSIDES AND DOCUSATE SODIUM 2 TABLET: 8.6; 5 TABLET ORAL at 08:19

## 2025-05-26 RX ADMIN — OXYCODONE 10 MG: 5 TABLET ORAL at 13:02

## 2025-05-26 RX ADMIN — OXYCODONE 10 MG: 5 TABLET ORAL at 08:19

## 2025-05-26 RX ADMIN — Medication 10 ML: at 20:08

## 2025-05-26 RX ADMIN — LEVOTHYROXINE SODIUM 75 MCG: 0.07 TABLET ORAL at 05:20

## 2025-05-26 RX ADMIN — LEVETIRACETAM 250 MG: 250 TABLET, FILM COATED ORAL at 20:07

## 2025-05-26 RX ADMIN — SENNOSIDES AND DOCUSATE SODIUM 2 TABLET: 8.6; 5 TABLET ORAL at 20:07

## 2025-05-26 RX ADMIN — MIDODRINE HYDROCHLORIDE 10 MG: 5 TABLET ORAL at 05:20

## 2025-05-26 RX ADMIN — SERTRALINE HYDROCHLORIDE 100 MG: 100 TABLET, FILM COATED ORAL at 20:07

## 2025-05-26 RX ADMIN — ATORVASTATIN CALCIUM 40 MG: 40 TABLET, FILM COATED ORAL at 08:19

## 2025-05-26 RX ADMIN — PREGABALIN 200 MG: 100 CAPSULE ORAL at 05:19

## 2025-05-26 RX ADMIN — ENOXAPARIN SODIUM 80 MG: 100 INJECTION SUBCUTANEOUS at 20:07

## 2025-05-26 RX ADMIN — MIDODRINE HYDROCHLORIDE 10 MG: 5 TABLET ORAL at 13:02

## 2025-05-26 RX ADMIN — ENOXAPARIN SODIUM 80 MG: 100 INJECTION SUBCUTANEOUS at 08:18

## 2025-05-26 RX ADMIN — Medication 10 ML: at 08:19

## 2025-05-26 RX ADMIN — PREGABALIN 200 MG: 100 CAPSULE ORAL at 13:02

## 2025-05-26 RX ADMIN — MIDODRINE HYDROCHLORIDE 10 MG: 5 TABLET ORAL at 22:54

## 2025-05-26 RX ADMIN — PREGABALIN 200 MG: 100 CAPSULE ORAL at 22:54

## 2025-05-26 NOTE — PLAN OF CARE
Problem: Adult Inpatient Plan of Care  Goal: Plan of Care Review  Outcome: Progressing  Goal: Patient-Specific Goal (Individualized)  Outcome: Progressing  Goal: Absence of Hospital-Acquired Illness or Injury  Outcome: Progressing  Intervention: Identify and Manage Fall Risk  Recent Flowsheet Documentation  Taken 5/25/2025 2200 by Francesca Ramires RN  Safety Promotion/Fall Prevention:   nonskid shoes/slippers when out of bed   safety round/check completed  Taken 5/25/2025 2000 by Francesca Ramires RN  Safety Promotion/Fall Prevention: safety round/check completed  Intervention: Prevent Skin Injury  Recent Flowsheet Documentation  Taken 5/25/2025 2000 by Francesca Ramires RN  Skin Protection: silicone foam dressing in place  Intervention: Prevent and Manage VTE (Venous Thromboembolism) Risk  Recent Flowsheet Documentation  Taken 5/25/2025 2100 by Francesca Ramires RN  VTE Prevention/Management:   bilateral   SCDs (sequential compression devices) on  Intervention: Prevent Infection  Recent Flowsheet Documentation  Taken 5/25/2025 2200 by Francesca Ramires RN  Infection Prevention:   hand hygiene promoted   single patient room provided  Taken 5/25/2025 2000 by Francesca Ramires RN  Infection Prevention:   hand hygiene promoted   single patient room provided  Goal: Optimal Comfort and Wellbeing  Outcome: Progressing  Intervention: Provide Person-Centered Care  Recent Flowsheet Documentation  Taken 5/25/2025 2000 by Francesca Ramires RN  Trust Relationship/Rapport:   care explained   questions answered   thoughts/feelings acknowledged  Goal: Readiness for Transition of Care  Outcome: Progressing     Problem: Pain Acute  Goal: Optimal Pain Control and Function  Outcome: Progressing  Intervention: Optimize Psychosocial Wellbeing  Recent Flowsheet Documentation  Taken 5/25/2025 2000 by Francesca Ramires RN  Diversional Activities:   smartphone   television  Intervention: Prevent or Manage Pain  Recent Flowsheet Documentation  Taken 5/25/2025  2200 by Francesca Ramires RN  Medication Review/Management: medications reviewed  Taken 5/25/2025 2000 by Francesca Ramires RN  Sleep/Rest Enhancement: awakenings minimized  Medication Review/Management: medications reviewed     Problem: Constipation  Goal: Effective Bowel Elimination  Outcome: Progressing     Problem: Fall Injury Risk  Goal: Absence of Fall and Fall-Related Injury  Outcome: Progressing  Intervention: Identify and Manage Contributors  Recent Flowsheet Documentation  Taken 5/25/2025 2200 by Francesca Ramires RN  Medication Review/Management: medications reviewed  Taken 5/25/2025 2000 by Francesca Ramires RN  Medication Review/Management: medications reviewed  Intervention: Promote Injury-Free Environment  Recent Flowsheet Documentation  Taken 5/25/2025 2200 by Francesca Ramires RN  Safety Promotion/Fall Prevention:   nonskid shoes/slippers when out of bed   safety round/check completed  Taken 5/25/2025 2000 by Francesca Ramires RN  Safety Promotion/Fall Prevention: safety round/check completed     Problem: Skin Injury Risk Increased  Goal: Skin Health and Integrity  Outcome: Progressing  Intervention: Optimize Skin Protection  Recent Flowsheet Documentation  Taken 5/25/2025 2200 by Francesca Ramires RN  Head of Bed (HOB) Positioning: HOB elevated  Taken 5/25/2025 2000 by Francesca Ramires RN  Activity Management: bedrest  Pressure Reduction Techniques: frequent weight shift encouraged  Pressure Reduction Devices: pressure-redistributing mattress utilized  Skin Protection: silicone foam dressing in place   Goal Outcome Evaluation:

## 2025-05-26 NOTE — PROGRESS NOTES
LOS: 0 days   Patient Care Team:  Anish Lew APRN as PCP - General  Evgeny Gregory MD as Consulting Physician (Cardiology)  Leeanne Pleitez MD as Consulting Physician (Cardiology)  Radha Owusu APRN as Nurse Practitioner (Nurse Practitioner)  Kathe Muñoz MD as Consulting Physician (Nephrology)    Subjective     Interval History: Stable, waiting on cardiac clearance for surgery     Patient Complaints: No voiced complaints    History taken from: patient    Review of Systems   Constitutional:  Positive for activity change.   HENT:  Negative for trouble swallowing.    Eyes:  Negative for visual disturbance.   Respiratory:  Negative for cough, shortness of breath and wheezing.    Cardiovascular:  Negative for chest pain, palpitations and leg swelling.   Gastrointestinal:  Negative for abdominal pain, nausea and vomiting.   Endocrine: Negative for polyuria.   Genitourinary:  Negative for difficulty urinating.   Musculoskeletal:  Positive for arthralgias, back pain and gait problem.   Skin:  Negative for rash.   Neurological:  Positive for weakness.   Psychiatric/Behavioral:  Negative for confusion.            Objective     Vital Signs  Temp:  [97.4 °F (36.3 °C)-97.6 °F (36.4 °C)] 97.6 °F (36.4 °C)  Heart Rate:  [60-64] 60  Resp:  [14-16] 16  BP: ()/(48-79) 116/59    Physical Exam:     General Appearance:    Alert, cooperative, in no acute distress,   Head:    Normocephalic, without obvious abnormality, atraumatic   Eyes:            Lids and lashes normal, conjunctivae and sclerae normal, no   icterus, no pallor, corneas clear   Ears:    Ears appear intact with no abnormalities noted   Throat:   No oral lesions, no thrush, oral mucosa moist   Neck:   No adenopathy, supple, trachea midline, no thyromegaly, no   carotid bruit, no JVD   Lungs:     Clear to auscultation,respirations regular, even and                unlabored    Heart:    Regular rhythm and normal rate, normal S1  and S2, no          murmur, no gallop, no rub, no click   Chest Wall:    No abnormalities observed   Abdomen:     Normal bowel sounds, no masses, no organomegaly, soft      Non-tender non-distended, no guarding,   Extremities:   No edema, no cyanosis, no redness   Pulses:   Pulses palpable and equal bilaterally   Skin:   No bleeding, bruising or rash   Lymph nodes:   No palpable adenopathy            Results Review:    Lab Results (last 24 hours)       Procedure Component Value Units Date/Time    Blood Culture - Blood, Arm, Right [[id number]]  (Normal) Collected: 05/24/25 0021    Specimen: Blood from Arm, Right Updated: 05/26/25 0030     Blood Culture No growth at 2 days    Narrative:      Less than seven (7) mL's of blood was collected.  Insufficient quantity may yield false negative results.    Blood Culture - Blood, Arm, Left [[id number]]  (Normal) Collected: 05/23/25 2037    Specimen: Blood from Arm, Left Updated: 05/25/25 2046     Blood Culture No growth at 2 days             Imaging Results (Last 24 Hours)       ** No results found for the last 24 hours. **                 I reviewed the patient's new clinical results.    Medication Review:   Scheduled Meds:[Held by provider] apixaban, 5 mg, Oral, BID  [Held by provider] aspirin, 81 mg, Oral, Nightly  atorvastatin, 40 mg, Oral, Daily  enoxaparin sodium, 1 mg/kg, Subcutaneous, Q12H  levETIRAcetam, 250 mg, Oral, Q12H  levothyroxine, 75 mcg, Oral, Q AM  [Held by provider] lisinopril, 5 mg, Oral, Daily  metoprolol succinate XL, 25 mg, Oral, Q24H  midodrine, 10 mg, Oral, Q8H  pregabalin, 200 mg, Oral, Q8H  senna-docusate sodium, 2 tablet, Oral, BID  sertraline, 100 mg, Oral, Q PM  sodium chloride, 10 mL, Intravenous, Q12H      Continuous Infusions:   PRN Meds:.  acetaminophen    senna-docusate sodium **AND** polyethylene glycol **AND** bisacodyl **AND** bisacodyl    nitroglycerin    ondansetron    oxyCODONE    [COMPLETED] Insert Peripheral IV **AND** sodium  chloride    sodium chloride    sodium chloride    zolpidem     Assessment & Plan       Cervical neuralgia    Pacemaker    Paroxysmal atrial fibrillation    Cervical radiculopathy    Essential hypertension    Spondylolisthesis, acquired    Obesity (BMI 30.0-34.9)    Hx of hypertrophic cardiomyopathy    Coronary artery disease    Neck pain    Seizure disorder    Encounter for pre-operative cardiovascular clearance    Aortic insufficiency    Mitral regurgitation      -Neurosurgery consultation, waiting on cardiology to determine if she is clear for surgery. If granted clearance, will discuss with patient and family planning surgery.  -Waiting on cardiology for determination on clearance for surgery  -home medications for chronic medical conditions  -holding Eliquis and aspirin for possible surgery  -Lovenox for DVT prophylaxis as well as paroxysmal atrial fibrillation  -pain control with oxycodone    CODE Status:    Code status (Patient has no pulse and is not breathing):  CPR (Attempt to Resuscitate)  Medical Interventions (Patient has pulse or is breathing):  Full support  Level of support discussed with:  Patient    Admission status:  I believe this patient meets inpatient status  Expected length of stay:  2 midnights or greater  I discussed the patient's findings and my recommendations with the patient.    Plan for disposition:MARLA Zarate PA-C  05/26/25  14:13 EDT

## 2025-05-26 NOTE — PROGRESS NOTES
Referring Provider: Tatiana Gilliland MD    Reason for follow-up:  Leg weakness  Preoperative cardiovascular evaluation.     Patient Care Team:  Anish Lew APRN as PCP - Evgeny Machuca MD as Consulting Physician (Cardiology)  Leeanne Pleitez MD as Consulting Physician (Cardiology)  Radha Owusu APRN as Nurse Practitioner (Nurse Practitioner)  Kathe Muñoz MD as Consulting Physician (Nephrology)    Subjective .      ROS    Since I have last seen, the patient has been without any chest discomfort ,shortness of breath, palpitations, dizziness or syncope.  Denies having any headache ,abdominal pain ,nausea, vomiting , diarrhea constipation, loss of weight or loss of appetite.  Denies having any excessive bruising ,hematuria or blood in the stool.    Review of all systems negative except as indicated.    Reviewed ROS.    History  Past Medical History:   Diagnosis Date    Anemia     Aortic insufficiency 03/21/2025    Asthma     CHF (congestive heart failure)     Cholecystitis 09/02/2011    Congenital heart disease     Coronary artery disease 2013    Deep vein thrombosis (DVT) 11/20/2017    GERD (gastroesophageal reflux disease)     Heart murmur Don't know    Hx of hypertrophic cardiomyopathy     Hypertension     Hypertrophic obstructive cardiomyopathy 11/10/2017    Low back pain     Lumbosacral disc disease     Mitral regurgitation 03/21/2025    Mixed hyperlipidemia 10/06/2017    Morbid obesity 09/21/2020    Peptic ulceration     Persistent atrial fibrillation 01/10/2023    Primary osteoarthritis of both knees 02/08/2021    Primary osteoarthritis of right knee 09/21/2020    Seizures     Sleep apnea 2017    TIA (transient ischemic attack)        Past Surgical History:   Procedure Laterality Date    ABDOMINAL HERNIA REPAIR      ABLATION OF DYSRHYTHMIC FOCUS  01-    BACK SURGERY      BREAST AUGMENTATION      CARDIAC CATHETERIZATION      CARDIAC ELECTROPHYSIOLOGY PROCEDURE N/A  2023    Procedure: Ablation atrial fibrillation and flutter, cryo Duong and Ramón aware;  Surgeon: Leeanne Pleitez MD;  Location:  [facility];  Service: Cardiovascular;  Laterality: N/A;    CARDIAC PACEMAKER PLACEMENT  2017    Dual Chamber    CERVICAL RIB RESECTION Bilateral     CERVICAL RIB RESECTION Bilateral 1963    bilateral cervical ribs removed - extra ribs located and causing pain    CHOLECYSTECTOMY      COLONOSCOPY      CORONARY ARTERY BYPASS GRAFT      EPIDURAL BLOCK      NECK SURGERY      SPINAL FUSION      THORACIC DECOMPRESSION POSTERIOR FUSION  2014    L3-5 & S1    TRIGGER POINT INJECTION      UPPER GASTROINTESTINAL ENDOSCOPY         Family History   Problem Relation Age of Onset    Colon cancer Mother     Heart disease Mother     Hypertension Mother     Arthritis Mother     Cancer Mother     Diabetes Mother     Kidney disease Mother     Colon cancer Brother     Heart attack Brother     Colon cancer Daughter     Arthritis Daughter     Arthritis Father     Hearing loss Father     Arthritis Sister     Hypertension Brother     Arthritis Brother     Cancer Brother     Stroke Brother     Arthritis Son     Arthritis Son     Cancer Sister     Diabetes Sister     Cancer Brother     Hypertension Sister     Kidney disease Sister        Social History     Tobacco Use    Smoking status: Former     Current packs/day: 0.00     Average packs/day: 1 pack/day for 8.0 years (8.0 ttl pk-yrs)     Types: Cigarettes     Start date: 1977     Quit date: 1985     Years since quittin.4     Passive exposure: Past    Smokeless tobacco: Never    Tobacco comments:     Quit    Vaping Use    Vaping status: Never Used   Substance Use Topics    Alcohol use: Never     Comment: 6 drinks a year in social settings    Drug use: Never        Medications Prior to Admission   Medication Sig Dispense Refill Last Dose/Taking    apixaban (Eliquis) 5 MG tablet tablet Take 1 tablet by  mouth 2 (Two) Times a Day. 180 tablet 3 5/23/2025 Morning    aspirin 81 MG EC tablet Take 1 tablet by mouth Every Night. 90 tablet 3 5/22/2025 Evening    diclofenac (VOLTAREN) 50 MG EC tablet Take 2 tablets by mouth 2 (Two) Times a Day.   5/23/2025 Morning    Ibuprofen 3 %, Gabapentin 10 %, Baclofen 2 %, lidocaine 4 %, Ketamine HCl 4 % Apply 1-2 g topically to the appropriate area as directed 3 (Three) to 4 (Four) times daily. 90 g 5 Past Week    lisinopril (PRINIVIL,ZESTRIL) 10 MG tablet Take 0.5 tablets by mouth Daily.   5/23/2025 Morning    metoprolol succinate XL (TOPROL-XL) 25 MG 24 hr tablet Take 1 tablet by mouth Daily.   5/23/2025 Morning    nystatin (MYCOSTATIN) 977621 UNIT/GM powder Apply  topically to the appropriate area as directed Every 12 (Twelve) Hours.   Past Week    oxyCODONE-acetaminophen (PERCOCET)  MG per tablet Take 1 tablet by mouth Every 4 (Four) Hours As Needed for Moderate Pain.   5/23/2025 Morning    polyethylene glycol (MIRALAX) 17 g packet Take 17 g by mouth Daily As Needed (Use if senna-docusate is ineffective).   Past Month    pregabalin (LYRICA) 100 MG capsule Take 2 capsules by mouth 3 (Three) Times a Day.   5/23/2025 Morning    sertraline (ZOLOFT) 100 MG tablet Take 1 tablet by mouth Every Evening. Indications: Major Depressive Disorder   5/22/2025 Evening    Synthroid 75 MCG tablet Take 1 tablet by mouth Every Morning.   5/23/2025 Morning    zolpidem (AMBIEN) 10 MG tablet Take 1 tablet by mouth At Night As Needed for Sleep.   Past Month Bedtime    albuterol sulfate  (90 Base) MCG/ACT inhaler Inhale.   Unknown    guaiFENesin (MUCINEX) 600 MG 12 hr tablet Take 1 tablet by mouth.   Unknown    sennosides-docusate (PERICOLACE) 8.6-50 MG per tablet Take 2 tablets by mouth Daily.   More than a month       Allergies  Patient has no known allergies.    Scheduled Meds:[Held by provider] apixaban, 5 mg, Oral, BID  [Held by provider] aspirin, 81 mg, Oral, Nightly  atorvastatin, 40  "mg, Oral, Daily  enoxaparin sodium, 1 mg/kg, Subcutaneous, Q12H  levETIRAcetam, 250 mg, Oral, Q12H  levothyroxine, 75 mcg, Oral, Q AM  [Held by provider] lisinopril, 5 mg, Oral, Daily  metoprolol succinate XL, 25 mg, Oral, Q24H  midodrine, 10 mg, Oral, Q8H  pregabalin, 200 mg, Oral, Q8H  senna-docusate sodium, 2 tablet, Oral, BID  sertraline, 100 mg, Oral, Q PM  sodium chloride, 10 mL, Intravenous, Q12H      Continuous Infusions:   PRN Meds:.  acetaminophen    senna-docusate sodium **AND** polyethylene glycol **AND** bisacodyl **AND** bisacodyl    nitroglycerin    ondansetron    oxyCODONE    [COMPLETED] Insert Peripheral IV **AND** sodium chloride    sodium chloride    sodium chloride    zolpidem    Objective     VITAL SIGNS  Vitals:    05/25/25 2018 05/26/25 0040 05/26/25 0449 05/26/25 0518   BP: 108/48 122/73  101/52   BP Location:  Left arm  Left arm   Patient Position:  Lying  Lying   Pulse: 64 63  60   Resp:  14     Temp:  97.5 °F (36.4 °C)     TempSrc:  Oral     SpO2:  100%     Weight:   73.4 kg (161 lb 13.1 oz)    Height:           Flowsheet Rows      Flowsheet Row First Filed Value   Admission Height 152.4 cm (60\") Documented at 05/23/2025 1228   Admission Weight 79.4 kg (175 lb) Documented at 05/23/2025 1228              Intake/Output Summary (Last 24 hours) at 5/26/2025 0705  Last data filed at 5/26/2025 0556  Gross per 24 hour   Intake 120 ml   Output 900 ml   Net -780 ml        TELEMETRY: Pacemaker rhythm    Physical Exam:  The patient is alert, oriented and in no distress.  Vital signs as noted above.  Head and neck revealed no carotid bruits or jugular venous distention.  No thyromegaly or lymphadenopathy is present  Lungs clear.  No wheezing.  Breath sounds are normal bilaterally.  Heart normal first and second heart sounds.  No murmur. No precordial rub is present.  No gallop is present.  Abdomen soft and nontender.  No organomegaly is present.  Extremities with good peripheral pulses without any " pedal edema.  Skin warm and dry.  Pacemaker site looks normal.  Musculoskeletal system is grossly normal  CNS grossly normal    Reviewed and updated.    Results Review:   I reviewed the patient's new clinical results.  Lab Results (last 24 hours)       Procedure Component Value Units Date/Time    Blood Culture - Blood, Arm, Right [[id number]]  (Normal) Collected: 05/24/25 0021    Specimen: Blood from Arm, Right Updated: 05/26/25 0030     Blood Culture No growth at 2 days    Narrative:      Less than seven (7) mL's of blood was collected.  Insufficient quantity may yield false negative results.    Blood Culture - Blood, Arm, Left [[id number]]  (Normal) Collected: 05/23/25 2037    Specimen: Blood from Arm, Left Updated: 05/25/25 2046     Blood Culture No growth at 2 days            Imaging Results (Last 24 Hours)       ** No results found for the last 24 hours. **        LAB RESULTS (LAST 7 DAYS)    CBC  Results from last 7 days   Lab Units 05/23/25  1403   WBC 10*3/mm3 6.64   RBC 10*6/mm3 3.69*   HEMOGLOBIN g/dL 10.8*   HEMATOCRIT % 34.4   MCV fL 93.2   PLATELETS 10*3/mm3 115*       BMP  Results from last 7 days   Lab Units 05/23/25  1403   SODIUM mmol/L 139   POTASSIUM mmol/L 4.7   CHLORIDE mmol/L 104   CO2 mmol/L 26.4   BUN mg/dL 22   CREATININE mg/dL 0.77   GLUCOSE mg/dL 86       CMP   Results from last 7 days   Lab Units 05/23/25  1403   SODIUM mmol/L 139   POTASSIUM mmol/L 4.7   CHLORIDE mmol/L 104   CO2 mmol/L 26.4   BUN mg/dL 22   CREATININE mg/dL 0.77   GLUCOSE mg/dL 86   ALBUMIN g/dL 3.6   BILIRUBIN mg/dL 0.4   ALK PHOS U/L 94   AST (SGOT) U/L 20   ALT (SGPT) U/L 9         BNP        TROPONIN  Results from last 7 days   Lab Units 05/23/25  1511   HSTROP T ng/L 16*       CoAg        Creatinine Clearance  Estimated Creatinine Clearance: 46.9 mL/min (by C-G formula based on SCr of 0.77 mg/dL).    ABG        Radiology  No radiology results for the last day          EKG      I personally viewed and interpreted the  patient's EKG/Telemetry data:    ECHOCARDIOGRAM:    Results for orders placed during the hospital encounter of 03/21/25    Adult Transthoracic Echo Complete W/ Cont if Necessary Per Protocol    Interpretation Summary    Left ventricular ejection fraction appears to be 61 - 65%.    Left ventricular diastolic function was normal.    The left atrial cavity is moderately dilated.    Left atrial volume is severely increased.    Moderate aortic valve regurgitation is present.    Moderate mitral valve regurgitation is present.    Estimated right ventricular systolic pressure from tricuspid regurgitation is normal (<35 mmHg).          STRESS TEST  Results for orders placed during the hospital encounter of 03/28/25    Stress Test With Myocardial Perfusion One Day    Interpretation Summary    Myocardial perfusion imaging indicates a normal myocardial perfusion study with no evidence of ischemia. Impressions are consistent with a low risk study.    Left ventricular ejection fraction is normal (Calculated EF = 56%).        Cardiolite (Tc-99m sestamibi) stress test    CARDIAC CATHETERIZATION  No results found for this or any previous visit.                OTHER:         Assessment & Plan     Principal Problem:    Cervical neuralgia  Active Problems:    Pacemaker    Paroxysmal atrial fibrillation    Cervical radiculopathy    Essential hypertension    Spondylolisthesis, acquired    Obesity (BMI 30.0-34.9)    Hx of hypertrophic cardiomyopathy    Coronary artery disease    Neck pain    Seizure disorder    Encounter for pre-operative cardiovascular clearance    Aortic insufficiency    Mitral regurgitation      Assessment & Plan  Principal Problem:    Cervical neuralgia  Active Problems:    Pacemaker    Paroxysmal atrial fibrillation    Cervical radiculopathy    Hypertrophic obstructive cardiomyopathy    Spondylolisthesis, acquired    Obesity (BMI 30.0-34.9)    Coronary artery disease    Neck pain    Seizure disorder        Encounter  for pre-operative cardiovascular clearance  Cardiac cath in 2017 showed 30% stenosis of the LAD.   Nuclear stress test done 4/2/25 showed no evidence of ischemia.  Echocardiogram done 3/21/25 showed preserved LVEF with moderate aortic and mitral valve regurgitation.   The patient denies any chest pain or shortness of breath.   Serial high sensitivity troponin 20, 16  EKG reviewed     Preoperative cardiovascular risk assessment:     Teri Perioperative Risk for Myocardial Infarction or Cardiac Arrest (BEBE):  0.4% Risk of myocardial infarction or cardiac arrest, intraoperatively or up to 30 days post-op     Revised Cardiac Risk Index for Pre-Operative Risk:  6.0% 30-day risk of death, MI, or cardiac arrest        Cervical neuralgia  Neck pain  Cervical radiculopathy  Spondylolisthesis, acquired  Neurosurgery following     Hx of hypertrophic cardiomyopathy  Status post septal myomectomy (2013)     Aortic insufficiency / Mitral regurgitation   Moderate per recent echocardiogram  The patient has BLE edema.   She received IVF for hypotension, which has improved.   She may need intermittent diuresis.   Consider LUISA for further assessment of VHD.      Coronary artery disease  Non-obstructive per cardiac cath in 2017.  The patient denies any chest pain.  Hold aspirin for possible surgery   Continue high intensity statin and beta blocker.      Pacemaker  History of sick sinus syndrome      Paroxysmal atrial fibrillation  The patient was recently seen by cardiac electrophysiology, Dr. Pleitez, and offered ablation and cardioversion.  She is supposed to follow-up with EP as outpatient.  [id number] score is 6  I will hold Eliquis and start Lovenox 1 mg/kg sq q12h for possible surgery.      Essential hypertension, chronic  The patient presented with hypotension.  She received 2 liters of IVF and was started on midodrine.  Blood pressure has improved.  Continue to hold lisinopril  Closely monitor blood pressure.       Obesity (BMI 30.0-34.9)  BMI is 34.18. She weighs 175 lbs.  Lifestyle modifications recommended.      Seizure disorder  On Keppra     Patient has hypertrophic cardiomyopathy.  Status post myomectomy.  2015  Status post dual-chamber pacemaker implantation for sick sinus syndrome.  History of symptomatic atrial fibrillation.  Status post ablation for atrial flutter 2023.  Patient is being considered for ablation for atrial fibrillation     History of hypertrophic obstructive cardiomyopathy.  Status post myomectomy at [facility] in 2013.     Status post ablation for atrial fibrillation 2023     Status post dual-chamber pacemaker implantation     Recent atrial fibrillation flutter.  Patient has spinal stenosis.  Patient was seen by Dr. Pleitez.  Options were discussed that included cardioversion versus ablation.  Patient is thinking about the options.  Patient is in sinus rhythm 4/5/2025     Stress Myoview 4/3/2025  Myocardial perfusion imaging indicates a normal myocardial perfusion study with no evidence of ischemia. Impressions are consistent with a low risk study.    Left ventricular ejection fraction is normal (Calculated EF = 56%).     Medications were reviewed and updated.  Eliquis is on hold.  Patient is on atorvastatin subcu Lovenox and levothyroxine metoprolol midodrine.    Patient's cardiovascular status is stable.  Patient is being considered for surgery for cervical spine stenosis secondary to severe adjacent segment disease C7-T1.  Okay with planned surgery from cardiovascular standpoint.    Neurosurgery note and input appreciated.    Dr. Gregory primary cardiologist will see the patient tomorrow.    Further plan will depend on patient's progress.    Reviewed and updated 5/26/2025.      Further plan will depend on patient's progress.    Reviewed and updated 5/26/2025.  ]]]]]]]]]]]]]]]]]]]]]]]]      Thierry Doty MD  05/26/25  07:05 EDT

## 2025-05-27 LAB
INR PPP: 1.04 (ref 0.9–1.1)
PROTHROMBIN TIME: 13.6 SECONDS (ref 11.7–14.2)

## 2025-05-27 PROCEDURE — 0RG6071 FUSION OF THORACIC VERTEBRAL JOINT WITH AUTOLOGOUS TISSUE SUBSTITUTE, POSTERIOR APPROACH, POSTERIOR COLUMN, OPEN APPROACH: ICD-10-PCS | Performed by: NEUROLOGICAL SURGERY

## 2025-05-27 PROCEDURE — 99231 SBSQ HOSP IP/OBS SF/LOW 25: CPT | Performed by: NEUROLOGICAL SURGERY

## 2025-05-27 PROCEDURE — 25010000002 MORPHINE PER 10 MG: Performed by: NURSE PRACTITIONER

## 2025-05-27 PROCEDURE — 0RG4071 FUSION OF CERVICOTHORACIC VERTEBRAL JOINT WITH AUTOLOGOUS TISSUE SUBSTITUTE, POSTERIOR APPROACH, POSTERIOR COLUMN, OPEN APPROACH: ICD-10-PCS | Performed by: NEUROLOGICAL SURGERY

## 2025-05-27 PROCEDURE — 25810000003 SODIUM CHLORIDE 0.9 % SOLUTION: Performed by: INTERNAL MEDICINE

## 2025-05-27 PROCEDURE — 25810000003 SODIUM CHLORIDE 0.9 % SOLUTION: Performed by: PHYSICIAN ASSISTANT

## 2025-05-27 PROCEDURE — 0RG1071 FUSION OF CERVICAL VERTEBRAL JOINT WITH AUTOLOGOUS TISSUE SUBSTITUTE, POSTERIOR APPROACH, POSTERIOR COLUMN, OPEN APPROACH: ICD-10-PCS | Performed by: NEUROLOGICAL SURGERY

## 2025-05-27 PROCEDURE — 99232 SBSQ HOSP IP/OBS MODERATE 35: CPT | Performed by: INTERNAL MEDICINE

## 2025-05-27 PROCEDURE — 85610 PROTHROMBIN TIME: CPT | Performed by: NEUROLOGICAL SURGERY

## 2025-05-27 PROCEDURE — 25010000002 ENOXAPARIN PER 10 MG: Performed by: NURSE PRACTITIONER

## 2025-05-27 RX ORDER — DOCUSATE SODIUM 100 MG/1
100 CAPSULE, LIQUID FILLED ORAL 2 TIMES DAILY
Status: DISCONTINUED | OUTPATIENT
Start: 2025-05-27 | End: 2025-06-05 | Stop reason: HOSPADM

## 2025-05-27 RX ORDER — MIDODRINE HYDROCHLORIDE 5 MG/1
10 TABLET ORAL EVERY 8 HOURS
Status: DISCONTINUED | OUTPATIENT
Start: 2025-05-27 | End: 2025-06-05 | Stop reason: HOSPADM

## 2025-05-27 RX ORDER — POLYETHYLENE GLYCOL 3350 17 G/17G
17 POWDER, FOR SOLUTION ORAL DAILY
Status: DISCONTINUED | OUTPATIENT
Start: 2025-05-27 | End: 2025-06-05 | Stop reason: HOSPADM

## 2025-05-27 RX ORDER — MIDODRINE HYDROCHLORIDE 5 MG/1
5 TABLET ORAL EVERY 8 HOURS
Status: DISCONTINUED | OUTPATIENT
Start: 2025-05-27 | End: 2025-05-27

## 2025-05-27 RX ADMIN — MIDODRINE HYDROCHLORIDE 10 MG: 5 TABLET ORAL at 19:09

## 2025-05-27 RX ADMIN — SERTRALINE HYDROCHLORIDE 100 MG: 100 TABLET, FILM COATED ORAL at 21:45

## 2025-05-27 RX ADMIN — MIDODRINE HYDROCHLORIDE 10 MG: 5 TABLET ORAL at 05:54

## 2025-05-27 RX ADMIN — OXYCODONE 10 MG: 5 TABLET ORAL at 08:12

## 2025-05-27 RX ADMIN — Medication 10 ML: at 08:12

## 2025-05-27 RX ADMIN — POLYETHYLENE GLYCOL 3350 17 G: 17 POWDER, FOR SOLUTION ORAL at 08:12

## 2025-05-27 RX ADMIN — ATORVASTATIN CALCIUM 40 MG: 40 TABLET, FILM COATED ORAL at 08:12

## 2025-05-27 RX ADMIN — OXYCODONE 10 MG: 5 TABLET ORAL at 13:44

## 2025-05-27 RX ADMIN — POLYETHYLENE GLYCOL 3350 17 G: 17 POWDER, FOR SOLUTION ORAL at 17:25

## 2025-05-27 RX ADMIN — SODIUM CHLORIDE 500 ML: 900 INJECTION, SOLUTION INTRAVENOUS at 17:54

## 2025-05-27 RX ADMIN — SENNOSIDES AND DOCUSATE SODIUM 2 TABLET: 8.6; 5 TABLET ORAL at 08:12

## 2025-05-27 RX ADMIN — PREGABALIN 200 MG: 100 CAPSULE ORAL at 05:54

## 2025-05-27 RX ADMIN — PREGABALIN 200 MG: 100 CAPSULE ORAL at 13:44

## 2025-05-27 RX ADMIN — SENNOSIDES AND DOCUSATE SODIUM 2 TABLET: 8.6; 5 TABLET ORAL at 21:45

## 2025-05-27 RX ADMIN — LISINOPRIL 5 MG: 5 TABLET ORAL at 10:11

## 2025-05-27 RX ADMIN — DOCUSATE SODIUM 100 MG: 100 CAPSULE, LIQUID FILLED ORAL at 21:45

## 2025-05-27 RX ADMIN — METOPROLOL SUCCINATE 25 MG: 25 TABLET, EXTENDED RELEASE ORAL at 08:12

## 2025-05-27 RX ADMIN — SODIUM CHLORIDE 250 ML: 9 INJECTION, SOLUTION INTRAVENOUS at 20:35

## 2025-05-27 RX ADMIN — ACETAMINOPHEN 650 MG: 325 TABLET ORAL at 10:11

## 2025-05-27 RX ADMIN — LEVETIRACETAM 250 MG: 250 TABLET, FILM COATED ORAL at 21:45

## 2025-05-27 RX ADMIN — MORPHINE SULFATE 4 MG: 4 INJECTION, SOLUTION INTRAMUSCULAR; INTRAVENOUS at 11:36

## 2025-05-27 RX ADMIN — ENOXAPARIN SODIUM 80 MG: 100 INJECTION SUBCUTANEOUS at 08:12

## 2025-05-27 RX ADMIN — OXYCODONE 10 MG: 5 TABLET ORAL at 00:07

## 2025-05-27 RX ADMIN — LEVOTHYROXINE SODIUM 75 MCG: 0.07 TABLET ORAL at 05:55

## 2025-05-27 RX ADMIN — MIDODRINE HYDROCHLORIDE 5 MG: 5 TABLET ORAL at 13:44

## 2025-05-27 RX ADMIN — ENOXAPARIN SODIUM 80 MG: 100 INJECTION SUBCUTANEOUS at 21:45

## 2025-05-27 RX ADMIN — Medication 10 ML: at 21:46

## 2025-05-27 NOTE — PLAN OF CARE
Patient tentatively added to surgery schedule tomorrow afternoon pending cardiac clearance.  If it is deemed safe from the cardiac standpoint that patient can undergo surgery, patient will need to have her Lovenox held tomorrow morning 5/28/2025.  She will also need to be n.p.o. tonight.

## 2025-05-27 NOTE — PLAN OF CARE
Goal Outcome Evaluation:              Outcome Evaluation: Pt AO, able to make needs known. VSS. Pt c/o pain and discomfort treated per MAR. Pt resting comfortably with call light within reach. Plan of care ongoing

## 2025-05-27 NOTE — PROGRESS NOTES
LOS: 0 days   Patient Care Team:  Anish Lew APRN as PCP - General  Evgeny Gregory MD as Consulting Physician (Cardiology)  Leeanne Pleitez MD as Consulting Physician (Cardiology)  Radha Owusu APRN as Nurse Practitioner (Nurse Practitioner)  Kathe Muñoz MD as Consulting Physician (Nephrology)    Subjective     Patient continues with pain and constipation    Review of Systems   Constitutional:  Positive for activity change and fatigue.   HENT: Negative.     Respiratory: Negative.     Cardiovascular: Negative.    Gastrointestinal: Negative.    Genitourinary: Negative.    Musculoskeletal:  Positive for back pain and gait problem.   Neurological:  Positive for weakness.   Psychiatric/Behavioral: Negative.             Objective     Vital Signs  Temp:  [97.6 °F (36.4 °C)-98.2 °F (36.8 °C)] 97.7 °F (36.5 °C)  Heart Rate:  [61-65] 64  Resp:  [14-17] 15  BP: ()/(54-78) 109/58      Physical Exam  Vitals reviewed.   Constitutional:       Appearance: She is not ill-appearing.   HENT:      Head: Normocephalic and atraumatic.      Right Ear: External ear normal.      Left Ear: External ear normal.      Nose: Nose normal.      Mouth/Throat:      Mouth: Mucous membranes are dry.   Eyes:      General:         Right eye: No discharge.         Left eye: No discharge.   Cardiovascular:      Rate and Rhythm: Normal rate and regular rhythm.      Heart sounds: Normal heart sounds.   Pulmonary:      Effort: Pulmonary effort is normal.      Breath sounds: Normal breath sounds.   Abdominal:      General: Bowel sounds are normal.   Musculoskeletal:      Cervical back: Tenderness present.      Right lower leg: No edema.      Left lower leg: No edema.   Skin:     General: Skin is dry.   Neurological:      Mental Status: She is alert and oriented to person, place, and time.   Psychiatric:         Behavior: Behavior normal.              Results Review:    Lab Results (last 24 hours)        Procedure Component Value Units Date/Time    Protime-INR [[id number]]  (Normal) Collected: 05/27/25 1303    Specimen: Blood from Hand, Left Updated: 05/27/25 1320     Protime 13.6 Seconds      INR 1.04    Blood Culture - Blood, Arm, Right [[id number]]  (Normal) Collected: 05/24/25 0021    Specimen: Blood from Arm, Right Updated: 05/27/25 0030     Blood Culture No growth at 3 days    Narrative:      Less than seven (7) mL's of blood was collected.  Insufficient quantity may yield false negative results.    Blood Culture - Blood, Arm, Left [[id number]]  (Normal) Collected: 05/23/25 2037    Specimen: Blood from Arm, Left Updated: 05/26/25 2045     Blood Culture No growth at 3 days             Imaging Results (Last 24 Hours)       ** No results found for the last 24 hours. **                 I reviewed the patient's new clinical results.    Medication Review:   Scheduled Meds:[Held by provider] apixaban, 5 mg, Oral, BID  [Held by provider] aspirin, 81 mg, Oral, Nightly  atorvastatin, 40 mg, Oral, Daily  docusate sodium, 100 mg, Oral, BID  enoxaparin sodium, 1 mg/kg, Subcutaneous, Q12H  levETIRAcetam, 250 mg, Oral, Q12H  levothyroxine, 75 mcg, Oral, Q AM  lisinopril, 5 mg, Oral, Daily  metoprolol succinate XL, 25 mg, Oral, Q24H  midodrine, 5 mg, Oral, Q8H  polyethylene glycol, 17 g, Oral, Daily  pregabalin, 200 mg, Oral, Q8H  senna-docusate sodium, 2 tablet, Oral, BID  sertraline, 100 mg, Oral, Q PM  sodium chloride, 10 mL, Intravenous, Q12H      Continuous Infusions:   PRN Meds:.  acetaminophen    senna-docusate sodium **AND** polyethylene glycol **AND** bisacodyl **AND** bisacodyl    Morphine    nitroglycerin    ondansetron    oxyCODONE    [COMPLETED] Insert Peripheral IV **AND** sodium chloride    sodium chloride    sodium chloride    zolpidem     Interval History:        Assessment & Plan      Cervical neuralgia  Pacemaker  Paroxysmal atrial fibrillation  Cervical radiculopathy  Essential  hypertension  Spondylolisthesis, acquired  Obesity (BMI 30.0-34.9)  Hx of hypertrophic cardiomyopathy  Coronary artery disease  Neck pain  Seizure disorder  Encounter for pre-operative cardiovascular clearance  Aortic insufficiency  Mitral regurgitation    Plan of care  -Neurosurgery following with tentative surgery for tomorrow  -bowel regimen will need bowel purge  -Waiting on cardiology for determination on clearance for surgery  -home medications for chronic medical conditions  -holding Eliquis and aspirin for possible surgery  -Lovenox for DVT prophylaxis as well as paroxysmal atrial fibrillation  -pain control with oxycodone/morphine  -BP fluctuating most likely due to pain and pain medication; will hold lisinopril and keep midodrine would rather have more htn lean than hypotension      CODE Status:    Code status (Patient has no pulse and is not breathing):  CPR (Attempt to Resuscitate)  Medical Interventions (Patient has pulse or is breathing):  Full support  Level of support discussed with:  Patient     Admission status:  I believe this patient meets inpatient status  Expected length of stay:  2 midnights or greater    Plan for disposition:WOODY Hart  05/27/25  15:37 EDT

## 2025-05-27 NOTE — CASE MANAGEMENT/SOCIAL WORK
Continued Stay Note   [place]     Patient Name: Domonique See  MRN: [id number]  Today's Date: 5/27/2025    Admit Date: 5/23/2025    Plan: DC Plan:from home with son. pending cardiology clearance for possible surgery.   Discharge Plan       Row Name 05/27/25 1902       Plan    Plan DC Plan:from home with son. pending cardiology clearance for possible surgery.    Plan Comments DC barriers: pending cardiac clearace for possible neurosurgery, pain, constipation                          Corin Baker RN    [facility]  [email or web address]  Office [phone]  Cell [phone]

## 2025-05-27 NOTE — PROGRESS NOTES
Neurosurgery Evaluation    Date: 25     Patient: Domonique See     Sex: female     : 1940      SUBJECTIVE    CC: Cervical myelopathy    Interval history:  No adverse events overnight.  No new complaints today.      OBJECTIVE  Vitals:    25 2254 25 0003 25 0554 25 0759   BP: 146/73 167/76 156/74 172/78   BP Location:  Left arm  Right arm   Patient Position:  Lying  Lying   Pulse:  65     Resp:  17  15   Temp:  98.2 °F (36.8 °C)  97.7 °F (36.5 °C)   TempSrc:  Oral  Oral   SpO2:  96%     Weight:       Height:           Physical exam    General  - Awake, cooperative, in no acute distress  NEUROLOGIC  - Stable weakness throughout BLE, worse distally  - Stable decreased sensation throughout BLE  - Negative clonus  - Negative David      Results review  CBC          4/15/2025    08:37 2025    12:30 2025    14:03   CBC   WBC 4.60  4.47  6.64    RBC 3.78  3.58  3.69    Hemoglobin 11.1  10.6  10.8    Hematocrit 36.7  34.3  34.4    MCV 97.1  95.8  93.2    MCH 29.4  29.6  29.3    MCHC 30.2  30.9  31.4    RDW 13.0  13.2  13.4    Platelets 139  156  115        BMP          4/15/2025    08:37 2025    12:30 2025    14:03   BMP   BUN 14  10  22    Creatinine 0.82  0.72  0.77    Sodium 142  141  139    Potassium 4.4  4.0  4.7    Chloride 104  105  104    CO2 30.1  29.6  26.4    Calcium 9.3  8.8  9.2              ASSESSMENT/PLAN  This is a 85 y.o. female presenting with chronic cervical myeloradiculopathy with severe degenerative changes at C7-T1.  She has weakness in her lower extremities which is grossly stable over the past several weeks.      Patient is felt to be at high perioperative risk due to her advanced age and decreased bone density as well as intraoperative risks secondary to significant cardiac issues.  Was seen by Dr. Doty over the weekend.  Awaiting cardiac clearance from patient's primary cardiologist Dr. Gregory.    Surgical plans pending further  discussion with cardiology, the patient, and family.  Continue current management for now      Cervical spondylosis with myelopathy  -Waiting cardiology recommendations; appreciate their assistance  - Continue PT/OT  - Medical management and pain control per primary team  - Call with any questions or concerns    Neurosurgery will continue to follow    I discussed my assessment and recommendations with Dr. Dolores Adam PA-C  05/27/25  09:27 EDT      Part of this note may be an electronic transcription/translation of spoken language to printed text using the Dragon Dictation System.

## 2025-05-27 NOTE — PROGRESS NOTES
Cardiology Progress Note    Patient Identification:  Name: Domonique See  Age: 85 y.o.  Sex: female  :  1940  MRN: [id number]                 Follow Up / Chief Complaint: pre op evaluation   Chief Complaint   Patient presents with    Rectal Pain     Constipated last night, ws on the toilet for 4 hours without getting up, pt took a break and went back for another 2 hours. Pt is worried she prolapsed her rectum. Pt has cold legs and feet - spinal stenosis may be worsening. Partial paralysis from waist down has rectal numbness x 3 months.       Interval History: Patient presented with worsening lower extremity weakness        NP NOTE:  Patient seen and examined this morning.  Patient complaining of lumbar pain now, which she reports she has not had.  We discuss risks/benefits of cervical spine surgery at her age with her cardiology issues vs not having surgery.  Patient wishes to proceed with surgery.  She currently denies any chest pain or shortness of breath     Electronically signed by WOODY Thomas, 25, 11:08 AM EDT.    Cardiology attending addendum :    I have personally performed a face-to-face diagnostic evaluation, physical exam and reviewed data on this patient.  I have reviewed documentation done by me and nurse practitioner  and corrected as needed.  And agree with the different components of documentation.Greater than 50% of the time spent in the care of this patient was provided by attending consultant/me.       Subjective: Patient seen and examined.  Chart reviewed.  Labs reviewed.  Discussed with RN taking care of patient.        Objective:   2025: High-sensitivity troponin 20--16 potassium 4.7 otherwise CMP unremarkable lactic normal hemoglobin 10.3 blood cultures negative    History of present illness:      Ms. Domonique See  has PMH of     Hypertrophic cardiomyopathy, history of septal myomectomy,      nonsustained VT  hypertension  [facility], [employer] permanent  pacemaker 3/16/2017  Paroxysmal atrial fibrillation, ablation 1/16/2023  [id number] SCORE   [id number] Score: 8 (6/20/2024  2:06 PM)  [id number] Score: 8 (5/22/2023  3:37 PM)  Age greater than 75, CHF, hypertension, CVA, vascular disease     CAD, cath 12/2017 30% LAD  CHF due to hypertrophic cardiomyopathy  Hyperlipidemia  COPD  History of DAYLIN 4/28/2023  Peptic ulcer disease  Back surgery (previous cervical decompression and fusion, lumbar decompression and fusion), breast augmentation  Former smoker              presented to the ER at [facility] on 5/23/2025 complaining of worsening leg weakness, numbness and constipation. Of note, the patient was recently seen by neurosurgery, Dr. Bernal, for severe stenosis of the cervical spine. She is now wheelchair-bound.      Workup in the ER revealed hypotension, which responded to IV fluids. Neurosurgery was consulted and she was admitted for further treatment. Cardiology was consulted for preoperative cardiac clearance.              Outpatient echocardiogram 3/21/2025    Left ventricular ejection fraction appears to be 61 - 65%.    Left ventricular diastolic function was normal.    The left atrial cavity is moderately dilated.    Left atrial volume is severely increased.    Moderate aortic valve regurgitation is present.    Moderate mitral valve regurgitation is present.    Estimated right ventricular systolic pressure from tricuspid regurgitation is normal (<35 mmHg)              Chart review:  Underwent A-fib ablation 1/16/2023  Admitted 1/18/2023 with CHF     Echocardiogram 6/29/2021 reveals EF of 50%   Echocardiogram 1/19/2023 EF of 60 to 65% with severe right ventricular and right atrial enlargement moderate left atrial enlargement, moderate mitral regurgitation and mild aortic regurgitation.                 Assessment:    Preoperative cardiovascular evaluation for cervical spine surgery  Cervical neuralgia  Inactive functional status  Extremity  weakness  Permanent pacemaker  Permanent A-fib status post ablation, long-term Eliquis  Hypertrophic cardiomyopathy, myomectomy, NSVT  Moderate mitral regurgitation, mild aortic regurgitation  Chronic HFpEF due to diastolic dysfunction from HCM  Hypertension  Dyslipidemia  Prediabetes  Severe right ventricular enlargement        Recommendations and plan:     Patient has issues with chronic back pain requiring injections in the past is not functionally active.  Patient had cath in 2017 showing 30% stenosis in the LAD, Lexiscan 4-25 revealed no ischemia.  Echocardiogram 3/21/2025 revealed preserved EF with moderate aortic and mitral valve regurgitation  Serial troponins are mildly elevated at 20 and 16.  Patient denies any chest pain.  EKG revealed paced rhythm.      Patient has elevated [id number] score of 8, due to age over 75, female gender, CHF, hypertension, CVA, vascular disease.  Will benefit from long-term anticoagulation to prevent thromboembolic events.  Patient's risk of stroke and other embolism ranged from 9.7 to 13.6 %/year.  Patient's anticoagulation is currently held for possible surgery  Would recommend restart, when safe.  Continue metoprolol for rate control.    Patient is preop for C-spine surgery/C3-7   Given her advanced age her risk of complications and cardiac events and death would be higher.  Discussed the same.  Based on ACS NSQIP calculator risk is high  Cardiac risk index for preoperative risk per [facility] preoperative risk assessment would be 6% of risk of major adverse cardiac events.  Discussed the same with patient and multiple family members in the room.       Data:     EKG 4/1/2025 reviewed/interpreted by me reveals A-fib with a rate of 92 bpm with right bundle branch block.  Patient underwent Lexiscan Cardiolite 4/3/2025 which was negative for ischemia EF of 56%.  Reviewed stress test results with patient.  Patient had previous ablation.  Was seen by EP Dr. Naseem Pleitez, I am  patient was offered ablation and cardioversion she is considering.  Will follow-up with EP as outpatient.  Will continue to monitor rhythm on telemetry  Will follow-up as outpatient in pacemaker clinic to follow rhythm.           Procedures     ECHOCARDIOGRAM:  Results for orders placed during the hospital encounter of 03/21/25     Adult Transthoracic Echo Complete W/ Cont if Necessary Per Protocol     Interpretation Summary    Left ventricular ejection fraction appears to be 61 - 65%.    Left ventricular diastolic function was normal.    The left atrial cavity is moderately dilated.    Left atrial volume is severely increased.    Moderate aortic valve regurgitation is present.    Moderate mitral valve regurgitation is present.    Estimated right ventricular systolic pressure from tricuspid regurgitation is normal (<35 mmHg).        STRESS TEST  Results for orders placed during the hospital encounter of 03/28/25     Stress Test With Myocardial Perfusion One Day     Interpretation Summary    Myocardial perfusion imaging indicates a normal myocardial perfusion study with no evidence of ischemia. Impressions are consistent with a low risk study.    Left ventricular ejection fraction is normal (Calculated EF = 56%).                     Copied text in this portion of the note has been reviewed and is accurate as of 5/27/2025    Past Medical History:  Past Medical History:   Diagnosis Date    Anemia     Aortic insufficiency 03/21/2025    Asthma     CHF (congestive heart failure)     Cholecystitis 09/02/2011    Congenital heart disease     Coronary artery disease 2013    Deep vein thrombosis (DVT) 11/20/2017    GERD (gastroesophageal reflux disease)     Heart murmur Don't know    Hx of hypertrophic cardiomyopathy     Hypertension     Hypertrophic obstructive cardiomyopathy 11/10/2017    Low back pain     Lumbosacral disc disease     Mitral regurgitation 03/21/2025    Mixed hyperlipidemia 10/06/2017    Morbid obesity  2020    Peptic ulceration     Persistent atrial fibrillation 01/10/2023    Primary osteoarthritis of both knees 2021    Primary osteoarthritis of right knee 2020    Seizures     Sleep apnea 2017    TIA (transient ischemic attack)      Past Surgical History:  Past Surgical History:   Procedure Laterality Date    ABDOMINAL HERNIA REPAIR      ABLATION OF DYSRHYTHMIC FOCUS  2023    BACK SURGERY      BREAST AUGMENTATION      CARDIAC CATHETERIZATION      CARDIAC ELECTROPHYSIOLOGY PROCEDURE N/A 2023    Procedure: Ablation atrial fibrillation and flutter, cryo Duong and Ramón aware;  Surgeon: Leeanne Pleitez MD;  Location: [facility] CATH INVASIVE LOCATION;  Service: Cardiovascular;  Laterality: N/A;    CARDIAC PACEMAKER PLACEMENT  2017    Dual Chamber    CERVICAL RIB RESECTION Bilateral     CERVICAL RIB RESECTION Bilateral 1963    bilateral cervical ribs removed - extra ribs located and causing pain    CHOLECYSTECTOMY      COLONOSCOPY      CORONARY ARTERY BYPASS GRAFT      EPIDURAL BLOCK      NECK SURGERY      SPINAL FUSION      THORACIC DECOMPRESSION POSTERIOR FUSION  2014    L3-5 & S1    TRIGGER POINT INJECTION      UPPER GASTROINTESTINAL ENDOSCOPY          Social History:   Social History     Tobacco Use    Smoking status: Former     Current packs/day: 0.00     Average packs/day: 1 pack/day for 8.0 years (8.0 ttl pk-yrs)     Types: Cigarettes     Start date: 1977     Quit date: 1985     Years since quittin.4     Passive exposure: Past    Smokeless tobacco: Never    Tobacco comments:     Quit    Substance Use Topics    Alcohol use: Never     Comment: 6 drinks a year in social settings      Family History:  Family History   Problem Relation Age of Onset    Colon cancer Mother     Heart disease Mother     Hypertension Mother     Arthritis Mother     Cancer Mother     Diabetes Mother     Kidney disease Mother     Colon cancer Brother     Heart attack Brother      "Colon cancer Daughter     Arthritis Daughter     Arthritis Father     Hearing loss Father     Arthritis Sister     Hypertension Brother     Arthritis Brother     Cancer Brother     Stroke Brother     Arthritis Son     Arthritis Son     Cancer Sister     Diabetes Sister     Cancer Brother     Hypertension Sister     Kidney disease Sister           Allergies:  No Known Allergies  Scheduled Meds:  [Held by provider] apixaban, 5 mg, BID  [Held by provider] aspirin, 81 mg, Nightly  atorvastatin, 40 mg, Daily  enoxaparin sodium, 1 mg/kg, Q12H  levETIRAcetam, 250 mg, Q12H  levothyroxine, 75 mcg, Q AM  lisinopril, 5 mg, Daily  metoprolol succinate XL, 25 mg, Q24H  midodrine, 5 mg, Q8H  pregabalin, 200 mg, Q8H  senna-docusate sodium, 2 tablet, BID  sertraline, 100 mg, Q PM  sodium chloride, 10 mL, Q12H          Review of Systems:   Review of Systems   Musculoskeletal:  Positive for back pain and muscle weakness.   Neurological:  Positive for numbness, paresthesias and weakness.     Review of Systems   Constitution: Negative for chills and fever.   Cardiovascular: Negative for chest pain and palpitations.   Respiratory: Negative for cough and hemoptysis.    Gastrointestinal: Negative for nausea.        Constitutional:  Temp:  [97.6 °F (36.4 °C)-98.2 °F (36.8 °C)] 97.7 °F (36.5 °C)  Heart Rate:  [61-65] 61  Resp:  [11-17] 15  BP: ()/(51-78) 119/65    Physical Exam   /65 (BP Location: Right arm, Patient Position: Lying)   Pulse 61   Temp 97.7 °F (36.5 °C) (Oral)   Resp 15   Ht 152.4 cm (60\")   Wt 73.4 kg (161 lb 13.1 oz)   LMP  (LMP Unknown)   SpO2 96%   BMI 31.60 kg/m²   General:  Appears in no acute distress  Eyes: Sclera is anicteric,  conjunctiva is clear   HEENT:  No JVD. Thyroid not visibly enlarged. No mucosal pallor or cyanosis  Respiratory: Respirations regular and unlabored at rest.  Clear to auscultation  Cardiovascular: S1,S2 Regular rate and rhythm.   Gastrointestinal: Abdomen " nondistended.  Musculoskeletal:  No abnormal movements  Extremities: No digital clubbing or cyanosis  Skin: Color pink.   Neuro: Alert and awake.    INTAKE AND OUTPUT:    Intake/Output Summary (Last 24 hours) at 5/27/2025 1334  Last data filed at 5/27/2025 1208  Gross per 24 hour   Intake 800 ml   Output 1650 ml   Net -850 ml       Cardiographics  Telemetry: Reviewed and interpreted by me reveals A-fib with V paced rhythm    ECG:   ECG 12 Lead Other; arm pain   Final Result   HEART RATE=68  bpm   RR Vxrrbouf=867  ms   DE Interval=  ms   P Horizontal Axis=  deg   P Front Axis=  deg   QRSD Rfmngsyi=804  ms   QT Dblfgudf=906  ms   MJgE=790  ms   QRS Axis=-25  deg   T Wave Axis=34  deg   - ABNORMAL ECG -   Afib/flut and V-paced complexes   Right bundle branch block   Incomplete left bundle branch block   Left ventricular hypertrophy   When compared with ECG of 01-Apr-2025 22:52:35,   Significant change in rhythm   Significant repolarization change   Electronically Signed By: Manav Whitt (ELSA) 2025-05-24 07:06:44   Date and Time of Study:2025-05-23 13:04:06      Telemetry Scan   Final Result      Telemetry Scan   Final Result      Telemetry Scan   Final Result      Telemetry Scan   Final Result      Telemetry Scan   Final Result      Telemetry Scan   Final Result      Telemetry Scan   Final Result      Telemetry Scan   Final Result      Telemetry Scan   Final Result      Telemetry Scan   Final Result        I have personally reviewed EKG    Echocardiogram: Results for orders placed during the hospital encounter of 03/21/25    Adult Transthoracic Echo Complete W/ Cont if Necessary Per Protocol    Interpretation Summary    Left ventricular ejection fraction appears to be 61 - 65%.    Left ventricular diastolic function was normal.    The left atrial cavity is moderately dilated.    Left atrial volume is severely increased.    Moderate aortic valve regurgitation is present.    Moderate mitral valve regurgitation is  "present.    Estimated right ventricular systolic pressure from tricuspid regurgitation is normal (<35 mmHg).      STRESS TEST  Results for orders placed during the hospital encounter of 03/28/25    Stress Test With Myocardial Perfusion One Day    Interpretation Summary    Myocardial perfusion imaging indicates a normal myocardial perfusion study with no evidence of ischemia. Impressions are consistent with a low risk study.    Left ventricular ejection fraction is normal (Calculated EF = 56%).      HEART CATHETERIZATION  No results found for this or any previous visit.      Lab Review   I have reviewed the labs  Results from last 7 days   Lab Units 05/23/25  1511 05/23/25  1403   HSTROP T ng/L 16* 20*         Results from last 7 days   Lab Units 05/23/25  1403   SODIUM mmol/L 139   POTASSIUM mmol/L 4.7   BUN mg/dL 22   CREATININE mg/dL 0.77   CALCIUM mg/dL 9.2         Results from last 7 days   Lab Units 05/23/25  1403   WBC 10*3/mm3 6.64   HEMOGLOBIN g/dL 10.8*   HEMATOCRIT % 34.4   PLATELETS 10*3/mm3 115*     Results from last 7 days   Lab Units 05/27/25  1303   INR  1.04       RADIOLOGY:  Imaging Results (Last 24 Hours)       ** No results found for the last 24 hours. **                  )5/27/2025  MD JONO Barajas/Transcription:   \"Dictated utilizing Dragon dictation\".   "

## 2025-05-28 ENCOUNTER — APPOINTMENT (OUTPATIENT)
Dept: GENERAL RADIOLOGY | Facility: HOSPITAL | Age: 85
End: 2025-05-28
Payer: MEDICARE

## 2025-05-28 LAB
ABO GROUP BLD: NORMAL
ALBUMIN SERPL-MCNC: 3.3 G/DL (ref 3.5–5.2)
ALBUMIN/GLOB SERPL: 1.7 G/DL
ALP SERPL-CCNC: 97 U/L (ref 39–117)
ALT SERPL W P-5'-P-CCNC: 10 U/L (ref 1–33)
ANION GAP SERPL CALCULATED.3IONS-SCNC: 10.7 MMOL/L (ref 5–15)
APTT PPP: 43 SECONDS (ref 22.7–35.4)
AST SERPL-CCNC: 17 U/L (ref 1–32)
BACTERIA SPEC AEROBE CULT: NORMAL
BASOPHILS # BLD AUTO: 0.01 10*3/MM3 (ref 0–0.2)
BASOPHILS NFR BLD AUTO: 0.2 % (ref 0–1.5)
BILIRUB SERPL-MCNC: 0.4 MG/DL (ref 0–1.2)
BLD GP AB SCN SERPL QL: NEGATIVE
BUN SERPL-MCNC: 16.1 MG/DL (ref 8–23)
BUN/CREAT SERPL: 21.2 (ref 7–25)
CALCIUM SPEC-SCNC: 9.1 MG/DL (ref 8.6–10.5)
CHLORIDE SERPL-SCNC: 107 MMOL/L (ref 98–107)
CO2 SERPL-SCNC: 21.3 MMOL/L (ref 22–29)
CREAT SERPL-MCNC: 0.76 MG/DL (ref 0.57–1)
DEPRECATED RDW RBC AUTO: 49.8 FL (ref 37–54)
EGFRCR SERPLBLD CKD-EPI 2021: 76.4 ML/MIN/1.73
EOSINOPHIL # BLD AUTO: 0.15 10*3/MM3 (ref 0–0.4)
EOSINOPHIL NFR BLD AUTO: 3 % (ref 0.3–6.2)
ERYTHROCYTE [DISTWIDTH] IN BLOOD BY AUTOMATED COUNT: 13.9 % (ref 12.3–15.4)
GLOBULIN UR ELPH-MCNC: 2 GM/DL
GLUCOSE SERPL-MCNC: 98 MG/DL (ref 65–99)
HCT VFR BLD AUTO: 36.3 % (ref 34–46.6)
HGB BLD-MCNC: 10.7 G/DL (ref 12–15.9)
IMM GRANULOCYTES # BLD AUTO: 0.05 10*3/MM3 (ref 0–0.05)
IMM GRANULOCYTES NFR BLD AUTO: 1 % (ref 0–0.5)
LYMPHOCYTES # BLD AUTO: 1.27 10*3/MM3 (ref 0.7–3.1)
LYMPHOCYTES NFR BLD AUTO: 25 % (ref 19.6–45.3)
MCH RBC QN AUTO: 29.2 PG (ref 26.6–33)
MCHC RBC AUTO-ENTMCNC: 29.5 G/DL (ref 31.5–35.7)
MCV RBC AUTO: 99.2 FL (ref 79–97)
MONOCYTES # BLD AUTO: 1.18 10*3/MM3 (ref 0.1–0.9)
MONOCYTES NFR BLD AUTO: 23.2 % (ref 5–12)
NEUTROPHILS NFR BLD AUTO: 2.42 10*3/MM3 (ref 1.7–7)
NEUTROPHILS NFR BLD AUTO: 47.6 % (ref 42.7–76)
NRBC BLD AUTO-RTO: 0 /100 WBC (ref 0–0.2)
PLATELET # BLD AUTO: 131 10*3/MM3 (ref 140–450)
PMV BLD AUTO: 11.7 FL (ref 6–12)
POTASSIUM SERPL-SCNC: 4.5 MMOL/L (ref 3.5–5.2)
PROT SERPL-MCNC: 5.3 G/DL (ref 6–8.5)
RBC # BLD AUTO: 3.66 10*6/MM3 (ref 3.77–5.28)
RH BLD: POSITIVE
SODIUM SERPL-SCNC: 139 MMOL/L (ref 136–145)
T&S EXPIRATION DATE: NORMAL
WBC NRBC COR # BLD AUTO: 5.08 10*3/MM3 (ref 3.4–10.8)

## 2025-05-28 PROCEDURE — 25010000002 ENOXAPARIN PER 10 MG: Performed by: NURSE PRACTITIONER

## 2025-05-28 PROCEDURE — 86900 BLOOD TYPING SEROLOGIC ABO: CPT | Performed by: INTERNAL MEDICINE

## 2025-05-28 PROCEDURE — 86850 RBC ANTIBODY SCREEN: CPT | Performed by: INTERNAL MEDICINE

## 2025-05-28 PROCEDURE — 86901 BLOOD TYPING SEROLOGIC RH(D): CPT

## 2025-05-28 PROCEDURE — 71045 X-RAY EXAM CHEST 1 VIEW: CPT

## 2025-05-28 PROCEDURE — 86900 BLOOD TYPING SEROLOGIC ABO: CPT

## 2025-05-28 PROCEDURE — 99232 SBSQ HOSP IP/OBS MODERATE 35: CPT | Performed by: NEUROLOGICAL SURGERY

## 2025-05-28 PROCEDURE — 80053 COMPREHEN METABOLIC PANEL: CPT | Performed by: INTERNAL MEDICINE

## 2025-05-28 PROCEDURE — 85730 THROMBOPLASTIN TIME PARTIAL: CPT | Performed by: INTERNAL MEDICINE

## 2025-05-28 PROCEDURE — 85025 COMPLETE CBC W/AUTO DIFF WBC: CPT | Performed by: INTERNAL MEDICINE

## 2025-05-28 PROCEDURE — 86901 BLOOD TYPING SEROLOGIC RH(D): CPT | Performed by: INTERNAL MEDICINE

## 2025-05-28 PROCEDURE — 99232 SBSQ HOSP IP/OBS MODERATE 35: CPT | Performed by: INTERNAL MEDICINE

## 2025-05-28 RX ORDER — METOPROLOL SUCCINATE 25 MG/1
12.5 TABLET, EXTENDED RELEASE ORAL
Status: DISCONTINUED | OUTPATIENT
Start: 2025-05-29 | End: 2025-06-05 | Stop reason: HOSPADM

## 2025-05-28 RX ADMIN — PREGABALIN 200 MG: 100 CAPSULE ORAL at 14:51

## 2025-05-28 RX ADMIN — MIDODRINE HYDROCHLORIDE 10 MG: 5 TABLET ORAL at 22:19

## 2025-05-28 RX ADMIN — MIDODRINE HYDROCHLORIDE 10 MG: 5 TABLET ORAL at 14:51

## 2025-05-28 RX ADMIN — Medication 10 ML: at 22:19

## 2025-05-28 RX ADMIN — SENNOSIDES AND DOCUSATE SODIUM 2 TABLET: 8.6; 5 TABLET ORAL at 22:19

## 2025-05-28 RX ADMIN — BISACODYL 10 MG: 10 SUPPOSITORY RECTAL at 23:34

## 2025-05-28 RX ADMIN — ACETAMINOPHEN 650 MG: 325 TABLET ORAL at 08:54

## 2025-05-28 RX ADMIN — PREGABALIN 200 MG: 100 CAPSULE ORAL at 22:19

## 2025-05-28 RX ADMIN — ENOXAPARIN SODIUM 80 MG: 100 INJECTION SUBCUTANEOUS at 22:19

## 2025-05-28 RX ADMIN — Medication 10 ML: at 08:41

## 2025-05-28 RX ADMIN — ACETAMINOPHEN 650 MG: 325 TABLET ORAL at 14:51

## 2025-05-28 RX ADMIN — SERTRALINE HYDROCHLORIDE 100 MG: 100 TABLET, FILM COATED ORAL at 22:19

## 2025-05-28 RX ADMIN — DOCUSATE SODIUM 100 MG: 100 CAPSULE, LIQUID FILLED ORAL at 22:19

## 2025-05-28 RX ADMIN — ACETAMINOPHEN 650 MG: 325 TABLET ORAL at 22:28

## 2025-05-28 NOTE — PROGRESS NOTES
LOS: 1 day   Patient Care Team:  Anish Lew APRN as PCP - General  Evgeny Gregory MD as Consulting Physician (Cardiology)  Leeanne Pleitez MD as Consulting Physician (Cardiology)  Radha Owusu APRN as Nurse Practitioner (Nurse Practitioner)  Kathe Muñoz MD as Consulting Physician (Nephrology)    Subjective     Interval History: Blood pressure was low overnight but has improved this morning    Patient Complaints: Sleeping soundly.  I did not wake her as she was awake most of the night with nursing interventions.    History taken from: patient    Review of Systems   Unable to perform ROS: Mental status change           Objective     Vital Signs  Temp:  [97.5 °F (36.4 °C)-98.2 °F (36.8 °C)] 98.2 °F (36.8 °C)  Heart Rate:  [60-90] 90  Resp:  [11-18] 18  BP: ()/(41-76) 148/73    Physical Exam:     General Appearance:  Sleeping, able to be awakened, no acute distress   Head:    Normocephalic, without obvious abnormality, atraumatic   Eyes:            Lids and lashes normal, conjunctivae and sclerae normal, no   icterus, no pallor, corneas clear, PERRLA   Ears:    Ears appear intact with no abnormalities noted   Throat:   No oral lesions, no thrush, oral mucosa moist   Neck:   No adenopathy, supple, trachea midline, no thyromegaly, no   carotid bruit, no JVD   Lungs:     Clear to auscultation,respirations regular, even and                  unlabored    Heart:    Regular rhythm and normal rate, normal S1 and S2, no            murmur, no gallop, no rub, no click   Chest Wall:    No abnormalities observed   Abdomen:     Normal bowel sounds, no masses, no organomegaly, soft        Non-tender non-distended, no guarding,   Extremities:   Moves all extremities well, no edema, no cyanosis, no             Redness   Pulses:   Pulses palpable and equal bilaterally   Skin:   No bleeding, bruising or rash   Lymph nodes:   No palpable adenopathy   Neurologic: No lateralizing neurologic  deficits        Results Review:    Lab Results (last 24 hours)       Procedure Component Value Units Date/Time    aPTT [[id number]]  (Abnormal) Collected: 05/28/25 0850    Specimen: Blood Updated: 05/28/25 0950     PTT 43.0 seconds     Comprehensive Metabolic Panel [[id number]]  (Abnormal) Collected: 05/28/25 0159    Specimen: Blood Updated: 05/28/25 0234     Glucose 98 mg/dL      BUN 16.1 mg/dL      Creatinine 0.76 mg/dL      Sodium 139 mmol/L      Potassium 4.5 mmol/L      Chloride 107 mmol/L      CO2 21.3 mmol/L      Calcium 9.1 mg/dL      Total Protein 5.3 g/dL      Albumin 3.3 g/dL      ALT (SGPT) 10 U/L      AST (SGOT) 17 U/L      Alkaline Phosphatase 97 U/L      Total Bilirubin 0.4 mg/dL      Globulin 2.0 gm/dL      A/G Ratio 1.7 g/dL      BUN/Creatinine Ratio 21.2     Anion Gap 10.7 mmol/L      eGFR 76.4 mL/min/1.73     Narrative:      GFR Categories in Chronic Kidney Disease (CKD)              GFR Category          GFR (mL/min/1.73)    Interpretation  G1                    90 or greater        Normal or high (1)  G2                    60-89                Mild decrease (1)  G3a                   45-59                Mild to moderate decrease  G3b                   30-44                Moderate to severe decrease  G4                    15-29                Severe decrease  G5                    14 or less           Kidney failure    (1)In the absence of evidence of kidney disease, neither GFR category G1 or G2 fulfill the criteria for CKD.    eGFR calculation 2021 CKD-EPI creatinine equation, which does not include race as a factor    CBC & Differential [[id number]]  (Abnormal) Collected: 05/28/25 0159    Specimen: Blood Updated: 05/28/25 0211    Narrative:      The following orders were created for panel order CBC & Differential.  Procedure                               Abnormality         Status                     ---------                               -----------         ------                     CBC Auto  Differential[[id number]]        Abnormal            Final result               Scan Slide[[id number]]                                                                    Please view results for these tests on the individual orders.    CBC Auto Differential [[id number]]  (Abnormal) Collected: 05/28/25 0159    Specimen: Blood Updated: 05/28/25 0211     WBC 5.08 10*3/mm3      RBC 3.66 10*6/mm3      Hemoglobin 10.7 g/dL      Hematocrit 36.3 %      MCV 99.2 fL      MCH 29.2 pg      MCHC 29.5 g/dL      RDW 13.9 %      RDW-SD 49.8 fl      MPV 11.7 fL      Platelets 131 10*3/mm3      Neutrophil % 47.6 %      Lymphocyte % 25.0 %      Monocyte % 23.2 %      Eosinophil % 3.0 %      Basophil % 0.2 %      Immature Grans % 1.0 %      Neutrophils, Absolute 2.42 10*3/mm3      Lymphocytes, Absolute 1.27 10*3/mm3      Monocytes, Absolute 1.18 10*3/mm3      Eosinophils, Absolute 0.15 10*3/mm3      Basophils, Absolute 0.01 10*3/mm3      Immature Grans, Absolute 0.05 10*3/mm3      nRBC 0.0 /100 WBC     Blood Culture - Blood, Arm, Right [[id number]]  (Normal) Collected: 05/24/25 0021    Specimen: Blood from Arm, Right Updated: 05/28/25 0030     Blood Culture No growth at 4 days    Narrative:      Less than seven (7) mL's of blood was collected.  Insufficient quantity may yield false negative results.    Blood Culture - Blood, Arm, Left [[id number]]  (Normal) Collected: 05/23/25 2037    Specimen: Blood from Arm, Left Updated: 05/27/25 2045     Blood Culture No growth at 4 days    Protime-INR [[id number]]  (Normal) Collected: 05/27/25 1303    Specimen: Blood from Hand, Left Updated: 05/27/25 1320     Protime 13.6 Seconds      INR 1.04             Imaging Results (Last 24 Hours)       Procedure Component Value Units Date/Time    XR Chest 1 View [[id number]] Collected: 05/28/25 0541     Updated: 05/28/25 0544    Narrative:      XR CHEST 1 VW    Date of Exam: 5/28/2025 5:15 AM EDT    Indication: pre surgery    Comparison:  7/13/2023    Findings:  Heart is enlarged. Patient is status post sternotomy and pacer placement. Patient is also status post cervical spinal fusion. There is a left side breast implant. The lungs are clear. No pneumothorax is identified.      Impression:      Cardiomegaly with no clearly acute findings in the chest.      Electronically Signed: Bud Ling MD    5/28/2025 5:42 AM EDT    Workstation ID: [id number]                 I reviewed the patient's new clinical results.    Medication Review:   Scheduled Meds:[Held by provider] apixaban, 5 mg, Oral, BID  [Held by provider] aspirin, 81 mg, Oral, Nightly  atorvastatin, 40 mg, Oral, Daily  docusate sodium, 100 mg, Oral, BID  enoxaparin sodium, 1 mg/kg, Subcutaneous, Q12H  levothyroxine, 75 mcg, Oral, Q AM  [Held by provider] lisinopril, 5 mg, Oral, Daily  metoprolol succinate XL, 25 mg, Oral, Q24H  midodrine, 10 mg, Oral, Q8H  polyethylene glycol, 17 g, Oral, Daily  pregabalin, 200 mg, Oral, Q8H  senna-docusate sodium, 2 tablet, Oral, BID  sertraline, 100 mg, Oral, Q PM  sodium chloride, 10 mL, Intravenous, Q12H      Continuous Infusions:   PRN Meds:.  acetaminophen    senna-docusate sodium **AND** polyethylene glycol **AND** bisacodyl **AND** bisacodyl    [Held by provider] Morphine    nitroglycerin    ondansetron    oxyCODONE    [COMPLETED] Insert Peripheral IV **AND** sodium chloride    sodium chloride    sodium chloride    zolpidem     Assessment & Plan       Cervical neuralgia    Pacemaker    Paroxysmal atrial fibrillation    Cervical radiculopathy    Essential hypertension    Spondylolisthesis, acquired    Obesity (BMI 30.0-34.9)    Hx of hypertrophic cardiomyopathy    Coronary artery disease    Neck pain    Seizure disorder    Encounter for pre-operative cardiovascular clearance    Aortic insufficiency    Mitral regurgitation    -Hemodynamically stable this morning.  She was asymptomatic with hypotension last night.  Hypotension was likely due to a  combination of prolonged immobility, pain medications.  Will hold lisinopril, reduced dose of metoprolol for rate control and continue midodrine.  - Anticipate cervical surgery tomorrow.    CODE Status:    Code status (Patient has no pulse and is not breathing):  CPR (Attempt to Resuscitate)  Medical Interventions (Patient has pulse or is breathing):  Full support  Level of support discussed with:  Patient    Admission status:  I believe this patient meets inpatient status  Expected length of stay:  2 midnights or greater  I discussed the patient's findings and my recommendations with the patient.    Plan for disposition: Likely will need skilled rehab    Tatiana Gilliland MD  05/28/25  11:56 EDT

## 2025-05-28 NOTE — PLAN OF CARE
Problem: Adult Inpatient Plan of Care  Goal: Plan of Care Review  Outcome: Progressing  Goal: Patient-Specific Goal (Individualized)  Outcome: Progressing  Goal: Absence of Hospital-Acquired Illness or Injury  Outcome: Progressing  Intervention: Identify and Manage Fall Risk  Recent Flowsheet Documentation  Taken 5/28/2025 0030 by Mitzi Willingham LPN  Safety Promotion/Fall Prevention: safety round/check completed  Taken 5/27/2025 2241 by Mitzi Willingham LPN  Safety Promotion/Fall Prevention: safety round/check completed  Taken 5/27/2025 2041 by Mitzi Willingham LPN  Safety Promotion/Fall Prevention:   safety round/check completed   room organization consistent   nonskid shoes/slippers when out of bed   fall prevention program maintained   assistive device/personal items within reach   clutter free environment maintained  Intervention: Prevent Skin Injury  Recent Flowsheet Documentation  Taken 5/28/2025 0030 by Mitzi Willingham LPN  Body Position:   turned   left  Taken 5/27/2025 2041 by Mitzi Willingham LPN  Skin Protection: incontinence pads utilized  Intervention: Prevent and Manage VTE (Venous Thromboembolism) Risk  Recent Flowsheet Documentation  Taken 5/27/2025 2041 by Mitzi Willingham LPN  VTE Prevention/Management: SCDs (sequential compression devices) off  Intervention: Prevent Infection  Recent Flowsheet Documentation  Taken 5/27/2025 2041 by Mitzi Willingham LPN  Infection Prevention: single patient room provided  Goal: Optimal Comfort and Wellbeing  Outcome: Progressing  Intervention: Provide Person-Centered Care  Recent Flowsheet Documentation  Taken 5/27/2025 2041 by Mitzi Willingham LPN  Trust Relationship/Rapport:   care explained   thoughts/feelings acknowledged  Goal: Readiness for Transition of Care  Outcome: Progressing   Goal Outcome Evaluation:

## 2025-05-28 NOTE — NURSING NOTE
Upon evaluation patient did not have any visible veins. Nurse tried to stick patient and failed. Nurse asked charge nurse to stick patient and charge nurse also failed. Nurse then asked clinical coordinator to try. When all failed laboratory came to try to stick patient. Laboratory staff was only able to stick patient in the abdomen with only getting a little blood return to barley fill green and purple lab tubes. Nurse then asked Nurse Practitioner on call if sticking patient in the foot was be appropriate with a visible vein showing. Nurse Practitioner on call stated she would prefer not since patient has edema in feet and sensory deficit bilaterally. Nurse Practitioner suggested IV therapy in the AM to try. Nurse will leave a voicemail for IV therapy and pass onto day shift.

## 2025-05-28 NOTE — CASE MANAGEMENT/SOCIAL WORK
Continued Stay Note  SUSAN Goldstein     Patient Name: Domonique See  MRN: [id number]  Today's Date: 5/28/2025    Admit Date: 5/23/2025    Plan: DC Plan: from home with son. Surgery scheduled for 5.29.25 1605 for cervical surgery with Dr. Bernal, Will need PT OT eval.   Discharge Plan       Row Name 05/28/25 1405       Plan    Plan DC Plan: from home with son. Surgery scheduled for 5.29.25 1605 for cervical surgery with Dr. Bernal, Will need PT OT eval.    Plan Comments DC barriers: surgery 5/29/25 1605 for cervical laminectomy c7-T1, C5-T1 fusion, Will need PT OT eval after surgery,               Corin Baker RN    [facility]  [email or web address]  Office [phone]  Cell [phone]

## 2025-05-28 NOTE — PROGRESS NOTES
NEUROSURGERY PROGRESS NOTE     LOS: 1 day   Patient Care Team:  Ainsh Lew APRN as PCP - Evgeny Machuca MD as Consulting Physician (Cardiology)  Leeanne Pleitez MD as Consulting Physician (Cardiology)  Radha Owusu APRN as Nurse Practitioner (Nurse Practitioner)  Kathe Muñoz MD as Consulting Physician (Nephrology)    Chief Complaint:    Chief Complaint   Patient presents with    Rectal Pain     Constipated last night, ws on the toilet for 4 hours without getting up, pt took a break and went back for another 2 hours. Pt is worried she prolapsed her rectum. Pt has cold legs and feet - spinal stenosis may be worsening. Partial paralysis from waist down has rectal numbness x 3 months.        Subjective     Interval History: NAEO.  Patient awake and alert and oriented.  Dr. Gregory did evaluate patient and did clear patient per nursing.  Cardiology discussed higher risk of surgery with patient due to her cardiac comorbidities and patient still wanting to proceed.  Cardiology does recommend restarting anticoagulation when safe to do so after surgery.  History taken from: patient chart    Objective      Vital Signs  Temp:  [97.5 °F (36.4 °C)-98.2 °F (36.8 °C)] 98.2 °F (36.8 °C)  Heart Rate:  [60-90] 90  Resp:  [11-18] 18  BP: ()/(41-76) 148/73  Body mass index is 31.6 kg/m².    Intake/Output last 3 shifts:  I/O last 3 completed shifts:  In: 860 [P.O.:860]  Out: 2500 [Urine:2500]    Intake/Output this shift:  No intake/output data recorded.    Physical    Alert cooperative in no acute distress  Stable weakness throughout bilateral lower extremities  Stable decree sensation throughout bilateral lower extremities  Negative clonus  Negative David      Results Review:  I reviewed the patient's new clinical results.    Labs:    Lab Results (last 24 hours)       Procedure Component Value Units Date/Time    Protime-INR [[id number]]  (Normal) Collected: 05/27/25 1303     Specimen: Blood from Hand, Left Updated: 05/27/25 1320     Protime 13.6 Seconds      INR 1.04    CBC & Differential [[id number]]  (Abnormal) Collected: 05/28/25 0159    Specimen: Blood Updated: 05/28/25 0211    Narrative:      The following orders were created for panel order CBC & Differential.  Procedure                               Abnormality         Status                     ---------                               -----------         ------                     CBC Auto Differential[[id number]]        Abnormal            Final result               Scan Slide[[id number]]                                                                    Please view results for these tests on the individual orders.    Comprehensive Metabolic Panel [[id number]]  (Abnormal) Collected: 05/28/25 0159    Specimen: Blood Updated: 05/28/25 0234     Glucose 98 mg/dL      BUN 16.1 mg/dL      Creatinine 0.76 mg/dL      Sodium 139 mmol/L      Potassium 4.5 mmol/L      Chloride 107 mmol/L      CO2 21.3 mmol/L      Calcium 9.1 mg/dL      Total Protein 5.3 g/dL      Albumin 3.3 g/dL      ALT (SGPT) 10 U/L      AST (SGOT) 17 U/L      Alkaline Phosphatase 97 U/L      Total Bilirubin 0.4 mg/dL      Globulin 2.0 gm/dL      A/G Ratio 1.7 g/dL      BUN/Creatinine Ratio 21.2     Anion Gap 10.7 mmol/L      eGFR 76.4 mL/min/1.73     Narrative:      GFR Categories in Chronic Kidney Disease (CKD)              GFR Category          GFR (mL/min/1.73)    Interpretation  G1                    90 or greater        Normal or high (1)  G2                    60-89                Mild decrease (1)  G3a                   45-59                Mild to moderate decrease  G3b                   30-44                Moderate to severe decrease  G4                    15-29                Severe decrease  G5                    14 or less           Kidney failure    (1)In the absence of evidence of kidney disease, neither GFR category G1 or G2 fulfill the criteria for  CKD.    eGFR calculation 2021 CKD-EPI creatinine equation, which does not include race as a factor    CBC Auto Differential [[id number]]  (Abnormal) Collected: 05/28/25 0159    Specimen: Blood Updated: 05/28/25 0211     WBC 5.08 10*3/mm3      RBC 3.66 10*6/mm3      Hemoglobin 10.7 g/dL      Hematocrit 36.3 %      MCV 99.2 fL      MCH 29.2 pg      MCHC 29.5 g/dL      RDW 13.9 %      RDW-SD 49.8 fl      MPV 11.7 fL      Platelets 131 10*3/mm3      Neutrophil % 47.6 %      Lymphocyte % 25.0 %      Monocyte % 23.2 %      Eosinophil % 3.0 %      Basophil % 0.2 %      Immature Grans % 1.0 %      Neutrophils, Absolute 2.42 10*3/mm3      Lymphocytes, Absolute 1.27 10*3/mm3      Monocytes, Absolute 1.18 10*3/mm3      Eosinophils, Absolute 0.15 10*3/mm3      Basophils, Absolute 0.01 10*3/mm3      Immature Grans, Absolute 0.05 10*3/mm3      nRBC 0.0 /100 WBC     aPTT [[id number]] Collected: 05/28/25 0850    Specimen: Blood Updated: 05/28/25 0901            Imaging:    No new neuroimaging.    Current Medications:   Scheduled Meds:[Held by provider] apixaban, 5 mg, Oral, BID  [Held by provider] aspirin, 81 mg, Oral, Nightly  atorvastatin, 40 mg, Oral, Daily  docusate sodium, 100 mg, Oral, BID  enoxaparin sodium, 1 mg/kg, Subcutaneous, Q12H  levothyroxine, 75 mcg, Oral, Q AM  [Held by provider] lisinopril, 5 mg, Oral, Daily  metoprolol succinate XL, 25 mg, Oral, Q24H  midodrine, 10 mg, Oral, Q8H  polyethylene glycol, 17 g, Oral, Daily  pregabalin, 200 mg, Oral, Q8H  senna-docusate sodium, 2 tablet, Oral, BID  sertraline, 100 mg, Oral, Q PM  sodium chloride, 10 mL, Intravenous, Q12H      Continuous Infusions:     Assessment & Plan       Cervical neuralgia    Pacemaker    Paroxysmal atrial fibrillation    Cervical radiculopathy    Essential hypertension    Spondylolisthesis, acquired    Obesity (BMI 30.0-34.9)    Hx of hypertrophic cardiomyopathy    Coronary artery disease    Neck pain    Seizure disorder    Encounter for  pre-operative cardiovascular clearance    Aortic insufficiency    Mitral regurgitation    Platelets 115    Assessment/Plan:  Domonique See is a 85 y.o. 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  Cardiology to clear patient for surgery but did discuss increased cardiac risks.  Patient still wanting to proceed with surgery this morning.  Her Lovenox has been held this morning.  Type and screen pending.    - OR this afternoon  -Continue n.p.o.  -Please call for any questions or concerns    Assessment findings were discussed with patient, family and Serak who agree with plan of care.    Carmen Salamanca, APRN  05/28/25  09:09 EDT

## 2025-05-28 NOTE — PLAN OF CARE
Goal Outcome Evaluation:              Outcome Evaluation: pt still complains of pain in her back and inability to move her legs. Upon assessment she is able to move legs some but still having a lot of difficulty. Surgery will be tomorrow.

## 2025-05-28 NOTE — PROGRESS NOTES
Cardiology Progress Note    Patient Identification:  Name: Domonique See  Age: 85 y.o.  Sex: female  :  1940  MRN: [id number]                 Follow Up / Chief Complaint: pre op evaluation   Chief Complaint   Patient presents with    Rectal Pain     Constipated last night, ws on the toilet for 4 hours without getting up, pt took a break and went back for another 2 hours. Pt is worried she prolapsed her rectum. Pt has cold legs and feet - spinal stenosis may be worsening. Partial paralysis from waist down has rectal numbness x 3 months.       Interval History: Patient presented with worsening lower extremity weakness        NP NOTE:  Patient seen with Dr. Gregory this morning.  Patient reports she is trying to rest.  Possibly surgery today.  She understands the risks and wants to proceed.     Electronically signed by WOODY Thomas, 25, 11:17 AM EDT.        Cardiology attending addendum :    I have personally performed a face-to-face diagnostic evaluation, physical exam and reviewed data on this patient.  I have reviewed documentation done by me and nurse practitioner  and corrected as needed.  And agree with the different components of documentation.Greater than 50% of the time spent in the care of this patient was provided by attending consultant/me.       Subjective: Patient seen and examined.  Chart reviewed.  Labs reviewed.  Patient denies any chest pain or shortness of breath.    Objective:   2025: High-sensitivity troponin 20--16 potassium 4.7 otherwise CMP unremarkable lactic normal hemoglobin 10.3 blood cultures negative  2025: CMP unremarkable, CBC with hgb 10.7 plts 131 CXR negative     History of present illness:      Ms. Domonique See  has PMH of     Hypertrophic cardiomyopathy, history of septal myomectomy,      nonsustained VT  hypertension  SSS, [employer] permanent pacemaker 3/16/2017  Paroxysmal atrial fibrillation, ablation 2023  [id number] SCORE    [id number] Score: 8 (6/20/2024  2:06 PM)  [id number] Score: 8 (5/22/2023  3:37 PM)  Age greater than 75, CHF, hypertension, CVA, vascular disease     CAD, cath 12/2017 30% LAD  CHF due to hypertrophic cardiomyopathy  Hyperlipidemia  COPD  History of DAYLIN 4/28/2023  Peptic ulcer disease  Back surgery (previous cervical decompression and fusion, lumbar decompression and fusion), breast augmentation  Former smoker              presented to the ER at [facility] on 5/23/2025 complaining of worsening leg weakness, numbness and constipation. Of note, the patient was recently seen by neurosurgery, Dr. Bernal, for severe stenosis of the cervical spine. She is now wheelchair-bound.      Workup in the ER revealed hypotension, which responded to IV fluids. Neurosurgery was consulted and she was admitted for further treatment. Cardiology was consulted for preoperative cardiac clearance.              Outpatient echocardiogram 3/21/2025    Left ventricular ejection fraction appears to be 61 - 65%.    Left ventricular diastolic function was normal.    The left atrial cavity is moderately dilated.    Left atrial volume is severely increased.    Moderate aortic valve regurgitation is present.    Moderate mitral valve regurgitation is present.    Estimated right ventricular systolic pressure from tricuspid regurgitation is normal (<35 mmHg)              Chart review:  Underwent A-fib ablation 1/16/2023  Admitted 1/18/2023 with CHF     Echocardiogram 6/29/2021 reveals EF of 50%   Echocardiogram 1/19/2023 EF of 60 to 65% with severe right ventricular and right atrial enlargement moderate left atrial enlargement, moderate mitral regurgitation and mild aortic regurgitation.                 Assessment:    Preoperative cardiovascular evaluation for cervical spine surgery  Cervical neuralgia  Inactive functional status  Extremity weakness  Permanent pacemaker  Permanent A-fib status post ablation, long-term  Eliquis  Hypertrophic cardiomyopathy, myomectomy, NSVT  Moderate mitral regurgitation, mild aortic regurgitation  Chronic HFpEF due to diastolic dysfunction from HCM  Hypertension  Dyslipidemia  Prediabetes  Severe right ventricular enlargement        Recommendations and plan:     Patient has issues with chronic back pain requiring injections in the past is not functionally active.  Patient had cath in 2017 showing 30% stenosis in the LAD, Lexiscan 4-25 revealed no ischemia.  Echocardiogram 3/21/2025 revealed preserved EF with moderate aortic and mitral valve regurgitation  Serial troponins are mildly elevated at 20 and 16.  Patient denies any chest pain.  EKG revealed paced rhythm.      Patient has elevated [id number] score of 8, due to age over 75, female gender, CHF, hypertension, CVA, vascular disease.  Will benefit from long-term anticoagulation to prevent thromboembolic events.  Patient's risk of stroke and other embolism ranged from 9.7 to 13.6 %/year.  Patient's anticoagulation is currently on hold for possible surgery  Will recommend restart when safe and okay with surgery  Continue rate control with metoprolol as tolerated  Monitor hemodynamics and rhythm    Patient is a cardiac bleeding C-spine surgery/C3-7 ACDF.  Given her advanced age her risk of complications and cardiac events and death would be higher.  Discussed the same.  Based on ACS NSQIP calculator risk is high  Cardiac risk index for preoperative risk per [facility] preoperative risk assessment would be 6% of risk of major adverse cardiac events.  Discussed cardiac risk with patient.  Understands risk benefits alternatives.       Data:     EKG 4/1/2025 reviewed/interpreted by me reveals A-fib with a rate of 92 bpm with right bundle branch block.  Patient underwent Lexiscan Cardiolite 4/3/2025 which was negative for ischemia EF of 56%.  Reviewed stress test results with patient.  Patient had previous ablation.  Was seen by EP Dr. Naseem Pleitez,  I am patient was offered ablation and cardioversion she is considering.  Will follow-up with EP as outpatient.  Will continue to monitor rhythm on telemetry  Will follow-up as outpatient in pacemaker clinic to follow rhythm.           Procedures     ECHOCARDIOGRAM:  Results for orders placed during the hospital encounter of 03/21/25     Adult Transthoracic Echo Complete W/ Cont if Necessary Per Protocol     Interpretation Summary    Left ventricular ejection fraction appears to be 61 - 65%.    Left ventricular diastolic function was normal.    The left atrial cavity is moderately dilated.    Left atrial volume is severely increased.    Moderate aortic valve regurgitation is present.    Moderate mitral valve regurgitation is present.    Estimated right ventricular systolic pressure from tricuspid regurgitation is normal (<35 mmHg).        STRESS TEST  Results for orders placed during the hospital encounter of 03/28/25     Stress Test With Myocardial Perfusion One Day     Interpretation Summary    Myocardial perfusion imaging indicates a normal myocardial perfusion study with no evidence of ischemia. Impressions are consistent with a low risk study.    Left ventricular ejection fraction is normal (Calculated EF = 56%).                     Copied text in this portion of the note has been reviewed and is accurate as of 5/28/2025    Past Medical History:  Past Medical History:   Diagnosis Date    Anemia     Aortic insufficiency 03/21/2025    Asthma     CHF (congestive heart failure)     Cholecystitis 09/02/2011    Congenital heart disease     Coronary artery disease 2013    Deep vein thrombosis (DVT) 11/20/2017    GERD (gastroesophageal reflux disease)     Heart murmur Don't know    Hx of hypertrophic cardiomyopathy     Hypertension     Hypertrophic obstructive cardiomyopathy 11/10/2017    Low back pain     Lumbosacral disc disease     Mitral regurgitation 03/21/2025    Mixed hyperlipidemia 10/06/2017    Morbid obesity  2020    Peptic ulceration     Persistent atrial fibrillation 01/10/2023    Primary osteoarthritis of both knees 2021    Primary osteoarthritis of right knee 2020    Seizures     Sleep apnea 2017    TIA (transient ischemic attack)      Past Surgical History:  Past Surgical History:   Procedure Laterality Date    ABDOMINAL HERNIA REPAIR      ABLATION OF DYSRHYTHMIC FOCUS  2023    BACK SURGERY      BREAST AUGMENTATION      CARDIAC CATHETERIZATION      CARDIAC ELECTROPHYSIOLOGY PROCEDURE N/A 2023    Procedure: Ablation atrial fibrillation and flutter, cryo Duong and Ramón aware;  Surgeon: Leeanne Pleitez MD;  Location: [facility] CATH INVASIVE LOCATION;  Service: Cardiovascular;  Laterality: N/A;    CARDIAC PACEMAKER PLACEMENT  2017    Dual Chamber    CERVICAL RIB RESECTION Bilateral     CERVICAL RIB RESECTION Bilateral 1963    bilateral cervical ribs removed - extra ribs located and causing pain    CHOLECYSTECTOMY      COLONOSCOPY      CORONARY ARTERY BYPASS GRAFT      EPIDURAL BLOCK      NECK SURGERY      SPINAL FUSION      THORACIC DECOMPRESSION POSTERIOR FUSION  2014    L3-5 & S1    TRIGGER POINT INJECTION      UPPER GASTROINTESTINAL ENDOSCOPY          Social History:   Social History     Tobacco Use    Smoking status: Former     Current packs/day: 0.00     Average packs/day: 1 pack/day for 8.0 years (8.0 ttl pk-yrs)     Types: Cigarettes     Start date: 1977     Quit date: 1985     Years since quittin.4     Passive exposure: Past    Smokeless tobacco: Never    Tobacco comments:     Quit    Substance Use Topics    Alcohol use: Never     Comment: 6 drinks a year in social settings      Family History:  Family History   Problem Relation Age of Onset    Colon cancer Mother     Heart disease Mother     Hypertension Mother     Arthritis Mother     Cancer Mother     Diabetes Mother     Kidney disease Mother     Colon cancer Brother     Heart attack Brother      "Colon cancer Daughter     Arthritis Daughter     Arthritis Father     Hearing loss Father     Arthritis Sister     Hypertension Brother     Arthritis Brother     Cancer Brother     Stroke Brother     Arthritis Son     Arthritis Son     Cancer Sister     Diabetes Sister     Cancer Brother     Hypertension Sister     Kidney disease Sister           Allergies:  No Known Allergies  Scheduled Meds:  [Held by provider] apixaban, 5 mg, BID  [Held by provider] aspirin, 81 mg, Nightly  atorvastatin, 40 mg, Daily  docusate sodium, 100 mg, BID  enoxaparin sodium, 1 mg/kg, Q12H  levothyroxine, 75 mcg, Q AM  [Held by provider] lisinopril, 5 mg, Daily  [START ON 5/29/2025] metoprolol succinate XL, 12.5 mg, Q24H  midodrine, 10 mg, Q8H  polyethylene glycol, 17 g, Daily  pregabalin, 200 mg, Q8H  senna-docusate sodium, 2 tablet, BID  sertraline, 100 mg, Q PM  sodium chloride, 10 mL, Q12H          Review of Systems:   Review of Systems   Musculoskeletal:  Positive for back pain and muscle weakness.   Neurological:  Positive for numbness, paresthesias and weakness.     Review of Systems   Constitution: Negative for chills and fever.   Cardiovascular: Negative for chest pain and palpitations.   Respiratory: Negative for cough and hemoptysis.    Gastrointestinal: Negative for nausea.        Constitutional:  Temp:  [97.5 °F (36.4 °C)-99.1 °F (37.3 °C)] 99.1 °F (37.3 °C)  Heart Rate:  [60-90] 61  Resp:  [11-18] 15  BP: ()/(41-76) 119/57    Physical Exam   /57 (BP Location: Left arm, Patient Position: Lying)   Pulse 61   Temp 99.1 °F (37.3 °C) (Axillary)   Resp 15   Ht 152.4 cm (60\")   Wt 73.4 kg (161 lb 13.1 oz)   LMP  (LMP Unknown)   SpO2 98%   BMI 31.60 kg/m²   General:  Appears in no acute distress  Eyes: Sclera is anicteric,  conjunctiva is clear   HEENT:  No JVD. Thyroid not visibly enlarged. No mucosal pallor or cyanosis  Respiratory: Respirations regular and unlabored at rest.  Clear to " auscultation  Cardiovascular: S1,S2, irregularly irregular rhythm.   Gastrointestinal: Abdomen nondistended.  Musculoskeletal:  No abnormal movements  Extremities: No digital clubbing or cyanosis  Skin: Color pink.   Neuro: Alert and awake.    INTAKE AND OUTPUT:    Intake/Output Summary (Last 24 hours) at 5/28/2025 1236  Last data filed at 5/28/2025 0504  Gross per 24 hour   Intake 300 ml   Output 850 ml   Net -550 ml       Cardiographics  Telemetry: Reviewed and interpreted by me reveals A-fib with intermittent V paced rhythm    ECG:   ECG 12 Lead Other; arm pain   Final Result   HEART RATE=68  bpm   RR Zdpqsasn=940  ms   SD Interval=  ms   P Horizontal Axis=  deg   P Front Axis=  deg   QRSD Vswceaqy=479  ms   QT Bgklpvsc=899  ms   WWsH=354  ms   QRS Axis=-25  deg   T Wave Axis=34  deg   - ABNORMAL ECG -   Afib/flut and V-paced complexes   Right bundle branch block   Incomplete left bundle branch block   Left ventricular hypertrophy   When compared with ECG of 01-Apr-2025 22:52:35,   Significant change in rhythm   Significant repolarization change   Electronically Signed By: Manav Whitt (ELSA) 2025-05-24 07:06:44   Date and Time of Study:2025-05-23 13:04:06      Telemetry Scan   Final Result      Telemetry Scan   Final Result      Telemetry Scan   Final Result      Telemetry Scan   Final Result      Telemetry Scan   Final Result      Telemetry Scan   Final Result      Telemetry Scan   Final Result      Telemetry Scan   Final Result      Telemetry Scan   Final Result      Telemetry Scan   Final Result      Telemetry Scan   Final Result      Telemetry Scan   Final Result      Telemetry Scan   Final Result      ECG 12 Lead Pre-Op / Pre-Procedure    (Results Pending)     I have personally reviewed EKG    Echocardiogram: Results for orders placed during the hospital encounter of 03/21/25    Adult Transthoracic Echo Complete W/ Cont if Necessary Per Protocol    Interpretation Summary    Left ventricular ejection  fraction appears to be 61 - 65%.    Left ventricular diastolic function was normal.    The left atrial cavity is moderately dilated.    Left atrial volume is severely increased.    Moderate aortic valve regurgitation is present.    Moderate mitral valve regurgitation is present.    Estimated right ventricular systolic pressure from tricuspid regurgitation is normal (<35 mmHg).      STRESS TEST  Results for orders placed during the hospital encounter of 03/28/25    Stress Test With Myocardial Perfusion One Day    Interpretation Summary    Myocardial perfusion imaging indicates a normal myocardial perfusion study with no evidence of ischemia. Impressions are consistent with a low risk study.    Left ventricular ejection fraction is normal (Calculated EF = 56%).      HEART CATHETERIZATION  No results found for this or any previous visit.      Lab Review   I have reviewed the labs  Results from last 7 days   Lab Units 05/23/25  1511 05/23/25  1403   HSTROP T ng/L 16* 20*         Results from last 7 days   Lab Units 05/28/25  0159   SODIUM mmol/L 139   POTASSIUM mmol/L 4.5   BUN mg/dL 16.1   CREATININE mg/dL 0.76   CALCIUM mg/dL 9.1         Results from last 7 days   Lab Units 05/28/25  0159 05/23/25  1403   WBC 10*3/mm3 5.08 6.64   HEMOGLOBIN g/dL 10.7* 10.8*   HEMATOCRIT % 36.3 34.4   PLATELETS 10*3/mm3 131* 115*     Results from last 7 days   Lab Units 05/28/25  0850 05/27/25  1303   INR   --  1.04   APTT seconds 43.0*  --        RADIOLOGY:  Imaging Results (Last 24 Hours)       Procedure Component Value Units Date/Time    XR Chest 1 View [[id number]] Collected: 05/28/25 0541     Updated: 05/28/25 0544    Narrative:      XR CHEST 1 VW    Date of Exam: 5/28/2025 5:15 AM EDT    Indication: pre surgery    Comparison: 7/13/2023    Findings:  Heart is enlarged. Patient is status post sternotomy and pacer placement. Patient is also status post cervical spinal fusion. There is a left side breast implant. The lungs are clear.  "No pneumothorax is identified.      Impression:      Cardiomegaly with no clearly acute findings in the chest.      Electronically Signed: Bud Ling MD    5/28/2025 5:42 AM EDT    Workstation ID: [id number]                  )5/28/2025  Evgeny Gregory MD      EMR Dragon/Transcription:   \"Dictated utilizing Dragon dictation\".   "

## 2025-05-29 ENCOUNTER — ANESTHESIA EVENT (OUTPATIENT)
Dept: PERIOP | Facility: HOSPITAL | Age: 85
End: 2025-05-29
Payer: MEDICARE

## 2025-05-29 ENCOUNTER — ANESTHESIA (OUTPATIENT)
Dept: PERIOP | Facility: HOSPITAL | Age: 85
End: 2025-05-29
Payer: MEDICARE

## 2025-05-29 ENCOUNTER — APPOINTMENT (OUTPATIENT)
Dept: GENERAL RADIOLOGY | Facility: HOSPITAL | Age: 85
End: 2025-05-29
Payer: MEDICARE

## 2025-05-29 ENCOUNTER — ANESTHESIA EVENT CONVERTED (OUTPATIENT)
Dept: ANESTHESIOLOGY | Facility: HOSPITAL | Age: 85
End: 2025-05-29
Payer: MEDICARE

## 2025-05-29 LAB
ANION GAP SERPL CALCULATED.3IONS-SCNC: 9.7 MMOL/L (ref 5–15)
APTT PPP: 45.1 SECONDS (ref 22.7–35.4)
BACTERIA SPEC AEROBE CULT: NORMAL
BUN SERPL-MCNC: 15.3 MG/DL (ref 8–23)
BUN/CREAT SERPL: 19.4 (ref 7–25)
CALCIUM SPEC-SCNC: 9.8 MG/DL (ref 8.6–10.5)
CHLORIDE SERPL-SCNC: 108 MMOL/L (ref 98–107)
CO2 SERPL-SCNC: 25.3 MMOL/L (ref 22–29)
CREAT SERPL-MCNC: 0.79 MG/DL (ref 0.57–1)
DEPRECATED RDW RBC AUTO: 46.9 FL (ref 37–54)
EGFRCR SERPLBLD CKD-EPI 2021: 73 ML/MIN/1.73
ERYTHROCYTE [DISTWIDTH] IN BLOOD BY AUTOMATED COUNT: 13.7 % (ref 12.3–15.4)
GLUCOSE SERPL-MCNC: 101 MG/DL (ref 65–99)
HCT VFR BLD AUTO: 36.7 % (ref 34–46.6)
HGB BLD-MCNC: 11.3 G/DL (ref 12–15.9)
INR PPP: 1.09 (ref 0.9–1.1)
MCH RBC QN AUTO: 28.9 PG (ref 26.6–33)
MCHC RBC AUTO-ENTMCNC: 30.8 G/DL (ref 31.5–35.7)
MCV RBC AUTO: 93.9 FL (ref 79–97)
PLATELET # BLD AUTO: 125 10*3/MM3 (ref 140–450)
PMV BLD AUTO: 12 FL (ref 6–12)
POTASSIUM SERPL-SCNC: 4.2 MMOL/L (ref 3.5–5.2)
PROTHROMBIN TIME: 14.1 SECONDS (ref 11.7–14.2)
RBC # BLD AUTO: 3.91 10*6/MM3 (ref 3.77–5.28)
SODIUM SERPL-SCNC: 143 MMOL/L (ref 136–145)
WBC NRBC COR # BLD AUTO: 5.02 10*3/MM3 (ref 3.4–10.8)

## 2025-05-29 PROCEDURE — 22600 ARTHRD PST TQ 1NTRSPC CRV: CPT

## 2025-05-29 PROCEDURE — 22600 ARTHRD PST TQ 1NTRSPC CRV: CPT | Performed by: NEUROLOGICAL SURGERY

## 2025-05-29 PROCEDURE — 25810000003 SODIUM CHLORIDE 0.9 % SOLUTION 250 ML FLEX CONT

## 2025-05-29 PROCEDURE — 25010000002 CEFAZOLIN PER 500 MG

## 2025-05-29 PROCEDURE — 25010000002 FENTANYL CITRATE (PF) 50 MCG/ML SOLUTION: Performed by: REGISTERED NURSE

## 2025-05-29 PROCEDURE — 85610 PROTHROMBIN TIME: CPT | Performed by: NEUROLOGICAL SURGERY

## 2025-05-29 PROCEDURE — 25010000002 MORPHINE PER 10 MG

## 2025-05-29 PROCEDURE — 25010000002 FENTANYL CITRATE (PF) 100 MCG/2ML SOLUTION

## 2025-05-29 PROCEDURE — 25010000002 HYDROMORPHONE PER 4 MG: Performed by: REGISTERED NURSE

## 2025-05-29 PROCEDURE — 80048 BASIC METABOLIC PNL TOTAL CA: CPT | Performed by: NEUROLOGICAL SURGERY

## 2025-05-29 PROCEDURE — 85027 COMPLETE CBC AUTOMATED: CPT | Performed by: NEUROLOGICAL SURGERY

## 2025-05-29 PROCEDURE — 25010000002 METHYLPREDNISOLONE PER 80 MG: Performed by: NEUROLOGICAL SURGERY

## 2025-05-29 PROCEDURE — 72040 X-RAY EXAM NECK SPINE 2-3 VW: CPT

## 2025-05-29 PROCEDURE — 25010000002 DEXAMETHASONE PER 1 MG

## 2025-05-29 PROCEDURE — 25010000002 LIDOCAINE PF 2% 2 % SOLUTION

## 2025-05-29 PROCEDURE — 63045 LAM FACETEC & FORAMOT CRV: CPT | Performed by: NEUROLOGICAL SURGERY

## 2025-05-29 PROCEDURE — 25010000002 SUCCINYLCHOLINE PER 20 MG

## 2025-05-29 PROCEDURE — 25010000002 PHENYLEPHRINE 10 MG/ML SOLUTION 5 ML VIAL

## 2025-05-29 PROCEDURE — 85730 THROMBOPLASTIN TIME PARTIAL: CPT | Performed by: NEUROLOGICAL SURGERY

## 2025-05-29 PROCEDURE — 22842 INSERT SPINE FIXATION DEVICE: CPT | Performed by: NEUROLOGICAL SURGERY

## 2025-05-29 PROCEDURE — 25010000002 KETOROLAC TROMETHAMINE PER 15 MG: Performed by: INTERNAL MEDICINE

## 2025-05-29 PROCEDURE — C1713 ANCHOR/SCREW BN/BN,TIS/BN: HCPCS | Performed by: NEUROLOGICAL SURGERY

## 2025-05-29 PROCEDURE — 99232 SBSQ HOSP IP/OBS MODERATE 35: CPT | Performed by: INTERNAL MEDICINE

## 2025-05-29 PROCEDURE — 25010000002 MORPHINE PER 10 MG: Performed by: NURSE PRACTITIONER

## 2025-05-29 PROCEDURE — 22614 ARTHRD PST TQ 1NTRSPC EA ADD: CPT | Performed by: NEUROLOGICAL SURGERY

## 2025-05-29 PROCEDURE — 25010000002 ONDANSETRON PER 1 MG: Performed by: INTERNAL MEDICINE

## 2025-05-29 PROCEDURE — 63045 LAM FACETEC & FORAMOT CRV: CPT

## 2025-05-29 PROCEDURE — 25010000002 PROPOFOL 10 MG/ML EMULSION

## 2025-05-29 PROCEDURE — 25810000003 LACTATED RINGERS PER 1000 ML

## 2025-05-29 PROCEDURE — 25010000002 CEFAZOLIN PER 500 MG: Performed by: NEUROLOGICAL SURGERY

## 2025-05-29 PROCEDURE — 25810000003 SODIUM CHLORIDE 0.9 % SOLUTION

## 2025-05-29 PROCEDURE — 25010000002 ENOXAPARIN PER 10 MG

## 2025-05-29 PROCEDURE — 25010000002 ONDANSETRON PER 1 MG: Performed by: REGISTERED NURSE

## 2025-05-29 PROCEDURE — 76000 FLUOROSCOPY <1 HR PHYS/QHP: CPT

## 2025-05-29 PROCEDURE — 22614 ARTHRD PST TQ 1NTRSPC EA ADD: CPT

## 2025-05-29 PROCEDURE — 22842 INSERT SPINE FIXATION DEVICE: CPT

## 2025-05-29 RX ORDER — EPHEDRINE SULFATE 5 MG/ML
INJECTION INTRAVENOUS AS NEEDED
Status: DISCONTINUED | OUTPATIENT
Start: 2025-05-29 | End: 2025-05-29 | Stop reason: SURG

## 2025-05-29 RX ORDER — METHOCARBAMOL 750 MG/1
750 TABLET, FILM COATED ORAL 4 TIMES DAILY PRN
Status: DISCONTINUED | OUTPATIENT
Start: 2025-05-29 | End: 2025-05-31

## 2025-05-29 RX ORDER — METHYLPREDNISOLONE ACETATE 80 MG/ML
INJECTION, SUSPENSION INTRA-ARTICULAR; INTRALESIONAL; INTRAMUSCULAR; SOFT TISSUE AS NEEDED
Status: DISCONTINUED | OUTPATIENT
Start: 2025-05-29 | End: 2025-05-29 | Stop reason: HOSPADM

## 2025-05-29 RX ORDER — HYDROMORPHONE HYDROCHLORIDE 1 MG/ML
0.5 INJECTION, SOLUTION INTRAMUSCULAR; INTRAVENOUS; SUBCUTANEOUS
Status: COMPLETED | OUTPATIENT
Start: 2025-05-29 | End: 2025-05-29

## 2025-05-29 RX ORDER — MORPHINE SULFATE 2 MG/ML
2 INJECTION, SOLUTION INTRAMUSCULAR; INTRAVENOUS EVERY 6 HOURS PRN
Status: DISCONTINUED | OUTPATIENT
Start: 2025-05-29 | End: 2025-06-01

## 2025-05-29 RX ORDER — ONDANSETRON 2 MG/ML
INJECTION INTRAMUSCULAR; INTRAVENOUS AS NEEDED
Status: DISCONTINUED | OUTPATIENT
Start: 2025-05-29 | End: 2025-05-29 | Stop reason: SURG

## 2025-05-29 RX ORDER — SODIUM CHLORIDE 0.9 % (FLUSH) 0.9 %
10 SYRINGE (ML) INJECTION EVERY 12 HOURS SCHEDULED
Status: DISCONTINUED | OUTPATIENT
Start: 2025-05-29 | End: 2025-06-05 | Stop reason: HOSPADM

## 2025-05-29 RX ORDER — FENTANYL CITRATE 50 UG/ML
50 INJECTION, SOLUTION INTRAMUSCULAR; INTRAVENOUS
Status: DISCONTINUED | OUTPATIENT
Start: 2025-05-29 | End: 2025-05-29 | Stop reason: HOSPADM

## 2025-05-29 RX ORDER — MORPHINE SULFATE 2 MG/ML
2 INJECTION, SOLUTION INTRAMUSCULAR; INTRAVENOUS ONCE
Status: COMPLETED | OUTPATIENT
Start: 2025-05-29 | End: 2025-05-29

## 2025-05-29 RX ORDER — SUCCINYLCHOLINE CHLORIDE 20 MG/ML
INJECTION INTRAMUSCULAR; INTRAVENOUS AS NEEDED
Status: DISCONTINUED | OUTPATIENT
Start: 2025-05-29 | End: 2025-05-29 | Stop reason: SURG

## 2025-05-29 RX ORDER — ONDANSETRON 2 MG/ML
4 INJECTION INTRAMUSCULAR; INTRAVENOUS ONCE AS NEEDED
Status: DISCONTINUED | OUTPATIENT
Start: 2025-05-29 | End: 2025-05-29 | Stop reason: HOSPADM

## 2025-05-29 RX ORDER — LIDOCAINE HYDROCHLORIDE 20 MG/ML
INJECTION, SOLUTION EPIDURAL; INFILTRATION; INTRACAUDAL; PERINEURAL AS NEEDED
Status: DISCONTINUED | OUTPATIENT
Start: 2025-05-29 | End: 2025-05-29 | Stop reason: SURG

## 2025-05-29 RX ORDER — REMIFENTANIL HYDROCHLORIDE 1 MG/ML
INJECTION, POWDER, LYOPHILIZED, FOR SOLUTION INTRAVENOUS CONTINUOUS PRN
Status: DISCONTINUED | OUTPATIENT
Start: 2025-05-29 | End: 2025-05-29 | Stop reason: SURG

## 2025-05-29 RX ORDER — SODIUM CHLORIDE 9 MG/ML
40 INJECTION, SOLUTION INTRAVENOUS AS NEEDED
Status: DISCONTINUED | OUTPATIENT
Start: 2025-05-29 | End: 2025-06-05 | Stop reason: HOSPADM

## 2025-05-29 RX ORDER — SODIUM CHLORIDE, SODIUM LACTATE, POTASSIUM CHLORIDE, CALCIUM CHLORIDE 600; 310; 30; 20 MG/100ML; MG/100ML; MG/100ML; MG/100ML
INJECTION, SOLUTION INTRAVENOUS CONTINUOUS PRN
Status: DISCONTINUED | OUTPATIENT
Start: 2025-05-29 | End: 2025-05-29 | Stop reason: SURG

## 2025-05-29 RX ORDER — PROPOFOL 10 MG/ML
VIAL (ML) INTRAVENOUS AS NEEDED
Status: DISCONTINUED | OUTPATIENT
Start: 2025-05-29 | End: 2025-05-29 | Stop reason: SURG

## 2025-05-29 RX ORDER — DEXAMETHASONE SODIUM PHOSPHATE 4 MG/ML
INJECTION, SOLUTION INTRA-ARTICULAR; INTRALESIONAL; INTRAMUSCULAR; INTRAVENOUS; SOFT TISSUE AS NEEDED
Status: DISCONTINUED | OUTPATIENT
Start: 2025-05-29 | End: 2025-05-29 | Stop reason: SURG

## 2025-05-29 RX ORDER — ENOXAPARIN SODIUM 100 MG/ML
40 INJECTION SUBCUTANEOUS EVERY 12 HOURS SCHEDULED
Status: DISCONTINUED | OUTPATIENT
Start: 2025-05-29 | End: 2025-06-01

## 2025-05-29 RX ORDER — FENTANYL CITRATE 50 UG/ML
INJECTION, SOLUTION INTRAMUSCULAR; INTRAVENOUS AS NEEDED
Status: DISCONTINUED | OUTPATIENT
Start: 2025-05-29 | End: 2025-05-29 | Stop reason: SURG

## 2025-05-29 RX ORDER — SODIUM CHLORIDE 0.9 % (FLUSH) 0.9 %
10 SYRINGE (ML) INJECTION AS NEEDED
Status: DISCONTINUED | OUTPATIENT
Start: 2025-05-29 | End: 2025-06-05 | Stop reason: HOSPADM

## 2025-05-29 RX ORDER — SODIUM CHLORIDE 9 MG/ML
INJECTION, SOLUTION INTRAVENOUS CONTINUOUS PRN
Status: DISCONTINUED | OUTPATIENT
Start: 2025-05-29 | End: 2025-05-29 | Stop reason: SURG

## 2025-05-29 RX ORDER — KETOROLAC TROMETHAMINE 30 MG/ML
15 INJECTION, SOLUTION INTRAMUSCULAR; INTRAVENOUS ONCE
Status: COMPLETED | OUTPATIENT
Start: 2025-05-29 | End: 2025-05-29

## 2025-05-29 RX ADMIN — DOCUSATE SODIUM 100 MG: 100 CAPSULE, LIQUID FILLED ORAL at 22:02

## 2025-05-29 RX ADMIN — HYDROMORPHONE HYDROCHLORIDE 0.5 MG: 1 INJECTION, SOLUTION INTRAMUSCULAR; INTRAVENOUS; SUBCUTANEOUS at 18:48

## 2025-05-29 RX ADMIN — PHENYLEPHRINE HYDROCHLORIDE 0.5 MCG/KG/MIN: 10 INJECTION INTRAVENOUS at 15:52

## 2025-05-29 RX ADMIN — LIDOCAINE HYDROCHLORIDE 80 MG: 20 INJECTION, SOLUTION EPIDURAL; INFILTRATION; INTRACAUDAL; PERINEURAL at 15:34

## 2025-05-29 RX ADMIN — HYDROMORPHONE HYDROCHLORIDE 0.5 MG: 1 INJECTION, SOLUTION INTRAMUSCULAR; INTRAVENOUS; SUBCUTANEOUS at 19:43

## 2025-05-29 RX ADMIN — FENTANYL CITRATE 25 MCG: 50 INJECTION, SOLUTION INTRAMUSCULAR; INTRAVENOUS at 17:44

## 2025-05-29 RX ADMIN — FENTANYL CITRATE 50 MCG: 50 INJECTION, SOLUTION INTRAMUSCULAR; INTRAVENOUS at 16:21

## 2025-05-29 RX ADMIN — KETOROLAC TROMETHAMINE 15 MG: 30 INJECTION INTRAMUSCULAR; INTRAVENOUS at 09:30

## 2025-05-29 RX ADMIN — MIDODRINE HYDROCHLORIDE 10 MG: 5 TABLET ORAL at 22:02

## 2025-05-29 RX ADMIN — PHENYLEPHRINE HYDROCHLORIDE 150 MCG: 10 INJECTION INTRAVENOUS at 15:47

## 2025-05-29 RX ADMIN — ONDANSETRON 4 MG: 2 INJECTION, SOLUTION INTRAMUSCULAR; INTRAVENOUS at 09:30

## 2025-05-29 RX ADMIN — CEFAZOLIN 2000 MG: 2 INJECTION, POWDER, FOR SOLUTION INTRAMUSCULAR; INTRAVENOUS at 22:03

## 2025-05-29 RX ADMIN — SODIUM CHLORIDE, SODIUM LACTATE, POTASSIUM CHLORIDE, AND CALCIUM CHLORIDE: .6; .31; .03; .02 INJECTION, SOLUTION INTRAVENOUS at 15:50

## 2025-05-29 RX ADMIN — DEXAMETHASONE SODIUM PHOSPHATE 8 MG: 4 INJECTION, SOLUTION INTRAMUSCULAR; INTRAVENOUS at 16:46

## 2025-05-29 RX ADMIN — EPHEDRINE SULFATE 10 MG: 5 INJECTION INTRAVENOUS at 16:15

## 2025-05-29 RX ADMIN — PHENYLEPHRINE HYDROCHLORIDE 100 MCG: 10 INJECTION INTRAVENOUS at 16:15

## 2025-05-29 RX ADMIN — SODIUM CHLORIDE: 9 INJECTION, SOLUTION INTRAVENOUS at 15:27

## 2025-05-29 RX ADMIN — FENTANYL CITRATE 25 MCG: 50 INJECTION, SOLUTION INTRAMUSCULAR; INTRAVENOUS at 15:34

## 2025-05-29 RX ADMIN — METOPROLOL SUCCINATE 12.5 MG: 25 TABLET, EXTENDED RELEASE ORAL at 09:30

## 2025-05-29 RX ADMIN — PROPOFOL 100 MG: 10 INJECTION, EMULSION INTRAVENOUS at 15:34

## 2025-05-29 RX ADMIN — FENTANYL CITRATE 50 MCG: 50 INJECTION, SOLUTION INTRAMUSCULAR; INTRAVENOUS at 19:07

## 2025-05-29 RX ADMIN — SENNOSIDES AND DOCUSATE SODIUM 2 TABLET: 8.6; 5 TABLET ORAL at 22:02

## 2025-05-29 RX ADMIN — Medication 10 ML: at 22:15

## 2025-05-29 RX ADMIN — Medication 10 ML: at 22:16

## 2025-05-29 RX ADMIN — PROPOFOL 150 MCG/KG/MIN: 10 INJECTION, EMULSION INTRAVENOUS at 15:52

## 2025-05-29 RX ADMIN — CEFAZOLIN 2000 MG: 2 INJECTION, POWDER, FOR SOLUTION INTRAMUSCULAR; INTRAVENOUS at 15:16

## 2025-05-29 RX ADMIN — REMIFENTANIL HYDROCHLORIDE 0.1 MCG/KG/MIN: 1 INJECTION, POWDER, LYOPHILIZED, FOR SOLUTION INTRAVENOUS at 15:52

## 2025-05-29 RX ADMIN — ENOXAPARIN SODIUM 40 MG: 100 INJECTION SUBCUTANEOUS at 22:02

## 2025-05-29 RX ADMIN — BISACODYL 10 MG: 10 SUPPOSITORY RECTAL at 10:44

## 2025-05-29 RX ADMIN — MORPHINE SULFATE 2 MG: 2 INJECTION, SOLUTION INTRAMUSCULAR; INTRAVENOUS at 06:31

## 2025-05-29 RX ADMIN — MORPHINE SULFATE 2 MG: 2 INJECTION, SOLUTION INTRAMUSCULAR; INTRAVENOUS at 22:19

## 2025-05-29 RX ADMIN — SUCCINYLCHOLINE CHLORIDE 100 MG: 20 INJECTION, SOLUTION INTRAMUSCULAR; INTRAVENOUS at 15:35

## 2025-05-29 RX ADMIN — SERTRALINE HYDROCHLORIDE 100 MG: 100 TABLET, FILM COATED ORAL at 22:02

## 2025-05-29 RX ADMIN — EPHEDRINE SULFATE 10 MG: 5 INJECTION INTRAVENOUS at 16:35

## 2025-05-29 RX ADMIN — PHENYLEPHRINE HYDROCHLORIDE 100 MCG: 10 INJECTION INTRAVENOUS at 16:38

## 2025-05-29 RX ADMIN — PREGABALIN 200 MG: 100 CAPSULE ORAL at 22:02

## 2025-05-29 RX ADMIN — ONDANSETRON 4 MG: 2 INJECTION, SOLUTION INTRAMUSCULAR; INTRAVENOUS at 18:06

## 2025-05-29 NOTE — ANESTHESIA PROCEDURE NOTES
Arterial Line    Pre-sedation assessment completed: 5/29/2025 4:56 PM    Patient reassessed immediately prior to procedure    Patient location during procedure: OR  Start time: 5/29/2025 4:56 PM   Line placed for hemodynamic monitoring.  Performed By   CRNA/CAA: Maritza Dumont CRNA   Preanesthetic Checklist  Completed: patient identified, IV checked, site marked, risks and benefits discussed, surgical consent, monitors and equipment checked, pre-op evaluation and timeout performed  Arterial Line Prep    Sterile Tech: cap, gloves and mask  Prep: ChloraPrep  Patient monitoring: blood pressure monitoring, continuous pulse oximetry and EKG  Arterial Line Procedure   Laterality:left  Location:  radial artery  Catheter size: 20 G   Guidance: landmark technique and palpation technique  Number of attempts: 2  Successful placement: yes   Post Assessment   Dressing Type: secured with tape and occlusive dressing applied.   Complications no  Circ/Move/Sens Assessment: unchanged.   Patient Tolerance: patient tolerated the procedure well with no apparent complications

## 2025-05-29 NOTE — ANESTHESIA PREPROCEDURE EVALUATION
Anesthesia Evaluation     Patient summary reviewed and Nursing notes reviewed   NPO Solid Status: > 8 hours             Airway   Mallampati: II  TM distance: >3 FB  Neck ROM: full  No difficulty expected  Dental - normal exam     Pulmonary - normal exam   (+) asthma,sleep apnea  Cardiovascular - normal exam    ECG reviewed    (+) pacemaker pacemaker, hypertension, valvular problems/murmurs MR, CAD, CABG >6 Months, dysrhythmias Atrial Fib, hyperlipidemia  (-) angina, FRY    ROS comment: Repaired IHSS    Neuro/Psych  (+) seizures well controlled, TIA, numbness  GI/Hepatic/Renal/Endo    (+) obesity, GERD    Musculoskeletal     (+) neck pain  Abdominal  - normal exam    Bowel sounds: normal.   Substance History - negative use     OB/GYN negative ob/gyn ROS         Other                    Anesthesia Plan    ASA 3     general       Anesthetic plan, risks, benefits, and alternatives have been provided, discussed and informed consent has been obtained with: patient.    CODE STATUS:    Code Status (Patient has no pulse and is not breathing): CPR (Attempt to Resuscitate)  Medical Interventions (Patient has pulse or is breathing): Full Support

## 2025-05-29 NOTE — PLAN OF CARE
Problem: Adult Inpatient Plan of Care  Goal: Plan of Care Review  Outcome: Progressing  Goal: Patient-Specific Goal (Individualized)  Outcome: Progressing  Goal: Absence of Hospital-Acquired Illness or Injury  Outcome: Progressing  Intervention: Identify and Manage Fall Risk  Recent Flowsheet Documentation  Taken 5/29/2025 0230 by Mitzi Willingham LPN  Safety Promotion/Fall Prevention: safety round/check completed  Taken 5/29/2025 0041 by Mitzi Willingham LPN  Safety Promotion/Fall Prevention: safety round/check completed  Taken 5/28/2025 2241 by Mitzi Willingham LPN  Safety Promotion/Fall Prevention: safety round/check completed  Taken 5/28/2025 2041 by Mitzi Willingham LPN  Safety Promotion/Fall Prevention:   safety round/check completed   room organization consistent   nonskid shoes/slippers when out of bed   fall prevention program maintained   clutter free environment maintained   assistive device/personal items within reach  Intervention: Prevent Skin Injury  Recent Flowsheet Documentation  Taken 5/29/2025 0041 by Mitzi Willingham LPN  Body Position:   turned   right  Taken 5/28/2025 2241 by Mitzi Willingham LPN  Body Position: position maintained  Taken 5/28/2025 2041 by Mitzi Willingham LPN  Body Position:   turned   supine  Intervention: Prevent and Manage VTE (Venous Thromboembolism) Risk  Recent Flowsheet Documentation  Taken 5/28/2025 2041 by Mitzi Willingham LPN  VTE Prevention/Management: SCDs (sequential compression devices) on  Intervention: Prevent Infection  Recent Flowsheet Documentation  Taken 5/28/2025 2041 by Mitzi Willingham LPN  Infection Prevention: single patient room provided  Goal: Optimal Comfort and Wellbeing  Outcome: Progressing  Intervention: Monitor Pain and Promote Comfort  Recent Flowsheet Documentation  Taken 5/28/2025 2228 by Mitzi Willingham LPN  Pain Management Interventions: pain medication given  Intervention: Provide Person-Centered Care  Recent Flowsheet  Documentation  Taken 5/28/2025 2041 by Mitzi Willingham LPN  Trust Relationship/Rapport:   care explained   thoughts/feelings acknowledged   questions answered  Goal: Readiness for Transition of Care  Outcome: Progressing   Goal Outcome Evaluation:

## 2025-05-29 NOTE — ANESTHESIA POSTPROCEDURE EVALUATION
Patient: Domonique See    Procedure Summary       Date: 05/29/25 Room / Location: [facility] OR 02 / [facility] MAIN OR    Anesthesia Start: 1527 Anesthesia Stop: 1825    Procedure: CERVICAL LAMINECTOMY DECOMPRESSION POSTERIOR C7 - T1, C5-T2 FUSION (Spine Cervical) Diagnosis:     Surgeons: [place] Bernal IV, MD Provider: Maritza Dumont CRNA    Anesthesia Type: general ASA Status: 3            Anesthesia Type: general    Vitals  Vitals Value Taken Time   /51 05/29/25 19:40   Temp 98 °F (36.7 °C) 05/29/25 18:26   Pulse 92 05/29/25 19:53   Resp 14 05/29/25 19:40   SpO2 95 % 05/29/25 19:53   Vitals shown include unfiled device data.        Post Anesthesia Care and Evaluation    Patient location during evaluation: PACU  Patient participation: complete - patient participated  Level of consciousness: awake  Pain scale: See nurse's notes for pain score.  Pain management: adequate    Airway patency: patent  Anesthetic complications: No anesthetic complications  PONV Status: none  Cardiovascular status: acceptable  Respiratory status: acceptable and spontaneous ventilation  Hydration status: acceptable    Comments: Patient seen and examined postoperatively; vital signs stable; SpO2 greater than or equal to 90%; cardiopulmonary status stable; nausea/vomiting adequately controlled; pain adequately controlled; no apparent anesthesia complications; patient discharged from anesthesia care when discharge criteria were met

## 2025-05-29 NOTE — OP NOTE
CERVICAL LAMINECTOMY DECOMPRESSION POSTERIOR  Procedure Report    Patient Name:  Domonique See  YOB: 1940    Date of Surgery:  5/29/2025     Indications: 85-year-old female with worsening symptoms of myelopathy and paraparesis with severe stenosis at C7-T1 below previous fusion due to spondylolisthesis and ligamentous overgrowth.  Given this patient was taken to the OR for C7-T1 laminectomy and C5-T2 fusion.  Patient understood the significant risks of surgery including bleeding infection CSF leak nerve damage spinal cord injury hardware failure pseudoarthrosis vascular injury stroke no improvement or worsening in her current symptoms need for future operation among many other risks and she agreed to undergo the procedure.  Notably the patient was cleared by cardiology given her significant cardiac history.  She also understood that she is at significantly higher risk of perioperative and postoperative complications given her advanced age and bone density and she agreed to undergo the procedure    Pre-op Diagnosis:   Cervical myelopathy  Cervical spondylolisthesis  Cervical stenosis       Post-Op Diagnosis Codes:  Cervical myelopathy  Cervical spondylolisthesis  Cervical stenosis    Procedure(s):  Cervical 5 to thoracic 2 instrumented fusion with globus lateral mass screws and pedicle screws  Cervical 7 to T1 laminectomy  Cervical 5 to thoracic 2 posterior lateral fusion with iFactor allograft and autograft        Spinal Surgery Levels Completed:4 Levels      Staff:  Surgeon(s):  Lake Bernal IV, MD    Assistant: Ricardo Adam PA    Anesthesia: General    Estimated Blood Loss: 150 mL    Implants:    Implant Name Type Inv. Item Serial No.  Lot No. LRB No. Used Action   KT SEAL HEMOS ABS FLOSEAL MATRX 1.5/FAST/PREP 5000/IU 10ML - [id number] Implant KT SEAL HEMOS ABS FLOSEAL MATRX 1.5/FAST/PREP 5000/IU 10ML  [employer]LL[employer] Jewel Toned [id number] N/A 2 Implanted   DEV CONTRL TISS STRATAFIX  SPIRAL MNCRYL UD 3/0 PLS 30CM - [id number] Implant DEV CONTRL TISS STRATAFIX SPIRAL MNCRYL UD 3/0 PLS 30CM  [employer]  DIV OF ELEANOR AND J [id number] N/A 1 Implanted   GRFT BONE IFACTOR PUTTY 5ML - [id number] Implant GRFT BONE IFACTOR PUTTY 5ML  [employer] [id number] N/A 1 Implanted   GRFT BONE IFACTOR PUTTY 5ML - [id number] Implant GRFT BONE IFACTOR PUTTY 5ML  [employer] [id number] N/A 1 Implanted   ALLOGRFT FIBR OSTEOAMP SELECT 10CC - [id number] - [id number] Implant ALLOGRFT FIBR OSTEOAMP SELECT 10CC [id number] [employer] . N/A 1 Implanted   ALLOGRFT FIBR OSTEOAMP SELECT 10CC - [id number] - [id number] Implant ALLOGRFT FIBR OSTEOAMP SELECT 10CC [id number] [employer] . N/A 1 Implanted   3.5x12 screw     . N/A 3 Implanted   4.0x12 screw     . N/A 1 Implanted   4.5x25 screw     . N/A 4 Implanted   caps     . N/A 1 Implanted   60 rods     . N/A 2 Implanted   DEV WND/CLS CONTRL TISS STRATAFIX SYMM PDS PLS CTX 60CM LILLIAN - [id number] Implant DEV WND/CLS CONTRL TISS STRATAFIX SYMM PDS PLS CTX 60CM LILLIAN  [employer]  DIV OF ELEANOR AND J [id number] N/A 1 Implanted       Specimen:          None        Findings: Stenosis    Complications: None    Description of Procedure: Patient was brought to the OR and placed under general endotracheal anesthesia.  She was placed in a Campuzano head greenwood and flipped into the prone position on a Jesse table with her head secured in the Campuzano head greenwood with head and neck in a neutral position.  Patient was prepped and draped in the usual fashion.  Lateral fluoroscopy was used to identify the C5 level and an incision was made from C5 down to approximately T2.  Incision was carried down to the deep dermal and fat layers with monopolar cautery.  Subperiosteal dissection was undertaken exposing the posterior cervical spine from see 5 to T1 using bipolar cautery monopolar cautery and blunt dissection.  Lateral mass screws were then placed under lateral fluoroscopic guidance.  High-speed bur was  used to make an initial cut through the cortical bone followed by drill the lateral mass and the proper medial to lateral trajectory followed by tap followed by placement of the screw.  Screws were placed bilaterally at C5 and C6.  Pedicle screws were then placed at T1 and T2 using AP lateral fluoroscopic guidance.  High-speed bur was used to make initial cut through the cortical bone followed by a lanky probe through the pedicle and the proper trajectory followed by tap followed by placement of the screw.  Leksell was used to remove the spinous processes of C7 and T1.  High-speed drill was used to remove lamina of C7 and T1.  Remaining shelled out bone and ligament was removed with 2 Kerrison.  Once good central decompression was achieved, hemostasis was achieved with bipolar cautery and Gelfoam powder.  Rods were then placed bilaterally and locking screws were placed and final tightened.  Final x-rays demonstrated good positioning of all hardware.  Wound was thoroughly irrigated.  Further hemostasis was achieved with bipolar cautery and Gelfoam powder.  High-speed bur was used to decorticate the remaining lateral masses spinous processes and facets from C5-T2 and posterior lateral fusion was performed from C5-T2 with iFactor allograft and autograft.  Hemovac drain was placed.  Muscle was reapproximated using 0 Vicryl sutures, the fascia was closed with a running strata fix 1, the deep dermal layer was closed with 2-0 Vicryl sutures and the skin was closed with a running subcuticular Monocryl.  Dermabond was placed over the wound.  There were no changes in neuromonitoring throughout the case.                Assistant: Ricardo Adam PA  was responsible for performing the following activities: Retraction, Suction, Irrigation, Suturing, Closing, and Placing Dressing and their skilled assistance was necessary for the success of this case.    Lake Bernal IV, MD     Date: 5/29/2025  Time: 18:07 EDT

## 2025-05-29 NOTE — PROGRESS NOTES
LOS: 2 days   Patient Care Team:  Anish Lew APRN as PCP - General  Evgeny Gregory MD as Consulting Physician (Cardiology)  Leeanne Pleitez MD as Consulting Physician (Cardiology)  Radha Owusu APRN as Nurse Practitioner (Nurse Practitioner)  Kathe Muñoz MD as Consulting Physician (Nephrology)    Subjective     Patient continues with pain      Review of Systems   Constitutional:  Positive for activity change and fatigue.   HENT: Negative.     Respiratory: Negative.     Cardiovascular: Negative.    Gastrointestinal:  Positive for constipation.   Genitourinary: Negative.    Musculoskeletal:  Positive for back pain and gait problem.   Neurological:  Positive for weakness.   Psychiatric/Behavioral: Negative.             Objective     Vital Signs  Temp:  [97.5 °F (36.4 °C)-99.1 °F (37.3 °C)] 98 °F (36.7 °C)  Heart Rate:  [60-69] 69  Resp:  [15-18] 17  BP: (119-155)/(57-95) 143/82      Physical Exam  Vitals reviewed.   Constitutional:       Appearance: She is not ill-appearing.   HENT:      Head: Normocephalic and atraumatic.      Right Ear: External ear normal.      Left Ear: External ear normal.      Nose: Nose normal.      Mouth/Throat:      Mouth: Mucous membranes are dry.   Eyes:      General:         Right eye: No discharge.         Left eye: No discharge.   Cardiovascular:      Rate and Rhythm: Normal rate and regular rhythm.      Heart sounds: Normal heart sounds.   Pulmonary:      Effort: Pulmonary effort is normal.      Breath sounds: Normal breath sounds.   Abdominal:      General: Bowel sounds are normal.   Musculoskeletal:      Cervical back: Tenderness present.      Right lower leg: No edema.      Left lower leg: No edema.   Skin:     General: Skin is dry.   Neurological:      Mental Status: She is alert and oriented to person, place, and time.   Psychiatric:         Behavior: Behavior normal.              Results Review:    Lab Results (last 24 hours)        Procedure Component Value Units Date/Time    Basic Metabolic Panel [[id number]]  (Abnormal) Collected: 05/29/25 0509    Specimen: Blood Updated: 05/29/25 0556     Glucose 101 mg/dL      BUN 15.3 mg/dL      Creatinine 0.79 mg/dL      Sodium 143 mmol/L      Potassium 4.2 mmol/L      Chloride 108 mmol/L      CO2 25.3 mmol/L      Calcium 9.8 mg/dL      BUN/Creatinine Ratio 19.4     Anion Gap 9.7 mmol/L      eGFR 73.0 mL/min/1.73     Narrative:      GFR Categories in Chronic Kidney Disease (CKD)              GFR Category          GFR (mL/min/1.73)    Interpretation  G1                    90 or greater        Normal or high (1)  G2                    60-89                Mild decrease (1)  G3a                   45-59                Mild to moderate decrease  G3b                   30-44                Moderate to severe decrease  G4                    15-29                Severe decrease  G5                    14 or less           Kidney failure    (1)In the absence of evidence of kidney disease, neither GFR category G1 or G2 fulfill the criteria for CKD.    eGFR calculation 2021 CKD-EPI creatinine equation, which does not include race as a factor    Protime-INR [[id number]]  (Normal) Collected: 05/29/25 0509    Specimen: Blood Updated: 05/29/25 0536     Protime 14.1 Seconds      INR 1.09    aPTT [[id number]]  (Abnormal) Collected: 05/29/25 0509    Specimen: Blood Updated: 05/29/25 0536     PTT 45.1 seconds     CBC (No Diff) [[id number]]  (Abnormal) Collected: 05/29/25 0509    Specimen: Blood Updated: 05/29/25 0534     WBC 5.02 10*3/mm3      RBC 3.91 10*6/mm3      Hemoglobin 11.3 g/dL      Hematocrit 36.7 %      MCV 93.9 fL      MCH 28.9 pg      MCHC 30.8 g/dL      RDW 13.7 %      RDW-SD 46.9 fl      MPV 12.0 fL      Platelets 125 10*3/mm3     Blood Culture - Blood, Arm, Right [[id number]]  (Normal) Collected: 05/24/25 0021    Specimen: Blood from Arm, Right Updated: 05/29/25 0030     Blood Culture No growth at 5 days     Narrative:      Less than seven (7) mL's of blood was collected.  Insufficient quantity may yield false negative results.    Blood Culture - Blood, Arm, Left [[id number]]  (Normal) Collected: 05/23/25 2037    Specimen: Blood from Arm, Left Updated: 05/28/25 2045     Blood Culture No growth at 5 days    aPTT [[id number]]  (Abnormal) Collected: 05/28/25 0850    Specimen: Blood Updated: 05/28/25 0950     PTT 43.0 seconds              Imaging Results (Last 24 Hours)       ** No results found for the last 24 hours. **                 I reviewed the patient's new clinical results.    Medication Review:   Scheduled Meds:[Held by provider] apixaban, 5 mg, Oral, BID  [Held by provider] aspirin, 81 mg, Oral, Nightly  atorvastatin, 40 mg, Oral, Daily  docusate sodium, 100 mg, Oral, BID  enoxaparin sodium, 1 mg/kg, Subcutaneous, Q12H  ketorolac, 15 mg, Intravenous, Once  levothyroxine, 75 mcg, Oral, Q AM  [Held by provider] lisinopril, 5 mg, Oral, Daily  metoprolol succinate XL, 12.5 mg, Oral, Q24H  midodrine, 10 mg, Oral, Q8H  polyethylene glycol, 17 g, Oral, Daily  pregabalin, 200 mg, Oral, Q8H  senna-docusate sodium, 2 tablet, Oral, BID  sertraline, 100 mg, Oral, Q PM  sodium chloride, 10 mL, Intravenous, Q12H      Continuous Infusions:   PRN Meds:.  acetaminophen    senna-docusate sodium **AND** polyethylene glycol **AND** bisacodyl **AND** bisacodyl    Morphine    [Held by provider] Morphine    nitroglycerin    ondansetron    oxyCODONE    [COMPLETED] Insert Peripheral IV **AND** sodium chloride    sodium chloride    sodium chloride    zolpidem     Interval History:        Assessment & Plan      Cervical neuralgia  Pacemaker  Paroxysmal atrial fibrillation  Cervical radiculopathy  Essential hypertension  Spondylolisthesis, acquired  Obesity (BMI 30.0-34.9)  Hx of hypertrophic cardiomyopathy  Coronary artery disease  Neck pain  Seizure disorder  Encounter for pre-operative cardiovascular clearance  Aortic insufficiency  Mitral  regurgitation    Plan of care  -Neurosurgery following with surgery today  -bowel regimen will need bowel purge  -Waiting on cardiology for determination on clearance for surgery  -home medications for chronic medical conditions  -holding Eliquis and aspirin for possible surgery  -Lovenox for DVT prophylaxis as well as paroxysmal atrial fibrillation  -pain control with oxycodone/morphine  -BP fluctuating most likely due to pain and pain medication; will hold lisinopril and keep midodrine would rather have more htn lean than hypotension      CODE Status:    Code status (Patient has no pulse and is not breathing):  CPR (Attempt to Resuscitate)  Medical Interventions (Patient has pulse or is breathing):  Full support  Level of support discussed with:  Patient     Admission status:  I believe this patient meets inpatient status  Expected length of stay:  2 midnights or greater    Plan for disposition:MARLA Acosta, WOODY  05/29/25  09:29 EDT

## 2025-05-29 NOTE — PROGRESS NOTES
No events    Awake and alert   4 out of 5  Bilateral lower extremities 1 out of 5 quads IP, 2 out of 5 dorsiflexion, 3 out of 5 plantarflexion  Truncal and lower extremity sensory loss bilaterally    Plan: 85-year-old female with previous anterior cervical fusion C3-C7 with severe adjacent segment disease with spondylolisthesis of C7-T1 and severe central stenosis with spinal cord compression  -Neuro stable  -OR today for C7-T1 decompression and C5-T2 fusion  - I have spoken extensively with the patient and her family about risks of surgery including her age low bone density and cardiac history and they would like to proceed with surgery for decompression of the spinal cord.  They understand that we are ultimately doing the surgery to prevent the patient from worsening in terms of upper extremity function and bowel and bladder function.  She understands that there are significant risks including death or worsening spinal cord injury including quadriplegia.  Is at increased risk due to medical comorbidities including anemia congestive heart failure coronary artery disease history of DVT GERD hypertension peptic ulcer sleep apnea history of TIA among other medical comorbidities and she is agreed to undergo the procedure.  -Patient will need to wear cervical collar at all times following surgery  -Appreciate hospitalist and cardiology recommendations.

## 2025-05-29 NOTE — PLAN OF CARE
Goal Outcome Evaluation:  Plan of Care Reviewed With: patient        Progress: no change        Patient to have surgery this afternoon, will monitor and watch for new orders

## 2025-05-29 NOTE — PROGRESS NOTES
Cardiology Progress Note    Patient Identification:  Name: Domonique See  Age: 85 y.o.  Sex: female  :  1940  MRN: [id number]                 Follow Up / Chief Complaint: pre op evaluation   Chief Complaint   Patient presents with    Rectal Pain     Constipated last night, ws on the toilet for 4 hours without getting up, pt took a break and went back for another 2 hours. Pt is worried she prolapsed her rectum. Pt has cold legs and feet - spinal stenosis may be worsening. Partial paralysis from waist down has rectal numbness x 3 months.       Interval History: Patient presented with worsening lower extremity weakness        NP NOTE:  Patient seen with Dr. Gregory this morning.  Patient reports she is trying to rest.  Possibly surgery today.  She understands the risks and wants to proceed.     Electronically signed by WOODY Thomas, 25, 11:17 AM EDT.      Cardiology attending addendum :    I have personally performed a face-to-face diagnostic evaluation, physical exam and reviewed data on this patient.  I have reviewed documentation done by me and nurse practitioner  and corrected as needed.  And agree with the different components of documentation.Greater than 50% of the time spent in the care of this patient was provided by attending consultant/me.       Subjective: Patient seen and examined.  Chart reviewed.  Labs reviewed.  Patient denies any chest pain or shortness of breath.      Objective:   2025: High-sensitivity troponin 20--16 potassium 4.7 otherwise CMP unremarkable lactic normal hemoglobin 10.3 blood cultures negative  2025: CMP unremarkable, CBC with hgb 10.7 plts 131 CXR negative   2025: BMP with glucose of 101    History of present illness:      Ms. Domonique See  has PMH of     Hypertrophic cardiomyopathy, history of septal myomectomy,      nonsustained VT  hypertension  SSS, [employer] [facility] permanent pacemaker 3/16/2017  Paroxysmal atrial fibrillation,  ablation 1/16/2023  [id number] SCORE   [id number] Score: 8 (6/20/2024  2:06 PM)  [id number] Score: 8 (5/22/2023  3:37 PM)  Age greater than 75, CHF, hypertension, CVA, vascular disease     CAD, cath 12/2017 30% LAD  CHF due to hypertrophic cardiomyopathy  Hyperlipidemia  COPD  History of DAYLIN 4/28/2023  Peptic ulcer disease  Back surgery (previous cervical decompression and fusion, lumbar decompression and fusion), breast augmentation  Former smoker     presented to the ER at [facility] on 5/23/2025 complaining of worsening leg weakness, numbness and constipation. Of note, the patient was recently seen by neurosurgery, Dr. Bernal, for severe stenosis of the cervical spine. She is now wheelchair-bound.      Workup in the ER revealed hypotension, which responded to IV fluids. Neurosurgery was consulted and she was admitted for further treatment. Cardiology was consulted for preoperative cardiac clearance.        Outpatient echocardiogram 3/21/2025    Left ventricular ejection fraction appears to be 61 - 65%.    Left ventricular diastolic function was normal.    The left atrial cavity is moderately dilated.    Left atrial volume is severely increased.    Moderate aortic valve regurgitation is present.    Moderate mitral valve regurgitation is present.    Estimated right ventricular systolic pressure from tricuspid regurgitation is normal (<35 mmHg)              Chart review:  Underwent A-fib ablation 1/16/2023  Admitted 1/18/2023 with CHF     Echocardiogram 6/29/2021 reveals EF of 50%   Echocardiogram 1/19/2023 EF of 60 to 65% with severe right ventricular and right atrial enlargement moderate left atrial enlargement, moderate mitral regurgitation and mild aortic regurgitation.           Assessment:    Preoperative cardiovascular evaluation for cervical spine surgery  Cervical neuralgia  Inactive functional status  Extremity weakness  Permanent pacemaker  Permanent A-fib status post ablation, long-term  Eliquis  Hypertrophic cardiomyopathy, myomectomy, NSVT  Moderate mitral regurgitation, mild aortic regurgitation  Chronic HFpEF due to diastolic dysfunction from HCM  Hypertension  Dyslipidemia  Prediabetes  Severe right ventricular enlargement        Recommendations and plan:     Patient has issues with chronic back pain requiring injections in the past is not functionally active.  Patient had cath in 2017 showing 30% stenosis in the LAD, Lexiscan 4-25 revealed no ischemia.  Echocardiogram 3/21/2025 revealed preserved EF with moderate aortic and mitral valve regurgitation  Serial troponins are mildly elevated at 20 and 16.  Patient denies any chest pain.  EKG revealed paced rhythm.      Patient has elevated [id number] score of 8, due to age over 75, female gender, CHF, hypertension, CVA, vascular disease.  Will benefit from long-term anticoagulation to prevent thromboembolic events.  Patient's risk of stroke and other embolism ranged from 9.7 to 13.6 %/year.  Patient's anticoagulation is currently on hold for possible surgery  Will recommend restart when safe and okay with surgery  Continue rate control with metoprolol as tolerated  Monitor hemodynamics and rhythm.    Patient is a cardiac bleeding C-spine surgery/C3-7 ACDF.  Given her advanced age her risk of complications and cardiac events and death would be higher.  Discussed the same.  Based on ACS NSQIP calculator risk is high  Cardiac risk index for preoperative risk per [facility] preoperative risk assessment would be 6% of risk of major adverse cardiac events.  Risk benefits alternatives explained to patient and family.  Will continue to monitor hemodynamics and rhythm.       Data:     EKG 4/1/2025 reviewed/interpreted by me reveals A-fib with a rate of 92 bpm with right bundle branch block.  Patient underwent Lexiscan Cardiolite 4/3/2025 which was negative for ischemia EF of 56%.  Reviewed stress test results with patient.  Patient had previous ablation.  Was  seen by EP Dr. Naseem Pleitez, I am patient was offered ablation and cardioversion she is considering.  Will follow-up with EP as outpatient.  Will continue to monitor rhythm on telemetry  Will follow-up as outpatient in pacemaker clinic to follow rhythm.           Procedures     ECHOCARDIOGRAM:  Results for orders placed during the hospital encounter of 03/21/25     Adult Transthoracic Echo Complete W/ Cont if Necessary Per Protocol     Interpretation Summary    Left ventricular ejection fraction appears to be 61 - 65%.    Left ventricular diastolic function was normal.    The left atrial cavity is moderately dilated.    Left atrial volume is severely increased.    Moderate aortic valve regurgitation is present.    Moderate mitral valve regurgitation is present.    Estimated right ventricular systolic pressure from tricuspid regurgitation is normal (<35 mmHg).        STRESS TEST  Results for orders placed during the hospital encounter of 03/28/25     Stress Test With Myocardial Perfusion One Day     Interpretation Summary    Myocardial perfusion imaging indicates a normal myocardial perfusion study with no evidence of ischemia. Impressions are consistent with a low risk study.    Left ventricular ejection fraction is normal (Calculated EF = 56%).                     Copied text in this portion of the note has been reviewed and is accurate as of 5/29/2025    Past Medical History:  Past Medical History:   Diagnosis Date    Anemia     Aortic insufficiency 03/21/2025    Asthma     CHF (congestive heart failure)     Cholecystitis 09/02/2011    Congenital heart disease     Coronary artery disease 2013    Deep vein thrombosis (DVT) 11/20/2017    GERD (gastroesophageal reflux disease)     Heart murmur Don't know    Hx of hypertrophic cardiomyopathy     Hypertension     Hypertrophic obstructive cardiomyopathy 11/10/2017    Low back pain     Lumbosacral disc disease     Mitral regurgitation 03/21/2025    Mixed  hyperlipidemia 10/06/2017    Morbid obesity 2020    Peptic ulceration     Persistent atrial fibrillation 01/10/2023    Primary osteoarthritis of both knees 2021    Primary osteoarthritis of right knee 2020    Seizures     Sleep apnea 2017    TIA (transient ischemic attack)      Past Surgical History:  Past Surgical History:   Procedure Laterality Date    ABDOMINAL HERNIA REPAIR      ABLATION OF DYSRHYTHMIC FOCUS  2023    BACK SURGERY      BREAST AUGMENTATION      CARDIAC CATHETERIZATION      CARDIAC ELECTROPHYSIOLOGY PROCEDURE N/A 2023    Procedure: Ablation atrial fibrillation and flutter, cryo Duong and Ramón aware;  Surgeon: Leeanne Pleitez MD;  Location: [facility] CATH INVASIVE LOCATION;  Service: Cardiovascular;  Laterality: N/A;    CARDIAC PACEMAKER PLACEMENT  2017    Dual Chamber    CERVICAL RIB RESECTION Bilateral     CERVICAL RIB RESECTION Bilateral 1963    bilateral cervical ribs removed - extra ribs located and causing pain    CHOLECYSTECTOMY      COLONOSCOPY      CORONARY ARTERY BYPASS GRAFT      EPIDURAL BLOCK      NECK SURGERY      SPINAL FUSION      THORACIC DECOMPRESSION POSTERIOR FUSION  2014    L3-5 & S1    TRIGGER POINT INJECTION      UPPER GASTROINTESTINAL ENDOSCOPY          Social History:   Social History     Tobacco Use    Smoking status: Former     Current packs/day: 0.00     Average packs/day: 1 pack/day for 8.0 years (8.0 ttl pk-yrs)     Types: Cigarettes     Start date: 1977     Quit date: 1985     Years since quittin.4     Passive exposure: Past    Smokeless tobacco: Never    Tobacco comments:     Quit    Substance Use Topics    Alcohol use: Never     Comment: 6 drinks a year in social settings      Family History:  Family History   Problem Relation Age of Onset    Colon cancer Mother     Heart disease Mother     Hypertension Mother     Arthritis Mother     Cancer Mother     Diabetes Mother     Kidney disease Mother     Colon  "cancer Brother     Heart attack Brother     Colon cancer Daughter     Arthritis Daughter     Arthritis Father     Hearing loss Father     Arthritis Sister     Hypertension Brother     Arthritis Brother     Cancer Brother     Stroke Brother     Arthritis Son     Arthritis Son     Cancer Sister     Diabetes Sister     Cancer Brother     Hypertension Sister     Kidney disease Sister           Allergies:  No Known Allergies  Scheduled Meds:  [Held by provider] apixaban, 5 mg, BID  [Held by provider] aspirin, 81 mg, Nightly  atorvastatin, 40 mg, Daily  docusate sodium, 100 mg, BID  enoxaparin sodium, 1 mg/kg, Q12H  ketorolac, 15 mg, Once  levothyroxine, 75 mcg, Q AM  [Held by provider] lisinopril, 5 mg, Daily  metoprolol succinate XL, 12.5 mg, Q24H  midodrine, 10 mg, Q8H  polyethylene glycol, 17 g, Daily  pregabalin, 200 mg, Q8H  senna-docusate sodium, 2 tablet, BID  sertraline, 100 mg, Q PM  sodium chloride, 10 mL, Q12H          Review of Systems:   Review of Systems   Musculoskeletal:  Positive for back pain and muscle weakness.   Neurological:  Positive for numbness, paresthesias and weakness.     Review of Systems   Constitution: Negative for chills and fever.   Cardiovascular: Negative for chest pain and palpitations.   Respiratory: Negative for cough and hemoptysis.    Gastrointestinal: Negative for nausea.        Constitutional:  Temp:  [97.5 °F (36.4 °C)-99.1 °F (37.3 °C)] 98 °F (36.7 °C)  Heart Rate:  [60-69] 69  Resp:  [15-18] 17  BP: (119-155)/(57-95) 143/82    Physical Exam   /82 (BP Location: Right arm, Patient Position: Lying)   Pulse 69   Temp 98 °F (36.7 °C) (Oral)   Resp 17   Ht 152.4 cm (60\")   Wt 73.4 kg (161 lb 13.1 oz)   LMP  (LMP Unknown)   SpO2 97%   BMI 31.60 kg/m²   General:  Appears in no acute distress  Eyes: Sclera is anicteric,  conjunctiva is clear   HEENT:  No JVD. Thyroid not visibly enlarged. No mucosal pallor or cyanosis  Respiratory: Respirations regular and unlabored at " rest.  Clear to auscultation  Cardiovascular: S1,S2, irregularly irregular rhythm.   Gastrointestinal: Abdomen nondistended.  Musculoskeletal:  No abnormal movements  Extremities: No digital clubbing or cyanosis  Skin: Color pink.   Neuro: Alert and awake.    INTAKE AND OUTPUT:    Intake/Output Summary (Last 24 hours) at 5/29/2025 0816  Last data filed at 5/29/2025 0613  Gross per 24 hour   Intake 480 ml   Output 1650 ml   Net -1170 ml       Cardiographics  Telemetry: Reviewed and interpreted by me reveals A-fib with intermittent V paced rhythm    ECG:   ECG 12 Lead Other; arm pain   Final Result   HEART RATE=68  bpm   RR Nvmicudc=367  ms   CA Interval=  ms   P Horizontal Axis=  deg   P Front Axis=  deg   QRSD Zfcyzgen=837  ms   QT Pupjxixb=617  ms   GZwG=314  ms   QRS Axis=-25  deg   T Wave Axis=34  deg   - ABNORMAL ECG -   Afib/flut and V-paced complexes   Right bundle branch block   Incomplete left bundle branch block   Left ventricular hypertrophy   When compared with ECG of 01-Apr-2025 22:52:35,   Significant change in rhythm   Significant repolarization change   Electronically Signed By: Manav Whitt (ELSA) 2025-05-24 07:06:44   Date and Time of Study:2025-05-23 13:04:06      Telemetry Scan   Final Result      Telemetry Scan   Final Result      Telemetry Scan   Final Result      Telemetry Scan   Final Result      Telemetry Scan   Final Result      Telemetry Scan   Final Result      Telemetry Scan   Final Result      Telemetry Scan   Final Result      Telemetry Scan   Final Result      Telemetry Scan   Final Result      Telemetry Scan   Final Result      Telemetry Scan   Final Result      Telemetry Scan   Final Result      Telemetry Scan   Final Result      Telemetry Scan   Final Result      ECG 12 Lead Pre-Op / Pre-Procedure    (Results Pending)     I have personally reviewed EKG    Echocardiogram: Results for orders placed during the hospital encounter of 03/21/25    Adult Transthoracic Echo Complete W/  "Cont if Necessary Per Protocol    Interpretation Summary    Left ventricular ejection fraction appears to be 61 - 65%.    Left ventricular diastolic function was normal.    The left atrial cavity is moderately dilated.    Left atrial volume is severely increased.    Moderate aortic valve regurgitation is present.    Moderate mitral valve regurgitation is present.    Estimated right ventricular systolic pressure from tricuspid regurgitation is normal (<35 mmHg).      STRESS TEST  Results for orders placed during the hospital encounter of 03/28/25    Stress Test With Myocardial Perfusion One Day    Interpretation Summary    Myocardial perfusion imaging indicates a normal myocardial perfusion study with no evidence of ischemia. Impressions are consistent with a low risk study.    Left ventricular ejection fraction is normal (Calculated EF = 56%).      HEART CATHETERIZATION  No results found for this or any previous visit.      Lab Review   I have reviewed the labs  Results from last 7 days   Lab Units 05/23/25  1511 05/23/25  1403   HSTROP T ng/L 16* 20*         Results from last 7 days   Lab Units 05/29/25  0509   SODIUM mmol/L 143   POTASSIUM mmol/L 4.2   BUN mg/dL 15.3   CREATININE mg/dL 0.79   CALCIUM mg/dL 9.8         Results from last 7 days   Lab Units 05/29/25  0509 05/28/25  0159 05/23/25  1403   WBC 10*3/mm3 5.02 5.08 6.64   HEMOGLOBIN g/dL 11.3* 10.7* 10.8*   HEMATOCRIT % 36.7 36.3 34.4   PLATELETS 10*3/mm3 125* 131* 115*     Results from last 7 days   Lab Units 05/29/25  0509 05/28/25  0850 05/27/25  1303   INR  1.09  --  1.04   APTT seconds 45.1* 43.0*  --        RADIOLOGY:  Imaging Results (Last 24 Hours)       ** No results found for the last 24 hours. **                  )5/29/2025  Evgeny Gregory MD      EMR Dragon/Transcription:   \"Dictated utilizing Dragon dictation\".   "

## 2025-05-30 ENCOUNTER — APPOINTMENT (OUTPATIENT)
Dept: GENERAL RADIOLOGY | Facility: HOSPITAL | Age: 85
End: 2025-05-30
Payer: MEDICARE

## 2025-05-30 LAB
ANION GAP SERPL CALCULATED.3IONS-SCNC: 9.4 MMOL/L (ref 5–15)
BASOPHILS # BLD MANUAL: 0.1 10*3/MM3 (ref 0–0.2)
BASOPHILS NFR BLD MANUAL: 1 % (ref 0–1.5)
BUN SERPL-MCNC: 16.9 MG/DL (ref 8–23)
BUN/CREAT SERPL: 23.2 (ref 7–25)
CALCIUM SPEC-SCNC: 8.9 MG/DL (ref 8.6–10.5)
CHLORIDE SERPL-SCNC: 104 MMOL/L (ref 98–107)
CO2 SERPL-SCNC: 23.6 MMOL/L (ref 22–29)
CREAT SERPL-MCNC: 0.73 MG/DL (ref 0.57–1)
DEPRECATED RDW RBC AUTO: 46 FL (ref 37–54)
EGFRCR SERPLBLD CKD-EPI 2021: 80.2 ML/MIN/1.73
ERYTHROCYTE [DISTWIDTH] IN BLOOD BY AUTOMATED COUNT: 13.5 % (ref 12.3–15.4)
GIANT PLATELETS: ABNORMAL
GLUCOSE SERPL-MCNC: 154 MG/DL (ref 65–99)
HCT VFR BLD AUTO: 27.5 % (ref 34–46.6)
HGB BLD-MCNC: 8.7 G/DL (ref 12–15.9)
LARGE PLATELETS: ABNORMAL
LYMPHOCYTES # BLD MANUAL: 0.58 10*3/MM3 (ref 0.7–3.1)
LYMPHOCYTES NFR BLD MANUAL: 9 % (ref 5–12)
MCH RBC QN AUTO: 29.3 PG (ref 26.6–33)
MCHC RBC AUTO-ENTMCNC: 31.6 G/DL (ref 31.5–35.7)
MCV RBC AUTO: 92.6 FL (ref 79–97)
METAMYELOCYTES NFR BLD MANUAL: 2 % (ref 0–0)
MONOCYTES # BLD: 0.87 10*3/MM3 (ref 0.1–0.9)
NEUTROPHILS # BLD AUTO: 7.96 10*3/MM3 (ref 1.7–7)
NEUTROPHILS NFR BLD MANUAL: 65 % (ref 42.7–76)
NEUTS BAND NFR BLD MANUAL: 17 % (ref 0–5)
PLATELET # BLD AUTO: 109 10*3/MM3 (ref 140–450)
PMV BLD AUTO: 11.9 FL (ref 6–12)
POLYCHROMASIA BLD QL SMEAR: ABNORMAL
POTASSIUM SERPL-SCNC: 4.3 MMOL/L (ref 3.5–5.2)
RBC # BLD AUTO: 2.97 10*6/MM3 (ref 3.77–5.28)
SCAN SLIDE: NORMAL
SMALL PLATELETS BLD QL SMEAR: ABNORMAL
SODIUM SERPL-SCNC: 137 MMOL/L (ref 136–145)
VARIANT LYMPHS NFR BLD MANUAL: 1 % (ref 0–5)
VARIANT LYMPHS NFR BLD MANUAL: 5 % (ref 19.6–45.3)
WBC MORPH BLD: NORMAL
WBC NRBC COR # BLD AUTO: 9.71 10*3/MM3 (ref 3.4–10.8)

## 2025-05-30 PROCEDURE — 97166 OT EVAL MOD COMPLEX 45 MIN: CPT

## 2025-05-30 PROCEDURE — 99232 SBSQ HOSP IP/OBS MODERATE 35: CPT | Performed by: INTERNAL MEDICINE

## 2025-05-30 PROCEDURE — 25010000002 CEFAZOLIN PER 500 MG

## 2025-05-30 PROCEDURE — 97163 PT EVAL HIGH COMPLEX 45 MIN: CPT

## 2025-05-30 PROCEDURE — 72040 X-RAY EXAM NECK SPINE 2-3 VW: CPT

## 2025-05-30 PROCEDURE — 25010000002 ENOXAPARIN PER 10 MG

## 2025-05-30 PROCEDURE — 25010000002 MORPHINE PER 10 MG

## 2025-05-30 PROCEDURE — 99024 POSTOP FOLLOW-UP VISIT: CPT | Performed by: NURSE PRACTITIONER

## 2025-05-30 PROCEDURE — 85025 COMPLETE CBC W/AUTO DIFF WBC: CPT

## 2025-05-30 PROCEDURE — 85007 BL SMEAR W/DIFF WBC COUNT: CPT

## 2025-05-30 PROCEDURE — 80048 BASIC METABOLIC PNL TOTAL CA: CPT

## 2025-05-30 PROCEDURE — 25010000002 ONDANSETRON PER 1 MG

## 2025-05-30 RX ORDER — SORBITOL SOLUTION 70 %
30 SOLUTION, ORAL MISCELLANEOUS ONCE
Status: COMPLETED | OUTPATIENT
Start: 2025-05-30 | End: 2025-05-30

## 2025-05-30 RX ADMIN — ACETAMINOPHEN 650 MG: 325 TABLET ORAL at 18:07

## 2025-05-30 RX ADMIN — SERTRALINE HYDROCHLORIDE 100 MG: 100 TABLET, FILM COATED ORAL at 22:34

## 2025-05-30 RX ADMIN — OXYCODONE 10 MG: 5 TABLET ORAL at 12:04

## 2025-05-30 RX ADMIN — LEVOTHYROXINE SODIUM 75 MCG: 0.07 TABLET ORAL at 06:03

## 2025-05-30 RX ADMIN — SENNOSIDES AND DOCUSATE SODIUM 2 TABLET: 8.6; 5 TABLET ORAL at 22:34

## 2025-05-30 RX ADMIN — MORPHINE SULFATE 2 MG: 2 INJECTION, SOLUTION INTRAMUSCULAR; INTRAVENOUS at 22:34

## 2025-05-30 RX ADMIN — POLYETHYLENE GLYCOL 3350 17 G: 17 POWDER, FOR SOLUTION ORAL at 09:18

## 2025-05-30 RX ADMIN — OXYCODONE 10 MG: 5 TABLET ORAL at 01:29

## 2025-05-30 RX ADMIN — MORPHINE SULFATE 2 MG: 2 INJECTION, SOLUTION INTRAMUSCULAR; INTRAVENOUS at 16:14

## 2025-05-30 RX ADMIN — MIDODRINE HYDROCHLORIDE 10 MG: 5 TABLET ORAL at 06:03

## 2025-05-30 RX ADMIN — ONDANSETRON 4 MG: 2 INJECTION, SOLUTION INTRAMUSCULAR; INTRAVENOUS at 09:24

## 2025-05-30 RX ADMIN — PREGABALIN 200 MG: 100 CAPSULE ORAL at 16:14

## 2025-05-30 RX ADMIN — CEFAZOLIN 2000 MG: 2 INJECTION, POWDER, FOR SOLUTION INTRAMUSCULAR; INTRAVENOUS at 16:14

## 2025-05-30 RX ADMIN — SENNOSIDES AND DOCUSATE SODIUM 2 TABLET: 8.6; 5 TABLET ORAL at 09:17

## 2025-05-30 RX ADMIN — MIDODRINE HYDROCHLORIDE 10 MG: 5 TABLET ORAL at 22:34

## 2025-05-30 RX ADMIN — PREGABALIN 200 MG: 100 CAPSULE ORAL at 22:34

## 2025-05-30 RX ADMIN — Medication 10 ML: at 22:36

## 2025-05-30 RX ADMIN — OXYCODONE 10 MG: 5 TABLET ORAL at 06:03

## 2025-05-30 RX ADMIN — MIDODRINE HYDROCHLORIDE 10 MG: 5 TABLET ORAL at 16:14

## 2025-05-30 RX ADMIN — DOCUSATE SODIUM 100 MG: 100 CAPSULE, LIQUID FILLED ORAL at 22:34

## 2025-05-30 RX ADMIN — ATORVASTATIN CALCIUM 40 MG: 40 TABLET, FILM COATED ORAL at 09:18

## 2025-05-30 RX ADMIN — MORPHINE SULFATE 2 MG: 2 INJECTION, SOLUTION INTRAMUSCULAR; INTRAVENOUS at 04:16

## 2025-05-30 RX ADMIN — SORBITOL SOLUTION (BULK) 30 ML: 70 SOLUTION at 09:18

## 2025-05-30 RX ADMIN — Medication 10 ML: at 09:19

## 2025-05-30 RX ADMIN — CEFAZOLIN 2000 MG: 2 INJECTION, POWDER, FOR SOLUTION INTRAMUSCULAR; INTRAVENOUS at 06:03

## 2025-05-30 RX ADMIN — DOCUSATE SODIUM 100 MG: 100 CAPSULE, LIQUID FILLED ORAL at 09:18

## 2025-05-30 RX ADMIN — ENOXAPARIN SODIUM 40 MG: 100 INJECTION SUBCUTANEOUS at 22:34

## 2025-05-30 RX ADMIN — METHOCARBAMOL TABLETS 750 MG: 750 TABLET, COATED ORAL at 01:30

## 2025-05-30 RX ADMIN — Medication 10 ML: at 09:25

## 2025-05-30 RX ADMIN — OXYCODONE 10 MG: 5 TABLET ORAL at 18:07

## 2025-05-30 RX ADMIN — METOPROLOL SUCCINATE 12.5 MG: 25 TABLET, EXTENDED RELEASE ORAL at 09:18

## 2025-05-30 RX ADMIN — ENOXAPARIN SODIUM 40 MG: 100 INJECTION SUBCUTANEOUS at 09:18

## 2025-05-30 RX ADMIN — MORPHINE SULFATE 2 MG: 2 INJECTION, SOLUTION INTRAMUSCULAR; INTRAVENOUS at 10:06

## 2025-05-30 RX ADMIN — PREGABALIN 200 MG: 100 CAPSULE ORAL at 06:03

## 2025-05-30 NOTE — PLAN OF CARE
Goal Outcome Evaluation:  Plan of Care Reviewed With: patient           Outcome Evaluation: Pt is a 86 y/o female s/p C7-T1 laminectomy with C5-T2 fusion secondary to 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  C-collar to be worn an all times.  CT abdomen/pelvis: (-) acute.  Chest X-ray: cardiomegaly.  Pt reports prior to March 4th she lived alone in a 1 story home and was independent with all functional mobility tasks and did not use an assistive device.  Since March 4th pt has become progressively weaker and used RW for 3 days and has been unable to ambulate for the past 3 weeks.  Pt moved into son's home (1 story with w/c ramp) and family has been mobilizing pt via sliding board transfer with A x 2.  Pt with 2L O2, C-collar donned, hemovac drain, Fuentes and telemetry this date.  Pt required Max A x 2 for bed mobility via logroll technique and Mod A for static sitting and Max A for dynamic sitting balance EOB.  Pt initially with anterior lean with sitting and then reverted to posterior lean with fatigue.  Unable to tolerate sit to stand transfer this date secondary pain and weakness.  Recommend SNF.    Anticipated Discharge Disposition (PT): skilled nursing facility

## 2025-05-30 NOTE — PLAN OF CARE
Problem: Adult Inpatient Plan of Care  Goal: Plan of Care Review  Outcome: Progressing  Goal: Patient-Specific Goal (Individualized)  Outcome: Progressing  Goal: Absence of Hospital-Acquired Illness or Injury  Outcome: Progressing  Intervention: Identify and Manage Fall Risk  Recent Flowsheet Documentation  Taken 5/30/2025 0633 by Mitzi Willingham LPN  Safety Promotion/Fall Prevention: safety round/check completed  Taken 5/30/2025 0450 by Mitzi Willingham LPN  Safety Promotion/Fall Prevention: safety round/check completed  Taken 5/30/2025 0250 by Mitzi Willingham LPN  Safety Promotion/Fall Prevention: safety round/check completed  Taken 5/30/2025 0050 by Mitzi Willingham LPN  Safety Promotion/Fall Prevention: safety round/check completed  Taken 5/29/2025 2250 by Mitzi Willingham LPN  Safety Promotion/Fall Prevention: safety round/check completed  Taken 5/29/2025 2050 by Mitzi Willingham LPN  Safety Promotion/Fall Prevention:   safety round/check completed   room organization consistent   fall prevention program maintained   assistive device/personal items within reach   clutter free environment maintained  Taken 5/29/2025 2003 by Mitzi Willingham LPN  Safety Promotion/Fall Prevention: patient off unit  Intervention: Prevent Skin Injury  Recent Flowsheet Documentation  Taken 5/30/2025 0633 by Mitzi Willingham LPN  Body Position: patient/family refused  Taken 5/30/2025 0450 by Mitzi Willingham LPN  Body Position:   position maintained   heels elevated  Taken 5/30/2025 0250 by Mitzi Willingham LPN  Body Position:   turned   right  Taken 5/30/2025 0050 by Mitzi Willingham LPN  Body Position:   turned   right  Taken 5/29/2025 2250 by Mitzi Willingham LPN  Body Position:   turned   right  Taken 5/29/2025 2050 by Mitzi Willingham LPN  Body Position:   turned   supine   heels elevated  Skin Protection:   incontinence pads utilized   silicone foam dressing in place  Intervention: Prevent and Manage VTE (Venous  Thromboembolism) Risk  Recent Flowsheet Documentation  Taken 5/29/2025 2050 by Mitzi Willingham LPN  VTE Prevention/Management: SCDs (sequential compression devices) on  Intervention: Prevent Infection  Recent Flowsheet Documentation  Taken 5/29/2025 2050 by Mitzi Willingham LPN  Infection Prevention: single patient room provided  Goal: Optimal Comfort and Wellbeing  Outcome: Progressing  Intervention: Provide Person-Centered Care  Recent Flowsheet Documentation  Taken 5/29/2025 2050 by Mitzi Willingham LPN  Trust Relationship/Rapport:   care explained   thoughts/feelings acknowledged   questions encouraged  Goal: Readiness for Transition of Care  Outcome: Progressing   Goal Outcome Evaluation:

## 2025-05-30 NOTE — THERAPY EVALUATION
Patient Name: Domonique See  : 1940    MRN: [id number]                              Today's Date: 2025       Admit Date: 2025    Visit Dx:     ICD-10-CM ICD-9-CM   1. Hypotension, unspecified hypotension type  I95.9 458.9   2. Constipation, unspecified constipation type  K59.00 564.00   3. Spinal stenosis, unspecified spinal region  M48.00 724.00     Patient Active Problem List   Diagnosis    Iron deficiency anemia    Anemia, unspecified    Pacemaker    Paroxysmal atrial fibrillation    Cervical disc disorder with radiculopathy    Cervical radiculopathy    Cervical stenosis of spinal canal    Deep vein thrombosis (DVT)    Dyspnea    Family history of diabetes mellitus    Fibromyositis    Essential hypertension    Lumbar radiculopathy    Primary localized osteoarthritis    Sacroiliitis, not elsewhere classified    Spondylolisthesis, acquired    Primary osteoarthritis of both knees    Obesity (BMI 30.0-34.9)    Hx of hypertrophic cardiomyopathy    Coronary artery disease    Sleep apnea    Acute midline low back pain without sciatica    Hypertensive retinopathy    Neck pain    Cervical neuralgia    Seizure disorder    Encounter for pre-operative cardiovascular clearance    Aortic insufficiency    Mitral regurgitation     Past Medical History:   Diagnosis Date    Anemia     Aortic insufficiency 2025    Asthma     CHF (congestive heart failure)     Cholecystitis 2011    Congenital heart disease     Coronary artery disease 2013    Deep vein thrombosis (DVT) 2017    GERD (gastroesophageal reflux disease)     Heart murmur Don't know    Hx of hypertrophic cardiomyopathy     Hypertension     Hypertrophic obstructive cardiomyopathy 11/10/2017    Low back pain     Lumbosacral disc disease     Mitral regurgitation 2025    Mixed hyperlipidemia 10/06/2017    Morbid obesity 2020    Peptic ulceration     Persistent atrial fibrillation 01/10/2023    Primary osteoarthritis of both  knees 02/08/2021    Primary osteoarthritis of right knee 09/21/2020    Seizures     Sleep apnea 2017    TIA (transient ischemic attack)      Past Surgical History:   Procedure Laterality Date    ABDOMINAL HERNIA REPAIR      ABLATION OF DYSRHYTHMIC FOCUS  01-    BACK SURGERY      BREAST AUGMENTATION      CARDIAC CATHETERIZATION      CARDIAC ELECTROPHYSIOLOGY PROCEDURE N/A 01/16/2023    Procedure: Ablation atrial fibrillation and flutter, cryo Duong and Ramón aware;  Surgeon: Leeanne Pleitez MD;  Location: [facility] CATH INVASIVE LOCATION;  Service: Cardiovascular;  Laterality: N/A;    CARDIAC PACEMAKER PLACEMENT  03/16/2017    Dual Chamber    CERVICAL RIB RESECTION Bilateral     CERVICAL RIB RESECTION Bilateral 1963    bilateral cervical ribs removed - extra ribs located and causing pain    CHOLECYSTECTOMY      COLONOSCOPY      CORONARY ARTERY BYPASS GRAFT  2013    EPIDURAL BLOCK      NECK SURGERY      SPINAL FUSION      THORACIC DECOMPRESSION POSTERIOR FUSION  06/04/2014    L3-5 & S1    TRIGGER POINT INJECTION      UPPER GASTROINTESTINAL ENDOSCOPY        General Information       Row Name 05/30/25 1317          Physical Therapy Time and Intention    Document Type evaluation  -AM     Mode of Treatment physical therapy  -AM       Row Name 05/30/25 131          General Information    Patient Profile Reviewed yes  -AM     Prior Level of Function --  prior to March 4th pt was independent wtih all mobility tasks.  Since than started to use RW after 3 days progressively weaker.  Family provides A x 2 for bed mobility and sliding board transfers into w/c.  -AM     Existing Precautions/Restrictions cervical collar;fall;oxygen therapy device and L/min  2L O2  -AM     Barriers to Rehab medically complex  -AM       Row Name 05/30/25 3848          Living Environment    Current Living Arrangements home  -AM     People in Home child(su), adult  prior to March 4th pt lived alone.  Now lives with son and his family  -AM        Row Name 05/30/25 1317          Home Main Entrance    Number of Stairs, Main Entrance --  w/c ramp  -AM       Row Name 05/30/25 1317          Stairs Within Home, Primary    Number of Stairs, Within Home, Primary none  -AM       [facility] Name 05/30/25 1317          Cognition    Orientation Status (Cognition) oriented x 4  -AM       Row Name 05/30/25 1317          Safety Issues/Impairments Affecting Functional Mobility    Impairments Affecting Function (Mobility) balance;coordination;endurance/activity tolerance;motor control;motor planning;postural/trunk control;strength  -AM               User Key  (r) = Recorded By, (t) = Taken By, (c) = Cosigned By      Initials Name Provider Type    AM Rolando Garrido, PT Physical Therapist                   Mobility       Row Name 05/30/25 1320          Bed Mobility    Bed Mobility bed mobility (all) activities  -AM     All Activities, [place] (Bed Mobility) maximum assist (25% patient effort);2 person assist  -AM     Assistive Device (Bed Mobility) bed rails  -AM     Comment, (Bed Mobility) via logroll technique  -AM       [facility] Name 05/30/25 1320          Bed-Chair Transfer    Bed-Chair [place] (Transfers) not tested  -AM       Row Name 05/30/25 1320          Sit-Stand Transfer    Sit-Stand [place] (Transfers) not tested  -AM       [facility] Name 05/30/25 1320          Gait/Stairs (Locomotion)    [place] Level (Gait) not tested  -AM               User Key  (r) = Recorded By, (t) = Taken By, (c) = Cosigned By      Initials Name Provider Type    AM Rolando Garrido, PT Physical Therapist                   Obj/Interventions       Row Name 05/30/25 1320          Range of Motion Comprehensive    Comment, General Range of Motion Defer BUE ROM to OT.  BLEs:  PROM WFL.  Decreaed AROM B hips/knees  -AM       Row Name 05/30/25 1320          Strength Comprehensive (MMT)    Comment, General Manual Muscle Testing (MMT) Assessment Defer BUE MMT to OT.  B hips/knees 2/5.  B ankles:  3+/5  -AM       Row Name 05/30/25 1320          Motor Skills    Motor Skills functional endurance  -AM     Functional Endurance poor  -AM       Row Name 05/30/25 1320          Balance    Balance Assessment sitting static balance;sitting dynamic balance  -AM     Static Sitting Balance moderate assist  -AM     Dynamic Sitting Balance maximum assist  -AM     Position, Sitting Balance sitting edge of bed  -AM     Comment, Balance Pt initially with anteior lean that progressed to posterior lean as fatigue set in.  -AM       Row Name 05/30/25 1320          Sensory Assessment (Somatosensory)    Sensory Assessment (Somatosensory) sensation intact  -AM               User Key  (r) = Recorded By, (t) = Taken By, (c) = Cosigned By      Initials Name Provider Type    AM Rolando Garrido, PT Physical Therapist                   Goals/Plan       [facility] Name 05/30/25 9223          Bed Mobility Goal 1 (PT)    Activity/Assistive Device (Bed Mobility Goal 1, PT) bed mobility activities, all  -AM     [place] Level/Cues Needed (Bed Mobility Goal 1, PT) minimum assist (75% or more patient effort)  -AM     Time Frame (Bed Mobility Goal 1, PT) long term goal (LTG)  -AM     Strategies/Barriers (Bed Mobility Goal 1, PT) via log-roll technique  -AM       Row Name 05/30/25 2323          Transfer Goal 1 (PT)    Activity/Assistive Device (Transfer Goal 1, PT) transfers, all  -AM     [place] Level/Cues Needed (Transfer Goal 1, PT) minimum assist (75% or more patient effort)  -AM     Time Frame (Transfer Goal 1, PT) long term goal (LTG)  -AM       [facility] Name 05/30/25 4803          Gait Training Goal 1 (PT)    Activity/Assistive Device (Gait Training Goal 1, PT) gait (walking locomotion)  -AM     [place] Level (Gait Training Goal 1, PT) minimum assist (75% or more patient effort)  -AM     Distance (Gait Training Goal 1, PT) 100'  -AM     Time Frame (Gait Training Goal 1, PT) long term goal (LTG)  -AM       [facility] Name 05/30/25 4462           Problem Specific Goal 1 (PT)    Problem Specific Goal 1 (PT) Pt to sit EOB x 10 minutes with supervision  -AM     Time Frame (Problem Specific Goal 1, PT) long-term goal (LTG)  -AM       Row Name 05/30/25 1333          Therapy Assessment/Plan (PT)    Planned Therapy Interventions (PT) balance training;bed mobility training;gait training;strengthening;patient/family education;transfer training  -AM               User Key  (r) = Recorded By, (t) = Taken By, (c) = Cosigned By      Initials Name Provider Type    AM Rolando Garrido, PT Physical Therapist                   Clinical Impression       Row Name 05/30/25 1322          Pain    Pretreatment Pain Rating 10/10  -AM     Posttreatment Pain Rating 10/10  -AM     Pain Location neck  -AM     Pain Management Interventions exercise or physical activity utilized  -AM     Response to Pain Interventions no change per patient report  -AM       Row Name 05/30/25 1322          Plan of Care Review    Plan of Care Reviewed With patient  -AM     Outcome Evaluation Pt is a 84 y/o female s/p C7-T1 laminectomy with C5-T2 fusion secondary to 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  C-collar to be worn an all times.  CT abdomen/pelvis: (-) acute.  Chest X-ray: cardiomegaly.  Pt reports prior to March 4th she lived alone in a 1 story home and was independent with all functional mobility tasks and did not use an assistive device.  Since March 4th pt has become progressively weaker and used RW for 3 days and has been unable to ambulate for the past 3 weeks.  Pt moved into son's home (1 story with w/c ramp) and family has been mobilizing pt via sliding board transfer with A x 2.  Pt with 2L O2, C-collar donned, hemovac drain, Fuentes and telemetry this date.  Pt required Max A x 2 for bed mobility via logroll technique and Mod A for static sitting and Max A for dynamic sitting  balance EOB.  Pt initially with anterior lean with sitting and then reverted to posterior lean with fatigue.  Unable to tolerate sit to stand transfer this date secondary pain and weakness.  Recommend SNF.  -AM       Row Name 05/30/25 1322          Therapy Assessment/Plan (PT)    Patient/Family Therapy Goals Statement (PT) To get stronger  -AM     Rehab Potential (PT) good  -AM     Criteria for Skilled Interventions Met (PT) yes  -AM     Therapy Frequency (PT) 5 times/wk  -AM     Predicted Duration of Therapy Intervention (PT) until d/c  -AM       Row Name 05/30/25 1322          Vital Signs    Pre Systolic BP Rehab 136  -AM     Pre Treatment Diastolic BP 61  -AM     Intra Systolic BP Rehab 164  -AM     Intra Treatment Diastolic BP 71  -AM     Pre SpO2 (%) 98  -AM     O2 Delivery Pre Treatment nasal cannula  2L O2  -AM     O2 Delivery Intra Treatment nasal cannula  -AM     O2 Delivery Post Treatment nasal cannula  -AM     Pre Patient Position Supine  -AM     Intra Patient Position Sitting  -AM     Post Patient Position Supine  -AM       Row Name 05/30/25 1322          Positioning and Restraints    Pre-Treatment Position in bed  -AM     Post Treatment Position bed  -AM     In Bed notified nsg;supine;call light within reach;encouraged to call for assist;exit alarm on;with nsg  -AM               User Key  (r) = Recorded By, (t) = Taken By, (c) = Cosigned By      Initials Name Provider Type    AM Rolando Garrido, PT Physical Therapist                   Outcome Measures       Row Name 05/30/25 1336 05/30/25 0800       How much help from another person do you currently need...    Turning from your back to your side while in flat bed without using bedrails? 2  -AM 2  -SM    Moving from lying on back to sitting on the side of a flat bed without bedrails? 2  -AM 2  -SM    Moving to and from a bed to a chair (including a wheelchair)? 1  -AM 1  -SM    Standing up from a chair using your arms (e.g., wheelchair, bedside chair)? 1   -AM 1  -SM    Climbing 3-5 steps with a railing? 1  -AM 1  -SM    To walk in hospital room? 1  -AM 1  -SM    AM-PAC 6 Clicks Score (PT) 8  -AM 8  -SM      Row Name 05/30/25 1135          Functional Assessment    Outcome Measure Options AM-PAC 6 Clicks Daily Activity (OT)  -SP               User Key  (r) = Recorded By, (t) = Taken By, (c) = Cosigned By      Initials Name Provider Type    AM Rolando Garrido, PT Physical Therapist    Chetna March, RN Registered Nurse    Mauro Stacy, OT Occupational Therapist                                 Physical Therapy Education       Title: PT OT SLP Therapies (Done)       Topic: Physical Therapy (Done)       Point: Mobility training (Done)       Learning Progress Summary            Patient Acceptance, E,TB, VU by AM at 5/30/2025 1336                      Point: Body mechanics (Done)       Learning Progress Summary            Patient Acceptance, E,TB, VU by AM at 5/30/2025 1336                      Point: Precautions (Done)       Learning Progress Summary            Patient Acceptance, E,TB, VU by AM at 5/30/2025 1336                                      User Key       Initials Effective Dates Name Provider Type Discipline    AM 05/10/21 -  Rolando Garrido, PT Physical Therapist PT                  PT Recommendation and Plan  Planned Therapy Interventions (PT): balance training, bed mobility training, gait training, strengthening, patient/family education, transfer training  Outcome Evaluation: Pt is a 86 y/o female s/p C7-T1 laminectomy with C5-T2 fusion secondary to 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  C-collar to be worn an all times.  CT abdomen/pelvis: (-) acute.  Chest X-ray: cardiomegaly.  Pt reports prior to March 4th she lived alone in a 1 story home and was independent with all functional mobility tasks and did not use an assistive device.  Since  March 4th pt has become progressively weaker and used RW for 3 days and has been unable to ambulate for the past 3 weeks.  Pt moved into son's home (1 story with w/c ramp) and family has been mobilizing pt via sliding board transfer with A x 2.  Pt with 2L O2, C-collar donned, hemovac drain, Fuentes and telemetry this date.  Pt required Max A x 2 for bed mobility via logroll technique and Mod A for static sitting and Max A for dynamic sitting balance EOB.  Pt initially with anterior lean with sitting and then reverted to posterior lean with fatigue.  Unable to tolerate sit to stand transfer this date secondary pain and weakness.  Recommend SNF.     Time Calculation:         PT Charges       Row Name 05/30/25 1337             Time Calculation    Start Time 0906  -AM      Stop Time 0934  -AM      Time Calculation (min) 28 min  -AM      PT Received On 05/30/25  -AM      PT - Next Appointment 05/31/25  -AM      PT Goal Re-Cert Due Date 06/13/25  -AM                User Key  (r) = Recorded By, (t) = Taken By, (c) = Cosigned By      Initials Name Provider Type    Rolando Robbins PT Physical Therapist                  Therapy Charges for Today       Code Description Service Date Service Provider Modifiers Qty    76442623534 HC PT EVAL HIGH COMPLEXITY 4 5/30/2025 Rolando Garrido, PT GP 1            PT G-Codes  Outcome Measure Options: AM-PAC 6 Clicks Daily Activity (OT)  AM-PAC 6 Clicks Score (PT): 8  AM-PAC 6 Clicks Score (OT): 14  PT Discharge Summary  Anticipated Discharge Disposition (PT): skilled nursing facility    Rolando Garrido PT  5/30/2025

## 2025-05-30 NOTE — PLAN OF CARE
Goal Outcome Evaluation:      Patient has been very painful during the shift. Pain has been treated per MAR. Patients family has remained at bedside during the shift. Patient is able to make needs known and has call light within reach. Will continue with patients plan of care.

## 2025-05-30 NOTE — DISCHARGE PLACEMENT REQUEST
"Geoffrey See (85 y.o. Female)       Date of Birth   1940    Social Security Number       Address   [address] [place]    Home Phone   [phone]    MRN   [id number]       [employer]   [employer]    Marital Status                               Admission Date   5/23/2025    Admission Type   Emergency    Admitting Provider   Tatiana Gilliland MD    Attending Provider   Tatiana Gilliland MD    Department, Room/Bed   [facility] SURGICAL INPATIENT, 4105/1       Discharge Date       Discharge Disposition       Discharge Destination                                 Attending Provider: Tatiana Gilliland MD    Allergies: No Known Allergies    Isolation: None   Infection: None   Code Status: CPR    Ht: 152.4 cm (60\")   Wt: 73.4 kg (161 lb 13.1 oz)    Admission Cmt: None   Principal Problem: Cervical neuralgia [M54.12]                   Active Insurance as of 5/23/2025       Primary Coverage       Payor Plan Insurance Group Employer/Plan Group    ANTHEM MEDICARE REPLACEMENT ANTHEM MEDICARE ADVANTAGE PPO [id number]       Payor Plan Address Payor Plan Phone Number Payor Plan Fax Number Effective Dates    [address] [phone]  4/1/2024 - None Entered    [facility] [id number]         Subscriber Name Subscriber Birth Date Member ID       GEOFFREY SEE 1940 [id number]                     Emergency Contacts        (Rel.) Home Phone Work Phone Mobile Phone    Boone See (Son) -- -- [phone]    NILTON SEE (Relative) [phone]    AMADEO SEE (Son) [phone]                "

## 2025-05-30 NOTE — CASE MANAGEMENT/SOCIAL WORK
Continued Stay Note  SUSAN Goldstein     Patient Name: Domonique See  MRN: [id number]  Today's Date: 5/30/2025    Admit Date: 5/23/2025    Plan: DC Plan: from home with son. Current with [facility]. Pending decison about going to snf .   Discharge Plan       Row Name 05/30/25 1717       Plan    Plan DC Plan: from home with son. Current with [facility]. Pending decison about going to snf .    Plan Comments DC barriers: pod#1  cervical laminectomy c7-T1, C5-T1 fusion, . From home with son and current with [facility].  PT recommend snf pending patient and family decison if willing to go to snf.                      Corin Baker RN    SIPS 1  [email or web address]  Office [phone]  Cell [phone]

## 2025-05-30 NOTE — THERAPY EVALUATION
Patient Name: Domonique See  : 1940    MRN: [id number]                              Today's Date: 2025       Admit Date: 2025    Visit Dx:     ICD-10-CM ICD-9-CM   1. Hypotension, unspecified hypotension type  I95.9 458.9   2. Constipation, unspecified constipation type  K59.00 564.00   3. Spinal stenosis, unspecified spinal region  M48.00 724.00     Patient Active Problem List   Diagnosis    Iron deficiency anemia    Anemia, unspecified    Pacemaker    Paroxysmal atrial fibrillation    Cervical disc disorder with radiculopathy    Cervical radiculopathy    Cervical stenosis of spinal canal    Deep vein thrombosis (DVT)    Dyspnea    Family history of diabetes mellitus    Fibromyositis    Essential hypertension    Lumbar radiculopathy    Primary localized osteoarthritis    Sacroiliitis, not elsewhere classified    Spondylolisthesis, acquired    Primary osteoarthritis of both knees    Obesity (BMI 30.0-34.9)    Hx of hypertrophic cardiomyopathy    Coronary artery disease    Sleep apnea    Acute midline low back pain without sciatica    Hypertensive retinopathy    Neck pain    Cervical neuralgia    Seizure disorder    Encounter for pre-operative cardiovascular clearance    Aortic insufficiency    Mitral regurgitation     Past Medical History:   Diagnosis Date    Anemia     Aortic insufficiency 2025    Asthma     CHF (congestive heart failure)     Cholecystitis 2011    Congenital heart disease     Coronary artery disease 2013    Deep vein thrombosis (DVT) 2017    GERD (gastroesophageal reflux disease)     Heart murmur Don't know    Hx of hypertrophic cardiomyopathy     Hypertension     Hypertrophic obstructive cardiomyopathy 11/10/2017    Low back pain     Lumbosacral disc disease     Mitral regurgitation 2025    Mixed hyperlipidemia 10/06/2017    Morbid obesity 2020    Peptic ulceration     Persistent atrial fibrillation 01/10/2023    Primary osteoarthritis of both  knees 02/08/2021    Primary osteoarthritis of right knee 09/21/2020    Seizures     Sleep apnea 2017    TIA (transient ischemic attack)      Past Surgical History:   Procedure Laterality Date    ABDOMINAL HERNIA REPAIR      ABLATION OF DYSRHYTHMIC FOCUS  01-    BACK SURGERY      BREAST AUGMENTATION      CARDIAC CATHETERIZATION      CARDIAC ELECTROPHYSIOLOGY PROCEDURE N/A 01/16/2023    Procedure: Ablation atrial fibrillation and flutter, cryo Duong and Ramón aware;  Surgeon: Leeanne Pleitez MD;  Location: [facility] CATH INVASIVE LOCATION;  Service: Cardiovascular;  Laterality: N/A;    CARDIAC PACEMAKER PLACEMENT  03/16/2017    Dual Chamber    CERVICAL RIB RESECTION Bilateral     CERVICAL RIB RESECTION Bilateral 1963    bilateral cervical ribs removed - extra ribs located and causing pain    CHOLECYSTECTOMY      COLONOSCOPY      CORONARY ARTERY BYPASS GRAFT  2013    EPIDURAL BLOCK      NECK SURGERY      SPINAL FUSION      THORACIC DECOMPRESSION POSTERIOR FUSION  06/04/2014    L3-5 & S1    TRIGGER POINT INJECTION      UPPER GASTROINTESTINAL ENDOSCOPY        General Information       Row Name 05/30/25 1126          OT Time and Intention    Document Type evaluation  -SP     Mode of Treatment occupational therapy  -SP       Row Name 05/30/25 1126          General Information    Patient Profile Reviewed yes  -SP     Prior Level of Function mod assist:;ADL's  prior to March 4th, pt IND with I/ADLs, reports within past 3 wks she has been using w/c and requires assist x2 wtih use of sliding boards for transfers and assistance with ADLs.  -SP     Existing Precautions/Restrictions cervical collar;fall  -SP     Barriers to Rehab medically complex;previous functional deficit  -SP       Row Name 05/30/25 1126          Living Environment    Current Living Arrangements home  -SP     People in Home child(su), adult;other (see comments)  DIL  -SP       Row Name 05/30/25 1126          Home Main Entrance    Number of Stairs,  Main Entrance other (see comments)  ramp entry  -SP       Row Name 05/30/25 1126          Stairs Within Home, Primary    Number of Stairs, Within Home, Primary none  -SP       Row Name 05/30/25 1126          Cognition    Orientation Status (Cognition) oriented x 4  -SP       Row Name 05/30/25 1126          Safety Issues/Impairments Affecting Functional Mobility    Impairments Affecting Function (Mobility) balance;endurance/activity tolerance;strength;pain;range of motion (ROM)  -SP               User Key  (r) = Recorded By, (t) = Taken By, (c) = Cosigned By      Initials Name Provider Type    SP Mauro Szymanski OT Occupational Therapist                     Mobility/ADL's       Row Name 05/30/25 1128          Bed Mobility    Bed Mobility bed mobility (all) activities  -SP     All Activities, [place] (Bed Mobility) maximum assist (25% patient effort);2 person assist  -SP       Row Name 05/30/25 1128          Transfers    Transfers sit-stand transfer  -SP       Row Name 05/30/25 1128          Sit-Stand Transfer    Sit-Stand [place] (Transfers) not tested  -SP       Row Name 05/30/25 1128          Functional Mobility    Patient was able to Ambulate no, other medical factors prevent ambulation  -SP     Reason Patient was unable to Ambulate Excessive Weakness  -SP       Row Name 05/30/25 1128          Activities of Daily Living    BADL Assessment/Intervention lower body dressing  -SP       Row Name 05/30/25 1128          Lower Body Dressing Assessment/Training    [place] Level (Lower Body Dressing) don;socks;dependent (less than 25% patient effort)  -SP               User Key  (r) = Recorded By, (t) = Taken By, (c) = Cosigned By      Initials Name Provider Type    SP Mauro Szymanski OT Occupational Therapist                   Obj/Interventions       Row Name 05/30/25 1129          Sensory Assessment (Somatosensory)    Sensory Assessment reports impaired sensation in R digits - tingling  -SP       Row Name  05/30/25 1129          Range of Motion Comprehensive    Comment, General Range of Motion BUE shoulders roughly 75*  -SP       Row Name 05/30/25 1129          Strength Comprehensive (MMT)    Comment, General Manual Muscle Testing (MMT) Assessment Bilateral  grossly 4-/5, shoulders/elbows not formally assessed 2/2 pain  -SP       Row Name 05/30/25 1129          Balance    Balance Assessment sitting static balance;sitting dynamic balance  -SP     Static Sitting Balance moderate assist  -SP     Dynamic Sitting Balance maximum assist  -SP     Position, Sitting Balance sitting edge of bed  anterior progressing to posterior lean  -SP               User Key  (r) = Recorded By, (t) = Taken By, (c) = Cosigned By      Initials Name Provider Type    SP Mauro Szymanski, OT Occupational Therapist                   Goals/Plan       Row Name 05/30/25 1134          Bathing Goal 1 (OT)    Activity/Device (Bathing Goal 1, OT) bathing skills, all  -SP     [place] Level/Cues Needed (Bathing Goal 1, OT) moderate assist (50-74% patient effort)  -SP     Time Frame (Bathing Goal 1, OT) 2 weeks  -SP     Strategies/Barriers (Bathing Goal 1, OT) until d/c  -SP       Row Name 05/30/25 1134          Dressing Goal 1 (OT)    Activity/Device (Dressing Goal 1, OT) dressing skills, all  -SP     [place]/Cues Needed (Dressing Goal 1, OT) moderate assist (50-74% patient effort)  -SP     Time Frame (Dressing Goal 1, OT) 2 weeks  -SP     Strategies/Barriers (Dressing Goal 1, OT) until d/c  -SP       Row Name 05/30/25 1134          Toileting Goal 1 (OT)    Activity/Device (Toileting Goal 1, OT) toileting skills, all  -SP     [place] Level/Cues Needed (Toileting Goal 1, OT) moderate assist (50-74% patient effort)  -SP     Time Frame (Toileting Goal 1, OT) 2 weeks  -SP     Strategies/Barriers (Toileting Goal 1, OT) until d/c  -SP       Row Name 05/30/25 1134          Therapy Assessment/Plan (OT)    Planned Therapy Interventions (OT)  activity tolerance training;adaptive equipment training;BADL retraining;functional balance retraining;IADL retraining;patient/caregiver education/training;passive ROM/stretching;occupation/activity based interventions;transfer/mobility retraining;strengthening exercise;neuromuscular control/coordination retraining;ROM/therapeutic exercise  -SP               User Key  (r) = Recorded By, (t) = Taken By, (c) = Cosigned By      Initials Name Provider Type    SP Mauro Szymanski OT Occupational Therapist                   Clinical Impression       Row Name 05/30/25 1131          Pain Assessment    Pretreatment Pain Rating 10/10  -SP     Posttreatment Pain Rating 10/10  -SP     Pain Location neck  -SP     Response to Pain Interventions no change per patient report  -SP       Row Name 05/30/25 1131          Plan of Care Review    Plan of Care Reviewed With patient  -SP     Outcome Evaluation Pt is a 86 y/o female admitted to [facility] on 5/23/25 with c/o worsening bilateral lower extremity weakness. She is followed by Dr. Bernal for her cervical disc disease. Surgery has been considered, but her complicated cardiac history has presented a barrier. Pt s/p cervical laminectomy decompression posterior C7-T1 and C5-T2 fusion preformed by Dr. Bernal. Pt is POD 1, Hard cervical collar on at all times. PMHx significant for cervical spinal stenosis, permanent atrial fib, chronic coronary artery disease and hypertrophic cardiomyopathy. At baseline, pt resides with adult son and DIL in a H with ramp entry. Pt reports she was IND with I/ADLs and actively driving before March 4th, pt drove home from doctors appointment that day and reports she has significantly declined since requiring assistance with ADLs, utilizing walker, progressing to w/c with x2 assist to complete transfers with use of sliding board. Upon assessment, A&O x4 with 10/10 pain in neck. She presents on 2L O2 NC satting at 98% SpO2. She requires max A x2 to come to sitting  EOB and mod/max A to sustain dynamic sitting balance. Pt noted with initial anterior lean progressing to posterior lean. Unable to progress further mobility this date d/t safety as pt is a high falls risk and unable to maintain sitting balance. Pt appears to be significantly below her stated baseline, warranting the need for continued skilled OT services while admitted in hospital setting. OT recommending SNF when medically appropriate for d/c.  -SP       Row Name 05/30/25 1131          Therapy Assessment/Plan (OT)    Criteria for Skilled Therapeutic Interventions Met (OT) yes;meets criteria;skilled treatment is necessary  -SP     Therapy Frequency (OT) 5 times/wk  -SP     Predicted Duration of Therapy Intervention (OT) until d/c  -SP       Row Name 05/30/25 1131          Therapy Plan Review/Discharge Plan (OT)    Anticipated Discharge Disposition (OT) skilled nursing facility  -SP       Row Name 05/30/25 1131          Vital Signs    Pre Systolic BP Rehab 136  -SP     Pre Treatment Diastolic BP 61  -SP     Intra Systolic BP Rehab 164  -SP     Intra Treatment Diastolic BP 71  -SP     Pre SpO2 (%) 98  -SP     O2 Delivery Pre Treatment supplemental O2  -SP     O2 Delivery Intra Treatment supplemental O2  -SP     O2 Delivery Post Treatment supplemental O2  -SP     Pre Patient Position Supine  -SP     Intra Patient Position Sitting  -SP     Post Patient Position Supine  -SP       Row Name 05/30/25 1131          Positioning and Restraints    Pre-Treatment Position in bed  -SP     Post Treatment Position bed  -SP     In Bed notified nsg;fowlers;call light within reach;encouraged to call for assist;exit alarm on  -SP               User Key  (r) = Recorded By, (t) = Taken By, (c) = Cosigned By      Initials Name Provider Type    Mauro Stacy, OT Occupational Therapist                   Outcome Measures       Row Name 05/30/25 1130          How much help from another is currently needed...    Putting on and taking off  regular lower body clothing? 1  -SP     Bathing (including washing, rinsing, and drying) 2  -SP     Toileting (which includes using toilet bed pan or urinal) 2  -SP     Putting on and taking off regular upper body clothing 2  -SP     Taking care of personal grooming (such as brushing teeth) 3  -SP     Eating meals 4  -SP     AM-PAC 6 Clicks Score (OT) 14  -SP       Row Name 05/30/25 0800          How much help from another person do you currently need...    Turning from your back to your side while in flat bed without using bedrails? 2  -SM     Moving from lying on back to sitting on the side of a flat bed without bedrails? 2  -SM     Moving to and from a bed to a chair (including a wheelchair)? 1  -SM     Standing up from a chair using your arms (e.g., wheelchair, bedside chair)? 1  -SM     Climbing 3-5 steps with a railing? 1  -SM     To walk in hospital room? 1  -SM     AM-PAC 6 Clicks Score (PT) 8  -SM       Row Name 05/30/25 1135          Functional Assessment    Outcome Measure Options AM-PAC 6 Clicks Daily Activity (OT)  -SP               User Key  (r) = Recorded By, (t) = Taken By, (c) = Cosigned By      Initials Name Provider Type    Chetna March, RN Registered Nurse    Mauro Stacy OT Occupational Therapist                    Occupational Therapy Education       Title: PT OT SLP Therapies (Done)       Topic: Occupational Therapy (Done)       Point: ADL training (Done)       Learning Progress Summary            Patient Acceptance, E,TB, VU by SP at 5/30/2025 1135                      Point: Home exercise program (Done)       Learning Progress Summary            Patient Acceptance, E,TB, VU by SP at 5/30/2025 1135                      Point: Precautions (Done)       Learning Progress Summary            Patient Acceptance, E,TB, VU by SP at 5/30/2025 1135                      Point: Body mechanics (Done)       Learning Progress Summary            Patient Acceptance, E,TB, VU by SP at 5/30/2025 1135                                       User Key       Initials Effective Dates Name Provider Type Discipline    SP 11/15/23 -  Mauro Szymanski, OT Occupational Therapist OT                  OT Recommendation and Plan  Planned Therapy Interventions (OT): activity tolerance training, adaptive equipment training, BADL retraining, functional balance retraining, IADL retraining, patient/caregiver education/training, passive ROM/stretching, occupation/activity based interventions, transfer/mobility retraining, strengthening exercise, neuromuscular control/coordination retraining, ROM/therapeutic exercise  Therapy Frequency (OT): 5 times/wk  Plan of Care Review  Plan of Care Reviewed With: patient  Outcome Evaluation: Pt is a 86 y/o female admitted to [facility] on 5/23/25 with c/o worsening bilateral lower extremity weakness. She is followed by Dr. Bernal for her cervical disc disease. Surgery has been considered, but her complicated cardiac history has presented a barrier. Pt s/p cervical laminectomy decompression posterior C7-T1 and C5-T2 fusion preformed by Dr. Bernal. Pt is POD 1, Hard cervical collar on at all times. PMHx significant for cervical spinal stenosis, permanent atrial fib, chronic coronary artery disease and hypertrophic cardiomyopathy. At baseline, pt resides with adult son and DIL in a H with ramp entry. Pt reports she was IND with I/ADLs and actively driving before March 4th, pt drove home from doctors appointment that day and reports she has significantly declined since requiring assistance with ADLs, utilizing walker, progressing to w/c with x2 assist to complete transfers with use of sliding board. Upon assessment, A&O x4 with 10/10 pain in neck. She presents on 2L O2 NC satting at 98% SpO2. She requires max A x2 to come to sitting EOB and mod/max A to sustain dynamic sitting balance. Pt noted with initial anterior lean progressing to posterior lean. Unable to progress further mobility this date d/t safety  as pt is a high falls risk and unable to maintain sitting balance. Pt appears to be significantly below her stated baseline, warranting the need for continued skilled OT services while admitted in hospital setting. OT recommending SNF when medically appropriate for d/c.     Time Calculation:         Time Calculation- OT       Row Name 05/30/25 1135             Time Calculation- OT    OT Start Time 0905  -SP      OT Stop Time 0935  -SP      OT Time Calculation (min) 30 min  -SP      OT Received On 05/30/25  -SP      OT - Next Appointment 06/01/25  -SP      OT Goal Re-Cert Due Date 06/13/25  -SP                User Key  (r) = Recorded By, (t) = Taken By, (c) = Cosigned By      Initials Name Provider Type    SP Mauro Szymanski OT Occupational Therapist                  Therapy Charges for Today       Code Description Service Date Service Provider Modifiers Qty    [id number] HC OT EVAL MOD COMPLEXITY 4 5/30/2025 Mauro Szymanski OT GO 1                 Mauro Szymanski OT  5/30/2025

## 2025-05-30 NOTE — PROGRESS NOTES
Neurosurgery Progress Note      Patient: Domonique See    YOB: 1940    Medical Record Number: [id number]    Attending Physician: Tatiana Gilliland MD    Date of Admission: 5/23/2025 12:30 PM    Status Post: C7-T1 decompression and C5-T2 posterior fusion    Post Operative Day Number: 1    Admitting Dx: Spinal stenosis, unspecified spinal region [M48.00]  Hypotension, unspecified hypotension type [I95.9]  Constipation, unspecified constipation type [K59.00]  Cervical neuralgia [M54.12]    General Appearance:  85 y.o. female awake alert lying in bed with cervical brace on.  Patient moving distal lower extremities.  Surgical drain intact with serosanguineous drainage.    Current Problem List:     Cervical neuralgia    Pacemaker    Paroxysmal atrial fibrillation    Cervical radiculopathy    Essential hypertension    Spondylolisthesis, acquired    Obesity (BMI 30.0-34.9)    Hx of hypertrophic cardiomyopathy    Coronary artery disease    Neck pain    Seizure disorder    Encounter for pre-operative cardiovascular clearance    Aortic insufficiency    Mitral regurgitation          Current Medications:  Scheduled Meds:[Held by provider] apixaban, 5 mg, Oral, BID  [Held by provider] aspirin, 81 mg, Oral, Nightly  atorvastatin, 40 mg, Oral, Daily  ceFAZolin, 2,000 mg, Intravenous, Q8H  docusate sodium, 100 mg, Oral, BID  enoxaparin sodium, 40 mg, Subcutaneous, Q12H  levothyroxine, 75 mcg, Oral, Q AM  [Held by provider] lisinopril, 5 mg, Oral, Daily  metoprolol succinate XL, 12.5 mg, Oral, Q24H  midodrine, 10 mg, Oral, Q8H  polyethylene glycol, 17 g, Oral, Daily  pregabalin, 200 mg, Oral, Q8H  senna-docusate sodium, 2 tablet, Oral, BID  sertraline, 100 mg, Oral, Q PM  sodium chloride, 10 mL, Intravenous, Q12H  sodium chloride, 10 mL, Intravenous, Q12H      Continuous Infusions:   PRN Meds:.  acetaminophen    senna-docusate sodium **AND** polyethylene glycol **AND** bisacodyl **AND** bisacodyl    methocarbamol     Morphine    [Held by provider] Morphine    nitroglycerin    ondansetron    oxyCODONE    [COMPLETED] Insert Peripheral IV **AND** sodium chloride    sodium chloride    sodium chloride    sodium chloride    sodium chloride    zolpidem      Diagnostic Tests:    Lab Results (last 24 hours)       Procedure Component Value Units Date/Time    CBC & Differential [[id number]] Collected: 05/30/25 0200    Specimen: Blood Updated: 05/30/25 0820    Narrative:      The following orders were created for panel order CBC & Differential.  Procedure                               Abnormality         Status                     ---------                               -----------         ------                     CBC Auto Differential[[id number]]                                                       Scan Slide[[id number]]                                                                    Please view results for these tests on the individual orders.    CBC Auto Differential [[id number]] Updated: 05/30/25 0820    Specimen: Blood     Basic Metabolic Panel [[id number]]  (Abnormal) Collected: 05/30/25 0200    Specimen: Blood Updated: 05/30/25 0409     Glucose 154 mg/dL      BUN 16.9 mg/dL      Creatinine 0.73 mg/dL      Sodium 137 mmol/L      Potassium 4.3 mmol/L      Chloride 104 mmol/L      CO2 23.6 mmol/L      Calcium 8.9 mg/dL      BUN/Creatinine Ratio 23.2     Anion Gap 9.4 mmol/L      eGFR 80.2 mL/min/1.73     Narrative:      GFR Categories in Chronic Kidney Disease (CKD)              GFR Category          GFR (mL/min/1.73)    Interpretation  G1                    90 or greater        Normal or high (1)  G2                    60-89                Mild decrease (1)  G3a                   45-59                Mild to moderate decrease  G3b                   30-44                Moderate to severe decrease  G4                    15-29                Severe decrease  G5                    14 or less           Kidney failure    (1)In the absence  of evidence of kidney disease, neither GFR category G1 or G2 fulfill the criteria for CKD.    eGFR calculation 2021 CKD-EPI creatinine equation, which does not include race as a factor            Imaging Results (Last 24 Hours)       Procedure Component Value Units Date/Time    XR Spine Cervical 2 or 3 View [[id number]] Resulted: 05/29/25 1803     Updated: 05/29/25 1803    Narrative:      This procedure was auto-finalized with no dictation required.    FL C Arm During Surgery [[id number]] Resulted: 05/29/25 1803     Updated: 05/29/25 1803    Narrative:      This procedure was auto-finalized with no dictation required.            Physical Exam:   General Appearance:  Alert, cooperative, in no acute distress   Head:  Normocephalic, without obvious abnormality, atraumatic   Neck:   Surgical dressing clean dry and intact, surgical drain intact with sanguinous drainage no focal fluid collection noted   Abdomen:   nontender, nondistended   Extremities/MSK: Moves upper extremities well, no edema, no cyanosis, no             Redness    Skin: No bleeding, bruising or rash   Neurologic: Cranial nerves 2 - 12 grossly intact, lower extremity distal movement improved         Vitals:    05/30/25 0100 05/30/25 0446 05/30/25 0603 05/30/25 0757   BP: 132/54 141/62 157/67 158/75   BP Location: Right arm Left arm  Left arm   Patient Position: Lying Lying  Lying   Pulse: 80 73 67 97   Resp: 14 18  21   Temp: 97.4 °F (36.3 °C) 98 °F (36.7 °C)  96.3 °F (35.7 °C)   TempSrc: Oral Oral  Axillary   SpO2: 99% 100%  98%   Weight:       Height:             Assessment: Patient is an 85-year-old female that underwent C7-T1 decompression and C5-T2 posterior fusion for severe central stenosis and spinal cord compression.  She is neuro stable to improved.  Continue surgical drain at this time.      plan:    Continue hard cervical collar at all times  Continue surgical drain  Continue Pain management efforts  Continue mobilization efforts  Continue  incisional care  Continue DVT Prophylaxis    Discharge Plan: Likely rehab      Assessment and plan were discussed with Dr. Bernal who agrees with plan of care.    Date: 5/30/2025    Carmen Salamanca, APRN  10:34 EDT

## 2025-05-30 NOTE — PROGRESS NOTES
LOS: 3 days   Patient Care Team:  Anish Lew APRN as PCP - General  Evgeny Gregory MD as Consulting Physician (Cardiology)  Leeanne Pleitez MD as Consulting Physician (Cardiology)  Radha Owusu APRN as Nurse Practitioner (Nurse Practitioner)  Kathe Muñoz MD as Consulting Physician (Nephrology)    Subjective     Patient continues with pain      Review of Systems   Constitutional:  Positive for activity change and fatigue.   HENT: Negative.     Respiratory: Negative.     Cardiovascular: Negative.    Gastrointestinal:  Positive for constipation.   Genitourinary: Negative.    Musculoskeletal:  Positive for back pain and gait problem.   Neurological:  Positive for weakness.   Psychiatric/Behavioral: Negative.             Objective     Vital Signs  Temp:  [96.3 °F (35.7 °C)-98.2 °F (36.8 °C)] 96.3 °F (35.7 °C)  Heart Rate:  [67-97] 97  Resp:  [12-21] 21  BP: (100-158)/(45-75) 158/75      Physical Exam  Vitals reviewed.   Constitutional:       Appearance: She is not ill-appearing.   HENT:      Head: Normocephalic and atraumatic.      Right Ear: External ear normal.      Left Ear: External ear normal.      Nose: Nose normal.      Mouth/Throat:      Mouth: Mucous membranes are dry.   Eyes:      General:         Right eye: No discharge.         Left eye: No discharge.   Cardiovascular:      Rate and Rhythm: Normal rate and regular rhythm.      Heart sounds: Normal heart sounds.   Pulmonary:      Effort: Pulmonary effort is normal.      Breath sounds: Normal breath sounds.   Abdominal:      General: Bowel sounds are normal.   Musculoskeletal:      Cervical back: Tenderness present.      Right lower leg: No edema.      Left lower leg: No edema.   Skin:     General: Skin is dry.   Neurological:      Mental Status: She is alert and oriented to person, place, and time.   Psychiatric:         Behavior: Behavior normal.              Results Review:    Lab Results (last 24 hours)        Procedure Component Value Units Date/Time    CBC & Differential [[id number]] Collected: 05/30/25 0200    Specimen: Blood Updated: 05/30/25 0820    Narrative:      The following orders were created for panel order CBC & Differential.  Procedure                               Abnormality         Status                     ---------                               -----------         ------                     CBC Auto Differential[[id number]]                                                       Scan Slide[[id number]]                                                                    Please view results for these tests on the individual orders.    CBC Auto Differential [[id number]] Updated: 05/30/25 0820    Specimen: Blood     Basic Metabolic Panel [[id number]]  (Abnormal) Collected: 05/30/25 0200    Specimen: Blood Updated: 05/30/25 0409     Glucose 154 mg/dL      BUN 16.9 mg/dL      Creatinine 0.73 mg/dL      Sodium 137 mmol/L      Potassium 4.3 mmol/L      Chloride 104 mmol/L      CO2 23.6 mmol/L      Calcium 8.9 mg/dL      BUN/Creatinine Ratio 23.2     Anion Gap 9.4 mmol/L      eGFR 80.2 mL/min/1.73     Narrative:      GFR Categories in Chronic Kidney Disease (CKD)              GFR Category          GFR (mL/min/1.73)    Interpretation  G1                    90 or greater        Normal or high (1)  G2                    60-89                Mild decrease (1)  G3a                   45-59                Mild to moderate decrease  G3b                   30-44                Moderate to severe decrease  G4                    15-29                Severe decrease  G5                    14 or less           Kidney failure    (1)In the absence of evidence of kidney disease, neither GFR category G1 or G2 fulfill the criteria for CKD.    eGFR calculation 2021 CKD-EPI creatinine equation, which does not include race as a factor             Imaging Results (Last 24 Hours)       Procedure Component Value Units Date/Time    XR Spine  Cervical 2 or 3 View [[id number]] Resulted: 05/29/25 1803     Updated: 05/29/25 1803    Narrative:      This procedure was auto-finalized with no dictation required.    FL C Arm During Surgery [[id number]] Resulted: 05/29/25 1803     Updated: 05/29/25 1803    Narrative:      This procedure was auto-finalized with no dictation required.                 I reviewed the patient's new clinical results.    Medication Review:   Scheduled Meds:[Held by provider] apixaban, 5 mg, Oral, BID  [Held by provider] aspirin, 81 mg, Oral, Nightly  atorvastatin, 40 mg, Oral, Daily  ceFAZolin, 2,000 mg, Intravenous, Q8H  docusate sodium, 100 mg, Oral, BID  enoxaparin sodium, 40 mg, Subcutaneous, Q12H  levothyroxine, 75 mcg, Oral, Q AM  [Held by provider] lisinopril, 5 mg, Oral, Daily  metoprolol succinate XL, 12.5 mg, Oral, Q24H  midodrine, 10 mg, Oral, Q8H  polyethylene glycol, 17 g, Oral, Daily  pregabalin, 200 mg, Oral, Q8H  senna-docusate sodium, 2 tablet, Oral, BID  sertraline, 100 mg, Oral, Q PM  sodium chloride, 10 mL, Intravenous, Q12H  sodium chloride, 10 mL, Intravenous, Q12H  sorbitol, 30 mL, Oral, Once      Continuous Infusions:   PRN Meds:.  acetaminophen    senna-docusate sodium **AND** polyethylene glycol **AND** bisacodyl **AND** bisacodyl    methocarbamol    Morphine    [Held by provider] Morphine    nitroglycerin    ondansetron    oxyCODONE    [COMPLETED] Insert Peripheral IV **AND** sodium chloride    sodium chloride    sodium chloride    sodium chloride    sodium chloride    zolpidem     Interval History:        Assessment & Plan      Cervical neuralgia  Pacemaker  Paroxysmal atrial fibrillation  Cervical radiculopathy  Essential hypertension  Spondylolisthesis, acquired  Obesity (BMI 30.0-34.9)  Hx of hypertrophic cardiomyopathy  Coronary artery disease  Neck pain  Seizure disorder  Encounter for pre-operative cardiovascular clearance  Aortic insufficiency  Mitral regurgitation    Plan of care  -Neurosurgery  following    -s/p cervical laminectomy decompression  -bowel regimen will need bowel purge  -home medications for chronic medical conditions  -holding Eliquis and aspirin for possible surgery  -Lovenox for DVT prophylaxis as well as paroxysmal atrial fibrillation  -pain control with oxycodone/morphine  -BP fluctuating most likely due to pain and pain medication; will hold lisinopril and keep midodrine would rather have more htn lean than hypotension      CODE Status:    Code status (Patient has no pulse and is not breathing):  CPR (Attempt to Resuscitate)  Medical Interventions (Patient has pulse or is breathing):  Full support  Level of support discussed with:  Patient     Admission status:  I believe this patient meets inpatient status  Expected length of stay:  2 midnights or greater    Plan for disposition:WOODY Hart  05/30/25  08:51 EDT

## 2025-05-30 NOTE — PLAN OF CARE
Goal Outcome Evaluation:  Plan of Care Reviewed With: patient           Outcome Evaluation: Pt is a 84 y/o female admitted to [facility] on 5/23/25 with c/o worsening bilateral lower extremity weakness. She is followed by Dr. Bernal for her cervical disc disease. Surgery has been considered, but her complicated cardiac history has presented a barrier. Pt s/p cervical laminectomy decompression posterior C7-T1 and C5-T2 fusion preformed by Dr. Bernal. Pt is POD 1, Hard cervical collar on at all times. PMHx significant for cervical spinal stenosis, permanent atrial fib, chronic coronary artery disease and hypertrophic cardiomyopathy. At baseline, pt resides with adult son and DIL in a H with ramp entry. Pt reports she was IND with I/ADLs and actively driving before March 4th, pt drove home from doctors appointment that day and reports she has significantly declined since requiring assistance with ADLs, utilizing walker, progressing to w/c with x2 assist to complete transfers with use of sliding board. Upon assessment, A&O x4 with 10/10 pain in neck. She presents on 2L O2 NC satting at 98% SpO2. She requires max A x2 to come to sitting EOB and mod/max A to sustain dynamic sitting balance. Pt noted with initial anterior lean progressing to posterior lean. Unable to progress further mobility this date d/t safety as pt is a high falls risk and unable to maintain sitting balance. Pt appears to be significantly below her stated baseline, warranting the need for continued skilled OT services while admitted in hospital setting. OT recommending SNF when medically appropriate for d/c.    Anticipated Discharge Disposition (OT): skilled nursing facility

## 2025-05-30 NOTE — PROGRESS NOTES
Cardiology Progress Note    Patient Identification:  Name: Domonique See  Age: 85 y.o.  Sex: female  :  1940  MRN: [id number]                 Follow Up / Chief Complaint: pre op evaluation   Chief Complaint   Patient presents with    Rectal Pain     Constipated last night, ws on the toilet for 4 hours without getting up, pt took a break and went back for another 2 hours. Pt is worried she prolapsed her rectum. Pt has cold legs and feet - spinal stenosis may be worsening. Partial paralysis from waist down has rectal numbness x 3 months.       Interval History: Patient presented with worsening lower extremity weakness   Patient underwent surgery 2025       Subjective: Patient seen and examined.  Chart reviewed.  Labs reviewed.  Discussed with RN taking care of patient.  Patient denies any chest pain or shortness of breath.      Objective:   2025: High-sensitivity troponin 20--16 potassium 4.7 otherwise CMP unremarkable lactic normal hemoglobin 10.3 blood cultures negative  2025: CMP unremarkable, CBC with hgb 10.7 plts 131 CXR negative   2025: BMP with glucose of 101  2025: BMP with a glucose of 154    History of present illness:      Ms. Domonique See  has PMH of     Hypertrophic cardiomyopathy, history of septal myomectomy,      nonsustained VT  hypertension  SSS, [facility] permanent pacemaker 3/16/2017  Paroxysmal atrial fibrillation, ablation 2023  [id number] SCORE   [id number] Score: 8 (2024  2:06 PM)  [id number]-[id number] Score: 8 (2023  3:37 PM)  Age greater than 75, CHF, hypertension, CVA, vascular disease     CAD, cath 2017 30% LAD  CHF due to hypertrophic cardiomyopathy  Hyperlipidemia  COPD  History of DAYLIN 2023  Peptic ulcer disease  Back surgery (previous cervical decompression and fusion, lumbar decompression and fusion), breast augmentation  Former smoker     presented to the ER at [facility] on [facility]25 complaining of  worsening leg weakness, numbness and constipation. Of note, the patient was recently seen by neurosurgery, Dr. Bernal, for severe stenosis of the cervical spine. She is now wheelchair-bound.      Workup in the ER revealed hypotension, which responded to IV fluids. Neurosurgery was consulted and she was admitted for further treatment. Cardiology was consulted for preoperative cardiac clearance.        Outpatient echocardiogram 3/21/2025    Left ventricular ejection fraction appears to be 61 - 65%.    Left ventricular diastolic function was normal.    The left atrial cavity is moderately dilated.    Left atrial volume is severely increased.    Moderate aortic valve regurgitation is present.    Moderate mitral valve regurgitation is present.    Estimated right ventricular systolic pressure from tricuspid regurgitation is normal (<35 mmHg)              Chart review:  Underwent A-fib ablation 1/16/2023  Admitted 1/18/2023 with CHF     Echocardiogram 6/29/2021 reveals EF of 50%   Echocardiogram 1/19/2023 EF of 60 to 65% with severe right ventricular and right atrial enlargement moderate left atrial enlargement, moderate mitral regurgitation and mild aortic regurgitation.           Assessment:    Preoperative cardiovascular evaluation for cervical spine surgery  Cervical neuralgia  Inactive functional status  Extremity weakness  Permanent pacemaker  Permanent A-fib status post ablation, long-term Eliquis  Hypertrophic cardiomyopathy, myomectomy, NSVT  Moderate mitral regurgitation, mild aortic regurgitation  Chronic HFpEF due to diastolic dysfunction from HCM  Hypertension  Dyslipidemia  Prediabetes  Severe right ventricular enlargement        Recommendations and plan:     Patient has issues with chronic back pain requiring injections in the past is not functionally active.  Patient had cath in 2017 showing 30% stenosis in the LAD, Lexiscan 4-25 revealed no ischemia.  Echocardiogram 3/21/2025 revealed preserved EF with  moderate aortic and mitral valve regurgitation  Serial troponins are mildly elevated at 20 and 16.  Patient denies any chest pain.  EKG revealed paced rhythm.      Patient has elevated [id number] score of 8, due to age over 75, female gender, CHF, hypertension, CVA, vascular disease.  Will benefit from long-term anticoagulation to prevent thromboembolic events.  Patient's risk of stroke and other embolism ranged from 9.7 to 13.6 %/year.  Patient's anticoagulation is currently on hold for possible surgery  Will recommend restart when safe and okay with surgery      Patient underwent surgery 5/29/2025  Denies any chest pain or shortness of breath.  Telemetry is revealing A-fib with intermittent V paced rhythm.  Patient is hemodynamically stable.  Restart anticoagulation when okay with surgery.  Continue metoprolol for rate control.  Monitor rhythm.         Data:     EKG 4/1/2025 reviewed/interpreted by me reveals A-fib with a rate of 92 bpm with right bundle branch block.  Patient underwent Lexiscan Cardiolite 4/3/2025 which was negative for ischemia EF of 56%.  Reviewed stress test results with patient.  Patient had previous ablation.  Was seen by EP Dr. Naseem Pleitez, I am patient was offered ablation and cardioversion she is considering.  Will follow-up with EP as outpatient.  Will continue to monitor rhythm on telemetry  Will follow-up as outpatient in pacemaker clinic to follow rhythm.           Procedures     ECHOCARDIOGRAM:  Results for orders placed during the hospital encounter of 03/21/25     Adult Transthoracic Echo Complete W/ Cont if Necessary Per Protocol     Interpretation Summary    Left ventricular ejection fraction appears to be 61 - 65%.    Left ventricular diastolic function was normal.    The left atrial cavity is moderately dilated.    Left atrial volume is severely increased.    Moderate aortic valve regurgitation is present.    Moderate mitral valve regurgitation is present.    Estimated  right ventricular systolic pressure from tricuspid regurgitation is normal (<35 mmHg).        STRESS TEST  Results for orders placed during the hospital encounter of 03/28/25     Stress Test With Myocardial Perfusion One Day     Interpretation Summary    Myocardial perfusion imaging indicates a normal myocardial perfusion study with no evidence of ischemia. Impressions are consistent with a low risk study.    Left ventricular ejection fraction is normal (Calculated EF = 56%).                     Copied text in this portion of the note has been reviewed and is accurate as of 5/30/2025    Past Medical History:  Past Medical History:   Diagnosis Date    Anemia     Aortic insufficiency 03/21/2025    Asthma     CHF (congestive heart failure)     Cholecystitis 09/02/2011    Congenital heart disease     Coronary artery disease 2013    Deep vein thrombosis (DVT) 11/20/2017    GERD (gastroesophageal reflux disease)     Heart murmur Don't know    Hx of hypertrophic cardiomyopathy     Hypertension     Hypertrophic obstructive cardiomyopathy 11/10/2017    Low back pain     Lumbosacral disc disease     Mitral regurgitation 03/21/2025    Mixed hyperlipidemia 10/06/2017    Morbid obesity 09/21/2020    Peptic ulceration     Persistent atrial fibrillation 01/10/2023    Primary osteoarthritis of both knees 02/08/2021    Primary osteoarthritis of right knee 09/21/2020    Seizures     Sleep apnea 2017    TIA (transient ischemic attack)      Past Surgical History:  Past Surgical History:   Procedure Laterality Date    ABDOMINAL HERNIA REPAIR      ABLATION OF DYSRHYTHMIC FOCUS  01-    BACK SURGERY      BREAST AUGMENTATION      CARDIAC CATHETERIZATION      CARDIAC ELECTROPHYSIOLOGY PROCEDURE N/A 01/16/2023    Procedure: Ablation atrial fibrillation and flutter, sarthak Watters and Ramón aware;  Surgeon: Leeanne Pleitez MD;  Location: [facility] CATH INVASIVE LOC[facility];  Service: Cardiovascular;  Laterality: N/A;    CARDIAC PACEMAKER  PLACEMENT  2017    Dual Chamber    CERVICAL RIB RESECTION Bilateral     CERVICAL RIB RESECTION Bilateral 1963    bilateral cervical ribs removed - extra ribs located and causing pain    CHOLECYSTECTOMY      COLONOSCOPY      CORONARY ARTERY BYPASS GRAFT  2013    EPIDURAL BLOCK      NECK SURGERY      SPINAL FUSION      THORACIC DECOMPRESSION POSTERIOR FUSION  2014    L3-5 & S1    TRIGGER POINT INJECTION      UPPER GASTROINTESTINAL ENDOSCOPY          Social History:   Social History     Tobacco Use    Smoking status: Former     Current packs/day: 0.00     Average packs/day: 1 pack/day for 8.0 years (8.0 ttl pk-yrs)     Types: Cigarettes     Start date: 1977     Quit date: 1985     Years since quittin.4     Passive exposure: Past    Smokeless tobacco: Never    Tobacco comments:     Quit    Substance Use Topics    Alcohol use: Never     Comment: 6 drinks a year in social settings      Family History:  Family History   Problem Relation Age of Onset    Colon cancer Mother     Heart disease Mother     Hypertension Mother     Arthritis Mother     Cancer Mother     Diabetes Mother     Kidney disease Mother     Colon cancer Brother     Heart attack Brother     Colon cancer Daughter     Arthritis Daughter     Arthritis Father     Hearing loss Father     Arthritis Sister     Hypertension Brother     Arthritis Brother     Cancer Brother     Stroke Brother     Arthritis Son     Arthritis Son     Cancer Sister     Diabetes Sister     Cancer Brother     Hypertension Sister     Kidney disease Sister           Allergies:  No Known Allergies  Scheduled Meds:  [Held by provider] apixaban, 5 mg, BID  [Held by provider] aspirin, 81 mg, Nightly  atorvastatin, 40 mg, Daily  ceFAZolin, 2,000 mg, Q8H  docusate sodium, 100 mg, BID  enoxaparin sodium, 40 mg, Q12H  levothyroxine, 75 mcg, Q AM  [Held by provider] lisinopril, 5 mg, Daily  metoprolol succinate XL, 12.5 mg, Q24H  midodrine, 10 mg, Q8H  polyethylene  "glycol, 17 g, Daily  pregabalin, 200 mg, Q8H  senna-docusate sodium, 2 tablet, BID  sertraline, 100 mg, Q PM  sodium chloride, 10 mL, Q12H  sodium chloride, 10 mL, Q12H          Review of Systems:   ROS  Review of Systems   Constitution: Negative for chills and fever.   Cardiovascular: Negative for chest pain and palpitations.   Respiratory: Negative for cough and hemoptysis.    Gastrointestinal: Negative for nausea.        Constitutional:  Temp:  [96.3 °F (35.7 °C)-98.4 °F (36.9 °C)] 98.4 °F (36.9 °C)  Heart Rate:  [67-97] 70  Resp:  [12-21] 20  BP: (100-158)/(45-75) 109/53    Physical Exam   /53 (BP Location: Left arm, Patient Position: Lying)   Pulse 70   Temp 98.4 °F (36.9 °C) (Oral)   Resp 20   Ht 152.4 cm (60\")   Wt 73.4 kg (161 lb 13.1 oz)   LMP  (LMP Unknown)   SpO2 97%   BMI 31.60 kg/m²   General:  Appears in no acute distress  Eyes: Sclera is anicteric,  conjunctiva is clear   HEENT:  No JVD. Thyroid not visibly enlarged. No mucosal pallor or cyanosis  Respiratory: Respirations regular and unlabored at rest.  Clear to auscultation  Cardiovascular: S1,S2, irregularly irregular rhythm.   Gastrointestinal: Abdomen nondistended.  Musculoskeletal:  No abnormal movements  Extremities: No digital clubbing or cyanosis  Skin: Color pink.   Neuro: Alert and awake.    INTAKE AND OUTPUT:    Intake/Output Summary (Last 24 hours) at 5/30/2025 1229  Last data filed at 5/30/2025 1100  Gross per 24 hour   Intake 240 ml   Output 4465 ml   Net -4225 ml       Cardiographics  Telemetry: Reviewed and interpreted by me reveals atrial fibrillation with intermittent V paced rhythm    ECG:   ECG 12 Lead Other; arm pain   Final Result   HEART RATE=68  bpm   RR Izovefvu=852  ms   NC Interval=  ms   P Horizontal Axis=  deg   P Front Axis=  deg   QRSD Dhfyluka=380  ms   QT Chgkyyhh=638  ms   NQpQ=783  ms   QRS Axis=-25  deg   T Wave Axis=34  deg   - ABNORMAL ECG -   Afib/flut and V-paced complexes   Right bundle branch " block   Incomplete left bundle branch block   Left ventricular hypertrophy   When compared with ECG of 01-Apr-2025 22:52:35,   Significant change in rhythm   Significant repolarization change   Electronically Signed By: Manav Whitt (ELSA) 2025-05-24 07:06:44   Date and Time of Study:2025-05-23 13:04:06      Telemetry Scan   Final Result      Telemetry Scan   Final Result      Telemetry Scan   Final Result      Telemetry Scan   Final Result      Telemetry Scan   Final Result      Telemetry Scan   Final Result      Telemetry Scan   Final Result      Telemetry Scan   Final Result      Telemetry Scan   Final Result      Telemetry Scan   Final Result      Telemetry Scan   Final Result      Telemetry Scan   Final Result      Telemetry Scan   Final Result      Telemetry Scan   Final Result      Telemetry Scan   Final Result      Telemetry Scan   Final Result      Telemetry Scan   Final Result      Telemetry Scan   Final Result      Telemetry Scan   Final Result      ECG 12 Lead Pre-Op / Pre-Procedure    (Results Pending)     I have personally reviewed EKG    Echocardiogram: Results for orders placed during the hospital encounter of 03/21/25    Adult Transthoracic Echo Complete W/ Cont if Necessary Per Protocol    Interpretation Summary    Left ventricular ejection fraction appears to be 61 - 65%.    Left ventricular diastolic function was normal.    The left atrial cavity is moderately dilated.    Left atrial volume is severely increased.    Moderate aortic valve regurgitation is present.    Moderate mitral valve regurgitation is present.    Estimated right ventricular systolic pressure from tricuspid regurgitation is normal (<35 mmHg).      STRESS TEST  Results for orders placed during the hospital encounter of 03/28/25    Stress Test With Myocardial Perfusion One Day    Interpretation Summary    Myocardial perfusion imaging indicates a normal myocardial perfusion study with no evidence of ischemia. Impressions are  "consistent with a low risk study.    Left ventricular ejection fraction is normal (Calculated EF = 56%).      HEART CATHETERIZATION  No results found for this or any previous visit.      Lab Review   I have reviewed the labs  Results from last 7 days   Lab Units 05/23/25  1511 05/23/25  1403   HSTROP T ng/L 16* 20*         Results from last 7 days   Lab Units 05/30/25  0200   SODIUM mmol/L 137   POTASSIUM mmol/L 4.3   BUN mg/dL 16.9   CREATININE mg/dL 0.73   CALCIUM mg/dL 8.9         Results from last 7 days   Lab Units 05/29/25  0509 05/28/25  0159 05/23/25  1403   WBC 10*3/mm3 5.02 5.08 6.64   HEMOGLOBIN g/dL 11.3* 10.7* 10.8*   HEMATOCRIT % 36.7 36.3 34.4   PLATELETS 10*3/mm3 125* 131* 115*     Results from last 7 days   Lab Units 05/29/25  0509 05/28/25  0850 05/27/25  1303   INR  1.09  --  1.04   APTT seconds 45.1* 43.0*  --        RADIOLOGY:  Imaging Results (Last 24 Hours)       Procedure Component Value Units Date/Time    XR Spine Cervical 2 or 3 View [[id number]] Resulted: 05/29/25 1803     Updated: 05/29/25 1803    Narrative:      This procedure was auto-finalized with no dictation required.    FL C Arm During Surgery [[id number]] Resulted: 05/29/25 1803     Updated: 05/29/25 1803    Narrative:      This procedure was auto-finalized with no dictation required.                  )5/30/2025  MD JONO Barajas Dragon/Transcription:   \"Dictated utilizing Dragon dictation\".   "

## 2025-05-31 PROCEDURE — 99024 POSTOP FOLLOW-UP VISIT: CPT | Performed by: NURSE PRACTITIONER

## 2025-05-31 PROCEDURE — 25010000002 MORPHINE PER 10 MG

## 2025-05-31 PROCEDURE — 97530 THERAPEUTIC ACTIVITIES: CPT

## 2025-05-31 PROCEDURE — 25010000002 CEFAZOLIN PER 500 MG

## 2025-05-31 PROCEDURE — 99232 SBSQ HOSP IP/OBS MODERATE 35: CPT | Performed by: INTERNAL MEDICINE

## 2025-05-31 PROCEDURE — 97110 THERAPEUTIC EXERCISES: CPT

## 2025-05-31 PROCEDURE — 97112 NEUROMUSCULAR REEDUCATION: CPT

## 2025-05-31 PROCEDURE — 25010000002 ENOXAPARIN PER 10 MG

## 2025-05-31 RX ORDER — METHOCARBAMOL 750 MG/1
750 TABLET, FILM COATED ORAL 3 TIMES DAILY
Status: DISCONTINUED | OUTPATIENT
Start: 2025-05-31 | End: 2025-06-05 | Stop reason: HOSPADM

## 2025-05-31 RX ORDER — SORBITOL SOLUTION 70 %
30 SOLUTION, ORAL MISCELLANEOUS ONCE
Status: COMPLETED | OUTPATIENT
Start: 2025-05-31 | End: 2025-05-31

## 2025-05-31 RX ADMIN — LEVOTHYROXINE SODIUM 75 MCG: 0.07 TABLET ORAL at 05:58

## 2025-05-31 RX ADMIN — SORBITOL SOLUTION (BULK) 30 ML: 70 SOLUTION at 16:47

## 2025-05-31 RX ADMIN — Medication 10 ML: at 21:37

## 2025-05-31 RX ADMIN — OXYCODONE 10 MG: 5 TABLET ORAL at 00:00

## 2025-05-31 RX ADMIN — OXYCODONE 10 MG: 5 TABLET ORAL at 20:28

## 2025-05-31 RX ADMIN — METHOCARBAMOL TABLETS 750 MG: 750 TABLET, COATED ORAL at 21:09

## 2025-05-31 RX ADMIN — MIDODRINE HYDROCHLORIDE 10 MG: 5 TABLET ORAL at 05:59

## 2025-05-31 RX ADMIN — PREGABALIN 200 MG: 100 CAPSULE ORAL at 05:58

## 2025-05-31 RX ADMIN — METOPROLOL SUCCINATE 12.5 MG: 25 TABLET, EXTENDED RELEASE ORAL at 09:41

## 2025-05-31 RX ADMIN — CEFAZOLIN 2000 MG: 2 INJECTION, POWDER, FOR SOLUTION INTRAMUSCULAR; INTRAVENOUS at 06:00

## 2025-05-31 RX ADMIN — SERTRALINE HYDROCHLORIDE 100 MG: 100 TABLET, FILM COATED ORAL at 21:04

## 2025-05-31 RX ADMIN — POLYETHYLENE GLYCOL 3350 17 G: 17 POWDER, FOR SOLUTION ORAL at 09:43

## 2025-05-31 RX ADMIN — Medication 10 ML: at 21:10

## 2025-05-31 RX ADMIN — SENNOSIDES AND DOCUSATE SODIUM 2 TABLET: 8.6; 5 TABLET ORAL at 09:41

## 2025-05-31 RX ADMIN — ENOXAPARIN SODIUM 40 MG: 100 INJECTION SUBCUTANEOUS at 21:04

## 2025-05-31 RX ADMIN — METHOCARBAMOL TABLETS 750 MG: 750 TABLET, COATED ORAL at 14:50

## 2025-05-31 RX ADMIN — SENNOSIDES AND DOCUSATE SODIUM 2 TABLET: 8.6; 5 TABLET ORAL at 21:16

## 2025-05-31 RX ADMIN — MORPHINE SULFATE 2 MG: 2 INJECTION, SOLUTION INTRAMUSCULAR; INTRAVENOUS at 19:26

## 2025-05-31 RX ADMIN — METHOCARBAMOL TABLETS 750 MG: 750 TABLET, COATED ORAL at 10:42

## 2025-05-31 RX ADMIN — OXYCODONE 10 MG: 5 TABLET ORAL at 14:50

## 2025-05-31 RX ADMIN — OXYCODONE 10 MG: 5 TABLET ORAL at 05:58

## 2025-05-31 RX ADMIN — DOCUSATE SODIUM 100 MG: 100 CAPSULE, LIQUID FILLED ORAL at 21:09

## 2025-05-31 RX ADMIN — MIDODRINE HYDROCHLORIDE 10 MG: 5 TABLET ORAL at 14:50

## 2025-05-31 RX ADMIN — ENOXAPARIN SODIUM 40 MG: 100 INJECTION SUBCUTANEOUS at 09:43

## 2025-05-31 RX ADMIN — MORPHINE SULFATE 2 MG: 2 INJECTION, SOLUTION INTRAMUSCULAR; INTRAVENOUS at 13:04

## 2025-05-31 RX ADMIN — CEFAZOLIN 2000 MG: 2 INJECTION, POWDER, FOR SOLUTION INTRAMUSCULAR; INTRAVENOUS at 14:50

## 2025-05-31 RX ADMIN — MIDODRINE HYDROCHLORIDE 10 MG: 5 TABLET ORAL at 21:04

## 2025-05-31 RX ADMIN — PREGABALIN 200 MG: 100 CAPSULE ORAL at 21:09

## 2025-05-31 RX ADMIN — PREGABALIN 200 MG: 100 CAPSULE ORAL at 14:50

## 2025-05-31 RX ADMIN — ATORVASTATIN CALCIUM 40 MG: 40 TABLET, FILM COATED ORAL at 09:41

## 2025-05-31 RX ADMIN — OXYCODONE 10 MG: 5 TABLET ORAL at 10:42

## 2025-05-31 RX ADMIN — MORPHINE SULFATE 2 MG: 2 INJECTION, SOLUTION INTRAMUSCULAR; INTRAVENOUS at 06:33

## 2025-05-31 RX ADMIN — CEFAZOLIN 2000 MG: 2 INJECTION, POWDER, FOR SOLUTION INTRAMUSCULAR; INTRAVENOUS at 00:00

## 2025-05-31 NOTE — PROGRESS NOTES
Neurosurgery Progress Note      Patient: Domonique See    YOB: 1940    Medical Record Number: [id number]    Attending Physician: Tatiana Gilliland MD    Date of Admission: 5/23/2025 12:30 PM    Status Post: C7-T1 decompression and C5-T2 posterior fusion    Post Operative Day Number: 2    Admitting Dx: Spinal stenosis, unspecified spinal region [M48.00]  Hypotension, unspecified hypotension type [I95.9]  Constipation, unspecified constipation type [K59.00]  Cervical neuralgia [M54.12]    General Appearance:  85 y.o. female awake alert lying in bed with cervical brace on.  Patient patient moving upper extremities well with continued but improved lower motor remedy movement.  Surgical drain intact with serous to serosanguineous drainage.    Current Problem List:     Cervical neuralgia    Pacemaker    Paroxysmal atrial fibrillation    Cervical radiculopathy    Essential hypertension    Spondylolisthesis, acquired    Obesity (BMI 30.0-34.9)    Hx of hypertrophic cardiomyopathy    Coronary artery disease    Neck pain    Seizure disorder    Encounter for pre-operative cardiovascular clearance    Aortic insufficiency    Mitral regurgitation          Current Medications:  Scheduled Meds:[Held by provider] apixaban, 5 mg, Oral, BID  [Held by provider] aspirin, 81 mg, Oral, Nightly  atorvastatin, 40 mg, Oral, Daily  ceFAZolin, 2,000 mg, Intravenous, Q8H  docusate sodium, 100 mg, Oral, BID  enoxaparin sodium, 40 mg, Subcutaneous, Q12H  levothyroxine, 75 mcg, Oral, Q AM  [Held by provider] lisinopril, 5 mg, Oral, Daily  metoprolol succinate XL, 12.5 mg, Oral, Q24H  midodrine, 10 mg, Oral, Q8H  polyethylene glycol, 17 g, Oral, Daily  pregabalin, 200 mg, Oral, Q8H  senna-docusate sodium, 2 tablet, Oral, BID  sertraline, 100 mg, Oral, Q PM  sodium chloride, 10 mL, Intravenous, Q12H  sodium chloride, 10 mL, Intravenous, Q12H      Continuous Infusions:   PRN Meds:.  acetaminophen    senna-docusate sodium **AND**  polyethylene glycol **AND** bisacodyl **AND** bisacodyl    methocarbamol    Morphine    [Held by provider] Morphine    nitroglycerin    ondansetron    oxyCODONE    [COMPLETED] Insert Peripheral IV **AND** sodium chloride    sodium chloride    sodium chloride    sodium chloride    sodium chloride    zolpidem      Diagnostic Tests:    Lab Results (last 24 hours)       Procedure Component Value Units Date/Time    CBC & Differential [[id number]]  (Abnormal) Collected: 05/30/25 1331    Specimen: Blood Updated: 05/30/25 1426    Narrative:      The following orders were created for panel order CBC & Differential.  Procedure                               Abnormality         Status                     ---------                               -----------         ------                     CBC Auto Differential[[id number]]        Abnormal            Final result               Scan Slide[[id number]]                                       Final result                 Please view results for these tests on the individual orders.    CBC Auto Differential [[id number]]  (Abnormal) Collected: 05/30/25 1331    Specimen: Blood Updated: 05/30/25 1426     WBC 9.71 10*3/mm3      RBC 2.97 10*6/mm3      Hemoglobin 8.7 g/dL      Comment: Result checked          Hematocrit 27.5 %      MCV 92.6 fL      MCH 29.3 pg      MCHC 31.6 g/dL      RDW 13.5 %      RDW-SD 46.0 fl      MPV 11.9 fL      Platelets 109 10*3/mm3     Scan Slide [[id number]] Collected: 05/30/25 1331    Specimen: Blood Updated: 05/30/25 1426     Scan Slide --     Comment: See Manual Differential Results       Manual Differential [[id number]]  (Abnormal) Collected: 05/30/25 1331    Specimen: Blood Updated: 05/30/25 1426     Neutrophil % 65.0 %      Lymphocyte % 5.0 %      Monocyte % 9.0 %      Basophil % 1.0 %      Bands %  17.0 %      Metamyelocyte % 2.0 %      Atypical Lymphocyte % 1.0 %      Neutrophils Absolute 7.96 10*3/mm3      Lymphocytes Absolute 0.58 10*3/mm3      Monocytes  Absolute 0.87 10*3/mm3      Basophils Absolute 0.10 10*3/mm3      Polychromasia Slight/1+     WBC Morphology Normal     Platelet Estimate Decreased     Large Platelets Slight/1+     Giant Platelets Slight/1+            Imaging Results (Last 24 Hours)       Procedure Component Value Units Date/Time    XR Spine Cervical 2 or 3 View [[id number]] Collected: 05/30/25 1611     Updated: 05/30/25 1616    Narrative:      XR SPINE CERVICAL 2 OR 3 VW    Date of Exam: 5/30/2025 3:05 PM EDT    Indication: s/p cervical fusion    Comparison: CT cervical spine 3/28/2025, cervical spine radiographs 5/29/2025    Findings:  The patient is undergone an anterior fusion from the C3 through the C7 level with a C6 corpectomy and fenestrated cage placement. Since the recent CT the patient is undergone a posterior fusion which appears to extend from the C5 level to at least the T2   level. The alignment is difficult to assess on the current radiograph however there appears to be less anterolisthesis of C7 on T1 than on the preoperative CT.      Impression:      Impression:  Postoperative changes of anterior and posterior cervical fusion as described above. The alignment is difficult to assess however there appears to be less anterolisthesis of C7 on T1 than on the preoperative CT.        Electronically Signed: Lake Garza MD    5/30/2025 4:14 PM EDT    Workstation ID: [id number]            Physical Exam:   General Appearance:  Alert, cooperative, in no acute distress   Head:  Normocephalic, without obvious abnormality, atraumatic   Neck:   Surgical dressing clean dry and intact, surgical drain intact with sanguinous drainage no focal fluid collection noted   Abdomen:   nontender, nondistended   Extremities/MSK: Moves upper extremities well, no edema, no cyanosis, no             Redness    Skin: No bleeding, bruising or rash   Neurologic: Cranial nerves 2 - 12 grossly intact, lower extremity distal movement improved         Vitals:    05/30/25  2100 05/30/25 2317 05/31/25 0556 05/31/25 0753   BP: 104/58 103/52 122/52 116/50   BP Location: Left arm Left arm Left arm Left arm   Patient Position: Lying Lying Lying Lying   Pulse:  85  68   Resp: 16 12 16 13   Temp:  97.8 °F (36.6 °C)  97.9 °F (36.6 °C)   TempSrc:  Oral  Axillary   SpO2:   99%    Weight:       Height:         Surgical drain 45 mL reported output last 24-hour    Assessment: Patient is an 85-year-old female that underwent C7-T1 decompression and C5-T2 posterior fusion for severe central stenosis and spinal cord compression.  Patient is postop day 2.  She is neuro stable to improved.  She is still quite painful and have adjusted her muscle relaxers to scheduled administration.  Okay to discontinue surgical drain.  Given her significant cardiac history, patient okay to restart her oral anticoagulation tomorrow.     plan:    Continue hard cervical collar at all times  Remove surgical drain  Okay to restart anticoagulation tomorrow 6/1  Continue Pain management efforts -scheduled muscle relaxer  Continue mobilization efforts  Continue incisional care  Continue DVT Prophylaxis    Discharge Plan: Likely rehab      Assessment and plan were discussed with Dr. Lyles who agrees with plan of care.    Date: 5/31/2025    Carmen Salamanca, APRN  10:30 EDT

## 2025-05-31 NOTE — PLAN OF CARE
Assessment: Domonique See presents with functional mobility impairments which indicate the need for skilled intervention. Tolerating session today without incident. Pt only c/o incr pain in neck after sitting EOB x10 mins. Worked on midline orientation with UE support. Plans on rehab at [place].Will continue to follow and progress as tolerated.

## 2025-05-31 NOTE — PLAN OF CARE
Goal Outcome Evaluation:  Plan of Care Reviewed With: patient        Progress: improving  Outcome Evaluation: Pt worked with PT today. AOx4, family at bedside, call light within reach and able to make needs known.

## 2025-05-31 NOTE — THERAPY TREATMENT NOTE
"Subjective: Pt agreeable to therapeutic plan of care. Pt agreed to sit EOB    Objective:     Precautions - falls, cervical collar on at all times, hemovac    Bed mobility - Max-A and Assist x 2 to [employer]  Transfers - N/A  Ambulation -  feet N/A or Not attempted.    Therapeutic Exercise - 10 Reps B BERNARD SENIOR lying supine    Vitals: WNL on 2L o2    Pain: 10+  VAS Location: neck and meds not due for 1 hour  Intervention for pain: Repositioned, RN notified, and Therapeutic Presence    Education: Provided education on the importance of mobility in the acute care setting, Verbal/Tactile Cues, and Post-Op Precautions    Assessment: Domonique See presents with functional mobility impairments which indicate the need for skilled intervention. Tolerating session today without incident. Pt only c/o incr pain in neck after sitting EOB x10 mins. Worked on midline orientation with UE support. Plans on rehab at [place].Will continue to follow and progress as tolerated.     Plan/Recommendations:   If medically appropriate, Moderate Intensity Therapy recommended post-acute care. This is recommended as therapy feels the patient would require 3-4 days per week and wouldn't tolerate \"3 hour daily\" rehab intensity. SNF would be the preferred choice. If the patient does not agree to SNF, arrange HH or OP depending on home bound status. If patient is medically complex, consider LTACH. Pt requires no DME at discharge.     Pt desires Skilled Rehab placement at discharge. Pt cooperative; agreeable to therapeutic recommendations and plan of care.         Basic Mobility 6-click:  Rollin = Total, A lot = 2, A little = 3; 4 = None  Supine>Sit:   1 = Total, A lot = 2, A little = 3; 4 = None   Sit>Stand with arms:  1 = Total, A lot = 2, A little = 3; 4 = None  Bed>Chair:   1 = Total, A lot = 2, A little = 3; 4 = None  Ambulate in room:  1 = Total, A lot = 2, A little = 3; 4 = None  3-5 Steps with railin = Total, A lot = 2, A " little = 3; 4 = None  Score: 8      Post-Tx Position: Supine with HOB Elevated, Staff Present, and Call light and personal items within reach  PPE: gloves    Therapy Charges for Today       Code Description Service Date Service Provider Modifiers Qty    15559846935  PT NEUROMUSC RE EDUCATION EA 15 MIN 5/31/2025 Hazel Cruz, FAUSTINO GP 1    [id number]  PT THERAPEUTIC ACT EA 15 MIN 5/31/2025 Hazel Cruz PTA GP 1    [id number]  PT THER PROC EA 15 MIN 5/31/2025 Hazel Cruz, FAUSTINO GP 1           PT Charges       Row Name 05/31/25 1307             Time Calculation    Start Time 1117  -      Stop Time 1145  -      Time Calculation (min) 28 min  -      PT Received On 05/31/25  -      PT - Next Appointment 06/02/25  -         Time Calculation- PT    Total Timed Code Minutes- PT 28 minute(s)  -                User Key  (r) = Recorded By, (t) = Taken By, (c) = Cosigned By      Initials Name Provider Type    Hazel Recinos PTA Physical Therapist Assistant

## 2025-05-31 NOTE — PROGRESS NOTES
LOS: 4 days   Patient Care Team:  Anish Lew APRN as PCP - General  Evgeny Gregory MD as Consulting Physician (Cardiology)  Leeanne Pleitez MD as Consulting Physician (Cardiology)  Radha Owusu APRN as Nurse Practitioner (Nurse Practitioner)  Kathe Muñoz MD as Consulting Physician (Nephrology)    Subjective     Patient continues with pain      Review of Systems   Constitutional:  Positive for activity change and fatigue.   HENT: Negative.     Respiratory: Negative.     Cardiovascular: Negative.    Gastrointestinal:  Positive for constipation.   Genitourinary: Negative.    Musculoskeletal:  Positive for back pain and gait problem.   Neurological:  Positive for weakness.   Psychiatric/Behavioral: Negative.             Objective     Vital Signs  Temp:  [97.8 °F (36.6 °C)-98.1 °F (36.7 °C)] 97.9 °F (36.6 °C)  Heart Rate:  [68-85] 68  Resp:  [12-18] 13  BP: (103-122)/(50-60) 116/50      Physical Exam  Vitals reviewed.   Constitutional:       Appearance: She is not ill-appearing.   HENT:      Head: Normocephalic and atraumatic.      Right Ear: External ear normal.      Left Ear: External ear normal.      Nose: Nose normal.      Mouth/Throat:      Mouth: Mucous membranes are dry.   Eyes:      General:         Right eye: No discharge.         Left eye: No discharge.   Cardiovascular:      Rate and Rhythm: Normal rate and regular rhythm.      Heart sounds: Normal heart sounds.   Pulmonary:      Effort: Pulmonary effort is normal.      Breath sounds: Normal breath sounds.   Abdominal:      General: Bowel sounds are normal.   Musculoskeletal:      Cervical back: Tenderness present.      Right lower leg: No edema.      Left lower leg: No edema.   Skin:     General: Skin is dry.   Neurological:      Mental Status: She is alert and oriented to person, place, and time.   Psychiatric:         Behavior: Behavior normal.              Results Review:    Lab Results (last 24 hours)        Procedure Component Value Units Date/Time    CBC & Differential [[id number]]  (Abnormal) Collected: 05/30/25 1331    Specimen: Blood Updated: 05/30/25 1426    Narrative:      The following orders were created for panel order CBC & Differential.  Procedure                               Abnormality         Status                     ---------                               -----------         ------                     CBC Auto Differential[[id number]]        Abnormal            Final result               Scan Slide[[id number]]                                       Final result                 Please view results for these tests on the individual orders.    CBC Auto Differential [[id number]]  (Abnormal) Collected: 05/30/25 1331    Specimen: Blood Updated: 05/30/25 1426     WBC 9.71 10*3/mm3      RBC 2.97 10*6/mm3      Hemoglobin 8.7 g/dL      Comment: Result checked          Hematocrit 27.5 %      MCV 92.6 fL      MCH 29.3 pg      MCHC 31.6 g/dL      RDW 13.5 %      RDW-SD 46.0 fl      MPV 11.9 fL      Platelets 109 10*3/mm3     Scan Slide [[id number]] Collected: 05/30/25 1331    Specimen: Blood Updated: 05/30/25 1426     Scan Slide --     Comment: See Manual Differential Results       Manual Differential [[id number]]  (Abnormal) Collected: 05/30/25 1331    Specimen: Blood Updated: 05/30/25 1426     Neutrophil % 65.0 %      Lymphocyte % 5.0 %      Monocyte % 9.0 %      Basophil % 1.0 %      Bands %  17.0 %      Metamyelocyte % 2.0 %      Atypical Lymphocyte % 1.0 %      Neutrophils Absolute 7.96 10*3/mm3      Lymphocytes Absolute 0.58 10*3/mm3      Monocytes Absolute 0.87 10*3/mm3      Basophils Absolute 0.10 10*3/mm3      Polychromasia Slight/1+     WBC Morphology Normal     Platelet Estimate Decreased     Large Platelets Slight/1+     Giant Platelets Slight/1+             Imaging Results (Last 24 Hours)       Procedure Component Value Units Date/Time    XR Spine Cervical 2 or 3 View [[id number]] Collected: 05/30/25 9871      Updated: 05/30/25 1616    Narrative:      XR SPINE CERVICAL 2 OR 3 VW    Date of Exam: 5/30/2025 3:05 PM EDT    Indication: s/p cervical fusion    Comparison: CT cervical spine 3/28/2025, cervical spine radiographs 5/29/2025    Findings:  The patient is undergone an anterior fusion from the C3 through the C7 level with a C6 corpectomy and fenestrated cage placement. Since the recent CT the patient is undergone a posterior fusion which appears to extend from the C5 level to at least the T2   level. The alignment is difficult to assess on the current radiograph however there appears to be less anterolisthesis of C7 on T1 than on the preoperative CT.      Impression:      Impression:  Postoperative changes of anterior and posterior cervical fusion as described above. The alignment is difficult to assess however there appears to be less anterolisthesis of C7 on T1 than on the preoperative CT.        Electronically Signed: Lake Garza MD    5/30/2025 4:14 PM EDT    Workstation ID: [id number]                 I reviewed the patient's new clinical results.    Medication Review:   Scheduled Meds:[Held by provider] apixaban, 5 mg, Oral, BID  [Held by provider] aspirin, 81 mg, Oral, Nightly  atorvastatin, 40 mg, Oral, Daily  ceFAZolin, 2,000 mg, Intravenous, Q8H  docusate sodium, 100 mg, Oral, BID  enoxaparin sodium, 40 mg, Subcutaneous, Q12H  levothyroxine, 75 mcg, Oral, Q AM  [Held by provider] lisinopril, 5 mg, Oral, Daily  methocarbamol, 750 mg, Oral, TID  metoprolol succinate XL, 12.5 mg, Oral, Q24H  midodrine, 10 mg, Oral, Q8H  polyethylene glycol, 17 g, Oral, Daily  pregabalin, 200 mg, Oral, Q8H  senna-docusate sodium, 2 tablet, Oral, BID  sertraline, 100 mg, Oral, Q PM  sodium chloride, 10 mL, Intravenous, Q12H  sodium chloride, 10 mL, Intravenous, Q12H      Continuous Infusions:   PRN Meds:.  acetaminophen    senna-docusate sodium **AND** polyethylene glycol **AND** bisacodyl **AND** bisacodyl    Morphine    [Held by  provider] Morphine    nitroglycerin    ondansetron    oxyCODONE    [COMPLETED] Insert Peripheral IV **AND** sodium chloride    sodium chloride    sodium chloride    sodium chloride    sodium chloride    zolpidem     Interval History:        Assessment & Plan      Cervical neuralgia  Pacemaker  Paroxysmal atrial fibrillation  Cervical radiculopathy  Essential hypertension  Spondylolisthesis, acquired  Obesity (BMI 30.0-34.9)  Hx of hypertrophic cardiomyopathy  Coronary artery disease  Neck pain  Seizure disorder  Encounter for pre-operative cardiovascular clearance  Aortic insufficiency  Mitral regurgitation    Plan of care  -Neurosurgery following    -s/p cervical laminectomy decompression  -bowel regimen will need bowel purge  -home medications for chronic medical conditions  -holding Eliquis and aspirin for possible surgery  -Lovenox for DVT prophylaxis as well as paroxysmal atrial fibrillation  -pain control with oxycodone/morphine  -BP fluctuating most likely due to pain and pain medication; will hold lisinopril and keep midodrine would rather have more htn lean than hypotension      CODE Status:    Code status (Patient has no pulse and is not breathing):  CPR (Attempt to Resuscitate)  Medical Interventions (Patient has pulse or is breathing):  Full support  Level of support discussed with:  Patient     Admission status:  I believe this patient meets inpatient status  Expected length of stay:  2 midnights or greater    Plan for disposition:MARLA Acosta, WOODY  05/31/25  11:34 EDT

## 2025-05-31 NOTE — PROGRESS NOTES
CARDIOLOGY PROGRESS NOTE:    Domonique See  85 y.o.  female  1940  [id number]      Referring Provider: Dr. Gilliland    Reason for follow-up: Preop evaluation     Patient Care Team:  Anish Lew APRN as PCP - Evgeny Machuca MD as Consulting Physician (Cardiology)  Leeanne Pleitez MD as Consulting Physician (Cardiology)  Radha Owusu APRN as Nurse Practitioner (Nurse Practitioner)  Kathe Muñoz MD as Consulting Physician (Nephrology)    Subjective .  Patient doing well without any symptoms    Objective lying in bed comfortably     Review of Systems   Constitutional: Negative for malaise/fatigue.   Cardiovascular:  Negative for chest pain, dyspnea on exertion, leg swelling and palpitations.   Respiratory:  Negative for cough and shortness of breath.    Gastrointestinal:  Negative for abdominal pain, nausea and vomiting.   Neurological:  Negative for dizziness, headaches, light-headedness, numbness and weakness.   All other systems reviewed and are negative.      Allergies: Patient has no known allergies.    Scheduled Meds:[Held by provider] apixaban, 5 mg, Oral, BID  [Held by provider] aspirin, 81 mg, Oral, Nightly  atorvastatin, 40 mg, Oral, Daily  docusate sodium, 100 mg, Oral, BID  enoxaparin sodium, 40 mg, Subcutaneous, Q12H  levothyroxine, 75 mcg, Oral, Q AM  [Held by provider] lisinopril, 5 mg, Oral, Daily  methocarbamol, 750 mg, Oral, TID  metoprolol succinate XL, 12.5 mg, Oral, Q24H  midodrine, 10 mg, Oral, Q8H  polyethylene glycol, 17 g, Oral, Daily  pregabalin, 200 mg, Oral, Q8H  senna-docusate sodium, 2 tablet, Oral, BID  sertraline, 100 mg, Oral, Q PM  sodium chloride, 10 mL, Intravenous, Q12H  sodium chloride, 10 mL, Intravenous, Q12H      Continuous Infusions:   PRN Meds:.  acetaminophen    senna-docusate sodium **AND** polyethylene glycol **AND** bisacodyl **AND** bisacodyl    Morphine    [Held by provider] Morphine    nitroglycerin    ondansetron     "oxyCODONE    [COMPLETED] Insert Peripheral IV **AND** sodium chloride    sodium chloride    sodium chloride    sodium chloride    sodium chloride    zolpidem        VITAL SIGNS  Vitals:    05/30/25 2317 05/31/25 0556 05/31/25 0753 05/31/25 1653   BP: 103/52 122/52 116/50 96/47   BP Location: Left arm Left arm Left arm Left arm   Patient Position: Lying Lying Lying Lying   Pulse: 85  68 83   Resp: 12 16 13 13   Temp: 97.8 °F (36.6 °C)  97.9 °F (36.6 °C) 97.4 °F (36.3 °C)   TempSrc: Oral  Axillary Axillary   SpO2:  99%  99%   Weight:       Height:           Flowsheet Rows      Flowsheet Row First Filed Value   Admission Height 152.4 cm (60\") Documented at 05/23/2025 1228   Admission Weight 79.4 kg (175 lb) Documented at 05/23/2025 1228             TELEMETRY: Atrial fibrillation    Physical Exam:  Constitutional:       Appearance: Well-developed.   Eyes:      General: No scleral icterus.     Conjunctiva/sclera: Conjunctivae normal.   HENT:      Head: Normocephalic and atraumatic.   Neck:      Vascular: No carotid bruit or JVD.   Pulmonary:      Effort: Pulmonary effort is normal.      Breath sounds: Normal breath sounds. No wheezing. No rales.   Cardiovascular:      Normal rate. Regular rhythm.   Pulses:     Intact distal pulses.   Abdominal:      General: Bowel sounds are normal.      Palpations: Abdomen is soft.   Musculoskeletal:      Cervical back: Normal range of motion and neck supple. Skin:     General: Skin is warm and dry.      Findings: No rash.   Neurological:      Mental Status: Alert.          Results Review:   I reviewed the patient's new clinical results.  Lab Results (last 24 hours)       ** No results found for the last 24 hours. **            Imaging Results (Last 24 Hours)       ** No results found for the last 24 hours. **            EKG      I personally viewed and interpreted the patient's EKG/Telemetry data:    ECHOCARDIOGRAM:  Results for orders placed during the hospital encounter of " 03/21/25    Adult Transthoracic Echo Complete W/ Cont if Necessary Per Protocol    Interpretation Summary    Left ventricular ejection fraction appears to be 61 - 65%.    Left ventricular diastolic function was normal.    The left atrial cavity is moderately dilated.    Left atrial volume is severely increased.    Moderate aortic valve regurgitation is present.    Moderate mitral valve regurgitation is present.    Estimated right ventricular systolic pressure from tricuspid regurgitation is normal (<35 mmHg).       STRESS MYOVIEW:  Results for orders placed during the hospital encounter of 03/28/25    Stress Test With Myocardial Perfusion One Day    Interpretation Summary    Myocardial perfusion imaging indicates a normal myocardial perfusion study with no evidence of ischemia. Impressions are consistent with a low risk study.    Left ventricular ejection fraction is normal (Calculated EF = 56%).       CARDIAC CATHETERIZATION:  No results found for this or any previous visit.       OTHER:         Assessment & Plan     Preop evaluation  Patient presented with back pain and had severe stenosis of the cervical spine and needs surgery and hence cardiology was consulted  Patient had an echocardiogram which showed normal LV function with moderate mitral regurgitation moderate aortic insufficiency  Patient also has normal coronary disease and was cleared for 70-year-old back surgery and is doing well.    Coronary artery disease  Patient has nonobstructive disease in the past and is currently stable without any angina     atrial fibrillation  Patient is currently stable on medications including anticoagulation  Patient also had ablation in the past    Permanent pacemaker placement  Patient had permanent pacemaker placement which is working very well.    Valvular heart disease  Patient had moderate mitral regurgitation and moderate aortic insufficiency stable.    Hypertension patient blood pressure currently stable on  medications    Dyslipidemia  Patient on statins and the lipid levels are within normal limits.        Henry Sanderson MD  05/31/25  18:19 EDT

## 2025-06-01 PROCEDURE — 99232 SBSQ HOSP IP/OBS MODERATE 35: CPT | Performed by: INTERNAL MEDICINE

## 2025-06-01 PROCEDURE — 25010000002 MORPHINE PER 10 MG

## 2025-06-01 PROCEDURE — 25010000002 MORPHINE PER 10 MG: Performed by: NURSE PRACTITIONER

## 2025-06-01 PROCEDURE — 25010000002 ENOXAPARIN PER 10 MG

## 2025-06-01 PROCEDURE — 99024 POSTOP FOLLOW-UP VISIT: CPT | Performed by: NURSE PRACTITIONER

## 2025-06-01 RX ADMIN — METOPROLOL SUCCINATE 12.5 MG: 25 TABLET, EXTENDED RELEASE ORAL at 09:50

## 2025-06-01 RX ADMIN — MORPHINE SULFATE 2 MG: 2 INJECTION, SOLUTION INTRAMUSCULAR; INTRAVENOUS at 00:12

## 2025-06-01 RX ADMIN — METHOCARBAMOL TABLETS 750 MG: 750 TABLET, COATED ORAL at 21:18

## 2025-06-01 RX ADMIN — SENNOSIDES AND DOCUSATE SODIUM 2 TABLET: 8.6; 5 TABLET ORAL at 09:49

## 2025-06-01 RX ADMIN — LEVOTHYROXINE SODIUM 75 MCG: 0.07 TABLET ORAL at 05:15

## 2025-06-01 RX ADMIN — METHOCARBAMOL TABLETS 750 MG: 750 TABLET, COATED ORAL at 09:49

## 2025-06-01 RX ADMIN — PREGABALIN 200 MG: 100 CAPSULE ORAL at 14:07

## 2025-06-01 RX ADMIN — DOCUSATE SODIUM 100 MG: 100 CAPSULE, LIQUID FILLED ORAL at 21:18

## 2025-06-01 RX ADMIN — MIDODRINE HYDROCHLORIDE 10 MG: 5 TABLET ORAL at 21:18

## 2025-06-01 RX ADMIN — SENNOSIDES AND DOCUSATE SODIUM 2 TABLET: 8.6; 5 TABLET ORAL at 21:18

## 2025-06-01 RX ADMIN — MIDODRINE HYDROCHLORIDE 10 MG: 5 TABLET ORAL at 14:08

## 2025-06-01 RX ADMIN — METHOCARBAMOL TABLETS 750 MG: 750 TABLET, COATED ORAL at 16:13

## 2025-06-01 RX ADMIN — SERTRALINE HYDROCHLORIDE 100 MG: 100 TABLET, FILM COATED ORAL at 21:18

## 2025-06-01 RX ADMIN — PREGABALIN 200 MG: 100 CAPSULE ORAL at 05:15

## 2025-06-01 RX ADMIN — Medication 10 ML: at 22:31

## 2025-06-01 RX ADMIN — DOCUSATE SODIUM 100 MG: 100 CAPSULE, LIQUID FILLED ORAL at 09:50

## 2025-06-01 RX ADMIN — ACETAMINOPHEN 650 MG: 325 TABLET ORAL at 05:15

## 2025-06-01 RX ADMIN — OXYCODONE 10 MG: 5 TABLET ORAL at 05:15

## 2025-06-01 RX ADMIN — APIXABAN 5 MG: 5 TABLET, FILM COATED ORAL at 21:18

## 2025-06-01 RX ADMIN — MORPHINE SULFATE 3 MG: 4 INJECTION, SOLUTION INTRAMUSCULAR; INTRAVENOUS at 23:51

## 2025-06-01 RX ADMIN — Medication 10 ML: at 21:18

## 2025-06-01 RX ADMIN — POLYETHYLENE GLYCOL 3350 17 G: 17 POWDER, FOR SOLUTION ORAL at 09:49

## 2025-06-01 RX ADMIN — Medication 10 ML: at 09:51

## 2025-06-01 RX ADMIN — OXYCODONE 10 MG: 5 TABLET ORAL at 01:25

## 2025-06-01 RX ADMIN — PREGABALIN 200 MG: 100 CAPSULE ORAL at 21:18

## 2025-06-01 RX ADMIN — OXYCODONE 10 MG: 5 TABLET ORAL at 14:08

## 2025-06-01 RX ADMIN — OXYCODONE 10 MG: 5 TABLET ORAL at 09:49

## 2025-06-01 RX ADMIN — MIDODRINE HYDROCHLORIDE 10 MG: 5 TABLET ORAL at 05:23

## 2025-06-01 RX ADMIN — OXYCODONE 10 MG: 5 TABLET ORAL at 21:18

## 2025-06-01 RX ADMIN — ATORVASTATIN CALCIUM 40 MG: 40 TABLET, FILM COATED ORAL at 09:50

## 2025-06-01 RX ADMIN — ENOXAPARIN SODIUM 40 MG: 100 INJECTION SUBCUTANEOUS at 09:49

## 2025-06-01 NOTE — PLAN OF CARE
Goal Outcome Evaluation: Pt remains pain and pain meds given per MAR. Pt with C-collar in place. Reach out to on call awaiting return call r./t pt pain. Family remains at bedside,. Drain removed to back. Plan of care ongoing

## 2025-06-01 NOTE — PROGRESS NOTES
Neurosurgery Progress Note      Patient: Domonique See    YOB: 1940    Medical Record Number: [id number]    Attending Physician: Tatiana Gilliland MD    Date of Admission: 5/23/2025 12:30 PM    Status Post: C7-T1 decompression and C5-T2 posterior fusion    Post Operative Day Number: 3    Admitting Dx: Spinal stenosis, unspecified spinal region [M48.00]  Hypotension, unspecified hypotension type [I95.9]  Constipation, unspecified constipation type [K59.00]  Cervical neuralgia [M54.12]    General Appearance:  85 y.o. female awake alert lying in bed with cervical brace on.  Patient patient moving upper extremities well with continued but improved weakness lower extremity weakness.  Current Problem List:     Cervical neuralgia    Pacemaker    Paroxysmal atrial fibrillation    Cervical radiculopathy    Essential hypertension    Spondylolisthesis, acquired    Obesity (BMI 30.0-34.9)    Hx of hypertrophic cardiomyopathy    Coronary artery disease    Neck pain    Seizure disorder    Encounter for pre-operative cardiovascular clearance    Aortic insufficiency    Mitral regurgitation          Current Medications:  Scheduled Meds:[Held by provider] apixaban, 5 mg, Oral, BID  [Held by provider] aspirin, 81 mg, Oral, Nightly  atorvastatin, 40 mg, Oral, Daily  docusate sodium, 100 mg, Oral, BID  enoxaparin sodium, 40 mg, Subcutaneous, Q12H  levothyroxine, 75 mcg, Oral, Q AM  [Held by provider] lisinopril, 5 mg, Oral, Daily  methocarbamol, 750 mg, Oral, TID  metoprolol succinate XL, 12.5 mg, Oral, Q24H  midodrine, 10 mg, Oral, Q8H  polyethylene glycol, 17 g, Oral, Daily  pregabalin, 200 mg, Oral, Q8H  senna-docusate sodium, 2 tablet, Oral, BID  sertraline, 100 mg, Oral, Q PM  sodium chloride, 10 mL, Intravenous, Q12H  sodium chloride, 10 mL, Intravenous, Q12H      Continuous Infusions:   PRN Meds:.  acetaminophen    senna-docusate sodium **AND** polyethylene glycol **AND** bisacodyl **AND** bisacodyl    Morphine     [Held by provider] Morphine    nitroglycerin    ondansetron    oxyCODONE    [COMPLETED] Insert Peripheral IV **AND** sodium chloride    sodium chloride    sodium chloride    sodium chloride    sodium chloride    zolpidem      Diagnostic Tests:    Lab Results (last 24 hours)       ** No results found for the last 24 hours. **            Imaging Results (Last 24 Hours)       ** No results found for the last 24 hours. **            Physical Exam:   General Appearance:  Alert, cooperative, in no acute distress   Head:  Normocephalic, without obvious abnormality, atraumatic   Neck:   Patient is well-approximated with no focal fluid collection or drainage noted.   Abdomen:   nontender, nondistended   Extremities/MSK: Moves upper extremities well, no edema, no cyanosis, no             Redness    Skin: No bleeding, bruising or rash   Neurologic: Cranial nerves 2 - 12 grossly intact, lower extremity distal movement improved left over right, bilateral distal strength 4/5  , Left proximal strength 2/5, no appreciable right proximal lower extremity strength         Vitals:    05/31/25 1930 05/31/25 2317 06/01/25 0523 06/01/25 0749   BP: 119/53 104/52 120/66 103/65   BP Location:  Left arm  Left arm   Patient Position:  Lying  Lying   Pulse: 68 78 75 84   Resp:  12  14   Temp:  99.8 °F (37.7 °C)  97.7 °F (36.5 °C)   TempSrc:  Oral  Oral   SpO2:  100%  90%   Weight:       Height:           Assessment: Patient is an 85-year-old female that underwent C7-T1 decompression and C5-T2 posterior fusion for severe central stenosis and spinal cord compression.  Patient is postop day 3.  She is neuro stable to improved.  Her pain control still seems to be an issue and we will take a look at pain medication regimen and adjust as appropriate.  Her surgical drain was removed yesterday and neurosurgery okay with resuming her full anticoagulation given her significant cardiology issues.  Postop x-rays have been completed with expected  positioning.     plan:    Continue hard cervical collar at all times  Okay to restart patients full anticoagulation per CLAIR standpoint  Continue Pain management efforts,   Continue mobilization efforts  Continue incisional care  Continue DVT Prophylaxis    Discharge Plan: Likely rehab      Assessment and plan were discussed with Dr. Lyles who agrees with plan of care.    Date: 6/1/2025    Carmen Salamanca, WOODY  08:36 EDT

## 2025-06-01 NOTE — PROGRESS NOTES
LOS: 5 days   Patient Care Team:  Anish Lew APRN as PCP - General  Evgeny Gregory MD as Consulting Physician (Cardiology)  Leeanne Pleitez MD as Consulting Physician (Cardiology)  Radha Owusu APRN as Nurse Practitioner (Nurse Practitioner)  Kathe Muñoz MD as Consulting Physician (Nephrology)    Subjective     Interval History: Stable overnight but did have complaints of pain    Patient Complaints: Neck pain, no new problems    History taken from: patient    Review of Systems   Constitutional:  Positive for activity change, appetite change and fatigue. Negative for chills, diaphoresis and fever.   HENT:  Negative for congestion.    Respiratory:  Negative for cough.    Gastrointestinal:  Negative for abdominal distention, abdominal pain and diarrhea.   Endocrine: Negative for polyuria.   Genitourinary:  Negative for urgency.   Musculoskeletal:  Positive for arthralgias, gait problem, myalgias and neck pain.   Neurological:  Positive for weakness. Negative for dizziness, seizures and headaches.   Psychiatric/Behavioral:  Negative for confusion.            Objective     Vital Signs  Temp:  [97.4 °F (36.3 °C)-99.8 °F (37.7 °C)] 97.7 °F (36.5 °C)  Heart Rate:  [68-84] 84  Resp:  [12-14] 14  BP: ()/(47-66) 103/65    Physical Exam:     General Appearance:  Sleeping   Head:    Normocephalic, without obvious abnormality, atraumatic   Eyes:            Lids and lashes normal, conjunctivae and sclerae normal, no   icterus, no pallor, corneas clear, PERRLA   Ears:    Ears appear intact with no abnormalities noted   Throat:   No oral lesions, no thrush, oral mucosa moist   Neck: Hard cervical collar in place   Lungs:     Clear to auscultation,respirations regular, even and                  unlabored    Heart:    Regular rhythm and normal rate, normal S1 and S2, no            murmur, no gallop, no rub, no click   Chest Wall:    No abnormalities observed   Abdomen:     Normal bowel  sounds, no masses, no organomegaly, soft        Non-tender non-distended, no guarding,   Extremities:   Moves all extremities well, no edema, no cyanosis, no             Redness   Pulses:   Pulses palpable and equal bilaterally   Skin:   No bleeding, bruising or rash   Lymph nodes:   No palpable adenopathy   Neurologic: Able to move upper extremities, able to move lower extremities but weak        Results Review:    Lab Results (last 24 hours)       ** No results found for the last 24 hours. **             Imaging Results (Last 24 Hours)       ** No results found for the last 24 hours. **                 I reviewed the patient's new clinical results.    Medication Review:   Scheduled Meds:[Held by provider] apixaban, 5 mg, Oral, BID  [Held by provider] aspirin, 81 mg, Oral, Nightly  atorvastatin, 40 mg, Oral, Daily  docusate sodium, 100 mg, Oral, BID  enoxaparin sodium, 40 mg, Subcutaneous, Q12H  levothyroxine, 75 mcg, Oral, Q AM  [Held by provider] lisinopril, 5 mg, Oral, Daily  methocarbamol, 750 mg, Oral, TID  metoprolol succinate XL, 12.5 mg, Oral, Q24H  midodrine, 10 mg, Oral, Q8H  polyethylene glycol, 17 g, Oral, Daily  pregabalin, 200 mg, Oral, Q8H  senna-docusate sodium, 2 tablet, Oral, BID  sertraline, 100 mg, Oral, Q PM  sodium chloride, 10 mL, Intravenous, Q12H  sodium chloride, 10 mL, Intravenous, Q12H      Continuous Infusions:   PRN Meds:.  acetaminophen    senna-docusate sodium **AND** polyethylene glycol **AND** bisacodyl **AND** bisacodyl    Morphine    [Held by provider] Morphine    nitroglycerin    ondansetron    oxyCODONE    [COMPLETED] Insert Peripheral IV **AND** sodium chloride    sodium chloride    sodium chloride    sodium chloride    sodium chloride    zolpidem     Assessment & Plan       Cervical neuralgia    Pacemaker    Paroxysmal atrial fibrillation    Cervical radiculopathy    Essential hypertension    Spondylolisthesis, acquired    Obesity (BMI 30.0-34.9)    Hx of hypertrophic  cardiomyopathy    Coronary artery disease    Neck pain    Seizure disorder    Encounter for pre-operative cardiovascular clearance    Aortic insufficiency    Mitral regurgitation    - Postop course has been stable.  Pain control is intermittently an issue.  Continue Robaxin, Lyrica, oxycodone with morphine as needed for breakthrough pain  - Heart rate is controlled with metoprolol; midodrine for blood pressure support; holding lisinopril  - Restarting full anticoagulation for atrial fibs with   - PT, OT, anticipate skilled rehab      CODE Status:    Code status (Patient has no pulse and is not breathing):  CPR (Attempt to Resuscitate)  Medical Interventions (Patient has pulse or is breathing):  Full support  Level of support discussed with:  Patient    Admission status:  I believe this patient meets inpatient status  Expected length of stay:  2 midnights or greater  I discussed the patient's findings and my recommendations with the patient.    Plan for disposition: Skilled rehab    Tatiana Gilliland MD  06/01/25  11:07 EDT

## 2025-06-01 NOTE — PROGRESS NOTES
CARDIOLOGY PROGRESS NOTE:    Domonique See  85 y.o.  female  1940  [id number]      Referring Provider: Dr. Gilliland    Reason for follow-up: Preop evaluation     Patient Care Team:  Anish Lew APRN as PCP - Evgeny Machuca MD as Consulting Physician (Cardiology)  Leeanne Pleitez MD as Consulting Physician (Cardiology)  Radha Owusu APRN as Nurse Practitioner (Nurse Practitioner)  Kathe Muñoz MD as Consulting Physician (Nephrology)    Subjective .  Patient doing well without any symptoms    Objective lying in bed comfortably     Review of Systems   Constitutional: Negative for malaise/fatigue.   Cardiovascular:  Negative for chest pain, dyspnea on exertion, leg swelling and palpitations.   Respiratory:  Negative for cough and shortness of breath.    Gastrointestinal:  Negative for abdominal pain, nausea and vomiting.   Neurological:  Negative for dizziness, headaches, light-headedness, numbness and weakness.   All other systems reviewed and are negative.      Allergies: Patient has no known allergies.    Scheduled Meds:apixaban, 5 mg, Oral, BID  [Held by provider] aspirin, 81 mg, Oral, Nightly  atorvastatin, 40 mg, Oral, Daily  docusate sodium, 100 mg, Oral, BID  levothyroxine, 75 mcg, Oral, Q AM  [Held by provider] lisinopril, 5 mg, Oral, Daily  methocarbamol, 750 mg, Oral, TID  metoprolol succinate XL, 12.5 mg, Oral, Q24H  midodrine, 10 mg, Oral, Q8H  polyethylene glycol, 17 g, Oral, Daily  pregabalin, 200 mg, Oral, Q8H  senna-docusate sodium, 2 tablet, Oral, BID  sertraline, 100 mg, Oral, Q PM  sodium chloride, 10 mL, Intravenous, Q12H  sodium chloride, 10 mL, Intravenous, Q12H      Continuous Infusions:   PRN Meds:.  acetaminophen    senna-docusate sodium **AND** polyethylene glycol **AND** bisacodyl **AND** bisacodyl    Morphine    [Held by provider] Morphine    nitroglycerin    ondansetron    oxyCODONE    [COMPLETED] Insert Peripheral IV **AND** sodium  "chloride    sodium chloride    sodium chloride    sodium chloride    sodium chloride    zolpidem        VITAL SIGNS  Vitals:    05/31/25 1930 05/31/25 2317 06/01/25 0523 06/01/25 0749   BP: 119/53 104/52 120/66 103/65   BP Location:  Left arm  Left arm   Patient Position:  Lying  Lying   Pulse: 68 78 75 84   Resp:  12  14   Temp:  99.8 °F (37.7 °C)  97.7 °F (36.5 °C)   TempSrc:  Oral  Oral   SpO2:  100%  90%   Weight:       Height:           Flowsheet Rows      Flowsheet Row First Filed Value   Admission Height 152.4 cm (60\") Documented at 05/23/2025 1228   Admission Weight 79.4 kg (175 lb) Documented at 05/23/2025 1228             TELEMETRY: Atrial fibrillation    Physical Exam:  Constitutional:       Appearance: Well-developed.   Eyes:      General: No scleral icterus.     Conjunctiva/sclera: Conjunctivae normal.   HENT:      Head: Normocephalic and atraumatic.   Neck:      Vascular: No carotid bruit or JVD.   Pulmonary:      Effort: Pulmonary effort is normal.      Breath sounds: Normal breath sounds. No wheezing. No rales.   Cardiovascular:      Normal rate. Regular rhythm.   Pulses:     Intact distal pulses.   Abdominal:      General: Bowel sounds are normal.      Palpations: Abdomen is soft.   Musculoskeletal:      Cervical back: Normal range of motion and neck supple. Skin:     General: Skin is warm and dry.      Findings: No rash.   Neurological:      Mental Status: Alert.          Results Review:   I reviewed the patient's new clinical results.  Lab Results (last 24 hours)       ** No results found for the last 24 hours. **            Imaging Results (Last 24 Hours)       ** No results found for the last 24 hours. **            EKG      I personally viewed and interpreted the patient's EKG/Telemetry data:    ECHOCARDIOGRAM:  Results for orders placed during the hospital encounter of 03/21/25    Adult Transthoracic Echo Complete W/ Cont if Necessary Per Protocol    Interpretation Summary    Left ventricular " ejection fraction appears to be 61 - 65%.    Left ventricular diastolic function was normal.    The left atrial cavity is moderately dilated.    Left atrial volume is severely increased.    Moderate aortic valve regurgitation is present.    Moderate mitral valve regurgitation is present.    Estimated right ventricular systolic pressure from tricuspid regurgitation is normal (<35 mmHg).       STRESS MYOVIEW:  Results for orders placed during the hospital encounter of 03/28/25    Stress Test With Myocardial Perfusion One Day    Interpretation Summary    Myocardial perfusion imaging indicates a normal myocardial perfusion study with no evidence of ischemia. Impressions are consistent with a low risk study.    Left ventricular ejection fraction is normal (Calculated EF = 56%).       CARDIAC CATHETERIZATION:  No results found for this or any previous visit.       OTHER:         Assessment & Plan     Preop evaluation  Patient presented with back pain and had severe stenosis of the cervical spine and needs surgery and hence cardiology was consulted  Patient had an echocardiogram which showed normal LV function with moderate mitral regurgitation moderate aortic insufficiency  Patient also has normal coronary disease and was cleared for 70-year-old back surgery and is doing well.  Neurosurgeon following and is on adequate pain medications    Coronary artery disease  Patient has nonobstructive disease in the past and is currently stable without any angina  Patient has normal LV systolic function     atrial fibrillation  Patient is currently stable on medications including anticoagulation  Patient also had ablation in the past  Patient is restarted on Eliquis does not have any bleeding problems    Permanent pacemaker placement  Patient had permanent pacemaker placement which is working very well.    Valvular heart disease  Patient had moderate mitral regurgitation and moderate aortic insufficiency stable.    Hypertension  patient blood pressure currently stable on medications    Dyslipidemia  Patient on statins and the lipid levels are within normal limits.        Henry Sanderson MD  06/01/25  15:05 EDT

## 2025-06-02 LAB
ANION GAP SERPL CALCULATED.3IONS-SCNC: 11.2 MMOL/L (ref 5–15)
BASOPHILS # BLD AUTO: 0.01 10*3/MM3 (ref 0–0.2)
BASOPHILS NFR BLD AUTO: 0.2 % (ref 0–1.5)
BUN SERPL-MCNC: 7.4 MG/DL (ref 8–23)
BUN/CREAT SERPL: 16.4 (ref 7–25)
CALCIUM SPEC-SCNC: 9.4 MG/DL (ref 8.6–10.5)
CHLORIDE SERPL-SCNC: 100 MMOL/L (ref 98–107)
CO2 SERPL-SCNC: 23.8 MMOL/L (ref 22–29)
CREAT SERPL-MCNC: 0.45 MG/DL (ref 0.57–1)
DEPRECATED RDW RBC AUTO: 45.8 FL (ref 37–54)
EGFRCR SERPLBLD CKD-EPI 2021: 93.8 ML/MIN/1.73
EOSINOPHIL # BLD AUTO: 0.08 10*3/MM3 (ref 0–0.4)
EOSINOPHIL NFR BLD AUTO: 1.5 % (ref 0.3–6.2)
ERYTHROCYTE [DISTWIDTH] IN BLOOD BY AUTOMATED COUNT: 13.2 % (ref 12.3–15.4)
GLUCOSE SERPL-MCNC: 107 MG/DL (ref 65–99)
HCT VFR BLD AUTO: 27.4 % (ref 34–46.6)
HGB BLD-MCNC: 8.5 G/DL (ref 12–15.9)
IMM GRANULOCYTES # BLD AUTO: 0.34 10*3/MM3 (ref 0–0.05)
IMM GRANULOCYTES NFR BLD AUTO: 6.2 % (ref 0–0.5)
LYMPHOCYTES # BLD AUTO: 0.55 10*3/MM3 (ref 0.7–3.1)
LYMPHOCYTES NFR BLD AUTO: 10.1 % (ref 19.6–45.3)
MCH RBC QN AUTO: 29.3 PG (ref 26.6–33)
MCHC RBC AUTO-ENTMCNC: 31 G/DL (ref 31.5–35.7)
MCV RBC AUTO: 94.5 FL (ref 79–97)
MONOCYTES # BLD AUTO: 0.71 10*3/MM3 (ref 0.1–0.9)
MONOCYTES NFR BLD AUTO: 13 % (ref 5–12)
NEUTROPHILS NFR BLD AUTO: 3.76 10*3/MM3 (ref 1.7–7)
NEUTROPHILS NFR BLD AUTO: 69 % (ref 42.7–76)
NRBC BLD AUTO-RTO: 0 /100 WBC (ref 0–0.2)
PLATELET # BLD AUTO: 131 10*3/MM3 (ref 140–450)
PMV BLD AUTO: 12 FL (ref 6–12)
POTASSIUM SERPL-SCNC: 4.2 MMOL/L (ref 3.5–5.2)
RBC # BLD AUTO: 2.9 10*6/MM3 (ref 3.77–5.28)
SODIUM SERPL-SCNC: 135 MMOL/L (ref 136–145)
WBC NRBC COR # BLD AUTO: 5.45 10*3/MM3 (ref 3.4–10.8)

## 2025-06-02 PROCEDURE — 97110 THERAPEUTIC EXERCISES: CPT

## 2025-06-02 PROCEDURE — 97530 THERAPEUTIC ACTIVITIES: CPT

## 2025-06-02 PROCEDURE — 80048 BASIC METABOLIC PNL TOTAL CA: CPT | Performed by: INTERNAL MEDICINE

## 2025-06-02 PROCEDURE — 25810000003 SODIUM CHLORIDE 0.9 % SOLUTION: Performed by: NURSE PRACTITIONER

## 2025-06-02 PROCEDURE — 99024 POSTOP FOLLOW-UP VISIT: CPT

## 2025-06-02 PROCEDURE — 97535 SELF CARE MNGMENT TRAINING: CPT

## 2025-06-02 PROCEDURE — 85025 COMPLETE CBC W/AUTO DIFF WBC: CPT | Performed by: INTERNAL MEDICINE

## 2025-06-02 PROCEDURE — 25010000002 MORPHINE PER 10 MG: Performed by: NURSE PRACTITIONER

## 2025-06-02 RX ORDER — SORBITOL SOLUTION 70 %
30 SOLUTION, ORAL MISCELLANEOUS ONCE
Status: COMPLETED | OUTPATIENT
Start: 2025-06-02 | End: 2025-06-02

## 2025-06-02 RX ORDER — HYDROCODONE BITARTRATE AND ACETAMINOPHEN 7.5; 325 MG/1; MG/1
1 TABLET ORAL EVERY 6 HOURS PRN
Refills: 0 | Status: DISCONTINUED | OUTPATIENT
Start: 2025-06-02 | End: 2025-06-05 | Stop reason: HOSPADM

## 2025-06-02 RX ADMIN — PREGABALIN 200 MG: 100 CAPSULE ORAL at 05:26

## 2025-06-02 RX ADMIN — MIDODRINE HYDROCHLORIDE 10 MG: 5 TABLET ORAL at 05:26

## 2025-06-02 RX ADMIN — METHOCARBAMOL TABLETS 750 MG: 750 TABLET, COATED ORAL at 21:55

## 2025-06-02 RX ADMIN — POLYETHYLENE GLYCOL 3350 17 G: 17 POWDER, FOR SOLUTION ORAL at 09:03

## 2025-06-02 RX ADMIN — APIXABAN 5 MG: 5 TABLET, FILM COATED ORAL at 09:00

## 2025-06-02 RX ADMIN — HYDROCODONE BITARTRATE AND ACETAMINOPHEN 1 TABLET: 7.5; 325 TABLET ORAL at 13:44

## 2025-06-02 RX ADMIN — OXYCODONE 10 MG: 5 TABLET ORAL at 05:22

## 2025-06-02 RX ADMIN — METHOCARBAMOL TABLETS 750 MG: 750 TABLET, COATED ORAL at 16:22

## 2025-06-02 RX ADMIN — LEVOTHYROXINE SODIUM 75 MCG: 0.07 TABLET ORAL at 05:26

## 2025-06-02 RX ADMIN — MIDODRINE HYDROCHLORIDE 10 MG: 5 TABLET ORAL at 13:44

## 2025-06-02 RX ADMIN — PREGABALIN 200 MG: 100 CAPSULE ORAL at 21:55

## 2025-06-02 RX ADMIN — Medication 10 ML: at 09:04

## 2025-06-02 RX ADMIN — METOPROLOL SUCCINATE 12.5 MG: 25 TABLET, EXTENDED RELEASE ORAL at 09:00

## 2025-06-02 RX ADMIN — METHOCARBAMOL TABLETS 750 MG: 750 TABLET, COATED ORAL at 09:00

## 2025-06-02 RX ADMIN — MIDODRINE HYDROCHLORIDE 10 MG: 5 TABLET ORAL at 21:55

## 2025-06-02 RX ADMIN — Medication 10 ML: at 21:55

## 2025-06-02 RX ADMIN — SENNOSIDES AND DOCUSATE SODIUM 2 TABLET: 8.6; 5 TABLET ORAL at 09:00

## 2025-06-02 RX ADMIN — ATORVASTATIN CALCIUM 40 MG: 40 TABLET, FILM COATED ORAL at 09:00

## 2025-06-02 RX ADMIN — PREGABALIN 200 MG: 100 CAPSULE ORAL at 13:44

## 2025-06-02 RX ADMIN — SODIUM CHLORIDE 500 ML: 9 INJECTION, SOLUTION INTRAVENOUS at 11:27

## 2025-06-02 RX ADMIN — Medication 10 ML: at 09:11

## 2025-06-02 RX ADMIN — MORPHINE SULFATE 3 MG: 4 INJECTION, SOLUTION INTRAMUSCULAR; INTRAVENOUS at 07:40

## 2025-06-02 RX ADMIN — APIXABAN 5 MG: 5 TABLET, FILM COATED ORAL at 21:55

## 2025-06-02 RX ADMIN — DOCUSATE SODIUM 100 MG: 100 CAPSULE, LIQUID FILLED ORAL at 09:00

## 2025-06-02 RX ADMIN — OXYCODONE 10 MG: 5 TABLET ORAL at 21:55

## 2025-06-02 RX ADMIN — Medication 10 ML: at 23:03

## 2025-06-02 RX ADMIN — SERTRALINE HYDROCHLORIDE 100 MG: 100 TABLET, FILM COATED ORAL at 21:55

## 2025-06-02 RX ADMIN — SORBITOL SOLUTION (BULK) 30 ML: 70 SOLUTION at 11:27

## 2025-06-02 NOTE — PROGRESS NOTES
Neurosurgery Evaluation      Patient: Domonique See    YOB: 1940    Date of Admission: 5/23/2025 12:30 PM    Status Post: C7-T1 laminectomy with C5-T2 posterior instrumented fusion    Post Operative Day Number: 4 Days Post-Op      Subjective     Interval history:  No adverse events overnight.  Patient resting in bed with hard cervical collar in place.  Feels like she is moving her legs slightly better today.  Has some numbness and tingling in her hands.  Denies chest pain, SOA, and new weakness.        Objective   Vitals:    06/02/25 0526 06/02/25 0802 06/02/25 0900 06/02/25 1344   BP: 148/67 125/52 144/61 113/65   BP Location:  Left arm     Patient Position:  Lying     Pulse: 80 64 88    Resp:  15     Temp:  98.8 °F (37.1 °C)     TempSrc:  Oral     SpO2:  98%     Weight:       Height:           Physical exam    General  - awake, cooperative, in no acute distress  Skin  - Surgical incision CDI with no swelling redness or drainage  NEUROLOGIC  - Distal lower extremity strength somewhat improved, 4/5  - Continued proximal lower extremity weakness, worse on the right, unchanged  - 5/5  strength bilateral      Results review  CBC          5/29/2025    05:09 5/30/2025    13:31 6/2/2025    08:06   CBC   WBC 5.02  9.71  5.45    RBC 3.91  2.97  2.90    Hemoglobin 11.3  8.7  8.5    Hematocrit 36.7  27.5  27.4    MCV 93.9  92.6  94.5    MCH 28.9  29.3  29.3    MCHC 30.8  31.6  31.0    RDW 13.7  13.5  13.2    Platelets 125  109  131        BMP          5/29/2025    05:09 5/30/2025    02:00 6/2/2025    09:10   BMP   BUN 15.3  16.9  7.4    Creatinine 0.79  0.73  0.45    Sodium 143  137  135    Potassium 4.2  4.3  4.2    Chloride 108  104  100    CO2 25.3  23.6  23.8    Calcium 9.8  8.9  9.4        Intake/Output                               05/31/25 0701 - 06/01/25 0700 06/01/25 0701 - 06/02/25 0700 06/02/25 0701 - 06/03/25 0700     0149-7201 8521-0540 Total 0701-1900 1901-0700 Total 0701-1900 1901-0700  Total                    Intake    P.O.  600  540 1140  600  240 840  480  -- 480    Total Intake  600 240 840 480 -- 480       Output    Urine  450  550 1000  1250  750 2000  300  -- 300    Drains  0  -- 0  --  -- --  --  -- --    Total Output  0396 072 8175 300 -- 300            XR Spine Cervical 2 or 3 View  Result Date: 5/30/2025  XR SPINE CERVICAL 2 OR 3 VW Date of Exam: 5/30/2025 3:05 PM EDT Indication: s/p cervical fusion Comparison: CT cervical spine 3/28/2025, cervical spine radiographs 5/29/2025 Findings: The patient is undergone an anterior fusion from the C3 through the C7 level with a C6 corpectomy and fenestrated cage placement. Since the recent CT the patient is undergone a posterior fusion which appears to extend from the C5 level to at least the T2  level. The alignment is difficult to assess on the current radiograph however there appears to be less anterolisthesis of C7 on T1 than on the preoperative CT.     Impression: Impression: Postoperative changes of anterior and posterior cervical fusion as described above. The alignment is difficult to assess however there appears to be less anterolisthesis of C7 on T1 than on the preoperative CT. Electronically Signed: Lake Garza MD  5/30/2025 4:14 PM EDT  Workstation ID: [id number]    XR Spine Cervical 2 or 3 View  Result Date: 5/29/2025  This procedure was auto-finalized with no dictation required.          Assessment     MDM:  85 y.o. female s/p posterior cervical decompression and fusion with Dr. Bernal. Tolerating surgery well for the most part.  Pain better controlled today.  Lower extremity motor function with slight improvements distally.  Otherwise unchanged.    Should continue to work with PT/OT.  Fuentes catheter per primary team.  Work towards discharge to rehab.      Plan   S/p cervical spinal fusion  Cervical myelopathy  - Hard cervical collar at all times  - Continue mobilization efforts  - Continue incisional care  -  DVT prophylaxis/anticoagulation per primary team and cardiology  - Likely rehab at discharge  - Call with any questions or concerns      I discussed my assessment and recommendations with Dr. Dolores Adam PA-C  6/2/2025  16:11 EDT

## 2025-06-02 NOTE — CONSULTS
"Nutrition Services    Patient Name: Domonique See  YOB: 1940  MRN: [id number]  Admission date: 5/23/2025    RD to add Boost Original BID (Provides 480 kcals, 20 g protein if consumed)     NUTRITION SCREENING      Trending Narrative: 6/2: Nutrition screening r/t LOS x 10 days. Pt presented to ED on 5/23 with complaints of worsening bilateral lower extremity weakness. Pt is followed by Dr. Bernal for cervical disc disease. Pt underwent cervical laminectomy decompression posterior on 5/29. Plan for SNF placement upon discharge. Tolerating po diet without noted issues at this time.        PO Diet: Diet: Cardiac; Healthy Heart (2-3 Na+); Fluid Consistency: Thin (IDDSI 0)   PO Supplements: None    Trending PO Intake:  6/2: 55% average po intake x last 18 documented meals       Nutritionally-Pertinent Medications RDN Reviewed, C/W clinical course         Labs (reviewed below): Hyponatremia - management per attending     Results from last 7 days   Lab Units 06/02/25  0910 05/30/25  0200 05/29/25  0509 05/28/25  0159   SODIUM mmol/L 135* 137 143 139   POTASSIUM mmol/L 4.2 4.3 4.2 4.5   CHLORIDE mmol/L 100 104 108* 107   CO2 mmol/L 23.8 23.6 25.3 21.3*   BUN mg/dL 7.4* 16.9 15.3 16.1   CREATININE mg/dL 0.45* 0.73 0.79 0.76   CALCIUM mg/dL 9.4 8.9 9.8 9.1   BILIRUBIN mg/dL  --   --   --  0.4   ALK PHOS U/L  --   --   --  97   ALT (SGPT) U/L  --   --   --  10   AST (SGOT) U/L  --   --   --  17   GLUCOSE mg/dL 107* 154* 101* 98     Results from last 7 days   Lab Units 06/02/25  0806   HEMOGLOBIN g/dL 8.5*   HEMATOCRIT % 27.4*     Lab Results   Component Value Date    HGBA1C 5.8 (H) 01/19/2023          GI Function:  Last documented BM 5/29       Skin: No PI documented        Weight Review: Estimated body mass index is 31.6 kg/m² as calculated from the following:    Height as of this encounter: 152.4 cm (60\").    Weight as of this encounter: 73.4 kg (161 lb 13.1 oz).    Comment:   6/2: (last weight 5/29) " 161#  5% weight loss in the 1-3 months     Wt Readings from Last 30 Encounters:   05/29/25 1354 73.4 kg (161 lb 13.1 oz)   05/26/25 0449 73.4 kg (161 lb 13.1 oz)   05/23/25 1228 79.4 kg (175 lb)   05/21/25 1226 77.1 kg (170 lb)   05/16/25 1313 77.1 kg (170 lb)   04/30/25 1540 77.1 kg (170 lb)   04/08/25 1311 77.1 kg (170 lb)   03/28/25 1346 77.1 kg (170 lb)   03/25/25 1542 76.7 kg (169 lb 1.5 oz)   03/21/25 1205 76.7 kg (169 lb)   02/24/25 1328 76.8 kg (169 lb 4 oz)   06/20/24 1339 77.6 kg (171 lb)   12/04/23 1405 79.4 kg (175 lb)   07/13/23 1856 82.9 kg (182 lb 12.8 oz)   06/05/23 1928 81.7 kg (180 lb 1.9 oz)   06/03/23 0215 83.2 kg (183 lb 6.8 oz)   06/02/23 1100 83.9 kg (185 lb)   05/22/23 1505 84.4 kg (186 lb)   05/04/23 1346 86.6 kg (191 lb)   04/28/23 2235 92.6 kg (204 lb 2.3 oz)   04/28/23 1734 86.2 kg (190 lb)   02/08/23 1515 87.5 kg (193 lb)   01/23/23 0319 92.3 kg (203 lb 7.8 oz)   01/19/23 1730 91 kg (200 lb 9.9 oz)   01/19/23 0544 90.7 kg (200 lb)   01/18/23 1349 90.7 kg (200 lb)   01/17/23 0606 89.6 kg (197 lb 8.5 oz)   01/16/23 0956 89.5 kg (197 lb 5 oz)   01/10/23 1014 86.6 kg (191 lb)   11/22/22 1232 87.5 kg (193 lb)   11/09/22 1400 88 kg (194 lb)   06/09/22 1031 88.3 kg (194 lb 9.6 oz)   03/09/22 1126 87.5 kg (193 lb)   06/30/21 0638 86.5 kg (190 lb 11.2 oz)   06/29/21 0951 86.2 kg (190 lb)   06/28/21 1720 86.2 kg (190 lb)   06/08/21 1508 86.8 kg (191 lb 6.4 oz)   04/28/21 1132 87.1 kg (192 lb)   02/08/21 1426 88.9 kg (196 lb)   09/28/20 1406 85.7 kg (189 lb)   09/21/20 1147 85.7 kg (189 lb)   10/30/19 1502 82.6 kg (182 lb)          --       Nutrition Problem Statement: Unintentional weight loss related to poor appetite in the setting of current clinical course as evidenced by 5% weight loss documented in the last 1-3 months.         Nutrition Intervention: Continue current po diet as tolerated.    Encourage good po intake.    RD to add Boost Original BID (Provides 480 kcals, 20 g protein if  consumed)             Monitoring/Evaluation Per protocol, I&O, PO intake, Supplement intake, Pertinent labs, Weight, GI status, Hemodynamic stability            RD to follow up per protocol.    Electronically signed by:  Heather Harry RD  06/02/25 14:28 EDT

## 2025-06-02 NOTE — PLAN OF CARE
"Assessment: Domonique See presents with functional mobility impairments which indicate the need for skilled intervention. Pt agreeable to PT, but limited by generalized weakness and impaired trunk control. Pt motivated to transfer to BSC from , and transfers with MAX A x2 with use of kartik stedy, but only able to void urine while sitting on BSC. Will continue to follow and progress as tolerated.     Plan/Recommendations:   If medically appropriate, Moderate Intensity Therapy recommended post-acute care. This is recommended as therapy feels the patient would require 3-4 days per week and wouldn't tolerate \"3 hour daily\" rehab intensity. SNF would be the preferred choice. If the patient does not agree to SNF, arrange HH or OP depending on home bound status. If patient is medically complex, consider LTACH. Pt requires hospital bed at discharge.     Pt desires Skilled Rehab placement at discharge. Pt cooperative; agreeable to therapeutic recommendations and plan of care.                                             "

## 2025-06-02 NOTE — PROGRESS NOTES
LOS: 6 days   Patient Care Team:  Anish Lew APRN as PCP - General  Evgeny Gregory MD as Consulting Physician (Cardiology)  Leeanne Pleitez MD as Consulting Physician (Cardiology)  Radha Owusu APRN as Nurse Practitioner (Nurse Practitioner)  Kathe Muñoz MD as Consulting Physician (Nephrology)    Subjective     Patient continues with pain, constipation, and burning sensation under her armpits    Review of Systems   Constitutional:  Positive for activity change and fatigue.   HENT: Negative.     Respiratory: Negative.     Cardiovascular: Negative.    Gastrointestinal:  Positive for constipation.   Genitourinary: Negative.    Musculoskeletal:  Positive for back pain and gait problem.   Neurological:  Positive for weakness.   Psychiatric/Behavioral: Negative.             Objective     Vital Signs  Temp:  [97.4 °F (36.3 °C)-98.8 °F (37.1 °C)] 98.8 °F (37.1 °C)  Heart Rate:  [64-89] (P) 88  Resp:  [10-15] 15  BP: (102-148)/(52-74) (P) 144/61      Physical Exam  Vitals reviewed.   Constitutional:       Appearance: She is not ill-appearing.   HENT:      Head: Normocephalic and atraumatic.      Right Ear: External ear normal.      Left Ear: External ear normal.      Nose: Nose normal.      Mouth/Throat:      Mouth: Mucous membranes are dry.   Eyes:      General:         Right eye: No discharge.         Left eye: No discharge.   Cardiovascular:      Rate and Rhythm: Normal rate and regular rhythm.      Heart sounds: Normal heart sounds.   Pulmonary:      Effort: Pulmonary effort is normal.      Breath sounds: Normal breath sounds.   Abdominal:      General: Bowel sounds are normal.   Musculoskeletal:      Cervical back: Tenderness present.      Right lower leg: No edema.      Left lower leg: No edema.   Skin:     General: Skin is dry.   Neurological:      Mental Status: She is alert and oriented to person, place, and time.   Psychiatric:         Behavior: Behavior normal.               Results Review:    Lab Results (last 24 hours)       Procedure Component Value Units Date/Time    Basic Metabolic Panel [[id number]]  (Abnormal) Collected: 06/02/25 0910    Specimen: Blood Updated: 06/02/25 0941     Glucose 107 mg/dL      BUN 7.4 mg/dL      Creatinine 0.45 mg/dL      Sodium 135 mmol/L      Potassium 4.2 mmol/L      Comment: Specimen hemolyzed.  Result may be falsely elevated.        Chloride 100 mmol/L      CO2 23.8 mmol/L      Calcium 9.4 mg/dL      BUN/Creatinine Ratio 16.4     Anion Gap 11.2 mmol/L      eGFR 93.8 mL/min/1.73     Narrative:      GFR Categories in Chronic Kidney Disease (CKD)              GFR Category          GFR (mL/min/1.73)    Interpretation  G1                    90 or greater        Normal or high (1)  G2                    60-89                Mild decrease (1)  G3a                   45-59                Mild to moderate decrease  G3b                   30-44                Moderate to severe decrease  G4                    15-29                Severe decrease  G5                    14 or less           Kidney failure    (1)In the absence of evidence of kidney disease, neither GFR category G1 or G2 fulfill the criteria for CKD.    eGFR calculation 2021 CKD-EPI creatinine equation, which does not include race as a factor    CBC & Differential [[id number]]  (Abnormal) Collected: 06/02/25 0806    Specimen: Blood Updated: 06/02/25 0825    Narrative:      The following orders were created for panel order CBC & Differential.  Procedure                               Abnormality         Status                     ---------                               -----------         ------                     CBC Auto Differential[[id number]]        Abnormal            Final result               Scan Slide[[id number]]                                                                    Please view results for these tests on the individual orders.    CBC Auto Differential [[id number]]   (Abnormal) Collected: 06/02/25 0806    Specimen: Blood Updated: 06/02/25 0825     WBC 5.45 10*3/mm3      RBC 2.90 10*6/mm3      Hemoglobin 8.5 g/dL      Hematocrit 27.4 %      MCV 94.5 fL      MCH 29.3 pg      MCHC 31.0 g/dL      RDW 13.2 %      RDW-SD 45.8 fl      MPV 12.0 fL      Platelets 131 10*3/mm3      Neutrophil % 69.0 %      Lymphocyte % 10.1 %      Monocyte % 13.0 %      Eosinophil % 1.5 %      Basophil % 0.2 %      Immature Grans % 6.2 %      Neutrophils, Absolute 3.76 10*3/mm3      Lymphocytes, Absolute 0.55 10*3/mm3      Monocytes, Absolute 0.71 10*3/mm3      Eosinophils, Absolute 0.08 10*3/mm3      Basophils, Absolute 0.01 10*3/mm3      Immature Grans, Absolute 0.34 10*3/mm3      nRBC 0.0 /100 WBC              Imaging Results (Last 24 Hours)       ** No results found for the last 24 hours. **                 I reviewed the patient's new clinical results.    Medication Review:   Scheduled Meds:apixaban, 5 mg, Oral, BID  [Held by provider] aspirin, 81 mg, Oral, Nightly  atorvastatin, 40 mg, Oral, Daily  docusate sodium, 100 mg, Oral, BID  levothyroxine, 75 mcg, Oral, Q AM  [Held by provider] lisinopril, 5 mg, Oral, Daily  methocarbamol, 750 mg, Oral, TID  metoprolol succinate XL, 12.5 mg, Oral, Q24H  midodrine, 10 mg, Oral, Q8H  polyethylene glycol, 17 g, Oral, Daily  pregabalin, 200 mg, Oral, Q8H  senna-docusate sodium, 2 tablet, Oral, BID  sertraline, 100 mg, Oral, Q PM  sodium chloride, 10 mL, Intravenous, Q12H  sodium chloride, 10 mL, Intravenous, Q12H  sorbitol, 30 mL, Oral, Once      Continuous Infusions:   PRN Meds:.  acetaminophen    senna-docusate sodium **AND** polyethylene glycol **AND** bisacodyl **AND** bisacodyl    HYDROcodone-acetaminophen    Morphine    [Held by provider] Morphine    nitroglycerin    ondansetron    oxyCODONE    [COMPLETED] Insert Peripheral IV **AND** sodium chloride    sodium chloride    sodium chloride    sodium chloride    sodium chloride    zolpidem     Interval  History:        Assessment & Plan      Cervical neuralgia  Pacemaker  Paroxysmal atrial fibrillation  Cervical radiculopathy  Essential hypertension  Spondylolisthesis, acquired  Obesity (BMI 30.0-34.9)  Hx of hypertrophic cardiomyopathy  Coronary artery disease  Neck pain  Seizure disorder  Encounter for pre-operative cardiovascular clearance  Aortic insufficiency  Mitral regurgitation    Plan of care  -Neurosurgery following    -s/p cervical laminectomy decompression  -bowel regimen will need bowel purge  -home medications for chronic medical conditions  - resumption of eliquis  -pain control with oxycodone/morphine  -BP fluctuating most likely due to pain and pain medication; will hold lisinopril and keep midodrine would rather have more htn lean than hypotension   -patient appears dry will add slow bolus and complete the remainder of 1 liter NS     CODE Status:    Code status (Patient has no pulse and is not breathing):  CPR (Attempt to Resuscitate)  Medical Interventions (Patient has pulse or is breathing):  Full support  Level of support discussed with:  Patient     Admission status:  I believe this patient meets inpatient status  Expected length of stay:  2 midnights or greater    Plan for disposition:WOODY Hart  06/02/25  10:36 EDT

## 2025-06-02 NOTE — CASE MANAGEMENT/SOCIAL WORK
Continued Stay Note  SUSAN Goldstein     Patient Name: Domonique See  MRN: [id number]  Today's Date: 6/2/2025    Admit Date: 5/23/2025    Plan: DC Plan: from home with son. Current with [facility]. Pending decison about going to snf .   Discharge Plan       Row Name 06/02/25 1955       Plan    Plan DC Plan: from home with son. Current with [facility]. Pending decison about going to snf .    Plan Comments DC barriers: pod#4cervical laminectomy c7-T1, C5-T1 fusion, pain, bm. From home with son and current with [facility]. PT recommend snf pending patient and family decison if willing to go to snf.  Will reach out to family again to get choices.  Will require precert. PASRR  approved.                    Corin Baker RN    [facility]  [email or web address]  Office [phone]  Cell [phone]

## 2025-06-02 NOTE — THERAPY TREATMENT NOTE
"Subjective: Pt agreeable to therapeutic plan of care.    Objective:     Precautions - cervical collar and spinal precautions    Bed mobility - Max-A and Assist x 2; supine to/from sit with log roll sequence.   Transfers - Max-A, Assist x 2, and Kartik Stedy with sit to/from stand with use of kartik stedy for transfer bed to/from BSC; Pt requires MOD A with static sitting EOB unsupported and CGA with supported sitting on BSC. Increased time for voiding (urine only).  Ambulation - N/A or Not attempted.    Therapeutic Exercise - 10 Reps B LE AAROM lying supine    Vitals: WNL    Pain: 10 VAS Location: neck / shoulders  Intervention for pain: Repositioned, RN provided medication, and Therapeutic Presence; premedicated for activity.     Education: Provided education on the importance of mobility in the acute care setting, Verbal/Tactile Cues, ADL training, Transfer Training, Post-Op Precautions, and HEP    Assessment: Domonique See presents with functional mobility impairments which indicate the need for skilled intervention. Pt agreeable to PT, but limited by generalized weakness and impaired trunk control. Pt motivated to transfer to BSC from , and transfers with MAX A x2 with use of kartik stedy, but only able to void urine while sitting on BSC. Will continue to follow and progress as tolerated.     Plan/Recommendations:   If medically appropriate, Moderate Intensity Therapy recommended post-acute care. This is recommended as therapy feels the patient would require 3-4 days per week and wouldn't tolerate \"3 hour daily\" rehab intensity. SNF would be the preferred choice. If the patient does not agree to SNF, arrange HH or OP depending on home bound status. If patient is medically complex, consider LTACH. Pt requires hospital bed at discharge.     Pt desires Skilled Rehab placement at discharge. Pt cooperative; agreeable to therapeutic recommendations and plan of care.     Post-Tx Position: Supine with HOB Elevated, " Staff Present, Alarms activated, and Call light and personal items within reach  PPE: gloves, surgical mask, and gown    Therapy Charges for Today       Code Description Service Date Service Provider Modifiers Qty    66386556556 HC PT THERAPEUTIC ACT EA 15 MIN 6/2/2025 Renetta Last GP 2    99441044849 HC PT SELF CARE/MGMT/TRAIN EA 15 MIN 6/2/2025 Renetta Last GP 2    70803304765 HC PT THER PROC EA 15 MIN 6/2/2025 Renetta Last GP 1           PT Charges       Row Name 06/02/25 1629             Time Calculation    Start Time 1338  -JV      Stop Time 1434  -JV      Time Calculation (min) 56 min  -JV      PT Received On 06/02/25  -JV      PT - Next Appointment 06/03/25  -ROSIE         Time Calculation- PT    Total Timed Code Minutes- PT 56 minute(s)  -JV                User Key  (r) = Recorded By, (t) = Taken By, (c) = Cosigned By      Initials Name Provider Type    Renetta Mendez Physical Therapist

## 2025-06-02 NOTE — PLAN OF CARE
Problem: Adult Inpatient Plan of Care  Goal: Plan of Care Review  Outcome: Progressing  Goal: Patient-Specific Goal (Individualized)  Outcome: Progressing  Goal: Absence of Hospital-Acquired Illness or Injury  Outcome: Progressing  Intervention: Identify and Manage Fall Risk  Recent Flowsheet Documentation  Taken 6/2/2025 0257 by Mitzi Willingham LPN  Safety Promotion/Fall Prevention: safety round/check completed  Taken 6/2/2025 0055 by Mitzi Willingham LPN  Safety Promotion/Fall Prevention: safety round/check completed  Taken 6/1/2025 2259 by Mitzi Willingham LPN  Safety Promotion/Fall Prevention: safety round/check completed  Taken 6/1/2025 2059 by Mitzi Willingham LPN  Safety Promotion/Fall Prevention:   room organization consistent   safety round/check completed   nonskid shoes/slippers when out of bed   fall prevention program maintained   clutter free environment maintained   assistive device/personal items within reach  Intervention: Prevent Skin Injury  Recent Flowsheet Documentation  Taken 6/2/2025 0257 by Mitzi Willingham LPN  Body Position:   turned   supine  Taken 6/1/2025 2259 by Mitzi Willingham LPN  Body Position:   turned   left  Taken 6/1/2025 2059 by Mitzi Willingham LPN  Body Position:   turned   supine   weight shifting   heels elevated  Skin Protection:   incontinence pads utilized   silicone foam dressing in place  Intervention: Prevent and Manage VTE (Venous Thromboembolism) Risk  Recent Flowsheet Documentation  Taken 6/1/2025 2059 by Mitzi Willingham LPN  VTE Prevention/Management: SCDs (sequential compression devices) on  Intervention: Prevent Infection  Recent Flowsheet Documentation  Taken 6/1/2025 2059 by Mitzi Willingham LPN  Infection Prevention:   rest/sleep promoted   single patient room provided  Goal: Optimal Comfort and Wellbeing  Outcome: Progressing  Intervention: Monitor Pain and Promote Comfort  Recent Flowsheet Documentation  Taken 6/1/2025 2118 by Mitzi Willingham LPN  Pain  Management Interventions: pain medication given  Intervention: Provide Person-Centered Care  Recent Flowsheet Documentation  Taken 6/1/2025 2059 by Mitzi Willingham LPN  Trust Relationship/Rapport:   care explained   thoughts/feelings acknowledged   questions encouraged  Goal: Readiness for Transition of Care  Outcome: Progressing   Goal Outcome Evaluation:

## 2025-06-02 NOTE — PLAN OF CARE
Goal Outcome Evaluation:           Progress: improving        Pt VSS, able to make needs known, pain controlled per MAR, currently working with PT, working on BM and voiding post catheter removal, agreeable to SNF. Plan of care ongoing.

## 2025-06-03 PROBLEM — R33.8 ACUTE URINARY RETENTION: Status: ACTIVE | Noted: 2025-06-03

## 2025-06-03 PROCEDURE — 99024 POSTOP FOLLOW-UP VISIT: CPT

## 2025-06-03 PROCEDURE — 25010000002 MORPHINE PER 10 MG: Performed by: NURSE PRACTITIONER

## 2025-06-03 RX ADMIN — ATORVASTATIN CALCIUM 40 MG: 40 TABLET, FILM COATED ORAL at 08:46

## 2025-06-03 RX ADMIN — MIDODRINE HYDROCHLORIDE 10 MG: 5 TABLET ORAL at 14:15

## 2025-06-03 RX ADMIN — Medication 10 ML: at 21:00

## 2025-06-03 RX ADMIN — APIXABAN 5 MG: 5 TABLET, FILM COATED ORAL at 20:59

## 2025-06-03 RX ADMIN — METOPROLOL SUCCINATE 12.5 MG: 25 TABLET, EXTENDED RELEASE ORAL at 08:46

## 2025-06-03 RX ADMIN — METHOCARBAMOL TABLETS 750 MG: 750 TABLET, COATED ORAL at 14:15

## 2025-06-03 RX ADMIN — Medication 10 ML: at 08:46

## 2025-06-03 RX ADMIN — HYDROCODONE BITARTRATE AND ACETAMINOPHEN 1 TABLET: 7.5; 325 TABLET ORAL at 10:30

## 2025-06-03 RX ADMIN — MIDODRINE HYDROCHLORIDE 10 MG: 5 TABLET ORAL at 21:00

## 2025-06-03 RX ADMIN — SERTRALINE HYDROCHLORIDE 100 MG: 100 TABLET, FILM COATED ORAL at 20:59

## 2025-06-03 RX ADMIN — PREGABALIN 200 MG: 100 CAPSULE ORAL at 21:00

## 2025-06-03 RX ADMIN — HYDROCODONE BITARTRATE AND ACETAMINOPHEN 1 TABLET: 7.5; 325 TABLET ORAL at 19:17

## 2025-06-03 RX ADMIN — MIDODRINE HYDROCHLORIDE 10 MG: 5 TABLET ORAL at 05:47

## 2025-06-03 RX ADMIN — OXYCODONE 10 MG: 5 TABLET ORAL at 05:47

## 2025-06-03 RX ADMIN — MORPHINE SULFATE 3 MG: 4 INJECTION, SOLUTION INTRAMUSCULAR; INTRAVENOUS at 08:48

## 2025-06-03 RX ADMIN — SENNOSIDES AND DOCUSATE SODIUM 2 TABLET: 8.6; 5 TABLET ORAL at 08:45

## 2025-06-03 RX ADMIN — PREGABALIN 200 MG: 100 CAPSULE ORAL at 05:47

## 2025-06-03 RX ADMIN — SENNOSIDES AND DOCUSATE SODIUM 2 TABLET: 8.6; 5 TABLET ORAL at 20:59

## 2025-06-03 RX ADMIN — APIXABAN 5 MG: 5 TABLET, FILM COATED ORAL at 08:46

## 2025-06-03 RX ADMIN — METHOCARBAMOL TABLETS 750 MG: 750 TABLET, COATED ORAL at 20:59

## 2025-06-03 RX ADMIN — METHOCARBAMOL TABLETS 750 MG: 750 TABLET, COATED ORAL at 08:46

## 2025-06-03 RX ADMIN — LEVOTHYROXINE SODIUM 75 MCG: 0.07 TABLET ORAL at 05:48

## 2025-06-03 RX ADMIN — DOCUSATE SODIUM 100 MG: 100 CAPSULE, LIQUID FILLED ORAL at 08:46

## 2025-06-03 RX ADMIN — DOCUSATE SODIUM 100 MG: 100 CAPSULE, LIQUID FILLED ORAL at 20:59

## 2025-06-03 RX ADMIN — PREGABALIN 200 MG: 100 CAPSULE ORAL at 14:15

## 2025-06-03 RX ADMIN — OXYCODONE 10 MG: 5 TABLET ORAL at 21:07

## 2025-06-03 NOTE — PLAN OF CARE
Problem: Adult Inpatient Plan of Care  Goal: Plan of Care Review  Outcome: Progressing  Goal: Patient-Specific Goal (Individualized)  Outcome: Progressing  Goal: Absence of Hospital-Acquired Illness or Injury  Outcome: Progressing  Intervention: Identify and Manage Fall Risk  Recent Flowsheet Documentation  Taken 6/3/2025 0025 by Mitzi Willingham LPN  Safety Promotion/Fall Prevention: safety round/check completed  Taken 6/2/2025 2259 by Mitzi Willingham LPN  Safety Promotion/Fall Prevention: safety round/check completed  Taken 6/2/2025 2059 by Mitzi Willingham LPN  Safety Promotion/Fall Prevention:   safety round/check completed   room organization consistent   nonskid shoes/slippers when out of bed   fall prevention program maintained   clutter free environment maintained   assistive device/personal items within reach  Intervention: Prevent Skin Injury  Recent Flowsheet Documentation  Taken 6/3/2025 0025 by Mitzi Willingham LPN  Body Position:   turned   left  Taken 6/2/2025 2259 by Mitzi Willingham LPN  Body Position:   turned   supine   heels elevated  Taken 6/2/2025 2059 by Mitzi Willingham LPN  Body Position:   supine   heels elevated   left  Intervention: Prevent and Manage VTE (Venous Thromboembolism) Risk  Recent Flowsheet Documentation  Taken 6/2/2025 2059 by Mitzi Willingham LPN  VTE Prevention/Management: SCDs (sequential compression devices) on  Intervention: Prevent Infection  Recent Flowsheet Documentation  Taken 6/2/2025 2059 by Mitzi Willingham LPN  Infection Prevention: single patient room provided  Goal: Optimal Comfort and Wellbeing  Outcome: Progressing  Intervention: Monitor Pain and Promote Comfort  Recent Flowsheet Documentation  Taken 6/2/2025 2155 by Mitzi Willingham LPN  Pain Management Interventions: pain pump in use  Intervention: Provide Person-Centered Care  Recent Flowsheet Documentation  Taken 6/2/2025 2059 by Mitzi Willingham LPN  Trust Relationship/Rapport: thoughts/feelings  acknowledged  Goal: Readiness for Transition of Care  Outcome: Progressing   Goal Outcome Evaluation:

## 2025-06-03 NOTE — DISCHARGE PLACEMENT REQUEST
"Geoffrey See (85 y.o. Female)       Date of Birth   1940    Social Security Number       Address   [address] [place] [facility]    Home Phone   [phone]    MRN   [id number]       Yazidi   Sikh    Marital Status                               Admission Date   5/23/2025    Admission Type   Emergency    Admitting Provider   Tatiana Gilliland MD    Attending Provider   Tatiana Gilliland MD    Department, Room/Bed   [facility] SURGICAL INPATIENT, 4105/1       Discharge Date       Discharge Disposition       Discharge Destination                                 Attending Provider: Tatiana Gilliland MD    Allergies: No Known Allergies    Isolation: None   Infection: None   Code Status: CPR    Ht: 152.4 cm (60\")   Wt: 73.4 kg (161 lb 13.1 oz)    Admission Cmt: None   Principal Problem: Cervical neuralgia [M54.12]                   Active Insurance as of 5/23/2025       Primary Coverage       Payor Plan Insurance Group Employer/Plan Group    ANTHEM MEDICARE REPLACEMENT ANTHEM MEDICARE ADVANTAGE PPO [id number]       Payor Plan Address Payor Plan Phone Number Payor Plan Fax Number Effective Dates    [address] [phone]  4/1/2024 - None Entered    [facility] [id number]         Subscriber Name Subscriber Birth Date Member ID       GEOFFREY SEE 1940 [id number]                     Emergency Contacts        (Rel.) Home Phone Work Phone Mobile Phone    Boone See (Son) -- -- [phone]    NILTON SEE (Relative) [phone]    AMADEO SEE (Son) [phone]                "

## 2025-06-03 NOTE — PLAN OF CARE
Goal Outcome Evaluation:   Patient a&ox4, pain controlled, inserted f/c for acute urine retention. Call light within reach, family at bedside, bed in low position. Plan of care on going.

## 2025-06-03 NOTE — PROGRESS NOTES
LOS: 7 days   Patient Care Team:  Anish Lew APRN as PCP - General  Evgeny Gregroy MD as Consulting Physician (Cardiology)  Leeanne Pleitez MD as Consulting Physician (Cardiology)  Radha Owusu APRN as Nurse Practitioner (Nurse Practitioner)  Kathe Muñoz MD as Consulting Physician (Nephrology)    Subjective     Interval History: Retaining urine - about 1800 removed with straight cath;     Patient Complaints: No sensation of urinary retention; neck pain; no new complaints    History taken from: patient    Review of Systems   Constitutional:  Positive for activity change, appetite change and fatigue. Negative for chills, diaphoresis and fever.   HENT:  Negative for congestion.    Eyes:  Negative for visual disturbance.   Respiratory:  Negative for cough and shortness of breath.    Cardiovascular:  Negative for chest pain, palpitations and leg swelling.   Gastrointestinal:  Negative for abdominal pain, constipation, diarrhea, nausea and vomiting.   Genitourinary:  Positive for difficulty urinating. Negative for dysuria.   Musculoskeletal:  Positive for arthralgias, gait problem, myalgias and neck pain.   Skin:  Negative for rash and wound.   Hematological:  Negative for adenopathy.   Psychiatric/Behavioral:  Negative for confusion.            Objective     Vital Signs  Temp:  [97.4 °F (36.3 °C)-98.3 °F (36.8 °C)] 97.4 °F (36.3 °C)  Heart Rate:  [69-88] 88  Resp:  [10-15] 15  BP: (102-124)/(65-89) 102/69    Physical Exam:     General Appearance:    Alert, cooperative, in no acute distress, oriented; c collar in place   Head:    Normocephalic, without obvious abnormality, atraumatic   Eyes:            Lids and lashes normal, conjunctivae and sclerae normal, no   icterus, no pallor, corneas clear, PERRLA   Ears:    Ears appear intact with no abnormalities noted   Throat:   No oral lesions, no thrush, oral mucosa moist   Neck:   No adenopathy, supple, trachea midline, no  thyromegaly, no   carotid bruit, no JVD   Lungs:     Clear to auscultation,respirations regular, even and                  unlabored    Heart:    Regular rhythm and normal rate, normal S1 and S2, no            murmur, no gallop, no rub, no click   Chest Wall:    No abnormalities observed   Abdomen:     Normal bowel sounds, no masses, no organomegaly, soft        Non-tender non-distended, no guarding,   Extremities:   Moves all extremities well, no edema, no cyanosis, no             Redness   Pulses:   Pulses palpable and equal bilaterally   Skin:   No bleeding, bruising or rash   Lymph nodes:   No palpable adenopathy   Neurologic:   Cranial nerves 2 - 12 grossly intact, sensation intact, DTR       present and equal bilaterally        Results Review:    Lab Results (last 24 hours)       ** No results found for the last 24 hours. **             Imaging Results (Last 24 Hours)       ** No results found for the last 24 hours. **                 I reviewed the patient's new clinical results.    Medication Review:   Scheduled Meds:apixaban, 5 mg, Oral, BID  [Held by provider] aspirin, 81 mg, Oral, Nightly  atorvastatin, 40 mg, Oral, Daily  docusate sodium, 100 mg, Oral, BID  levothyroxine, 75 mcg, Oral, Q AM  [Held by provider] lisinopril, 5 mg, Oral, Daily  methocarbamol, 750 mg, Oral, TID  metoprolol succinate XL, 12.5 mg, Oral, Q24H  midodrine, 10 mg, Oral, Q8H  polyethylene glycol, 17 g, Oral, Daily  pregabalin, 200 mg, Oral, Q8H  senna-docusate sodium, 2 tablet, Oral, BID  sertraline, 100 mg, Oral, Q PM  sodium chloride, 10 mL, Intravenous, Q12H  sodium chloride, 10 mL, Intravenous, Q12H      Continuous Infusions:   PRN Meds:.  acetaminophen    senna-docusate sodium **AND** polyethylene glycol **AND** bisacodyl **AND** bisacodyl    HYDROcodone-acetaminophen    Morphine    nitroglycerin    ondansetron    oxyCODONE    [COMPLETED] Insert Peripheral IV **AND** sodium chloride    sodium chloride    sodium chloride     sodium chloride    sodium chloride    zolpidem     Assessment & Plan       Cervical neuralgia    Pacemaker    Paroxysmal atrial fibrillation    Cervical radiculopathy    Essential hypertension    Spondylolisthesis, acquired    Obesity (BMI 30.0-34.9)    Hx of hypertrophic cardiomyopathy    Coronary artery disease    Neck pain    Seizure disorder    Encounter for pre-operative cardiovascular clearance    Aortic insufficiency    Mitral regurgitation    Acute urinary retention    -Fuentes replaced due to severe urinary retention related to immobility and narcotics  -postop course is otherwise as expected  -pain is reasonably well controlled  -working toward rehab  -heart rate controlled with metoprolol; tolerating anticoagulation        CODE Status:    Code status (Patient has no pulse and is not breathing):  CPR (Attempt to Resuscitate)  Medical Interventions (Patient has pulse or is breathing):  Full support  Level of support discussed with:  Patient    Admission status:  I believe this patient meets inpatient status  Expected length of stay:  2 midnights or greater  I discussed the patient's findings and my recommendations with the patient.    Plan for disposition:Skilled rehab    Tatiana Gilliland MD  06/03/25  19:32 EDT

## 2025-06-03 NOTE — CASE MANAGEMENT/SOCIAL WORK
Continued Stay Note   [facility]     Patient Name: Domonique See  MRN: [id number]  Today's Date: 6/3/2025    Admit Date: 5/23/2025    Plan: DC Plan: Referral to [facility]. Will require precert. PASRR  approved.  Also current with [facility]   Discharge Plan       Row Name 06/03/25 1638       Plan    Plan DC Plan: Referral to [facility]. Will require precert. PASRR  approved.  Also current with [facility]     Patient/Family in Agreement with Plan yes    Plan Comments DC barriers: pod# 5 cervical laminectomy c7-T1, C5-T1 fusion, pain, bm..Spoke with family about choices for snf.  1 [facility] 2. [facility] 3 [facility].  Referral sent to [facility] pending acceptance. Will require precert. PASRR approved.   From home with son and daughter in law Corrina  ,current with [facility] .                    Corin Baker RN    SIPS 1  [email or web address]  Office [phone]  Cell [phone]

## 2025-06-03 NOTE — SIGNIFICANT NOTE
06/03/25 1639   OTHER   Discipline physical therapy assistant   Rehab Time/Intention   Session Not Performed other (see comments)  (Pt with nursing care upon both attempts. Will reattempt tomorrow for PT.)   Therapy Assessment/Plan (PT)   Criteria for Skilled Interventions Met (PT) yes   Recommendation   PT - Next Appointment 06/04/25

## 2025-06-03 NOTE — PROGRESS NOTES
Neurosurgery Progress Note    Date: 25     Patient: Domonique See   Sex: female   : 1940      SUBJECTIVE    CC: Post op day 5 C5-T2 posterior instrumented fusion    Interval history:  Patient switched from Fuentes catheter to external catheter.  Patient had enema yesterday and had a bowel movement.  Patient's pain better this morning.  Wearing hard collar in bed.         Current Medications:  Scheduled Meds:apixaban, 5 mg, Oral, BID  [Held by provider] aspirin, 81 mg, Oral, Nightly  atorvastatin, 40 mg, Oral, Daily  docusate sodium, 100 mg, Oral, BID  levothyroxine, 75 mcg, Oral, Q AM  [Held by provider] lisinopril, 5 mg, Oral, Daily  methocarbamol, 750 mg, Oral, TID  metoprolol succinate XL, 12.5 mg, Oral, Q24H  midodrine, 10 mg, Oral, Q8H  polyethylene glycol, 17 g, Oral, Daily  pregabalin, 200 mg, Oral, Q8H  senna-docusate sodium, 2 tablet, Oral, BID  sertraline, 100 mg, Oral, Q PM  sodium chloride, 10 mL, Intravenous, Q12H  sodium chloride, 10 mL, Intravenous, Q12H      Continuous Infusions:   PRN Meds:   acetaminophen    senna-docusate sodium **AND** polyethylene glycol **AND** bisacodyl **AND** bisacodyl    HYDROcodone-acetaminophen    Morphine    [Held by provider] Morphine    nitroglycerin    ondansetron    oxyCODONE    [COMPLETED] Insert Peripheral IV **AND** sodium chloride    sodium chloride    sodium chloride    sodium chloride    sodium chloride    zolpidem      OBJECTIVE  Vitals:    25 2155 25 0020 25 0547 25 0817   BP: 121/89 124/65 118/69 115/81   BP Location:  Left arm  Left arm   Patient Position:  Lying  Lying   Pulse: 80 78 77 69   Resp:  12  14   Temp:  97.6 °F (36.4 °C)  98.3 °F (36.8 °C)   TempSrc:  Oral  Oral   SpO2:    90%   Weight:       Height:           Physical exam    General  - WD/WN female, appears their stated age  - Awake, cooperative, in no acute distress  HEENT  - Normocephalic, atraumatic  - PERRLA, EOM intact  Respiratory  - Normal  respiratory rate and effort  Musculoskeletal  - No joint redness, swelling, or tenderness  Skin  - Warm and dry, no bleeding, bruising, or rash  -Surgical incision intact covered with dressing  NEUROLOGIC  - A/O x3, GCS 4-6-5  - CN II-XII grossly intact  - strength intact  - Lower extremities continue to be weak      Results review  CBC          5/29/2025    05:09 5/30/2025    13:31 6/2/2025    08:06   CBC   WBC 5.02  9.71  5.45    RBC 3.91  2.97  2.90    Hemoglobin 11.3  8.7  8.5    Hematocrit 36.7  27.5  27.4    MCV 93.9  92.6  94.5    MCH 28.9  29.3  29.3    MCHC 30.8  31.6  31.0    RDW 13.7  13.5  13.2    Platelets 125  109  131        BMP          5/29/2025    05:09 5/30/2025    02:00 6/2/2025    09:10   BMP   BUN 15.3  16.9  7.4    Creatinine 0.79  0.73  0.45    Sodium 143  137  135    Potassium 4.2  4.3  4.2    Chloride 108  104  100    CO2 25.3  23.6  23.8    Calcium 9.8  8.9  9.4          XR Spine Cervical 2 or 3 View  Result Date: 5/30/2025  XR SPINE CERVICAL 2 OR 3 VW Date of Exam: 5/30/2025 3:05 PM EDT Indication: s/p cervical fusion Comparison: CT cervical spine 3/28/2025, cervical spine radiographs 5/29/2025 Findings: The patient is undergone an anterior fusion from the C3 through the C7 level with a C6 corpectomy and fenestrated cage placement. Since the recent CT the patient is undergone a posterior fusion which appears to extend from the C5 level to at least the T2  level. The alignment is difficult to assess on the current radiograph however there appears to be less anterolisthesis of C7 on T1 than on the preoperative CT.     Impression: Impression: Postoperative changes of anterior and posterior cervical fusion as described above. The alignment is difficult to assess however there appears to be less anterolisthesis of C7 on T1 than on the preoperative CT. Electronically Signed: Lake Garza MD  5/30/2025 4:14 PM EDT  Workstation ID: [id number]    XR Spine Cervical 2 or 3 View  Result Date:  5/29/2025  This procedure was auto-finalized with no dictation required.                    ASSESSMENT/PLAN  This is a 85 y.o. female who underwent a C5-T2 posterior fusion with .  Patient is doing well postoperatively and states that her pain is well-tolerated this morning.  Patient should continue to work with physical therapy.  Patient had her Fuentes catheter removed and has an external catheter now.  Will continue to monitor for bowel movements.  Work towards discharge to rehab.  Please reach out with any questions or concerns.      Plan:  SP cervical spinal fusion  -Hard collar at all times  - Continue PT/OT  - Continue incisional care  - Continue DVT prophylaxis/anticoagulation  - Pending rehab        I discussed my assessment and recommendations with Dr. Dolores Hayden PA-C  06/03/25  08:19 EDT      Part of this note may be an electronic transcription/translation of spoken language to printed text using the Dragon Dictation System.

## 2025-06-03 NOTE — CASE MANAGEMENT/SOCIAL WORK
Continued Stay Note   [facility]     Patient Name: Domonique See  MRN: [id number]  Today's Date: 6/3/2025    Admit Date: 5/23/2025    Plan: DC Plan: Referral to [facility] snf skilled pending acceptance. . Will require precert. PASRR approved. Also current with [facility]   Discharge Plan       Row Name 06/03/25 1655       Plan    Plan DC Plan: Referral to [facility] snf skilled pending acceptance. . Will require precert. PASRR approved. Also current with [facility]    Plan Comments DC barriers: pod# 5 cervical laminectomy c7-T1, C5-T1 fusion, pain, bm..Spoke with family about choices for snf. 1 [facility] 2. [facility] 3 [facility]. [facility] has no beds available but will folllow [facility]. Will require precert. PASRR approved. From home with son and daughter in law Corrina ,current with [facility] .                                                                 Corin Baker RN    SIPS 1  [email or web address]  Office [phone]  Cell [phone]

## 2025-06-04 PROCEDURE — 97110 THERAPEUTIC EXERCISES: CPT

## 2025-06-04 PROCEDURE — 97112 NEUROMUSCULAR REEDUCATION: CPT

## 2025-06-04 PROCEDURE — 97535 SELF CARE MNGMENT TRAINING: CPT

## 2025-06-04 PROCEDURE — 97530 THERAPEUTIC ACTIVITIES: CPT

## 2025-06-04 PROCEDURE — 99024 POSTOP FOLLOW-UP VISIT: CPT

## 2025-06-04 RX ADMIN — SERTRALINE HYDROCHLORIDE 100 MG: 100 TABLET, FILM COATED ORAL at 21:27

## 2025-06-04 RX ADMIN — ATORVASTATIN CALCIUM 40 MG: 40 TABLET, FILM COATED ORAL at 09:24

## 2025-06-04 RX ADMIN — OXYCODONE 10 MG: 5 TABLET ORAL at 09:23

## 2025-06-04 RX ADMIN — METHOCARBAMOL TABLETS 750 MG: 750 TABLET, COATED ORAL at 09:24

## 2025-06-04 RX ADMIN — Medication 10 ML: at 21:27

## 2025-06-04 RX ADMIN — Medication 10 ML: at 09:25

## 2025-06-04 RX ADMIN — METOPROLOL SUCCINATE 12.5 MG: 25 TABLET, EXTENDED RELEASE ORAL at 09:24

## 2025-06-04 RX ADMIN — MIDODRINE HYDROCHLORIDE 10 MG: 5 TABLET ORAL at 14:41

## 2025-06-04 RX ADMIN — METHOCARBAMOL TABLETS 750 MG: 750 TABLET, COATED ORAL at 14:41

## 2025-06-04 RX ADMIN — APIXABAN 5 MG: 5 TABLET, FILM COATED ORAL at 21:24

## 2025-06-04 RX ADMIN — PREGABALIN 200 MG: 100 CAPSULE ORAL at 14:41

## 2025-06-04 RX ADMIN — PREGABALIN 200 MG: 100 CAPSULE ORAL at 21:24

## 2025-06-04 RX ADMIN — DOCUSATE SODIUM 100 MG: 100 CAPSULE, LIQUID FILLED ORAL at 09:23

## 2025-06-04 RX ADMIN — APIXABAN 5 MG: 5 TABLET, FILM COATED ORAL at 09:24

## 2025-06-04 RX ADMIN — SENNOSIDES AND DOCUSATE SODIUM 2 TABLET: 8.6; 5 TABLET ORAL at 21:26

## 2025-06-04 RX ADMIN — DOCUSATE SODIUM 100 MG: 100 CAPSULE, LIQUID FILLED ORAL at 21:26

## 2025-06-04 RX ADMIN — LEVOTHYROXINE SODIUM 75 MCG: 0.07 TABLET ORAL at 05:11

## 2025-06-04 RX ADMIN — SENNOSIDES AND DOCUSATE SODIUM 2 TABLET: 8.6; 5 TABLET ORAL at 09:24

## 2025-06-04 RX ADMIN — METHOCARBAMOL TABLETS 750 MG: 750 TABLET, COATED ORAL at 21:26

## 2025-06-04 RX ADMIN — PREGABALIN 200 MG: 100 CAPSULE ORAL at 05:11

## 2025-06-04 RX ADMIN — HYDROCODONE BITARTRATE AND ACETAMINOPHEN 1 TABLET: 7.5; 325 TABLET ORAL at 07:56

## 2025-06-04 RX ADMIN — MIDODRINE HYDROCHLORIDE 10 MG: 5 TABLET ORAL at 05:11

## 2025-06-04 RX ADMIN — Medication 10 ML: at 09:24

## 2025-06-04 RX ADMIN — HYDROCODONE BITARTRATE AND ACETAMINOPHEN 1 TABLET: 7.5; 325 TABLET ORAL at 14:41

## 2025-06-04 RX ADMIN — OXYCODONE 10 MG: 5 TABLET ORAL at 01:20

## 2025-06-04 RX ADMIN — HYDROCODONE BITARTRATE AND ACETAMINOPHEN 1 TABLET: 7.5; 325 TABLET ORAL at 21:26

## 2025-06-04 RX ADMIN — MIDODRINE HYDROCHLORIDE 10 MG: 5 TABLET ORAL at 21:27

## 2025-06-04 NOTE — PLAN OF CARE
"Assessment: Domonique See presents with ADL impairments affecting function including balance, endurance / activity tolerance, grasp, motor control, pain, postural / trunk control, range of motion (ROM), and strength. Pt max A x2 to come to sitting. EOB, pt noted with left lateral and anterior lean. Dynamic reaching and verbal cues provided for increased postural stability. Dependent A to dress LB. Demonstrated functioning below baseline abilities indicate the need for continued skilled intervention while inpatient. Tolerating session today without incident. Will continue to follow and progress as tolerated.      Plan/Recommendations:   Moderate Intensity Therapy recommended post-acute care. This is recommended as therapy feels the patient would require 3-4 days per week and wouldn't tolerate \"3 hour daily\" rehab intensity. SNF would be the preferred choice. If the patient does not agree to SNF, arrange HH or OP depending on home bound status. If patient is medically complex, consider LTACH.     "

## 2025-06-04 NOTE — PROGRESS NOTES
LOS: 8 days   Patient Care Team:  Anish Lew APRN as PCP - General  Evgeny Gregory MD as Consulting Physician (Cardiology)  Leeanne Pleitez MD as Consulting Physician (Cardiology)  Radha Owusu APRN as Nurse Practitioner (Nurse Practitioner)  Kathe Muñoz MD as Consulting Physician (Nephrology)    Subjective     Interval History: Catheter in place, hard collar on    Patient Complaints: No sensation of urinary retention; neck pain; no new complaints    History taken from: patient    Review of Systems   Constitutional:  Positive for activity change, appetite change and fatigue. Negative for chills, diaphoresis and fever.   HENT:  Negative for congestion.    Eyes:  Negative for visual disturbance.   Respiratory:  Negative for cough and shortness of breath.    Cardiovascular:  Negative for chest pain, palpitations and leg swelling.   Gastrointestinal:  Negative for abdominal pain, constipation, diarrhea, nausea and vomiting.   Genitourinary:  Positive for difficulty urinating. Negative for dysuria.   Musculoskeletal:  Positive for arthralgias, gait problem, myalgias and neck pain.   Skin:  Negative for rash and wound.   Hematological:  Negative for adenopathy.   Psychiatric/Behavioral:  Negative for confusion.            Objective     Vital Signs  Temp:  [97.4 °F (36.3 °C)-97.8 °F (36.6 °C)] 97.4 °F (36.3 °C)  Heart Rate:  [62-88] 85  Resp:  [15-20] 20  BP: (102-155)/(65-88) 155/72    Physical Exam:     General Appearance:    Alert, cooperative, in no acute distress, oriented; c collar in place   Head:    Normocephalic, without obvious abnormality, atraumatic   Eyes:            Lids and lashes normal, conjunctivae and sclerae normal, no   icterus, no pallor, corneas clear, PERRLA   Ears:    Ears appear intact with no abnormalities noted   Throat:   No oral lesions, no thrush, oral mucosa moist   Neck:   No adenopathy, supple, trachea midline, no thyromegaly, no   carotid bruit,  no JVD   Lungs:     Clear to auscultation,respirations regular, even and                  unlabored    Heart:    Regular rhythm and normal rate, normal S1 and S2, no            murmur, no gallop, no rub, no click   Chest Wall:    No abnormalities observed   Abdomen:     Normal bowel sounds, no masses, no organomegaly, soft        Non-tender non-distended, no guarding,   Extremities:   Moves all extremities well, no edema, no cyanosis, no             Redness   Pulses:   Pulses palpable and equal bilaterally   Skin:   No bleeding, bruising or rash   Lymph nodes:   No palpable adenopathy   Neurologic:   Cranial nerves 2 - 12 grossly intact, sensation intact, DTR       present and equal bilaterally        Results Review:    Lab Results (last 24 hours)       ** No results found for the last 24 hours. **             Imaging Results (Last 24 Hours)       ** No results found for the last 24 hours. **                 I reviewed the patient's new clinical results.    Medication Review:   Scheduled Meds:apixaban, 5 mg, Oral, BID  [Held by provider] aspirin, 81 mg, Oral, Nightly  atorvastatin, 40 mg, Oral, Daily  docusate sodium, 100 mg, Oral, BID  levothyroxine, 75 mcg, Oral, Q AM  [Held by provider] lisinopril, 5 mg, Oral, Daily  methocarbamol, 750 mg, Oral, TID  metoprolol succinate XL, 12.5 mg, Oral, Q24H  midodrine, 10 mg, Oral, Q8H  polyethylene glycol, 17 g, Oral, Daily  pregabalin, 200 mg, Oral, Q8H  senna-docusate sodium, 2 tablet, Oral, BID  sertraline, 100 mg, Oral, Q PM  sodium chloride, 10 mL, Intravenous, Q12H  sodium chloride, 10 mL, Intravenous, Q12H      Continuous Infusions:   PRN Meds:.  acetaminophen    senna-docusate sodium **AND** polyethylene glycol **AND** bisacodyl **AND** bisacodyl    HYDROcodone-acetaminophen    Morphine    nitroglycerin    ondansetron    oxyCODONE    [COMPLETED] Insert Peripheral IV **AND** sodium chloride    sodium chloride    sodium chloride    sodium chloride    sodium chloride     zolpidem     Assessment & Plan       Cervical neuralgia    Pacemaker    Paroxysmal atrial fibrillation    Cervical radiculopathy    Essential hypertension    Spondylolisthesis, acquired    Obesity (BMI 30.0-34.9)    Hx of hypertrophic cardiomyopathy    Coronary artery disease    Neck pain    Seizure disorder    Encounter for pre-operative cardiovascular clearance    Aortic insufficiency    Mitral regurgitation    Acute urinary retention    -Fuentes replaced due to severe urinary retention related to immobility and narcotics  -postop course is otherwise as expected  -pain is reasonably well controlled  -working toward rehab  -heart rate controlled with metoprolol; tolerating anticoagulation    CODE Status:    Code status (Patient has no pulse and is not breathing):  CPR (Attempt to Resuscitate)  Medical Interventions (Patient has pulse or is breathing):  Full support  Level of support discussed with:  Patient    Admission status:  I believe this patient meets inpatient status  Expected length of stay:  2 midnights or greater  I discussed the patient's findings and my recommendations with the patient.    Plan for disposition:Skilled rehab    WOODY Warner  06/04/25  08:59 EDT

## 2025-06-04 NOTE — PLAN OF CARE
Problem: Adult Inpatient Plan of Care  Goal: Plan of Care Review  Outcome: Progressing  Goal: Patient-Specific Goal (Individualized)  Outcome: Progressing  Goal: Absence of Hospital-Acquired Illness or Injury  Outcome: Progressing  Intervention: Identify and Manage Fall Risk  Recent Flowsheet Documentation  Taken 6/3/2025 2250 by Mitzi Willingham LPN  Safety Promotion/Fall Prevention: safety round/check completed  Taken 6/3/2025 2050 by Mitzi Willingham LPN  Safety Promotion/Fall Prevention:   room organization consistent   safety round/check completed   nonskid shoes/slippers when out of bed   fall prevention program maintained   clutter free environment maintained   assistive device/personal items within reach  Intervention: Prevent Skin Injury  Recent Flowsheet Documentation  Taken 6/4/2025 0050 by Mitzi Willingham LPN  Body Position:   turned   left  Taken 6/3/2025 2250 by Mitzi Willingham LPN  Body Position: position maintained  Taken 6/3/2025 2050 by Mitzi Willingham LPN  Body Position:   position maintained   heels elevated  Skin Protection: incontinence pads utilized  Intervention: Prevent and Manage VTE (Venous Thromboembolism) Risk  Recent Flowsheet Documentation  Taken 6/3/2025 2050 by Mitzi Willingham LPN  VTE Prevention/Management: SCDs (sequential compression devices) on  Intervention: Prevent Infection  Recent Flowsheet Documentation  Taken 6/3/2025 2050 by Mitzi Willingham LPN  Infection Prevention: single patient room provided  Goal: Optimal Comfort and Wellbeing  Outcome: Progressing  Intervention: Monitor Pain and Promote Comfort  Recent Flowsheet Documentation  Taken 6/3/2025 2107 by Mitzi Willingham LPN  Pain Management Interventions: pain medication given  Taken 6/3/2025 1917 by Mitzi Willingham LPN  Pain Management Interventions: pain medication given  Intervention: Provide Person-Centered Care  Recent Flowsheet Documentation  Taken 6/3/2025 2050 by Mitzi Willingham LPN  Trust  Relationship/Rapport: thoughts/feelings acknowledged  Goal: Readiness for Transition of Care  Outcome: Progressing   Goal Outcome Evaluation:

## 2025-06-04 NOTE — PROGRESS NOTES
Neurosurgery Progress Note    Date: 25     Patient: Domonique See   Sex: female   : 1940      SUBJECTIVE    CC: Post op day 6 C5- T2 posterior instrumented fusion    Interval history:  Patient in some pain this morning. Hard collar on. External catheter in place. Had bm yesterday. No new complaints.         Current Medications:  Scheduled Meds:apixaban, 5 mg, Oral, BID  [Held by provider] aspirin, 81 mg, Oral, Nightly  atorvastatin, 40 mg, Oral, Daily  docusate sodium, 100 mg, Oral, BID  levothyroxine, 75 mcg, Oral, Q AM  [Held by provider] lisinopril, 5 mg, Oral, Daily  methocarbamol, 750 mg, Oral, TID  metoprolol succinate XL, 12.5 mg, Oral, Q24H  midodrine, 10 mg, Oral, Q8H  polyethylene glycol, 17 g, Oral, Daily  pregabalin, 200 mg, Oral, Q8H  senna-docusate sodium, 2 tablet, Oral, BID  sertraline, 100 mg, Oral, Q PM  sodium chloride, 10 mL, Intravenous, Q12H  sodium chloride, 10 mL, Intravenous, Q12H      Continuous Infusions:   PRN Meds:   acetaminophen    senna-docusate sodium **AND** polyethylene glycol **AND** bisacodyl **AND** bisacodyl    HYDROcodone-acetaminophen    Morphine    nitroglycerin    ondansetron    oxyCODONE    [COMPLETED] Insert Peripheral IV **AND** sodium chloride    sodium chloride    sodium chloride    sodium chloride    sodium chloride    zolpidem      OBJECTIVE  Vitals:    25 2100 25 0033 25 0511 25 0751   BP: 114/65 124/65 132/68 155/72   BP Location:  Left arm  Right arm   Patient Position:  Lying  Lying   Pulse:  62 85    Resp:  17  20   Temp:  97.8 °F (36.6 °C)  97.4 °F (36.3 °C)   TempSrc:  Oral  Oral   SpO2:  98%     Weight:       Height:           Physical exam    General  - WD/WN female, appears their stated age  - Awake, cooperative, in no acute distress  HEENT  - Normocephalic, atraumatic  - PERRLA, EOM intact  Respiratory  - Normal respiratory rate and effort  Musculoskeletal  - No joint redness, swelling, or tenderness  Skin  -  Warm and dry, no bleeding, bruising, or rash  - Surgical incision intact covered with dressing  NEUROLOGIC  - A/O x3, GCS 4-6-5  - CN II-XII grossly intact  - Sensation intact in lower extremities  - Slightly improved strength in lower extremities this morning         Results review  CBC          5/29/2025    05:09 5/30/2025    13:31 6/2/2025    08:06   CBC   WBC 5.02  9.71  5.45    RBC 3.91  2.97  2.90    Hemoglobin 11.3  8.7  8.5    Hematocrit 36.7  27.5  27.4    MCV 93.9  92.6  94.5    MCH 28.9  29.3  29.3    MCHC 30.8  31.6  31.0    RDW 13.7  13.5  13.2    Platelets 125  109  131        BMP          5/29/2025    05:09 5/30/2025    02:00 6/2/2025    09:10   BMP   BUN 15.3  16.9  7.4    Creatinine 0.79  0.73  0.45    Sodium 143  137  135    Potassium 4.2  4.3  4.2    Chloride 108  104  100    CO2 25.3  23.6  23.8    Calcium 9.8  8.9  9.4          XR Spine Cervical 2 or 3 View  Result Date: 5/30/2025  XR SPINE CERVICAL 2 OR 3 VW Date of Exam: 5/30/2025 3:05 PM EDT Indication: s/p cervical fusion Comparison: CT cervical spine 3/28/2025, cervical spine radiographs 5/29/2025 Findings: The patient is undergone an anterior fusion from the C3 through the C7 level with a C6 corpectomy and fenestrated cage placement. Since the recent CT the patient is undergone a posterior fusion which appears to extend from the C5 level to at least the T2  level. The alignment is difficult to assess on the current radiograph however there appears to be less anterolisthesis of C7 on T1 than on the preoperative CT.     Impression: Impression: Postoperative changes of anterior and posterior cervical fusion as described above. The alignment is difficult to assess however there appears to be less anterolisthesis of C7 on T1 than on the preoperative CT. Electronically Signed: Lake Garza MD  5/30/2025 4:14 PM EDT  Workstation ID: [id number]    XR Spine Cervical 2 or 3 View  Result Date: 5/29/2025  This procedure was auto-finalized with no  dictation required.                    ASSESSMENT/PLAN  This is a 85 y.o. female who underwent a cervical fusion with Dr. Bernal. Patient is in some pain this morning, but otherwise has no new complaints. Patient has chosen two new rehabs and we are waiting on acceptance.     Patient is to continue wearing her hard collar at all times and to continue working with PT/OT.     Please reach out with any questions or concerns.      Plan:  S/P cervical fusion  - Hard collar AAT  - DVT prophylaxis  - Continue pain regimen  - Medical care per primary   - Continue PT/OT  - Pending rehab acceptance         I discussed my assessment and recommendations with Dr. Dolores Hayden PA-C  06/04/25  07:55 EDT      Part of this note may be an electronic transcription/translation of spoken language to printed text using the Dragon Dictation System.

## 2025-06-04 NOTE — THERAPY TREATMENT NOTE
"Subjective: Pt agreeable to therapeutic plan of care.  Cognition: arousal/alertness: Alert and Attentive, A&O x4    Objective:     Precautions - Cervical collar, spinal, falls    Bed Mobility: Max-A and Assist x 2   Functional Transfers: N/A or Not attempted.  Balance: sitting EOB and unsupported mod progressing to CGA, noted with L lateral lean fluctuating between anterior lean. Verbal cueing and dynamic reaching activities to correct, pt with decreased trunk control.   Functional Ambulation: N/A or Not attempted.    Grooming: Mod-A  ADL Position: edge of bed sitting  ADL Comments: hair hygiene    Lower Body Dressing: Dependent  ADL Position: supine with HOB elevated  ADL Comments: don socks    Vitals: Desaturates, RA 94% SpO2, desats 87% SpO2, returned on 1L O2 NC end of treatment session satting at 97%    Pain: 10 VAS Location: neck and shoulders  Interventions for pain: Repositioned, Increased Activity, and Therapeutic Presence  Education: Provided education on the importance of mobility in the acute care setting, Verbal/Tactile Cues, and ADL training    Assessment: Domonique See presents with ADL impairments affecting function including balance, endurance / activity tolerance, grasp, motor control, pain, postural / trunk control, range of motion (ROM), and strength. Pt max A x2 to come to sitting. EOB, pt noted with left lateral and anterior lean. Dynamic reaching and verbal cues provided for increased postural stability. Dependent A to dress LB. Demonstrated functioning below baseline abilities indicate the need for continued skilled intervention while inpatient. Tolerating session today without incident. Will continue to follow and progress as tolerated.     Plan/Recommendations:   Moderate Intensity Therapy recommended post-acute care. This is recommended as therapy feels the patient would require 3-4 days per week and wouldn't tolerate \"3 hour daily\" rehab intensity. SNF would be the preferred choice. " If the patient does not agree to SNF, arrange HH or OP depending on home bound status. If patient is medically complex, consider LTACH.    Pt desires Skilled Rehab placement at discharge. Pt cooperative; agreeable to therapeutic recommendations and plan of care.     Modified [place]: N/A = No pre-op stroke/TIA    Post-Tx Position: Supine with HOB Elevated, Alarms activated, and Call light and personal items within reach, adult son in room  PPE: gloves    Therapy Charges for Today       Code Description Service Date Service Provider Modifiers Qty    41857267422 HC OT SELF CARE/MGMT/TRAIN EA 15 MIN 6/4/2025 Mauro Szymanski OT GO 2    15246735834 HC OT NEUROMUSC RE EDUCATION EA 15 MIN 6/4/2025 Mauro Szymanski OT GO 1           Time Calculation- OT       Row Name 06/04/25 1520             Time Calculation- OT    OT Start Time 1345  -SP      OT Stop Time 1416  -SP      OT Time Calculation (min) 31 min  -SP      Total Timed Code Minutes- OT 31 minute(s)  -SP      OT Received On 06/04/25  -SP      OT - Next Appointment 06/05/25  -SP                User Key  (r) = Recorded By, (t) = Taken By, (c) = Cosigned By      Initials Name Provider Type    SP Mauro Szymanski, OT Occupational Therapist

## 2025-06-04 NOTE — PLAN OF CARE
"Assessment: Domonique See presents with functional mobility impairments which indicate the need for skilled intervention. Tolerating session today without incident. Pt requires Max Ax2 for bed mobility. Focused on trunk/core control and stability at EOB. Unable to progress to transfer training due to excessive global weakness. Desats on room air with activity. Will continue to follow and progress as tolerated.     Plan/Recommendations:   If medically appropriate, Moderate Intensity Therapy recommended post-acute care. This is recommended as therapy feels the patient would require 3-4 days per week and wouldn't tolerate \"3 hour daily\" rehab intensity. SNF would be the preferred choice. If the patient does not agree to SNF, arrange HH or OP depending on home bound status. If patient is medically complex, consider LTACH. Pt requires no DME at discharge.     Pt desires Skilled Rehab placement at discharge. Pt cooperative; agreeable to therapeutic recommendations and plan of care.     "

## 2025-06-04 NOTE — CASE MANAGEMENT/SOCIAL WORK
Continued Stay Note   [place]     Patient Name: Domonique See  MRN: [id number]  Today's Date: 6/4/2025    Admit Date: 5/23/2025    Plan: To [facility] skilled, Accepted. PASRR Approved. Need Precert.   Discharge Plan       Row Name 06/04/25 1649       Plan    Plan To [facility] skilled, Accepted. PASRR Approved. Need Precert.    Patient/Family in Agreement with Plan yes    Plan Comments CM notified per [facility] [employer] liaison, that pt accepted at [facility] and she spoke with family, which is agreeable to semi-private bed. CM resquested precert per secure chat with Corin GUTIERREZ CM RN and CME, Leonel GUTIERREZ.             Expected Discharge Date and Time       Expected Discharge Date Expected Discharge Time    Jun 4, 2025      Cecelia Hercules RN    [phone]  [email or web address]

## 2025-06-04 NOTE — PLAN OF CARE
Goal Outcome Evaluation:         Pain high today still, worked with therapy, C collar remains in place

## 2025-06-04 NOTE — THERAPY TREATMENT NOTE
"Subjective: Pt agreeable to therapeutic plan of care. PTA arrived in patient's room this morning per nursing request to adjust C Collar, as patient states it \"feels too loose\". PTA returned in the afternoon with OT for therapy session.     Objective:     Precautions - cervical collar and spinal precautions    Bed mobility - Supine<>sitting Max Ax2    Neuro - Pt requiring Mod A for sitting balance at EOB, with L lateral and anterior lean. Pt able to progress to CGA-Min A when given max cues to demonstrate upright and midline posture. While sitting at EOB, pt instructed on scapular squeezes using towel behind shoulders, with manual assistance to open up chest wall. Pt also instructed on unilateral reaching forward and across midline with each UE to promote trunk control, balance.     Transfers - N/A or Not attempted.    Ambulation - 0 feet N/A or Not attempted.    Therapeutic Exercise - 5 Reps B LE AAROM unsupported sitting / EOB    Vitals: Desaturates to 87% on room air with activity. Pt placed back on 1L at end of session.     Pain: 10 VAS Location: Neck, shoulders  Intervention for pain: Repositioned, Increased Activity, and Therapeutic Presence    Education: Provided education on the importance of mobility in the acute care setting, Verbal/Tactile Cues, and Post-Op Precautions    Assessment: Domonique See presents with functional mobility impairments which indicate the need for skilled intervention. Tolerating session today without incident. Pt requires Max Ax2 for bed mobility. Focused on trunk/core control and stability at EOB. Unable to progress to transfer training due to excessive global weakness. Desats on room air with activity. Will continue to follow and progress as tolerated.     Plan/Recommendations:   If medically appropriate, Moderate Intensity Therapy recommended post-acute care. This is recommended as therapy feels the patient would require 3-4 days per week and wouldn't tolerate \"3 hour " "daily\" rehab intensity. SNF would be the preferred choice. If the patient does not agree to SNF, arrange HH or OP depending on home bound status. If patient is medically complex, consider LTACH. Pt requires no DME at discharge.     Pt desires Skilled Rehab placement at discharge. Pt cooperative; agreeable to therapeutic recommendations and plan of care.         Basic Mobility 6-click:  Rollin = Total, A lot = 2, A little = 3; 4 = None  Supine>Sit:   1 = Total, A lot = 2, A little = 3; 4 = None   Sit>Stand with arms:  1 = Total, A lot = 2, A little = 3; 4 = None  Bed>Chair:   1 = Total, A lot = 2, A little = 3; 4 = None  Ambulate in room:  1 = Total, A lot = 2, A little = 3; 4 = None  3-5 Steps with railin = Total, A lot = 2, A little = 3; 4 = None  Score: 8    Modified [place]: N/A = No pre-op stroke/TIA    Post-Tx Position: Supine with HOB Elevated, Alarms activated, and Call light and personal items within reach  PPE: gloves    Therapy Charges for Today       Code Description Service Date Service Provider Modifiers Qty    26911727609 HC PT NEUROMUSC RE EDUCATION EA 15 MIN 2025 Serg Richardson PTA GP 1    58399864569 HC PT THER PROC EA 15 MIN 2025 Serg Richardson PTA GP 1    54101023186 HC PT THERAPEUTIC ACT EA 15 MIN 2025 Serg Richardson PTA GP 2           PT Charges       Row Name 25 1522             Time Calculation    Start Time 1340  -UN      Stop Time 1419  -UN      Time Calculation (min) 39 min  -UN      PT Received On 25  -UN      PT - Next Appointment 25  -UN         Time Calculation- PT    Total Timed Code Minutes- PT 39 minute(s)  -UN                User Key  (r) = Recorded By, (t) = Taken By, (c) = Cosigned By      Initials Name Provider Type    UN Serg Richardson PTA Physical Therapist Assistant                    "

## 2025-06-05 ENCOUNTER — TELEPHONE (OUTPATIENT)
Dept: NEUROSURGERY | Facility: CLINIC | Age: 85
End: 2025-06-05
Payer: MEDICARE

## 2025-06-05 VITALS
HEIGHT: 60 IN | RESPIRATION RATE: 17 BRPM | TEMPERATURE: 97.9 F | BODY MASS INDEX: 31.77 KG/M2 | SYSTOLIC BLOOD PRESSURE: 108 MMHG | HEART RATE: 65 BPM | DIASTOLIC BLOOD PRESSURE: 69 MMHG | OXYGEN SATURATION: 97 % | WEIGHT: 161.82 LBS

## 2025-06-05 PROCEDURE — 97535 SELF CARE MNGMENT TRAINING: CPT | Performed by: OCCUPATIONAL THERAPIST

## 2025-06-05 PROCEDURE — 97530 THERAPEUTIC ACTIVITIES: CPT | Performed by: OCCUPATIONAL THERAPIST

## 2025-06-05 PROCEDURE — 99024 POSTOP FOLLOW-UP VISIT: CPT

## 2025-06-05 PROCEDURE — 97530 THERAPEUTIC ACTIVITIES: CPT

## 2025-06-05 PROCEDURE — 97112 NEUROMUSCULAR REEDUCATION: CPT

## 2025-06-05 PROCEDURE — 97110 THERAPEUTIC EXERCISES: CPT | Performed by: OCCUPATIONAL THERAPIST

## 2025-06-05 PROCEDURE — 97110 THERAPEUTIC EXERCISES: CPT

## 2025-06-05 RX ORDER — SERTRALINE HYDROCHLORIDE 100 MG/1
100 TABLET, FILM COATED ORAL EVERY EVENING
Qty: 30 TABLET | Refills: 1 | Status: SHIPPED | OUTPATIENT
Start: 2025-06-05

## 2025-06-05 RX ORDER — AMOXICILLIN 250 MG
2 CAPSULE ORAL 2 TIMES DAILY
Start: 2025-06-05

## 2025-06-05 RX ORDER — MIDODRINE HYDROCHLORIDE 10 MG/1
10 TABLET ORAL EVERY 8 HOURS
Qty: 90 TABLET | Refills: 1 | Status: SHIPPED | OUTPATIENT
Start: 2025-06-05

## 2025-06-05 RX ORDER — METOPROLOL SUCCINATE 25 MG/1
12.5 TABLET, EXTENDED RELEASE ORAL
Qty: 15 TABLET | Refills: 1 | Status: SHIPPED | OUTPATIENT
Start: 2025-06-06

## 2025-06-05 RX ORDER — ATORVASTATIN CALCIUM 40 MG/1
40 TABLET, FILM COATED ORAL DAILY
Qty: 30 TABLET | Refills: 1 | Status: SHIPPED | OUTPATIENT
Start: 2025-06-06

## 2025-06-05 RX ORDER — POLYETHYLENE GLYCOL 3350 17 G/17G
17 POWDER, FOR SOLUTION ORAL DAILY PRN
Start: 2025-06-05

## 2025-06-05 RX ORDER — BISACODYL 10 MG
10 SUPPOSITORY, RECTAL RECTAL DAILY PRN
Start: 2025-06-05

## 2025-06-05 RX ORDER — POLYETHYLENE GLYCOL 3350 17 G/17G
17 POWDER, FOR SOLUTION ORAL DAILY
Qty: 30 PACKET | Refills: 3 | Status: SHIPPED | OUTPATIENT
Start: 2025-06-06

## 2025-06-05 RX ORDER — PSEUDOEPHEDRINE HCL 30 MG
100 TABLET ORAL 2 TIMES DAILY
Qty: 60 CAPSULE | Refills: 1 | Status: SHIPPED | OUTPATIENT
Start: 2025-06-05

## 2025-06-05 RX ORDER — LEVOTHYROXINE SODIUM 75 MCG
75 TABLET ORAL EVERY MORNING
Qty: 30 TABLET | Refills: 1 | Status: SHIPPED | OUTPATIENT
Start: 2025-06-05

## 2025-06-05 RX ORDER — OXYCODONE HYDROCHLORIDE 10 MG/1
10 TABLET ORAL EVERY 4 HOURS PRN
Qty: 20 EACH | Refills: 0 | Status: SHIPPED | OUTPATIENT
Start: 2025-06-05

## 2025-06-05 RX ORDER — HYDROCODONE BITARTRATE AND ACETAMINOPHEN 7.5; 325 MG/1; MG/1
1 TABLET ORAL EVERY 6 HOURS PRN
Qty: 20 TABLET | Refills: 0 | Status: SHIPPED | OUTPATIENT
Start: 2025-06-05

## 2025-06-05 RX ORDER — PREGABALIN 200 MG/1
200 CAPSULE ORAL EVERY 8 HOURS SCHEDULED
Qty: 30 CAPSULE | Refills: 1 | Status: SHIPPED | OUTPATIENT
Start: 2025-06-05

## 2025-06-05 RX ORDER — METHOCARBAMOL 750 MG/1
750 TABLET, FILM COATED ORAL 3 TIMES DAILY
Qty: 90 TABLET | Refills: 1 | Status: SHIPPED | OUTPATIENT
Start: 2025-06-05

## 2025-06-05 RX ORDER — ACETAMINOPHEN 325 MG/1
650 TABLET ORAL EVERY 4 HOURS PRN
Start: 2025-06-05

## 2025-06-05 RX ORDER — BISACODYL 5 MG/1
5 TABLET, DELAYED RELEASE ORAL DAILY PRN
Start: 2025-06-05

## 2025-06-05 RX ORDER — ZOLPIDEM TARTRATE 5 MG/1
5 TABLET ORAL NIGHTLY PRN
Qty: 10 TABLET | Refills: 0 | Status: SHIPPED | OUTPATIENT
Start: 2025-06-05

## 2025-06-05 RX ORDER — ASPIRIN 81 MG/1
81 TABLET ORAL NIGHTLY
Qty: 30 TABLET | Refills: 3 | Status: SHIPPED | OUTPATIENT
Start: 2025-06-05

## 2025-06-05 RX ADMIN — ATORVASTATIN CALCIUM 40 MG: 40 TABLET, FILM COATED ORAL at 08:33

## 2025-06-05 RX ADMIN — HYDROCODONE BITARTRATE AND ACETAMINOPHEN 1 TABLET: 7.5; 325 TABLET ORAL at 04:47

## 2025-06-05 RX ADMIN — SENNOSIDES AND DOCUSATE SODIUM 2 TABLET: 8.6; 5 TABLET ORAL at 08:33

## 2025-06-05 RX ADMIN — Medication 10 ML: at 08:37

## 2025-06-05 RX ADMIN — MIDODRINE HYDROCHLORIDE 10 MG: 5 TABLET ORAL at 13:49

## 2025-06-05 RX ADMIN — LEVOTHYROXINE SODIUM 75 MCG: 0.07 TABLET ORAL at 05:58

## 2025-06-05 RX ADMIN — HYDROCODONE BITARTRATE AND ACETAMINOPHEN 1 TABLET: 7.5; 325 TABLET ORAL at 19:02

## 2025-06-05 RX ADMIN — OXYCODONE 10 MG: 5 TABLET ORAL at 14:02

## 2025-06-05 RX ADMIN — MIDODRINE HYDROCHLORIDE 10 MG: 5 TABLET ORAL at 05:58

## 2025-06-05 RX ADMIN — PREGABALIN 200 MG: 100 CAPSULE ORAL at 13:48

## 2025-06-05 RX ADMIN — METHOCARBAMOL TABLETS 750 MG: 750 TABLET, COATED ORAL at 14:02

## 2025-06-05 RX ADMIN — POLYETHYLENE GLYCOL 3350 17 G: 17 POWDER, FOR SOLUTION ORAL at 08:33

## 2025-06-05 RX ADMIN — APIXABAN 5 MG: 5 TABLET, FILM COATED ORAL at 08:33

## 2025-06-05 RX ADMIN — DOCUSATE SODIUM 100 MG: 100 CAPSULE, LIQUID FILLED ORAL at 08:33

## 2025-06-05 RX ADMIN — HYDROCODONE BITARTRATE AND ACETAMINOPHEN 1 TABLET: 7.5; 325 TABLET ORAL at 12:15

## 2025-06-05 RX ADMIN — PREGABALIN 200 MG: 100 CAPSULE ORAL at 05:58

## 2025-06-05 RX ADMIN — METHOCARBAMOL TABLETS 750 MG: 750 TABLET, COATED ORAL at 08:33

## 2025-06-05 RX ADMIN — METOPROLOL SUCCINATE 12.5 MG: 25 TABLET, EXTENDED RELEASE ORAL at 08:33

## 2025-06-05 RX ADMIN — OXYCODONE 10 MG: 5 TABLET ORAL at 08:33

## 2025-06-05 NOTE — PLAN OF CARE
"Assessment: Domonique See presents with functional mobility impairments which indicate the need for skilled intervention. This date, pt demonstrates improved BLE motor control and able to complete total of three STS this session with Max Ax2. Pt left in recliner chair with Miguel pad under to allow nursing staff to utilize Miguel Lift to transfer patient back to bed when ready. Will continue to follow and progress as tolerated.     Plan/Recommendations:   If medically appropriate, Moderate Intensity Therapy recommended post-acute care. This is recommended as therapy feels the patient would require 3-4 days per week and wouldn't tolerate \"3 hour daily\" rehab intensity. SNF would be the preferred choice. If the patient does not agree to SNF, arrange HH or OP depending on home bound status. If patient is medically complex, consider LTACH. Pt requires no DME at discharge.     Pt desires Skilled Rehab placement at discharge. Pt cooperative; agreeable to therapeutic recommendations and plan of care.     "

## 2025-06-05 NOTE — THERAPY TREATMENT NOTE
Subjective: Pt agreeable to therapeutic plan of care. Pt stating she would like to try to get on BSC to have a BM.   Cognition: oriented to Person, Place, Time, and Situation    Objective:     Precautions - cervical collar, spinal precautions.     Bed Mobility: Max-A and Assist x 2 for sup>sit.   Functional Transfers: Heavy Max-A and Assist x 2 using Ilene Stedy lift.      Balance: sitting EOB CGA-min A with pt tending to lean slightly to her left. Pt requiring v.c & tactile cues with proprioceptive input to correct to midline.  Pt requiring max A X2 to obtain upright standing posture in Ilene Stedy lift   Functional Ambulation: N/A or Not attempted.    Toileting: Max-A and Assist x 2 using BSC & Ilene Stedy lift to support pt   ADL Position: supported sitting and supported standing  ADL Comments:     Therapeutic Exercise - 10 Reps B UE AROM supported sitting / chair. Pt able to complete BUE HEP without an inc in neck or shoulder pain.     Vitals: WNL    Pain: Pt did not report any pain    Education: Provided education on the importance of mobility in the acute care setting, Verbal/Tactile Cues, ADL training, and Transfer Training    Assessment: Domonique See presents with ADL impairments affecting function including balance, endurance / activity tolerance, postural / trunk control, range of motion (ROM), and strength. Used Ilene Stedy to get pt on BSC & up to recliner this date. Pt is a heavy max A x2 to complete sit<>stand this date. She is demo inc BLE AROM this date. A bert pad was placed in recliner for staff to use bert lift for return to bed. Pt demo improving sitting balance on EOB & is making steady progress. Demonstrated functioning below baseline abilities indicate the need for continued skilled intervention while inpatient. Tolerating session today without incident. Will continue to follow and progress as tolerated.     Plan/Recommendations:   Moderate Intensity Therapy recommended post-acute care.  "This is recommended as therapy feels the patient would require 3-4 days per week and wouldn't tolerate \"3 hour daily\" rehab intensity. SNF would be the preferred choice. If the patient does not agree to SNF, arrange HH or OP depending on home bound status. If patient is medically complex, consider LTACH.. Pt requires no DME at discharge.     Pt desires Skilled Rehab placement at discharge. Pt cooperative; agreeable to therapeutic recommendations and plan of care.     Modified [place]: N/A = No pre-op stroke/TIA    Post-Tx Position: Up in Chair, Alarms activated, and Call light and personal items within reach  PPE: gloves       Time Calculation- OT       Row Name 06/05/25 1253             Time Calculation- OT    OT Start Time 0943  -DT      OT Stop Time 1021  -DT      OT Time Calculation (min) 38 min  -DT      Total Timed Code Minutes- OT 38 minute(s)  -DT      OT Received On 06/05/25  -DT      OT - Next Appointment 06/06/25  -DT                User Key  (r) = Recorded By, (t) = Taken By, (c) = Cosigned By      Initials Name Provider Type    Carmen Brand, OT Occupational Therapist                    "

## 2025-06-05 NOTE — CASE MANAGEMENT/SOCIAL WORK
"Physicians Statement of Medical Necessity for  Ambulance Transportation    GENERAL INFORMATION     Name: Domonique See  YOB: 1940  Medicare #:     [id number]     Transport Date: 6/5/2025 (Valid for round trips this date, or for scheduled repetitive trips for 60 days from the date signed below.)  Origin: [facility]   Destination: [facility] [address] [place] [address]   Is the Patient's stay covered under Medicare Part A (PPS/DRG?)Yes  Closest appropriate facility? Yes  If this a hosp-hosp transfer? No  Is this a hospice patient? No    MEDICAL NECESSITY QUESTIONAIRE    Ambulance Transportation is medically necessary only if other means of transportation are contraindicated or would be potentially harmful to the patient.  To meet this requirement, the patient must be either \"bed confined\" or suffer from a condition such that transport by means other than an ambulance is contraindicated by the patient's condition.  The following questions must be answered by the healthcare professional signing below for this form to be valid:     1) Describe the MEDICAL CONDITION (physical and/or mental) of this patient AT THE TIME OF AMBULANCE TRANSPORT that requires the patient to be transported in an ambulance, and why transport by other means is contraindicated by the patient's condition: UNABLE TO AMBULATE,, POOR TRUNK CONTROL  Past Medical History:   Diagnosis Date    Anemia     Aortic insufficiency 03/21/2025    Asthma     CHF (congestive heart failure)     Cholecystitis 09/02/2011    Congenital heart disease     Coronary artery disease 2013    Deep vein thrombosis (DVT) 11/20/2017    GERD (gastroesophageal reflux disease)     Heart murmur Don't know    Hx of hypertrophic cardiomyopathy     Hypertension     Hypertrophic obstructive cardiomyopathy 11/10/2017    Low back pain     Lumbosacral disc disease     Mitral regurgitation 03/21/2025    Mixed hyperlipidemia " "10/06/2017    Morbid obesity 09/21/2020    Peptic ulceration     Persistent atrial fibrillation 01/10/2023    Primary osteoarthritis of both knees 02/08/2021    Primary osteoarthritis of right knee 09/21/2020    Seizures     Sleep apnea 2017    TIA (transient ischemic attack)       Past Surgical History:   Procedure Laterality Date    ABDOMINAL HERNIA REPAIR      ABLATION OF DYSRHYTHMIC FOCUS  01-    BACK SURGERY      BREAST AUGMENTATION      CARDIAC CATHETERIZATION      CARDIAC ELECTROPHYSIOLOGY PROCEDURE N/A 01/16/2023    Procedure: Ablation atrial fibrillation and flutter, cryo Duong and Ramón aware;  Surgeon: Leeanne Pleitez MD;  Location: [facility] CATH INVASIVE LOCATION;  Service: Cardiovascular;  Laterality: N/A;    CARDIAC PACEMAKER PLACEMENT  03/16/2017    Dual Chamber    CERVICAL RIB RESECTION Bilateral     CERVICAL RIB RESECTION Bilateral 1963    bilateral cervical ribs removed - extra ribs located and causing pain    CHOLECYSTECTOMY      COLONOSCOPY      CORONARY ARTERY BYPASS GRAFT  2013    EPIDURAL BLOCK      NECK SURGERY      SPINAL FUSION      THORACIC DECOMPRESSION POSTERIOR FUSION  06/04/2014    L3-5 & S1    TRIGGER POINT INJECTION      UPPER GASTROINTESTINAL ENDOSCOPY        2) Is this patient \"bed confined\" as defined below?Yes   To be \"bed confined\" the patient must satisfy all three of the following criteria:  (1) unable to get up from bed without assistance; AND (2) unable to ambulate;  AND (3) unable to sit in a chair or wheelchair.  3) Can this patient safely be transported by car or wheelchair van (I.e., may safely sit during transport, without an attendant or monitoring?)No   4. In addition to completing questions 1-3 above, please check any of the following conditions that apply*:          *Note: supporting documentation for any boxes checked must be maintained in the patient's medical records Moderate/severe pain on movement, Requires oxygen - unable to self administer, " Hemodynamic monitoring required en route, and Other UNABLE TO AMBULATE. POOR TRUNK CONTROL       SIGNATURE OF PHYSICIAN OR OTHER AUTHORIZED HEALTHCARE PROFESSIONAL    I certify that the above information is true and correct based on my evaluation of this patient, and represent that the patient requires transport by ambulance and that other forms of transport are contraindicated.  I understand that this information will be used by the Centers for Medicare and Medicaid Services (CMS) to support the determiniation of medical necessity for ambulance services, and I represent that I have personal knowledge of the patient's condition at the time of transport.    X   If this box is checked, I also certify that the patient is physically or mentally incapable of signing the ambulance service's claim form and that the institution with which I am affiliated has furnished care, services or assistance to the patient.  My signature below is made on behalf of the patient pursuant to 42 .36(b)(4). In accordance with 42 .37, the specific reason(s) that the patient is physically or mentally incapable of signing the claim for is as follows: NA    Signature of Physician or Healthcare Professional   ZAIDA DAVIS MD/GONZALO NAZARIO RN CASE MANAGER X Date/Time:   6/5/2025 1600     (For Scheduled repetitive transport, this form is not valid for transports performed more than 60 days after this date).                                                                                                                                            --------------------------------------------------------------------------------------------  Printed Name and Credentials of Physician or Authorized Healthcare Professional     *Form must be signed by patient's attending physician for scheduled, repetitive transports,.  For non-repetitive ambulance transports, if unable to obtain the signature of the attending physician, any of the following may  sign (please select below):     Physician  Clinical Nurse Specialist  Registered Nurse     Physician Assistant  Discharge Planner  Licensed Practical Nurse     Nurse Practitioner X

## 2025-06-05 NOTE — THERAPY TREATMENT NOTE
"Subjective: Pt agreeable to therapeutic plan of care.    Objective:     Precautions - cervical collar and spinal precautions     Bed mobility - Supine to sitting Max Ax2    Transfers - STS from EOB, BSC Max Ax2 utilizing Ilene Stedy. Utilized Ilene Stedy to transfer patient to recliner chair.     Neuro - Unsupported sitting balance initially requiring Mod A with L side and anterior lean. Max cueing required to correct midline posturing, and able to progress to Min A for balance.   Mod-Max A required for upright balance while on Ilene Stedy.     Ambulation - 0 feet N/A or Not attempted.    Therapeutic Exercise - 10 Reps B LE AAROM lying supine    Vitals: WNL on 2L    Education: Provided education on the importance of mobility in the acute care setting, Verbal/Tactile Cues, Transfer Training, and Post-Op Precautions    Assessment: Domonique See presents with functional mobility impairments which indicate the need for skilled intervention. This date, pt demonstrates improved BLE motor control and able to complete total of three STS this session with Max Ax2. Pt left in recliner chair with Miguel pad under to allow nursing staff to utilize Miguel Lift to transfer patient back to bed when ready. Will continue to follow and progress as tolerated.     Plan/Recommendations:   If medically appropriate, Moderate Intensity Therapy recommended post-acute care. This is recommended as therapy feels the patient would require 3-4 days per week and wouldn't tolerate \"3 hour daily\" rehab intensity. SNF would be the preferred choice. If the patient does not agree to SNF, arrange HH or OP depending on home bound status. If patient is medically complex, consider LTACH. Pt requires no DME at discharge.     Pt desires Skilled Rehab placement at discharge. Pt cooperative; agreeable to therapeutic recommendations and plan of care.         Basic Mobility 6-click:  Rollin = Total, A lot = 2, A little = 3; 4 = None  Supine>Sit:  "  1 = Total, A lot = 2, A little = 3; 4 = None   Sit>Stand with arms:  1 = Total, A lot = 2, A little = 3; 4 = None  Bed>Chair:   1 = Total, A lot = 2, A little = 3; 4 = None  Ambulate in room:  1 = Total, A lot = 2, A little = 3; 4 = None  3-5 Steps with railin = Total, A lot = 2, A little = 3; 4 = None  Score: 8    Modified Shoaib: N/A = No pre-op stroke/TIA    Post-Tx Position: Up in Chair, Alarms activated, and Call light and personal items within reach  PPE: gloves    Therapy Charges for Today       Code Description Service Date Service Provider Modifiers Qty    90772739897 HC PT NEUROMUSC RE EDUCATION EA 15 MIN 2025 Serg Richardson, FAUSTINO GP 1    48341082935 HC PT THER PROC EA 15 MIN 2025 Serg Richardson, FAUSTINO GP 1    17244598070 HC PT THERAPEUTIC ACT EA 15 MIN 2025 Serg Richardson, FAUSTINO GP 2    32189844108 HC PT THERAPEUTIC ACT EA 15 MIN 2025 Serg Richardson, PTA GP 2    06853993314 HC PT NEUROMUSC RE EDUCATION EA 15 MIN 2025 Serg Richardson, PTA GP 1    44846531553 HC PT THER PROC EA 15 MIN 2025 Serg Richardson, PTA GP 1           PT Charges       Row Name 25 1136             Time Calculation    Start Time 0940  -      Stop Time 1021  -      Time Calculation (min) 41 min  -UN      PT Received On 25  -UN      PT - Next Appointment 25  -         Time Calculation- PT    Total Timed Code Minutes- PT 41 minute(s)  -UN                User Key  (r) = Recorded By, (t) = Taken By, (c) = Cosigned By      Initials Name Provider Type    Serg Fontaine PTA Physical Therapist Assistant

## 2025-06-05 NOTE — DISCHARGE PLACEMENT REQUEST
"Geoffrey See (85 y.o. Female)       Date of Birth   1940    Social Security Number       Address   [address] [place] [facility]    Home Phone   [phone]    MRN   [id number]       [place]   M[employer]ism    Marital Status                               Admission Date   5/23/2025    Admission Type   Emergency    Admitting Provider   Tatiana Gilliland MD    Attending Provider   Tatiana Gilliland MD    Department, Room/Bed   [facility], 4105/1       Discharge Date       Discharge Disposition       Discharge Destination                                 Attending Provider: Tatiana Gilliland MD    Allergies: No Known Allergies    Isolation: None   Infection: None   Code Status: CPR    Ht: 152.4 cm (60\")   Wt: 73.4 kg (161 lb 13.1 oz)    Admission Cmt: None   Principal Problem: Cervical neuralgia [M54.12]                   Active Insurance as of 5/23/2025       Primary Coverage       Payor Plan Insurance Group Employer/Plan Group    ANTHEM MEDICARE REPLACEMENT ANTHEM MEDICARE ADVANTAGE PPO [id number]       Payor Plan Address Payor Plan Phone Number Payor Plan Fax Number Effective Dates    [address] [phone]  4/1/2024 - None Entered    [facility] [id number]         Subscriber Name Subscriber Birth Date Member ID       GEOFFREY SEE 1940 [id number]                     Emergency Contacts        (Rel.) Home Phone Work Phone Mobile Phone    Boone See (Son) -- -- [phone]    NILTON SEE (Relative) [phone]    AMADEO SEE (Son) [phone]                 History & Physical        Tatiana Gilliland MD at 05/23/25 3366              Patient Care Team:  Anish Lew APRN as PCP - Evgeny Machuca MD as Consulting Physician (Cardiology)  Leeanne Pleitez MD as Consulting Physician (Cardiology)  Radha Owusu APRN as Nurse Practitioner (Nurse " Practitioner)  Kathe Muñoz MD as Consulting Physician (Nephrology)    Chief complaint leg weakness    Subjective    Patient is a 85 y.o. female with cervical spinal stenosis, permanent atrial fib, chronic coronary artery disease and hypertrophic cardiomyopathy who presents with worsening bilateral lower extremity weakness.  She is followed by Dr. Bernal for her cervical disc disease.  Surgery has been considered, but her complicated cardiac history has presented a barrier.  She lives with family, who provides 24 hour care.  Over the last 3 days her mobility has worsened so that she has severe weakness in her legs - can not walk.  She complains of constipation.  No incontinence.      Review of Systems   Constitutional:  Positive for activity change. Negative for appetite change, chills, fatigue and fever.   HENT:  Negative for congestion and facial swelling.    Eyes:  Negative for visual disturbance.   Respiratory:  Negative for cough, shortness of breath, wheezing and stridor.    Cardiovascular:  Negative for chest pain, palpitations and leg swelling.   Gastrointestinal:  Positive for constipation. Negative for abdominal pain, diarrhea and nausea.   Endocrine: Negative for polyuria.   Genitourinary:  Negative for dysuria.   Musculoskeletal:  Positive for arthralgias, gait problem, myalgias and neck pain.   Skin:  Negative for rash and wound.   Neurological:  Negative for light-headedness and numbness.   Psychiatric/Behavioral:  Negative for confusion.           History  Past Medical History:   Diagnosis Date    Anemia     Asthma     CHF (congestive heart failure)     Congenital heart disease     Coronary artery disease 2013    Deep vein thrombosis (DVT) 11/20/2017    GERD (gastroesophageal reflux disease)     Heart murmur Don't know    Hx of hypertrophic cardiomyopathy     Hypertension     Low back pain     Lumbosacral disc disease     Peptic ulceration     Persistent atrial fibrillation 01/10/2023     Primary osteoarthritis of both knees 02/08/2021    Primary osteoarthritis of right knee 09/21/2020    Seizures     Sleep apnea 2017    TIA (transient ischemic attack)      Past Surgical History:   Procedure Laterality Date    ABDOMINAL HERNIA REPAIR      ABLATION OF DYSRHYTHMIC FOCUS  01-    BACK SURGERY      BREAST AUGMENTATION      CARDIAC CATHETERIZATION      CARDIAC ELECTROPHYSIOLOGY PROCEDURE N/A 01/16/2023    Procedure: Ablation atrial fibrillation and flutter, cryo Duong and Ramón aware;  Surgeon: Leeanne Pleitez MD;  Location: [facility] INVASIVE LOCATION;  Service: Cardiovascular;  Laterality: N/A;    CARDIAC PACEMAKER PLACEMENT  03/16/2017    Dual Chamber    CERVICAL RIB RESECTION Bilateral     CERVICAL RIB RESECTION Bilateral 1963    bilateral cervical ribs removed - extra ribs located and causing pain    CHOLECYSTECTOMY      COLONOSCOPY      CORONARY ARTERY BYPASS GRAFT  2013    EPIDURAL BLOCK      NECK SURGERY      SPINAL FUSION      THORACIC DECOMPRESSION POSTERIOR FUSION  06/04/2014    L3-5 & S1    TRIGGER POINT INJECTION      UPPER GASTROINTESTINAL ENDOSCOPY       Family History   Problem Relation Age of Onset    Colon cancer Mother     Heart disease Mother     Hypertension Mother     Arthritis Mother     Cancer Mother     Diabetes Mother     Kidney disease Mother     Colon cancer Brother     Heart attack Brother     Colon cancer Daughter     Arthritis Daughter     Arthritis Father     Hearing loss Father     Arthritis Sister     Hypertension Brother     Arthritis Brother     Cancer Brother     Stroke Brother     Arthritis Son     Arthritis Son     Cancer Sister     Diabetes Sister     Cancer Brother     Hypertension Sister     Kidney disease Sister      Social History     Tobacco Use    Smoking status: Former     Current packs/day: 0.00     Average packs/day: 1 pack/day for 8.0 years (8.0 ttl pk-yrs)     Types: Cigarettes     Start date: 1/1/1977     Quit date: 1/1/1985     Years since  quittin.4     Passive exposure: Past    Smokeless tobacco: Never    Tobacco comments:     Quit 1985   Vaping Use    Vaping status: Never Used   Substance Use Topics    Alcohol use: Never     Comment: 6 drinks a year in social settings    Drug use: Never     Medications Prior to Admission   Medication Sig Dispense Refill Last Dose/Taking    albuterol sulfate  (90 Base) MCG/ACT inhaler Inhale.       apixaban (Eliquis) 5 MG tablet tablet Take 1 tablet by mouth 2 (Two) Times a Day. 180 tablet 3     aspirin 81 MG EC tablet Take 1 tablet by mouth Every Night. 90 tablet 3     atorvastatin (LIPITOR) 40 MG tablet        budesonide-formoterol (Symbicort) 80-4.5 MCG/ACT inhaler Inhale 2 puffs.       diclofenac (VOLTAREN) 50 MG EC tablet Take 2 tablets by mouth 2 (Two) Times a Day.       esomeprazole (nexIUM) 20 MG capsule Take 1 capsule by mouth.       fluticasone (FLONASE) 50 MCG/ACT nasal spray Administer 1 spray into the nostril(s) as directed by provider.       guaiFENesin (MUCINEX) 600 MG 12 hr tablet Take 1 tablet by mouth.       Ibuprofen 3 %, Gabapentin 10 %, Baclofen 2 %, lidocaine 4 %, Ketamine HCl 4 % Apply 1-2 g topically to the appropriate area as directed 3 (Three) to 4 (Four) times daily. 90 g 5     ipratropium-albuterol (DUO-NEB) 0.5-2.5 mg/3 ml nebulizer Take 3 mL by nebulization Every 4 (Four) Hours As Needed for Wheezing. 360 mL 1     levETIRAcetam (KEPPRA) 250 MG tablet        lisinopril (PRINIVIL,ZESTRIL) 10 MG tablet Take 0.5 tablets by mouth Daily.       metoprolol succinate XL (TOPROL-XL) 25 MG 24 hr tablet Take 1 tablet by mouth Daily.       nystatin (MYCOSTATIN) 725996 UNIT/GM powder Apply  topically to the appropriate area as directed Every 12 (Twelve) Hours.       oxyCODONE-acetaminophen (PERCOCET)  MG per tablet Take 1 tablet by mouth Every 4 (Four) Hours As Needed for Moderate Pain.       polyethylene glycol (MIRALAX) 17 g packet Take 17 g by mouth Daily As Needed (Use if  senna-docusate is ineffective).       pregabalin (LYRICA) 100 MG capsule Take 2 capsules by mouth 3 (Three) Times a Day.       sennosides-docusate (PERICOLACE) 8.6-50 MG per tablet Take 2 tablets by mouth Daily.       sertraline (ZOLOFT) 100 MG tablet Take 1 tablet by mouth Every Evening. Indications: Major Depressive Disorder       simvastatin (ZOCOR) 5 MG tablet Take 2 tablets by mouth.       Synthroid 75 MCG tablet Take 1 tablet by mouth Every Morning.       zolpidem (AMBIEN) 10 MG tablet Take 1 tablet by mouth At Night As Needed for Sleep.        Allergies:  Patient has no known allergies.    Objective    Vital Signs  Temp:  [98 °F (36.7 °C)] 98 °F (36.7 °C)  Heart Rate:  [61-79] 74  Resp:  [18] 18  BP: ()/(37-69) 91/67     Physical Exam:      General Appearance:    Alert, cooperative, in no acute distress, overweight, younger than stated age   Head:    Normocephalic, without obvious abnormality, atraumatic   Eyes:            Lids and lashes normal, conjunctivae and sclerae normal, no   icterus, no pallor, corneas clear, PERRLA   Ears:    Ears appear intact with no abnormalities noted   Throat:   No oral lesions, no thrush, oral mucosa moist   Neck:   No adenopathy, supple, trachea midline, no thyromegaly, no   carotid bruit, no JVD   Lungs:     Clear to auscultation,respirations regular, even and                  unlabored    Heart:    Regular rhythm and normal rate, normal S1 and S2, no            murmur, no gallop, no rub, no click   Chest Wall:    No abnormalities observed   Abdomen:     Normal bowel sounds, no masses, no organomegaly, soft        non-tender, non-distended, no guarding, no rebound                tenderness   Extremities: 2+ strength in bilateral lower extremities, nonpitting edema   Pulses:   Pulses palpable and equal bilaterally   Skin:   No bleeding, bruising or   Lymph nodes:   No palpable adenopathy   Neurologic:   Cranial nerves 2 - 12 grossly intact, sensation intact, DTR        present and equal bilaterally       Results Review:     Imaging Results (Last 24 Hours)       Procedure Component Value Units Date/Time    CT Abdomen Pelvis Without Contrast [[id number]] Collected: 05/23/25 1344     Updated: 05/23/25 1352    Narrative:      CT ABDOMEN PELVIS WO CONTRAST    Date of Exam: 5/23/2025 1:30 PM EDT    Indication: conspitation, pain.    Comparison: None available.    Technique: Axial CT images were obtained of the abdomen and pelvis without the administration of contrast. Sagittal and coronal reconstructions were performed.  Automated exposure control and iterative reconstruction methods were used.      Findings:    Liver: The liver is unremarkable in morphology. Evaluation for focal liver lesions is limited without IV contrast. No biliary dilation is seen.    Gallbladder: Surgically absent.    Pancreas: Unremarkable.    Spleen: Unremarkable.    Adrenal glands: 13 mm low-density nodule of the lateral limb of the left adrenal gland, likely an adenoma. Right adrenal gland is unremarkable.    Genitourinary tract: Kidneys are unremarkable. No hydronephrosis is seen. No urinary tract calculi are seen. The visualized portions of the ureters and urinary bladder appear unremarkable. Limited evaluation of the pelvic organs due to lack of IV   contrast administration.    Gastrointestinal tract: Limited evaluation of the hollow viscera due to lack of IV contrast administration. There is no evidence of bowel obstruction. Moderate stool within the proximal/mid colon.    Appendix: The appendix is borderline prominent in caliber, measuring approximately 8 mm distally. No periappendiceal stranding is seen. This is felt to be within normal limits.    Other findings: No free air or free fluid is identified. No pathologically enlarged lymph nodes are seen. Vascular calcifications are seen.    Bones and soft tissues: No acute osseous lesion is identified. Bones are demineralized. There are degenerative changes  within the spine with grade 1 retrolisthesis at L3-L4. Status post PLIF at L4-L5. Right-sided fat-containing inguinal hernia noted.   Bilateral breast implants are partially visualized.    Lung bases: The visualized lung bases are clear. Cardiac pacemaker leads are noted.      Impression:      Impression:  1.Examination is limited due to lack of IV contrast administration.  2.No acute abnormality identified within the abdomen or pelvis.  3.The appendix is borderline prominent in caliber, measuring approximately 8 mm distally. No periappendiceal stranding is seen. This is felt to be within normal limits.  4.Moderate stool within the proximal/mid colon.  5.13 mm low-density nodule of the lateral limb of the left adrenal gland, likely an adenoma.  6.Additional findings as detailed above.      Electronically Signed: Isaac Pressley MD    5/23/2025 1:50 PM EDT    Workstation ID: [id number]             Lab Results (last 24 hours)       Procedure Component Value Units Date/Time    High Sensitivity Troponin T 1Hr [[id number]]  (Abnormal) Collected: 05/23/25 1511    Specimen: Blood Updated: 05/23/25 1541     HS Troponin T 16 ng/L      Troponin T Numeric Delta -4 ng/L      Troponin T % Delta -20    Narrative:      High Sensitive Troponin T Reference Range:  <14.0 ng/L- Negative Female for AMI  <22.0 ng/L- Negative Male for AMI  >=14 - Abnormal Female indicating possible myocardial injury.  >=22 - Abnormal Male indicating possible myocardial injury.   Clinicians would have to utilize clinical acumen, EKG, Troponin, and serial changes to determine if it is an Acute Myocardial Infarction or myocardial injury due to an underlying chronic condition.         Comprehensive Metabolic Panel [[id number]]  (Abnormal) Collected: 05/23/25 1403    Specimen: Blood Updated: 05/23/25 1437     Glucose 86 mg/dL      BUN 22 mg/dL      Creatinine 0.77 mg/dL      Sodium 139 mmol/L      Potassium 4.7 mmol/L      Chloride 104 mmol/L      CO2 26.4 mmol/L       Calcium 9.2 mg/dL      Total Protein 5.6 g/dL      Albumin 3.6 g/dL      ALT (SGPT) 9 U/L      AST (SGOT) 20 U/L      Alkaline Phosphatase 94 U/L      Total Bilirubin 0.4 mg/dL      Globulin 2.0 gm/dL      A/G Ratio 1.8 g/dL      BUN/Creatinine Ratio 28.6     Anion Gap 8.6 mmol/L      eGFR 75.2 mL/min/1.73     Narrative:      GFR Categories in Chronic Kidney Disease (CKD)              GFR Category          GFR (mL/min/1.73)    Interpretation  G1                    90 or greater        Normal or high (1)  G2                    60-89                Mild decrease (1)  G3a                   45-59                Mild to moderate decrease  G3b                   30-44                Moderate to severe decrease  G4                    15-29                Severe decrease  G5                    14 or less           Kidney failure    (1)In the absence of evidence of kidney disease, neither GFR category G1 or G2 fulfill the criteria for CKD.    eGFR calculation 2021 CKD-EPI creatinine equation, which does not include race as a factor    High Sensitivity Troponin T [[id number]]  (Abnormal) Collected: 05/23/25 1403    Specimen: Blood Updated: 05/23/25 1437     HS Troponin T 20 ng/L     Narrative:      High Sensitive Troponin T Reference Range:  <14.0 ng/L- Negative Female for AMI  <22.0 ng/L- Negative Male for AMI  >=14 - Abnormal Female indicating possible myocardial injury.  >=22 - Abnormal Male indicating possible myocardial injury.   Clinicians would have to utilize clinical acumen, EKG, Troponin, and serial changes to determine if it is an Acute Myocardial Infarction or myocardial injury due to an underlying chronic condition.         CBC & Differential [[id number]]  (Abnormal) Collected: 05/23/25 1403    Specimen: Blood Updated: 05/23/25 1427    Narrative:      The following orders were created for panel order CBC & Differential.  Procedure                               Abnormality         Status                      ---------                               -----------         ------                     CBC Auto Differential[[id number]]        Abnormal            Final result               Scan Slide[[id number]]                   Normal              Final result                 Please view results for these tests on the individual orders.    CBC Auto Differential [[id number]]  (Abnormal) Collected: 05/23/25 1403    Specimen: Blood Updated: 05/23/25 1427     WBC 6.64 10*3/mm3      RBC 3.69 10*6/mm3      Hemoglobin 10.8 g/dL      Hematocrit 34.4 %      MCV 93.2 fL      MCH 29.3 pg      MCHC 31.4 g/dL      RDW 13.4 %      RDW-SD 45.4 fl      MPV 12.2 fL      Platelets 115 10*3/mm3      Neutrophil % 55.6 %      Lymphocyte % 17.8 %      Monocyte % 12.3 %      Eosinophil % 1.4 %      Basophil % 0.2 %      Immature Grans % 12.7 %      Neutrophils, Absolute 3.70 10*3/mm3      Lymphocytes, Absolute 1.18 10*3/mm3      Monocytes, Absolute 0.82 10*3/mm3      Eosinophils, Absolute 0.09 10*3/mm3      Basophils, Absolute 0.01 10*3/mm3      Immature Grans, Absolute 0.84 10*3/mm3      nRBC 0.0 /100 WBC     Scan Slide [[id number]]  (Normal) Collected: 05/23/25 1403    Specimen: Blood Updated: 05/23/25 1427     RBC Morphology Normal     WBC Morphology Normal     Platelet Morphology Normal    Narrative:      Slide Reviewed      Extra Tubes [[id number]] Collected: 05/23/25 1403    Specimen: Blood, Venous Line Updated: 05/23/25 1415    Narrative:      The following orders were created for panel order Extra Tubes.  Procedure                               Abnormality         Status                     ---------                               -----------         ------                     Gold Top - SST[[id number]]                                   Final result                 Please view results for these tests on the individual orders.    Gold Top - SST [[id number]] Collected: 05/23/25 1403    Specimen: Blood Updated: 05/23/25 1415     Extra Tube Hold for  add-ons.     Comment: Auto resulted.       POC Lactate [[id number]]  (Normal) Collected: 05/23/25 1407    Specimen: Blood Updated: 05/23/25 1409     Lactate 0.3 mmol/L      Comment: Serial Number: [id number]:  [id number]       Urinalysis With Microscopic If Indicated (No Culture) - Urine, Catheter [[id number]]  (Normal) Collected: 05/23/25 1312    Specimen: Urine, Catheter Updated: 05/23/25 1335     Color, UA Yellow     Appearance, UA Clear     pH, UA 6.0     Specific Gravity, UA 1.010     Glucose, UA Negative     Ketones, UA Negative     Bilirubin, UA Negative     Blood, UA Negative     Protein, UA Negative     Leuk Esterase, UA Negative     Nitrite, UA Negative     Urobilinogen, UA 1.0 E.U./dL    Narrative:      Urine microscopic not indicated.             I reviewed the patient's new clinical results.    Assessment & Plan      Cervical neuralgia    Pacemaker    Paroxysmal atrial fibrillation    Cervical radiculopathy    Hypertrophic obstructive cardiomyopathy    Spondylolisthesis, acquired    Obesity (BMI 30.0-34.9)    Coronary artery disease    Neck pain    Seizure disorder    -Progressive leg weakness likely related to worsening of cervical spine disease.  1 dose of IV dexamethasone will be given.  -Neurosurgery to consult to see if she is a surgical candidate  -Cardiology consult for preop clearance  -Home medications for other chronic medical conditions will be continued  CODE STATUS:  Code status (Patient has no pulse and is not breathing):  CPR (Attempt to Resuscitate)  Medical Interventions (Patient has pulse or is breathing):  Full Support  Level of Support Discussed with:  Patient    Admission Status:  I believe this patient meets sedation status    Expected length of stay:  1 midnights or greater    I discussed the patient's findings and my recommendations with patient.     Tatiana Gilliland MD  05/23/25  18:05 EDT          Electronically signed by Tatiana Gilliland MD at 05/23/25 8427           Operative/Procedure Notes (all)        Lake Bernal IV, MD at 05/29/25 1616  Version 1 of 1         CERVICAL LAMINECTOMY DECOMPRESSION POSTERIOR  Procedure Report    Patient Name:  Domonique See  YOB: 1940    Date of Surgery:  5/29/2025     Indications: 85-year-old female with worsening symptoms of myelopathy and paraparesis with severe stenosis at C7-T1 below previous fusion due to spondylolisthesis and ligamentous overgrowth.  Given this patient was taken to the OR for C7-T1 laminectomy and C5-T2 fusion.  Patient understood the significant risks of surgery including bleeding infection CSF leak nerve damage spinal cord injury hardware failure pseudoarthrosis vascular injury stroke no improvement or worsening in her current symptoms need for future operation among many other risks and she agreed to undergo the procedure.  Notably the patient was cleared by cardiology given her significant cardiac history.  She also understood that she is at significantly higher risk of perioperative and postoperative complications given her advanced age and bone density and she agreed to undergo the procedure    Pre-op Diagnosis:   Cervical myelopathy  Cervical spondylolisthesis  Cervical stenosis       Post-Op Diagnosis Codes:  Cervical myelopathy  Cervical spondylolisthesis  Cervical stenosis    Procedure(s):  Cervical 5 to thoracic 2 instrumented fusion with globus lateral mass screws and pedicle screws  Cervical 7 to T1 laminectomy  Cervical 5 to thoracic 2 posterior lateral fusion with iFactor allograft and autograft        Spinal Surgery Levels Completed:4 Levels      Staff:  Surgeon(s):  Lake Bernal IV, MD    Assistant: Ricardo Adam PA    Anesthesia: General    Estimated Blood Loss: 150 mL    Implants:    Implant Name Type Inv. Item Serial No.  Lot No. LRB No. Used Action   KT SEAL HEMOS ABS FLOSEAL MATRX 1.5/FAST/PREP 5000/IU 10ML - [id number] Implant KT SEAL HEMOS ABS FLOSEAL MATRX  1.5/FAST/PREP 5000/IU 10ML  [employer] [id number] N/A 2 Implanted   DEV CONTRL TISS STRATAFIX SPIRAL MNCRYL UD 3/0 PLS 30CM - [id number] Implant DEV CONTRL TISS STRATAFIX SPIRAL MNCRYL UD 3/0 PLS 30CM  [employer]  DIV OF J AND J [id number] N/A 1 Implanted   GRFT BONE IFACTOR PUTTY 5ML - [id number] Implant GRFT BONE IFACTOR PUTTY 5ML  [employer] [id number] N/A 1 Implanted   GRFT BONE IFACTOR PUTTY 5ML - [id number] Implant GRFT BONE IFACTOR PUTTY 5ML  [employer] [id number] N/A 1 Implanted   ALLOGRFT FIBR OSTEOAMP SELECT 10CC - [id number] - [id number] Implant ALLOGRFT FIBR OSTEOAMP SELECT 10CC [id number] [employer] . N/A 1 Implanted   ALLOGRFT FIBR OSTEOAMP SELECT 10CC - [id number] - [id number] Implant ALLOGRFT FIBR OSTEOAMP SELECT 10CC [id number] [employer] . N/A 1 Implanted   3.5x12 screw     . N/A 3 Implanted   4.0x12 screw     . N/A 1 Implanted   4.5x25 screw     . N/A 4 Implanted   caps     . N/A 1 Implanted   60 rods     . N/A 2 Implanted   DEV WND/CLS CONTRL TISS STRATAFIX SYMM PDS PLS CTX 60CM LILLIAN - [id number] Implant DEV WND/CLS CONTRL TISS STRATAFIX SYMM PDS PLS CTX 60CM LILLIAN  [facility]  DIV OF J AND J [id number] N/A 1 Implanted       Specimen:          None        Findings: Stenosis    Complications: None    Description of Procedure: Patient was brought to the OR and placed under general endotracheal anesthesia.  She was placed in a Campuzano head greenwood and flipped into the prone position on a Jesse table with her head secured in the Campuzano head greenwood with head and neck in a neutral position.  Patient was prepped and draped in the usual fashion.  Lateral fluoroscopy was used to identify the C5 level and an incision was made from C5 down to approximately T2.  Incision was carried down to the deep dermal and fat layers with monopolar cautery.  Subperiosteal dissection was undertaken exposing the posterior cervical spine from see 5 to T1 using bipolar cautery monopolar cautery and blunt  dissection.  Lateral mass screws were then placed under lateral fluoroscopic guidance.  High-speed bur was used to make an initial cut through the cortical bone followed by drill the lateral mass and the proper medial to lateral trajectory followed by tap followed by placement of the screw.  Screws were placed bilaterally at C5 and C6.  Pedicle screws were then placed at T1 and T2 using AP lateral fluoroscopic guidance.  High-speed bur was used to make initial cut through the cortical bone followed by a lanky probe through the pedicle and the proper trajectory followed by tap followed by placement of the screw.  L[place]ell was used to remove the spinous processes of C7 and T1.  High-speed drill was used to remove lamina of C7 and T1.  Remaining shelled out bone and ligament was removed with 2 Kerrison.  Once good central decompression was achieved, hemostasis was achieved with bipolar cautery and Gelfoam powder.  Rods were then placed bilaterally and locking screws were placed and final tightened.  Final x-rays demonstrated good positioning of all hardware.  Wound was thoroughly irrigated.  Further hemostasis was achieved with bipolar cautery and Gelfoam powder.  High-speed bur was used to decorticate the remaining lateral masses spinous processes and facets from C5-T2 and posterior lateral fusion was performed from C5-T2 with iFactor allograft and autograft.  Hemovac drain was placed.  Muscle was reapproximated using 0 Vicryl sutures, the fascia was closed with a running strata fix 1, the deep dermal layer was closed with 2-0 Vicryl sutures and the skin was closed with a running subcuticular Monocryl.  Dermabond was placed over the wound.  There were no changes in neuromonitoring throughout the case.                Assistant: Ricardo Adam PA  was responsible for performing the following activities: Retraction, Suction, Irrigation, Suturing, Closing, and Placing Dressing and their skilled assistance was necessary  for the success of this case.    Lake Bernal IV, MD     Date: 5/29/2025  Time: 18:07 EDT      Electronically signed by Lake Bernal IV, MD at 05/29/25 1814          Physician Progress Notes (last 48 hours)        Ricardo Adam PA at 06/05/25 0737          Neurosurgery Evaluation      Patient: Domonique See    YOB: 1940    Date of Admission: 5/23/2025 12:30 PM    Status Post: C7-T1 laminectomy with C5-T2 posterior instrumented fusion    Post Operative Day Number: 7 Days Post-Op      Subjective     Interval history:  No adverse events overnight.  No new complaints.  Continued slow improvement in sensorimotor function.  Pain moderately well-controlled.  Cervical collar in place.        Objective   Vitals:    06/04/25 1134 06/04/25 1636 06/04/25 2127 06/05/25 0002   BP:  114/60 112/52 132/55   BP Location:  Right arm  Right arm   Patient Position:  Lying  Lying   Pulse:  65 67 60   Resp: 12 13 16 14   Temp:    97.9 °F (36.6 °C)   TempSrc:    Oral   SpO2:  98% 99% 99%   Weight:       Height:           Physical exam    General  - awake, cooperative, in no acute distress  Skin  - Surgical incision CDI with no swelling redness or drainage  NEUROLOGIC  - Stable to slightly improved strength  - Sensation grossly intact      Results review  CBC          5/29/2025    05:09 5/30/2025    13:31 6/2/2025    08:06   CBC   WBC 5.02  9.71  5.45    RBC 3.91  2.97  2.90    Hemoglobin 11.3  8.7  8.5    Hematocrit 36.7  27.5  27.4    MCV 93.9  92.6  94.5    MCH 28.9  29.3  29.3    MCHC 30.8  31.6  31.0    RDW 13.7  13.5  13.2    Platelets 125  109  131        BMP          5/29/2025    05:09 5/30/2025    02:00 6/2/2025    09:10   BMP   BUN 15.3  16.9  7.4    Creatinine 0.79  0.73  0.45    Sodium 143  137  135    Potassium 4.2  4.3  4.2    Chloride 108  104  100    CO2 25.3  23.6  23.8    Calcium 9.8  8.9  9.4        Intake/Output                         06/03/25 0701 - 06/04/25 0700 06/04/25 0701 - 06/05/25 0700      [id number] 1901-0700 Total 3783-6144 1374-4186 Total                 Intake    P.O.  238  -- 238  360  -- 360    Total Intake 238 -- 238 360 -- 360       Output    Urine  2450  1150 3600  600  850 1450    Total Output 2450 1150 3600             XR Spine Cervical 2 or 3 View  Result Date: 5/30/2025  XR SPINE CERVICAL 2 OR 3 VW Date of Exam: 5/30/2025 3:05 PM EDT Indication: s/p cervical fusion Comparison: CT cervical spine 3/28/2025, cervical spine radiographs 5/29/2025 Findings: The patient is undergone an anterior fusion from the C3 through the C7 level with a C6 corpectomy and fenestrated cage placement. Since the recent CT the patient is undergone a posterior fusion which appears to extend from the C5 level to at least the T2  level. The alignment is difficult to assess on the current radiograph however there appears to be less anterolisthesis of C7 on T1 than on the preoperative CT.     Impression: Impression: Postoperative changes of anterior and posterior cervical fusion as described above. The alignment is difficult to assess however there appears to be less anterolisthesis of C7 on T1 than on the preoperative CT. Electronically Signed: Lake Garza MD  5/30/2025 4:14 PM EDT  Workstation ID: [id number]    XR Spine Cervical 2 or 3 View  Result Date: 5/29/2025  This procedure was auto-finalized with no dictation required.          Assessment     MDM:  85 y.o. female s/p posterior cervical decompression and fusion with Dr. Bernal.  Patient is neurologically stable with some initial improvements in her sensorimotor function since surgery.  Pain moderately well-controlled.  Drain has been removed.  Patient fine for discharge to rehab from neurosurgical perspective.  We will have her follow-up in clinic in 2 weeks.      Plan   S/p cervical spinal fusion  Cervical myelopathy  - Fine for discharge to rehab from neurosurgical perspective  - Follow-up with outpatient neurosurgery in 2 weeks  - Hard cervical  collar at all times  - Continue mobilization efforts  - Continue incisional care    Neurosurgery will sign off at this time.  Call with any questions or concerns.      I discussed my assessment and recommendations with Dr. Dolores Adam PA-C  6/5/2025  07:37 EDT    Electronically signed by Ricardo Adam PA at 06/05/25 1022       [place], WOODY Flanagan at 06/04/25 0859       Attestation signed by Tatiana Gilliland MD at 06/04/25 1956      I have reviewed this documentation and agree.                           LOS: 8 days   Patient Care Team:  Anish Lew APRN as PCP - General  Evgeny Greogry MD as Consulting Physician (Cardiology)  Leeanne Pleitez MD as Consulting Physician (Cardiology)  Radha Owusu APRN as Nurse Practitioner (Nurse Practitioner)  Kathe Muñoz MD as Consulting Physician (Nephrology)    Subjective     Interval History: Catheter in place, hard collar on    Patient Complaints: No sensation of urinary retention; neck pain; no new complaints    History taken from: patient    Review of Systems   Constitutional:  Positive for activity change, appetite change and fatigue. Negative for chills, diaphoresis and fever.   HENT:  Negative for congestion.    Eyes:  Negative for visual disturbance.   Respiratory:  Negative for cough and shortness of breath.    Cardiovascular:  Negative for chest pain, palpitations and leg swelling.   Gastrointestinal:  Negative for abdominal pain, constipation, diarrhea, nausea and vomiting.   Genitourinary:  Positive for difficulty urinating. Negative for dysuria.   Musculoskeletal:  Positive for arthralgias, gait problem, myalgias and neck pain.   Skin:  Negative for rash and wound.   Hematological:  Negative for adenopathy.   Psychiatric/Behavioral:  Negative for confusion.            Objective     Vital Signs  Temp:  [97.4 °F (36.3 °C)-97.8 °F (36.6 °C)] 97.4 °F (36.3 °C)  Heart Rate:  [62-88] 85  Resp:  [15-20] 20  BP:  (102-155)/(65-88) 155/72    Physical Exam:     General Appearance:    Alert, cooperative, in no acute distress, oriented; c collar in place   Head:    Normocephalic, without obvious abnormality, atraumatic   Eyes:            Lids and lashes normal, conjunctivae and sclerae normal, no   icterus, no pallor, corneas clear, PERRLA   Ears:    Ears appear intact with no abnormalities noted   Throat:   No oral lesions, no thrush, oral mucosa moist   Neck:   No adenopathy, supple, trachea midline, no thyromegaly, no   carotid bruit, no JVD   Lungs:     Clear to auscultation,respirations regular, even and                  unlabored    Heart:    Regular rhythm and normal rate, normal S1 and S2, no            murmur, no gallop, no rub, no click   Chest Wall:    No abnormalities observed   Abdomen:     Normal bowel sounds, no masses, no organomegaly, soft        Non-tender non-distended, no guarding,   Extremities:   Moves all extremities well, no edema, no cyanosis, no             Redness   Pulses:   Pulses palpable and equal bilaterally   Skin:   No bleeding, bruising or rash   Lymph nodes:   No palpable adenopathy   Neurologic:   Cranial nerves 2 - 12 grossly intact, sensation intact, DTR       present and equal bilaterally        Results Review:    Lab Results (last 24 hours)       ** No results found for the last 24 hours. **             Imaging Results (Last 24 Hours)       ** No results found for the last 24 hours. **                 I reviewed the patient's new clinical results.    Medication Review:   Scheduled Meds:apixaban, 5 mg, Oral, BID  [Held by provider] aspirin, 81 mg, Oral, Nightly  atorvastatin, 40 mg, Oral, Daily  docusate sodium, 100 mg, Oral, BID  levothyroxine, 75 mcg, Oral, Q AM  [Held by provider] lisinopril, 5 mg, Oral, Daily  methocarbamol, 750 mg, Oral, TID  metoprolol succinate XL, 12.5 mg, Oral, Q24H  midodrine, 10 mg, Oral, Q8H  polyethylene glycol, 17 g, Oral, Daily  pregabalin, 200 mg, Oral,  Q8H  senna-docusate sodium, 2 tablet, Oral, BID  sertraline, 100 mg, Oral, Q PM  sodium chloride, 10 mL, Intravenous, Q12H  sodium chloride, 10 mL, Intravenous, Q12H      Continuous Infusions:   PRN Meds:.  acetaminophen    senna-docusate sodium **AND** polyethylene glycol **AND** bisacodyl **AND** bisacodyl    HYDROcodone-acetaminophen    Morphine    nitroglycerin    ondansetron    oxyCODONE    [COMPLETED] Insert Peripheral IV **AND** sodium chloride    sodium chloride    sodium chloride    sodium chloride    sodium chloride    zolpidem     Assessment & Plan       Cervical neuralgia    Pacemaker    Paroxysmal atrial fibrillation    Cervical radiculopathy    Essential hypertension    Spondylolisthesis, acquired    Obesity (BMI 30.0-34.9)    Hx of hypertrophic cardiomyopathy    Coronary artery disease    Neck pain    Seizure disorder    Encounter for pre-operative cardiovascular clearance    Aortic insufficiency    Mitral regurgitation    Acute urinary retention    -Fuentes replaced due to severe urinary retention related to immobility and narcotics  -postop course is otherwise as expected  -pain is reasonably well controlled  -working toward rehab  -heart rate controlled with metoprolol; tolerating anticoagulation    CODE Status:    Code status (Patient has no pulse and is not breathing):  CPR (Attempt to Resuscitate)  Medical Interventions (Patient has pulse or is breathing):  Full support  Level of support discussed with:  Patient    Admission status:  I believe this patient meets inpatient status  Expected length of stay:  2 midnights or greater  I discussed the patient's findings and my recommendations with the patient.    Plan for disposition:Skilled rehab    WOODY Warner  06/04/25  08:59 EDT            Electronically signed by Tatiana Gilliland MD at 06/04/25 1956       Pau Hayden PA-C at 06/04/25 0755       Attestation signed by Lake Bernal IV, MD at 06/04/25 1037      I have reviewed this documentation  and agree.                      Neurosurgery Progress Note    Date: 25     Patient: Domonique See   Sex: female   : 1940      SUBJECTIVE    CC: Post op day 6 C5- T2 posterior instrumented fusion    Interval history:  Patient in some pain this morning. Hard collar on. External catheter in place. Had bm yesterday. No new complaints.         Current Medications:  Scheduled Meds:apixaban, 5 mg, Oral, BID  [Held by provider] aspirin, 81 mg, Oral, Nightly  atorvastatin, 40 mg, Oral, Daily  docusate sodium, 100 mg, Oral, BID  levothyroxine, 75 mcg, Oral, Q AM  [Held by provider] lisinopril, 5 mg, Oral, Daily  methocarbamol, 750 mg, Oral, TID  metoprolol succinate XL, 12.5 mg, Oral, Q24H  midodrine, 10 mg, Oral, Q8H  polyethylene glycol, 17 g, Oral, Daily  pregabalin, 200 mg, Oral, Q8H  senna-docusate sodium, 2 tablet, Oral, BID  sertraline, 100 mg, Oral, Q PM  sodium chloride, 10 mL, Intravenous, Q12H  sodium chloride, 10 mL, Intravenous, Q12H      Continuous Infusions:   PRN Meds:   acetaminophen    senna-docusate sodium **AND** polyethylene glycol **AND** bisacodyl **AND** bisacodyl    HYDROcodone-acetaminophen    Morphine    nitroglycerin    ondansetron    oxyCODONE    [COMPLETED] Insert Peripheral IV **AND** sodium chloride    sodium chloride    sodium chloride    sodium chloride    sodium chloride    zolpidem      OBJECTIVE  Vitals:    25 2100 25 0033 25 0511 25 0751   BP: 114/65 124/65 132/68 155/72   BP Location:  Left arm  Right arm   Patient Position:  Lying  Lying   Pulse:  62 85    Resp:  17  20   Temp:  97.8 °F (36.6 °C)  97.4 °F (36.3 °C)   TempSrc:  Oral  Oral   SpO2:  98%     Weight:       Height:           Physical exam    General  - WD/WN female, appears their stated age  - Awake, cooperative, in no acute distress  HEENT  - Normocephalic, atraumatic  - PERRLA, EOM intact  Respiratory  - Normal respiratory rate and effort  Musculoskeletal  - No joint redness,  swelling, or tenderness  Skin  - Warm and dry, no bleeding, bruising, or rash  - Surgical incision intact covered with dressing  NEUROLOGIC  - A/O x3, GCS 4-6-5  - CN II-XII grossly intact  - Sensation intact in lower extremities  - Slightly improved strength in lower extremities this morning         Results review  CBC          5/29/2025    05:09 5/30/2025    13:31 6/2/2025    08:06   CBC   WBC 5.02  9.71  5.45    RBC 3.91  2.97  2.90    Hemoglobin 11.3  8.7  8.5    Hematocrit 36.7  27.5  27.4    MCV 93.9  92.6  94.5    MCH 28.9  29.3  29.3    MCHC 30.8  31.6  31.0    RDW 13.7  13.5  13.2    Platelets 125  109  131        BMP          5/29/2025    05:09 5/30/2025    02:00 6/2/2025    09:10   BMP   BUN 15.3  16.9  7.4    Creatinine 0.79  0.73  0.45    Sodium 143  137  135    Potassium 4.2  4.3  4.2    Chloride 108  104  100    CO2 25.3  23.6  23.8    Calcium 9.8  8.9  9.4          XR Spine Cervical 2 or 3 View  Result Date: 5/30/2025  XR SPINE CERVICAL 2 OR 3 VW Date of Exam: 5/30/2025 3:05 PM EDT Indication: s/p cervical fusion Comparison: CT cervical spine 3/28/2025, cervical spine radiographs 5/29/2025 Findings: The patient is undergone an anterior fusion from the C3 through the C7 level with a C6 corpectomy and fenestrated cage placement. Since the recent CT the patient is undergone a posterior fusion which appears to extend from the C5 level to at least the T2  level. The alignment is difficult to assess on the current radiograph however there appears to be less anterolisthesis of C7 on T1 than on the preoperative CT.     Impression: Impression: Postoperative changes of anterior and posterior cervical fusion as described above. The alignment is difficult to assess however there appears to be less anterolisthesis of C7 on T1 than on the preoperative CT. Electronically Signed: Lake Garza MD  5/30/2025 4:14 PM EDT  Workstation ID: [id number]    XR Spine Cervical 2 or 3 View  Result Date: 5/29/2025  This procedure  was auto-finalized with no dictation required.                    ASSESSMENT/PLAN  This is a 85 y.o. female who underwent a cervical fusion with Dr. Bernal. Patient is in some pain this morning, but otherwise has no new complaints. Patient has chosen two new rehabs and we are waiting on acceptance.     Patient is to continue wearing her hard collar at all times and to continue working with PT/OT.     Please reach out with any questions or concerns.      Plan:  S/P cervical fusion  - Hard collar AAT  - DVT prophylaxis  - Continue pain regimen  - Medical care per primary   - Continue PT/OT  - Pending rehab acceptance         I discussed my assessment and recommendations with Dr. Dolores Hayden PA-C  06/04/25  07:55 EDT      Part of this note may be an electronic transcription/translation of spoken language to printed text using the Dragon Dictation System.    Electronically signed by Lake Bernal IV, MD at 06/04/25 1037       Consult Notes (last 48 hours)  Notes from 06/03/25 1059 through 06/05/25 1059   No notes of this type exist for this encounter.          Nutrition Notes (most recent note)        Heather Harry RD at 06/02/25 1427          Nutrition Services    Patient Name: Domonique See  YOB: 1940  MRN: [id number]  Admission date: 5/23/2025    RD to add Boost Original BID (Provides 480 kcals, 20 g protein if consumed)     NUTRITION SCREENING      Trending Narrative: 6/2: Nutrition screening r/t LOS x 10 days. Pt presented to ED on 5/23 with complaints of worsening bilateral lower extremity weakness. Pt is followed by Dr. Bernal for cervical disc disease. Pt underwent cervical laminectomy decompression posterior on 5/29. Plan for SNF placement upon discharge. Tolerating po diet without noted issues at this time.        PO Diet: Diet: Cardiac; Healthy Heart (2-3 Na+); Fluid Consistency: Thin (IDDSI 0)   PO Supplements: None    Trending PO Intake:  6/2: 55% average po  "intake x last 18 documented meals       Nutritionally-Pertinent Medications RDN Reviewed, C/W clinical course         Labs (reviewed below): Hyponatremia - management per attending     Results from last 7 days   Lab Units 06/02/25  0910 05/30/25  0200 05/29/25  0509 05/28/25  0159   SODIUM mmol/L 135* 137 143 139   POTASSIUM mmol/L 4.2 4.3 4.2 4.5   CHLORIDE mmol/L 100 104 108* 107   CO2 mmol/L 23.8 23.6 25.3 21.3*   BUN mg/dL 7.4* 16.9 15.3 16.1   CREATININE mg/dL 0.45* 0.73 0.79 0.76   CALCIUM mg/dL 9.4 8.9 9.8 9.1   BILIRUBIN mg/dL  --   --   --  0.4   ALK PHOS U/L  --   --   --  97   ALT (SGPT) U/L  --   --   --  10   AST (SGOT) U/L  --   --   --  17   GLUCOSE mg/dL 107* 154* 101* 98     Results from last 7 days   Lab Units 06/02/25  0806   HEMOGLOBIN g/dL 8.5*   HEMATOCRIT % 27.4*     Lab Results   Component Value Date    HGBA1C 5.8 (H) 01/19/2023          GI Function:  Last documented BM 5/29       Skin: No PI documented        Weight Review: Estimated body mass index is 31.6 kg/m² as calculated from the following:    Height as of this encounter: 152.4 cm (60\").    Weight as of this encounter: 73.4 kg (161 lb 13.1 oz).    Comment:   6/2: (last weight 5/29) 161#  5% weight loss in the 1-3 months     Wt Readings from Last 30 Encounters:   05/29/25 1354 73.4 kg (161 lb 13.1 oz)   05/26/25 0449 73.4 kg (161 lb 13.1 oz)   05/23/25 1228 79.4 kg (175 lb)   05/21/25 1226 77.1 kg (170 lb)   05/16/25 1313 77.1 kg (170 lb)   04/30/25 1540 77.1 kg (170 lb)   04/08/25 1311 77.1 kg (170 lb)   03/28/25 1346 77.1 kg (170 lb)   03/25/25 1542 76.7 kg (169 lb 1.5 oz)   03/21/25 1205 76.7 kg (169 lb)   02/24/25 1328 76.8 kg (169 lb 4 oz)   06/20/24 1339 77.6 kg (171 lb)   12/04/23 1405 79.4 kg (175 lb)   07/13/23 1856 82.9 kg (182 lb 12.8 oz)   06/05/23 1928 81.7 kg (180 lb 1.9 oz)   06/03/23 0215 83.2 kg (183 lb 6.8 oz)   06/02/23 1100 83.9 kg (185 lb)   05/22/23 1505 84.4 kg (186 lb)   05/04/23 1346 86.6 kg (191 lb)   04/28/23 " 2235 92.6 kg (204 lb 2.3 oz)   23 1734 86.2 kg (190 lb)   23 1515 87.5 kg (193 lb)   23 0319 92.3 kg (203 lb 7.8 oz)   23 1730 91 kg (200 lb 9.9 oz)   23 0544 90.7 kg (200 lb)   23 1349 90.7 kg (200 lb)   23 0606 89.6 kg (197 lb 8.5 oz)   23 0956 89.5 kg (197 lb 5 oz)   01/10/23 1014 86.6 kg (191 lb)   22 1232 87.5 kg (193 lb)   22 1400 88 kg (194 lb)   22 1031 88.3 kg (194 lb 9.6 oz)   22 1126 87.5 kg (193 lb)   21 0638 86.5 kg (190 lb 11.2 oz)   21 0951 86.2 kg (190 lb)   21 1720 86.2 kg (190 lb)   21 1508 86.8 kg (191 lb 6.4 oz)   21 1132 87.1 kg (192 lb)   21 1426 88.9 kg (196 lb)   20 1406 85.7 kg (189 lb)   20 1147 85.7 kg (189 lb)   10/30/19 1502 82.6 kg (182 lb)          --       Nutrition Problem Statement: Unintentional weight loss related to poor appetite in the setting of current clinical course as evidenced by 5% weight loss documented in the last 1-3 months.         Nutrition Intervention: Continue current po diet as tolerated.    Encourage good po intake.    RD to add Boost Original BID (Provides 480 kcals, 20 g protein if consumed)             Monitoring/Evaluation Per protocol, I&O, PO intake, Supplement intake, Pertinent labs, Weight, GI status, Hemodynamic stability            RD to follow up per protocol.    Electronically signed by:  Heather Harry RD  25 14:28 EDT        Electronically signed by LoHeather restrepo RD at 25 1441       Speech Language Pathology Notes (most recent note)    No notes exist for this encounter.       Rolando Garrido, PT   Physical Therapist  Physical Therapy  Therapy Evaluation     Signed  Date of Service:  25  Creation Time:  25     Signed        Expand All Collapse All  Patient Name: Domonique See                     : 1940                        MRN: [id number]                               Today's Date: 5/30/2025                                   Admit Date: 5/23/2025                        Visit Dx:   Visit Diagnosis       ICD-10-CM ICD-9-CM   1. Hypotension, unspecified hypotension type  I95.9 458.9   2. Constipation, unspecified constipation type  K59.00 564.00   3. Spinal stenosis, unspecified spinal region  M48.00 724.00         Problem List       Patient Active Problem List   Diagnosis    Iron deficiency anemia    Anemia, unspecified    Pacemaker    Paroxysmal atrial fibrillation    Cervical disc disorder with radiculopathy    Cervical radiculopathy    Cervical stenosis of spinal canal    Deep vein thrombosis (DVT)    Dyspnea    Family history of diabetes mellitus    Fibromyositis    Essential hypertension    Lumbar radiculopathy    Primary localized osteoarthritis    Sacroiliitis, not elsewhere classified    Spondylolisthesis, acquired    Primary osteoarthritis of both knees    Obesity (BMI 30.0-34.9)    Hx of hypertrophic cardiomyopathy    Coronary artery disease    Sleep apnea    Acute midline low back pain without sciatica    Hypertensive retinopathy    Neck pain    Cervical neuralgia    Seizure disorder    Encounter for pre-operative cardiovascular clearance    Aortic insufficiency    Mitral regurgitation         Medical History        Past Medical History:   Diagnosis Date    Anemia      Aortic insufficiency 03/21/2025    Asthma      CHF (congestive heart failure)      Cholecystitis 09/02/2011    Congenital heart disease      Coronary artery disease 2013    Deep vein thrombosis (DVT) 11/20/2017    GERD (gastroesophageal reflux disease)      Heart murmur Don't know    Hx of hypertrophic cardiomyopathy      Hypertension      Hypertrophic obstructive cardiomyopathy 11/10/2017    Low back pain      Lumbosacral disc disease      Mitral regurgitation 03/21/2025    Mixed hyperlipidemia 10/06/2017    Morbid obesity 09/21/2020    Peptic ulceration      Persistent atrial  fibrillation 01/10/2023    Primary osteoarthritis of both knees 02/08/2021    Primary osteoarthritis of right knee 09/21/2020    Seizures      Sleep apnea 2017    TIA (transient ischemic attack)           Surgical History         Past Surgical History:   Procedure Laterality Date    ABDOMINAL HERNIA REPAIR        ABLATION OF DYSRHYTHMIC FOCUS   01-    BACK SURGERY        BREAST AUGMENTATION        CARDIAC CATHETERIZATION        CARDIAC ELECTROPHYSIOLOGY PROCEDURE N/A 01/16/2023     Procedure: Ablation atrial fibrillation and flutter, cryo Duong and Ramón aware;  Surgeon: Leeanne Pleitez MD;  Location: [facility] CATH INVASIVE LOCATION;  Service: Cardiovascular;  Laterality: N/A;    CARDIAC PACEMAKER PLACEMENT   03/16/2017     Dual Chamber    CERVICAL RIB RESECTION Bilateral      CERVICAL RIB RESECTION Bilateral 1963     bilateral cervical ribs removed - extra ribs located and causing pain    CHOLECYSTECTOMY        COLONOSCOPY        CORONARY ARTERY BYPASS GRAFT   2013    EPIDURAL BLOCK        NECK SURGERY        SPINAL FUSION        THORACIC DECOMPRESSION POSTERIOR FUSION   06/04/2014     L3-5 & S1    TRIGGER POINT INJECTION        UPPER GASTROINTESTINAL ENDOSCOPY               General Information         Row Name 05/30/25 0394                 Physical Therapy Time and Intention     Document Type evaluation  -AM       Mode of Treatment physical therapy  -AM          Row Name 05/30/25 0738                 General Information     Patient Profile Reviewed yes  -AM       Prior Level of Function --  prior to March 4th pt was independent wtih all mobility tasks.  Since than started to use RW after 3 days progressively weaker.  Family provides A x 2 for bed mobility and sliding board transfers into w/c.  -AM       Existing Precautions/Restrictions cervical collar;fall;oxygen therapy device and L/min  2L O2  -AM       Barriers to Rehab medically complex  -AM          Row Name 05/30/25 7372                 Living  Environment     Current Living Arrangements home  -AM       People in Home child(su), adult  prior to March 4th pt lived alone.  Now lives with son and his family  -AM          Row Name 05/30/25 1317                 Home Main Entrance     Number of Stairs, Main Entrance --  w/c ramp  -AM          Row Name 05/30/25 1317                 Stairs Within Home, Primary     Number of Stairs, Within Home, Primary none  -AM          Row Name 05/30/25 1317                 Cognition     Orientation Status (Cognition) oriented x 4  -AM          Row Name 05/30/25 1317                 Safety Issues/Impairments Affecting Functional Mobility     Impairments Affecting Function (Mobility) balance;coordination;endurance/activity tolerance;motor control;motor planning;postural/trunk control;strength  -AM                       User Key  (r) = Recorded By, (t) = Taken By, (c) = Cosigned By        Initials Name Provider Type     AM Rolando Garrido, PT Physical Therapist                            Mobility         Row Name 05/30/25 1320                 Bed Mobility     Bed Mobility bed mobility (all) activities  -AM       All Activities, [place] (Bed Mobility) maximum assist (25% patient effort);2 person assist  -AM       Assistive Device (Bed Mobility) bed rails  -AM       Comment, (Bed Mobility) via logroll technique  -AM          Row Name 05/30/25 1320                 Bed-Chair Transfer     Bed-Chair [place] (Transfers) not tested  -AM          Row Name 05/30/25 1320                 Sit-Stand Transfer     Sit-Stand [place] (Transfers) not tested  -AM          Row Name 05/30/25 1320                 Gait/Stairs (Locomotion)     [place] Level (Gait) not tested  -AM                       User Key  (r) = Recorded By, (t) = Taken By, (c) = Cosigned By        Initials Name Provider Type     AM Rolando Garrido, PT Physical Therapist                            Obj/Interventions         Row Name 05/30/25 1320                  Range of Motion Comprehensive     Comment, General Range of Motion Defer BUE ROM to OT.  BLEs:  PROM WFL.  Decreaed AROM B hips/knees  -AM          Row Name 05/30/25 1320                 Strength Comprehensive (MMT)     Comment, General Manual Muscle Testing (MMT) Assessment Defer BUE MMT to OT.  B hips/knees 2/5.  B ankles: 3+/5  -AM          Row Name 05/30/25 1320                 Motor Skills     Motor Skills functional endurance  -AM       Functional Endurance poor  -AM          Row Name 05/30/25 1320                 Balance     Balance Assessment sitting static balance;sitting dynamic balance  -AM       Static Sitting Balance moderate assist  -AM       Dynamic Sitting Balance maximum assist  -AM       Position, Sitting Balance sitting edge of bed  -AM       Comment, Balance Pt initially with anteior lean that progressed to posterior lean as fatigue set in.  -AM          Row Name 05/30/25 1320                 Sensory Assessment (Somatosensory)     Sensory Assessment (Somatosensory) sensation intact  -AM                       User Key  (r) = Recorded By, (t) = Taken By, (c) = Cosigned By        Initials Name Provider Type     AM Rolando Garrido, PT Physical Therapist                            Goals/Plan         Row Name 05/30/25 1333                 Bed Mobility Goal 1 (PT)     Activity/Assistive Device (Bed Mobility Goal 1, PT) bed mobility activities, all  -AM       [place] Level/Cues Needed (Bed Mobility Goal 1, PT) minimum assist (75% or more patient effort)  -AM       Time Frame (Bed Mobility Goal 1, PT) long term goal (LTG)  -AM       Strategies/Barriers (Bed Mobility Goal 1, PT) via log-roll technique  -AM          Row Name 05/30/25 1333                 Transfer Goal 1 (PT)     Activity/Assistive Device (Transfer Goal 1, PT) transfers, all  -AM       [place] Level/Cues Needed (Transfer Goal 1, PT) minimum assist (75% or more patient effort)  -AM       Time Frame (Transfer Goal 1, PT) long term  goal (LTG)  -AM          Row Name 05/30/25 1333                 Gait Training Goal 1 (PT)     Activity/Assistive Device (Gait Training Goal 1, PT) gait (walking locomotion)  -AM       [place] Level (Gait Training Goal 1, PT) minimum assist (75% or more patient effort)  -AM       Distance (Gait Training Goal 1, PT) 100'  -AM       Time Frame (Gait Training Goal 1, PT) long term goal (LTG)  -AM          Row Name 05/30/25 1333                 Problem Specific Goal 1 (PT)     Problem Specific Goal 1 (PT) Pt to sit EOB x 10 minutes with supervision  -AM       Time Frame (Problem Specific Goal 1, PT) long-term goal (LTG)  -AM          Row Name 05/30/25 1333                 Therapy Assessment/Plan (PT)     Planned Therapy Interventions (PT) balance training;bed mobility training;gait training;strengthening;patient/family education;transfer training  -AM                    User Key  (r) = Recorded By, (t) = Taken By, (c) = Cosigned By        Initials Name Provider Type     AM Rolando Garrido, PT Physical Therapist                         Clinical Impression         Row Name 05/30/25 1322                 Pain     Pretreatment Pain Rating 10/10  -AM       Posttreatment Pain Rating 10/10  -AM       Pain Location neck  -AM       Pain Management Interventions exercise or physical activity utilized  -AM       Response to Pain Interventions no change per patient report  -AM          Row Name 05/30/25 1322                 Plan of Care Review     Plan of Care Reviewed With patient  -AM       Outcome Evaluation Pt is a 84 y/o female s/p C7-T1 laminectomy with C5-T2 fusion secondary to 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  C-collar to be worn an all times.  CT abdomen/pelvis: (-) acute.  Chest X-ray: cardiomegaly.  Pt reports prior to March 4th she lived alone in a 1 story home and was independent with all functional  mobility tasks and did not use an assistive device.  Since March 4th pt has become progressively weaker and used RW for 3 days and has been unable to ambulate for the past 3 weeks.  Pt moved into son's home (1 story with w/c ramp) and family has been mobilizing pt via sliding board transfer with A x 2.  Pt with 2L O2, C-collar donned, hemovac drain, Fuentes and telemetry this date.  Pt required Max A x 2 for bed mobility via logroll technique and Mod A for static sitting and Max A for dynamic sitting balance EOB.  Pt initially with anterior lean with sitting and then reverted to posterior lean with fatigue.  Unable to tolerate sit to stand transfer this date secondary pain and weakness.  Recommend SNF.  -AM          Row Name 05/30/25 1322                 Therapy Assessment/Plan (PT)     Patient/Family Therapy Goals Statement (PT) To get stronger  -AM       Rehab Potential (PT) good  -AM       Criteria for Skilled Interventions Met (PT) yes  -AM       Therapy Frequency (PT) 5 times/wk  -AM       Predicted Duration of Therapy Intervention (PT) until d/c  -AM          Row Name 05/30/25 1322                 Vital Signs     Pre Systolic BP Rehab 136  -AM       Pre Treatment Diastolic BP 61  -AM       Intra Systolic BP Rehab 164  -AM       Intra Treatment Diastolic BP 71  -AM       Pre SpO2 (%) 98  -AM       O2 Delivery Pre Treatment nasal cannula  2L O2  -AM       O2 Delivery Intra Treatment nasal cannula  -AM       O2 Delivery Post Treatment nasal cannula  -AM       Pre Patient Position Supine  -AM       Intra Patient Position Sitting  -AM       Post Patient Position Supine  -AM          Row Name 05/30/25 1322                 Positioning and Restraints     Pre-Treatment Position in bed  -AM       Post Treatment Position bed  -AM       In Bed notified nsg;supine;call light within reach;encouraged to call for assist;exit alarm on;with nsg  -AM                       User Key  (r) = Recorded By, (t) = Taken By, (c) = Cosigned  By        Initials Name Provider Type     AM Rolando Garrido, PT Physical Therapist                            Outcome Measures         Row Name 05/30/25 1336 05/30/25 0800            How much help from another person do you currently need...     Turning from your back to your side while in flat bed without using bedrails? 2  -AM 2  -SM     Moving from lying on back to sitting on the side of a flat bed without bedrails? 2  -AM 2  -SM     Moving to and from a bed to a chair (including a wheelchair)? 1  -AM 1  -SM     Standing up from a chair using your arms (e.g., wheelchair, bedside chair)? 1  -AM 1  -SM     Climbing 3-5 steps with a railing? 1  -AM 1  -SM     To walk in hospital room? 1  -AM 1  -SM     AM-PAC 6 Clicks Score (PT) 8  -AM 8  -SM        Row Name 05/30/25 1135                 Functional Assessment     Outcome Measure Options AM-PAC 6 Clicks Daily Activity (OT)  -SP                       User Key  (r) = Recorded By, (t) = Taken By, (c) = Cosigned By        Initials Name Provider Type     AM Rolando Garrido, PT Physical Therapist     Chetna March, RN Registered Nurse     Mauro Stacy, OT Occupational Therapist                          Physical Therapy Education            Title: PT OT SLP Therapies (Done)         Topic: Physical Therapy (Done)         Point: Mobility training (Done)         Learning Progress Summary             Patient Acceptance, E,TB, VU by AM at 5/30/2025 1336                            Point: Body mechanics (Done)         Learning Progress Summary             Patient Acceptance, E,TB, VU by AM at 5/30/2025 1336                            Point: Precautions (Done)         Learning Progress Summary             Patient Acceptance, E,TB, VU by AM at 5/30/2025 1336                                                User Key         Initials Effective Dates Name Provider Type Discipline     AM 05/10/21 -  Rolando Garrido, PT Physical Therapist PT                          PT  Recommendation and Plan  Planned Therapy Interventions (PT): balance training, bed mobility training, gait training, strengthening, patient/family education, transfer training  Outcome Evaluation: Pt is a 84 y/o female s/p C7-T1 laminectomy with C5-T2 fusion secondary to 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  C-collar to be worn an all times.  CT abdomen/pelvis: (-) acute.  Chest X-ray: cardiomegaly.  Pt reports prior to March 4th she lived alone in a 1 story home and was independent with all functional mobility tasks and did not use an assistive device.  Since March 4th pt has become progressively weaker and used RW for 3 days and has been unable to ambulate for the past 3 weeks.  Pt moved into son's home (1 story with w/c ramp) and family has been mobilizing pt via sliding board transfer with A x 2.  Pt with 2L O2, C-collar donned, hemovac drain, Fuentes and telemetry this date.  Pt required Max A x 2 for bed mobility via logroll technique and Mod A for static sitting and Max A for dynamic sitting balance EOB.  Pt initially with anterior lean with sitting and then reverted to posterior lean with fatigue.  Unable to tolerate sit to stand transfer this date secondary pain and weakness.  Recommend SNF.      Time Calculation:        PT Charges         Row Name 05/30/25 1334                       Time Calculation     Start Time 0906  -AM         Stop Time 0934  -AM         Time Calculation (min) 28 min  -AM         PT Received On 05/30/25  -AM         PT - Next Appointment 05/31/25  -AM         PT Goal Re-Cert Due Date 06/13/25  -AM                      User Key  (r) = Recorded By, (t) = Taken By, (c) = Cosigned By        Initials Name Provider Type     Rolando Robbins PT Physical Therapist                       Therapy Charges for Today         Code Description Service Date Service Provider Modifiers Qty      35559374788  PT EVAL HIGH COMPLEXITY 4 5/30/2025 Rolando Garrido, PT GP 1                PT G-Codes  Outcome Measure Options: AM-PAC 6 Clicks Daily Activity (OT)  AM-PAC 6 Clicks Score (PT): 8  AM-PAC 6 Clicks Score (OT): 14  PT Discharge Summary  Anticipated Discharge Disposition (PT): skilled nursing facility     Rolando Garrido, PT                   5/30/2025                                    Mauro Szymanski, BRIANDA   Occupational Therapist  Occupational Therapy  Therapy Treatment Note     Signed  Date of Service:  06/04/25 1521  Creation Time:  06/04/25 1521     Signed        Subjective: Pt agreeable to therapeutic plan of care.  Cognition: arousal/alertness: Alert and Attentive, A&O x4     Objective:      Precautions - Cervical collar, spinal, falls     Bed Mobility: Max-A and Assist x 2   Functional Transfers: N/A or Not attempted.  Balance: sitting EOB and unsupported mod progressing to CGA, noted with L lateral lean fluctuating between anterior lean. Verbal cueing and dynamic reaching activities to correct, pt with decreased trunk control.   Functional Ambulation: N/A or Not attempted.     Grooming: Mod-A  ADL Position: edge of bed sitting  ADL Comments: hair hygiene     Lower Body Dressing: Dependent  ADL Position: supine with HOB elevated  ADL Comments: don socks     Vitals: Desaturates, RA 94% SpO2, desats 87% SpO2, returned on 1L O2 NC end of treatment session satting at 97%     Pain: 10 VAS Location: neck and shoulders  Interventions for pain: Repositioned, Increased Activity, and Therapeutic Presence  Education: Provided education on the importance of mobility in the acute care setting, Verbal/Tactile Cues, and ADL training     Assessment: Domonique See presents with ADL impairments affecting function including balance, endurance / activity tolerance, grasp, motor control, pain, postural / trunk control, range of motion (ROM), and strength. Pt max A x2 to come to sitting. EOB, pt noted with left  "lateral and anterior lean. Dynamic reaching and verbal cues provided for increased postural stability. Dependent A to dress LB. Demonstrated functioning below baseline abilities indicate the need for continued skilled intervention while inpatient. Tolerating session today without incident. Will continue to follow and progress as tolerated.      Plan/Recommendations:   Moderate Intensity Therapy recommended post-acute care. This is recommended as therapy feels the patient would require 3-4 days per week and wouldn't tolerate \"3 hour daily\" rehab intensity. SNF would be the preferred choice. If the patient does not agree to SNF, arrange HH or OP depending on home bound status. If patient is medically complex, consider LTACH.     Pt desires Skilled Rehab placement at discharge. Pt cooperative; agreeable to therapeutic recommendations and plan of care.      Modified [place]: N/A = No pre-op stroke/TIA     Post-Tx Position: Supine with HOB Elevated, Alarms activated, and Call light and personal items within reach, adult son in room  PPE: gloves     Therapy Charges for Today         Code Description Service Date Service Provider Modifiers Qty     52687882350 HC OT SELF CARE/MGMT/TRAIN EA 15 MIN 6/4/2025 Mauro Szymanski OT GO 2     [id number]  OT NEUROMUSC RE EDUCATION EA 15 MIN 6/4/2025 Mauro Szymanski, OT GO 1               Time Calculation- OT         Row Name 06/04/25 1520                       Time Calculation- OT     OT Start Time 1345  -SP         OT Stop Time 1416  -SP         OT Time Calculation (min) 31 min  -SP         Total Timed Code Minutes- OT 31 minute(s)  -SP         OT Received On 06/04/25  -SP         OT - Next Appointment 06/05/25  -SP                      User Key  (r) = Recorded By, (t) = Taken By, (c) = Cosigned By        Initials Name Provider Type     SP Mauro Szymanski, BRIANDA Occupational Therapist                             Serg Richardson PTA   Physical Therapist Assistant  Physical " "Therapy  Therapy Treatment Note     Signed  Date of Service:  06/04/25 1528  Creation Time:  06/04/25 1528     Signed        Subjective: Pt agreeable to therapeutic plan of care. PTA arrived in patient's room this morning per nursing request to adjust C Collar, as patient states it \"feels too loose\". PTA returned in the afternoon with OT for therapy session.      Objective:      Precautions - cervical collar and spinal precautions     Bed mobility - Supine<>sitting Max Ax2     Neuro - Pt requiring Mod A for sitting balance at EOB, with L lateral and anterior lean. Pt able to progress to CGA-Min A when given max cues to demonstrate upright and midline posture. While sitting at EOB, pt instructed on scapular squeezes using towel behind shoulders, with manual assistance to open up chest wall. Pt also instructed on unilateral reaching forward and across midline with each UE to promote trunk control, balance.      Transfers - N/A or Not attempted.     Ambulation - 0 feet N/A or Not attempted.     Therapeutic Exercise - 5 Reps B LE AAROM unsupported sitting / EOB     Vitals: Desaturates to 87% on room air with activity. Pt placed back on 1L at end of session.      Pain: 10 VAS Location: Neck, shoulders  Intervention for pain: Repositioned, Increased Activity, and Therapeutic Presence     Education: Provided education on the importance of mobility in the acute care setting, Verbal/Tactile Cues, and Post-Op Precautions     Assessment: Domonique See presents with functional mobility impairments which indicate the need for skilled intervention. Tolerating session today without incident. Pt requires Max Ax2 for bed mobility. Focused on trunk/core control and stability at EOB. Unable to progress to transfer training due to excessive global weakness. Desats on room air with activity. Will continue to follow and progress as tolerated.      Plan/Recommendations:   If medically appropriate, Moderate Intensity Therapy " "recommended post-acute care. This is recommended as therapy feels the patient would require 3-4 days per week and wouldn't tolerate \"3 hour daily\" rehab intensity. SNF would be the preferred choice. If the patient does not agree to SNF, arrange HH or OP depending on home bound status. If patient is medically complex, consider LTACH. Pt requires no DME at discharge.      Pt desires Skilled Rehab placement at discharge. Pt cooperative; agreeable to therapeutic recommendations and plan of care.            Basic Mobility 6-click:  Rollin = Total, A lot = 2, A little = 3; 4 = None  Supine>Sit:                      1 = Total, A lot = 2, A little = 3; 4 = None   Sit>Stand with arms:       1 = Total, A lot = 2, A little = 3; 4 = None  Bed>Chair:                      1 = Total, A lot = 2, A little = 3; 4 = None  Ambulate in room:           1 = Total, A lot = 2, A little = 3; 4 = None  3-5 Steps with railin = Total, A lot = 2, A little = 3; 4 = None  Score: 8     Modified [place]: N/A = No pre-op stroke/TIA     Post-Tx Position: Supine with HOB Elevated, Alarms activated, and Call light and personal items within reach  PPE: gloves     Therapy Charges for Today         Code Description Service Date Service Provider Modifiers Qty     07318489721 HC PT NEUROMUSC RE EDUCATION EA 15 MIN 2025 Serg Richardson, FAUSTINO GP 1     23865184331 HC PT THER PROC EA 15 MIN 2025 Serg Richardson PTA GP 1     89339048415 HC PT THERAPEUTIC ACT EA 15 MIN 2025 Serg Richardson PTA GP 2               PT Charges         Row Name 25 1522                       Time Calculation     Start Time 1340  -UN         Stop Time 1419  -UN         Time Calculation (min) 39 min  -UN         PT Received On 25  -UN         PT - Next Appointment 25  -UN                 Time Calculation- PT     Total Timed Code Minutes- PT 39 minute(s)  -UN                      User Key  " (r) = Recorded By, (t) = Taken By, (c) = Cosigned By        Initials Name Provider Type     Serg Fontaine, PTA Physical Therapist Assistant

## 2025-06-05 NOTE — SIGNIFICANT NOTE
Case Management/Social Work    Patient Name:  Domonique See  YOB: 1940  MRN: [id number]  Admit Date:  5/23/2025 06/05/25 1104   Post Acute Pre-Cert Documentation   Request Submitted by Facility - Type: Hospital   Post-Acute Authorization Type Submitted: SNF   Date Post Acute Pre-Cert Inititated per Facility 06/05/25   Verification from Payer Yes   Date Post Acute Pre-Cert Completed 06/05/25   Accepting Facility [facility]   Hospital Discharge Date Requested 06/05/25   All Clinicals Submitted? Yes   Had Accepting Facility at Time of Submission Yes   Response Received from Insurance? Approval   Response Communicated to:    Authorization Number: [id number]   Post Acute Pre-Cert Initiated Comment CME submitted SNF precert for [facility] via [employer] portal. Auth ID [id number] . SNF precert auto approved. valid 6/5-6/11. Approval letter indexed to media. CM made aware.           Electronically signed by:  AMBER Ocasio  06/05/25 11:09 EDT    Leonel Chen  Case Management Extender  [address] [address]  P: [phone]  F: [phone]

## 2025-06-05 NOTE — CASE MANAGEMENT/SOCIAL WORK
Continued Stay Note  [facility]     Patient Name: Domonique See  MRN: [id number]  Today's Date: 6/5/2025    Admit Date: 5/23/2025    Plan: DC Plan:  Accepted to [facility]. Precert approved Valid 6/5-6/11/25. PASRR  approved. Pharmacy updated.  Bed ready today 6.5.2025   Discharge Plan       Row Name 06/05/25 1554       Plan    Plan DC Plan:  Accepted to [facility] skilled. Precert approved Valid 6/5-6/11/25. PASRR  approved. Pharmacy updated.  Bed ready today 6.5.2025    Plan Comments Precert approved for [facility] skilled. Valid 6/5-6/11/25. PASRR  approved.  Bed ready today6.5.2025.  Pharmacy updated.   Pt to transport via [facility] stretcher.  Request is in.  Patient to go to room 219B.                    Expected Discharge Date and Time       Expected Discharge Date Expected Discharge Time    Jun 5, 2025               Corin Baker RN    SIPS 1  [email or web address]  Office [phone]  Cell [phone]

## 2025-06-05 NOTE — PLAN OF CARE
Goal Outcome Evaluation:  Plan of Care Reviewed With: patient        Progress: no change  Outcome Evaluation: Patient with some complaints of neck pain this shift, C-collar in place, oral PRN pain medications given per MAR. VSS, no concern at this time.

## 2025-06-05 NOTE — PROGRESS NOTES
Neurosurgery Evaluation      Patient: Domonique See    YOB: 1940    Date of Admission: 5/23/2025 12:30 PM    Status Post: C7-T1 laminectomy with C5-T2 posterior instrumented fusion    Post Operative Day Number: 7 Days Post-Op      Subjective     Interval history:  No adverse events overnight.  No new complaints.  Continued slow improvement in sensorimotor function.  Pain moderately well-controlled.  Cervical collar in place.        Objective   Vitals:    06/04/25 1134 06/04/25 1636 06/04/25 2127 06/05/25 0002   BP:  114/60 112/52 132/55   BP Location:  Right arm  Right arm   Patient Position:  Lying  Lying   Pulse:  65 67 60   Resp: 12 13 16 14   Temp:    97.9 °F (36.6 °C)   TempSrc:    Oral   SpO2:  98% 99% 99%   Weight:       Height:           Physical exam    General  - awake, cooperative, in no acute distress  Skin  - Surgical incision CDI with no swelling redness or drainage  NEUROLOGIC  - Stable to slightly improved strength  - Sensation grossly intact      Results review  CBC          5/29/2025    05:09 5/30/2025    13:31 6/2/2025    08:06   CBC   WBC 5.02  9.71  5.45    RBC 3.91  2.97  2.90    Hemoglobin 11.3  8.7  8.5    Hematocrit 36.7  27.5  27.4    MCV 93.9  92.6  94.5    MCH 28.9  29.3  29.3    MCHC 30.8  31.6  31.0    RDW 13.7  13.5  13.2    Platelets 125  109  131        BMP          5/29/2025    05:09 5/30/2025    02:00 6/2/2025    09:10   BMP   BUN 15.3  16.9  7.4    Creatinine 0.79  0.73  0.45    Sodium 143  137  135    Potassium 4.2  4.3  4.2    Chloride 108  104  100    CO2 25.3  23.6  23.8    Calcium 9.8  8.9  9.4        Intake/Output                         06/03/25 0701 - 06/04/25 0700 06/04/25 0701 - 06/05/25 0700     [id number] 1569-3426 Total 1628-7851 3547-1974 Total                 Intake    P.O.  238  -- 238  360  -- 360    Total Intake 238 -- 238 360 -- 360       Output    Urine  2450  1150 3600  600  850 1450    Total Output 2450 1150 3600             XR  Spine Cervical 2 or 3 View  Result Date: 5/30/2025  XR SPINE CERVICAL 2 OR 3 VW Date of Exam: 5/30/2025 3:05 PM EDT Indication: s/p cervical fusion Comparison: CT cervical spine 3/28/2025, cervical spine radiographs 5/29/2025 Findings: The patient is undergone an anterior fusion from the C3 through the C7 level with a C6 corpectomy and fenestrated cage placement. Since the recent CT the patient is undergone a posterior fusion which appears to extend from the C5 level to at least the T2  level. The alignment is difficult to assess on the current radiograph however there appears to be less anterolisthesis of C7 on T1 than on the preoperative CT.     Impression: Impression: Postoperative changes of anterior and posterior cervical fusion as described above. The alignment is difficult to assess however there appears to be less anterolisthesis of C7 on T1 than on the preoperative CT. Electronically Signed: Lake Garza MD  5/30/2025 4:14 PM EDT  Workstation ID: [id number]    XR Spine Cervical 2 or 3 View  Result Date: 5/29/2025  This procedure was auto-finalized with no dictation required.          Assessment     MDM:  85 y.o. female s/p posterior cervical decompression and fusion with Dr. Bernal.  Patient is neurologically stable with some initial improvements in her sensorimotor function since surgery.  Pain moderately well-controlled.  Drain has been removed.  Patient fine for discharge to rehab from neurosurgical perspective.  We will have her follow-up in clinic in 2 weeks.      Plan   S/p cervical spinal fusion  Cervical myelopathy  - Fine for discharge to rehab from neurosurgical perspective  - Follow-up with outpatient neurosurgery in 2 weeks  - Hard cervical collar at all times  - Continue mobilization efforts  - Continue incisional care    Neurosurgery will sign off at this time.  Call with any questions or concerns.      I discussed my assessment and recommendations with Dr. Dolores Adam,  MICAH  6/5/2025  07:37 EDT

## 2025-06-05 NOTE — PLAN OF CARE
"Goal Outcome Evaluation:            Assessment: Domonique See presents with ADL impairments affecting function including balance, endurance / activity tolerance, postural / trunk control, range of motion (ROM), and strength. Used Ilene Michele to get pt on BSC & up to recliner this date. Pt is a heavy max A x2 to complete sit<>stand this date. She is demo inc BLE AROM this date. A bert pad was placed in recliner for staff to use bert lift for return to bed. Pt demo improving sitting balance on EOB & is making steady progress. Demonstrated functioning below baseline abilities indicate the need for continued skilled intervention while inpatient. Tolerating session today without incident. Will continue to follow and progress as tolerated.     Plan/Recommendations:   Moderate Intensity Therapy recommended post-acute care. This is recommended as therapy feels the patient would require 3-4 days per week and wouldn't tolerate \"3 hour daily\" rehab intensity. SNF would be the preferred choice. If the patient does not agree to SNF, arrange HH or OP depending on home bound status. If patient is medically complex, consider LTACH.. Pt requires no DME at discharge.     Pt desires Skilled Rehab placement at discharge. Pt cooperative; agreeable to therapeutic recommendations and plan of care.                         "

## 2025-06-05 NOTE — TELEPHONE ENCOUNTER
Patient had surgery with Dr. Bernal last week.  She needs to be scheduled for a 2-week postoperative follow-up with any APC, as we have all seen here during her hospital stay.  Please call to schedule.  Thanks

## 2025-06-05 NOTE — DISCHARGE SUMMARY
Date of Discharge:  6/5/2025    Discharge Diagnosis:   Cervical neuralgia  Pacemaker  Paroxysmal atrial fibrillation  Cervical radiculopathy  Essential hypertension  Spondylolisthesis, acquired  Obesity (BMI 30.0-34.9)  Hx of hypertrophic cardiomyopathy  Coronary artery disease  Neck pain  Seizure disorder  Encounter for pre-operative cardiovascular clearance  Aortic insufficiency  Mitral regurgitation  Acute urinary retention    Presenting Problem/History of Present Illness  Active Hospital Problems    Diagnosis  POA    **Cervical neuralgia [M54.12]  Yes    Acute urinary retention [R33.8]  No    Encounter for pre-operative cardiovascular clearance [Z01.810]  Not Applicable    Seizure disorder [G40.909]  Yes    Neck pain [M54.2]  Yes    Aortic insufficiency [I35.1]  Yes    Mitral regurgitation [I34.0]  Yes    Hx of hypertrophic cardiomyopathy [Z86.79]  Not Applicable    Obesity (BMI 30.0-34.9) [E66.811]  Yes    Essential hypertension [I10]  Yes    Paroxysmal atrial fibrillation [I48.0]  Yes    Pacemaker [Z95.0]  Yes    Spondylolisthesis, acquired [M43.10]  Yes    Cervical radiculopathy [M54.12]  Yes    Coronary artery disease [I25.10]  Yes      Resolved Hospital Problems    Diagnosis Date Resolved POA    Hypertrophic obstructive cardiomyopathy [I42.1] 05/24/2025 Yes          Hospital Course    Patient is a 85 y.o. female presented with ervical spinal stenosis, permanent atrial fib, chronic coronary artery disease and hypertrophic cardiomyopathy who presents with worsening bilateral lower extremity weakness.  She is followed by Dr. Bernal for her cervical disc disease.  Surgery has been considered, but her complicated cardiac history has presented a barrier.  She lives with family, who provides 24 hour care.  Over the last 3 days her mobility has worsened so that she has severe weakness in her legs - can not walk. On this admission she did have cervical spinal fusion. She has hard cervical collar at all times. She  has had urinary retention and constipation most likely due to immobility. She will discharge to rehab with [facility]en once mobility increases. She will need to follow up with neurosurgery in 2 weeks.     Procedures Performed    Procedure(s):  CERVICAL LAMINECTOMY DECOMPRESSION POSTERIOR C7 - T1, C5-T2 FUSION  -------------------       Consults:   Consults       Date and Time Order Name Status Description    5/23/2025  5:54 PM Inpatient Neurosurgery Consult Completed     5/23/2025  5:54 PM Inpatient Cardiology Consult Completed             Pertinent Test Results:    Lab Results (most recent)       Procedure Component Value Units Date/Time    Basic Metabolic Panel [[id number]]  (Abnormal) Collected: 06/02/25 0910    Specimen: Blood Updated: 06/02/25 0941     Glucose 107 mg/dL      BUN 7.4 mg/dL      Creatinine 0.45 mg/dL      Sodium 135 mmol/L      Potassium 4.2 mmol/L      Comment: Specimen hemolyzed.  Result may be falsely elevated.        Chloride 100 mmol/L      CO2 23.8 mmol/L      Calcium 9.4 mg/dL      BUN/Creatinine Ratio 16.4     Anion Gap 11.2 mmol/L      eGFR 93.8 mL/min/1.73     Narrative:      GFR Categories in Chronic Kidney Disease (CKD)              GFR Category          GFR (mL/min/1.73)    Interpretation  G1                    90 or greater        Normal or high (1)  G2                    60-89                Mild decrease (1)  G3a                   45-59                Mild to moderate decrease  G3b                   30-44                Moderate to severe decrease  G4                    15-29                Severe decrease  G5                    14 or less           Kidney failure    (1)In the absence of evidence of kidney disease, neither GFR category G1 or G2 fulfill the criteria for CKD.    eGFR calculation 2021 CKD-EPI creatinine equation, which does not include race as a factor    CBC & Differential [[id number]]  (Abnormal) Collected: 06/02/25 0806    Specimen: Blood Updated: 06/02/25 0825     Narrative:      The following orders were created for panel order CBC & Differential.  Procedure                               Abnormality         Status                     ---------                               -----------         ------                     CBC Auto Differential[[id number]]        Abnormal            Final result               Scan Slide[[id number]]                                                                    Please view results for these tests on the individual orders.    CBC Auto Differential [[id number]]  (Abnormal) Collected: 06/02/25 0806    Specimen: Blood Updated: 06/02/25 0825     WBC 5.45 10*3/mm3      RBC 2.90 10*6/mm3      Hemoglobin 8.5 g/dL      Hematocrit 27.4 %      MCV 94.5 fL      MCH 29.3 pg      MCHC 31.0 g/dL      RDW 13.2 %      RDW-SD 45.8 fl      MPV 12.0 fL      Platelets 131 10*3/mm3      Neutrophil % 69.0 %      Lymphocyte % 10.1 %      Monocyte % 13.0 %      Eosinophil % 1.5 %      Basophil % 0.2 %      Immature Grans % 6.2 %      Neutrophils, Absolute 3.76 10*3/mm3      Lymphocytes, Absolute 0.55 10*3/mm3      Monocytes, Absolute 0.71 10*3/mm3      Eosinophils, Absolute 0.08 10*3/mm3      Basophils, Absolute 0.01 10*3/mm3      Immature Grans, Absolute 0.34 10*3/mm3      nRBC 0.0 /100 WBC     CBC & Differential [[id number]]  (Abnormal) Collected: 05/30/25 1331    Specimen: Blood Updated: 05/30/25 1426    Narrative:      The following orders were created for panel order CBC & Differential.  Procedure                               Abnormality         Status                     ---------                               -----------         ------                     CBC Auto Differential[[id number]]        Abnormal            Final result               Scan Slide[[id number]]                                       Final result                 Please view results for these tests on the individual orders.    CBC Auto Differential [[id number]]  (Abnormal) Collected: 05/30/25 1331     Specimen: Blood Updated: 05/30/25 1426     WBC 9.71 10*3/mm3      RBC 2.97 10*6/mm3      Hemoglobin 8.7 g/dL      Comment: Result checked          Hematocrit 27.5 %      MCV 92.6 fL      MCH 29.3 pg      MCHC 31.6 g/dL      RDW 13.5 %      RDW-SD 46.0 fl      MPV 11.9 fL      Platelets 109 10*3/mm3     Scan Slide [[id number]] Collected: 05/30/25 1331    Specimen: Blood Updated: 05/30/25 1426     Scan Slide --     Comment: See Manual Differential Results       Manual Differential [[id number]]  (Abnormal) Collected: 05/30/25 1331    Specimen: Blood Updated: 05/30/25 1426     Neutrophil % 65.0 %      Lymphocyte % 5.0 %      Monocyte % 9.0 %      Basophil % 1.0 %      Bands %  17.0 %      Metamyelocyte % 2.0 %      Atypical Lymphocyte % 1.0 %      Neutrophils Absolute 7.96 10*3/mm3      Lymphocytes Absolute 0.58 10*3/mm3      Monocytes Absolute 0.87 10*3/mm3      Basophils Absolute 0.10 10*3/mm3      Polychromasia Slight/1+     WBC Morphology Normal     Platelet Estimate Decreased     Large Platelets Slight/1+     Giant Platelets Slight/1+    Basic Metabolic Panel [[id number]]  (Abnormal) Collected: 05/30/25 0200    Specimen: Blood Updated: 05/30/25 0409     Glucose 154 mg/dL      BUN 16.9 mg/dL      Creatinine 0.73 mg/dL      Sodium 137 mmol/L      Potassium 4.3 mmol/L      Chloride 104 mmol/L      CO2 23.6 mmol/L      Calcium 8.9 mg/dL      BUN/Creatinine Ratio 23.2     Anion Gap 9.4 mmol/L      eGFR 80.2 mL/min/1.73     Narrative:      GFR Categories in Chronic Kidney Disease (CKD)              GFR Category          GFR (mL/min/1.73)    Interpretation  G1                    90 or greater        Normal or high (1)  G2                    60-89                Mild decrease (1)  G3a                   45-59                Mild to moderate decrease  G3b                   30-44                Moderate to severe decrease  G4                    15-29                Severe decrease  G5                    14 or less            Kidney failure    (1)In the absence of evidence of kidney disease, neither GFR category G1 or G2 fulfill the criteria for CKD.    eGFR calculation 2021 CKD-EPI creatinine equation, which does not include race as a factor    Protime-INR [[id number]]  (Normal) Collected: 05/29/25 0509    Specimen: Blood Updated: 05/29/25 0536     Protime 14.1 Seconds      INR 1.09    aPTT [[id number]]  (Abnormal) Collected: 05/29/25 0509    Specimen: Blood Updated: 05/29/25 0536     PTT 45.1 seconds     CBC (No Diff) [[id number]]  (Abnormal) Collected: 05/29/25 0509    Specimen: Blood Updated: 05/29/25 0534     WBC 5.02 10*3/mm3      RBC 3.91 10*6/mm3      Hemoglobin 11.3 g/dL      Hematocrit 36.7 %      MCV 93.9 fL      MCH 28.9 pg      MCHC 30.8 g/dL      RDW 13.7 %      RDW-SD 46.9 fl      MPV 12.0 fL      Platelets 125 10*3/mm3     Blood Culture - Blood, Arm, Right [[id number]]  (Normal) Collected: 05/24/25 0021    Specimen: Blood from Arm, Right Updated: 05/29/25 0030     Blood Culture No growth at 5 days    Narrative:      Less than seven (7) mL's of blood was collected.  Insufficient quantity may yield false negative results.    Blood Culture - Blood, Arm, Left [[id number]]  (Normal) Collected: 05/23/25 2037    Specimen: Blood from Arm, Left Updated: 05/28/25 2045     Blood Culture No growth at 5 days    aPTT [[id number]]  (Abnormal) Collected: 05/28/25 0850    Specimen: Blood Updated: 05/28/25 0950     PTT 43.0 seconds     Comprehensive Metabolic Panel [[id number]]  (Abnormal) Collected: 05/28/25 0159    Specimen: Blood Updated: 05/28/25 0234     Glucose 98 mg/dL      BUN 16.1 mg/dL      Creatinine 0.76 mg/dL      Sodium 139 mmol/L      Potassium 4.5 mmol/L      Chloride 107 mmol/L      CO2 21.3 mmol/L      Calcium 9.1 mg/dL      Total Protein 5.3 g/dL      Albumin 3.3 g/dL      ALT (SGPT) 10 U/L      AST (SGOT) 17 U/L      Alkaline Phosphatase 97 U/L      Total Bilirubin 0.4 mg/dL      Globulin 2.0 gm/dL      A/G Ratio 1.7 g/dL       "BUN/Creatinine Ratio 21.2     Anion Gap 10.7 mmol/L      eGFR 76.4 mL/min/1.73     Narrative:      GFR Categories in Chronic Kidney Disease (CKD)              GFR Category          GFR (mL/min/1.73)    Interpretation  G1                    90 or greater        Normal or high (1)  G2                    60-89                Mild decrease (1)  G3a                   45-59                Mild to moderate decrease  G3b                   30-44                Moderate to severe decrease  G4                    15-29                Severe decrease  G5                    14 or less           Kidney failure    (1)In the absence of evidence of kidney disease, neither GFR category G1 or G2 fulfill the criteria for CKD.    eGFR calculation 2021 CKD-EPI creatinine equation, which does not include race as a factor    Protime-INR [[id number]]  (Normal) Collected: 05/27/25 1303    Specimen: Blood from Hand, Left Updated: 05/27/25 1320     Protime 13.6 Seconds      INR 1.04    Procalcitonin [[id number]]  (Normal) Collected: 05/24/25 0931    Specimen: Blood Updated: 05/24/25 1007     Procalcitonin 0.04 ng/mL     Narrative:      As a Marker for Sepsis (Non-Neonates):    1. <0.5 ng/mL represents a low risk of severe sepsis and/or septic shock.  2. >2 ng/mL represents a high risk of severe sepsis and/or septic shock.    As a Marker for Lower Respiratory Tract Infections that require antibiotic therapy:    PCT on Admission    Antibiotic Therapy       6-12 Hrs later    >0.5                Strongly Recommended  >0.25 - <0.5        Recommended   0.1 - 0.25          Discouraged              Remeasure/reassess PCT  <0.1                Strongly Discouraged     Remeasure/reassess PCT    As 28 day mortality risk marker: \"Change in Procalcitonin Result\" (>80% or <=80%) if Day 0 (or Day 1) and Day 4 values are available. Refer to http://www.[employer]-pct-calculator.com    Change in PCT <=80%  A decrease of PCT levels below or equal to 80% defines a " "positive change in PCT test result representing a higher risk for 28-day all-cause mortality of patients diagnosed with severe sepsis for septic shock.    Change in PCT >80%  A decrease of PCT levels of more than 80% defines a negative change in PCT result representing a lower risk for 28-day all-cause mortality of patients diagnosed with severe sepsis or septic shock.       Procalcitonin [[id number]]  (Normal) Collected: 05/24/25 0011    Specimen: Blood Updated: 05/24/25 0059     Procalcitonin 0.04 ng/mL     Narrative:      As a Marker for Sepsis (Non-Neonates):    1. <0.5 ng/mL represents a low risk of severe sepsis and/or septic shock.  2. >2 ng/mL represents a high risk of severe sepsis and/or septic shock.    As a Marker for Lower Respiratory Tract Infections that require antibiotic therapy:    PCT on Admission    Antibiotic Therapy       6-12 Hrs later    >0.5                Strongly Recommended  >0.25 - <0.5        Recommended   0.1 - 0.25          Discouraged              Remeasure/reassess PCT  <0.1                Strongly Discouraged     Remeasure/reassess PCT    As 28 day mortality risk marker: \"Change in Procalcitonin Result\" (>80% or <=80%) if Day 0 (or Day 1) and Day 4 values are available. Refer to http://www.[email or web address] [place]-pct-calculator.com    Change in PCT <=80%  A decrease of PCT levels below or equal to 80% defines a positive change in PCT test result representing a higher risk for 28-day all-cause mortality of patients diagnosed with severe sepsis for septic shock.    Change in PCT >80%  A decrease of PCT levels of more than 80% defines a negative change in PCT result representing a lower risk for 28-day all-cause mortality of patients diagnosed with severe sepsis or septic shock.       High Sensitivity Troponin T 1Hr [[id number]]  (Abnormal) Collected: 05/23/25 1511    Specimen: Blood Updated: 05/23/25 1541     HS Troponin T 16 ng/L      Troponin T Numeric Delta -4 ng/L      Troponin T % Delta -20    " Narrative:      High Sensitive Troponin T Reference Range:  <14.0 ng/L- Negative Female for AMI  <22.0 ng/L- Negative Male for AMI  >=14 - Abnormal Female indicating possible myocardial injury.  >=22 - Abnormal Male indicating possible myocardial injury.   Clinicians would have to utilize clinical acumen, EKG, Troponin, and serial changes to determine if it is an Acute Myocardial Infarction or myocardial injury due to an underlying chronic condition.         Comprehensive Metabolic Panel [[id number]]  (Abnormal) Collected: 05/23/25 1403    Specimen: Blood Updated: 05/23/25 1437     Glucose 86 mg/dL      BUN 22 mg/dL      Creatinine 0.77 mg/dL      Sodium 139 mmol/L      Potassium 4.7 mmol/L      Chloride 104 mmol/L      CO2 26.4 mmol/L      Calcium 9.2 mg/dL      Total Protein 5.6 g/dL      Albumin 3.6 g/dL      ALT (SGPT) 9 U/L      AST (SGOT) 20 U/L      Alkaline Phosphatase 94 U/L      Total Bilirubin 0.4 mg/dL      Globulin 2.0 gm/dL      A/G Ratio 1.8 g/dL      BUN/Creatinine Ratio 28.6     Anion Gap 8.6 mmol/L      eGFR 75.2 mL/min/1.73     Narrative:      GFR Categories in Chronic Kidney Disease (CKD)              GFR Category          GFR (mL/min/1.73)    Interpretation  G1                    90 or greater        Normal or high (1)  G2                    60-89                Mild decrease (1)  G3a                   45-59                Mild to moderate decrease  G3b                   30-44                Moderate to severe decrease  G4                    15-29                Severe decrease  G5                    14 or less           Kidney failure    (1)In the absence of evidence of kidney disease, neither GFR category G1 or G2 fulfill the criteria for CKD.    eGFR calculation 2021 CKD-EPI creatinine equation, which does not include race as a factor    High Sensitivity Troponin T [[id number]]  (Abnormal) Collected: 05/23/25 1403    Specimen: Blood Updated: 05/23/25 1437     HS Troponin T 20 ng/L      Narrative:      High Sensitive Troponin T Reference Range:  <14.0 ng/L- Negative Female for AMI  <22.0 ng/L- Negative Male for AMI  >=14 - Abnormal Female indicating possible myocardial injury.  >=22 - Abnormal Male indicating possible myocardial injury.   Clinicians would have to utilize clinical acumen, EKG, Troponin, and serial changes to determine if it is an Acute Myocardial Infarction or myocardial injury due to an underlying chronic condition.         Scan Slide [[id number]]  (Normal) Collected: 05/23/25 1403    Specimen: Blood Updated: 05/23/25 1427     RBC Morphology Normal     WBC Morphology Normal     Platelet Morphology Normal    Narrative:      Slide Reviewed      Extra Tubes [[id number]] Collected: 05/23/25 1403    Specimen: Blood, Venous Line Updated: 05/23/25 1415    Narrative:      The following orders were created for panel order Extra Tubes.  Procedure                               Abnormality         Status                     ---------                               -----------         ------                     Gold Top - SST[[id number]]                                   Final result                 Please view results for these tests on the individual orders.    Gold Top - SST [[id number]] Collected: 05/23/25 1403    Specimen: Blood Updated: 05/23/25 1415     Extra Tube Hold for add-ons.     Comment: Auto resulted.       POC Lactate [[id number]]  (Normal) Collected: 05/23/25 1407    Specimen: Blood Updated: 05/23/25 1409     Lactate 0.3 mmol/L      Comment: Serial Number: [id number]:  [id number]       Urinalysis With Microscopic If Indicated (No Culture) - Urine, Catheter [[id number]]  (Normal) Collected: 05/23/25 1312    Specimen: Urine, Catheter Updated: 05/23/25 1335     Color, UA Yellow     Appearance, UA Clear     pH, UA 6.0     Specific Gravity, UA 1.010     Glucose, UA Negative     Ketones, UA Negative     Bilirubin, UA Negative     Blood, UA Negative     Protein, UA Negative     Leuk  Esterase, UA Negative     Nitrite, UA Negative     Urobilinogen, UA 1.0 E.U./dL    Narrative:      Urine microscopic not indicated.             Results for orders placed during the hospital encounter of 03/21/25    Adult Transthoracic Echo Complete W/ Cont if Necessary Per Protocol    Interpretation Summary    Left ventricular ejection fraction appears to be 61 - 65%.    Left ventricular diastolic function was normal.    The left atrial cavity is moderately dilated.    Left atrial volume is severely increased.    Moderate aortic valve regurgitation is present.    Moderate mitral valve regurgitation is present.    Estimated right ventricular systolic pressure from tricuspid regurgitation is normal (<35 mmHg).       Condition on Discharge:  Stable    Vital Signs  Temp:  [97.9 °F (36.6 °C)-98.1 °F (36.7 °C)] 98.1 °F (36.7 °C)  Heart Rate:  [60-67] 63  Resp:  [13-16] 16  BP: (112-146)/(52-62) 146/62      Physical Exam  Vitals reviewed.   Constitutional:       Appearance: She is not ill-appearing.   HENT:      Head: Normocephalic and atraumatic.      Right Ear: External ear normal.      Left Ear: External ear normal.      Nose: Nose normal.      Mouth/Throat:      Mouth: Mucous membranes are moist.   Eyes:      General:         Right eye: No discharge.         Left eye: No discharge.   Cardiovascular:      Rate and Rhythm: Normal rate and regular rhythm.      Pulses: Normal pulses.      Heart sounds: Normal heart sounds.   Pulmonary:      Effort: Pulmonary effort is normal.      Breath sounds: Normal breath sounds.   Abdominal:      General: Bowel sounds are normal.      Palpations: Abdomen is soft.   Musculoskeletal:         General: Tenderness present.      Cervical back: No tenderness.      Right lower leg: No edema.      Left lower leg: No edema.   Skin:     General: Skin is warm and dry.   Neurological:      Mental Status: She is alert and oriented to person, place, and time.   Psychiatric:         Behavior:  Behavior normal.              Discharge Disposition  Skilled Nursing Facility (DC - External)    Discharge Medications     Discharge Medications        New Medications        Instructions Start Date   acetaminophen 325 MG tablet  Commonly known as: TYLENOL   650 mg, Oral, Every 4 Hours PRN      bisacodyl 5 MG EC tablet  Commonly known as: DULCOLAX   5 mg, Oral, Daily PRN      bisacodyl 10 MG suppository  Commonly known as: DULCOLAX   10 mg, Rectal, Daily PRN      docusate sodium 100 MG capsule   100 mg, Oral, 2 Times Daily      HYDROcodone-acetaminophen 7.5-325 MG per tablet  Commonly known as: NORCO   1 tablet, Oral, Every 6 Hours PRN      methocarbamol 750 MG tablet  Commonly known as: ROBAXIN   750 mg, Oral, 3 times daily      midodrine 10 MG tablet  Commonly known as: PROAMATINE   10 mg, Oral, Every 8 Hours      oxyCODONE 10 MG tablet  Commonly known as: ROXICODONE  Replaces: oxyCODONE-acetaminophen  MG per tablet   10 mg, Oral, Every 4 Hours PRN             Changes to Medications        Instructions Start Date   atorvastatin 40 MG tablet  Commonly known as: LIPITOR  What changed: See the new instructions.   40 mg, Oral, Daily   Start Date: June 6, 2025     metoprolol succinate XL 25 MG 24 hr tablet  Commonly known as: TOPROL-XL  What changed: how much to take   12.5 mg, Oral, Every 24 Hours Scheduled   Start Date: June 6, 2025     polyethylene glycol 17 g packet  Commonly known as: MIRALAX  What changed: You were already taking a medication with the same name, and this prescription was added. Make sure you understand how and when to take each.   17 g, Oral, Daily PRN      polyethylene glycol 17 g packet  Commonly known as: MIRALAX  What changed:   when to take this  reasons to take this   17 g, Oral, Daily   Start Date: June 6, 2025     pregabalin 200 MG capsule  Commonly known as: LYRICA  What changed:   medication strength  when to take this   200 mg, Oral, Every 8 Hours Scheduled       sennosides-docusate 8.6-50 MG per tablet  Commonly known as: PERICOLACE  What changed: when to take this   2 tablets, Oral, 2 Times Daily      Synthroid 75 MCG tablet  Generic drug: levothyroxine  What changed: how much to take   75 mcg, Oral, Every Morning      zolpidem 5 MG tablet  Commonly known as: AMBIEN  What changed:   medication strength  how much to take   5 mg, Oral, Nightly PRN             Continue These Medications        Instructions Start Date   apixaban 5 MG tablet tablet  Commonly known as: Eliquis   5 mg, Oral, 2 Times Daily      aspirin 81 MG EC tablet   81 mg, Oral, Nightly      sertraline 100 MG tablet  Commonly known as: ZOLOFT   100 mg, Oral, Every Evening             Stop These Medications      albuterol sulfate  (90 Base) MCG/ACT inhaler  Commonly known as: PROVENTIL HFA;VENTOLIN HFA;PROAIR HFA     diclofenac 50 MG EC tablet  Commonly known as: VOLTAREN     guaiFENesin 600 MG 12 hr tablet  Commonly known as: MUCINEX     Ibuprofen 3 %, Gabapentin 10 %, Baclofen 2 %, lidocaine 4 %, Ketamine HCl 4 %     lisinopril 10 MG tablet  Commonly known as: PRINIVIL,ZESTRIL     nystatin 171472 UNIT/GM powder  Commonly known as: MYCOSTATIN     oxyCODONE-acetaminophen  MG per tablet  Commonly known as: PERCOCET  Replaced by: oxyCODONE 10 MG tablet              Discharge Diet:     Activity at Discharge:     Follow-up Appointments  Future Appointments   Date Time Provider Department Center   8/28/2025  1:00 PM [facility]   8/28/2025  1:30 PM Evgeny Gregory MD [facility]         Test Results Pending at Discharge  Pending Results       None             Zoey Acosta, WOODY  06/05/25  14:35 EDT    Time: Discharge 35 minutes in chart review, coordination of care, and face to face

## 2025-06-06 NOTE — CASE MANAGEMENT/SOCIAL WORK
Case Management Discharge Note      Final Note: [facility] SKILLED    Provided Post Acute Provider List?: Yes  Post Acute Provider List: Home Health  Delivered To: Patient, Support Person  Support Person: Woodrow Houser  Method of Delivery: In person    Selected Continued Care - Discharged on 6/5/2025 Admission date: 5/23/2025 - Discharge disposition: Skilled Nursing Facility (DC - External)      Destination Coordination complete.      Service Provider Services Address Phone Fax Patient Preferred    [facility] [place] [address], [place] [address] [phone] --                    Transportation Services  Ambulance: [facility]    Final Discharge Disposition Code: 03 - skilled nursing facility (SNF)

## 2025-06-09 ENCOUNTER — TELEPHONE (OUTPATIENT)
Dept: NEUROSURGERY | Facility: CLINIC | Age: 85
End: 2025-06-09
Payer: MEDICARE

## 2025-06-09 NOTE — TELEPHONE ENCOUNTER
Patients relative called to ask if they can clean the neck brace. I let them know when she's in the shower or getting bathed she can have her brace be cleaned. At least once a week on a good cleaning and then wipe it down ever day after eating or drinking just to make sure. Corrina stated she understood.

## 2025-06-10 ENCOUNTER — TELEPHONE (OUTPATIENT)
Dept: PAIN MEDICINE | Facility: CLINIC | Age: 85
End: 2025-06-10
Payer: MEDICARE

## 2025-06-10 ENCOUNTER — TELEPHONE (OUTPATIENT)
Dept: NEUROSURGERY | Facility: CLINIC | Age: 85
End: 2025-06-10
Payer: MEDICARE

## 2025-06-10 NOTE — TELEPHONE ENCOUNTER
Caller: NILTON GENAO    Relationship: Emergency Contact    Best call back number: [phone]    What was the call regarding: PT'S RELATIVE CALLING TO RESCHEDULE APPT WITH DR MAST FROM 06/11/25. FIRST AVAILABLE JULY 21. PT HAD RECENT SX AND IS NEEDING PAIN MEDICATION MANAGEMENT TO BE HANDLED BY DR MAST. PT WILL BE OUT OF PAIN MEDICATION SOON AND WOULD LIKE TO GET SOONER APPT OR SEE IF MEDICATION CAN BE SENT BEFORE BEING SEEN IN THE OFFICE AGAIN. PLEASE CONTACT PT'S RELATIVE ABOVE TO DISCUSS.

## 2025-06-10 NOTE — TELEPHONE ENCOUNTER
Patient's daughter in law Corrina called and asked if the patient should cancel her Pain Management Appointment tomorrow. I told her yes that she should cancel that appointment due to her recent surgery on 5/29/25 and that she should wait until after she is seen here in our office for her Post Op appointment. She verbally understood.  Called Corrina back and explained that I had spoken with someone here in the office and they informed me that the patient will need to keep their appointment with Pain management as they will be managing the patient's pain medication. She verbally understood.

## 2025-06-11 ENCOUNTER — OFFICE VISIT (OUTPATIENT)
Dept: PAIN MEDICINE | Facility: CLINIC | Age: 85
End: 2025-06-11
Payer: MEDICARE

## 2025-06-11 VITALS
SYSTOLIC BLOOD PRESSURE: 110 MMHG | BODY MASS INDEX: 32.22 KG/M2 | RESPIRATION RATE: 16 BRPM | HEART RATE: 66 BPM | OXYGEN SATURATION: 96 % | WEIGHT: 165 LBS | DIASTOLIC BLOOD PRESSURE: 54 MMHG

## 2025-06-11 DIAGNOSIS — G89.18 ACUTE POST-OPERATIVE PAIN: ICD-10-CM

## 2025-06-11 DIAGNOSIS — M54.12 CERVICAL RADICULOPATHY: Primary | ICD-10-CM

## 2025-06-11 DIAGNOSIS — Z98.1 S/P CERVICAL SPINAL FUSION: ICD-10-CM

## 2025-06-11 NOTE — PATIENT INSTRUCTIONS
Recommend switching from Robaxin to Flexeril 10 mg TID PRN.    Manual Repair Warning Statement: We plan on removing the manually selected variable below in favor of our much easier automatic structured text blocks found in the previous tab. We decided to do this to help make the flow better and give you the full power of structured data. Manual selection is never going to be ideal in our platform and I would encourage you to avoid using manual selection from this point on, especially since I will be sunsetting this feature. It is important that you do one of two things with the customized text below. First, you can save all of the text in a word file so you can have it for future reference. Second, transfer the text to the appropriate area in the Library tab. Lastly, if there is a flap or graft type which we do not have you need to let us know right away so I can add it in before the variable is hidden. No need to panic, we plan to give you roughly 6 months to make the change.

## 2025-06-11 NOTE — TELEPHONE ENCOUNTER
DR. MAST PATIENT HAD CERVICAL FUSION C5-T2 AND CERVICAL LAMINECTOMY DECOMPRESSION C7-T1 WITH  ON 05/29/25. LOOKS LIKE YOU DO NOT PRESCRIBE MEDICATIONS FOR HER. I KNOW IF YOU ARE WILLING TO  SHE WILL NEED AN APPT. HOWEVER DIDN'T KNOW IF THAT WAS SOMETHING YOU WERE WILLING TO MANAGE OR DOES SHE NEED TO TALK TO HER SURGEON ABOUT THAT?

## 2025-06-11 NOTE — PROGRESS NOTES
Subjective     Chief Complaint   Patient presents with    Post-op         Previous Treatment: Oxycodone,hydrcodone,robaxin,tylenol,aspirin,epidural block 4/30/25, Cervical laminectomy decompression posterior C7-T2 fusion 5/29/25    HPI: Domonique See is a 85 y.o. female who presents to clinic for her 2-week postop her appointment for her cervical laminectomy decompression posterior C7-T1 and C5-T2 fusion.  Patient is currently at [facility] rehab.  Patient is doing very well and even stood with a walker.  Patient states that she has better strength in her arms and legs.  She is able to move her feet, worse before she can even move her right foot.  She still has a Fuentes catheter in place, but plans to have it taken out Monday and start using the bedside commode.  She continues to have numbness in her legs but states it is much better than it was preoperatively.      PMH:  Past Medical History:   Diagnosis Date    Anemia     Aortic insufficiency 03/21/2025    Asthma     CHF (congestive heart failure)     Cholecystitis 09/02/2011    Congenital heart disease     Coronary artery disease 2013    Deep vein thrombosis (DVT) 11/20/2017    GERD (gastroesophageal reflux disease)     Heart murmur Don't know    Hx of hypertrophic cardiomyopathy     Hypertension     Hypertrophic obstructive cardiomyopathy 11/10/2017    Low back pain     Lumbosacral disc disease     Mitral regurgitation 03/21/2025    Mixed hyperlipidemia 10/06/2017    Morbid obesity 09/21/2020    Peptic ulceration     Persistent atrial fibrillation 01/10/2023    Primary osteoarthritis of both knees 02/08/2021    Primary osteoarthritis of right knee 09/21/2020    Seizures     Sleep apnea 2017    TIA (transient ischemic attack)          Current Outpatient Medications:     acetaminophen (TYLENOL) 325 MG tablet, Take 2 tablets by mouth Every 4 (Four) Hours As Needed for Mild Pain., Disp: , Rfl:     apixaban (Eliquis) 5 MG tablet tablet, Take 1 tablet by mouth  2 (Two) Times a Day., Disp: 60 tablet, Rfl: 3    aspirin 81 MG EC tablet, Take 1 tablet by mouth Every Night. Indications: Disease involving Lipid Deposits in the Arteries, Disp: 30 tablet, Rfl: 3    atorvastatin (LIPITOR) 40 MG tablet, Take 1 tablet by mouth Daily., Disp: 30 tablet, Rfl: 1    bisacodyl (DULCOLAX) 10 MG suppository, Insert 1 suppository into the rectum Daily As Needed for Constipation (Use if bisacodyl oral is ineffective)., Disp: , Rfl:     bisacodyl (DULCOLAX) 5 MG EC tablet, Take 1 tablet by mouth Daily As Needed for Constipation (Use if polyethylene glycol is ineffective)., Disp: , Rfl:     docusate sodium 100 MG capsule, Take 1 capsule by mouth 2 (Two) Times a Day., Disp: 60 capsule, Rfl: 1    HYDROcodone-acetaminophen (NORCO) 7.5-325 MG per tablet, Take 1 tablet by mouth Every 6 (Six) Hours As Needed for Moderate Pain., Disp: 20 tablet, Rfl: 0    methocarbamol (ROBAXIN) 750 MG tablet, Take 1 tablet by mouth 3 times a day., Disp: 90 tablet, Rfl: 1    metoprolol succinate XL (TOPROL-XL) 25 MG 24 hr tablet, Take 0.5 tablets by mouth Daily., Disp: 15 tablet, Rfl: 1    midodrine (PROAMATINE) 10 MG tablet, Take 1 tablet by mouth Every 8 (Eight) Hours., Disp: 90 tablet, Rfl: 1    oxyCODONE (ROXICODONE) 10 MG tablet, Take 1 tablet by mouth Every 4 (Four) Hours As Needed for Severe Pain., Disp: 20 each, Rfl: 0    polyethylene glycol (MIRALAX) 17 g packet, Take 17 g by mouth Daily., Disp: 30 packet, Rfl: 3    pregabalin (LYRICA) 200 MG capsule, Take 1 capsule by mouth Every 8 (Eight) Hours., Disp: 30 capsule, Rfl: 1    sennosides-docusate (PERICOLACE) 8.6-50 MG per tablet, Take 2 tablets by mouth 2 (Two) Times a Day., Disp: , Rfl:     sertraline (ZOLOFT) 100 MG tablet, Take 1 tablet by mouth Every Evening. Indications: Major Depressive Disorder, Disp: 30 tablet, Rfl: 1    Synthroid 75 MCG tablet, Take 1 tablet by mouth Every Morning., Disp: 30 tablet, Rfl: 1    zolpidem (AMBIEN) 5 MG tablet, Take 1  tablet by mouth At Night As Needed for Sleep., Disp: 10 tablet, Rfl: 0    HYDROcodone-acetaminophen (NORCO) 7.5-325 MG per tablet, Take 1 tablet by mouth Every 6 (Six) Hours As Needed for Moderate Pain., Disp: 30 tablet, Rfl: 0    naloxone (NARCAN) 4 MG/0.1ML nasal spray, Call 911. Don't prime. [place] in 1 nostril for overdose. Repeat in 2-3 minutes in other nostril if no or minimal breathing/responsiveness., Disp: 2 each, Rfl: 0    oxyCODONE (ROXICODONE) 10 MG tablet, Take 1 tablet by mouth Every 4 (Four) Hours As Needed for Moderate Pain., Disp: 30 tablet, Rfl: 0    polyethylene glycol (MIRALAX) 17 g packet, Take 17 g by mouth Daily As Needed (Use if senna-docusate is ineffective). (Patient not taking: Reported on 6/13/2025), Disp: , Rfl:      No Known Allergies     Past Surgical History:   Procedure Laterality Date    ABDOMINAL HERNIA REPAIR      ABLATION OF DYSRHYTHMIC FOCUS  01-    BACK SURGERY      BREAST AUGMENTATION      CARDIAC CATHETERIZATION      CARDIAC ELECTROPHYSIOLOGY PROCEDURE N/A 01/16/2023    Procedure: Ablation atrial fibrillation and flutter, cryo Duong and Ramón aware;  Surgeon: Leeanne Pleitez MD;  Location: [facility] CATH INVASIVE LOCATION;  Service: Cardiovascular;  Laterality: N/A;    CARDIAC PACEMAKER PLACEMENT  03/16/2017    Dual Chamber    CERVICAL RIB RESECTION Bilateral     CERVICAL RIB RESECTION Bilateral 1963    bilateral cervical ribs removed - extra ribs located and causing pain    CHOLECYSTECTOMY      COLONOSCOPY      CORONARY ARTERY BYPASS GRAFT  2013    EPIDURAL BLOCK      NECK SURGERY      NECK SURGERY  05/29/2025    SPINAL FUSION      THORACIC DECOMPRESSION POSTERIOR FUSION  06/04/2014    L3-5 & S1    TRIGGER POINT INJECTION      UPPER GASTROINTESTINAL ENDOSCOPY          Family History   Problem Relation Age of Onset    Colon cancer Mother     Heart disease Mother     Hypertension Mother     Arthritis Mother     Cancer Mother     Diabetes Mother     Kidney disease Mother  "    Colon cancer Brother     Heart attack Brother     Colon cancer Daughter     Arthritis Daughter     Arthritis Father     Hearing loss Father     Arthritis Sister     Hypertension Brother     Arthritis Brother     Cancer Brother     Stroke Brother     Arthritis Son     Arthritis Son     Cancer Sister     Diabetes Sister     Cancer Brother     Hypertension Sister     Kidney disease Sister          Social Hx:  Social History     Tobacco Use   Smoking Status Former    Current packs/day: 0.00    Average packs/day: 1 pack/day for 8.0 years (8.0 ttl pk-yrs)    Types: Cigarettes    Start date: 1977    Quit date: 1985    Years since quittin.4    Passive exposure: Past   Smokeless Tobacco Never   Tobacco Comments    Quit       Alcohol Use: Not At Risk (2025)    AUDIT-C     Frequency of Alcohol Consumption: Never     Average Number of Drinks: Patient does not drink     Frequency of Binge Drinking: Never      Social History     Substance and Sexual Activity   Drug Use Never          Review of Systems   Constitutional:  Positive for activity change.   HENT: Negative.     Eyes: Negative.    Respiratory: Negative.     Cardiovascular: Negative.    Gastrointestinal: Negative.    Endocrine: Negative.    Genitourinary: Negative.    Musculoskeletal:  Positive for arthralgias, myalgias, neck pain and neck stiffness.   Skin:         Looks good   Allergic/Immunologic: Negative.    Neurological:  Positive for numbness (+tingling right hand a little). Negative for light-headedness.   Hematological: Negative.    Psychiatric/Behavioral: Negative.  Negative for sleep disturbance.          Objective     /67   Pulse 80   Resp 18   Ht 152.4 cm (60\")   Wt 74.8 kg (165 lb)   LMP  (LMP Unknown)   SpO2 95%   BMI 32.22 kg/m²    Body mass index is 32.22 kg/m².      Physical Exam  Vitals reviewed.   Musculoskeletal:         General: Normal range of motion.      Cervical back: Normal range of motion.   Skin:     " General: Skin is warm and dry.   Neurological:      General: No focal deficit present.   Psychiatric:         Mood and Affect: Mood normal.         Speech: Speech normal.          Neurological Exam  Mental Status  Awake, alert and oriented to person, place and time. Oriented to person, place and time. Speech is normal. Language is fluent with no aphasia.    Motor    4/5 strength in upper extremities  4/5 strength in left leg  3+/5 strength in right leg.    Reflexes    Right pathological reflexes: David's absent.  Left pathological reflexes: David's absent.          Results Review  I personally reviewed and interpreted the images from the following studies:          XR Spine Cervical 2 or 3 View  Result Date: 5/30/2025  XR SPINE CERVICAL 2 OR 3 VW Date of Exam: 5/30/2025 3:05 PM EDT Indication: s/p cervical fusion Comparison: CT cervical spine 3/28/2025, cervical spine radiographs 5/29/2025 Findings: The patient is undergone an anterior fusion from the C3 through the C7 level with a C6 corpectomy and fenestrated cage placement. Since the recent CT the patient is undergone a posterior fusion which appears to extend from the C5 level to at least the T2  level. The alignment is difficult to assess on the current radiograph however there appears to be less anterolisthesis of C7 on T1 than on the preoperative CT.     Impression: Impression: Postoperative changes of anterior and posterior cervical fusion as described above. The alignment is difficult to assess however there appears to be less anterolisthesis of C7 on T1 than on the preoperative CT. Electronically Signed: Lake Garza MD  5/30/2025 4:14 PM EDT  Workstation ID: [id number]    XR Spine Cervical 2 or 3 View  Result Date: 5/29/2025  This procedure was auto-finalized with no dictation required.        Assessment & Plan     MDM: Domonique See is a 85 y.o. female who presents to clinic for 2-week postoperative appointment.  Patient is doing very well  postoperatively.  Patient is able to move her legs much better than before and feels stronger in her upper extremities.  Patient even stood with a walker 3 times.  Patient feels much stronger and is doing very well in therapy.    On physical examination patient has good strength in her upper extremities and I did not appreciate David sign.  Patient continues to be stronger in her left leg and her right leg, but her right leg has drastically improved postoperatively.    At this time patient is to continue physical therapy while at rehab.  When she discharges from rehab she will continue physical therapy with home health.  She should continue wearing her hard collar at all times.  Patient's incision is healing very nicely and looks great at this time.  She denies any fever, chills or discharge from her incision.    Patient is now able to get her incision wet in the shower but should not submerge her incision.  She should not be lifting more than 5 pounds.  I did prescribe her more pain medication and Flexeril during this visit.    Patient is to follow-up in 3 months with an updated CT scan of her cervical spine.  Patient is agreeable to this plan knows to call with any questions or concerns.       Diagnosis Plan   1. S/P spinal fusion  HYDROcodone-acetaminophen (NORCO) 7.5-325 MG per tablet    oxyCODONE (ROXICODONE) 10 MG tablet    CT Cervical Spine Without Contrast    Ambulatory Referral to Home Health          Return 3-month, for Next scheduled follow up.      Domonique Lancastermarcosumu  reports that she quit smoking about 40 years ago. Her smoking use included cigarettes. She started smoking about 48 years ago. She has a 8 pack-year smoking history. She has been exposed to tobacco smoke. She has never used smokeless tobacco.          This patient was examined wearing appropriate personal protective equipment.            Pau Hayden PA-C    06/13/25  14:19 EDT      Part of this note may be an electronic  transcription/translation of spoken language to printed text using the Dragon Dictation System.

## 2025-06-11 NOTE — PROGRESS NOTES
Subjective   Domonique See is a 85 y.o. female is here for follow up for neck pain and lower back pain.  Patient is s/p C5-T2 fusion on 5/29/2025.  Here for follow-up due to pain.  She did pretty well after the surgery and her weakness is starting to improve.  She has been working with therapy at the rehab center.  She was able to stand up to 3 times today with help.  She complains of bilateral shoulder/trapezius pain that is not well-controlled.  Denies any fever, chills.  No new weakness or bowel or bladder incontinence.        On last visit:     Neck pain is 10/10 on VAS, at maximum is 10/10. Pain is burning in nature. Pain is referred to bilateral trapezius.  The pain is improved by DMITRIY. The pain is worse with nothing in particular.    Lower back pain is 6/10 on VAS, at maximum is 7/10. Pain is sharp, stabbing, burning, aching in nature. Pain is referred to bilateral lower extremities. The pain is constant. The pain is improved by nothing. The pain is worse with walking, standing.      Previous Injection:   4/8/2025-DMITRIY T1-T2- significant pain relief burning pain and 60% pain relief.       Hx: Presented to ED on 3/20/2025 with increased neck pain with upper extremity radiculopathy right greater than left. History of cervical and lumbar decompression and fusion about decade ago with worsening history of neck pain as well as upper extremity pain. Denies any difficulty using her hands or dropping things. Also has worsening balance issues and some feelings of her legs giving out. No bowel or bladder incontinence. Neurosurgery has evaluated patient and recommended DMITRIY. Unfortunately due to her advanced age she is not a good candidate for decompression.     PMH:   CHF, CVD, DVT, hokum, pacemaker, hypertension, peptic ulcer, persistent A-fib, lumbar decompression posterior fusion L4-5, ACDF C3-7     Current Medications:   Lyrica 75 mg  Zoloft  Aspirin 81 mg  Eliquis 5 mg        Past Medications:     Past  Modalities:  TENS:                                                                          no                                                  Physical Therapy Within The Last 6 Months              yes  Psychotherapy                                                            no  Massage Therapy                                                       no     Patient Complains Of:  Uro-Fecal Incontinence          no  Weight Gain/Loss                   no  Fever/Chills                             no  Weakness                               no        PEG Assessment   What number best describes your pain on average in the past week?5  What number best describes how, during the past week, pain has interfered with your enjoyment of life?5  What number best describes how, during the past week, pain has interfered with your general activity?  5`    Current Outpatient Medications:     acetaminophen (TYLENOL) 325 MG tablet, Take 2 tablets by mouth Every 4 (Four) Hours As Needed for Mild Pain., Disp: , Rfl:     apixaban (Eliquis) 5 MG tablet tablet, Take 1 tablet by mouth 2 (Two) Times a Day., Disp: 60 tablet, Rfl: 3    aspirin 81 MG EC tablet, Take 1 tablet by mouth Every Night. Indications: Disease involving Lipid Deposits in the Arteries, Disp: 30 tablet, Rfl: 3    atorvastatin (LIPITOR) 40 MG tablet, Take 1 tablet by mouth Daily., Disp: 30 tablet, Rfl: 1    bisacodyl (DULCOLAX) 10 MG suppository, Insert 1 suppository into the rectum Daily As Needed for Constipation (Use if bisacodyl oral is ineffective)., Disp: , Rfl:     bisacodyl (DULCOLAX) 5 MG EC tablet, Take 1 tablet by mouth Daily As Needed for Constipation (Use if polyethylene glycol is ineffective)., Disp: , Rfl:     docusate sodium 100 MG capsule, Take 1 capsule by mouth 2 (Two) Times a Day., Disp: 60 capsule, Rfl: 1    HYDROcodone-acetaminophen (NORCO) 7.5-325 MG per tablet, Take 1 tablet by mouth Every 6 (Six) Hours As Needed for Moderate Pain., Disp: 20 tablet,  Rfl: 0    methocarbamol (ROBAXIN) 750 MG tablet, Take 1 tablet by mouth 3 times a day., Disp: 90 tablet, Rfl: 1    metoprolol succinate XL (TOPROL-XL) 25 MG 24 hr tablet, Take 0.5 tablets by mouth Daily., Disp: 15 tablet, Rfl: 1    midodrine (PROAMATINE) 10 MG tablet, Take 1 tablet by mouth Every 8 (Eight) Hours., Disp: 90 tablet, Rfl: 1    oxyCODONE (ROXICODONE) 10 MG tablet, Take 1 tablet by mouth Every 4 (Four) Hours As Needed for Severe Pain., Disp: 20 each, Rfl: 0    polyethylene glycol (MIRALAX) 17 g packet, Take 17 g by mouth Daily., Disp: 30 packet, Rfl: 3    polyethylene glycol (MIRALAX) 17 g packet, Take 17 g by mouth Daily As Needed (Use if senna-docusate is ineffective)., Disp: , Rfl:     pregabalin (LYRICA) 200 MG capsule, Take 1 capsule by mouth Every 8 (Eight) Hours., Disp: 30 capsule, Rfl: 1    sennosides-docusate (PERICOLACE) 8.6-50 MG per tablet, Take 2 tablets by mouth 2 (Two) Times a Day., Disp: , Rfl:     sertraline (ZOLOFT) 100 MG tablet, Take 1 tablet by mouth Every Evening. Indications: Major Depressive Disorder, Disp: 30 tablet, Rfl: 1    Synthroid 75 MCG tablet, Take 1 tablet by mouth Every Morning., Disp: 30 tablet, Rfl: 1    zolpidem (AMBIEN) 5 MG tablet, Take 1 tablet by mouth At Night As Needed for Sleep., Disp: 10 tablet, Rfl: 0    The following portions of the patient's history were reviewed and updated as appropriate: allergies, current medications, past family history, past medical history, past social history, past surgical history, and problem list.      REVIEW OF PERTINENT MEDICAL DATA    Past Medical History:   Diagnosis Date    Anemia     Aortic insufficiency 03/21/2025    Asthma     CHF (congestive heart failure)     Cholecystitis 09/02/2011    Congenital heart disease     Coronary artery disease 2013    Deep vein thrombosis (DVT) 11/20/2017    GERD (gastroesophageal reflux disease)     Heart murmur Don't know    Hx of hypertrophic cardiomyopathy     Hypertension      Hypertrophic obstructive cardiomyopathy 11/10/2017    Low back pain     Lumbosacral disc disease     Mitral regurgitation 03/21/2025    Mixed hyperlipidemia 10/06/2017    Morbid obesity 09/21/2020    Peptic ulceration     Persistent atrial fibrillation 01/10/2023    Primary osteoarthritis of both knees 02/08/2021    Primary osteoarthritis of right knee 09/21/2020    Seizures     Sleep apnea 2017    TIA (transient ischemic attack)      Past Surgical History:   Procedure Laterality Date    ABDOMINAL HERNIA REPAIR      ABLATION OF DYSRHYTHMIC FOCUS  01-    BACK SURGERY      BREAST AUGMENTATION      CARDIAC CATHETERIZATION      CARDIAC ELECTROPHYSIOLOGY PROCEDURE N/A 01/16/2023    Procedure: Ablation atrial fibrillation and flutter, cryo Utong and Ramón aware;  Surgeon: Leeanne Pleitez MD;  Location: [facility] INVASIVE LOCATION;  Service: Cardiovascular;  Laterality: N/A;    CARDIAC PACEMAKER PLACEMENT  03/16/2017    Dual Chamber    CERVICAL RIB RESECTION Bilateral     CERVICAL RIB RESECTION Bilateral 1963    bilateral cervical ribs removed - extra ribs located and causing pain    CHOLECYSTECTOMY      COLONOSCOPY      CORONARY ARTERY BYPASS GRAFT  2013    EPIDURAL BLOCK      NECK SURGERY      NECK SURGERY  05/29/2025    SPINAL FUSION      THORACIC DECOMPRESSION POSTERIOR FUSION  06/04/2014    L3-5 & S1    TRIGGER POINT INJECTION      UPPER GASTROINTESTINAL ENDOSCOPY       Family History   Problem Relation Age of Onset    Colon cancer Mother     Heart disease Mother     Hypertension Mother     Arthritis Mother     Cancer Mother     Diabetes Mother     Kidney disease Mother     Colon cancer Brother     Heart attack Brother     Colon cancer Daughter     Arthritis Daughter     Arthritis Father     Hearing loss Father     Arthritis Sister     Hypertension Brother     Arthritis Brother     Cancer Brother     Stroke Brother     Arthritis Son     Arthritis Son     Cancer Sister     Diabetes Sister     Cancer  Brother     Hypertension Sister     Kidney disease Sister      Social History     Socioeconomic History    Marital status:    Tobacco Use    Smoking status: Former     Current packs/day: 0.00     Average packs/day: 1 pack/day for 8.0 years (8.0 ttl pk-yrs)     Types: Cigarettes     Start date: 1977     Quit date: 1985     Years since quittin.4     Passive exposure: Past    Smokeless tobacco: Never    Tobacco comments:     Quit    Vaping Use    Vaping status: Never Used   Substance and Sexual Activity    Alcohol use: Never     Comment: 6 drinks a year in social settings    Drug use: Never    Sexual activity: Not Currently     Partners: Male         Review of Systems   Musculoskeletal:  Positive for arthralgias, back pain, neck pain and neck stiffness.         Vitals:    25 1532   BP: 110/54   Pulse: 66   Resp: 16   SpO2: 96%   Weight: 74.8 kg (165 lb)   PainSc: 10-Worst pain ever         Objective   Physical Exam  Musculoskeletal:         General: Tenderness present.        Legs:            Imaging Reviewed:  Cervical MRI-3/29/2025  - Changes of C6 corpectomy and ACDF and fusion C3 7  - Moderate discogenic changes at C2-3 and C7-T1  Advanced discogenic changes at T1 to  C2-3 there is severe bilateral facet arthropathy, moderate osteophytes and severe bilateral neuroforaminal stenosis  C3-4-moderate bilateral facet arthropathy, moderate bilateral neuroforaminal stenosis  C4-5-moderate facet arthropathy  C5-C6-moderate bilateral facet arthropathy  C6-7-moderate bilateral facet arthropathy, moderate right and severe left uncovertebral hypertrophy with severe left neuroforaminal stenosis and moderate right  C7-T1-moderate facet arthropathy, ligamentum flavum infolding, anterolisthesis of C7 on T1 causing severe spinal canal stenosis and moderate bilateral neuroforaminal stenosis.     Lumbar MRI without contrast-3/31/2025  - Prior fusion L4-5  - L1-2-moderate disc bulge, moderate facet  changes, moderate bilateral neuroforaminal narrowing  L2-3-moderate disc bulge, moderate facet arthritis with ligamentum flavum hypertrophy causing moderate central canal stenosis and moderate bilateral neuroforaminal narrowing  L3-4-moderate disc bulge, moderate facet arthritis causing moderate central canal narrowing and moderate to severe bilateral neuroforaminal narrowing  L4-5-postop changes  L5 S1-moderate disc bulge, moderate facet arthritis with ligamentum flavum hypertrophy causing moderate central canal stenosis.  Moderate bilateral neuroforaminal narrowing.     MRI thoracic spine-3/29/2025  - Mild to moderate multilevel degenerative changes of thoracic spine.     Bilateral knee x-ray-tricompartmental degenerative osteoarthritis of both knees and small joint effusion.  Mild to moderate degenerative changes of hip joints    Assessment:    1. Cervical radiculopathy    2. S/P cervical spinal fusion    3. Acute post-operative pain           Plan:   Recommend continuing physical therapy at home.  Patient is currently on Norco 7.5-325 mg every 6 hours as needed, oxycodone 10 mg every 4 hours as needed, Lyrica 200 mg 3 times daily, Robaxin 750 mg 3 times daily as needed.  I believe her pain is mostly myofascial in nature and postoperative pain.  Discussed with patient that she is on significant amount of pain medications and I would not recommend escalating them further.  Recommend switching Robaxin to Flexeril 10 mg TID PRN.  She can also consider putting lidocaine 4% patches on trapezius muscles to help control the pain.  Advised not to put lidocaine patches on the incision itself.    Follow-up with neurosurgery as scheduled.    Navneet Gary DO  Pain Management   [facility]       INSPECT REPORT    As part of the patient's treatment plan, I may be prescribing controlled substances. The patient has been made aware of appropriate use of such medications, including potential risk of somnolence, limited ability  to drive and/or work safely, and the potential for dependence or overdose. It has also been made clear that these medications are for use by this patient only, without concomitant use of alcohol or other substances unless prescribed.     Patient has completed prescribing agreement detailing terms of continued prescribing of controlled substances, including monitoring INSPECT reports, urine drug screening, and pill counts if necessary. The patient is aware that inappropriate use will results in cessation of prescribing such medications.    INSPECT report has been reviewed and scanned into the patient's chart.

## 2025-06-11 NOTE — TELEPHONE ENCOUNTER
SCHEDULED WITH [facility] PATIENT IS IN [facility] AND THEY HAVE TO HAVE 72 HOURS NOTICE TO SET UP TRANSPORTATION.

## 2025-06-12 DIAGNOSIS — Z98.1 S/P SPINAL FUSION: Primary | ICD-10-CM

## 2025-06-13 ENCOUNTER — OFFICE VISIT (OUTPATIENT)
Dept: NEUROSURGERY | Facility: CLINIC | Age: 85
End: 2025-06-13
Payer: MEDICARE

## 2025-06-13 VITALS
RESPIRATION RATE: 18 BRPM | HEART RATE: 80 BPM | SYSTOLIC BLOOD PRESSURE: 126 MMHG | WEIGHT: 165 LBS | DIASTOLIC BLOOD PRESSURE: 67 MMHG | HEIGHT: 60 IN | OXYGEN SATURATION: 95 % | BODY MASS INDEX: 32.39 KG/M2

## 2025-06-13 DIAGNOSIS — Z98.1 S/P SPINAL FUSION: Primary | ICD-10-CM

## 2025-06-13 PROCEDURE — 99024 POSTOP FOLLOW-UP VISIT: CPT

## 2025-06-13 RX ORDER — HYDROCODONE BITARTRATE AND ACETAMINOPHEN 7.5; 325 MG/1; MG/1
1 TABLET ORAL EVERY 6 HOURS PRN
Qty: 30 TABLET | Refills: 0 | Status: SHIPPED | OUTPATIENT
Start: 2025-06-13

## 2025-06-13 RX ORDER — OXYCODONE HYDROCHLORIDE 10 MG/1
10 TABLET ORAL EVERY 4 HOURS PRN
Qty: 30 TABLET | Refills: 0 | Status: SHIPPED | OUTPATIENT
Start: 2025-06-13

## 2025-06-27 ENCOUNTER — HOSPITAL ENCOUNTER (INPATIENT)
Facility: HOSPITAL | Age: 85
LOS: 2 days | Discharge: NURSING, REHAB OR LONG-TERM CARE | End: 2025-07-01
Attending: EMERGENCY MEDICINE | Admitting: FAMILY MEDICINE
Payer: MEDICARE

## 2025-06-27 ENCOUNTER — APPOINTMENT (OUTPATIENT)
Dept: GENERAL RADIOLOGY | Facility: HOSPITAL | Age: 85
End: 2025-06-27
Payer: MEDICARE

## 2025-06-27 DIAGNOSIS — M25.512 ACUTE PAIN OF LEFT SHOULDER: ICD-10-CM

## 2025-06-27 DIAGNOSIS — M89.8X1 PAIN OF LEFT SCAPULA: Primary | ICD-10-CM

## 2025-06-27 DIAGNOSIS — M54.2 NECK PAIN: ICD-10-CM

## 2025-06-27 DIAGNOSIS — M54.12 CERVICAL RADICULOPATHY: ICD-10-CM

## 2025-06-27 PROCEDURE — 99285 EMERGENCY DEPT VISIT HI MDM: CPT

## 2025-06-27 PROCEDURE — 73010 X-RAY EXAM OF SHOULDER BLADE: CPT

## 2025-06-27 PROCEDURE — 25010000002 HYDROMORPHONE PER 4 MG

## 2025-06-27 PROCEDURE — 25010000002 ONDANSETRON PER 1 MG

## 2025-06-27 PROCEDURE — 73030 X-RAY EXAM OF SHOULDER: CPT

## 2025-06-27 PROCEDURE — 93005 ELECTROCARDIOGRAM TRACING: CPT

## 2025-06-27 RX ORDER — HYDROMORPHONE HYDROCHLORIDE 1 MG/ML
0.25 INJECTION, SOLUTION INTRAMUSCULAR; INTRAVENOUS; SUBCUTANEOUS ONCE
Refills: 0 | Status: COMPLETED | OUTPATIENT
Start: 2025-06-27 | End: 2025-06-27

## 2025-06-27 RX ORDER — ONDANSETRON 2 MG/ML
4 INJECTION INTRAMUSCULAR; INTRAVENOUS ONCE
Status: COMPLETED | OUTPATIENT
Start: 2025-06-27 | End: 2025-06-27

## 2025-06-27 RX ADMIN — ONDANSETRON 4 MG: 2 INJECTION, SOLUTION INTRAMUSCULAR; INTRAVENOUS at 22:36

## 2025-06-27 RX ADMIN — HYDROMORPHONE HYDROCHLORIDE 0.25 MG: 1 INJECTION, SOLUTION INTRAMUSCULAR; INTRAVENOUS; SUBCUTANEOUS at 22:36

## 2025-06-28 ENCOUNTER — APPOINTMENT (OUTPATIENT)
Dept: CT IMAGING | Facility: HOSPITAL | Age: 85
End: 2025-06-28
Payer: MEDICARE

## 2025-06-28 PROBLEM — M25.512 LEFT SHOULDER PAIN: Status: ACTIVE | Noted: 2025-06-28

## 2025-06-28 PROBLEM — R33.8 ACUTE URINARY RETENTION: Status: ACTIVE | Noted: 2025-06-28

## 2025-06-28 LAB
BACTERIA UR QL AUTO: ABNORMAL /HPF
BILIRUB UR QL STRIP: NEGATIVE
CLARITY UR: CLEAR
COLOR UR: YELLOW
GLUCOSE BLDC GLUCOMTR-MCNC: 97 MG/DL (ref 70–105)
GLUCOSE UR STRIP-MCNC: NEGATIVE MG/DL
HGB UR QL STRIP.AUTO: ABNORMAL
HYALINE CASTS UR QL AUTO: ABNORMAL /LPF
KETONES UR QL STRIP: NEGATIVE
LEUKOCYTE ESTERASE UR QL STRIP.AUTO: ABNORMAL
NITRITE UR QL STRIP: NEGATIVE
PH UR STRIP.AUTO: 6 [PH] (ref 5–8)
PROT UR QL STRIP: NEGATIVE
QT INTERVAL: 472 MS
QTC INTERVAL: 496 MS
RBC # UR STRIP: ABNORMAL /HPF
REF LAB TEST METHOD: ABNORMAL
SP GR UR STRIP: 1.01 (ref 1–1.03)
SQUAMOUS #/AREA URNS HPF: ABNORMAL /HPF
UROBILINOGEN UR QL STRIP: ABNORMAL
WBC # UR STRIP: ABNORMAL /HPF

## 2025-06-28 PROCEDURE — 25010000002 FUROSEMIDE PER 20 MG: Performed by: NURSE PRACTITIONER

## 2025-06-28 PROCEDURE — G0378 HOSPITAL OBSERVATION PER HR: HCPCS

## 2025-06-28 PROCEDURE — 87186 SC STD MICRODIL/AGAR DIL: CPT | Performed by: NURSE PRACTITIONER

## 2025-06-28 PROCEDURE — 99024 POSTOP FOLLOW-UP VISIT: CPT | Performed by: NEUROLOGICAL SURGERY

## 2025-06-28 PROCEDURE — 82948 REAGENT STRIP/BLOOD GLUCOSE: CPT

## 2025-06-28 PROCEDURE — 81001 URINALYSIS AUTO W/SCOPE: CPT | Performed by: NURSE PRACTITIONER

## 2025-06-28 PROCEDURE — 25010000002 HYDROMORPHONE PER 4 MG

## 2025-06-28 PROCEDURE — 87086 URINE CULTURE/COLONY COUNT: CPT | Performed by: NURSE PRACTITIONER

## 2025-06-28 PROCEDURE — 87077 CULTURE AEROBIC IDENTIFY: CPT | Performed by: NURSE PRACTITIONER

## 2025-06-28 PROCEDURE — 73200 CT UPPER EXTREMITY W/O DYE: CPT

## 2025-06-28 RX ORDER — SODIUM CHLORIDE 0.9 % (FLUSH) 0.9 %
10 SYRINGE (ML) INJECTION AS NEEDED
Status: DISCONTINUED | OUTPATIENT
Start: 2025-06-28 | End: 2025-07-01 | Stop reason: HOSPADM

## 2025-06-28 RX ORDER — METOPROLOL SUCCINATE 25 MG/1
12.5 TABLET, EXTENDED RELEASE ORAL
Status: DISCONTINUED | OUTPATIENT
Start: 2025-06-28 | End: 2025-07-01 | Stop reason: HOSPADM

## 2025-06-28 RX ORDER — BISACODYL 10 MG
10 SUPPOSITORY, RECTAL RECTAL DAILY PRN
Status: DISCONTINUED | OUTPATIENT
Start: 2025-06-28 | End: 2025-07-01 | Stop reason: HOSPADM

## 2025-06-28 RX ORDER — HYDROCODONE BITARTRATE AND ACETAMINOPHEN 10; 325 MG/1; MG/1
1 TABLET ORAL EVERY 6 HOURS PRN
Refills: 0 | Status: DISCONTINUED | OUTPATIENT
Start: 2025-06-28 | End: 2025-06-28

## 2025-06-28 RX ORDER — HYDROMORPHONE HYDROCHLORIDE 1 MG/ML
0.25 INJECTION, SOLUTION INTRAMUSCULAR; INTRAVENOUS; SUBCUTANEOUS
Status: DISCONTINUED | OUTPATIENT
Start: 2025-06-28 | End: 2025-06-28

## 2025-06-28 RX ORDER — SENNOSIDES 8.6 MG/1
2 TABLET ORAL NIGHTLY
Status: DISCONTINUED | OUTPATIENT
Start: 2025-06-28 | End: 2025-07-01 | Stop reason: HOSPADM

## 2025-06-28 RX ORDER — SODIUM CHLORIDE 9 MG/ML
40 INJECTION, SOLUTION INTRAVENOUS AS NEEDED
Status: DISCONTINUED | OUTPATIENT
Start: 2025-06-28 | End: 2025-07-01 | Stop reason: HOSPADM

## 2025-06-28 RX ORDER — NALOXONE HCL 0.4 MG/ML
0.4 VIAL (ML) INJECTION
Status: DISCONTINUED | OUTPATIENT
Start: 2025-06-28 | End: 2025-07-01 | Stop reason: HOSPADM

## 2025-06-28 RX ORDER — OXYCODONE HYDROCHLORIDE 5 MG/1
10 TABLET ORAL EVERY 4 HOURS PRN
Refills: 0 | Status: DISCONTINUED | OUTPATIENT
Start: 2025-06-28 | End: 2025-06-29

## 2025-06-28 RX ORDER — BISACODYL 5 MG/1
5 TABLET, DELAYED RELEASE ORAL DAILY PRN
Status: DISCONTINUED | OUTPATIENT
Start: 2025-06-28 | End: 2025-07-01 | Stop reason: HOSPADM

## 2025-06-28 RX ORDER — ONDANSETRON 2 MG/ML
4 INJECTION INTRAMUSCULAR; INTRAVENOUS EVERY 6 HOURS PRN
Status: DISCONTINUED | OUTPATIENT
Start: 2025-06-28 | End: 2025-07-01 | Stop reason: HOSPADM

## 2025-06-28 RX ORDER — ONDANSETRON 4 MG/1
4 TABLET, ORALLY DISINTEGRATING ORAL EVERY 6 HOURS PRN
Status: DISCONTINUED | OUTPATIENT
Start: 2025-06-28 | End: 2025-07-01 | Stop reason: HOSPADM

## 2025-06-28 RX ORDER — SERTRALINE HYDROCHLORIDE 100 MG/1
100 TABLET, FILM COATED ORAL EVERY EVENING
Status: DISCONTINUED | OUTPATIENT
Start: 2025-06-28 | End: 2025-07-01 | Stop reason: HOSPADM

## 2025-06-28 RX ORDER — HYDROCODONE BITARTRATE AND ACETAMINOPHEN 10; 325 MG/1; MG/1
1 TABLET ORAL EVERY 6 HOURS PRN
Status: ON HOLD | COMMUNITY
End: 2025-06-30

## 2025-06-28 RX ORDER — ATORVASTATIN CALCIUM 40 MG/1
40 TABLET, FILM COATED ORAL NIGHTLY
Status: DISCONTINUED | OUTPATIENT
Start: 2025-06-28 | End: 2025-07-01 | Stop reason: HOSPADM

## 2025-06-28 RX ORDER — POLYETHYLENE GLYCOL 3350 17 G/17G
17 POWDER, FOR SOLUTION ORAL DAILY PRN
Status: DISCONTINUED | OUTPATIENT
Start: 2025-06-28 | End: 2025-07-01 | Stop reason: HOSPADM

## 2025-06-28 RX ORDER — AMOXICILLIN 250 MG
2 CAPSULE ORAL 2 TIMES DAILY PRN
Status: DISCONTINUED | OUTPATIENT
Start: 2025-06-28 | End: 2025-07-01 | Stop reason: HOSPADM

## 2025-06-28 RX ORDER — FUROSEMIDE 10 MG/ML
20 INJECTION INTRAMUSCULAR; INTRAVENOUS DAILY
Status: DISCONTINUED | OUTPATIENT
Start: 2025-06-28 | End: 2025-07-01

## 2025-06-28 RX ORDER — SODIUM CHLORIDE 0.9 % (FLUSH) 0.9 %
10 SYRINGE (ML) INJECTION EVERY 12 HOURS SCHEDULED
Status: DISCONTINUED | OUTPATIENT
Start: 2025-06-28 | End: 2025-07-01 | Stop reason: HOSPADM

## 2025-06-28 RX ORDER — POLYETHYLENE GLYCOL 3350 17 G/17G
17 POWDER, FOR SOLUTION ORAL DAILY PRN
COMMUNITY

## 2025-06-28 RX ORDER — MIDODRINE HYDROCHLORIDE 5 MG/1
10 TABLET ORAL EVERY 8 HOURS SCHEDULED
Status: DISCONTINUED | OUTPATIENT
Start: 2025-06-28 | End: 2025-07-01 | Stop reason: HOSPADM

## 2025-06-28 RX ORDER — LEVOTHYROXINE SODIUM 75 UG/1
75 TABLET ORAL EVERY MORNING
Status: DISCONTINUED | OUTPATIENT
Start: 2025-06-28 | End: 2025-07-01 | Stop reason: HOSPADM

## 2025-06-28 RX ORDER — PREGABALIN 100 MG/1
200 CAPSULE ORAL EVERY 8 HOURS SCHEDULED
Status: DISCONTINUED | OUTPATIENT
Start: 2025-06-28 | End: 2025-07-01 | Stop reason: HOSPADM

## 2025-06-28 RX ORDER — NITROGLYCERIN 0.4 MG/1
0.4 TABLET SUBLINGUAL
Status: DISCONTINUED | OUTPATIENT
Start: 2025-06-28 | End: 2025-07-01 | Stop reason: HOSPADM

## 2025-06-28 RX ORDER — HYDROMORPHONE HYDROCHLORIDE 1 MG/ML
0.25 INJECTION, SOLUTION INTRAMUSCULAR; INTRAVENOUS; SUBCUTANEOUS ONCE
Refills: 0 | Status: COMPLETED | OUTPATIENT
Start: 2025-06-28 | End: 2025-06-28

## 2025-06-28 RX ORDER — SENNOSIDES 8.6 MG/1
2 TABLET ORAL
COMMUNITY

## 2025-06-28 RX ORDER — ASPIRIN 81 MG/1
81 TABLET, CHEWABLE ORAL DAILY
Status: DISCONTINUED | OUTPATIENT
Start: 2025-06-28 | End: 2025-07-01 | Stop reason: HOSPADM

## 2025-06-28 RX ORDER — ASPIRIN 81 MG/1
81 TABLET, CHEWABLE ORAL DAILY
COMMUNITY

## 2025-06-28 RX ADMIN — HYDROMORPHONE HYDROCHLORIDE 0.25 MG: 1 INJECTION, SOLUTION INTRAMUSCULAR; INTRAVENOUS; SUBCUTANEOUS at 05:35

## 2025-06-28 RX ADMIN — Medication 10 ML: at 21:06

## 2025-06-28 RX ADMIN — FUROSEMIDE 20 MG: 10 INJECTION, SOLUTION INTRAMUSCULAR; INTRAVENOUS at 15:26

## 2025-06-28 RX ADMIN — APIXABAN 5 MG: 5 TABLET, FILM COATED ORAL at 09:33

## 2025-06-28 RX ADMIN — SENNOSIDES 2 TABLET: 8.6 TABLET, FILM COATED ORAL at 21:05

## 2025-06-28 RX ADMIN — PREGABALIN 200 MG: 100 CAPSULE ORAL at 21:05

## 2025-06-28 RX ADMIN — HYDROMORPHONE HYDROCHLORIDE 0.25 MG: 1 INJECTION, SOLUTION INTRAMUSCULAR; INTRAVENOUS; SUBCUTANEOUS at 07:57

## 2025-06-28 RX ADMIN — LEVOTHYROXINE SODIUM 75 MCG: 0.07 TABLET ORAL at 09:32

## 2025-06-28 RX ADMIN — Medication 10 ML: at 09:33

## 2025-06-28 RX ADMIN — ATORVASTATIN CALCIUM 40 MG: 40 TABLET, FILM COATED ORAL at 21:05

## 2025-06-28 RX ADMIN — HYDROMORPHONE HYDROCHLORIDE 0.25 MG: 1 INJECTION, SOLUTION INTRAMUSCULAR; INTRAVENOUS; SUBCUTANEOUS at 01:41

## 2025-06-28 RX ADMIN — APIXABAN 5 MG: 5 TABLET, FILM COATED ORAL at 21:05

## 2025-06-28 RX ADMIN — PREGABALIN 200 MG: 100 CAPSULE ORAL at 15:26

## 2025-06-28 RX ADMIN — Medication 10 ML: at 05:05

## 2025-06-28 RX ADMIN — OXYCODONE 10 MG: 5 TABLET ORAL at 15:39

## 2025-06-28 RX ADMIN — MIDODRINE HYDROCHLORIDE 10 MG: 5 TABLET ORAL at 21:05

## 2025-06-28 RX ADMIN — ASPIRIN 81 MG CHEWABLE TABLET 81 MG: 81 TABLET CHEWABLE at 09:32

## 2025-06-28 RX ADMIN — MIDODRINE HYDROCHLORIDE 10 MG: 5 TABLET ORAL at 15:26

## 2025-06-28 RX ADMIN — SERTRALINE HYDROCHLORIDE 100 MG: 100 TABLET, FILM COATED ORAL at 17:29

## 2025-06-28 NOTE — PLAN OF CARE
Goal Outcome Evaluation:           Progress: no change  Outcome Evaluation: Pain cont on left shoulder, Neuro surg is being consulted, C-collar in place. Falls risks initiated. Education done on possible void trial and f/c removal. Plan of care is ongoing.

## 2025-06-28 NOTE — H&P
Patient Care Team:  Anish Lew APRN as PCP - General  Evgeny Gregory MD as Consulting Physician (Cardiology)  Leeanne Pleitez MD as Consulting Physician (Cardiology)  Radha Owusu APRN as Nurse Practitioner (Nurse Practitioner)  Kathe Muñoz MD as Consulting Physician (Nephrology)    Chief complaint Left shoulder pain    Subjective     Patient is a 85 y.o. female who presents with presented with cervical spinal stenosis, permanent atrial fib, chronic coronary artery disease and hypertrophic cardiomyopathy who presents with worsening bilateral lower extremity weakness. She was discharged from [facility] on 6/5/25 to inpatient rehab after cervical spinal fusion. She is to continue with cervical collar. She presented after having worsening left shoulder pain. Prior to surgery to she immobile but at at rehab she was able to take 5 steps. She has had urinary retention due to immobility and states they removed bolden for 3 voiding trials and she failed them all. CT of shoulder showed no fracture or acute finding. Neurosurgery saw patient and believe she has left sided ACL tenderness.           Review of Systems   Constitutional:  Positive for activity change.   HENT: Negative.     Respiratory: Negative.     Cardiovascular: Negative.    Gastrointestinal:  Positive for constipation.   Genitourinary:  Positive for difficulty urinating.   Musculoskeletal:  Positive for gait problem.   Neurological:  Positive for weakness.          History  Past Medical History:   Diagnosis Date    Anemia     Aortic insufficiency 03/21/2025    Asthma     CHF (congestive heart failure)     Cholecystitis 09/02/2011    Congenital heart disease     Coronary artery disease 2013    Deep vein thrombosis (DVT) 11/20/2017    GERD (gastroesophageal reflux disease)     Heart murmur Don't know    Hx of hypertrophic cardiomyopathy     Hypertension     Hypertrophic obstructive cardiomyopathy 11/10/2017    Low back pain      Lumbosacral disc disease     Mitral regurgitation 03/21/2025    Mixed hyperlipidemia 10/06/2017    Morbid obesity 09/21/2020    Peptic ulceration     Persistent atrial fibrillation 01/10/2023    Primary osteoarthritis of both knees 02/08/2021    Primary osteoarthritis of right knee 09/21/2020    Seizures     Sleep apnea 2017    TIA (transient ischemic attack)      Past Surgical History:   Procedure Laterality Date    ABDOMINAL HERNIA REPAIR      ABLATION OF DYSRHYTHMIC FOCUS  01-    BACK SURGERY      BREAST AUGMENTATION      CARDIAC CATHETERIZATION      CARDIAC ELECTROPHYSIOLOGY PROCEDURE N/A 01/16/2023    Procedure: Ablation atrial fibrillation and flutter, cryo Duong and Ramón aware;  Surgeon: Leeanne Pleitez MD;  Location: [facility] CATH INVASIVE LOCATION;  Service: Cardiovascular;  Laterality: N/A;    CARDIAC PACEMAKER PLACEMENT  03/16/2017    Dual Chamber    CERVICAL LAMINECTOMY DECOMPRESSION POSTERIOR N/A 5/29/2025    Procedure: CERVICAL LAMINECTOMY DECOMPRESSION POSTERIOR C7 - T1, C5-T2 FUSION;  Surgeon: Lake Bernal IV, MD;  Location: [facility] MAIN OR;  Service: Neurosurgery;  Laterality: N/A;    CERVICAL RIB RESECTION Bilateral     CERVICAL RIB RESECTION Bilateral 1963    bilateral cervical ribs removed - extra ribs located and causing pain    CHOLECYSTECTOMY      COLONOSCOPY      CORONARY ARTERY BYPASS GRAFT  2013    EPIDURAL BLOCK      NECK SURGERY      NECK SURGERY  05/29/2025    SPINAL FUSION      THORACIC DECOMPRESSION POSTERIOR FUSION  06/04/2014    L3-5 & S1    TRIGGER POINT INJECTION      UPPER GASTROINTESTINAL ENDOSCOPY       Family History   Problem Relation Age of Onset    Colon cancer Mother     Heart disease Mother     Hypertension Mother     Arthritis Mother     Cancer Mother     Diabetes Mother     Kidney disease Mother     Colon cancer Brother     Heart attack Brother     Colon cancer Daughter     Arthritis Daughter     Arthritis Father     Hearing loss Father     Arthritis Sister      Hypertension Brother     Arthritis Brother     Cancer Brother     Stroke Brother     Arthritis Son     Arthritis Son     Cancer Sister     Diabetes Sister     Cancer Brother     Hypertension Sister     Kidney disease Sister      Social History     Tobacco Use    Smoking status: Former     Current packs/day: 0.00     Average packs/day: 1 pack/day for 8.0 years (8.0 ttl pk-yrs)     Types: Cigarettes     Start date: 1977     Quit date: 1985     Years since quittin.5     Passive exposure: Past    Smokeless tobacco: Never    Tobacco comments:     Quit    Vaping Use    Vaping status: Never Used   Substance Use Topics    Alcohol use: Never     Comment: 6 drinks a year in social settings    Drug use: Never     Medications Prior to Admission   Medication Sig Dispense Refill Last Dose/Taking    apixaban (Eliquis) 5 MG tablet tablet Take 1 tablet by mouth 2 (Two) Times a Day. 60 tablet 3 2025 at  6:59 PM    aspirin 81 MG chewable tablet Chew 1 tablet Daily.   2025 at  6:59 PM    atorvastatin (LIPITOR) 40 MG tablet Take 1 tablet by mouth Daily. 30 tablet 1 2025 at  6:59 PM    bisacodyl (DULCOLAX) 10 MG suppository Insert 1 suppository into the rectum Daily As Needed for Constipation (Use if bisacodyl oral is ineffective).   2025    bisacodyl (DULCOLAX) 5 MG EC tablet Take 1 tablet by mouth Daily As Needed for Constipation (Use if polyethylene glycol is ineffective).   2025    docusate sodium 100 MG capsule Take 1 capsule by mouth 2 (Two) Times a Day. 60 capsule 1 2025 at  6:59 PM    HYDROcodone-acetaminophen (NORCO)  MG per tablet Take 1 tablet by mouth Every 6 (Six) Hours As Needed for Moderate Pain.   2025 at  6:59 PM    metoprolol succinate XL (TOPROL-XL) 25 MG 24 hr tablet Take 0.5 tablets by mouth Daily. 15 tablet 1 2025 at  8:43 AM    midodrine (PROAMATINE) 10 MG tablet Take 1 tablet by mouth Every 8 (Eight) Hours. 90 tablet 1 2025    NON FORMULARY  Ketamine-ibuprofen-gabapentin- lidocaine-baclofen cream:  apply a thin layer topically four times daily.   6/27/2025 at  6:59 PM    oxyCODONE (ROXICODONE) 10 MG tablet Take 1 tablet by mouth Every 4 (Four) Hours As Needed for Severe Pain. 20 each 0 6/27/2025 at 12:48 PM    polyethylene glycol (MIRALAX) 17 g packet Take 17 g by mouth Daily. 30 packet 3 6/27/2025 at  8:43 AM    polyethylene glycol (MiraLax) 17 GM/SCOOP powder Take 17 g by mouth Daily As Needed.   Taking As Needed    pregabalin (LYRICA) 200 MG capsule Take 1 capsule by mouth Every 8 (Eight) Hours. 30 capsule 1 6/27/2025 at  4:35 PM    senna 8.6 MG tablet Take 2 tablets by mouth every night at bedtime.   6/27/2025 at  6:59 PM    sertraline (ZOLOFT) 100 MG tablet Take 1 tablet by mouth Every Evening. Indications: Major Depressive Disorder 30 tablet 1 6/27/2025 at  6:59 PM    Synthroid 75 MCG tablet Take 1 tablet by mouth Every Morning. 30 tablet 1 6/27/2025 at  4:12 AM     Allergies:  Patient has no known allergies.    Objective     Vital Signs  Temp:  [97.3 °F (36.3 °C)-98 °F (36.7 °C)] 98 °F (36.7 °C)  Heart Rate:  [60-72] 60  Resp:  [11-18] 11  BP: ()/(41-98) 114/52       Physical Exam     Results Review:     Imaging Results (Last 24 Hours)       Procedure Component Value Units Date/Time    CT Upper Extremity Left Without Contrast [[id number]] Collected: 06/28/25 0256     Updated: 06/28/25 0300    Narrative:      CT UPPER EXTREMITY LEFT WO CONTRAST    Date of Exam: 6/28/2025 2:02 AM EDT    Indication: L shoulder pain, possible fracture of scapula.    Comparison: None available.    Technique: Axial CT images were obtained of the left upper extremity without contrast administration.  Sagittal and coronal reconstructions were performed.  Automated exposure control and iterative reconstruction methods were used.      Findings:  The inferior glenoid is normal. There is no acute fracture at the scapula. Proximal humerus appears intact. The left  clavicle appears intact. There is minimal osteophyte formation at the glenohumeral and acromioclavicular joints. Left sternoclavicular   joint is intact. No left-sided rib fracture. Left lung is clear. There is a left-sided ICD with leads in the right heart. Calcified left breast implant. Rotator cuff musculature appears well-maintained. No soft tissue contusion or hematoma.      Impression:      Impression:  1.No acute osseous abnormality.  2.Minimal degenerative changes.  3.Left-sided ICD.            Electronically Signed: Brijesh Ruiz MD    6/28/2025 2:58 AM EDT    Workstation ID: [id number]    XR Shoulder 2+ View Left [[id number]] Collected: 06/28/25 0046     Updated: 06/28/25 0054    Narrative:      XR SHOULDER 2+ VW LEFT    Date of Exam: 6/27/2025 11:31 PM EDT    Indication: L shoulder pain    Comparison: None available.    Findings:  There are linear cortical abnormalities at the lateral margin of the scapular body that are worrisome for nondisplaced fracture, but this could be from an old injury, as well. There is mild acromioclavicular and glenohumeral osteophyte formation. The   humerus appears intact. The left clavicle appears intact. There is a left-sided ICD in place. Adjacent lung and ribs appear intact.      Impression:      Impression:  1.Linear cortical abnormalities at the lateral margin of the scapular body that are worrisome for nondisplaced fracture, but this could be from an old injury. Recommend CT evaluation.  2.Mild acromioclavicular and glenohumeral joint degeneration.          Electronically Signed: Brijesh Ruiz MD    6/28/2025 12:52 AM EDT    Workstation ID: [id number]    XR Scapula Left [[id number]] Collected: 06/27/25 2251     Updated: 06/27/25 2256    Narrative:      XR SCAPULA LEFT    Date of Exam: 6/27/2025 10:48 PM EDT    Indication: L shoulder blade pain    Comparison: None available.    Findings:  A portion of the scapula is obscured by the patient's pacemaker. There is abnormal  ossific density noted along the lateral aspect of the inferior glenoid measuring about 1.3 cm. The remainder of the scapula appears unremarkable. Glenohumeral alignment is   anatomic. The acromioclavicular joint appears normal.      Impression:      Impression:  Small ossific density along the inferior aspect of the inferior glenoid could be posttraumatic but is age indeterminate. Dedicated radiographs of the left shoulder are recommended to further evaluate.      Electronically Signed: Gen Malcolm MD    6/27/2025 10:54 PM EDT    Workstation ID: [id number]             Lab Results (last 24 hours)       ** No results found for the last 24 hours. **             I reviewed the patient's new clinical results.    Assessment & Plan     Left shoulder pain  Urinary retention due to immobility  Edema  Cervical neuralgia s/p fusion  Pacemaker  Paroxysmal atrial fibrillation  Cervical radiculopathy  Essential hypertension  Spondylolisthesis, acquired  Obesity (BMI 30.0-34.9)  Hx of hypertrophic cardiomyopathy  Coronary artery disease  Neck pain  Seizure disorder  Encounter for pre-operative cardiovascular clearance  Aortic insufficiency  Mitral regurgitation     Plan of care  -pain control  -change catheter and obtain urine  -adding flomax  -will give gentle diuresis  -s/p cervical laminectomy decompression  -c-collar all time  -bowel regimen    -home medications for chronic medical conditions  - resumption of eliquis     DVT prophylaxis:eliquis       CODE STATUS:  Code status (Patient has no pulse and is not breathing):full  Medical Interventions (Patient has pulse or is breathing):full support  Level of Support Discussed with :patient    Admission Status:observation    Expected length of Stay: 2 nights or more      I discussed the patient's findings and my recommendations with patient.     WOODY Kaufman  06/28/25  12:59 EDT

## 2025-06-28 NOTE — PLAN OF CARE
Problem: Adult Inpatient Plan of Care  Goal: Absence of Hospital-Acquired Illness or Injury  Intervention: Identify and Manage Fall Risk  Recent Flowsheet Documentation  Taken 6/28/2025 0827 by Mayra Turner RN  Safety Promotion/Fall Prevention:   activity supervised   assistive device/personal items within reach   clutter free environment maintained   fall prevention program maintained   lighting adjusted   nonskid shoes/slippers when out of bed   room organization consistent   safety round/check completed  Intervention: Prevent Skin Injury  Recent Flowsheet Documentation  Taken 6/28/2025 0827 by Mayra Turner RN  Body Position: supine  Skin Protection: transparent dressing maintained  Intervention: Prevent and Manage VTE (Venous Thromboembolism) Risk  Recent Flowsheet Documentation  Taken 6/28/2025 0827 by Mayra Turner RN  VTE Prevention/Management:   bilateral   SCDs (sequential compression devices) off  Intervention: Prevent Infection  Recent Flowsheet Documentation  Taken 6/28/2025 0827 by Mayra Turner RN  Infection Prevention:   hand hygiene promoted   rest/sleep promoted   single patient room provided  Goal: Optimal Comfort and Wellbeing  Intervention: Monitor Pain and Promote Comfort  Recent Flowsheet Documentation  Taken 6/28/2025 0827 by Mayra Turner RN  Pain Management Interventions: cold applied  Taken 6/28/2025 0757 by Mayra Turner RN  Pain Management Interventions: pain medication given  Intervention: Provide Person-Centered Care  Recent Flowsheet Documentation  Taken 6/28/2025 0827 by Mayra Turner RN  Trust Relationship/Rapport:   care explained   choices provided   empathic listening provided   questions answered     Problem: Skin Injury Risk Increased  Goal: Skin Health and Integrity  Outcome: Progressing  Intervention: Optimize Skin Protection  Recent Flowsheet Documentation  Taken 6/28/2025 0827 by Mayra Turner RN  Activity Management: bedrest  Pressure Reduction Techniques:   frequent  weight shift encouraged   weight shift assistance provided  Head of Bed (HOB) Positioning: [facility] elevated  Pressure Reduction Devices:   pressure-redistributing mattress utilized   positioning supports utilized  Skin Protection: transparent dressing maintained     Problem: Comorbidity Management  Goal: Blood Pressure in Desired Range  Intervention: Maintain Blood Pressure Management  Recent Flowsheet Documentation  Taken 6/28/2025 0827 by Mayra Turner RN  Medication Review/Management: medications reviewed     Problem: Fall Injury Risk  Goal: Absence of Fall and Fall-Related Injury  Outcome: Progressing  Intervention: Identify and Manage Contributors  Recent Flowsheet Documentation  Taken 6/28/2025 0827 by Mayra Turner RN  Medication Review/Management: medications reviewed  Self-Care Promotion:   BADL personal objects within reach   BADL personal routines maintained  Intervention: Promote Injury-Free Environment  Recent Flowsheet Documentation  Taken 6/28/2025 0827 by Mayra Turner RN  Safety Promotion/Fall Prevention:   activity supervised   assistive device/personal items within reach   clutter free environment maintained   fall prevention program maintained   lighting adjusted   nonskid shoes/slippers when out of bed   room organization consistent   safety round/check completed     Problem: Pain Acute  Goal: Optimal Pain Control and Function  Outcome: Progressing  Intervention: Optimize Psychosocial Wellbeing  Recent Flowsheet Documentation  Taken 6/28/2025 0827 by Mayra Turner RN  Supportive Measures: active listening utilized  Diversional Activities: television  Intervention: Develop Pain Management Plan  Recent Flowsheet Documentation  Taken 6/28/2025 0827 by Myara Turner RN  Pain Management Interventions: cold applied  Taken 6/28/2025 0757 by Mayra Turner RN  Pain Management Interventions: pain medication given  Intervention: Prevent or Manage Pain  Recent Flowsheet Documentation  Taken 6/28/2025 0827 by  Mayra Turner, RN  Sensory Stimulation Regulation: care clustered  Medication Review/Management: medications reviewed   Goal Outcome Evaluation:   Pt AOx4, on 2L NC for comfort, VSS, paced on the monitor. Pt complained of constant left shoulder pain, pain controlled by prn meds and ice pack. Fuentes catheter changed, skin C/D/I, urine culture pending results. Neurosurgery consulted. Xray spine and PT/OT pending. IV diuretics given. C-collar maintained. Family member at bedside. Call bell within reach, bed alarm on. Continuity of care provided.

## 2025-06-28 NOTE — CONSULTS
NEUROSURGERY CONSULT      Domonique See  1940  [id number]    Referring Provider: Provider, [place] [address]  Reason for Consultation/Chief Complaint: Left-sided shoulder pain    Patient Care Team:  Anish Lew APRN as PCP - General  Evgeny Gregory MD as Consulting Physician (Cardiology)  Leeanne Pleitez MD as Consulting Physician (Cardiology)  Radha Owusu APRN as Nurse Practitioner (Nurse Practitioner)  Kathe Muñoz MD as Consulting Physician (Nephrology)    Subjective .     History of Present Illness:    Duration: 3 days  Severity: Moderate to severe  Timing: Acute  Associated Symptoms: Burning, occasionally sharp pain in her left shoulder area  Narrative: Had a cervical fusion 5/29/2025 and did well.  Started developing left-sided shoulder pain over the past week.  Does not radiate down to the arms or up from the neck.  Is tender to palpation locally.  Can be elicited with external or internal rotation    While in the room and during my examination of the patient I wore a mask and eye protection.  I washed my hands before and after this patient encounter.  The patient was also wearing a mask.    Review of Systems: All 14 systems are reviewed and are negative except for what is documented above in the HPI  Review of Systems    History: I have reviewed and agree with the following documented PMH, PSH, FH and there no additions or changes.  Past Medical History:   Diagnosis Date    Anemia     Aortic insufficiency 03/21/2025    Asthma     CHF (congestive heart failure)     Cholecystitis 09/02/2011    Congenital heart disease     Coronary artery disease 2013    Deep vein thrombosis (DVT) 11/20/2017    GERD (gastroesophageal reflux disease)     Heart murmur Don't know    Hx of hypertrophic cardiomyopathy     Hypertension     Hypertrophic obstructive cardiomyopathy 11/10/2017    Low back pain     Lumbosacral disc disease     Mitral  regurgitation 03/21/2025    Mixed hyperlipidemia 10/06/2017    Morbid obesity 09/21/2020    Peptic ulceration     Persistent atrial fibrillation 01/10/2023    Primary osteoarthritis of both knees 02/08/2021    Primary osteoarthritis of right knee 09/21/2020    Seizures     Sleep apnea 2017    TIA (transient ischemic attack)     and   Past Surgical History:   Procedure Laterality Date    ABDOMINAL HERNIA REPAIR      ABLATION OF DYSRHYTHMIC FOCUS  01-    BACK SURGERY      BREAST AUGMENTATION      CARDIAC CATHETERIZATION      CARDIAC ELECTROPHYSIOLOGY PROCEDURE N/A 01/16/2023    Procedure: Ablation atrial fibrillation and flutter, cryo Duong and Ramón aware;  Surgeon: Leeanne Pleitez MD;  Location: [facility] CATH INVASIVE LOCATION;  Service: Cardiovascular;  Laterality: N/A;    CARDIAC PACEMAKER PLACEMENT  03/16/2017    Dual Chamber    CERVICAL LAMINECTOMY DECOMPRESSION POSTERIOR N/A 5/29/2025    Procedure: CERVICAL LAMINECTOMY DECOMPRESSION POSTERIOR C7 - T1, C5-T2 FUSION;  Surgeon: Lake Bernal IV, MD;  Location: [facility] MAIN OR;  Service: Neurosurgery;  Laterality: N/A;    CERVICAL RIB RESECTION Bilateral     CERVICAL RIB RESECTION Bilateral 1963    bilateral cervical ribs removed - extra ribs located and causing pain    CHOLECYSTECTOMY      COLONOSCOPY      CORONARY ARTERY BYPASS GRAFT  2013    EPIDURAL BLOCK      NECK SURGERY      NECK SURGERY  05/29/2025    SPINAL FUSION      THORACIC DECOMPRESSION POSTERIOR FUSION  06/04/2014    L3-5 & S1    TRIGGER POINT INJECTION      UPPER GASTROINTESTINAL ENDOSCOPY      and The patient has a family history of    Objective     Physical Exam:  CON:  Appears stated age. No acute distress.  HEENT:  Atraumatic and normocephalic.  PULM:  Breathing nonlabored on room air  CARDIO:  RRR, Pulses 2+  MSK: Tenderness over the left acromioclavicular ligament.  Pain with palpation over the ACL and difficulty with internal/external rotation of the shoulder  PSYCH:  Appears within  normal limits.  SKIN:  No noticeable skin lesions or abrasions  VITALS:  Temp:  [97.3 °F (36.3 °C)-97.5 °F (36.4 °C)] 97.5 °F (36.4 °C)  Heart Rate:  [60-72] 65  Resp:  [11-18] 11  BP: ()/(41-98) 127/66, Body mass index is 30.18 kg/m².  NEURO: AO x 3.  Cranial nerves intact.  Strength 5/5 throughout with normal sensation.      Results Review: I reviewed the patient's new clinical results.    No new neuroimaging.    I personally reviewed the images from the following radiographic studies.    Lab Results (last 24 hours)       ** No results found for the last 24 hours. **            COMORBID CONDITIONS:  Morbid obesity    Assessment & Plan :     Assessment: Domonique See is a 85 y.o. female who presents with left-sided shoulder pain after cervical surgery.  Additional workup: X-ray cervical spine  Surgery planned: None  Additional plan: Appears to have left-sided ACL tenderness.  Recommended ice and ibuprofen.  She should have a repeat x-ray of the cervical spine and follow-up with Dr. Bernal outpatient.      Left shoulder pain      I discussed the patient's findings and my recommendations with patient and family    Chano Lyles MD  06/28/25  09:10 EDT

## 2025-06-28 NOTE — ED PROVIDER NOTES
Subjective   History of Present Illness  Patient is an 85-year-old female with PMH of CHF, CAD, HTN, HLD presenting to the ED for pain in her left shoulder blade, worsening over the past 3 days.  Patient reports history of bilateral shoulder pain, had a C-spine fusion last month.  Patient states over the last 3 days she has been having severe pain in her left shoulder, unable to give me a scale.  She has been taking oxycodone tens and Norco tens with no improvement in symptoms.  She has any fever, chills, chest pain, dyspnea.        Review of Systems   Constitutional:  Negative for chills and fever.   Respiratory:  Negative for shortness of breath.    Cardiovascular:  Negative for chest pain.   Musculoskeletal:  Positive for arthralgias.       Past Medical History:   Diagnosis Date    Anemia     Aortic insufficiency 03/21/2025    Asthma     CHF (congestive heart failure)     Cholecystitis 09/02/2011    Congenital heart disease     Coronary artery disease 2013    Deep vein thrombosis (DVT) 11/20/2017    GERD (gastroesophageal reflux disease)     Heart murmur Don't know    Hx of hypertrophic cardiomyopathy     Hypertension     Hypertrophic obstructive cardiomyopathy 11/10/2017    Low back pain     Lumbosacral disc disease     Mitral regurgitation 03/21/2025    Mixed hyperlipidemia 10/06/2017    Morbid obesity 09/21/2020    Peptic ulceration     Persistent atrial fibrillation 01/10/2023    Primary osteoarthritis of both knees 02/08/2021    Primary osteoarthritis of right knee 09/21/2020    Seizures     Sleep apnea 2017    TIA (transient ischemic attack)        No Known Allergies    Past Surgical History:   Procedure Laterality Date    ABDOMINAL HERNIA REPAIR      ABLATION OF DYSRHYTHMIC FOCUS  01-    BACK SURGERY      BREAST AUGMENTATION      CARDIAC CATHETERIZATION      CARDIAC ELECTROPHYSIOLOGY PROCEDURE N/A 01/16/2023    Procedure: Ablation atrial fibrillation and flutter, sarthak Yanes aware;   Surgeon: Leeanne Pleitez MD;  Location: [facility] CATH INVASIVE LOCATION;  Service: Cardiovascular;  Laterality: N/A;    CARDIAC PACEMAKER PLACEMENT  2017    Dual Chamber    CERVICAL LAMINECTOMY DECOMPRESSION POSTERIOR N/A 2025    Procedure: CERVICAL LAMINECTOMY DECOMPRESSION POSTERIOR C7 - T1, C5-T2 FUSION;  Surgeon: Lake Bernal IV, MD;  Location: [facility] MAIN OR;  Service: Neurosurgery;  Laterality: N/A;    CERVICAL RIB RESECTION Bilateral     CERVICAL RIB RESECTION Bilateral 1963    bilateral cervical ribs removed - extra ribs located and causing pain    CHOLECYSTECTOMY      COLONOSCOPY      CORONARY ARTERY BYPASS GRAFT      EPIDURAL BLOCK      NECK SURGERY      NECK SURGERY  2025    SPINAL FUSION      THORACIC DECOMPRESSION POSTERIOR FUSION  2014    L3-5 & S1    TRIGGER POINT INJECTION      UPPER GASTROINTESTINAL ENDOSCOPY         Family History   Problem Relation Age of Onset    Colon cancer Mother     Heart disease Mother     Hypertension Mother     Arthritis Mother     Cancer Mother     Diabetes Mother     Kidney disease Mother     Colon cancer Brother     Heart attack Brother     Colon cancer Daughter     Arthritis Daughter     Arthritis Father     Hearing loss Father     Arthritis Sister     Hypertension Brother     Arthritis Brother     Cancer Brother     Stroke Brother     Arthritis Son     Arthritis Son     Cancer Sister     Diabetes Sister     Cancer Brother     Hypertension Sister     Kidney disease Sister        Social History     Socioeconomic History    Marital status:    Tobacco Use    Smoking status: Former     Current packs/day: 0.00     Average packs/day: 1 pack/day for 8.0 years (8.0 ttl pk-yrs)     Types: Cigarettes     Start date: 1977     Quit date: 1985     Years since quittin.5     Passive exposure: Past    Smokeless tobacco: Never    Tobacco comments:     Quit    Vaping Use    Vaping status: Never Used   Substance and Sexual Activity     "Alcohol use: Never     Comment: 6 drinks a year in social settings    Drug use: Never    Sexual activity: Not Currently     Partners: Male           Objective   Physical Exam  Constitutional:       Appearance: Normal appearance.   HENT:      Head: Normocephalic and atraumatic.      Mouth/Throat:      Mouth: Mucous membranes are moist.   Eyes:      Extraocular Movements: Extraocular movements intact.   Neck:      Comments: Patient in c-collar  Cardiovascular:      Rate and Rhythm: Normal rate and regular rhythm.      Pulses: Normal pulses.      Heart sounds: Normal heart sounds.   Pulmonary:      Effort: Pulmonary effort is normal.      Breath sounds: Normal breath sounds.   Abdominal:      General: Abdomen is flat. Bowel sounds are normal.      Palpations: Abdomen is soft.      Tenderness: There is no abdominal tenderness.   Musculoskeletal:        Arms:       Right lower leg: Edema present.      Left lower leg: Edema present.      Comments: Tenderness to palpation areas noted above.  Slight decreased range of motion of left upper extremity due to pain.  Capillary refill normal.  Pulses palpated bilaterally.  No decreased  strength.      Bilateral lower extremity edema at baseline per patient.   Skin:     General: Skin is warm and dry.      Capillary Refill: Capillary refill takes less than 2 seconds.   Neurological:      General: No focal deficit present.      Mental Status: She is alert and oriented to person, place, and time.   Psychiatric:         Mood and Affect: Mood normal.         Behavior: Behavior normal.         Procedures           ED Course  ED Course as of 06/28/25 0341   Sat Jun 28, 2025   0051 Waiting for x-ray to be read [EC]      ED Course User Index  [EC] Maritza Graves PA-C      /71   Pulse 60   Temp 97.3 °F (36.3 °C)   Resp 18   Ht 152.4 cm (60\")   Wt 74.8 kg (164 lb 14.5 oz)   LMP  (LMP Unknown)   SpO2 97%   BMI 32.21 kg/m²   Labs Reviewed - No data to display  Medications "   sodium chloride 0.9 % flush 10 mL (has no administration in time range)   sodium chloride 0.9 % flush 10 mL (has no administration in time range)   sodium chloride 0.9 % infusion 40 mL (has no administration in time range)   nitroglycerin (NITROSTAT) SL tablet 0.4 mg (has no administration in time range)   HYDROmorphone (DILAUDID) injection 0.25 mg (has no administration in time range)     And   naloxone (NARCAN) injection 0.4 mg (has no administration in time range)   sennosides-docusate (PERICOLACE) 8.6-50 MG per tablet 2 tablet (has no administration in time range)     And   polyethylene glycol (MIRALAX) packet 17 g (has no administration in time range)     And   bisacodyl (DULCOLAX) EC tablet 5 mg (has no administration in time range)     And   bisacodyl (DULCOLAX) suppository 10 mg (has no administration in time range)   ondansetron ODT (ZOFRAN-ODT) disintegrating tablet 4 mg (has no administration in time range)     Or   ondansetron (ZOFRAN) injection 4 mg (has no administration in time range)   HYDROmorphone (DILAUDID) injection 0.25 mg (0.25 mg Intravenous Given 6/27/25 2236)   ondansetron (ZOFRAN) injection 4 mg (4 mg Intravenous Given 6/27/25 2236)   HYDROmorphone (DILAUDID) injection 0.25 mg (0.25 mg Intravenous Given 6/28/25 0141)     CT Upper Extremity Left Without Contrast  Result Date: 6/28/2025  Impression: 1.No acute osseous abnormality. 2.Minimal degenerative changes. 3.Left-sided ICD. Electronically Signed: Brijesh Ruiz MD  6/28/2025 2:58 AM EDT  Workstation ID: [id number]    XR Shoulder 2+ View Left  Result Date: 6/28/2025  Impression: 1.Linear cortical abnormalities at the lateral margin of the scapular body that are worrisome for nondisplaced fracture, but this could be from an old injury. Recommend CT evaluation. 2.Mild acromioclavicular and glenohumeral joint degeneration. Electronically Signed: Brijesh Ruiz MD  6/28/2025 12:52 AM EDT  Workstation ID: [id number]    XR Scapula Left  Result  Date: 6/27/2025  Impression: Small ossific density along the inferior aspect of the inferior glenoid could be posttraumatic but is age indeterminate. Dedicated radiographs of the left shoulder are recommended to further evaluate. Electronically Signed: Gen Malcolm MD  6/27/2025 10:54 PM EDT  Workstation ID: [id number]                                                     Medical Decision Making  Chart review: 6/13/25 Pau WALLACE Neurosurgery:  Domonique See is a 85 y.o. female who presents to clinic for 2-week postoperative appointment.  Patient is doing very well postoperatively.  Patient is able to move her legs much better than before and feels stronger in her upper extremities.  Patient even stood with a walker 3 times.  Patient feels much stronger and is doing very well in therapy.  On physical examination patient has good strength in her upper extremities and I did not appreciate David sign.  Patient continues to be stronger in her left leg and her right leg, but her right leg has drastically improved postoperatively.  At this time patient is to continue physical therapy while at rehab.  When she discharges from rehab she will continue physical therapy with home health.  She should continue wearing her hard collar at all times.  Patient's incision is healing very nicely and looks great at this time.  She denies any fever, chills or discharge from her incision.  Patient is now able to get her incision wet in the shower but should not submerge her incision.  She should not be lifting more than 5 pounds.  I did prescribe her more pain medication and Flexeril during this visit.  Patient is to follow-up in 3 months with an updated CT scan of her cervical spine.  Patient is agreeable to this plan knows to call with any questions or concerns.    Patient presented to the ED for the above complaint.    Patient underwent the above exam and evaluation.    While in the ED patient was placed in a gown and an IV  was established.  EKG was obtained to assess for arrhythmia, x-ray of left scapula was obtained to assess for fracture.  Questionable findings on x-ray suggested x-ray of left shoulder, this was obtained.  Questionable findings on shoulder x-ray were recommended CT, this was obtained to assess for possible fracture.  Patient was given IV pain medication and Zofran.  Upon reevaluation patient resting comfortably, reporting slight improvement in pain, voicing concern for pain control as patient is on oxycodone and Norco tens.  Offered to place patient in observation for continued pain control and neurosurgery evaluation in the morning as well as possible PT.  Patient voiced understanding, agreeable dispo plan.    EKG independently interpreted by Dr. Mera showing ventricular paced rhythm, rate 66, KS interval 229, QTc 496, compared to previous EKG 5/23/2025 showing A-fib/flutter and V paced complexes, RBBB, rate 68.  Labs considered and deemed unecessary, patient afebrile, nontoxic in appearance, no chest pain or dyspnea.  X-ray of left scapula, x-ray of left shoulder, CT left upper extremity independently interpreted by Dr. Mera and reviewed by myself showing: X-ray of left scapula showed small density along the inferior aspect of glenoid.  X-ray of left shoulder showed linear cortical abnormalities of lateral scapular body worrisome for nondisplaced fracture.  CT of left upper extremity showed no acute osseous abnormalities, minimal degenerative changes, left-sided ICD.    Appropriate PPE was worn during each patient encounter.    Note Disclaimer: At [facility], we believe that sharing information builds trust and better relationships. You are receiving this note because you are receiving care at [facility] or recently visited. It is possible you will see health information before a provider has talked with you about it. This kind of information can be easy to misunderstand. To help you fully understand  what it means for your health, we urge you to discuss this note with your provider.    Discussed this patient with Dr. Mera who agrees with plan.    Problems Addressed:  Pain of left scapula: complicated acute illness or injury    Amount and/or Complexity of Data Reviewed  Radiology: ordered.  ECG/medicine tests: ordered.    Risk  OTC drugs.  Prescription drug management.  Decision regarding hospitalization.        Final diagnoses:   Pain of left scapula       ED Disposition  ED Disposition       ED Disposition   Decision to Admit    Condition   --    Comment   --               No follow-up provider specified.       Medication List        ASK your doctor about these medications      aspirin 81 MG chewable tablet  Ask about: Which instructions should I use?     HYDROcodone-acetaminophen  MG per tablet  Commonly known as: NORCO  Ask about: Which instructions should I use?     oxyCODONE 10 MG tablet  Commonly known as: ROXICODONE  Take 1 tablet by mouth Every 4 (Four) Hours As Needed for Severe Pain.  Ask about: Which instructions should I use?     * polyethylene glycol 17 g packet  Commonly known as: MIRALAX  Take 17 g by mouth Daily.  Ask about: Which instructions should I use?     * MiraLax 17 GM/SCOOP powder  Generic drug: polyethylene glycol  Ask about: Which instructions should I use?           * This list has 2 medication(s) that are the same as other medications prescribed for you. Read the directions carefully, and ask your doctor or other care provider to review them with you.                     Maritza Graves PA-C  06/28/25 0347

## 2025-06-29 ENCOUNTER — APPOINTMENT (OUTPATIENT)
Dept: CT IMAGING | Facility: HOSPITAL | Age: 85
End: 2025-06-29
Payer: MEDICARE

## 2025-06-29 ENCOUNTER — APPOINTMENT (OUTPATIENT)
Dept: GENERAL RADIOLOGY | Facility: HOSPITAL | Age: 85
End: 2025-06-29
Payer: MEDICARE

## 2025-06-29 PROBLEM — R41.82 ALTERED MENTAL STATUS: Status: ACTIVE | Noted: 2025-06-29

## 2025-06-29 LAB
AMMONIA BLD-SCNC: 24 UMOL/L (ref 11–51)
ANION GAP SERPL CALCULATED.3IONS-SCNC: 8.7 MMOL/L (ref 5–15)
ARTERIAL PATENCY WRIST A: POSITIVE
ATMOSPHERIC PRESS: ABNORMAL MM[HG]
BASE EXCESS BLDA CALC-SCNC: 8.7 MMOL/L (ref 0–3)
BDY SITE: ABNORMAL
BUN SERPL-MCNC: 14.1 MG/DL (ref 8–23)
BUN/CREAT SERPL: 24.3 (ref 7–25)
CALCIUM SPEC-SCNC: 9.3 MG/DL (ref 8.6–10.5)
CHLORIDE SERPL-SCNC: 103 MMOL/L (ref 98–107)
CK SERPL-CCNC: 27 U/L (ref 20–180)
CO2 BLDA-SCNC: 37.5 MMOL/L (ref 22–29)
CO2 SERPL-SCNC: 27.3 MMOL/L (ref 22–29)
CREAT SERPL-MCNC: 0.58 MG/DL (ref 0.57–1)
DEPRECATED RDW RBC AUTO: 46.3 FL (ref 37–54)
EGFRCR SERPLBLD CKD-EPI 2021: 88.3 ML/MIN/1.73
ERYTHROCYTE [DISTWIDTH] IN BLOOD BY AUTOMATED COUNT: 14.5 % (ref 12.3–15.4)
GLUCOSE BLDC GLUCOMTR-MCNC: 96 MG/DL (ref 70–105)
GLUCOSE SERPL-MCNC: 88 MG/DL (ref 65–99)
HCO3 BLDA-SCNC: 35.6 MMOL/L (ref 21–28)
HCT VFR BLD AUTO: 32.6 % (ref 34–46.6)
HEMODILUTION: NO
HGB BLD-MCNC: 9.7 G/DL (ref 12–15.9)
INHALED O2 CONCENTRATION: 28 %
MCH RBC QN AUTO: 25.9 PG (ref 26.6–33)
MCHC RBC AUTO-ENTMCNC: 29.8 G/DL (ref 31.5–35.7)
MCV RBC AUTO: 87.2 FL (ref 79–97)
MODALITY: ABNORMAL
PCO2 BLDA: 59.5 MM HG (ref 35–48)
PH BLDA: 7.39 PH UNITS (ref 7.35–7.45)
PLATELET # BLD AUTO: 119 10*3/MM3 (ref 140–450)
PMV BLD AUTO: 11.1 FL (ref 6–12)
PO2 BLD: 334 MM[HG] (ref 0–500)
PO2 BLDA: 93.5 MM HG (ref 83–108)
POTASSIUM SERPL-SCNC: 4.1 MMOL/L (ref 3.5–5.2)
RBC # BLD AUTO: 3.74 10*6/MM3 (ref 3.77–5.28)
SAO2 % BLDCOA: 96.9 % (ref 94–98)
SODIUM SERPL-SCNC: 139 MMOL/L (ref 136–145)
WBC NRBC COR # BLD AUTO: 2.75 10*3/MM3 (ref 3.4–10.8)

## 2025-06-29 PROCEDURE — 72040 X-RAY EXAM NECK SPINE 2-3 VW: CPT

## 2025-06-29 PROCEDURE — 25010000002 METHYLPREDNISOLONE PER 40 MG: Performed by: FAMILY MEDICINE

## 2025-06-29 PROCEDURE — 82803 BLOOD GASES ANY COMBINATION: CPT | Performed by: STUDENT IN AN ORGANIZED HEALTH CARE EDUCATION/TRAINING PROGRAM

## 2025-06-29 PROCEDURE — 94799 UNLISTED PULMONARY SVC/PX: CPT

## 2025-06-29 PROCEDURE — 25010000002 FUROSEMIDE PER 20 MG: Performed by: NURSE PRACTITIONER

## 2025-06-29 PROCEDURE — 82550 ASSAY OF CK (CPK): CPT | Performed by: FAMILY MEDICINE

## 2025-06-29 PROCEDURE — 70496 CT ANGIOGRAPHY HEAD: CPT

## 2025-06-29 PROCEDURE — 85027 COMPLETE CBC AUTOMATED: CPT | Performed by: NURSE PRACTITIONER

## 2025-06-29 PROCEDURE — 70450 CT HEAD/BRAIN W/O DYE: CPT

## 2025-06-29 PROCEDURE — 25510000001 IOPAMIDOL PER 1 ML: Performed by: FAMILY MEDICINE

## 2025-06-29 PROCEDURE — 0042T HC CT CEREBRAL PERFUSION W/WO CONTRAST: CPT

## 2025-06-29 PROCEDURE — 70498 CT ANGIOGRAPHY NECK: CPT

## 2025-06-29 PROCEDURE — 80048 BASIC METABOLIC PNL TOTAL CA: CPT | Performed by: NURSE PRACTITIONER

## 2025-06-29 PROCEDURE — 25010000002 CEFTRIAXONE PER 250 MG: Performed by: NURSE PRACTITIONER

## 2025-06-29 PROCEDURE — 36600 WITHDRAWAL OF ARTERIAL BLOOD: CPT | Performed by: STUDENT IN AN ORGANIZED HEALTH CARE EDUCATION/TRAINING PROGRAM

## 2025-06-29 PROCEDURE — 82140 ASSAY OF AMMONIA: CPT | Performed by: STUDENT IN AN ORGANIZED HEALTH CARE EDUCATION/TRAINING PROGRAM

## 2025-06-29 PROCEDURE — 82948 REAGENT STRIP/BLOOD GLUCOSE: CPT

## 2025-06-29 PROCEDURE — 94640 AIRWAY INHALATION TREATMENT: CPT

## 2025-06-29 RX ORDER — IOPAMIDOL 755 MG/ML
100 INJECTION, SOLUTION INTRAVASCULAR
Status: COMPLETED | OUTPATIENT
Start: 2025-06-29 | End: 2025-06-29

## 2025-06-29 RX ORDER — IOPAMIDOL 755 MG/ML
50 INJECTION, SOLUTION INTRAVASCULAR
Status: COMPLETED | OUTPATIENT
Start: 2025-06-29 | End: 2025-06-29

## 2025-06-29 RX ORDER — MORPHINE SULFATE 2 MG/ML
2 INJECTION, SOLUTION INTRAMUSCULAR; INTRAVENOUS
Status: DISCONTINUED | OUTPATIENT
Start: 2025-06-29 | End: 2025-07-01 | Stop reason: HOSPADM

## 2025-06-29 RX ORDER — METHYLPREDNISOLONE SODIUM SUCCINATE 40 MG/ML
40 INJECTION, POWDER, LYOPHILIZED, FOR SOLUTION INTRAMUSCULAR; INTRAVENOUS ONCE
Status: COMPLETED | OUTPATIENT
Start: 2025-06-29 | End: 2025-06-29

## 2025-06-29 RX ORDER — OXYCODONE HYDROCHLORIDE 5 MG/1
5 TABLET ORAL EVERY 4 HOURS PRN
Refills: 0 | Status: DISCONTINUED | OUTPATIENT
Start: 2025-06-29 | End: 2025-07-01

## 2025-06-29 RX ORDER — IPRATROPIUM BROMIDE AND ALBUTEROL SULFATE 2.5; .5 MG/3ML; MG/3ML
3 SOLUTION RESPIRATORY (INHALATION)
Status: DISCONTINUED | OUTPATIENT
Start: 2025-06-29 | End: 2025-07-01 | Stop reason: HOSPADM

## 2025-06-29 RX ADMIN — IOPAMIDOL 50 ML: 755 INJECTION, SOLUTION INTRAVENOUS at 09:56

## 2025-06-29 RX ADMIN — MIDODRINE HYDROCHLORIDE 10 MG: 5 TABLET ORAL at 22:39

## 2025-06-29 RX ADMIN — METHYLPREDNISOLONE SODIUM SUCCINATE 40 MG: 40 INJECTION, POWDER, FOR SOLUTION INTRAMUSCULAR; INTRAVENOUS at 14:09

## 2025-06-29 RX ADMIN — PREGABALIN 200 MG: 100 CAPSULE ORAL at 05:57

## 2025-06-29 RX ADMIN — APIXABAN 5 MG: 5 TABLET, FILM COATED ORAL at 22:39

## 2025-06-29 RX ADMIN — PREGABALIN 100 MG: 100 CAPSULE ORAL at 22:40

## 2025-06-29 RX ADMIN — OXYCODONE 5 MG: 5 TABLET ORAL at 22:39

## 2025-06-29 RX ADMIN — ASPIRIN 81 MG CHEWABLE TABLET 81 MG: 81 TABLET CHEWABLE at 10:13

## 2025-06-29 RX ADMIN — METOPROLOL SUCCINATE 12.5 MG: 25 TABLET, EXTENDED RELEASE ORAL at 10:12

## 2025-06-29 RX ADMIN — IOPAMIDOL 100 ML: 755 INJECTION, SOLUTION INTRAVENOUS at 09:31

## 2025-06-29 RX ADMIN — SENNOSIDES 1 TABLET: 8.6 TABLET, FILM COATED ORAL at 22:39

## 2025-06-29 RX ADMIN — FUROSEMIDE 20 MG: 10 INJECTION, SOLUTION INTRAMUSCULAR; INTRAVENOUS at 10:13

## 2025-06-29 RX ADMIN — MIDODRINE HYDROCHLORIDE 10 MG: 5 TABLET ORAL at 14:10

## 2025-06-29 RX ADMIN — Medication 10 ML: at 10:14

## 2025-06-29 RX ADMIN — APIXABAN 5 MG: 5 TABLET, FILM COATED ORAL at 10:13

## 2025-06-29 RX ADMIN — IPRATROPIUM BROMIDE AND ALBUTEROL SULFATE 3 ML: .5; 3 SOLUTION RESPIRATORY (INHALATION) at 19:05

## 2025-06-29 RX ADMIN — MIDODRINE HYDROCHLORIDE 10 MG: 5 TABLET ORAL at 05:57

## 2025-06-29 RX ADMIN — LEVOTHYROXINE SODIUM 75 MCG: 0.07 TABLET ORAL at 06:03

## 2025-06-29 RX ADMIN — Medication 10 ML: at 22:40

## 2025-06-29 RX ADMIN — CEFTRIAXONE SODIUM 1000 MG: 1 INJECTION, POWDER, FOR SOLUTION INTRAMUSCULAR; INTRAVENOUS at 15:18

## 2025-06-29 NOTE — SIGNIFICANT NOTE
CODE STROKE was called for altered mental status.  Questionable last known well, on Eliquis - not a TNK candidate.  Non focal exam other than intermittent somnolence while interacting with nursing staff. Requires sternal rub or loud voice commands to wake her, and then falls back asleep. STAT ABG was ordered and the patient was sent for STAT imaging including CT/CTA H/N and CT perfusion which were all unremarkable.    ABG showed elevated CO2 of 59.5.  /70  Blood glucose 96  Add on ammonia, TSH  Further recommendations may follow.  Plan discussed with primary RN and son at bedside.   Suspect primary metabolic etiology for her mental status changes.       Please consult neurology as needed.       NIH Stroke Scale  Interval: baseline  1a. Level of Consciousness: 2-->Not alert, requires repeated stimulation to attend, or is obtunded and requires strong or painful stimulation to make movements (not stereotyped)  1b. LOC Questions: 0-->Answers both questions correctly  1c. LOC Commands: 0-->Performs both tasks correctly  2. Best Gaze: 0-->Normal  3. Visual: 0-->No visual loss  4. Facial Palsy: 0-->Normal symmetrical movements  5a. Motor Arm, Left: 0-->No drift, limb holds 90 (or 45) degrees for full 10 secs  5b. Motor Arm, Right: 0-->No drift, limb holds 90 (or 45) degrees for full 10 secs  6a. Motor Leg, Left: 3-->No effort against gravity, leg falls to bed immediately (baseline)  6b. Motor Leg, Right: 3-->No effort against gravity, leg falls to bed immediately (baseline)  7. Limb Ataxia: 0-->Absent  8. Sensory: 0-->Normal, no sensory loss  9. Best Language: 0-->No aphasia, normal  10. Dysarthria: 0-->Normal  11. Extinction and Inattention (formerly Neglect): 0-->No abnormality  Total (NIH Stroke Scale): 8      ABCD2 Score for TIA  Diabetes History: N    CHADS2 Score  CHF History: Y  Hypertension History: Y  Diabetes History: N  Stroke/TIA/Thromboembolism History: Y    [id number] Score for Atrial Fibrillation  Stroke Risk  Age in Years: 75+  Sex: Female  CHF History: Y  Hypertension History: Y  Stroke/TIA/Thromboembolism History: Y  Vascular Disease History: Y  Diabetes History: N  [id number] Score: 8            Jong Sanchez MD, TONY  Neurostroke

## 2025-06-29 NOTE — NURSING NOTE
See code stroke documentation  Code stroke called at 0848 for AMS. Patient has hx of TIA and afib. Patient is not a candiate for TNK d/t unknown LKW and received eliquis on 6/28 at 2100. On arrival, patient aroused to voice and followed commands. NIH 8. Patient is unable to move lower extremity at baseline. CT scans ordered and competed. After CT scan, patient was more awake and conversational. Patient transferred back to IP room. Dr. Sanchez was notified of completed CT scans.

## 2025-06-29 NOTE — PLAN OF CARE
Problem: Adult Inpatient Plan of Care  Goal: Absence of Hospital-Acquired Illness or Injury  Intervention: Identify and Manage Fall Risk  Recent Flowsheet Documentation  Taken 6/29/2025 0848 by Mayra Turner RN  Safety Promotion/Fall Prevention: safety round/check completed  Intervention: Prevent Skin Injury  Recent Flowsheet Documentation  Taken 6/29/2025 0848 by Mayra Turner RN  Body Position:   left   log-rolled  Skin Protection:   incontinence pads utilized   transparent dressing maintained  Intervention: Prevent and Manage VTE (Venous Thromboembolism) Risk  Recent Flowsheet Documentation  Taken 6/29/2025 0848 by Mayra Turner RN  VTE Prevention/Management:   bilateral   SCDs (sequential compression devices) off  Intervention: Prevent Infection  Recent Flowsheet Documentation  Taken 6/29/2025 0848 by Mayra Turner RN  Infection Prevention:   hand hygiene promoted   rest/sleep promoted   single patient room provided  Goal: Optimal Comfort and Wellbeing  Intervention: Provide Person-Centered Care  Recent Flowsheet Documentation  Taken 6/29/2025 0848 by Mayra Turner RN  Trust Relationship/Rapport:   care explained   choices provided     Problem: Skin Injury Risk Increased  Goal: Skin Health and Integrity  Outcome: Progressing  Intervention: Optimize Skin Protection  Recent Flowsheet Documentation  Taken 6/29/2025 0848 by Mayra Turner RN  Activity Management: bedrest  Pressure Reduction Techniques:   frequent weight shift encouraged   weight shift assistance provided  Head of Bed (HOB) Positioning: HOB elevated  Pressure Reduction Devices:   positioning supports utilized   pressure-redistributing mattress utilized  Skin Protection:   incontinence pads utilized   transparent dressing maintained     Problem: Comorbidity Management  Goal: Blood Pressure in Desired Range  Outcome: Progressing  Intervention: Maintain Blood Pressure Management  Recent Flowsheet Documentation  Taken 6/29/2025 0848 by Mayra Turner  RN  Medication Review/Management: medications reviewed     Problem: Fall Injury Risk  Goal: Absence of Fall and Fall-Related Injury  Outcome: Progressing  Intervention: Identify and Manage Contributors  Recent Flowsheet Documentation  Taken 6/29/2025 0848 by Mayra Turner RN  Medication Review/Management: medications reviewed  Self-Care Promotion:   BADL personal objects within reach   BADL personal routines maintained  Intervention: Promote Injury-Free Environment  Recent Flowsheet Documentation  Taken 6/29/2025 0848 by Mayra Turner RN  Safety Promotion/Fall Prevention: safety round/check completed     Problem: Pain Acute  Goal: Optimal Pain Control and Function  Outcome: Progressing  Intervention: Optimize Psychosocial Wellbeing  Recent Flowsheet Documentation  Taken 6/29/2025 0848 by Mayra Turner RN  Supportive Measures: active listening utilized  Diversional Activities: television  Intervention: Prevent or Manage Pain  Recent Flowsheet Documentation  Taken 6/29/2025 0848 by Mayra Turner RN  Sensory Stimulation Regulation: care clustered  Sleep/Rest Enhancement: family presence promoted  Medication Review/Management: medications reviewed   Goal Outcome Evaluation:   Pt lethargic, responds to voice and pain, on 2L NC, VSS, paced on the monitor. Spoke with provider for elevated CO2, IV solumedrol x 1 given and no narcotics per MD orders. Amonia level was drawn.   At around 1730, Pt is more awake and talking. Requested family to bring her CPAP per provider Zoey Acosta for pt to wear. Family brought in CPAP and pt currently using it. IV abx given. XR cervical spine done today. Call bell within reach, bed alarm on. Family at bedside. Care provided.

## 2025-06-29 NOTE — SIGNIFICANT NOTE
06/29/25 1036   OTHER   Discipline occupational therapist;physical therapist   Rehab Time/Intention   Session Not Performed unable to evaluate, medical status change;other (see comments)  (Code stroke called this a.m. Pt still very lethargic. Nursing asked to hold this date. Will f/u tomorrow for PT/OT eval as medically appropriate.)   Recommendation   PT - Next Appointment 06/30/25   Recommendation   OT - Next Appointment 06/30/25

## 2025-06-29 NOTE — NURSING NOTE
Pt lethargic, responsive to pain. Called code stroke for AMS. VSS stable /70, HR 60, RR 14, on 2L NC O2 sat at 98%, BG 96.

## 2025-06-29 NOTE — PROGRESS NOTES
LOS: 0 days   Patient Care Team:  Anish Lew APRN as PCP - General  [facility] as Consulting Physician (Cardiology)  Leeanne Pleitez MD as Consulting Physician (Cardiology)  Radha Owusu APRN as Nurse Practitioner (Nurse Practitioner)  Kathe Muñoz MD as Consulting Physician (Nephrology)    Subjective:  Pain is improved    Objective:   Afebrile      Review of Systems:   Review of Systems   Unable to perform ROS: Other           Vital Signs  Temp:  [97.4 °F (36.3 °C)-98.2 °F (36.8 °C)] 98.1 °F (36.7 °C)  Heart Rate:  [60-93] 60  Resp:  [12-15] 14  BP: (118-152)/(52-82) 141/67    Physical Exam:  Physical Exam  Vitals and nursing note reviewed.          Radiology:  CT CEREBRAL PERFUSION WITH & WITHOUT CONTRAST  Result Date: 6/29/2025  Negative for completed infarct or ischemic penumbra. Electronically Signed: Jay Jay Brennan MD  6/29/2025 10:18 AM EDT  Workstation ID: [id number]    CT Angiogram Neck  Result Date: 6/29/2025  Impression: 1. No hemodynamically significant stenosis of the carotid or vertebral arteries. 2. No intracranial vascular stenosis, occlusion, or aneurysm. 3. Patent dural venous sinuses. Electronically Signed: Raymond Santos MD  6/29/2025 10:11 AM EDT  Workstation ID: [id number]    CT Angiogram Head w AI Analysis of LVO  Result Date: 6/29/2025  Impression: 1. No hemodynamically significant stenosis of the carotid or vertebral arteries. 2. No intracranial vascular stenosis, occlusion, or aneurysm. 3. Patent dural venous sinuses. Electronically Signed: Raymond Santos MD  6/29/2025 10:11 AM EDT  Workstation ID: [id number]    CT Head Without Contrast Stroke Protocol  Result Date: 6/29/2025  Impression: No acute intracranial findings by CT. Chronic findings similar to 7/13/2023 comparison. Electronically Signed: Jay Jay Brennan MD  6/29/2025 9:25 AM EDT  Workstation ID: [id number]    CT Upper Extremity Left Without Contrast  Result Date: 6/28/2025  Impression:  1.No acute osseous abnormality. 2.Minimal degenerative changes. 3.Left-sided ICD. Electronically Signed: Brijesh Ruiz MD  6/28/2025 2:58 AM EDT  Workstation ID: [id number]    XR Shoulder 2+ View Left  Result Date: 6/28/2025  Impression: 1.Linear cortical abnormalities at the lateral margin of the scapular body that are worrisome for nondisplaced fracture, but this could be from an old injury. Recommend CT evaluation. 2.Mild acromioclavicular and glenohumeral joint degeneration. Electronically Signed: Brijesh Ruiz MD  6/28/2025 12:52 AM EDT  Workstation ID: [id number]    XR Scapula Left  Result Date: 6/27/2025  Impression: Small ossific density along the inferior aspect of the inferior glenoid could be posttraumatic but is age indeterminate. Dedicated radiographs of the left shoulder are recommended to further evaluate. Electronically Signed: Gen Malcolm MD  6/27/2025 10:54 PM EDT  Workstation ID: [id number]         Results Review:     I reviewed the patient's new clinical results.  I reviewed the patient's new imaging results and agree with the interpretation.    Medication Review:   Scheduled Meds:apixaban, 5 mg, Oral, BID  aspirin, 81 mg, Oral, Daily  [Held by provider] atorvastatin, 40 mg, Oral, Nightly  furosemide, 20 mg, Intravenous, Daily  levothyroxine, 75 mcg, Oral, QAM  metoprolol succinate XL, 12.5 mg, Oral, Q24H  midodrine, 10 mg, Oral, Q8H  pregabalin, 200 mg, Oral, Q8H  senna, 2 tablet, Oral, Nightly  sertraline, 100 mg, Oral, Q PM  sodium chloride, 10 mL, Intravenous, Q12H      Continuous Infusions:   PRN Meds:.  senna-docusate sodium **AND** polyethylene glycol **AND** bisacodyl **AND** bisacodyl    Morphine    [DISCONTINUED] HYDROmorphone **AND** naloxone    nitroglycerin    ondansetron ODT **OR** ondansetron    oxyCODONE    sodium chloride    sodium chloride    Labs:    CBC    Results from last 7 days   Lab Units 06/29/25  0312   WBC 10*3/mm3 2.75*   HEMOGLOBIN g/dL 9.7*   PLATELETS 10*3/mm3 119*      BMP   Results from last 7 days   Lab Units 06/29/25  0312   SODIUM mmol/L 139   POTASSIUM mmol/L 4.1   CHLORIDE mmol/L 103   CO2 mmol/L 27.3   BUN mg/dL 14.1   CREATININE mg/dL 0.58   GLUCOSE mg/dL 88     Cr Clearance Estimated Creatinine Clearance: 60.8 mL/min (by C-G formula based on SCr of 0.58 mg/dL).  Coag     HbA1C   Lab Results   Component Value Date    HGBA1C 5.8 (H) 01/19/2023    HGBA1C 5.9 (H) 03/08/2022    HGBA1C 6.1 (H) 06/30/2021     Blood Glucose   Glucose   Date/Time Value Ref Range Status   06/29/2025 0849 96 70 - 105 mg/dL Final     Comment:     Serial Number: [id number]:  [id number]   06/28/2025 2124 97 70 - 105 mg/dL Final     Comment:     Serial Number: [id number]:  [id number]     Infection     CMP   Results from last 7 days   Lab Units 06/29/25  0312   SODIUM mmol/L 139   POTASSIUM mmol/L 4.1   CHLORIDE mmol/L 103   CO2 mmol/L 27.3   BUN mg/dL 14.1   CREATININE mg/dL 0.58   GLUCOSE mg/dL 88     UA    Results from last 7 days   Lab Units 06/28/25  1543   NITRITE UA  Negative   WBC UA /HPF 21-50*   BACTERIA UA /HPF None Seen   SQUAM EPITHEL UA /HPF 0-2     Radiology(recent) CT CEREBRAL PERFUSION WITH & WITHOUT CONTRAST  Result Date: 6/29/2025  Negative for completed infarct or ischemic penumbra. Electronically Signed: Jay Jay Brennan MD  6/29/2025 10:18 AM EDT  Workstation ID: [id number]    CT Angiogram Neck  Result Date: 6/29/2025  Impression: 1. No hemodynamically significant stenosis of the carotid or vertebral arteries. 2. No intracranial vascular stenosis, occlusion, or aneurysm. 3. Patent dural venous sinuses. Electronically Signed: Raymond Santos MD  6/29/2025 10:11 AM EDT  Workstation ID: [id number]    CT Angiogram Head w AI Analysis of LVO  Result Date: 6/29/2025  Impression: 1. No hemodynamically significant stenosis of the carotid or vertebral arteries. 2. No intracranial vascular stenosis, occlusion, or aneurysm. 3. Patent dural venous sinuses. Electronically Signed: Raymond  MD Tom  6/29/2025 10:11 AM EDT  Workstation ID: [id number]    CT Head Without Contrast Stroke Protocol  Result Date: 6/29/2025  Impression: No acute intracranial findings by CT. Chronic findings similar to 7/13/2023 comparison. Electronically Signed: Jay Jay Brennan MD  6/29/2025 9:25 AM EDT  Workstation ID: [id number]    CT Upper Extremity Left Without Contrast  Result Date: 6/28/2025  Impression: 1.No acute osseous abnormality. 2.Minimal degenerative changes. 3.Left-sided ICD. Electronically Signed: Brijesh Ruiz MD  6/28/2025 2:58 AM EDT  Workstation ID: [id number]    XR Shoulder 2+ View Left  Result Date: 6/28/2025  Impression: 1.Linear cortical abnormalities at the lateral margin of the scapular body that are worrisome for nondisplaced fracture, but this could be from an old injury. Recommend CT evaluation. 2.Mild acromioclavicular and glenohumeral joint degeneration. Electronically Signed: Brijesh Ruiz MD  6/28/2025 12:52 AM EDT  Workstation ID: [id number]    XR Scapula Left  Result Date: 6/27/2025  Impression: Small ossific density along the inferior aspect of the inferior glenoid could be posttraumatic but is age indeterminate. Dedicated radiographs of the left shoulder are recommended to further evaluate. 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

## 2025-06-29 NOTE — PLAN OF CARE
Goal Outcome Evaluation:  Plan of Care Reviewed With: patient        Progress: no change  Outcome Evaluation: Plan of care is ongoing.

## 2025-06-30 LAB
ANION GAP SERPL CALCULATED.3IONS-SCNC: 11.1 MMOL/L (ref 5–15)
BACTERIA SPEC AEROBE CULT: ABNORMAL
BUN SERPL-MCNC: 21.1 MG/DL (ref 8–23)
BUN/CREAT SERPL: 32 (ref 7–25)
CALCIUM SPEC-SCNC: 8.9 MG/DL (ref 8.6–10.5)
CHLORIDE SERPL-SCNC: 101 MMOL/L (ref 98–107)
CO2 SERPL-SCNC: 24.9 MMOL/L (ref 22–29)
CREAT SERPL-MCNC: 0.66 MG/DL (ref 0.57–1)
DEPRECATED RDW RBC AUTO: 46.5 FL (ref 37–54)
EGFRCR SERPLBLD CKD-EPI 2021: 85.6 ML/MIN/1.73
ERYTHROCYTE [DISTWIDTH] IN BLOOD BY AUTOMATED COUNT: 14.7 % (ref 12.3–15.4)
GLUCOSE SERPL-MCNC: 108 MG/DL (ref 65–99)
HCT VFR BLD AUTO: 33.2 % (ref 34–46.6)
HGB BLD-MCNC: 10 G/DL (ref 12–15.9)
MCH RBC QN AUTO: 26.1 PG (ref 26.6–33)
MCHC RBC AUTO-ENTMCNC: 30.1 G/DL (ref 31.5–35.7)
MCV RBC AUTO: 86.7 FL (ref 79–97)
PLATELET # BLD AUTO: 158 10*3/MM3 (ref 140–450)
PMV BLD AUTO: 10.9 FL (ref 6–12)
POTASSIUM SERPL-SCNC: 3.6 MMOL/L (ref 3.5–5.2)
RBC # BLD AUTO: 3.83 10*6/MM3 (ref 3.77–5.28)
SODIUM SERPL-SCNC: 137 MMOL/L (ref 136–145)
WBC NRBC COR # BLD AUTO: 3.63 10*3/MM3 (ref 3.4–10.8)

## 2025-06-30 PROCEDURE — 94799 UNLISTED PULMONARY SVC/PX: CPT

## 2025-06-30 PROCEDURE — 94761 N-INVAS EAR/PLS OXIMETRY MLT: CPT

## 2025-06-30 PROCEDURE — 25010000002 FUROSEMIDE PER 20 MG: Performed by: NURSE PRACTITIONER

## 2025-06-30 PROCEDURE — 97167 OT EVAL HIGH COMPLEX 60 MIN: CPT

## 2025-06-30 PROCEDURE — 97162 PT EVAL MOD COMPLEX 30 MIN: CPT

## 2025-06-30 PROCEDURE — 80048 BASIC METABOLIC PNL TOTAL CA: CPT | Performed by: FAMILY MEDICINE

## 2025-06-30 PROCEDURE — 94664 DEMO&/EVAL PT USE INHALER: CPT

## 2025-06-30 PROCEDURE — 85027 COMPLETE CBC AUTOMATED: CPT | Performed by: FAMILY MEDICINE

## 2025-06-30 RX ORDER — AMOXICILLIN 500 MG/1
500 CAPSULE ORAL EVERY 8 HOURS SCHEDULED
Qty: 15 CAPSULE | Refills: 0 | Status: SHIPPED | OUTPATIENT
Start: 2025-06-30 | End: 2025-07-05

## 2025-06-30 RX ORDER — PREGABALIN 200 MG/1
200 CAPSULE ORAL EVERY 8 HOURS SCHEDULED
Qty: 21 CAPSULE | Refills: 0 | Status: SHIPPED | OUTPATIENT
Start: 2025-06-30 | End: 2025-07-07

## 2025-06-30 RX ORDER — ONDANSETRON 4 MG/1
4 TABLET, ORALLY DISINTEGRATING ORAL EVERY 6 HOURS PRN
Start: 2025-06-30

## 2025-06-30 RX ORDER — OXYCODONE HYDROCHLORIDE 10 MG/1
5 TABLET ORAL EVERY 4 HOURS PRN
Qty: 12 EACH | Refills: 0 | Status: SHIPPED | OUTPATIENT
Start: 2025-06-30 | End: 2025-07-01 | Stop reason: HOSPADM

## 2025-06-30 RX ORDER — POTASSIUM CHLORIDE 750 MG/1
10 TABLET, EXTENDED RELEASE ORAL 2 TIMES DAILY
Qty: 60 TABLET | Refills: 1 | Status: SHIPPED | OUTPATIENT
Start: 2025-06-30

## 2025-06-30 RX ORDER — HYDROCODONE BITARTRATE AND ACETAMINOPHEN 10; 325 MG/1; MG/1
1 TABLET ORAL EVERY 6 HOURS PRN
Qty: 12 TABLET | Refills: 0 | Status: SHIPPED | OUTPATIENT
Start: 2025-06-30 | End: 2025-07-01

## 2025-06-30 RX ORDER — AMOXICILLIN 250 MG/1
500 CAPSULE ORAL EVERY 8 HOURS SCHEDULED
Status: DISCONTINUED | OUTPATIENT
Start: 2025-06-30 | End: 2025-07-01

## 2025-06-30 RX ORDER — FUROSEMIDE 20 MG/1
20 TABLET ORAL DAILY
Qty: 30 TABLET | Refills: 1 | Status: SHIPPED | OUTPATIENT
Start: 2025-06-30

## 2025-06-30 RX ADMIN — IPRATROPIUM BROMIDE AND ALBUTEROL SULFATE 3 ML: .5; 3 SOLUTION RESPIRATORY (INHALATION) at 09:02

## 2025-06-30 RX ADMIN — IPRATROPIUM BROMIDE AND ALBUTEROL SULFATE 3 ML: .5; 3 SOLUTION RESPIRATORY (INHALATION) at 15:44

## 2025-06-30 RX ADMIN — Medication 10 ML: at 09:44

## 2025-06-30 RX ADMIN — OXYCODONE 5 MG: 5 TABLET ORAL at 07:39

## 2025-06-30 RX ADMIN — SERTRALINE HYDROCHLORIDE 100 MG: 100 TABLET, FILM COATED ORAL at 16:51

## 2025-06-30 RX ADMIN — FUROSEMIDE 20 MG: 10 INJECTION, SOLUTION INTRAMUSCULAR; INTRAVENOUS at 09:43

## 2025-06-30 RX ADMIN — IPRATROPIUM BROMIDE AND ALBUTEROL SULFATE 3 ML: .5; 3 SOLUTION RESPIRATORY (INHALATION) at 11:56

## 2025-06-30 RX ADMIN — MIDODRINE HYDROCHLORIDE 10 MG: 5 TABLET ORAL at 21:26

## 2025-06-30 RX ADMIN — MIDODRINE HYDROCHLORIDE 10 MG: 5 TABLET ORAL at 05:19

## 2025-06-30 RX ADMIN — LEVOTHYROXINE SODIUM 75 MCG: 0.07 TABLET ORAL at 09:43

## 2025-06-30 RX ADMIN — ASPIRIN 81 MG CHEWABLE TABLET 81 MG: 81 TABLET CHEWABLE at 09:43

## 2025-06-30 RX ADMIN — AMOXICILLIN 500 MG: 250 CAPSULE ORAL at 14:36

## 2025-06-30 RX ADMIN — METOPROLOL SUCCINATE 12.5 MG: 25 TABLET, EXTENDED RELEASE ORAL at 09:43

## 2025-06-30 RX ADMIN — OXYCODONE 5 MG: 5 TABLET ORAL at 14:37

## 2025-06-30 RX ADMIN — APIXABAN 5 MG: 5 TABLET, FILM COATED ORAL at 09:43

## 2025-06-30 RX ADMIN — APIXABAN 5 MG: 5 TABLET, FILM COATED ORAL at 21:26

## 2025-06-30 RX ADMIN — AMOXICILLIN 500 MG: 250 CAPSULE ORAL at 21:26

## 2025-06-30 RX ADMIN — PREGABALIN 200 MG: 100 CAPSULE ORAL at 09:46

## 2025-06-30 RX ADMIN — Medication 10 ML: at 21:26

## 2025-06-30 RX ADMIN — PREGABALIN 200 MG: 100 CAPSULE ORAL at 21:26

## 2025-06-30 RX ADMIN — SENNOSIDES 2 TABLET: 8.6 TABLET, FILM COATED ORAL at 21:26

## 2025-06-30 RX ADMIN — PREGABALIN 200 MG: 100 CAPSULE ORAL at 14:37

## 2025-06-30 NOTE — CASE MANAGEMENT/SOCIAL WORK
"Physicians Statement of Medical Necessity for  Ambulance Transportation    GENERAL INFORMATION     Name: Domonique See  YOB: 1940  Medicare #:     [id number]     Transport Date: 6/30/25 (Valid for round trips this date, or for scheduled repetitive trips for 60 days from the date signed below.)  Origin: [facility]  [facility]; [address] [place] [address]  Is the Patient's stay covered under Medicare Part A (PPS/DRG?)Yes  Closest appropriate facility? Yes  If this a hosp-hosp transfer? No  Is this a hospice patient? No    MEDICAL NECESSITY QUESTIONAIRE    Ambulance Transportation is medically necessary only if other means of transportation are contraindicated or would be potentially harmful to the patient.  To meet this requirement, the patient must be either \"bed confined\" or suffer from a condition such that transport by means other than an ambulance is contraindicated by the patient's condition.  The following questions must be answered by the healthcare professional signing below for this form to be valid:     1) Describe the MEDICAL CONDITION (physical and/or mental) of this patient AT THE TIME OF AMBULANCE TRANSPORT that requires the patient to be transported in an ambulance, and why transport by other means is contraindicated by the patient's condition: Cervical neuralgia s/p fusion, seizure disorder; CAD; BEDBOUND; unable to walk; Max x 2 to transfer  Past Medical History:   Diagnosis Date    Anemia     Aortic insufficiency 03/21/2025    Asthma     CHF (congestive heart failure)     Cholecystitis 09/02/2011    Congenital heart disease     Coronary artery disease 2013    Deep vein thrombosis (DVT) 11/20/2017    GERD (gastroesophageal reflux disease)     Heart murmur Don't know    Hx of hypertrophic cardiomyopathy     Hypertension     Hypertrophic obstructive cardiomyopathy 11/10/2017    Low back pain     Lumbosacral disc disease     Mitral regurgitation " "03/21/2025    Mixed hyperlipidemia 10/06/2017    Morbid obesity 09/21/2020    Peptic ulceration     Persistent atrial fibrillation 01/10/2023    Primary osteoarthritis of both knees 02/08/2021    Primary osteoarthritis of right knee 09/21/2020    Seizures     Sleep apnea 2017    TIA (transient ischemic attack)       Past Surgical History:   Procedure Laterality Date    ABDOMINAL HERNIA REPAIR      ABLATION OF DYSRHYTHMIC FOCUS  01-    BACK SURGERY      BREAST AUGMENTATION      CARDIAC CATHETERIZATION      CARDIAC ELECTROPHYSIOLOGY PROCEDURE N/A 01/16/2023    Procedure: Ablation atrial fibrillation and flutter, cryo Duong and Ramón aware;  Surgeon: Leeanne Pleitez MD;  Location: [facility] CATH INVASIVE LOCATION;  Service: Cardiovascular;  Laterality: N/A;    CARDIAC PACEMAKER PLACEMENT  03/16/2017    Dual Chamber    CERVICAL LAMINECTOMY DECOMPRESSION POSTERIOR N/A 5/29/2025    Procedure: CERVICAL LAMINECTOMY DECOMPRESSION POSTERIOR C7 - T1, C5-T2 FUSION;  Surgeon: Lake Bernal IV, MD;  Location: [facility] MAIN OR;  Service: Neurosurgery;  Laterality: N/A;    CERVICAL RIB RESECTION Bilateral     CERVICAL RIB RESECTION Bilateral 1963    bilateral cervical ribs removed - extra ribs located and causing pain    CHOLECYSTECTOMY      COLONOSCOPY      CORONARY ARTERY BYPASS GRAFT  2013    EPIDURAL BLOCK      NECK SURGERY      NECK SURGERY  05/29/2025    SPINAL FUSION      THORACIC DECOMPRESSION POSTERIOR FUSION  06/04/2014    L3-5 & S1    TRIGGER POINT INJECTION      UPPER GASTROINTESTINAL ENDOSCOPY        2) Is this patient \"bed confined\" as defined below?Yes   To be \"bed confined\" the patient must satisfy all three of the following criteria:  (1) unable to get up from bed without assistance; AND (2) unable to ambulate;  AND (3) unable to sit in a chair or wheelchair.  3) Can this patient safely be transported by car or wheelchair van (I.e., may safely sit during transport, without an attendant or monitoring?)No   4. In " addition to completing questions 1-3 above, please check any of the following conditions that apply*:          *Note: supporting documentation for any boxes checked must be maintained in the patient's medical records Moderate/severe pain on movement and Unable to tolerate seated position for time needed to transport      SIGNATURE OF PHYSICIAN OR OTHER AUTHORIZED HEALTHCARE PROFESSIONAL    I certify that the above information is true and correct based on my evaluation of this patient, and represent that the patient requires transport by ambulance and that other forms of transport are contraindicated.  I understand that this information will be used by the Centers for Medicare and Medicaid Services (CMS) to support the determiniation of medical necessity for ambulance services, and I represent that I have personal knowledge of the patient's condition at the time of transport.    .   If this box is checked, I also certify that the patient is physically or mentally incapable of signing the ambulance service's claim form and that the institution with which I am affiliated has furnished care, services or assistance to the patient.  My signature below is made on behalf of the patient pursuant to 42 .36(b)(4). In accordance with 42 .37, the specific reason(s) that the patient is physically or mentally incapable of signing the claim for is as follows: .    Signature of Physician or Healthcare Professional   Angela Chaudhry RN Case Manager Date/Time:   6/30/25     (For Scheduled repetitive transport, this form is not valid for transports performed more than 60 days after this date).                                                                                                                                            --------------------------------------------------------------------------------------------  Printed Name and Credentials of Physician or Authorized Healthcare Professional     *Form must be  signed by patient's attending physician for scheduled, repetitive transports,.  For non-repetitive ambulance transports, if unable to obtain the signature of the attending physician, any of the following may sign (please select below):     Physician  Clinical Nurse Specialist x Registered Nurse     Physician Assistant x Discharge Planner  Licensed Practical Nurse     Nurse Practitioner x

## 2025-06-30 NOTE — NURSING NOTE
Patient noted to have single stitch in upper back, spoke to Dr. Gilliland, ok to remove. Single stitch removed with no complaints/complications.

## 2025-06-30 NOTE — THERAPY EVALUATION
Patient Name: Domonique See  : 1940    MRN: [id number]                              Today's Date: 2025       Admit Date: 2025    Visit Dx:     ICD-10-CM ICD-9-CM   1. Pain of left scapula  M89.8X1 733.90   2. Cervical radiculopathy  M54.12 723.4   3. Acute pain of left shoulder  M25.512 719.41     Patient Active Problem List   Diagnosis    Iron deficiency anemia    Anemia, unspecified    Pacemaker    Paroxysmal atrial fibrillation    Cervical disc disorder with radiculopathy    Cervical radiculopathy    Cervical stenosis of spinal canal    Deep vein thrombosis (DVT)    Dyspnea    Family history of diabetes mellitus    Fibromyositis    Essential hypertension    Lumbar radiculopathy    Primary localized osteoarthritis    Sacroiliitis, not elsewhere classified    Spondylolisthesis, acquired    Primary osteoarthritis of both knees    Obesity (BMI 30.0-34.9)    Hx of hypertrophic cardiomyopathy    Coronary artery disease    Sleep apnea    Acute midline low back pain without sciatica    Hypertensive retinopathy    Neck pain    Cervical neuralgia    Seizure disorder    Encounter for pre-operative cardiovascular clearance    Aortic insufficiency    Mitral regurgitation    Acute urinary retention    Left shoulder pain    Acute urinary retention    Altered mental status     Past Medical History:   Diagnosis Date    Anemia     Aortic insufficiency 2025    Asthma     CHF (congestive heart failure)     Cholecystitis 2011    Congenital heart disease     Coronary artery disease 2013    Deep vein thrombosis (DVT) 2017    GERD (gastroesophageal reflux disease)     Heart murmur Don't know    Hx of hypertrophic cardiomyopathy     Hypertension     Hypertrophic obstructive cardiomyopathy 11/10/2017    Low back pain     Lumbosacral disc disease     Mitral regurgitation 2025    Mixed hyperlipidemia 10/06/2017    Morbid obesity 2020    Peptic ulceration     Persistent atrial  fibrillation 01/10/2023    Primary osteoarthritis of both knees 02/08/2021    Primary osteoarthritis of right knee 09/21/2020    Seizures     Sleep apnea 2017    TIA (transient ischemic attack)      Past Surgical History:   Procedure Laterality Date    ABDOMINAL HERNIA REPAIR      ABLATION OF DYSRHYTHMIC FOCUS  01-    BACK SURGERY      BREAST AUGMENTATION      CARDIAC CATHETERIZATION      CARDIAC ELECTROPHYSIOLOGY PROCEDURE N/A 01/16/2023    Procedure: Ablation atrial fibrillation and flutter, cryo Utong and Ramón aware;  Surgeon: Leeanne Pleitez MD;  Location: [facility] CATH INVASIVE LOCATION;  Service: Cardiovascular;  Laterality: N/A;    CARDIAC PACEMAKER PLACEMENT  03/16/2017    Dual Chamber    CERVICAL LAMINECTOMY DECOMPRESSION POSTERIOR N/A 5/29/2025    Procedure: CERVICAL LAMINECTOMY DECOMPRESSION POSTERIOR C7 - T1, C5-T2 FUSION;  Surgeon: Lake Bernal IV, MD;  Location: [facility] MAIN OR;  Service: Neurosurgery;  Laterality: N/A;    CERVICAL RIB RESECTION Bilateral     CERVICAL RIB RESECTION Bilateral 1963    bilateral cervical ribs removed - extra ribs located and causing pain    CHOLECYSTECTOMY      COLONOSCOPY      CORONARY ARTERY BYPASS GRAFT  2013    EPIDURAL BLOCK      NECK SURGERY      NECK SURGERY  05/29/2025    SPINAL FUSION      THORACIC DECOMPRESSION POSTERIOR FUSION  06/04/2014    L3-5 & S1    TRIGGER POINT INJECTION      UPPER GASTROINTESTINAL ENDOSCOPY        General Information       Row Name 06/30/25 1303          Physical Therapy Time and Intention    Document Type evaluation  -EL     Mode of Treatment individual therapy;physical therapy  -EL       Row Name 06/30/25 1303          General Information    Prior Level of Function independent:;all household mobility;ADL's  -EL       Row Name 06/30/25 1303          Living Environment    Current Living Arrangements home  -EL     People in Home other (see comments)  Initially from home alone, but has been at rehab since decline/sx  -EL       Row  Name 06/30/25 1303          Cognition    Orientation Status (Cognition) oriented x 4  -EL       Row Name 06/30/25 1303          Safety Issues/Impairments Affecting Functional Mobility    Impairments Affecting Function (Mobility) balance;endurance/activity tolerance;coordination;pain;postural/trunk control;shortness of breath  -EL               User Key  (r) = Recorded By, (t) = Taken By, (c) = Cosigned By      Initials Name Provider Type    Americo Dunn, PT Physical Therapist                   Mobility       Row Name 06/30/25 1306          Bed Mobility    Bed Mobility bed mobility (all) activities  -EL     All Activities, Gray (Bed Mobility) moderate assist (50% patient effort)  -EL       Row Name 06/30/25 1306          Bed-Chair Transfer    Bed-Chair [place] (Transfers) maximum assist (25% patient effort);2 person assist  -EL       Row Name 06/30/25 1306          Sit-Stand Transfer    Sit-Stand Gray (Transfers) maximum assist (25% patient effort);2 person assist  -EL       Row Name 06/30/25 1306          Gait/Stairs (Locomotion)    Patient was able to Ambulate no, other medical factors prevent ambulation  -EL     Reason Patient was unable to Ambulate Uncontrolled Pain;Excessive Weakness  -EL               User Key  (r) = Recorded By, (t) = Taken By, (c) = Cosigned By      Initials Name Provider Type    Americo Dunn, CASE Physical Therapist                   Obj/Interventions       Row Name 06/30/25 1319          Range of Motion Comprehensive    General Range of Motion lower extremity range of motion deficits identified  -EL     Comment, General Range of Motion R knee limited approx 25% due to pain. LLE WFL  -EL       Row Name 06/30/25 1319          Strength Comprehensive (MMT)    General Manual Muscle Testing (MMT) Assessment lower extremity strength deficits identified  -EL     Comment, General Manual Muscle Testing (MMT) Assessment BLE 3+/5 gross  -EL       Row Name 06/30/25 1312           Sensory Assessment (Somatosensory)    Sensory Assessment (Somatosensory) sensation intact  -EL               User Key  (r) = Recorded By, (t) = Taken By, (c) = Cosigned By      Initials Name Provider Type    Americo Dunn, PT Physical Therapist                   Goals/Plan       Row Name 06/30/25 1323          Bed Mobility Goal 1 (PT)    Activity/Assistive Device (Bed Mobility Goal 1, PT) bed mobility activities, all  -EL     [place] Level/Cues Needed (Bed Mobility Goal 1, PT) minimum assist (75% or more patient effort)  -EL     Time Frame (Bed Mobility Goal 1, PT) long term goal (LTG);2 weeks  -EL       Row Name 06/30/25 1323          Transfer Goal 1 (PT)    Activity/Assistive Device (Transfer Goal 1, PT) transfers, all  -EL     [place] Level/Cues Needed (Transfer Goal 1, PT) moderate assist (50-74% patient effort)  -EL     Time Frame (Transfer Goal 1, PT) long term goal (LTG);2 weeks  -EL       Row Name 06/30/25 1323          Gait Training Goal 1 (PT)    Activity/Assistive Device (Gait Training Goal 1, PT) gait (walking locomotion);walker, rolling  -EL     [place] Level (Gait Training Goal 1, PT) moderate assist (50-74% patient effort)  -EL     Distance (Gait Training Goal 1, PT) 15  -EL     Time Frame (Gait Training Goal 1, PT) long term goal (LTG);2 weeks  -EL       Row Name 06/30/25 1323          Therapy Assessment/Plan (PT)    Planned Therapy Interventions (PT) balance training;neuromuscular re-education;bed mobility training;transfer training;gait training;patient/family education;strengthening  -EL               User Key  (r) = Recorded By, (t) = Taken By, (c) = Cosigned By      Initials Name Provider Type    Americo Dunn, PT Physical Therapist                   Clinical Impression       Row Name 06/30/25 1319          Pain    Pretreatment Pain Rating 10/10  -EL     Posttreatment Pain Rating 10/10  -EL     Pain Location back;extremity  -EL     Pain Side/Orientation right;lower  -EL       Row  Name 06/30/25 1319          Plan of Care Review    Plan of Care Reviewed With patient  -EL     Outcome Evaluation Pt is a 85 y.o. year old female admitted 6/27/25 with c/o worsening L shoulder pain. Code CVA called 6/29 for AMS. Imaging (-). Multiple recent admissions for c-spine pain, scapular pain, and hypotension. Pt s/p cervical laminectomy decompression posterior C7-T1 and C5-T2 fusion preformed by Dr. Bernal. Order for Hard cervical collar on at all times. PMHx significant for cervical spinal stenosis, Afib, chronic CAD and hypertrophic cardiomyopathy. Originally pt is from home with family, with a ramp entrance, however has had significant decline recently and was admitted from SNF. Pt very willing to participate in therapy this date, but requires MAX A x2 to come to standing and to transfer to bedside chair. Pt with tenderness to palpation over L scapula but able to utilize UE to pull to standing. Educated nursing staff on using kartik steady to transfer pt back to bed. Recommendation is return to SNF at d/c.  -EL       Row Name 06/30/25 1319          Therapy Assessment/Plan (PT)    Rehab Potential (PT) good  -EL     Criteria for Skilled Interventions Met (PT) yes  -EL     Therapy Frequency (PT) 5 times/wk  -EL     Predicted Duration of Therapy Intervention (PT) until d/c  -EL       Row Name 06/30/25 1319          Vital Signs    O2 Delivery Pre Treatment room air  -EL     O2 Delivery Intra Treatment room air  -EL     O2 Delivery Post Treatment room air  -EL     Pre Patient Position Supine  -EL     Intra Patient Position Standing  -EL     Post Patient Position Sitting  -EL       Row Name 06/30/25 1319          Positioning and Restraints    Pre-Treatment Position in bed  -EL     Post Treatment Position chair  -EL     In Chair notified nsg;reclined;call light within reach;encouraged to call for assist;exit alarm on  -EL               User Key  (r) = Recorded By, (t) = Taken By, (c) = Cosigned By      Initials Name  Provider Type    Americo Dunn, PT Physical Therapist                   Outcome Measures       Row Name 06/30/25 1324 06/30/25 0800       How much help from another person do you currently need...    Turning from your back to your side while in flat bed without using bedrails? 2  -EL 2  -JH    Moving from lying on back to sitting on the side of a flat bed without bedrails? 2  -EL 2  -JH    Moving to and from a bed to a chair (including a wheelchair)? 2  -EL 2  -JH    Standing up from a chair using your arms (e.g., wheelchair, bedside chair)? 2  -EL 2  -JH    Climbing 3-5 steps with a railing? 1  -EL 2  -JH    To walk in hospital room? 1  -EL 2  -JH    AM-PAC 6 Clicks Score (PT) 10  -EL 12  -JH    Highest Level of Mobility Goal Move to Chair/Commode-4  -EL Move to Chair/Commode-4  -JH      Row Name 06/30/25 1324 06/30/25 1311       Functional Assessment    Outcome Measure Options AM-PAC 6 Clicks Basic Mobility (PT)  -EL AM-PAC 6 Clicks Daily Activity (OT)  -ES              User Key  (r) = Recorded By, (t) = Taken By, (c) = Cosigned By      Initials Name Provider Type    ES Kaylan Pompa OT Occupational Therapist    Americo Dunn, PT Physical Therapist    Aspen Lind, RN Registered Nurse                                 Physical Therapy Education       Title: PT OT SLP Therapies (Done)       Topic: Physical Therapy (Done)       Point: Mobility training (Done)       Learning Progress Summary            Patient Acceptance, E,TB, VU by  at 6/30/2025 1324                      Point: Precautions (Done)       Learning Progress Summary            Patient Acceptance, E,TB, VU by  at 6/30/2025 1324                                      User Key       Initials Effective Dates Name Provider Type [facility] 06/23/20 -  Americo Rodriguez, PT Physical Therapist PT                  PT Recommendation and Plan  Planned Therapy Interventions (PT): balance training, neuromuscular re-education, bed mobility training, transfer  training, gait training, patient/family education, strengthening  Outcome Evaluation: Pt is a 85 y.o. year old female admitted 6/27/25 with c/o worsening L shoulder pain. Code CVA called 6/29 for AMS. Imaging (-). Multiple recent admissions for c-spine pain, scapular pain, and hypotension. Pt s/p cervical laminectomy decompression posterior C7-T1 and C5-T2 fusion preformed by Dr. Bernal. Order for Hard cervical collar on at all times. PMHx significant for cervical spinal stenosis, Afib, chronic CAD and hypertrophic cardiomyopathy. Originally pt is from home with family, with a ramp entrance, however has had significant decline recently and was admitted from SNF. Pt very willing to participate in therapy this date, but requires MAX A x2 to come to standing and to transfer to bedside chair. Pt with tenderness to palpation over L scapula but able to utilize UE to pull to standing. Educated nursing staff on using kartik steady to transfer pt back to bed. Recommendation is return to SNF at d/c.     Time Calculation:   PT Evaluation Complexity  History, PT Evaluation Complexity: 1-2 personal factors and/or comorbidities  Examination of Body Systems (PT Eval Complexity): total of 3 or more elements  Clinical Presentation (PT Evaluation Complexity): evolving  Clinical Decision Making (PT Evaluation Complexity): moderate complexity  Overall Complexity (PT Evaluation Complexity): moderate complexity     PT Charges       Row Name 06/30/25 1325             Time Calculation    Start Time 1000  -EL      Stop Time 1028  -EL      Time Calculation (min) 28 min  -EL      PT Received On 06/30/25  -EL      PT - Next Appointment 07/01/25  -EL      PT Goal Re-Cert Due Date 07/14/25  -EL                User Key  (r) = Recorded By, (t) = Taken By, (c) = Cosigned By      Initials Name Provider Type    Americo Dunn, PT Physical Therapist                  Therapy Charges for Today       Code Description Service Date Service Provider Modifiers  Qty    [id number] HC PT EVAL MOD COMPLEXITY 4 6/30/2025 Americo Rodriguez, PT GP 1            PT G-Codes  Outcome Measure Options: AM-PAC 6 Clicks Basic Mobility (PT)  AM-PAC 6 Clicks Score (PT): 10  AM-PAC 6 Clicks Score (OT): 12  PT Discharge Summary  Anticipated Discharge Disposition (PT): skilled nursing facility    Americo Rodriguez PT  6/30/2025

## 2025-06-30 NOTE — DISCHARGE PLACEMENT REQUEST
"Geoffrey See (85 y.o. Female)       Date of Birth   1940    Social Security Number       Address   [address] [place] [address]    Home Phone   [phone]    MRN   [id number]       Cheondoism   Faith    Marital Status                               Admission Date   6/27/2025    Admission Type   Emergency    Admitting Provider   Jose Martin Aleman MD    Attending Provider   Tatiana Gilliland MD    Department, Room/Bed   [facility] OBSERVATION, 235/1       Discharge Date       Discharge Disposition   Skilled Nursing Facility (DC - External)    Discharge Destination                                 Attending Provider: Tatiana Gilliland MD    Allergies: No Known Allergies    Isolation: None   Infection: None   Code Status: CPR    Ht: 152.4 cm (60\")   Wt: 70.1 kg (154 lb 8.7 oz)    Admission Cmt: None   Principal Problem: Left shoulder pain [M25.512]                   Active Insurance as of 6/27/2025       Primary Coverage       Payor Plan Insurance Group Employer/Plan Group    ANTHEM MEDICARE REPLACEMENT ANTHEM MEDICARE ADVANTAGE PPO [id number]       Payor Plan Address Payor Plan Phone Number Payor Plan Fax Number Effective Dates    [address] [phone]  4/1/2024 - None Entered    [facility] [id number]         Subscriber Name Subscriber Birth Date Member ID       GEOFFREY SEE 1940 [id number]                     Emergency Contacts        (Rel.) Home Phone Work Phone Mobile Phone    Boone See (Son) -- -- [phone]    NILTON SEE (Relative) [phone]    RODDYGENEAMADEO STEWART (Son) [phone]                 History & Physical        Zoey Acosta APRN at 06/28/25 1259       Attestation signed by Jose Martin Aleman MD at 06/28/25 1317      I have reviewed this documentation and agree.                          Patient Care Team:  Anish Lew APRN as PCP - General  Evgeny Gregory " MD Roc as Consulting Physician (Cardiology)  Leeanne Pleitez MD as Consulting Physician (Cardiology)  Radha Owusu APRN as Nurse Practitioner (Nurse Practitioner)  Kathe Muñoz MD as Consulting Physician (Nephrology)    Chief complaint Left shoulder pain    Subjective    Patient is a 85 y.o. female who presents with presented with cervical spinal stenosis, permanent atrial fib, chronic coronary artery disease and hypertrophic cardiomyopathy who presents with worsening bilateral lower extremity weakness. She was discharged from [facility] on 6/5/25 to inpatient rehab after cervical spinal fusion. She is to continue with cervical collar. She presented after having worsening left shoulder pain. Prior to surgery to she immobile but at at rehab she was able to take 5 steps. She has had urinary retention due to immobility and states they removed bolden for 3 voiding trials and she failed them all. CT of shoulder showed no fracture or acute finding. Neurosurgery saw patient and believe she has left sided ACL tenderness.           Review of Systems   Constitutional:  Positive for activity change.   HENT: Negative.     Respiratory: Negative.     Cardiovascular: Negative.    Gastrointestinal:  Positive for constipation.   Genitourinary:  Positive for difficulty urinating.   Musculoskeletal:  Positive for gait problem.   Neurological:  Positive for weakness.          History  Past Medical History:   Diagnosis Date    Anemia     Aortic insufficiency 03/21/2025    Asthma     CHF (congestive heart failure)     Cholecystitis 09/02/2011    Congenital heart disease     Coronary artery disease 2013    Deep vein thrombosis (DVT) 11/20/2017    GERD (gastroesophageal reflux disease)     Heart murmur Don't know    Hx of hypertrophic cardiomyopathy     Hypertension     Hypertrophic obstructive cardiomyopathy 11/10/2017    Low back pain     Lumbosacral disc disease     Mitral regurgitation 03/21/2025    Mixed hyperlipidemia  10/06/2017    Morbid obesity 09/21/2020    Peptic ulceration     Persistent atrial fibrillation 01/10/2023    Primary osteoarthritis of both knees 02/08/2021    Primary osteoarthritis of right knee 09/21/2020    Seizures     Sleep apnea 2017    TIA (transient ischemic attack)      Past Surgical History:   Procedure Laterality Date    ABDOMINAL HERNIA REPAIR      ABLATION OF DYSRHYTHMIC FOCUS  01-    BACK SURGERY      BREAST AUGMENTATION      CARDIAC CATHETERIZATION      CARDIAC ELECTROPHYSIOLOGY PROCEDURE N/A 01/16/2023    Procedure: Ablation atrial fibrillation and flutter, cryo Duong and Ramón aware;  Surgeon: Leeanne Pleitez MD;  Location: [facility] CATH INVASIVE LOCATION;  Service: Cardiovascular;  Laterality: N/A;    CARDIAC PACEMAKER PLACEMENT  03/16/2017    Dual Chamber    CERVICAL LAMINECTOMY DECOMPRESSION POSTERIOR N/A 5/29/2025    Procedure: CERVICAL LAMINECTOMY DECOMPRESSION POSTERIOR C7 - T1, C5-T2 FUSION;  Surgeon: Lake Bernal IV, MD;  Location: [facility] MAIN OR;  Service: Neurosurgery;  Laterality: N/A;    CERVICAL RIB RESECTION Bilateral     CERVICAL RIB RESECTION Bilateral 1963    bilateral cervical ribs removed - extra ribs located and causing pain    CHOLECYSTECTOMY      COLONOSCOPY      CORONARY ARTERY BYPASS GRAFT  2013    EPIDURAL BLOCK      NECK SURGERY      NECK SURGERY  05/29/2025    SPINAL FUSION      THORACIC DECOMPRESSION POSTERIOR FUSION  06/04/2014    L3-5 & S1    TRIGGER POINT INJECTION      UPPER GASTROINTESTINAL ENDOSCOPY       Family History   Problem Relation Age of Onset    Colon cancer Mother     Heart disease Mother     Hypertension Mother     Arthritis Mother     Cancer Mother     Diabetes Mother     Kidney disease Mother     Colon cancer Brother     Heart attack Brother     Colon cancer Daughter     Arthritis Daughter     Arthritis Father     Hearing loss Father     Arthritis Sister     Hypertension Brother     Arthritis Brother     Cancer Brother     Stroke Brother      Arthritis Son     Arthritis Son     Cancer Sister     Diabetes Sister     Cancer Brother     Hypertension Sister     Kidney disease Sister      Social History     Tobacco Use    Smoking status: Former     Current packs/day: 0.00     Average packs/day: 1 pack/day for 8.0 years (8.0 ttl pk-yrs)     Types: Cigarettes     Start date: 1977     Quit date: 1985     Years since quittin.5     Passive exposure: Past    Smokeless tobacco: Never    Tobacco comments:     Quit    Vaping Use    Vaping status: Never Used   Substance Use Topics    Alcohol use: Never     Comment: 6 drinks a year in social settings    Drug use: Never     Medications Prior to Admission   Medication Sig Dispense Refill Last Dose/Taking    apixaban (Eliquis) 5 MG tablet tablet Take 1 tablet by mouth 2 (Two) Times a Day. 60 tablet 3 2025 at  6:59 PM    aspirin 81 MG chewable tablet Chew 1 tablet Daily.   2025 at  6:59 PM    atorvastatin (LIPITOR) 40 MG tablet Take 1 tablet by mouth Daily. 30 tablet 1 2025 at  6:59 PM    bisacodyl (DULCOLAX) 10 MG suppository Insert 1 suppository into the rectum Daily As Needed for Constipation (Use if bisacodyl oral is ineffective).   2025    bisacodyl (DULCOLAX) 5 MG EC tablet Take 1 tablet by mouth Daily As Needed for Constipation (Use if polyethylene glycol is ineffective).   2025    docusate sodium 100 MG capsule Take 1 capsule by mouth 2 (Two) Times a Day. 60 capsule 1 2025 at  6:59 PM    HYDROcodone-acetaminophen (NORCO)  MG per tablet Take 1 tablet by mouth Every 6 (Six) Hours As Needed for Moderate Pain.   2025 at  6:59 PM    metoprolol succinate XL (TOPROL-XL) 25 MG 24 hr tablet Take 0.5 tablets by mouth Daily. 15 tablet 1 2025 at  8:43 AM    midodrine (PROAMATINE) 10 MG tablet Take 1 tablet by mouth Every 8 (Eight) Hours. 90 tablet 1 2025    NON FORMULARY Ketamine-ibuprofen-gabapentin- lidocaine-baclofen cream:  apply a thin layer topically four  times daily.   6/27/2025 at  6:59 PM    oxyCODONE (ROXICODONE) 10 MG tablet Take 1 tablet by mouth Every 4 (Four) Hours As Needed for Severe Pain. 20 each 0 6/27/2025 at 12:48 PM    polyethylene glycol (MIRALAX) 17 g packet Take 17 g by mouth Daily. 30 packet 3 6/27/2025 at  8:43 AM    polyethylene glycol (MiraLax) 17 GM/SCOOP powder Take 17 g by mouth Daily As Needed.   Taking As Needed    pregabalin (LYRICA) 200 MG capsule Take 1 capsule by mouth Every 8 (Eight) Hours. 30 capsule 1 6/27/2025 at  4:35 PM    senna 8.6 MG tablet Take 2 tablets by mouth every night at bedtime.   6/27/2025 at  6:59 PM    sertraline (ZOLOFT) 100 MG tablet Take 1 tablet by mouth Every Evening. Indications: Major Depressive Disorder 30 tablet 1 6/27/2025 at  6:59 PM    Synthroid 75 MCG tablet Take 1 tablet by mouth Every Morning. 30 tablet 1 6/27/2025 at  4:12 AM     Allergies:  Patient has no known allergies.    Objective    Vital Signs  Temp:  [97.3 °F (36.3 °C)-98 °F (36.7 °C)] 98 °F (36.7 °C)  Heart Rate:  [60-72] 60  Resp:  [11-18] 11  BP: ()/(41-98) 114/52       Physical Exam     Results Review:     Imaging Results (Last 24 Hours)       Procedure Component Value Units Date/Time    CT Upper Extremity Left Without Contrast [[id number]] Collected: 06/28/25 0256     Updated: 06/28/25 0300    Narrative:      CT UPPER EXTREMITY LEFT WO CONTRAST    Date of Exam: 6/28/2025 2:02 AM EDT    Indication: L shoulder pain, possible fracture of scapula.    Comparison: None available.    Technique: Axial CT images were obtained of the left upper extremity without contrast administration.  Sagittal and coronal reconstructions were performed.  Automated exposure control and iterative reconstruction methods were used.      Findings:  The inferior glenoid is normal. There is no acute fracture at the scapula. Proximal humerus appears intact. The left clavicle appears intact. There is minimal osteophyte formation at the glenohumeral and  acromioclavicular joints. Left sternoclavicular   joint is intact. No left-sided rib fracture. Left lung is clear. There is a left-sided ICD with leads in the right heart. Calcified left breast implant. Rotator cuff musculature appears well-maintained. No soft tissue contusion or hematoma.      Impression:      Impression:  1.No acute osseous abnormality.  2.Minimal degenerative changes.  3.Left-sided ICD.            Electronically Signed: Brijesh Ruiz MD    6/28/2025 2:58 AM EDT    Workstation ID: [id number]    XR Shoulder 2+ View Left [[id number]] Collected: 06/28/25 0046     Updated: 06/28/25 0054    Narrative:      XR SHOULDER 2+ VW LEFT    Date of Exam: 6/27/2025 11:31 PM EDT    Indication: L shoulder pain    Comparison: None available.    Findings:  There are linear cortical abnormalities at the lateral margin of the scapular body that are worrisome for nondisplaced fracture, but this could be from an old injury, as well. There is mild acromioclavicular and glenohumeral osteophyte formation. The   humerus appears intact. The left clavicle appears intact. There is a left-sided ICD in place. Adjacent lung and ribs appear intact.      Impression:      Impression:  1.Linear cortical abnormalities at the lateral margin of the scapular body that are worrisome for nondisplaced fracture, but this could be from an old injury. Recommend CT evaluation.  2.Mild acromioclavicular and glenohumeral joint degeneration.          Electronically Signed: Brijesh Ruiz MD    6/28/2025 12:52 AM EDT    Workstation ID: [id number]    XR Scapula Left [[id number]] Collected: 06/27/25 2251     Updated: 06/27/25 2256    Narrative:      XR SCAPULA LEFT    Date of Exam: 6/27/2025 10:48 PM EDT    Indication: L shoulder blade pain    Comparison: None available.    Findings:  A portion of the scapula is obscured by the patient's pacemaker. There is abnormal ossific density noted along the lateral aspect of the inferior glenoid measuring about 1.3  cm. The remainder of the scapula appears unremarkable. Glenohumeral alignment is   anatomic. The acromioclavicular joint appears normal.      Impression:      Impression:  Small ossific density along the inferior aspect of the inferior glenoid could be posttraumatic but is age indeterminate. Dedicated radiographs of the left shoulder are recommended to further evaluate.      Electronically Signed: Gen Malcolm MD    6/27/2025 10:54 PM EDT    Workstation ID: [id number]             Lab Results (last 24 hours)       ** No results found for the last 24 hours. **             I reviewed the patient's new clinical results.    Assessment & Plan    Left shoulder pain  Urinary retention due to immobility  Edema  Cervical neuralgia s/p fusion  Pacemaker  Paroxysmal atrial fibrillation  Cervical radiculopathy  Essential hypertension  Spondylolisthesis, acquired  Obesity (BMI 30.0-34.9)  Hx of hypertrophic cardiomyopathy  Coronary artery disease  Neck pain  Seizure disorder  Encounter for pre-operative cardiovascular clearance  Aortic insufficiency  Mitral regurgitation     Plan of care  -pain control  -change catheter and obtain urine  -adding flomax  -will give gentle diuresis  -s/p cervical laminectomy decompression  -c-collar all time  -bowel regimen    -home medications for chronic medical conditions  - resumption of eliquis     DVT prophylaxis:eliquis       CODE STATUS:  Code status (Patient has no pulse and is not breathing):full  Medical Interventions (Patient has pulse or is breathing):full support  Level of Support Discussed with :patient    Admission Status:observation    Expected length of Stay: 2 nights or more      I discussed the patient's findings and my recommendations with patient.     WOODY Kaufman  06/28/25  12:59 EDT        Electronically signed by Jose Martin Aleman MD at 06/28/25 4235       Operative/Procedure Notes (all)    No notes of this type exist for this encounter.          Physician Progress  Notes (last 48 hours)        Jose Martin Aleman MD at 06/29/25 1151               LOS: 0 days   Patient Care Team:  Anish Lew APRN as PCP - General  Evgeny Gregory MD as Consulting Physician (Cardiology)  Leeanne Pleitez MD as Consulting Physician (Cardiology)  Radha Owusu APRN as Nurse Practitioner (Nurse Practitioner)  Kathe Muñoz MD as Consulting Physician (Nephrology)    Subjective:  Pain is improved    Objective:   Afebrile      Review of Systems:   Review of Systems   Unable to perform ROS: Other           Vital Signs  Temp:  [97.4 °F (36.3 °C)-98.2 °F (36.8 °C)] 98.1 °F (36.7 °C)  Heart Rate:  [60-93] 60  Resp:  [12-15] 14  BP: (118-152)/(52-82) 141/67    Physical Exam:  Physical Exam  Vitals and nursing note reviewed.          Radiology:  CT CEREBRAL PERFUSION WITH & WITHOUT CONTRAST  Result Date: 6/29/2025  Negative for completed infarct or ischemic penumbra. Electronically Signed: Jay Jay Brennan MD  6/29/2025 10:18 AM EDT  Workstation ID: [id number]    CT Angiogram Neck  Result Date: 6/29/2025  Impression: 1. No hemodynamically significant stenosis of the carotid or vertebral arteries. 2. No intracranial vascular stenosis, occlusion, or aneurysm. 3. Patent dural venous sinuses. Electronically Signed: Raymond Santos MD  6/29/2025 10:11 AM EDT  Workstation ID: [id number]    CT Angiogram Head w AI Analysis of LVO  Result Date: 6/29/2025  Impression: 1. No hemodynamically significant stenosis of the carotid or vertebral arteries. 2. No intracranial vascular stenosis, occlusion, or aneurysm. 3. Patent dural venous sinuses. Electronically Signed: Raymond Santos MD  6/29/2025 10:11 AM EDT  Workstation ID: [id number]    CT Head Without Contrast Stroke Protocol  Result Date: 6/29/2025  Impression: No acute intracranial findings by CT. Chronic findings similar to 7/13/2023 comparison. Electronically Signed: Jay Jay Brennan MD  6/29/2025 9:25 AM EDT  Workstation ID: [id number]    CT  Upper Extremity Left Without Contrast  Result Date: 6/28/2025  Impression: 1.No acute osseous abnormality. 2.Minimal degenerative changes. 3.Left-sided ICD. Electronically Signed: Brijesh Ruiz MD  6/28/2025 2:58 AM EDT  Workstation ID: [id number]    XR Shoulder 2+ View Left  Result Date: 6/28/2025  Impression: 1.Linear cortical abnormalities at the lateral margin of the scapular body that are worrisome for nondisplaced fracture, but this could be from an old injury. Recommend CT evaluation. 2.Mild acromioclavicular and glenohumeral joint degeneration. Electronically Signed: Brijesh Ruiz MD  6/28/2025 12:52 AM EDT  Workstation ID: [id number]    XR Scapula Left  Result Date: 6/27/2025  Impression: Small ossific density along the inferior aspect of the inferior glenoid could be posttraumatic but is age indeterminate. Dedicated radiographs of the left shoulder are recommended to further evaluate. Electronically Signed: Gen Malcolm MD  6/27/2025 10:54 PM EDT  Workstation ID: [id number]         Results Review:     I reviewed the patient's new clinical results.  I reviewed the patient's new imaging results and agree with the interpretation.    Medication Review:   Scheduled Meds:apixaban, 5 mg, Oral, BID  aspirin, 81 mg, Oral, Daily  [Held by provider] atorvastatin, 40 mg, Oral, Nightly  furosemide, 20 mg, Intravenous, Daily  levothyroxine, 75 mcg, Oral, QAM  metoprolol succinate XL, 12.5 mg, Oral, Q24H  midodrine, 10 mg, Oral, Q8H  pregabalin, 200 mg, Oral, Q8H  senna, 2 tablet, Oral, Nightly  sertraline, 100 mg, Oral, Q PM  sodium chloride, 10 mL, Intravenous, Q12H      Continuous Infusions:   PRN Meds:.  senna-docusate sodium **AND** polyethylene glycol **AND** bisacodyl **AND** bisacodyl    Morphine    [DISCONTINUED] HYDROmorphone **AND** naloxone    nitroglycerin    ondansetron ODT **OR** ondansetron    oxyCODONE    sodium chloride    sodium chloride    Labs:    CBC    Results from last 7 days   Lab Units  06/29/25  0312   WBC 10*3/mm3 2.75*   HEMOGLOBIN g/dL 9.7*   PLATELETS 10*3/mm3 119*     BMP   Results from last 7 days   Lab Units 06/29/25  0312   SODIUM mmol/L 139   POTASSIUM mmol/L 4.1   CHLORIDE mmol/L 103   CO2 mmol/L 27.3   BUN mg/dL 14.1   CREATININE mg/dL 0.58   GLUCOSE mg/dL 88     Cr Clearance Estimated Creatinine Clearance: 60.8 mL/min (by C-G formula based on SCr of 0.58 mg/dL).  Coag     HbA1C   Lab Results   Component Value Date    HGBA1C 5.8 (H) 01/19/2023    HGBA1C 5.9 (H) 03/08/2022    HGBA1C 6.1 (H) 06/30/2021     Blood Glucose   Glucose   Date/Time Value Ref Range Status   06/29/2025 0849 96 70 - 105 mg/dL Final     Comment:     Serial Number: [id number]:  [id number]   06/28/2025 2124 97 70 - 105 mg/dL Final     Comment:     Serial Number: [id number]:  [id number]     Infection     CMP   Results from last 7 days   Lab Units 06/29/25  0312   SODIUM mmol/L 139   POTASSIUM mmol/L 4.1   CHLORIDE mmol/L 103   CO2 mmol/L 27.3   BUN mg/dL 14.1   CREATININE mg/dL 0.58   GLUCOSE mg/dL 88     UA    Results from last 7 days   Lab Units 06/28/25  1543   NITRITE UA  Negative   WBC UA /HPF 21-50*   BACTERIA UA /HPF None Seen   SQUAM EPITHEL UA /HPF 0-2     Radiology(recent) CT CEREBRAL PERFUSION WITH & WITHOUT CONTRAST  Result Date: 6/29/2025  Negative for completed infarct or ischemic penumbra. Electronically Signed: Jay Jay Brennan MD  6/29/2025 10:18 AM EDT  Workstation ID: [id number]    CT Angiogram Neck  Result Date: 6/29/2025  Impression: 1. No hemodynamically significant stenosis of the carotid or vertebral arteries. 2. No intracranial vascular stenosis, occlusion, or aneurysm. 3. Patent dural venous sinuses. Electronically Signed: Raymond Santos MD  6/29/2025 10:11 AM EDT  Workstation ID: [id number]    CT Angiogram Head w AI Analysis of LVO  Result Date: 6/29/2025  Impression: 1. No hemodynamically significant stenosis of the carotid or vertebral arteries. 2. No intracranial vascular stenosis,  occlusion, or aneurysm. 3. Patent dural venous sinuses. Electronically Signed: Raymond Santos MD  6/29/2025 10:11 AM EDT  Workstation ID: [id number]    CT Head Without Contrast Stroke Protocol  Result Date: 6/29/2025  Impression: No acute intracranial findings by CT. Chronic findings similar to 7/13/2023 comparison. Electronically Signed: Jay Jay Brennan MD  6/29/2025 9:25 AM EDT  Workstation ID: [id number]    CT Upper Extremity Left Without Contrast  Result Date: 6/28/2025  Impression: 1.No acute osseous abnormality. 2.Minimal degenerative changes. 3.Left-sided ICD. Electronically Signed: Brijesh Ruiz MD  6/28/2025 2:58 AM EDT  Workstation ID: [id number]    XR Shoulder 2+ View Left  Result Date: 6/28/2025  Impression: 1.Linear cortical abnormalities at the lateral margin of the scapular body that are worrisome for nondisplaced fracture, but this could be from an old injury. Recommend CT evaluation. 2.Mild acromioclavicular and glenohumeral joint degeneration. Electronically Signed: Brijesh Ruiz MD  6/28/2025 12:52 AM EDT  Workstation ID: [id number]    XR Scapula Left  Result Date: 6/27/2025  Impression: Small ossific density along the inferior aspect of the inferior glenoid could be posttraumatic but is age indeterminate. Dedicated radiographs of the left shoulder are recommended to further evaluate. Electronically Signed: eGn Malcolm MD  6/27/2025 10:54 PM EDT  Workstation ID: [id number]     Assessment:      Cervical neuralgia  Left shoulder pain secondary to cervical radiculopathy  Urinary retention  Paroxysmal atrial fibrillation  History of AV giovana ablation  Atherosclerotic heart disease of native coronaries of native heart without angina pectoris  Chronic kidney disease stage II  Hypertension associated with chronic kidney disease stage II  Hypertrophic cardiomyopathy  Seizure disorder  Aortic insufficiency  Degenerative disc disease lumbosacral spine  Peptic ulcer disease  Gastroesophageal reflux disease without  esophagitis  Hypoventilation apnea syndrome with RENO  Cerebrovascular disease with history of TIA  Mitral regurgitation  Exogenous obesity  History of pacemaker placement      Plan:  Physical therapy//Occupational Therapy//imaging underway        Jose Martin Aleman MD  25  11:51 EDT          Electronically signed by Jose Martin Aleman MD at 25 1155       Consult Notes (last 48 hours)  Notes from 25 1541 through 25 1541   No notes of this type exist for this encounter.       Nutrition Notes (most recent note)    No notes exist for this encounter.       Speech Language Pathology Notes (most recent note)    No notes exist for this encounter.       Kaylan Pompa OT   Occupational Therapist  Occupational Therapy  Therapy Evaluation     Signed  Date of Service:  25 1311  Creation Time:  25     Signed        Expand All Collapse All  Patient Name: Domonique See                     : 1940                        MRN: [id number]                              Today's Date: 2025                                   Admit Date: 2025                        Visit Dx:   Visit Diagnosis       ICD-10-CM ICD-9-CM   1. Pain of left scapula  M89.8X1 733.90   2. Cervical radiculopathy  M54.12 723.4   3. Acute pain of left shoulder  M25.512 719.41         Problem List       Patient Active Problem List   Diagnosis    Iron deficiency anemia    Anemia, unspecified    Pacemaker    Paroxysmal atrial fibrillation    Cervical disc disorder with radiculopathy    Cervical radiculopathy    Cervical stenosis of spinal canal    Deep vein thrombosis (DVT)    Dyspnea    Family history of diabetes mellitus    Fibromyositis    Essential hypertension    Lumbar radiculopathy    Primary localized osteoarthritis    Sacroiliitis, not elsewhere classified    Spondylolisthesis, acquired    Primary osteoarthritis of both knees    Obesity (BMI 30.0-34.9)    Hx of hypertrophic cardiomyopathy    Coronary  artery disease    Sleep apnea    Acute midline low back pain without sciatica    Hypertensive retinopathy    Neck pain    Cervical neuralgia    Seizure disorder    Encounter for pre-operative cardiovascular clearance    Aortic insufficiency    Mitral regurgitation    Acute urinary retention    Left shoulder pain    Acute urinary retention    Altered mental status         Medical History        Past Medical History:   Diagnosis Date    Anemia      Aortic insufficiency 03/21/2025    Asthma      CHF (congestive heart failure)      Cholecystitis 09/02/2011    Congenital heart disease      Coronary artery disease 2013    Deep vein thrombosis (DVT) 11/20/2017    GERD (gastroesophageal reflux disease)      Heart murmur Don't know    Hx of hypertrophic cardiomyopathy      Hypertension      Hypertrophic obstructive cardiomyopathy 11/10/2017    Low back pain      Lumbosacral disc disease      Mitral regurgitation 03/21/2025    Mixed hyperlipidemia 10/06/2017    Morbid obesity 09/21/2020    Peptic ulceration      Persistent atrial fibrillation 01/10/2023    Primary osteoarthritis of both knees 02/08/2021    Primary osteoarthritis of right knee 09/21/2020    Seizures      Sleep apnea 2017    TIA (transient ischemic attack)           Surgical History         Past Surgical History:   Procedure Laterality Date    ABDOMINAL HERNIA REPAIR        ABLATION OF DYSRHYTHMIC FOCUS   01-    BACK SURGERY        BREAST AUGMENTATION        CARDIAC CATHETERIZATION        CARDIAC ELECTROPHYSIOLOGY PROCEDURE N/A 01/16/2023     Procedure: Ablation atrial fibrillation and flutter, sarthak Watters and Ramón menchaca;  Surgeon: Leeanne Pleitez MD;  Location: [facility] CATH INVASIVE LOCATION;  Service: Cardiovascular;  Laterality: N/A;    CARDIAC PACEMAKER PLACEMENT   03/16/2017     Dual Chamber    CERVICAL LAMINECTOMY DECOMPRESSION POSTERIOR N/A 5/29/2025     Procedure: CERVICAL LAMINECTOMY DECOMPRESSION POSTERIOR C7 - T1, C5-T2 FUSION;  Surgeon:  Lake Bernal IV, MD;  Location: [facility] MAIN OR;  Service: Neurosurgery;  Laterality: N/A;    CERVICAL RIB RESECTION Bilateral      CERVICAL RIB RESECTION Bilateral 1963     bilateral cervical ribs removed - extra ribs located and causing pain    CHOLECYSTECTOMY        COLONOSCOPY        CORONARY ARTERY BYPASS GRAFT   2013    EPIDURAL BLOCK        NECK SURGERY        NECK SURGERY   05/29/2025    SPINAL FUSION        THORACIC DECOMPRESSION POSTERIOR FUSION   06/04/2014     L3-5 & S1    TRIGGER POINT INJECTION        UPPER GASTROINTESTINAL ENDOSCOPY               General Information         Row Name 06/30/25 1300                 OT Time and Intention     Subjective Information complains of;pain  -ES          Row Name 06/30/25 1300                 General Information     Prior Level of Function independent:  -ES          Row Name 06/30/25 1300                 Occupational Profile     Reason for Services/Referral (Occupational Profile) Pt is a 85 y.o. year old female admitted 6/27/25 with c/o worsening L shoulder pain. Code CVA called 6/29 for AMS. Imaging (-).     Multiple recent admissions for c-spine pain, scapular pain, and hypotension. Pt s/p cervical laminectomy decompression posterior C7-T1 and C5-T2 fusion preformed by Dr. Bernal. Order for Hard cervical collar on at all times.    PMHx significant for cervical spinal stenosis, Afib, chronic CAD and hypertrophic cardiomyopathy.     At baseline, pt resides with adult son and granddaughter in a multi-level home with ramp entry. Pt reports she was IND with I/ADLs and actively driving before March 4th, pt drove home from doctors appointment that day and reports she has significantly declined since.  -ES          Row Name 06/30/25 1300                 Living Environment     Current Living Arrangements home  -ES          Row Name 06/30/25 1300                 Cognition     Orientation Status (Cognition) oriented x 4  Short Blessed: 6/28, indicating questionable cognition.   -ES          Row Name 06/30/25 1300                 Safety Issues/Impairments Affecting Functional Mobility     Safety Issues Affecting Function (Mobility) sequencing abilities;safety precaution awareness  -ES       Impairments Affecting Function (Mobility) balance;cognition;endurance/activity tolerance;coordination;pain;postural/trunk control;shortness of breath  -ES                       User Key  (r) = Recorded By, (t) = Taken By, (c) = Cosigned By        Initials Name Provider Type     ES Kaylan Pompa OT Occupational Therapist                                     Mobility/ADL's         Row Name 06/30/25 1301                 Bed Mobility     Bed Mobility bed mobility (all) activities  -ES       All Activities, [place] (Bed Mobility) moderate assist (50% patient effort)  -ES          Row Name 06/30/25 1301                 Transfers     Transfers sit-stand transfer;bed-chair transfer  -ES          Row Name 06/30/25 1301                 Bed-Chair Transfer     Bed-Chair [place] (Transfers) maximum assist (25% patient effort);2 person assist  -ES       Comment, (Bed-Chair Transfer) Rec Ilene Michele back to bed. Discussed with NSG  -ES          Row Name 06/30/25 1301                 Sit-Stand Transfer     Sit-Stand [place] (Transfers) maximum assist (25% patient effort);2 person assist  -ES          Row Name 06/30/25 1301                 Functional Mobility     Functional Mobility- Ind. Level not appropriate to assess  -ES          Row Name 06/30/25 1301                 Activities of Daily Living     BADL Assessment/Intervention upper body dressing;lower body dressing;toileting;grooming  -ES          Row Name 06/30/25 1301                 Upper Body Dressing Assessment/Training     [place] Level (Upper Body Dressing) maximum assist (25% patient effort)  -ES          Row Name 06/30/25 1301                 Lower Body Dressing Assessment/Training     [place] Level (Lower Body Dressing)  dependent (less than 25% patient effort)  -ES          Row Name 06/30/25 1301                 Toileting Assessment/Training     [place] Level (Toileting) maximum assist (25% patient effort)  -ES          Row Name 06/30/25 1301                 Grooming Assessment/Training     [place] Level (Grooming) moderate assist (50% patient effort)  -ES                       User Key  (r) = Recorded By, (t) = Taken By, (c) = Cosigned By        Initials Name Provider Type     ES Kaylan Pompa OT Occupational Therapist                            Obj/Interventions         Row Name 06/30/25 1306                 Sensory Assessment (Somatosensory)     Sensory Assessment (Somatosensory) sensation intact  -ES          Row Name 06/30/25 1306                 Vision Assessment/Intervention     Visual Impairment/Limitations corrective lenses for reading  -ES          Row Name 06/30/25 1306                 Range of Motion Comprehensive     Comment, General Range of Motion LUE shoulder 120* flexion, 90* abd. Elbow/wrist WFL. RUE WFL.  -ES          Row Name 06/30/25 1306                 Strength Comprehensive (MMT)     Comment, General Manual Muscle Testing (MMT) Assessment Limited assessment due to LUE shoulder pain and recent c-spine sx. Grossly 3+/5 within ROM  -ES          Row Name 06/30/25 1306                 Balance     Balance Assessment sitting static balance;sitting dynamic balance;standing static balance  -ES       Static Sitting Balance supervision  -ES       Dynamic Sitting Balance minimal assist  -ES       Static Standing Balance 2-person assist;maximum assist  -ES       Balance Interventions standing;sitting;dynamic;static  -ES                       User Key  (r) = Recorded By, (t) = Taken By, (c) = Cosigned By        Initials Name Provider Type     ES Kaylan Pomap OT Occupational Therapist                            Goals/Plan         Row Name 06/30/25 1310                 Dressing Goal 1 (OT)      Activity/Device (Dressing Goal 1, OT) dressing skills, all  -ES       [place]/Cues Needed (Dressing Goal 1, OT) maximum assist (25-49% patient effort)  -ES       Time Frame (Dressing Goal 1, OT) 2 weeks  -ES          Row Name 06/30/25 1310                 Toileting Goal 1 (OT)     Activity/Device (Toileting Goal 1, OT) toileting skills, all  -ES       [place] Level/Cues Needed (Toileting Goal 1, OT) maximum assist (25-49% patient effort)  -ES       Time Frame (Toileting Goal 1, OT) 2 weeks  -ES          Row Name 06/30/25 1310                 Grooming Goal 1 (OT)     Activity/Device (Grooming Goal 1, OT) grooming skills, all  -ES       [place] (Grooming Goal 1, OT) minimum assist (75% or more patient effort)  -ES       Time Frame (Grooming Goal 1, OT) 2 weeks  -ES          Row Name 06/30/25 1310                 Therapy Assessment/Plan (OT)     Planned Therapy Interventions (OT) activity tolerance training;BADL retraining;neuromuscular control/coordination retraining;occupation/activity based interventions;prosthetic fitting/training;transfer/mobility retraining;strengthening exercise;functional balance retraining  -ES                    User Key  (r) = Recorded By, (t) = Taken By, (c) = Cosigned By        Initials Name Provider Type     Kaylan Berman OT Occupational Therapist                         Clinical Impression         Row Name 06/30/25 1004                 Pain Assessment     Pretreatment Pain Rating 10/10  -ES       Posttreatment Pain Rating 10/10  -ES       Pain Location back;extremity  -ES       Pain Side/Orientation left;upper  -ES          Row Name 06/30/25 1004                 Plan of Care Review     Outcome Evaluation Pt is a 85 y.o. year old female admitted 6/27/25 with c/o worsening L shoulder pain. Code CVA called 6/29 for AMS. Imaging (-).     Multiple recent admissions for c-spine pain, scapular pain, and hypotension. Pt s/p cervical laminectomy decompression posterior  C7-T1 and C5-T2 fusion preformed by Dr. Bernal.  Order for Hard cervical collar on at all times.    PMHx significant for cervical spinal stenosis, Afib, chronic CAD and hypertrophic cardiomyopathy.     At baseline, pt resides with adult son and granddaughter in a multi-level home with ramp entry. Pt reports she was IND with I/ADLs and actively driving before March 4th, pt drove home from doctors appointment that day and reports she has significantly declined since. C/o 10/10 pain in LUE shoulder, but agreeable to work with OT / PT. Mod A for bed mobility. Sits EOB with supervision. NSG present to address small stitch in pt's back. Sit<>stand with Max A x2. Able to maintain stand with Min-MOd A x2, but limited with any weight shift. Able to pivot pt to chair, but rec Ilene Stedy to return to bed. NSG aware. pt highly motivated to improve. Rec return to SNF rehab.  -ES          Row Name 06/30/25 1004                 Therapy Assessment/Plan (OT)     Rehab Potential (OT) good  -ES       Criteria for Skilled Therapeutic Interventions Met (OT) yes;skilled treatment is necessary  -ES       Therapy Frequency (OT) 5 times/wk  -ES       Predicted Duration of Therapy Intervention (OT) until dc138  -ES          Row Name 06/30/25 1004                 Vital Signs     Pre Systolic BP Rehab 121  -ES       Pre Treatment Diastolic BP 65  -ES       Intra Systolic BP Rehab 137  -ES       Intra Treatment Diastolic BP 63  -ES       Post Systolic BP Rehab 138  -ES       Post Treatment Diastolic BP 72  -ES       Pretreatment Heart Rate (beats/min) 92  -ES       Pre SpO2 (%) 97  -ES       O2 Delivery Pre Treatment room air  -ES       O2 Delivery Intra Treatment room air  -ES       O2 Delivery Post Treatment room air  -ES       Pre Patient Position Supine  -ES       Intra Patient Position Standing  -ES       Post Patient Position Sitting  -ES          Row Name 06/30/25 1004                 Positioning and Restraints     Pre-Treatment Position  in bed  -ES       Post Treatment Position chair  -ES       In Chair notified nsg;call light within reach;encouraged to call for assist;exit alarm on;with nsg  -ES                       User Key  (r) = Recorded By, (t) = Taken By, (c) = Cosigned By        Initials Name Provider Type     Kaylan Berman OT Occupational Therapist                            Outcome Measures         Row Name 06/30/25 1311                 How much help from another is currently needed...     Putting on and taking off regular lower body clothing? 1  -ES       Bathing (including washing, rinsing, and drying) 2  -ES       Toileting (which includes using toilet bed pan or urinal) 2  -ES       Putting on and taking off regular upper body clothing 2  -ES       Taking care of personal grooming (such as brushing teeth) 2  -ES       Eating meals 3  -ES       AM-PAC 6 Clicks Score (OT) 12  -ES          Row Name 06/30/25 0800                 How much help from another person do you currently need...     Turning from your back to your side while in flat bed without using bedrails? 2  -JH       Moving from lying on back to sitting on the side of a flat bed without bedrails? 2  -JH       Moving to and from a bed to a chair (including a wheelchair)? 2  -JH       Standing up from a chair using your arms (e.g., wheelchair, bedside chair)? 2  -JH       Climbing 3-5 steps with a railing? 2  -JH       To walk in hospital room? 2  -JH       AM-PAC 6 Clicks Score (PT) 12  -JH          Row Name 06/30/25 1311                 Functional Assessment     Outcome Measure Options AM-PAC 6 Clicks Daily Activity (OT)  -ES                       User Key  (r) = Recorded By, (t) = Taken By, (c) = Cosigned By        Initials Name Provider Type     Kaylan Berman OT Occupational Therapist     Aspen Lind, RN Registered Nurse                                      OT Recommendation and Plan  Planned Therapy Interventions (OT): activity tolerance training,  BADL retraining, neuromuscular control/coordination retraining, occupation/activity based interventions, prosthetic fitting/training, transfer/mobility retraining, strengthening exercise, functional balance retraining  Therapy Frequency (OT): 5 times/wk  Plan of Care Review  Outcome Evaluation: Pt is a 85 y.o. year old female admitted 6/27/25 with c/o worsening L shoulder pain. Code CVA called 6/29 for AMS. Imaging (-).     Multiple recent admissions for c-spine pain, scapular pain, and hypotension. Pt s/p cervical laminectomy decompression posterior C7-T1 and C5-T2 fusion preformed by Dr. Bernal.  Order for Hard cervical collar on at all times.    PMHx significant for cervical spinal stenosis, Afib, chronic CAD and hypertrophic cardiomyopathy.     At baseline, pt resides with adult son and granddaughter in a multi-level home with ramp entry. Pt reports she was IND with I/ADLs and actively driving before March 4th, pt drove home from doctors appointment that day and reports she has significantly declined since. C/o 10/10 pain in LUE shoulder, but agreeable to work with OT / PT. Mod A for bed mobility. Sits EOB with supervision. NSG present to address small stitch in pt's back. Sit<>stand with Max A x2. Able to maintain stand with Min-MOd A x2, but limited with any weight shift. Able to pivot pt to chair, but rec Ilene Stedy to return to bed. NSG aware. pt highly motivated to improve. Rec return to SNF rehab.      Time Calculation:        Time Calculation- OT         Row Name 06/30/25 1311                       Time Calculation- OT     OT Start Time 1004  -ES         OT Stop Time 1030  -ES         OT Time Calculation (min) 26 min  -ES         Total Timed Code Minutes- OT 0 minute(s)  -ES         OT Received On 06/30/25  -ES         OT - Next Appointment 07/01/25  -ES         OT Goal Re-Cert Due Date 07/14/25  -ES                      User Key  (r) = Recorded By, (t) = Taken By, (c) = Cosigned By        Initials Name  Provider Type     Kaylan Berman OT Occupational Therapist                                Kaylan Pompa OT              2025                Americo Rodriguez, CASE   Physical Therapist  Specialty:  Physical Therapy  Therapy Evaluation     Signed  Date of Service:  25  Creation Time:  25     Signed        Expand All Collapse All  Patient Name: Domonique See                     : 1940                        MRN: [id number]                              Today's Date: 2025                                   Admit Date: 2025                        Visit Dx:   Visit Diagnosis       ICD-10-CM ICD-9-CM   1. Pain of left scapula  M89.8X1 733.90   2. Cervical radiculopathy  M54.12 723.4   3. Acute pain of left shoulder  M25.512 719.41         Problem List       Patient Active Problem List   Diagnosis    Iron deficiency anemia    Anemia, unspecified    Pacemaker    Paroxysmal atrial fibrillation    Cervical disc disorder with radiculopathy    Cervical radiculopathy    Cervical stenosis of spinal canal    Deep vein thrombosis (DVT)    Dyspnea    Family history of diabetes mellitus    Fibromyositis    Essential hypertension    Lumbar radiculopathy    Primary localized osteoarthritis    Sacroiliitis, not elsewhere classified    Spondylolisthesis, acquired    Primary osteoarthritis of both knees    Obesity (BMI 30.0-34.9)    Hx of hypertrophic cardiomyopathy    Coronary artery disease    Sleep apnea    Acute midline low back pain without sciatica    Hypertensive retinopathy    Neck pain    Cervical neuralgia    Seizure disorder    Encounter for pre-operative cardiovascular clearance    Aortic insufficiency    Mitral regurgitation    Acute urinary retention    Left shoulder pain    Acute urinary retention    Altered mental status         Medical History        Past Medical History:   Diagnosis Date    Anemia      Aortic insufficiency 2025    Asthma      CHF (congestive  heart failure)      Cholecystitis 09/02/2011    Congenital heart disease      Coronary artery disease 2013    Deep vein thrombosis (DVT) 11/20/2017    GERD (gastroesophageal reflux disease)      Heart murmur Don't know    Hx of hypertrophic cardiomyopathy      Hypertension      Hypertrophic obstructive cardiomyopathy 11/10/2017    Low back pain      Lumbosacral disc disease      Mitral regurgitation 03/21/2025    Mixed hyperlipidemia 10/06/2017    Morbid obesity 09/21/2020    Peptic ulceration      Persistent atrial fibrillation 01/10/2023    Primary osteoarthritis of both knees 02/08/2021    Primary osteoarthritis of right knee 09/21/2020    Seizures      Sleep apnea 2017    TIA (transient ischemic attack)           Surgical History         Past Surgical History:   Procedure Laterality Date    ABDOMINAL HERNIA REPAIR        ABLATION OF DYSRHYTHMIC FOCUS   01-    BACK SURGERY        BREAST AUGMENTATION        CARDIAC CATHETERIZATION        CARDIAC ELECTROPHYSIOLOGY PROCEDURE N/A 01/16/2023     Procedure: Ablation atrial fibrillation and flutter, cryo Duong and Ramón aware;  Surgeon: Leeanne Pleitez MD;  Location: [facility] CATH INVASIVE LOCATION;  Service: Cardiovascular;  Laterality: N/A;    CARDIAC PACEMAKER PLACEMENT   03/16/2017     Dual Chamber    CERVICAL LAMINECTOMY DECOMPRESSION POSTERIOR N/A 5/29/2025     Procedure: CERVICAL LAMINECTOMY DECOMPRESSION POSTERIOR C7 - T1, C5-T2 FUSION;  Surgeon: Lake Bernal IV, MD;  Location: [facility] MAIN OR;  Service: Neurosurgery;  Laterality: N/A;    CERVICAL RIB RESECTION Bilateral      CERVICAL RIB RESECTION Bilateral 1963     bilateral cervical ribs removed - extra ribs located and causing pain    CHOLECYSTECTOMY        COLONOSCOPY        CORONARY ARTERY BYPASS GRAFT   2013    EPIDURAL BLOCK        NECK SURGERY        NECK SURGERY   05/29/2025    SPINAL FUSION        THORACIC DECOMPRESSION POSTERIOR FUSION   06/04/2014     L3-5 & S1    TRIGGER POINT INJECTION         UPPER GASTROINTESTINAL ENDOSCOPY               General Information         Row Name 06/30/25 1303                 Physical Therapy Time and Intention     Document Type evaluation  -EL       Mode of Treatment individual therapy;physical therapy  -          Row Name 06/30/25 1303                 General Information     Prior Level of Function independent:;all household mobility;ADL's  -          Row Name 06/30/25 1303                 Living Environment     Current Living Arrangements home  -EL       People in Home other (see comments)  Initially from home alone, but has been at rehab since decline/sx  -EL          Row Name 06/30/25 1303                 Cognition     Orientation Status (Cognition) oriented x 4  -EL          Row Name 06/30/25 1303                 Safety Issues/Impairments Affecting Functional Mobility     Impairments Affecting Function (Mobility) balance;endurance/activity tolerance;coordination;pain;postural/trunk control;shortness of breath  -EL                       User Key  (r) = Recorded By, (t) = Taken By, (c) = Cosigned By        Initials Name Provider Type     EL Americo Rodriguez, PT Physical Therapist                            Mobility         Row Name 06/30/25 1306                 Bed Mobility     Bed Mobility bed mobility (all) activities  -EL       All Activities, [place] (Bed Mobility) moderate assist (50% patient effort)  -EL          Row Name 06/30/25 1306                 Bed-Chair Transfer     Bed-Chair [place] (Transfers) maximum assist (25% patient effort);2 person assist  -EL          Row Name 06/30/25 1306                 Sit-Stand Transfer     Sit-Stand Odem (Transfers) maximum assist (25% patient effort);2 person assist  -EL          Row Name 06/30/25 1306                 Gait/Stairs (Locomotion)     Patient was able to Ambulate no, other medical factors prevent ambulation  -EL       Reason Patient was unable to Ambulate Uncontrolled Pain;Excessive Weakness   -EL                       User Key  (r) = Recorded By, (t) = Taken By, (c) = Cosigned By        Initials Name Provider Type     Americo Dunn PT Physical Therapist                            Obj/Interventions         Row Name 06/30/25 1319                 Range of Motion Comprehensive     General Range of Motion lower extremity range of motion deficits identified  -       Comment, General Range of Motion R knee limited approx 25% due to pain. LLE WFL  -          Row Name 06/30/25 1319                 Strength Comprehensive (MMT)     General Manual Muscle Testing (MMT) Assessment lower extremity strength deficits identified  -EL       Comment, General Manual Muscle Testing (MMT) Assessment BLE 3+/5 gross  -          Row Name 06/30/25 1319                 Sensory Assessment (Somatosensory)     Sensory Assessment (Somatosensory) sensation intact  -EL                       User Key  (r) = Recorded By, (t) = Taken By, (c) = Cosigned By        Initials Name Provider Type     Americo Dunn PT Physical Therapist                            Goals/Plan         Row Name 06/30/25 1323                 Bed Mobility Goal 1 (PT)     Activity/Assistive Device (Bed Mobility Goal 1, PT) bed mobility activities, all  -EL       [place] Level/Cues Needed (Bed Mobility Goal 1, PT) minimum assist (75% or more patient effort)  -EL       Time Frame (Bed Mobility Goal 1, PT) long term goal (LTG);2 weeks  -EL          Row Name 06/30/25 1323                 Transfer Goal 1 (PT)     Activity/Assistive Device (Transfer Goal 1, PT) transfers, all  -EL       [place] Level/Cues Needed (Transfer Goal 1, PT) moderate assist (50-74% patient effort)  -EL       Time Frame (Transfer Goal 1, PT) long term goal (LTG);2 weeks  -EL          Row Name 06/30/25 1323                 Gait Training Goal 1 (PT)     Activity/Assistive Device (Gait Training Goal 1, PT) gait (walking locomotion);walker, rolling  -EL       [place] Level (Gait  Training Goal 1, PT) moderate assist (50-74% patient effort)  -EL       Distance (Gait Training Goal 1, PT) 15  -EL       Time Frame (Gait Training Goal 1, PT) long term goal (LTG);2 weeks  -EL          Row Name 06/30/25 1323                 Therapy Assessment/Plan (PT)     Planned Therapy Interventions (PT) balance training;neuromuscular re-education;bed mobility training;transfer training;gait training;patient/family education;strengthening  -EL                    User Key  (r) = Recorded By, (t) = Taken By, (c) = Cosigned By        Initials Name Provider Type     Americo Dunn, PT Physical Therapist                         Clinical Impression         Row Name 06/30/25 1319                 Pain     Pretreatment Pain Rating 10/10  -EL       Posttreatment Pain Rating 10/10  -EL       Pain Location back;extremity  -EL       Pain Side/Orientation right;lower  -EL          Row Name 06/30/25 1319                 Plan of Care Review     Plan of Care Reviewed With patient  -EL       Outcome Evaluation Pt is a 85 y.o. year old female admitted 6/27/25 with c/o worsening L shoulder pain. Code CVA called 6/29 for AMS. Imaging (-). Multiple recent admissions for c-spine pain, scapular pain, and hypotension. Pt s/p cervical laminectomy decompression posterior C7-T1 and C5-T2 fusion preformed by Dr. Bernal. Order for Hard cervical collar on at all times. PMHx significant for cervical spinal stenosis, Afib, chronic CAD and hypertrophic cardiomyopathy. Originally pt is from home with family, with a ramp entrance, however has had significant decline recently and was admitted from SNF. Pt very willing to participate in therapy this date, but requires MAX A x2 to come to standing and to transfer to bedside chair. Pt with tenderness to palpation over L scapula but able to utilize UE to pull to standing. Educated nursing staff on using kartik steady to transfer pt back to bed. Recommendation is return to SNF at d/c.  -EL          Row Name  06/30/25 1319                 Therapy Assessment/Plan (PT)     Rehab Potential (PT) good  -EL       Criteria for Skilled Interventions Met (PT) yes  -EL       Therapy Frequency (PT) 5 times/wk  -EL       Predicted Duration of Therapy Intervention (PT) until d/c  -EL          Row Name 06/30/25 1319                 Vital Signs     O2 Delivery Pre Treatment room air  -EL       O2 Delivery Intra Treatment room air  -EL       O2 Delivery Post Treatment room air  -EL       Pre Patient Position Supine  -EL       Intra Patient Position Standing  -EL       Post Patient Position Sitting  -EL          Row Name 06/30/25 1319                 Positioning and Restraints     Pre-Treatment Position in bed  -EL       Post Treatment Position chair  -EL       In Chair notified nsg;reclined;call light within reach;encouraged to call for assist;exit alarm on  -EL                       User Key  (r) = Recorded By, (t) = Taken By, (c) = Cosigned By        Initials Name Provider Type     Americo Dunn, PT Physical Therapist                            Outcome Measures         Row Name 06/30/25 1324 06/30/25 0800            How much help from another person do you currently need...     Turning from your back to your side while in flat bed without using bedrails? 2  -EL 2  -JH     Moving from lying on back to sitting on the side of a flat bed without bedrails? 2  -EL 2  -JH     Moving to and from a bed to a chair (including a wheelchair)? 2  -EL 2  -JH     Standing up from a chair using your arms (e.g., wheelchair, bedside chair)? 2  -EL 2  -JH     Climbing 3-5 steps with a railing? 1  -EL 2  -JH     To walk in hospital room? 1  -EL 2  -JH     AM-PAC 6 Clicks Score (PT) 10  -EL 12  -JH     Highest Level of Mobility Goal Move to Chair/Commode-4  -EL Move to Chair/Commode-4  -JH        Row Name 06/30/25 1324 06/30/25 1311            Functional Assessment     Outcome Measure Options AM-PAC 6 Clicks Basic Mobility (PT)  -EL AM-PAC 6 Clicks Daily  Activity (OT)  -ES                     User Key  (r) = Recorded By, (t) = Taken By, (c) = Cosigned By        Initials Name Provider Type     ES Kaylan Pompa OT Occupational Therapist     Americo Dunn, PT Physical Therapist     Aspen Lind, RN Registered Nurse                          Physical Therapy Education            Title: PT OT SLP Therapies (Done)         Topic: Physical Therapy (Done)         Point: Mobility training (Done)         Learning Progress Summary             Patient Acceptance, E,TB, VU by  at 6/30/2025 1324                            Point: Precautions (Done)         Learning Progress Summary             Patient Acceptance, E,TB, VU by  at 6/30/2025 1324                                                User Key         Initials Effective Dates Name Provider Type Discipline      06/23/20 -  Americo Rodriguez, PT Physical Therapist PT                          PT Recommendation and Plan  Planned Therapy Interventions (PT): balance training, neuromuscular re-education, bed mobility training, transfer training, gait training, patient/family education, strengthening  Outcome Evaluation: Pt is a 85 y.o. year old female admitted 6/27/25 with c/o worsening L shoulder pain. Code CVA called 6/29 for AMS. Imaging (-). Multiple recent admissions for c-spine pain, scapular pain, and hypotension. Pt s/p cervical laminectomy decompression posterior C7-T1 and C5-T2 fusion preformed by Dr. Bernal. Order for Hard cervical collar on at all times. PMHx significant for cervical spinal stenosis, Afib, chronic CAD and hypertrophic cardiomyopathy. Originally pt is from home with family, with a ramp entrance, however has had significant decline recently and was admitted from SNF. Pt very willing to participate in therapy this date, but requires MAX A x2 to come to standing and to transfer to bedside chair. Pt with tenderness to palpation over L scapula but able to utilize UE to pull to standing. Educated nursing  staff on using kartik steady to transfer pt back to bed. Recommendation is return to SNF at d/c.      Time Calculation:   PT Evaluation Complexity  History, PT Evaluation Complexity: 1-2 personal factors and/or comorbidities  Examination of Body Systems (PT Eval Complexity): total of 3 or more elements  Clinical Presentation (PT Evaluation Complexity): evolving  Clinical Decision Making (PT Evaluation Complexity): moderate complexity  Overall Complexity (PT Evaluation Complexity): moderate complexity       PT Charges         Row Name 06/30/25 1325                       Time Calculation     Start Time 1000  -EL         Stop Time 1028  -EL         Time Calculation (min) 28 min  -EL         PT Received On 06/30/25  -EL         PT - Next Appointment 07/01/25  -EL         PT Goal Re-Cert Due Date 07/14/25  -EL                      User Key  (r) = Recorded By, (t) = Taken By, (c) = Cosigned By        Initials Name Provider Type     Americo Dunn, PT Physical Therapist                       Therapy Charges for Today         Code Description Service Date Service Provider Modifiers Qty     [id number] HC PT EVAL MOD COMPLEXITY 4 6/30/2025 Americo Rodriguez, PT GP 1                PT G-Codes  Outcome Measure Options: AM-PAC 6 Clicks Basic Mobility (PT)  AM-PAC 6 Clicks Score (PT): 10  AM-PAC 6 Clicks Score (OT): 12  PT Discharge Summary  Anticipated Discharge Disposition (PT): skilled nursing facility     Americo Rodriguez PT             6/30/2025

## 2025-06-30 NOTE — CASE MANAGEMENT/SOCIAL WORK
Case Management Readmission Assessment Note    Case Management Readmission Assessment (all recorded)       Readmission Interview       Row Name 06/30/25 [facility]             Readmission Indications    Is the patient and/or family able to complete the readmission assessment questions? Yes      Is this hospitalization related to the prior hospital diagnosis? No        Row Name 06/30/25 [facility]             Recommendation for rehospitalization    Did you speak with your physician prior to coming to the hospital No        [facility] Name 06/30/25 [facility]             Follow-up Appointments    Do you have a PCP? Yes      Did you have an appointment with PCP after your hospitalization? No      Did you have an appointment with a Specialist? Yes      When was your appointment scheduled? 06/11/25  Dr. Gary , pain mgmt      Did you go to appointment? Yes      Are you current with the Pulmonary Clinic? No      Are you current with the CHF Clinic? No        [facility] Name 06/30/25 [facility]             Medications    Did you have newly prescribed medications at discharge? Yes      Did you understand the reasons for your medications at discharge and how to take them? Yes      Did you understand the side effects of your medications? Yes      Are you taking all of you prescribed medications? Yes      What pharmacy was used to fill prescription(s)? [facility]        [facility] Name 06/30/25 [facility]             Discharge Instructions    Did you understand your discharge instructions? Yes      Did your family/caregiver hear your instructions? No      Were you told to eat a special diet? No      Did you adhere to the diet? No      Were you given a number of someone to call if you had questions or concerns? Yes        [facility] Name 06/30/25 [facility]             Index discharge location/services    Where did you go upon discharge? Skilled Nursing Facility  [facility]      Do you have supportive family or friends in the home? Yes      What services were arranged at  discharge? Other (comment)      Was the provider seen at the facility? No      Which Skilled Nursing Facility were your admitted from? [facility]        Row Name 06/30/25 1351             Discharge Readiness    On a scale of 1-5 (5 being well prepared), how ready were you for discharge 4      Recommendation based on interview Goals of care discussion/advanced care planning        Row Name 06/30/25 1351             Palliative Care/Hospice    Are you current with Palliative Care? No      Are you current with Hospice Care? No        Row Name 06/30/25 1351 06/28/25 0432          Advance Directives (For Healthcare)    Pre-existing AND/MOST/POLST Order No No     Advance Directive Status Patient does not have advance directive Patient does not have advance directive     Have you reviewed your Advance Directive and is it valid for this stay? Not applicable Not applicable     Literature Provided on Advance Directives No No     Patient Requests Assistance on Advance Directives Patient Declined Patient Declined       Row Name 06/30/25 1351             Readmission Assessment Final Comments    Final Comments 235 Domonique See Current: 6/28 Obs, 6/29 UR review Inpt AMS, Code stroke called - Lethargic - NIH 8 - suspect metabolic etiology for AMS.    ABGs: elevated CO2 of 59.5. CT Head No acute intracranial findings, CT Negative for completed infarct or ischemic penumbra, CTA Neck: No hemodynamically significant stenosis, occlusion, or aneurysm. XR L shoulder: worrisome for nondisplaced fracture. XR Spine: Residual spondylotic change and grade 1 anterolisthesis C7 on T1 better assessed on recent CT. PREVIOUS: 5/23-6/5. 5/23 Obs, 5/27 UR Obs, sent PA review recommend Inpt - failied Obs. Neuro sx Cervial decompression surgery on 5/29

## 2025-06-30 NOTE — CASE MANAGEMENT/SOCIAL WORK
Discharge Planning Assessment   [place]     Patient Name: Domonique See  MRN: [id number]  Today's Date: 6/30/2025    Admit Date: 6/27/2025    Plan: Resides at home. From [facility]; accepted to return. Will need transport.  Ph[facility]acy [facility]. No PAS[facility]  needed   Discharge Needs Assessment       Row Name 06/30/25 3445       Living Environment    People in Home grandchild(su)    Unique Family Situation adult great granddaughter lives with her but she has been at SNF and will return    Current Living Arrangements extended care facility    Potentially Unsafe Housing Conditions none    In the past 12 months has the electric, gas, oil, or water company threatened to shut off services in your home? No    Primary Care Provided by self    Provides Primary Care For no one, unable/limited ability to care for self    Family Caregiver if Needed child(su), adult    Family Caregiver Names son, Woodrow and dtr in law, Corrina    Quality of Family Relationships helpful;involved    Able to Return to Prior Arrangements yes    Living Arrangement Comments Will return to [facility]       Resource/Environmental Concerns    Resource/Environmental Concerns none    Transportation Concerns other (see comments)  difficult transfers       Transportation Needs    In the past 12 months, has lack of transportation kept you from medical appointments or from getting medications? no    In the past 12 months, has lack of transportation kept you from meetings, work, or from getting things needed for daily living? No       Food Insecurity    Within the past 12 months, you worried that your food would run out before you got the money to buy more. Never true    Within the past 12 months, the food you bought just didn't last and you didn't have money to get more. Never true       Transition Planning    Patient/Family Anticipates Transition to inpatient rehabilitation facility    Patient/Family Anticipated Services at  Transition skilled nursing    Transportation Anticipated health plan transportation       Discharge Needs Assessment    Readmission Within the Last 30 Days previous discharge plan unsuccessful    Current Outpatient/Agency/Support Group skilled nursing facility    Equipment Currently Used at Home wheelchair;cpap;pulse ox    Concerns to be Addressed discharge planning    Do you want help finding or keeping work or a job? I do not need or want help    Do you want help with school or training? For example, starting or completing job training or getting a high school diploma, GED or equivalent No    Anticipated Changes Related to Illness none    Equipment Needed After Discharge none    Outpatient/Agency/Support Group Needs skilled nursing facility    Discharge Facility/Level of Care Needs nursing facility, skilled                   Discharge Plan       Row Name 06/30/25 1406       Plan    Patient/Family in Agreement with Plan yes    Plan Comments DC orders. Readmission assessment completed. CM met with Mrs. See who is a/o and agrees to return to [facility] for SNF/subacute rehab. Normally she resides with a great granddaughter but wants to go back to rehab. She has a wheelchair and CPAP there.  PCP confirmed. CM contacted Kalpana with [facility] who accepted  her to return but said she needs a new precert. CM contacted KEVON Chen CMA who called [facility] who said her current precert is still good until midnight tonight. CM informed Kalpana with [facility] and asked if they could transport back to their facility.      Row Name 06/30/25 1407       Plan    Plan Resides at home. From [facility] SN[facility]; accepted to return. Will need transport.  Ph[facility]acy [facility]. No PAS[facility]  needed                  Continued Care and Services - Admitted Since 6/27/2025       Destination       Service Provider Request Status Services Address Phone Fax Patient Preferred    [facility]  Pending - Request Sent -- [address], [place] [address] [phone] --                  Selected Continued Care - Prior Encounters Includes continued care and service providers with selected services from prior encounters from 3/29/2025 to 6/30/2025      Discharged on 6/5/2025 Admission date: 5/23/2025 - Discharge disposition: Skilled Nursing Facility (DC - External)      Destination       Service Provider Services Address Phone Fax Patient Preferred    [facility] [address] [place] [address] [phone] --                                       Demographic Summary       Row Name 06/30/25 1356       General Information    Admission Type inpatient    Arrived From emergency department    Required Notices Provided Important Message from Medicare    Referral Source admission list    Reason for Consult discharge planning    Preferred Language English                   Functional Status       Row Name 06/30/25 1356       Functional Status    Usual Activity Tolerance moderate    Current Activity Tolerance fair       Functional Status, IADL    Medications completely dependent    Meal Preparation completely dependent    Housekeeping completely dependent    Laundry completely dependent    Shopping completely dependent    If for any reason you need help with day-to-day activities such as bathing, preparing meals, shopping, managing finances, etc., do you get the help you need? I get all the help I need    IADL Comments SNF staff                          Angela OLSON,RN Case Manager  [facility]  Phone: Desk- [phone]  Cell- [phone]

## 2025-06-30 NOTE — PLAN OF CARE
Problem: Adult Inpatient Plan of Care  Goal: Plan of Care Review  Outcome: Progressing  Goal: Patient-Specific Goal (Individualized)  Outcome: Progressing  Goal: Absence of Hospital-Acquired Illness or Injury  Outcome: Progressing  Intervention: Identify and Manage Fall Risk  Intervention: Prevent Infection  Goal: Optimal Comfort and Wellbeing  Outcome: Progressing  Intervention: Provide Person-Centered Care  Goal: Readiness for Transition of Care  Outcome: Progressing     Problem: Skin Injury Risk Increased  Goal: Skin Health and Integrity  Outcome: Progressing  Intervention: Optimize Skin Protection     Problem: Comorbidity Management  Goal: Blood Pressure in Desired Range  Outcome: Progressing     Problem: Fall Injury Risk  Goal: Absence of Fall and Fall-Related Injury  Outcome: Progressing  Intervention: Promote Injury-Free Environment     Problem: Pain Acute  Goal: Optimal Pain Control and Function  Outcome: Progressing  Intervention: Optimize Psychosocial Wellbeing   Goal Outcome Evaluation:              Outcome Evaluation: bipapa at night, bolden to bedside, son helping with care, alert n oriented x4

## 2025-06-30 NOTE — DISCHARGE SUMMARY
Date of Discharge:  6/30/2025    Discharge Diagnosis:   **Left shoulder pain [M25.512]   Altered mental status [R41.82]   Acute urinary retention [R33.8]   Essential hypertension [I10]   Paroxysmal atrial fibrillation [I48.0]   Iron deficiency anemia [D50.9]   Cervical stenosis of spinal canal [M48.02]   Coronary artery disease [I25.10]       Presenting Problem/History of Present Illness  Active Hospital Problems    Diagnosis  POA    **Left shoulder pain [M25.512]  Yes    Altered mental status [R41.82]  Yes    Acute urinary retention [R33.8]  Yes    Essential hypertension [I10]  Yes    Paroxysmal atrial fibrillation [I48.0]  Yes    Iron deficiency anemia [D50.9]  Yes    Cervical stenosis of spinal canal [M48.02]  Yes    Coronary artery disease [I25.10]  Yes      Resolved Hospital Problems   No resolved problems to display.          Hospital Course  Patient is a 85 y.o. 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  Imaging showed no cervical or thoracic spine issues.  She had exquisite tenderness at the left acromioclavicular ligament.  Pain was worse with internal and external rotation of her left shoulder.  Pain improved with ice and rest and is considered to be musculoskeletal in origin.  No further intervention is warranted.  She is otherwise stable and will return to her skilled nursing facility.    Patient has had urinary retention since surgery.  She has an indwelling Fuentes catheter.  Urinalysis was suggestive of UTI and culture is growing Enterococcus.  Will treat with amoxicillin.  Once patient's mobility has improved additional voiding trials can be entertained.    Patient can use her left arm as tolerated, without overly stressing her left shoulder.    Patient has noted to have bilateral lower extremity edema.  Prior to surgery she was on diuretics.  Will restart low-dose Lasix  and potassium replacement.    Procedures Performed         Consults:   Consults       Date and Time Order Name Status Description    6/28/2025  3:09 AM Neurosurgery (on-call MD unless specified) Completed     5/23/2025  5:54 PM Inpatient Neurosurgery Consult Completed     5/23/2025  5:54 PM Inpatient Cardiology Consult Completed             Pertinent Test Results:    Lab Results (most recent)       Procedure Component Value Units Date/Time    Basic Metabolic Panel [[id number]]  (Abnormal) Collected: 06/30/25 0533    Specimen: Blood from Arm, Right Updated: 06/30/25 0608     Glucose 108 mg/dL      BUN 21.1 mg/dL      Creatinine 0.66 mg/dL      Sodium 137 mmol/L      Potassium 3.6 mmol/L      Chloride 101 mmol/L      CO2 24.9 mmol/L      Calcium 8.9 mg/dL      BUN/Creatinine Ratio 32.0     Anion Gap 11.1 mmol/L      eGFR 85.6 mL/min/1.73     Narrative:      GFR Categories in Chronic Kidney Disease (CKD)              GFR Category          GFR (mL/min/1.73)    Interpretation  G1                    90 or greater        Normal or high (1)  G2                    60-89                Mild decrease (1)  G3a                   45-59                Mild to moderate decrease  G3b                   30-44                Moderate to severe decrease  G4                    15-29                Severe decrease  G5                    14 or less           Kidney failure    (1)In the absence of evidence of kidney disease, neither GFR category G1 or G2 fulfill the criteria for CKD.    eGFR calculation 2021 CKD-EPI creatinine equation, which does not include race as a factor    CBC (No Diff) [[id number]]  (Abnormal) Collected: 06/30/25 0533    Specimen: Blood from Arm, Right Updated: 06/30/25 0543     WBC 3.63 10*3/mm3      RBC 3.83 10*6/mm3      Hemoglobin 10.0 g/dL      Hematocrit 33.2 %      MCV 86.7 fL      MCH 26.1 pg      MCHC 30.1 g/dL      RDW 14.7 %      RDW-SD 46.5 fl      MPV 10.9 fL      Platelets 158 10*3/mm3     Urine Culture -  Urine, Indwelling Urethral Catheter [[id number]]  (Abnormal)  (Susceptibility) Collected: 06/28/25 1543    Specimen: Urine from Indwelling Urethral Catheter Updated: 06/30/25 0348     Urine Culture 50,000 CFU/mL Enterococcus faecalis    Narrative:      Colonization of the urinary tract without infection is common. Treatment is discouraged unless the patient is symptomatic, pregnant, or undergoing an invasive urologic procedure.    Susceptibility        Enterococcus faecalis      WOOD      Ampicillin Susceptible      Levofloxacin Resistant      Nitrofurantoin Susceptible      Vancomycin Susceptible                           Ammonia [[id number]]  (Normal) Collected: 06/29/25 1402    Specimen: Blood from Arm, Left Updated: 06/29/25 1430     Ammonia 24 umol/L     Blood Gas, Arterial - [[id number]]  (Abnormal) Collected: 06/29/25 1130    Specimen: Arterial Blood Updated: 06/29/25 1136     Site Right Radial     Ruiz's Test Positive     pH, Arterial 7.386 pH units      pCO2, Arterial 59.5 mm Hg      pO2, Arterial 93.5 mm Hg      HCO3, Arterial 35.6 mmol/L      Base Excess, Arterial 8.7 mmol/L      Comment: Serial Number: [id number]:  [id number]        O2 Saturation, Arterial 96.9 %      CO2 Content 37.5 mmol/L      Barometric Pressure for Blood Gas --     Comment: N/A        Modality Cannula     FIO2 28 %      Hemodilution No     PO2/FIO2 334    CK [[id number]]  (Normal) Collected: 06/29/25 1034    Specimen: Blood from Arm, Right Updated: 06/29/25 1103     Creatine Kinase 27 U/L     POC Glucose Once [[id number]]  (Normal) Collected: 06/29/25 0849    Specimen: Blood Updated: 06/29/25 0851     Glucose 96 mg/dL      Comment: Serial Number: [id number]:  [id number]       Basic Metabolic Panel [[id number]]  (Normal) Collected: 06/29/25 0312    Specimen: Blood Updated: 06/29/25 0400     Glucose 88 mg/dL      BUN 14.1 mg/dL      Creatinine 0.58 mg/dL      Sodium 139 mmol/L      Potassium 4.1 mmol/L      Chloride 103 mmol/L      CO2  27.3 mmol/L      Calcium 9.3 mg/dL      BUN/Creatinine Ratio 24.3     Anion Gap 8.7 mmol/L      eGFR 88.3 mL/min/1.73     Narrative:      GFR Categories in Chronic Kidney Disease (CKD)              GFR Category          GFR (mL/min/1.73)    Interpretation  G1                    90 or greater        Normal or high (1)  G2                    60-89                Mild decrease (1)  G3a                   45-59                Mild to moderate decrease  G3b                   30-44                Moderate to severe decrease  G4                    15-29                Severe decrease  G5                    14 or less           Kidney failure    (1)In the absence of evidence of kidney disease, neither GFR category G1 or G2 fulfill the criteria for CKD.    eGFR calculation 2021 CKD-EPI creatinine equation, which does not include race as a factor    CBC (No Diff) [[id number]]  (Abnormal) Collected: 06/29/25 0312    Specimen: Blood Updated: 06/29/25 0340     WBC 2.75 10*3/mm3      RBC 3.74 10*6/mm3      Hemoglobin 9.7 g/dL      Hematocrit 32.6 %      MCV 87.2 fL      MCH 25.9 pg      MCHC 29.8 g/dL      RDW 14.5 %      RDW-SD 46.3 fl      MPV 11.1 fL      Platelets 119 10*3/mm3     POC Glucose Once [[id number]]  (Normal) Collected: 06/28/25 2124    Specimen: Blood Updated: 06/28/25 2126     Glucose 97 mg/dL      Comment: Serial Number: [id number]:  [id number]       Urinalysis With Culture If Indicated - Indwelling Urethral Catheter [[id number]]  (Abnormal) Collected: 06/28/25 1543    Specimen: Urine from Indwelling Urethral Catheter Updated: 06/28/25 1632     Color, UA Yellow     Appearance, UA Clear     pH, UA 6.0     Specific Gravity, UA 1.014     Glucose, UA Negative     Ketones, UA Negative     Bilirubin, UA Negative     Blood, UA Moderate (2+)     Protein, UA Negative     Leuk Esterase, UA Moderate (2+)     Nitrite, UA Negative     Urobilinogen, UA 1.0 E.U./dL    Narrative:      In absence of clinical symptoms, the  presence of pyuria, bacteria, and/or nitrites on the urinalysis result does not correlate with infection.    Urinalysis, Microscopic Only - Indwelling Urethral Catheter [[id number]]  (Abnormal) Collected: 06/28/25 1543    Specimen: Urine from Indwelling Urethral Catheter Updated: 06/28/25 1632     RBC, UA 21-50 /HPF      WBC, UA 21-50 /HPF      Bacteria, UA None Seen /HPF      Squamous Epithelial Cells, UA 0-2 /HPF      Hyaline Casts, UA 0-2 /LPF      Methodology Automated Microscopy             Results for orders placed during the hospital encounter of 03/21/25    Adult Transthoracic Echo Complete W/ Cont if Necessary Per Protocol    Interpretation Summary    Left ventricular ejection fraction appears to be 61 - 65%.    Left ventricular diastolic function was normal.    The left atrial cavity is moderately dilated.    Left atrial volume is severely increased.    Moderate aortic valve regurgitation is present.    Moderate mitral valve regurgitation is present.    Estimated right ventricular systolic pressure from tricuspid regurgitation is normal (<35 mmHg).              Condition on Discharge: Stable    Vital Signs  Temp:  [96.8 °F (36 °C)-98.2 °F (36.8 °C)] 97.7 °F (36.5 °C)  Heart Rate:  [59-93] 66  Resp:  [14-18] 18  BP: (100-132)/(55-84) 119/84    Physical Exam:     General Appearance:    Alert, cooperative, in no acute distress, pleasant, oriented   Head:    Normocephalic, without obvious abnormality, atraumatic   Eyes:            Lids and lashes normal, conjunctivae and sclerae normal, no   icterus, no pallor, corneas clear, PERRLA   Ears:    Ears appear intact with no abnormalities noted   Throat:   No oral lesions, no thrush, oral mucosa moist   Neck:   No adenopathy, supple, trachea midline, no thyromegaly, no   carotid bruit, no JVD   Lungs:     Clear to auscultation,respirations regular, even and                  unlabored    Heart:    Regular rhythm and normal rate, normal S1 and S2, no             murmur, no gallop, no rub, no click   Chest Wall:    No abnormalities observed   Abdomen:     Normal bowel sounds, no masses, no organomegaly, soft        non-tender, non-distended, no guarding, no rebound                tenderness   Extremities: Pain with internal/external rotation of left shoulder, improved from admission   Pulses:   Pulses palpable and equal bilaterally   Skin:   No bleeding, bruising or rash   Lymph nodes:   No palpable adenopathy   Neurologic: Weakness in bilateral lower extremities       Discharge Disposition  Skilled Nursing Facility ([place] - External)    Discharge Medications     Discharge Medications        New Medications        Instructions Start Date   amoxicillin 500 MG capsule  Commonly known as: AMOXIL   500 mg, Oral, Every 8 Hours Scheduled      furosemide 20 MG tablet  Commonly known as: Lasix   20 mg, Oral, Daily      ondansetron ODT 4 MG disintegrating tablet  Commonly known as: ZOFRAN-ODT   4 mg, Oral, Every 6 Hours PRN      potassium chloride 10 MEQ CR tablet  Commonly known as: KLOR-CON M10   10 mEq, Oral, 2 Times Daily             Changes to Medications        Instructions Start Date   oxyCODONE 10 MG tablet  Commonly known as: ROXICODONE  What changed: how much to take   5 mg, Oral, Every 4 Hours PRN             Continue These Medications        Instructions Start Date   apixaban 5 MG tablet tablet  Commonly known as: Eliquis   5 mg, Oral, 2 Times Daily      aspirin 81 MG chewable tablet   81 mg, Daily      atorvastatin 40 MG tablet  Commonly known as: LIPITOR   40 mg, Oral, Daily      bisacodyl 5 MG EC tablet  Commonly known as: DULCOLAX   5 mg, Oral, Daily PRN      bisacodyl 10 MG suppository  Commonly known as: DULCOLAX   10 mg, Rectal, Daily PRN      docusate sodium 100 MG capsule   100 mg, Oral, 2 Times Daily      HYDROcodone-acetaminophen  MG per tablet  Commonly known as: NORCO   1 tablet, Oral, Every 6 Hours PRN      metoprolol succinate XL 25 MG 24 hr  tablet  Commonly known as: TOPROL-XL   12.5 mg, Oral, Every 24 Hours Scheduled      midodrine 10 MG tablet  Commonly known as: PROAMATINE   10 mg, Oral, Every 8 Hours      NON FORMULARY   Ketamine-ibuprofen-gabapentin- lidocaine-baclofen cream:  apply a thin layer topically four times daily.      polyethylene glycol 17 g packet  Commonly known as: MIRALAX   17 g, Oral, Daily      MiraLax 17 GM/SCOOP powder  Generic drug: polyethylene glycol   17 g, Daily PRN      pregabalin 200 MG capsule  Commonly known as: LYRICA   200 mg, Oral, Every 8 Hours Scheduled      senna 8.6 MG tablet  Commonly known as: SENOKOT   2 tablets, Every Night at Bedtime      sertraline 100 MG tablet  Commonly known as: ZOLOFT   100 mg, Oral, Every Evening      Synthroid 75 MCG tablet  Generic drug: levothyroxine   75 mcg, Oral, Every Morning               Discharge Diet:   Diet Instructions       Diet: Regular/House Diet; Regular (IDDSI 7); Thin (IDDSI 0)      Discharge Diet: Regular/House Diet    Texture: Regular (IDDSI 7)    Fluid Consistency: Thin (IDDSI 0)            Activity at Discharge:   Activity Instructions       Up WIth Assist              Follow-up Appointments  Future Appointments   Date Time Provider Department Center   7/15/2025  4:00 PM [facility]   7/16/2025 11:20 AM Navneet Gary DO [facility]   8/11/2025  1:45 PM [place] IV, MD [facility]   8/28/2025  1:00 PM [facility] [place] DEVICE [facility]   8/28/2025  1:30 PM Evgeny Gregory MD [facility]     Additional Instructions for the Follow-ups that You Need to Schedule       Discharge Follow-up with PCP   As directed       Currently Documented PCP:    Anish Lew APRN    PCP Phone Number:    [phone]     Follow Up Details: After release from rehab                Test Results Pending at Discharge  Pending Results       None             Tatiana Gilliland MD  06/30/25  12:09 EDT    Time: Discharge 25  min

## 2025-06-30 NOTE — THERAPY EVALUATION
Patient Name: Domonique See  : 1940    MRN: [id number]                              Today's Date: 2025       Admit Date: 2025    Visit Dx:     ICD-10-CM ICD-9-CM   1. Pain of left scapula  M89.8X1 733.90   2. Cervical radiculopathy  M54.12 723.4   3. Acute pain of left shoulder  M25.512 719.41     Patient Active Problem List   Diagnosis    Iron deficiency anemia    Anemia, unspecified    Pacemaker    Paroxysmal atrial fibrillation    Cervical disc disorder with radiculopathy    Cervical radiculopathy    Cervical stenosis of spinal canal    Deep vein thrombosis (DVT)    Dyspnea    Family history of diabetes mellitus    Fibromyositis    Essential hypertension    Lumbar radiculopathy    Primary localized osteoarthritis    Sacroiliitis, not elsewhere classified    Spondylolisthesis, acquired    Primary osteoarthritis of both knees    Obesity (BMI 30.0-34.9)    Hx of hypertrophic cardiomyopathy    Coronary artery disease    Sleep apnea    Acute midline low back pain without sciatica    Hypertensive retinopathy    Neck pain    Cervical neuralgia    Seizure disorder    Encounter for pre-operative cardiovascular clearance    Aortic insufficiency    Mitral regurgitation    Acute urinary retention    Left shoulder pain    Acute urinary retention    Altered mental status     Past Medical History:   Diagnosis Date    Anemia     Aortic insufficiency 2025    Asthma     CHF (congestive heart failure)     Cholecystitis 2011    Congenital heart disease     Coronary artery disease 2013    Deep vein thrombosis (DVT) 2017    GERD (gastroesophageal reflux disease)     Heart murmur Don't know    Hx of hypertrophic cardiomyopathy     Hypertension     Hypertrophic obstructive cardiomyopathy 11/10/2017    Low back pain     Lumbosacral disc disease     Mitral regurgitation 2025    Mixed hyperlipidemia 10/06/2017    Morbid obesity 2020    Peptic ulceration     Persistent atrial  fibrillation 01/10/2023    Primary osteoarthritis of both knees 02/08/2021    Primary osteoarthritis of right knee 09/21/2020    Seizures     Sleep apnea 2017    TIA (transient ischemic attack)      Past Surgical History:   Procedure Laterality Date    ABDOMINAL HERNIA REPAIR      ABLATION OF DYSRHYTHMIC FOCUS  01-    BACK SURGERY      BREAST AUGMENTATION      CARDIAC CATHETERIZATION      CARDIAC ELECTROPHYSIOLOGY PROCEDURE N/A 01/16/2023    Procedure: Ablation atrial fibrillation and flutter, cryo Duong and Ramón aware;  Surgeon: Leeanne Pleitez MD;  Location: [facility] CATH INVASIVE LOCATION;  Service: Cardiovascular;  Laterality: N/A;    CARDIAC PACEMAKER PLACEMENT  03/16/2017    Dual Chamber    CERVICAL LAMINECTOMY DECOMPRESSION POSTERIOR N/A 5/29/2025    Procedure: CERVICAL LAMINECTOMY DECOMPRESSION POSTERIOR C7 - T1, C5-T2 FUSION;  Surgeon: Lake Bernal IV, MD;  Location: [facility] MAIN OR;  Service: Neurosurgery;  Laterality: N/A;    CERVICAL RIB RESECTION Bilateral     CERVICAL RIB RESECTION Bilateral 1963    bilateral cervical ribs removed - extra ribs located and causing pain    CHOLECYSTECTOMY      COLONOSCOPY      CORONARY ARTERY BYPASS GRAFT  2013    EPIDURAL BLOCK      NECK SURGERY      NECK SURGERY  05/29/2025    SPINAL FUSION      THORACIC DECOMPRESSION POSTERIOR FUSION  06/04/2014    L3-5 & S1    TRIGGER POINT INJECTION      UPPER GASTROINTESTINAL ENDOSCOPY        General Information       Row Name 06/30/25 1300          OT Time and Intention    Subjective Information complains of;pain  -ES       Row Name 06/30/25 1300          General Information    Prior Level of Function independent:  -ES       Row Name 06/30/25 1300          Occupational Profile    Reason for Services/Referral (Occupational Profile) Pt is a 85 y.o. year old female admitted 6/27/25 with c/o worsening L shoulder pain. Code CVA called 6/29 for AMS. Imaging (-).     Multiple recent admissions for c-spine pain, scapular pain, and  hypotension. Pt s/p cervical laminectomy decompression posterior C7-T1 and C5-T2 fusion preformed by Dr. Bernal. Order for Hard cervical collar on at all times.    PMHx significant for cervical spinal stenosis, Afib, chronic CAD and hypertrophic cardiomyopathy.     At baseline, pt resides with adult son and granddaughter in a multi-level home with ramp entry. Pt reports she was IND with I/ADLs and actively driving before March 4th, pt drove home from doctors appointment that day and reports she has significantly declined since.  -ES       Row Name 06/30/25 1300          Living Environment    Current Living Arrangements home  -ES       Row Name 06/30/25 1300          Cognition    Orientation Status (Cognition) oriented x 4  Short Blessed: 6/28, indicating questionable cognition.  -ES       Row Name 06/30/25 1300          Safety Issues/Impairments Affecting Functional Mobility    Safety Issues Affecting Function (Mobility) sequencing abilities;safety precaution awareness  -ES     Impairments Affecting Function (Mobility) balance;cognition;endurance/activity tolerance;coordination;pain;postural/trunk control;shortness of breath  -ES               User Key  (r) = Recorded By, (t) = Taken By, (c) = Cosigned By      Initials Name Provider Type    ES Kaylan Pompa OT Occupational Therapist                     Mobility/ADL's       Row Name 06/30/25 1301          Bed Mobility    Bed Mobility bed mobility (all) activities  -ES     All Activities, [place] (Bed Mobility) moderate assist (50% patient effort)  -ES       Row Name 06/30/25 1301          Transfers    Transfers sit-stand transfer;bed-chair transfer  -ES       Row Name 06/30/25 1301          Bed-Chair Transfer    Bed-Chair [place] (Transfers) maximum assist (25% patient effort);2 person assist  -ES     Comment, (Bed-Chair Transfer) Rec Ilene Wattsdy back to bed. Discussed with NSG  -ES       Row Name 06/30/25 1301          Sit-Stand Transfer    Sit-Stand  [place] (Transfers) maximum assist (25% patient effort);2 person assist  -       Row Name 06/30/25 1301          Functional Mobility    Functional Mobility- Ind. Level not appropriate to assess  -ES       Row Name 06/30/25 1301          Activities of Daily Living    BADL Assessment/Intervention upper body dressing;lower body dressing;toileting;grooming  -       Row Name 06/30/25 1301          Upper Body Dressing Assessment/Training    [place] Level (Upper Body Dressing) maximum assist (25% patient effort)  -ES       Row Name 06/30/25 1301          Lower Body Dressing Assessment/Training    [place] Level (Lower Body Dressing) dependent (less than 25% patient effort)  -ES       Row Name 06/30/25 1301          Toileting Assessment/Training    [place] Level (Toileting) maximum assist (25% patient effort)  -       Row Name 06/30/25 1301          Grooming Assessment/Training    [place] Level (Grooming) moderate assist (50% patient effort)  -               User Key  (r) = Recorded By, (t) = Taken By, (c) = Cosigned By      Initials Name Provider Type     Kaylan Pompa OT Occupational Therapist                   Obj/Interventions       [facility] Name 06/30/25 1306          Sensory Assessment (Somatosensory)    Sensory Assessment (Somatosensory) sensation intact  -       Row Name 06/30/25 1306          Vision Assessment/Intervention    Visual Impairment/Limitations corrective lenses for reading  -       Row Name 06/30/25 1306          Range of Motion Comprehensive    Comment, General Range of Motion LUE shoulder 120* flexion, 90* abd. Elbow/wrist WFL. RUE WFL.  -       Row Name 06/30/25 1306          Strength Comprehensive (MMT)    Comment, General Manual Muscle Testing (MMT) Assessment Limited assessment due to LUE shoulder pain and recent c-spine sx. Grossly 3+/5 within ROM  -ES       Row Name 06/30/25 1306          Balance    Balance Assessment sitting static balance;sitting  dynamic balance;standing static balance  -ES     Static Sitting Balance supervision  -ES     Dynamic Sitting Balance minimal assist  -ES     Static Standing Balance 2-person assist;maximum assist  -ES     Balance Interventions standing;sitting;dynamic;static  -ES               User Key  (r) = Recorded By, (t) = Taken By, (c) = Cosigned By      Initials Name Provider Type    Kaylan Berman OT Occupational Therapist                   Goals/Plan       Row Name 06/30/25 1310          Dressing Goal 1 (OT)    Activity/Device (Dressing Goal 1, OT) dressing skills, all  -ES     [place]/Cues Needed (Dressing Goal 1, OT) maximum assist (25-49% patient effort)  -ES     Time Frame (Dressing Goal 1, OT) 2 weeks  -ES       Row Name 06/30/25 1310          Toileting Goal 1 (OT)    Activity/Device (Toileting Goal 1, OT) toileting skills, all  -ES     [place] Level/Cues Needed (Toileting Goal 1, OT) maximum assist (25-49% patient effort)  -ES     Time Frame (Toileting Goal 1, OT) 2 weeks  -ES       Row Name 06/30/25 1310          Grooming Goal 1 (OT)    Activity/Device (Grooming Goal 1, OT) grooming skills, all  -ES     [place] (Grooming Goal 1, OT) minimum assist (75% or more patient effort)  -ES     Time Frame (Grooming Goal 1, OT) 2 weeks  -ES       Row Name 06/30/25 1310          Therapy Assessment/Plan (OT)    Planned Therapy Interventions (OT) activity tolerance training;BADL retraining;neuromuscular control/coordination retraining;occupation/activity based interventions;prosthetic fitting/training;transfer/mobility retraining;strengthening exercise;functional balance retraining  -ES               User Key  (r) = Recorded By, (t) = Taken By, (c) = Cosigned By      Initials Name Provider Type    Kaylan Berman OT Occupational Therapist                   Clinical Impression       Row Name 06/30/25 1004          Pain Assessment    Pretreatment Pain Rating 10/10  -ES     Posttreatment Pain Rating  10/10  -ES     Pain Location back;extremity  -ES     Pain Side/Orientation left;upper  -ES       Row Name 06/30/25 1004          Plan of Care Review    Outcome Evaluation Pt is a 85 y.o. year old female admitted 6/27/25 with c/o worsening L shoulder pain. Code CVA called 6/29 for AMS. Imaging (-).     Multiple recent admissions for c-spine pain, scapular pain, and hypotension. Pt s/p cervical laminectomy decompression posterior C7-T1 and C5-T2 fusion preformed by Dr. Bernal.  Order for Hard cervical collar on at all times.    PMHx significant for cervical spinal stenosis, Afib, chronic CAD and hypertrophic cardiomyopathy.     At baseline, pt resides with adult son and granddaughter in a multi-level home with ramp entry. Pt reports she was IND with I/ADLs and actively driving before March 4th, pt drove home from doctors appointment that day and reports she has significantly declined since. C/o 10/10 pain in LUE shoulder, but agreeable to work with OT / PT. Mod A for bed mobility. Sits EOB with supervision. NSG present to address small stitch in pt's back. Sit<>stand with Max A x2. Able to maintain stand with Min-MOd A x2, but limited with any weight shift. Able to pivot pt to chair, but rec Ilene Michele to return to bed. NSG aware. pt highly motivated to improve. Rec return to SNF rehab.  -ES       Row Name 06/30/25 1004          Therapy Assessment/Plan (OT)    Rehab Potential (OT) good  -ES     Criteria for Skilled Therapeutic Interventions Met (OT) yes;skilled treatment is necessary  -ES     Therapy Frequency (OT) 5 times/wk  -ES     Predicted Duration of Therapy Intervention (OT) until dc138  -ES       Row Name 06/30/25 1004          Vital Signs    Pre Systolic BP Rehab 121  -ES     Pre Treatment Diastolic BP 65  -ES     Intra Systolic BP Rehab 137  -ES     Intra Treatment Diastolic BP 63  -ES     Post Systolic BP Rehab 138  -ES     Post Treatment Diastolic BP 72  -ES     Pretreatment Heart Rate (beats/min) 92  -ES      Pre SpO2 (%) 97  -ES     O2 Delivery Pre Treatment room air  -ES     O2 Delivery Intra Treatment room air  -ES     O2 Delivery Post Treatment room air  -ES     Pre Patient Position Supine  -ES     Intra Patient Position Standing  -ES     Post Patient Position Sitting  -ES       Row Name 06/30/25 1004          Positioning and Restraints    Pre-Treatment Position in bed  -ES     Post Treatment Position chair  -ES     In Chair notified nsg;call light within reach;encouraged to call for assist;exit alarm on;with nsg  -ES               User Key  (r) = Recorded By, (t) = Taken By, (c) = Cosigned By      Initials Name Provider Type    Kaylan Berman OT Occupational Therapist                   Outcome Measures       Row Name 06/30/25 1311          How much help from another is currently needed...    Putting on and taking off regular lower body clothing? 1  -ES     Bathing (including washing, rinsing, and drying) 2  -ES     Toileting (which includes using toilet bed pan or urinal) 2  -ES     Putting on and taking off regular upper body clothing 2  -ES     Taking care of personal grooming (such as brushing teeth) 2  -ES     Eating meals 3  -ES     AM-PAC 6 Clicks Score (OT) 12  -ES       Row Name 06/30/25 0800          How much help from another person do you currently need...    Turning from your back to your side while in flat bed without using bedrails? 2  -JH     Moving from lying on back to sitting on the side of a flat bed without bedrails? 2  -JH     Moving to and from a bed to a chair (including a wheelchair)? 2  -JH     Standing up from a chair using your arms (e.g., wheelchair, bedside chair)? 2  -JH     Climbing 3-5 steps with a railing? 2  -JH     To walk in hospital room? 2  -JH     AM-PAC 6 Clicks Score (PT) 12  -JH       Row Name 06/30/25 1311          Functional Assessment    Outcome Measure Options AM-PAC 6 Clicks Daily Activity (OT)  -ES               User Key  (r) = Recorded By, (t) = Taken By,  (c) = Cosigned By      Initials Name Provider Type    Kaylan Berman OT Occupational Therapist    Aspen Lind RN Registered Nurse                      OT Recommendation and Plan  Planned Therapy Interventions (OT): activity tolerance training, BADL retraining, neuromuscular control/coordination retraining, occupation/activity based interventions, prosthetic fitting/training, transfer/mobility retraining, strengthening exercise, functional balance retraining  Therapy Frequency (OT): 5 times/wk  Plan of Care Review  Outcome Evaluation: Pt is a 85 y.o. year old female admitted 6/27/25 with c/o worsening L shoulder pain. Code CVA called 6/29 for AMS. Imaging (-).     Multiple recent admissions for c-spine pain, scapular pain, and hypotension. Pt s/p cervical laminectomy decompression posterior C7-T1 and C5-T2 fusion preformed by Dr. Bernal.  Order for Hard cervical collar on at all times.    PMHx significant for cervical spinal stenosis, Afib, chronic CAD and hypertrophic cardiomyopathy.     At baseline, pt resides with adult son and granddaughter in a multi-level home with ramp entry. Pt reports she was IND with I/ADLs and actively driving before March 4th, pt drove home from doctors appointment that day and reports she has significantly declined since. C/o 10/10 pain in LUE shoulder, but agreeable to work with OT / PT. Mod A for bed mobility. Sits EOB with supervision. NSG present to address small stitch in pt's back. Sit<>stand with Max A x2. Able to maintain stand with Min-MOd A x2, but limited with any weight shift. Able to pivot pt to chair, but rec Ilene Stedy to return to bed. NSG aware. pt highly motivated to improve. Rec return to SNF rehab.     Time Calculation:         Time Calculation- OT       Row Name 06/30/25 1311             Time Calculation- OT    OT Start Time 1004  -ES      OT Stop Time 1030  -ES      OT Time Calculation (min) 26 min  -ES      Total Timed Code Minutes- OT 0 minute(s)  -ES       OT Received On 06/30/25  -ES      OT - Next Appointment 07/01/25  -ES      OT Goal Re-Cert Due Date 07/14/25  -ES                User Key  (r) = Recorded By, (t) = Taken By, (c) = Cosigned By      Initials Name Provider Type    Kaylan Berman OT Occupational Therapist                           Kaylan Pompa OT  6/30/2025

## 2025-06-30 NOTE — PLAN OF CARE
Goal Outcome Evaluation:              Outcome Evaluation: Pt is a 85 y.o. year old female admitted 6/27/25 with c/o worsening L shoulder pain. Code CVA called 6/29 for AMS. Imaging (-).     Multiple recent admissions for c-spine pain, scapular pain, and hypotension. Pt s/p cervical laminectomy decompression posterior C7-T1 and C5-T2 fusion preformed by Dr. Bernal.  Order for Hard cervical collar on at all times.    PMHx significant for cervical spinal stenosis, Afib, chronic CAD and hypertrophic cardiomyopathy.     At baseline, pt resides with adult son and granddaughter in a multi-level home with ramp entry. Pt reports she was IND with I/ADLs and actively driving before March 4th, pt drove home from doctors appointment that day and reports she has significantly declined since. C/o 10/10 pain in LUE shoulder, but agreeable to work with OT / PT. Mod A for bed mobility. Sits EOB with supervision. NSG present to address small stitch in pt's back. Sit<>stand with Max A x2. Able to maintain stand with Min-MOd A x2, but limited with any weight shift. Able to pivot pt to chair, but rec Ilene Stedy to return to bed. NSG aware. pt highly motivated to improve. Rec return to SNF rehab.

## 2025-06-30 NOTE — SIGNIFICANT NOTE
Case Management/Social Work    Patient Name:  Domonique See  YOB: 1940  MRN: [id number]  Admit Date:  6/27/2025 06/30/25 1401   Post Acute Pre-Cert Documentation   Post Acute Pre-Cert Initiated Comment CME called [facility] ([phone]) and spoke with Manuel HUSAIN who states patient may readmit to skilled nursing facility [facility] under previous auth ID [id number] until 6/30/25 at 11:59 PM. If patient does not readmit to SNF by that time, new precert will be needed. CM made aware.           Electronically signed by:  AMBER Ocasio  06/30/25 14:01 EDT    Leonel Chen  Case Management Extender  [address] [address]  P: [phone]  F: [phone]

## 2025-06-30 NOTE — NURSING NOTE
Pt more awake and able to take meds. Spoke withmd about narcotic meds and lyrica . Assisted us to put her on a bedpan. N0 bm, just gas,

## 2025-06-30 NOTE — DISCHARGE PLACEMENT REQUEST
"Geoffrey See (85 y.o. Female)       Date of Birth   1940    Social Security Number       Address   [address] [place] [address]    Home Phone   [phone]    MRN   [id number]       Roman Catholic   Rastafarian    Marital Status                               Admission Date   6/27/2025    Admission Type   Emergency    Admitting Provider   Jose Martin Aleman MD    Attending Provider   Tatiana Gilliland MD    Department, Room/Bed   [facility] OBSERVATION, 235/1       Discharge Date       Discharge Disposition   Skilled Nursing Facility (DC - External)    Discharge Destination                                 Attending Provider: Tatiana Gilliland MD    Allergies: No Known Allergies    Isolation: None   Infection: None   Code Status: CPR    Ht: 152.4 cm (60\")   Wt: 70.1 kg (154 lb 8.7 oz)    Admission Cmt: None   Principal Problem: Left shoulder pain [M25.512]                   Active Insurance as of 6/27/2025       Primary Coverage       Payor Plan Insurance Group Employer/Plan Group    ANTHEM MEDICARE REPLACEMENT ANTHEM MEDICARE ADVANTAGE PPO [id number]       Payor Plan Address Payor Plan Phone Number Payor Plan Fax Number Effective Dates    [address] [phone]  4/1/2024 - None Entered    [facility] [id number]         Subscriber Name Subscriber Birth Date Member ID       GEOFFREY SEE 1940 [id number]                     Emergency Contacts        (Rel.) Home Phone Work Phone Mobile Phone    FarrahfelicianovidacandyBoone (Son) -- -- [phone]    NILTON SEE (Relative) [phone]    CHADWICKAMADEO (Son) [phone]              Insurance Information                  ANTHEM MEDICARE REPLACEMENT/ANTHEM MEDICARE ADVANTAGE PPO Phone: [phone]    Subscriber: Geoffrey See Subscriber#: [id number]    Group#: [id number] Precert#: [id number]    Authorization#: pnd Effective Date: --          "

## 2025-06-30 NOTE — PLAN OF CARE
Goal Outcome Evaluation:  Plan of Care Reviewed With: patient           Outcome Evaluation: Pt is a 85 y.o. year old female admitted 6/27/25 with c/o worsening L shoulder pain. Code CVA called 6/29 for AMS. Imaging (-). Multiple recent admissions for c-spine pain, scapular pain, and hypotension. Pt s/p cervical laminectomy decompression posterior C7-T1 and C5-T2 fusion preformed by Dr. Bernal. Order for Hard cervical collar on at all times. PMHx significant for cervical spinal stenosis, Afib, chronic CAD and hypertrophic cardiomyopathy. Originally pt is from home with family, with a ramp entrance, however has had significant decline recently and was admitted from SNF. Pt very willing to participate in therapy this date, but requires MAX A x2 to come to standing and to transfer to bedside chair. Pt with tenderness to palpation over L scapula but able to utilize UE to pull to standing. Educated nursing staff on using kartik steady to transfer pt back to bed. Recommendation is return to SNF at d/c.    Anticipated Discharge Disposition (PT): skilled nursing facility

## 2025-07-01 VITALS
HEART RATE: 62 BPM | DIASTOLIC BLOOD PRESSURE: 55 MMHG | HEIGHT: 60 IN | RESPIRATION RATE: 17 BRPM | WEIGHT: 154.54 LBS | BODY MASS INDEX: 30.34 KG/M2 | TEMPERATURE: 97.9 F | SYSTOLIC BLOOD PRESSURE: 124 MMHG | OXYGEN SATURATION: 100 %

## 2025-07-01 PROCEDURE — 97530 THERAPEUTIC ACTIVITIES: CPT

## 2025-07-01 PROCEDURE — 94799 UNLISTED PULMONARY SVC/PX: CPT

## 2025-07-01 PROCEDURE — 97535 SELF CARE MNGMENT TRAINING: CPT

## 2025-07-01 PROCEDURE — 94664 DEMO&/EVAL PT USE INHALER: CPT

## 2025-07-01 PROCEDURE — 94761 N-INVAS EAR/PLS OXIMETRY MLT: CPT

## 2025-07-01 PROCEDURE — 25010000002 FUROSEMIDE PER 20 MG: Performed by: NURSE PRACTITIONER

## 2025-07-01 RX ORDER — TAMSULOSIN HYDROCHLORIDE 0.4 MG/1
0.4 CAPSULE ORAL DAILY
Status: DISCONTINUED | OUTPATIENT
Start: 2025-07-01 | End: 2025-07-01 | Stop reason: HOSPADM

## 2025-07-01 RX ORDER — OXYCODONE HYDROCHLORIDE 5 MG/1
5 TABLET ORAL EVERY 6 HOURS
Qty: 28 TABLET | Refills: 0 | Status: SHIPPED | OUTPATIENT
Start: 2025-07-01

## 2025-07-01 RX ORDER — FUROSEMIDE 20 MG/1
20 TABLET ORAL DAILY PRN
Status: DISCONTINUED | OUTPATIENT
Start: 2025-07-01 | End: 2025-07-01 | Stop reason: HOSPADM

## 2025-07-01 RX ORDER — HYDROCODONE BITARTRATE AND ACETAMINOPHEN 10; 325 MG/1; MG/1
1 TABLET ORAL EVERY 6 HOURS PRN
Qty: 28 TABLET | Refills: 0 | Status: SHIPPED | OUTPATIENT
Start: 2025-07-01

## 2025-07-01 RX ORDER — NITROFURANTOIN 25; 75 MG/1; MG/1
100 CAPSULE ORAL EVERY 12 HOURS SCHEDULED
Status: DISCONTINUED | OUTPATIENT
Start: 2025-07-01 | End: 2025-07-01 | Stop reason: HOSPADM

## 2025-07-01 RX ORDER — OXYCODONE HYDROCHLORIDE 5 MG/1
5 TABLET ORAL EVERY 6 HOURS
Refills: 0 | Status: DISCONTINUED | OUTPATIENT
Start: 2025-07-01 | End: 2025-07-01 | Stop reason: HOSPADM

## 2025-07-01 RX ORDER — TAMSULOSIN HYDROCHLORIDE 0.4 MG/1
0.4 CAPSULE ORAL DAILY
Start: 2025-07-02

## 2025-07-01 RX ADMIN — MIDODRINE HYDROCHLORIDE 10 MG: 5 TABLET ORAL at 06:13

## 2025-07-01 RX ADMIN — TAMSULOSIN HYDROCHLORIDE 0.4 MG: 0.4 CAPSULE ORAL at 11:42

## 2025-07-01 RX ADMIN — PREGABALIN 200 MG: 100 CAPSULE ORAL at 06:13

## 2025-07-01 RX ADMIN — Medication 10 ML: at 08:29

## 2025-07-01 RX ADMIN — MIDODRINE HYDROCHLORIDE 10 MG: 5 TABLET ORAL at 14:14

## 2025-07-01 RX ADMIN — AMOXICILLIN 500 MG: 250 CAPSULE ORAL at 06:14

## 2025-07-01 RX ADMIN — NITROFURANTOIN MONOHYDRATE/MACROCRYSTALS 100 MG: 25; 75 CAPSULE ORAL at 11:42

## 2025-07-01 RX ADMIN — APIXABAN 5 MG: 5 TABLET, FILM COATED ORAL at 08:29

## 2025-07-01 RX ADMIN — OXYCODONE 5 MG: 5 TABLET ORAL at 17:46

## 2025-07-01 RX ADMIN — METOPROLOL SUCCINATE 12.5 MG: 25 TABLET, EXTENDED RELEASE ORAL at 08:29

## 2025-07-01 RX ADMIN — IPRATROPIUM BROMIDE AND ALBUTEROL SULFATE 3 ML: .5; 3 SOLUTION RESPIRATORY (INHALATION) at 11:18

## 2025-07-01 RX ADMIN — PREGABALIN 200 MG: 100 CAPSULE ORAL at 14:14

## 2025-07-01 RX ADMIN — FUROSEMIDE 20 MG: 10 INJECTION, SOLUTION INTRAMUSCULAR; INTRAVENOUS at 08:29

## 2025-07-01 RX ADMIN — SERTRALINE HYDROCHLORIDE 100 MG: 100 TABLET, FILM COATED ORAL at 17:47

## 2025-07-01 RX ADMIN — OXYCODONE 5 MG: 5 TABLET ORAL at 11:42

## 2025-07-01 RX ADMIN — ASPIRIN 81 MG CHEWABLE TABLET 81 MG: 81 TABLET CHEWABLE at 08:29

## 2025-07-01 RX ADMIN — LEVOTHYROXINE SODIUM 75 MCG: 0.07 TABLET ORAL at 06:14

## 2025-07-01 NOTE — CASE MANAGEMENT/SOCIAL WORK
Continued Stay Note  [facility]     Patient Name: Domonique See  MRN: [id number]  Today's Date: 7/1/2025    Admit Date: 6/27/2025    Plan: [facility] home[facility]. Will need transport.  Ph[facility]. No PASRR  needed   Discharge Plan       Row Name 07/01/25 1428       Plan    Plan Comments CM [facility] and [facility] that precert has been obtained and she can return to SNF today. CM completed the Medical Necessity Form.                          Angela DEL REALN,RN Case Manager  [facility]  Phone: Desk- [phone] cell- [phone]

## 2025-07-01 NOTE — SIGNIFICANT NOTE
Case Management/Social Work    Patient Name:  Domonique See  YOB: 1940  MRN: [id number]  Admit Date:  6/27/2025 07/01/25 1416   Post Acute Pre-Cert Documentation   Date Post Acute Pre-Cert Completed 07/01/25   All Clinicals Submitted? Yes   Response Received from Insurance? Approval   Post Acute Pre-Cert Initiated Comment CME verified SNF precert approval via [employer] portal. Auth ID [id number] . SNF precert valid 7/1-7/7. Approval letter indexed to media. CM made aware.           Electronically signed by:  Leonel Chen, AMBER  07/01/25 14:17 EDT    Leonel Chen  Case Management Extender  [address] [address]  P: [phone]  F: [phone]

## 2025-07-01 NOTE — PROGRESS NOTES
Enter Query Response Below      Query Response: Pt has UTI due to [place], present on admission.             If applicable, please update the problem list.

## 2025-07-01 NOTE — PLAN OF CARE
"Assessment: Domonique See presents with functional mobility impairments which indicate the need for skilled intervention. Pt reports she has not been out of bed this date but agreeable to get to the chair with therapy. Pt is maxA x2 to transfer to chair, demos poor trunk control and postural instability needing cueing to utilize glutes to stand upright. Pt able to tolerate upright position for ~15 sec before needing to rest. Tolerating session today without incident. Will continue to follow and progress as tolerated.     Plan/Recommendations:   If medically appropriate, Moderate Intensity Therapy recommended post-acute care. This is recommended as therapy feels the patient would require 3-4 days per week and wouldn't tolerate \"3 hour daily\" rehab intensity. SNF would be the preferred choice. If the patient does not agree to SNF, arrange HH or OP depending on home bound status. If patient is medically complex, consider LTACH. Pt requires no DME at discharge.  "

## 2025-07-01 NOTE — PLAN OF CARE
"Goal Outcome Evaluation:           Assessment: Domonique See presents with ADL impairments affecting function including balance, endurance / activity tolerance, pain, postural / trunk control, and strength. Pt requires max  x2 for functional transfers out of bed and to chair. Pt with poor standing tolerance and ability to keep hips under body and knees extended to stand with upright posture for functional activities including toileting hygiene. Pt is motivated to improve and work with therapy but needs multiple rest breaks with activity. Demonstrated functioning below baseline abilities indicate the need for continued skilled intervention while inpatient. Tolerating session today without incident. Will continue to follow and progress as tolerated.     Plan/Recommendations:   Moderate Intensity Therapy recommended post-acute care. This is recommended as therapy feels the patient would require 3-4 days per week and wouldn't tolerate \"3 hour daily\" rehab intensity. SNF would be the preferred choice. If the patient does not agree to SNF, arrange HH or OP depending on home bound status. If patient is medically complex, consider LTACH.. Pt requires no DME at discharge.                                    "

## 2025-07-01 NOTE — THERAPY TREATMENT NOTE
"Subjective: Pt agreeable to therapeutic plan of care.    Objective:     Precautions - fall risk, cervical collar at all times    Bed mobility - Mod-A and Assist x 2  Transfers - Max-A and Assist x 2 stand pivot from EOB to chair, stand from chair x2  Ambulation - 0 feet N/A or Not attempted.    Vitals: Hypotensive  103/59 supine  101/57 sitting EOB    Pain: 6 VAS Location: R knee  Intervention for pain: Repositioned, Increased Activity, and Therapeutic Presence    Education: Provided education on the importance of mobility in the acute care setting, Verbal/Tactile Cues, Transfer Training, and Post-Op Precautions    Assessment: Domonique See presents with functional mobility impairments which indicate the need for skilled intervention. Pt reports she has not been out of bed this date but agreeable to get to the chair with therapy. Pt is maxA x2 to transfer to chair, demos poor trunk control and postural instability needing cueing to utilize glutes to stand upright. Pt able to tolerate upright position for ~15 sec before needing to rest. Tolerating session today without incident. Will continue to follow and progress as tolerated.     Plan/Recommendations:   If medically appropriate, Moderate Intensity Therapy recommended post-acute care. This is recommended as therapy feels the patient would require 3-4 days per week and wouldn't tolerate \"3 hour daily\" rehab intensity. SNF would be the preferred choice. If the patient does not agree to SNF, arrange HH or OP depending on home bound status. If patient is medically complex, consider LTACH. Pt requires no DME at discharge.     Pt desires Skilled Rehab placement at discharge. Pt cooperative; agreeable to therapeutic recommendations and plan of care.     Modified [place]: N/A = No pre-op stroke/TIA    Post-Tx Position: Up in Chair, Alarms activated, and Call light and personal items within reach  PPE: gloves    Therapy Charges for Today       Code Description Service " Date Service Provider Modifiers Qty    [id number]  PT THERAPEUTIC ACT EA 15 MIN 7/1/2025 Karlie Bowie, PT GP 2           PT Charges       Row Name 07/01/25 1438             Time Calculation    Start Time 1413  -      Stop Time 1430  -      Time Calculation (min) 17 min  -      PT Received On 07/01/25  -      PT - Next Appointment 07/02/25  -         Time Calculation- PT    Total Timed Code Minutes- PT 17 minute(s)  -                User Key  (r) = Recorded By, (t) = Taken By, (c) = Cosigned By      Initials Name Provider Type     Karlie Bowie, PT Physical Therapist

## 2025-07-01 NOTE — DISCHARGE SUMMARY
Date of Discharge:  7/1/2025    Discharge Diagnosis:   **Left shoulder pain [M25.512]   Altered mental status [R41.82]   Acute urinary retention [R33.8]   Essential hypertension [I10]   Paroxysmal atrial fibrillation [I48.0]   Iron deficiency anemia [D50.9]   Cervical stenosis of spinal canal [M48.02]   Coronary artery disease [I25.10]       Presenting Problem/History of Present Illness  Active Hospital Problems    Diagnosis  POA    **Left shoulder pain [M25.512]  Yes    Altered mental status [R41.82]  Yes    Acute urinary retention [R33.8]  Yes    Essential hypertension [I10]  Yes    Paroxysmal atrial fibrillation [I48.0]  Yes    Iron deficiency anemia [D50.9]  Yes    Cervical stenosis of spinal canal [M48.02]  Yes    Coronary artery disease [I25.10]  Yes      Resolved Hospital Problems   No resolved problems to display.          Hospital Course  Patient is a 85 y.o. 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  Imaging showed no cervical or thoracic spine issues.  She had exquisite tenderness at the left acromioclavicular ligament.  Pain was worse with internal and external rotation of her left shoulder.  Pain improved with ice and rest and is considered to be musculoskeletal in origin.  No further intervention is warranted.  She is otherwise stable and will return to her skilled nursing facility.    Patient has had urinary retention since surgery.  She has an indwelling Fuentes catheter.  Urinalysis was suggestive of UTI and culture is growing Enterococcus.  Will treat with amoxicillin.  Once patient's mobility has improved additional voiding trials can be entertained.    Patient can use her left arm as tolerated, without overly stressing her left shoulder.    Patient has noted to have bilateral lower extremity edema.  Prior to surgery she was on diuretics.  Will restart low-dose Lasix  and potassium replacement.    Procedures Performed         Consults:   Consults       Date and Time Order Name Status Description    6/28/2025  3:09 AM Neurosurgery (on-call MD unless specified) Completed     5/23/2025  5:54 PM Inpatient Neurosurgery Consult Completed     5/23/2025  5:54 PM Inpatient Cardiology Consult Completed             Pertinent Test Results:    Lab Results (most recent)       Procedure Component Value Units Date/Time    Basic Metabolic Panel [[id number]]  (Abnormal) Collected: 06/30/25 0533    Specimen: Blood from Arm, Right Updated: 06/30/25 0608     Glucose 108 mg/dL      BUN 21.1 mg/dL      Creatinine 0.66 mg/dL      Sodium 137 mmol/L      Potassium 3.6 mmol/L      Chloride 101 mmol/L      CO2 24.9 mmol/L      Calcium 8.9 mg/dL      BUN/Creatinine Ratio 32.0     Anion Gap 11.1 mmol/L      eGFR 85.6 mL/min/1.73     Narrative:      GFR Categories in Chronic Kidney Disease (CKD)              GFR Category          GFR (mL/min/1.73)    Interpretation  G1                    90 or greater        Normal or high (1)  G2                    60-89                Mild decrease (1)  G3a                   45-59                Mild to moderate decrease  G3b                   30-44                Moderate to severe decrease  G4                    15-29                Severe decrease  G5                    14 or less           Kidney failure    (1)In the absence of evidence of kidney disease, neither GFR category G1 or G2 fulfill the criteria for CKD.    eGFR calculation 2021 CKD-EPI creatinine equation, which does not include race as a factor    CBC (No Diff) [[id number]]  (Abnormal) Collected: 06/30/25 0533    Specimen: Blood from Arm, Right Updated: 06/30/25 0543     WBC 3.63 10*3/mm3      RBC 3.83 10*6/mm3      Hemoglobin 10.0 g/dL      Hematocrit 33.2 %      MCV 86.7 fL      MCH 26.1 pg      MCHC 30.1 g/dL      RDW 14.7 %      RDW-SD 46.5 fl      MPV 10.9 fL      Platelets 158 10*3/mm3     Urine Culture -  Urine, Indwelling Urethral Catheter [[id number]]  (Abnormal)  (Susceptibility) Collected: 06/28/25 1543    Specimen: Urine from Indwelling Urethral Catheter Updated: 06/30/25 0348     Urine Culture 50,000 CFU/mL Enterococcus faecalis    Narrative:      Colonization of the urinary tract without infection is common. Treatment is discouraged unless the patient is symptomatic, pregnant, or undergoing an invasive urologic procedure.    Susceptibility        Enterococcus faecalis      WOOD      Ampicillin Susceptible      Levofloxacin Resistant      Nitrofurantoin Susceptible      Vancomycin Susceptible                           Ammonia [[id number]]  (Normal) Collected: 06/29/25 1402    Specimen: Blood from Arm, Left Updated: 06/29/25 1430     Ammonia 24 umol/L     Blood Gas, Arterial - [[id number]]  (Abnormal) Collected: 06/29/25 1130    Specimen: Arterial Blood Updated: 06/29/25 1136     Site Right Radial     Ruiz's Test Positive     pH, Arterial 7.386 pH units      pCO2, Arterial 59.5 mm Hg      pO2, Arterial 93.5 mm Hg      HCO3, Arterial 35.6 mmol/L      Base Excess, Arterial 8.7 mmol/L      Comment: Serial Number: [id number]:  [id number]        O2 Saturation, Arterial 96.9 %      CO2 Content 37.5 mmol/L      Barometric Pressure for Blood Gas --     Comment: N/A        Modality Cannula     FIO2 28 %      Hemodilution No     PO2/FIO2 334    CK [[id number]]  (Normal) Collected: 06/29/25 1034    Specimen: Blood from Arm, Right Updated: 06/29/25 1103     Creatine Kinase 27 U/L     POC Glucose Once [[id number]]  (Normal) Collected: 06/29/25 0849    Specimen: Blood Updated: 06/29/25 0851     Glucose 96 mg/dL      Comment: Serial Number: [id number]:  [id number]       Basic Metabolic Panel [[id number]]  (Normal) Collected: 06/29/25 0312    Specimen: Blood Updated: 06/29/25 0400     Glucose 88 mg/dL      BUN 14.1 mg/dL      Creatinine 0.58 mg/dL      Sodium 139 mmol/L      Potassium 4.1 mmol/L      Chloride 103 mmol/L      CO2  27.3 mmol/L      Calcium 9.3 mg/dL      BUN/Creatinine Ratio 24.3     Anion Gap 8.7 mmol/L      eGFR 88.3 mL/min/1.73     Narrative:      GFR Categories in Chronic Kidney Disease (CKD)              GFR Category          GFR (mL/min/1.73)    Interpretation  G1                    90 or greater        Normal or high (1)  G2                    60-89                Mild decrease (1)  G3a                   45-59                Mild to moderate decrease  G3b                   30-44                Moderate to severe decrease  G4                    15-29                Severe decrease  G5                    14 or less           Kidney failure    (1)In the absence of evidence of kidney disease, neither GFR category G1 or G2 fulfill the criteria for CKD.    eGFR calculation 2021 CKD-EPI creatinine equation, which does not include race as a factor    CBC (No Diff) [[id number]]  (Abnormal) Collected: 06/29/25 0312    Specimen: Blood Updated: 06/29/25 0340     WBC 2.75 10*3/mm3      RBC 3.74 10*6/mm3      Hemoglobin 9.7 g/dL      Hematocrit 32.6 %      MCV 87.2 fL      MCH 25.9 pg      MCHC 29.8 g/dL      RDW 14.5 %      RDW-SD 46.3 fl      MPV 11.1 fL      Platelets 119 10*3/mm3     POC Glucose Once [[id number]]  (Normal) Collected: 06/28/25 2124    Specimen: Blood Updated: 06/28/25 2126     Glucose 97 mg/dL      Comment: Serial Number: [id number]:  [id number]       Urinalysis With Culture If Indicated - Indwelling Urethral Catheter [[id number]]  (Abnormal) Collected: 06/28/25 1543    Specimen: Urine from Indwelling Urethral Catheter Updated: 06/28/25 1632     Color, UA Yellow     Appearance, UA Clear     pH, UA 6.0     Specific Gravity, UA 1.014     Glucose, UA Negative     Ketones, UA Negative     Bilirubin, UA Negative     Blood, UA Moderate (2+)     Protein, UA Negative     Leuk Esterase, UA Moderate (2+)     Nitrite, UA Negative     Urobilinogen, UA 1.0 E.U./dL    Narrative:      In absence of clinical symptoms, the  presence of pyuria, bacteria, and/or nitrites on the urinalysis result does not correlate with infection.    Urinalysis, Microscopic Only - Indwelling Urethral Catheter [[id number]]  (Abnormal) Collected: 06/28/25 1543    Specimen: Urine from Indwelling Urethral Catheter Updated: 06/28/25 1632     RBC, UA 21-50 /HPF      WBC, UA 21-50 /HPF      Bacteria, UA None Seen /HPF      Squamous Epithelial Cells, UA 0-2 /HPF      Hyaline Casts, UA 0-2 /LPF      Methodology Automated Microscopy             Results for orders placed during the hospital encounter of 03/21/25    Adult Transthoracic Echo Complete W/ Cont if Necessary Per Protocol 03/21/2025  4:16 PM    Interpretation Summary    Left ventricular ejection fraction appears to be 61 - 65%.    Left ventricular diastolic function was normal.    The left atrial cavity is moderately dilated.    Left atrial volume is severely increased.    Moderate aortic valve regurgitation is present.    Moderate mitral valve regurgitation is present.    Estimated right ventricular systolic pressure from tricuspid regurgitation is normal (<35 mmHg).              Condition on Discharge: Stable    Vital Signs  Temp:  [97.5 °F (36.4 °C)-98.3 °F (36.8 °C)] 97.9 °F (36.6 °C)  Heart Rate:  [60-69] 62  Resp:  [12-18] 17  BP: (108-150)/(55-72) 124/55    Physical Exam:     General Appearance:    Alert, cooperative, in no acute distress, pleasant, oriented   Head:    Normocephalic, without obvious abnormality, atraumatic   Eyes:            Lids and lashes normal, conjunctivae and sclerae normal, no   icterus, no pallor, corneas clear, PERRLA   Ears:    Ears appear intact with no abnormalities noted   Throat:   No oral lesions, no thrush, oral mucosa moist   Neck:   No adenopathy, supple, trachea midline, no thyromegaly, no   carotid bruit, no JVD   Lungs:     Clear to auscultation,respirations regular, even and                  unlabored    Heart:    Regular rhythm and normal rate, normal S1 and  S2, no            murmur, no gallop, no rub, no click   Chest Wall:    No abnormalities observed   Abdomen:     Normal bowel sounds, no masses, no organomegaly, soft        non-tender, non-distended, no guarding, no rebound                tenderness   Extremities: Pain with internal/external rotation of left shoulder, improved from admission   Pulses:   Pulses palpable and equal bilaterally   Skin:   No bleeding, bruising or rash   Lymph nodes:   No palpable adenopathy   Neurologic: Weakness in bilateral lower extremities       Discharge Disposition  Skilled Nursing Facility ([place] - External)    Discharge Medications     Discharge Medications        New Medications        Instructions Start Date   amoxicillin 500 MG capsule  Commonly known as: AMOXIL   500 mg, Oral, Every 8 Hours Scheduled      furosemide 20 MG tablet  Commonly known as: Lasix   20 mg, Oral, Daily      ondansetron ODT 4 MG disintegrating tablet  Commonly known as: ZOFRAN-ODT   4 mg, Oral, Every 6 Hours PRN      potassium chloride 10 MEQ CR tablet  Commonly known as: KLOR-CON M10   10 mEq, Oral, 2 Times Daily      tamsulosin 0.4 MG capsule 24 hr capsule  Commonly known as: FLOMAX   0.4 mg, Oral, Daily   Start Date: July 2, 2025            Changes to Medications        Instructions Start Date   oxyCODONE 5 MG immediate release tablet  Commonly known as: ROXICODONE  What changed:   medication strength  how much to take  when to take this  reasons to take this   5 mg, Oral, Every 6 Hours             Continue These Medications        Instructions Start Date   apixaban 5 MG tablet tablet  Commonly known as: Eliquis   5 mg, Oral, 2 Times Daily      aspirin 81 MG chewable tablet   81 mg, Daily      atorvastatin 40 MG tablet  Commonly known as: LIPITOR   40 mg, Oral, Daily      bisacodyl 5 MG EC tablet  Commonly known as: DULCOLAX   5 mg, Oral, Daily PRN      bisacodyl 10 MG suppository  Commonly known as: DULCOLAX   10 mg, Rectal, Daily PRN      docusate  sodium 100 MG capsule   100 mg, Oral, 2 Times Daily      HYDROcodone-acetaminophen  MG per tablet  Commonly known as: NORCO   1 tablet, Oral, Every 6 Hours PRN      metoprolol succinate XL 25 MG 24 hr tablet  Commonly known as: TOPROL-XL   12.5 mg, Oral, Every 24 Hours Scheduled      midodrine 10 MG tablet  Commonly known as: PROAMATINE   10 mg, Oral, Every 8 Hours      NON FORMULARY   Ketamine-ibuprofen-gabapentin- lidocaine-baclofen cream:  apply a thin layer topically four times daily.      polyethylene glycol 17 g packet  Commonly known as: MIRALAX   17 g, Oral, Daily      MiraLax 17 GM/SCOOP powder  Generic drug: polyethylene glycol   17 g, Daily PRN      pregabalin 200 MG capsule  Commonly known as: LYRICA   200 mg, Oral, Every 8 Hours Scheduled      senna 8.6 MG tablet  Commonly known as: SENOKOT   2 tablets, Every Night at Bedtime      sertraline 100 MG tablet  Commonly known as: ZOLOFT   100 mg, Oral, Every Evening      Synthroid 75 MCG tablet  Generic drug: levothyroxine   75 mcg, Oral, Every Morning               Discharge Diet:   Diet Instructions       Diet: Regular/House Diet; Regular (IDDSI 7); Thin (IDDSI 0)      Discharge Diet: Regular/House Diet    Texture: Regular (IDDSI 7)    Fluid Consistency: Thin (IDDSI 0)            Activity at Discharge:   Activity Instructions       Up WIth Assist              Follow-up Appointments  Future Appointments   Date Time Provider Department Center   7/15/2025  4:00 PM [facility] 4 [facility]   7/16/2025 11:20 AM Navneet Gary DO [facility] PAIN  [facility]   8/20/2025  1:15 PM [place] IV, MD [facility] NEURSUR[facility]   8/28/2025  1:00 PM [facility] [place] DEVICE CHECK [facility]   8/28/2025  1:30 PM Evgeny Gregory MD [facility]     Additional Instructions for the Follow-ups that You Need to Schedule       Discharge Follow-up with PCP   As directed       Currently Documented PCP:    Anish Lew APRN    PCP Phone Number:     [phone]     Follow Up Details: After release from rehab                Test Results Pending at Discharge  Pending Results       None             WOODY Kaufman  07/01/25  16:47 EDT    Time: Discharge 25 min

## 2025-07-01 NOTE — CASE MANAGEMENT/SOCIAL WORK
Continued Stay Note  [facility]     Patient Name: Domonique See  MRN: [id number]  Today's Date: 7/1/2025    Admit Date: 6/27/2025    Plan: Resides at home. From [facility]; accepted to return. Will need transport.  Pharmacy [facility]. No PASRR  needed   Discharge Plan       Row Name 07/01/25 1049       Plan    Plan Comments Barriers: Precert pending to return to [facility]                          Angela OLSON,RN Case Manager  [facility]  Phone: Desk- [phone] cell- [phone]

## 2025-07-01 NOTE — THERAPY TREATMENT NOTE
Subjective: Pt agreeable to therapeutic plan of care.  Cognition: oriented to Person, Place, Time, and Situation and awareness of deficits: good awareness of deficits    Objective:     Precautions - fall risk, cervical collar at all times     Bed Mobility: mod assist of 2 with HOB elevated and bed rail supine to sit    Functional Transfers: max A x2 for sit to stand and stand pivot form bed to chair      Balance: standing balance max A for toileting hygiene    Functional Ambulation: N/A or Not attempted.    Toileting: Max-A  ADL Position: standing   ADL Comments: 2 person assist for standing balance with pt attempting toileting hygiene and max A of one person with assist from OT for hygiene    Lower Body Dressing: Max-A  ADL Position: sitting up in bed  ADL Comments: donning socks, requires A getting socks on and over heels, able to pull up from heels.     Vitals: Hypotensive  Supine 103/59  Sitting /57    Pain: 6 VAS Location: R knee  Interventions for pain: Repositioned and RN notified  Education: Provided education on the importance of mobility in the acute care setting, Verbal/Tactile Cues, ADL training, Transfer Training, Post-Op Precautions, and HEP    Assessment: Domonique See presents with ADL impairments affecting function including balance, endurance / activity tolerance, pain, postural / trunk control, and strength. Pt requires max  x2 for functional transfers out of bed and to chair. Pt with poor standing tolerance and ability to keep hips under body and knees extended to stand with upright posture for functional activities including toileting hygiene. Pt is motivated to improve and work with therapy but needs multiple rest breaks with activity. Demonstrated functioning below baseline abilities indicate the need for continued skilled intervention while inpatient. Tolerating session today without incident. Will continue to follow and progress as tolerated.     Plan/Recommendations:   Moderate  "Intensity Therapy recommended post-acute care. This is recommended as therapy feels the patient would require 3-4 days per week and wouldn't tolerate \"3 hour daily\" rehab intensity. SNF would be the preferred choice. If the patient does not agree to SNF, arrange HH or OP depending on home bound status. If patient is medically complex, consider LTACH.. Pt requires no DME at discharge.     Pt desires Skilled Rehab placement at discharge. Pt cooperative; agreeable to therapeutic recommendations and plan of care.     Modified [place]: N/A = No pre-op stroke/TIA    Post-Tx Position: Up in Chair, Alarms activated, and Call light and personal items within reach  PPE: gloves    Therapy Charges for Today       Code Description Service Date Service Provider Modifiers Qty    70924339231  OT THERAPEUTIC ACT EA 15 MIN 7/1/2025 Fariba Dobbs OT GO 1    [id number]  OT SELF CARE/MGMT/TRAIN EA 15 MIN 7/1/2025 Fariba Dobbs OT GO 1           Time Calculation- OT       Row Name 07/01/25 1514             Time Calculation- OT    OT Start Time 1413  -MANISH      OT Stop Time 1430  -MANISH      OT Time Calculation (min) 17 min  -MANISH      Total Timed Code Minutes- OT 17 minute(s)  -MANISH      OT Received On 07/01/25  -MANISH      OT - Next Appointment 07/02/25  -MANISH                User Key  (r) = Recorded By, (t) = Taken By, (c) = Cosigned By      Initials Name Provider Type    Fariba Barnhart OT Occupational Therapist                    "

## 2025-07-01 NOTE — PLAN OF CARE
Goal Outcome Evaluation:      Pt with all belongings, paperwork, all questions answered, pt wheeled off unit by EMS with no issue.

## 2025-07-01 NOTE — PROGRESS NOTES
LOS: 2 days   Patient Care Team:  Anish Lew APRN as PCP - General  Evgeny Gregory MD as Consulting Physician (Cardiology)  Leeanne Pleitez MD as Consulting Physician (Cardiology)  Radha Owusu APRN as Nurse Practitioner (Nurse Practitioner)  Kathe Muñoz MD as Consulting Physician (Nephrology)    Subjective     Patient states shoulder is feeling improved    Review of Systems   Constitutional:  Positive for activity change.   HENT: Negative.     Respiratory: Negative.     Cardiovascular: Negative.    Gastrointestinal: Negative.    Genitourinary: Negative.    Musculoskeletal:  Positive for gait problem.   Neurological:  Positive for weakness.   Psychiatric/Behavioral: Negative.             Objective     Vital Signs  Temp:  [97.5 °F (36.4 °C)-98.3 °F (36.8 °C)] 98.1 °F (36.7 °C)  Heart Rate:  [60-70] 65  Resp:  [15-18] 15  BP: (108-150)/(51-72) 150/72      Physical Exam  Vitals reviewed.   Constitutional:       Appearance: She is not ill-appearing.   HENT:      Head: Normocephalic and atraumatic.      Right Ear: External ear normal.      Left Ear: External ear normal.      Nose: Nose normal.      Mouth/Throat:      Mouth: Mucous membranes are moist.   Eyes:      General:         Right eye: No discharge.         Left eye: No discharge.   Cardiovascular:      Rate and Rhythm: Normal rate and regular rhythm.      Pulses: Normal pulses.      Heart sounds: Normal heart sounds.   Pulmonary:      Effort: Pulmonary effort is normal.      Breath sounds: Normal breath sounds.   Abdominal:      General: Bowel sounds are normal.      Palpations: Abdomen is soft.   Musculoskeletal:         General: Tenderness present.      Cervical back: Tenderness present.   Skin:     General: Skin is warm.   Neurological:      Mental Status: She is alert and oriented to person, place, and time.   Psychiatric:         Behavior: Behavior normal.              Results Review:    Lab Results (last 24 hours)        ** No results found for the last 24 hours. **             Imaging Results (Last 24 Hours)       ** No results found for the last 24 hours. **                 I reviewed the patient's new clinical results.    Medication Review:   Scheduled Meds:apixaban, 5 mg, Oral, BID  aspirin, 81 mg, Oral, Daily  [Held by provider] atorvastatin, 40 mg, Oral, Nightly  furosemide, 20 mg, Intravenous, Daily  ipratropium-albuterol, 3 mL, Nebulization, 4x Daily - RT  levothyroxine, 75 mcg, Oral, QAM  metoprolol succinate XL, 12.5 mg, Oral, Q24H  midodrine, 10 mg, Oral, Q8H  nitrofurantoin (macrocrystal-monohydrate), 100 mg, Oral, Q12H  oxyCODONE, 5 mg, Oral, Q6H  pregabalin, 200 mg, Oral, Q8H  senna, 2 tablet, Oral, Nightly  sertraline, 100 mg, Oral, Q PM  sodium chloride, 10 mL, Intravenous, Q12H      Continuous Infusions:   PRN Meds:.  senna-docusate sodium **AND** polyethylene glycol **AND** bisacodyl **AND** bisacodyl    Morphine    [DISCONTINUED] HYDROmorphone **AND** naloxone    nitroglycerin    ondansetron ODT **OR** ondansetron    sodium chloride    sodium chloride     Interval History:    Assessment & Plan     Left shoulder pain  Urinary retention due to immobility  UTI  Edema  Cervical neuralgia s/p fusion  Pacemaker  Paroxysmal atrial fibrillation  Cervical radiculopathy  Essential hypertension  Spondylolisthesis, acquired  Obesity (BMI 30.0-34.9)  Hx of hypertrophic cardiomyopathy  Coronary artery disease  Neck pain  Seizure disorder  Encounter for pre-operative cardiovascular clearance  Aortic insufficiency  Mitral regurgitation     Plan of care  -pain control  -change catheter  -UTI growth Enterococcus faecalis Abnormal    -antibiotics adjusted  -adding flomax  -patient with good response to diuretics and 10 # wt loss  -lasix prn for wt gain and swelling  -s/p cervical laminectomy decompression  -c-collar all time  -bowel regimen    -home medications for chronic medical conditions  - resumption of eliquis     DVT  prophylaxis:eliquis        CODE STATUS:  Code status (Patient has no pulse and is not breathing):full  Medical Interventions (Patient has pulse or is breathing):full support  Level of Support Discussed with :patient       Plan for disposition:INPT rehab    WOODY Kaufman  07/01/25  10:53 EDT

## 2025-07-01 NOTE — PLAN OF CARE
Problem: Adult Inpatient Plan of Care  Goal: Plan of Care Review  Outcome: Progressing  Flowsheets (Taken 7/1/2025 0329)  Progress: no change  Plan of Care Reviewed With: patient  Goal: Patient-Specific Goal (Individualized)  Outcome: Progressing  Goal: Absence of Hospital-Acquired Illness or Injury  Outcome: Progressing  Intervention: Identify and Manage Fall Risk  Recent Flowsheet Documentation  Taken 7/1/2025 0200 by Shonda Weinstein RN  Safety Promotion/Fall Prevention: safety round/check completed  Taken 7/1/2025 0000 by Shonda Weinstein RN  Safety Promotion/Fall Prevention: safety round/check completed  Taken 6/30/2025 2200 by Shonda Weinstein RN  Safety Promotion/Fall Prevention: safety round/check completed  Taken 6/30/2025 2000 by Shonda Weinstein RN  Safety Promotion/Fall Prevention: safety round/check completed  Intervention: Prevent Skin Injury  Recent Flowsheet Documentation  Taken 7/1/2025 0000 by Shonda Weinstein RN  Body Position:   turned   position changed independently  Skin Protection: incontinence pads utilized  Taken 6/30/2025 2000 by Shonda Weinstein RN  Body Position: weight shifting  Skin Protection: incontinence pads utilized  Intervention: Prevent and Manage VTE (Venous Thromboembolism) Risk  Recent Flowsheet Documentation  Taken 7/1/2025 0000 by Shonda Weinstein RN  VTE Prevention/Management:   SCDs (sequential compression devices) off   patient refused intervention  Taken 6/30/2025 2000 by Shonda Weinstein RN  VTE Prevention/Management:   SCDs (sequential compression devices) off   patient refused intervention  Intervention: Prevent Infection  Recent Flowsheet Documentation  Taken 7/1/2025 0200 by Shonda Weinstein RN  Infection Prevention: hand hygiene promoted  Taken 7/1/2025 0000 by Shonda Weinstein RN  Infection Prevention: hand hygiene promoted  Taken 6/30/2025 2000 by Shonda Weinstein RN  Infection Prevention: hand hygiene promoted  Goal: Optimal Comfort and Wellbeing  Outcome:  Progressing  Intervention: Provide Person-Centered Care  Recent Flowsheet Documentation  Taken 7/1/2025 0000 by Shonda Weinstein RN  Trust Relationship/Rapport:   care explained   choices provided   emotional support provided   empathic listening provided   questions answered   questions encouraged   reassurance provided   thoughts/feelings acknowledged  Taken 6/30/2025 2000 by Shonda Weinstein RN  Trust Relationship/Rapport:   care explained   choices provided   empathic listening provided   emotional support provided   questions answered   questions encouraged   reassurance provided   thoughts/feelings acknowledged  Goal: Readiness for Transition of Care  Outcome: Progressing     Problem: Skin Injury Risk Increased  Goal: Skin Health and Integrity  Outcome: Progressing  Intervention: Optimize Skin Protection  Recent Flowsheet Documentation  Taken 7/1/2025 0000 by Shonda Weinstein RN  Activity Management: activity encouraged  Pressure Reduction Techniques: frequent weight shift encouraged  Head of Bed (HOB) Positioning:   HOB elevated   HOB at 30 degrees  Pressure Reduction Devices: pressure-redistributing mattress utilized  Skin Protection: incontinence pads utilized  Taken 6/30/2025 2000 by Shonda Weinstein RN  Activity Management: activity encouraged  Pressure Reduction Techniques: frequent weight shift encouraged  Head of Bed (HOB) Positioning:   HOB elevated   HOB at 30 degrees  Pressure Reduction Devices: pressure-redistributing mattress utilized  Skin Protection: incontinence pads utilized  Intervention: Promote and Optimize Oral Intake  Recent Flowsheet Documentation  Taken 7/1/2025 0000 by Shonda Weinstein RN  Oral Nutrition Promotion: rest periods promoted  Taken 6/30/2025 2000 by Shonda Weinstein RN  Oral Nutrition Promotion: rest periods promoted     Problem: Comorbidity Management  Goal: Blood Pressure in Desired Range  Outcome: Progressing  Intervention: Maintain Blood Pressure Management  Recent Flowsheet  Documentation  Taken 7/1/2025 0000 by Shonda Weinstein RN  Medication Review/Management: medications reviewed  Taken 6/30/2025 2000 by Shonda Weinstein RN  Medication Review/Management: medications reviewed     Problem: Fall Injury Risk  Goal: Absence of Fall and Fall-Related Injury  Outcome: Progressing  Intervention: Identify and Manage Contributors  Recent Flowsheet Documentation  Taken 7/1/2025 0000 by Shonda Weinstein RN  Medication Review/Management: medications reviewed  Self-Care Promotion: BADL personal objects within reach  Taken 6/30/2025 2000 by Shonda Weinstein RN  Medication Review/Management: medications reviewed  Self-Care Promotion: BADL personal objects within reach  Intervention: Promote Injury-Free Environment  Recent Flowsheet Documentation  Taken 7/1/2025 0200 by Shonda Weinstein RN  Safety Promotion/Fall Prevention: safety round/check completed  Taken 7/1/2025 0000 by Shonda Weinstein RN  Safety Promotion/Fall Prevention: safety round/check completed  Taken 6/30/2025 2200 by Shonda Weinstein RN  Safety Promotion/Fall Prevention: safety round/check completed  Taken 6/30/2025 2000 by Shonda Weinstein RN  Safety Promotion/Fall Prevention: safety round/check completed     Problem: Pain Acute  Goal: Optimal Pain Control and Function  Outcome: Progressing  Intervention: Optimize Psychosocial Wellbeing  Recent Flowsheet Documentation  Taken 7/1/2025 0000 by Shonda Weinstein RN  Supportive Measures: active listening utilized  Diversional Activities: television  Taken 6/30/2025 2000 by Shonda Weinstein RN  Supportive Measures: active listening utilized  Diversional Activities: television  Intervention: Prevent or Manage Pain  Recent Flowsheet Documentation  Taken 7/1/2025 0000 by Shonda Weinstein RN  Sensory Stimulation Regulation: care clustered  Bowel Elimination Promotion: adequate fluid intake promoted  Sleep/Rest Enhancement: awakenings minimized  Medication Review/Management: medications reviewed  Taken  6/30/2025 2000 by Shonda Weinstein, RN  Sensory Stimulation Regulation: care clustered  Bowel Elimination Promotion: adequate fluid intake promoted  Sleep/Rest Enhancement: awakenings minimized  Medication Review/Management: medications reviewed   Goal Outcome Evaluation:  Plan of Care Reviewed With: patient        Progress: no change

## 2025-07-01 NOTE — PLAN OF CARE
Problem: Adult Inpatient Plan of Care  Goal: Plan of Care Review  Outcome: Progressing  Goal: Patient-Specific Goal (Individualized)  Outcome: Progressing  Goal: Absence of Hospital-Acquired Illness or Injury  Outcome: Progressing  Intervention: Identify and Manage Fall Risk  Recent Flowsheet Documentation  Taken 7/1/2025 1449 by Renetta Bo RN  Safety Promotion/Fall Prevention:   assistive device/personal items within reach   clutter free environment maintained   fall prevention program maintained   gait belt   nonskid shoes/slippers when out of bed   safety round/check completed  Intervention: Prevent Skin Injury  Recent Flowsheet Documentation  Taken 7/1/2025 1449 by Renetta Bo RN  Body Position: weight shifting  Intervention: Prevent Infection  Recent Flowsheet Documentation  Taken 7/1/2025 1449 by Renetta Bo RN  Infection Prevention:   environmental surveillance performed   hand hygiene promoted   single patient room provided  Goal: Optimal Comfort and Wellbeing  Outcome: Progressing  Intervention: Provide Person-Centered Care  Recent Flowsheet Documentation  Taken 7/1/2025 1449 by Renetta Bo RN  Trust Relationship/Rapport:   empathic listening provided   questions answered  Goal: Readiness for Transition of Care  Outcome: Progressing     Problem: Skin Injury Risk Increased  Goal: Skin Health and Integrity  Outcome: Progressing  Intervention: Optimize Skin Protection  Recent Flowsheet Documentation  Taken 7/1/2025 1449 by Renetta Bo RN  Activity Management: activity encouraged  Head of Bed (HOB) Positioning: HOB elevated     Problem: Comorbidity Management  Goal: Blood Pressure in Desired Range  Outcome: Progressing  Intervention: Maintain Blood Pressure Management  Recent Flowsheet Documentation  Taken 7/1/2025 1449 by Renetta Bo RN  Medication Review/Management: medications reviewed     Problem: Fall Injury Risk  Goal: Absence of Fall and Fall-Related  Injury  Outcome: Progressing  Intervention: Identify and Manage Contributors  Recent Flowsheet Documentation  Taken 7/1/2025 1449 by Renetta Bo RN  Medication Review/Management: medications reviewed  Intervention: Promote Injury-Free Environment  Recent Flowsheet Documentation  Taken 7/1/2025 1449 by Renetta Bo, RN  Safety Promotion/Fall Prevention:   assistive device/personal items within reach   clutter free environment maintained   fall prevention program maintained   gait belt   nonskid shoes/slippers when out of bed   safety round/check completed     Problem: Pain Acute  Goal: Optimal Pain Control and Function  Outcome: Progressing  Intervention: Optimize Psychosocial Wellbeing  Recent Flowsheet Documentation  Taken 7/1/2025 1449 by Renetta Bo RN  Diversional Activities:   smartphone   television  Intervention: Prevent or Manage Pain  Recent Flowsheet Documentation  Taken 7/1/2025 1449 by Renetta Bo, RN  Medication Review/Management: medications reviewed   Goal Outcome Evaluation:

## 2025-07-02 NOTE — CASE MANAGEMENT/SOCIAL WORK
Case Management Discharge Note      [facility]                 Selected Continued Care - Prior Encounters Includes continued care and service providers with selected services from prior encounters from 3/29/2025 to 7/1/2025      Discharged on 6/5/2025 Admission date: 5/23/2025 - Discharge disposition: Skilled Nursing Facility (DC - External)      Destination       Service Provider Services Address Phone Fax Patient Preferred    [facility]  [address] [place] [address] [phone] --                                       Transportation Services  Transportation: Ambulance  Ambulance: [facility]  [facility] Ambulance Status: Accepted    Final Discharge Disposition Code: 03 - skilled nursing facility (SNF)

## 2025-07-07 ENCOUNTER — TELEPHONE (OUTPATIENT)
Dept: PAIN MEDICINE | Facility: CLINIC | Age: 85
End: 2025-07-07
Payer: MEDICARE

## 2025-07-07 NOTE — TELEPHONE ENCOUNTER
Caller: NILTON GENAO    Relationship to patient: Emergency Contact    Best call back number: [phone]    Patient is needing: PATIENTS DAUGHTER IN LAW CALLED STATING DR MAST PRESCRIBED A TOPICAL FOR THE PATIENT TO PUT ON FOR PAIN - SHE TOOK THE CREAM TO [facility], WHERE SHE IS CURRENTLY STAYING.   THE FACILITY PUT THE CREAM IN VIALS AND HAVE LOST IT.   THE FAMILY IS WANTING TO KNOW IF DR MAST CAN SEND IN ANOTHER SCRIPT ASAP   PLEASE ADVISE NILTON ON WHAT CAN BE DONE

## 2025-07-15 ENCOUNTER — TELEPHONE (OUTPATIENT)
Dept: NEUROSURGERY | Facility: CLINIC | Age: 85
End: 2025-07-15

## 2025-07-15 ENCOUNTER — HOSPITAL ENCOUNTER (OUTPATIENT)
Dept: CT IMAGING | Facility: HOSPITAL | Age: 85
Discharge: HOME | End: 2025-07-15
Payer: MEDICARE

## 2025-07-15 DIAGNOSIS — Z98.1 S/P SPINAL FUSION: ICD-10-CM

## 2025-07-15 PROCEDURE — 72125 CT NECK SPINE W/O DYE: CPT

## 2025-07-15 NOTE — TELEPHONE ENCOUNTER
Called and LVM for the patient, we need to move her appointment to either earlier in the day. HUB ok to move her to earlier in the day.

## 2025-07-15 NOTE — PROGRESS NOTES
Subjective   Domonique See is a 85 y.o. female is here for follow up for neck pain and lower back pain.  Last seen on 6/11/2025. Patient was admitted to hospital on 6/27/2025 for left shoulder pain along with AMS and urinary retention and A-fib.  Doing better with neck pain since last visit.  Burning pain in bilateral shoulders has improved.  Her main complaint is bilateral knee pain today.  She had been getting bilateral intra-articular knee injections previously with orthopedics with pain improvement lasting up to 6 months.  She is interested in repeating today.    On last visit:     Neck pain is 6/10 on VAS, at maximum is 7/10. Pain is burning in nature. Pain is referred to bilateral trapezius.  The pain is improved by DMITRIY. The pain is worse with nothing in particular.    Lower back pain is 6/10 on VAS, at maximum is 7/10. Pain is sharp, stabbing, burning, aching in nature. Pain is referred to bilateral lower extremities. The pain is constant. The pain is improved by nothing. The pain is worse with walking, standing.    Bilateral knee pain is 6/10 areas, maximum 7/10.  Pain is a dull and achy in nature.  No referred pain.  Worse with walking, standing, weightbearing.    Previous Injection:   4/8/2025-DMITRIY T1-T2- significant pain relief burning pain and 60% pain relief.       Hx: Presented to ED on 3/20/2025 with increased neck pain with upper extremity radiculopathy right greater than left. History of cervical and lumbar decompression and fusion about decade ago with worsening history of neck pain as well as upper extremity pain. Denies any difficulty using her hands or dropping things. Also has worsening balance issues and some feelings of her legs giving out. No bowel or bladder incontinence. Neurosurgery has evaluated patient and recommended DMITRIY. Unfortunately due to her advanced age she is not a good candidate for decompression. Patient is s/p C5-T2 fusion on 5/29/2025.  Here for follow-up due to  pain.  She did pretty well after the surgery and her weakness is starting to improve.  She has been working with therapy at the rehab center.  She was able to stand up to 3 times today with help.  She complains of bilateral shoulder/trapezius pain that is not well-controlled.  Denies any fever, chills.  No new weakness or bowel or bladder incontinence.    PMH:   CHF, CVD, DVT, hokum, pacemaker, hypertension, peptic ulcer, persistent A-fib, lumbar decompression posterior fusion L4-5, ACDF C3-7, s/p C5-T2 fusion on 5/29/2025.     Current Medications:   Norco  mg   Lyrica 75 mg  Zoloft  Aspirin 81 mg  Eliquis 5 mg        Past Medications:     Past Modalities:  TENS:                                                                          no                                                  Physical Therapy Within The Last 6 Months              yes  Psychotherapy                                                            no  Massage Therapy                                                       no     Patient Complains Of:  Uro-Fecal Incontinence          no  Weight Gain/Loss                   no  Fever/Chills                             no  Weakness                               no        PEG Assessment   What number best describes your pain on average in the past week?5  What number best describes how, during the past week, pain has interfered with your enjoyment of life?5  What number best describes how, during the past week, pain has interfered with your general activity?  5`    Current Outpatient Medications:     apixaban (Eliquis) 5 MG tablet tablet, Take 1 tablet by mouth 2 (Two) Times a Day., Disp: 60 tablet, Rfl: 3    aspirin 81 MG chewable tablet, Chew 1 tablet Daily., Disp: , Rfl:     atorvastatin (LIPITOR) 40 MG tablet, Take 1 tablet by mouth Daily., Disp: 30 tablet, Rfl: 1    bisacodyl (DULCOLAX) 10 MG suppository, Insert 1 suppository into the rectum Daily As Needed for Constipation (Use if bisacodyl  oral is ineffective)., Disp: , Rfl:     bisacodyl (DULCOLAX) 5 MG EC tablet, Take 1 tablet by mouth Daily As Needed for Constipation (Use if polyethylene glycol is ineffective)., Disp: , Rfl:     docusate sodium 100 MG capsule, Take 1 capsule by mouth 2 (Two) Times a Day., Disp: 60 capsule, Rfl: 1    furosemide (Lasix) 20 MG tablet, Take 1 tablet by mouth Daily., Disp: 30 tablet, Rfl: 1    HYDROcodone-acetaminophen (NORCO)  MG per tablet, Take 1 tablet by mouth Every 6 (Six) Hours As Needed for Moderate Pain., Disp: 28 tablet, Rfl: 0    metoprolol succinate XL (TOPROL-XL) 25 MG 24 hr tablet, Take 0.5 tablets by mouth Daily., Disp: 15 tablet, Rfl: 1    midodrine (PROAMATINE) 10 MG tablet, Take 1 tablet by mouth Every 8 (Eight) Hours., Disp: 90 tablet, Rfl: 1    NON FORMULARY, Ketamine-ibuprofen-gabapentin- lidocaine-baclofen cream:  apply a thin layer topically four times daily., Disp: , Rfl:     ondansetron ODT (ZOFRAN-ODT) 4 MG disintegrating tablet, Take 1 tablet by mouth Every 6 (Six) Hours As Needed for Nausea or Vomiting., Disp: , Rfl:     oxyCODONE (ROXICODONE) 5 MG immediate release tablet, Take 1 tablet by mouth Every 6 (Six) Hours., Disp: 28 tablet, Rfl: 0    polyethylene glycol (MIRALAX) 17 g packet, Take 17 g by mouth Daily., Disp: 30 packet, Rfl: 3    polyethylene glycol (MiraLax) 17 GM/SCOOP powder, Take 17 g by mouth Daily As Needed., Disp: , Rfl:     potassium chloride (KLOR-CON M10) 10 MEQ CR tablet, Take 1 tablet by mouth 2 (Two) Times a Day., Disp: 60 tablet, Rfl: 1    senna 8.6 MG tablet, Take 2 tablets by mouth every night at bedtime., Disp: , Rfl:     sertraline (ZOLOFT) 100 MG tablet, Take 1 tablet by mouth Every Evening. Indications: Major Depressive Disorder, Disp: 30 tablet, Rfl: 1    Synthroid 75 MCG tablet, Take 1 tablet by mouth Every Morning., Disp: 30 tablet, Rfl: 1    tamsulosin (FLOMAX) 0.4 MG capsule 24 hr capsule, Take 1 capsule by mouth Daily., Disp: , Rfl:     pregabalin  (LYRICA) 200 MG capsule, Take 1 capsule by mouth Every 8 (Eight) Hours for 7 days., Disp: 21 capsule, Rfl: 0    The following portions of the patient's history were reviewed and updated as appropriate: allergies, current medications, past family history, past medical history, past social history, past surgical history, and problem list.      REVIEW OF PERTINENT MEDICAL DATA    Past Medical History:   Diagnosis Date    Anemia     Aortic insufficiency 03/21/2025    Asthma     CHF (congestive heart failure)     Cholecystitis 09/02/2011    Congenital heart disease     Coronary artery disease 2013    Deep vein thrombosis (DVT) 11/20/2017    GERD (gastroesophageal reflux disease)     Heart murmur Don't know    Hx of hypertrophic cardiomyopathy     Hypertension     Hypertrophic obstructive cardiomyopathy 11/10/2017    Low back pain     Lumbosacral disc disease     Mitral regurgitation 03/21/2025    Mixed hyperlipidemia 10/06/2017    Morbid obesity 09/21/2020    Peptic ulceration     Persistent atrial fibrillation 01/10/2023    Primary osteoarthritis of both knees 02/08/2021    Primary osteoarthritis of right knee 09/21/2020    Seizures     Sleep apnea 2017    TIA (transient ischemic attack)      Past Surgical History:   Procedure Laterality Date    ABDOMINAL HERNIA REPAIR      ABLATION OF DYSRHYTHMIC FOCUS  01-    BACK SURGERY      BREAST AUGMENTATION      CARDIAC CATHETERIZATION      CARDIAC ELECTROPHYSIOLOGY PROCEDURE N/A 01/16/2023    Procedure: Ablation atrial fibrillation and flutter, cryo Duong and Ramón aware;  Surgeon: Leeanne Pleitez MD;  Location: [facility] CATH INVASIVE LOCATION;  Service: Cardiovascular;  Laterality: N/A;    CARDIAC PACEMAKER PLACEMENT  03/16/2017    Dual Chamber    CERVICAL LAMINECTOMY DECOMPRESSION POSTERIOR N/A 5/29/2025    Procedure: CERVICAL LAMINECTOMY DECOMPRESSION POSTERIOR C7 - T1, C5-T2 FUSION;  Surgeon: Lake Bernal IV, MD;  Location: [facility] MAIN OR;  Service: Neurosurgery;   Laterality: N/A;    CERVICAL RIB RESECTION Bilateral     CERVICAL RIB RESECTION Bilateral 1963    bilateral cervical ribs removed - extra ribs located and causing pain    CHOLECYSTECTOMY      COLONOSCOPY      CORONARY ARTERY BYPASS GRAFT  2013    EPIDURAL BLOCK      NECK SURGERY      NECK SURGERY  2025    SPINAL FUSION      THORACIC DECOMPRESSION POSTERIOR FUSION  2014    L3-5 & S1    TRIGGER POINT INJECTION      UPPER GASTROINTESTINAL ENDOSCOPY       Family History   Problem Relation Age of Onset    Colon cancer Mother     Heart disease Mother     Hypertension Mother     Arthritis Mother     Cancer Mother     Diabetes Mother     Kidney disease Mother     Colon cancer Brother     Heart attack Brother     Colon cancer Daughter     Arthritis Daughter     Arthritis Father     Hearing loss Father     Arthritis Sister     Hypertension Brother     Arthritis Brother     Cancer Brother     Stroke Brother     Arthritis Son     Arthritis Son     Cancer Sister     Diabetes Sister     Cancer Brother     Hypertension Sister     Kidney disease Sister      Social History     Socioeconomic History    Marital status:    Tobacco Use    Smoking status: Former     Current packs/day: 0.00     Average packs/day: 1 pack/day for 8.0 years (8.0 ttl pk-yrs)     Types: Cigarettes     Start date: 1977     Quit date: 1985     Years since quittin.5     Passive exposure: Past    Smokeless tobacco: Never    Tobacco comments:     Quit    Vaping Use    Vaping status: Never Used   Substance and Sexual Activity    Alcohol use: Never     Comment: 6 drinks a year in social settings    Drug use: Never    Sexual activity: Not Currently     Partners: Male         Review of Systems   Musculoskeletal:  Positive for arthralgias, back pain, neck pain and neck stiffness.         Vitals:    25 1121   BP: 119/63   Pulse: 64   Resp: 16   SpO2: 96%   Weight: 69.9 kg (154 lb)   PainSc: 6            Objective   Physical  Exam  Musculoskeletal:         General: Tenderness present.        Legs:            Imaging Reviewed:  Cervical MRI-3/29/2025  - Changes of C6 corpectomy and ACDF and fusion C3 7  - Moderate discogenic changes at C2-3 and C7-T1  Advanced discogenic changes at T1 to  C2-3 there is severe bilateral facet arthropathy, moderate osteophytes and severe bilateral neuroforaminal stenosis  C3-4-moderate bilateral facet arthropathy, moderate bilateral neuroforaminal stenosis  C4-5-moderate facet arthropathy  C5-C6-moderate bilateral facet arthropathy  C6-7-moderate bilateral facet arthropathy, moderate right and severe left uncovertebral hypertrophy with severe left neuroforaminal stenosis and moderate right  C7-T1-moderate facet arthropathy, ligamentum flavum infolding, anterolisthesis of C7 on T1 causing severe spinal canal stenosis and moderate bilateral neuroforaminal stenosis.     Lumbar MRI without contrast-3/31/2025  - Prior fusion L4-5  - L1-2-moderate disc bulge, moderate facet changes, moderate bilateral neuroforaminal narrowing  L2-3-moderate disc bulge, moderate facet arthritis with ligamentum flavum hypertrophy causing moderate central canal stenosis and moderate bilateral neuroforaminal narrowing  L3-4-moderate disc bulge, moderate facet arthritis causing moderate central canal narrowing and moderate to severe bilateral neuroforaminal narrowing  L4-5-postop changes  L5 S1-moderate disc bulge, moderate facet arthritis with ligamentum flavum hypertrophy causing moderate central canal stenosis.  Moderate bilateral neuroforaminal narrowing.     MRI thoracic spine-3/29/2025  - Mild to moderate multilevel degenerative changes of thoracic spine.     Bilateral knee x-ray-tricompartmental degenerative osteoarthritis of both knees and small joint effusion.  Mild to moderate degenerative changes of hip joints    Assessment:    1. Cervical radiculopathy    2. S/P cervical spinal fusion    3. Primary osteoarthritis of both  knees             Plan:   Recommend continuing physical therapy at home.  Patient is currently on Norco 7.5-325 mg every 6 hours as needed, oxycodone 10 mg every 4 hours as needed, Lyrica 200 mg 3 times daily, Robaxin 750 mg 3 times daily as needed.  I believe her pain is mostly myofascial in nature and postoperative pain.  Discussed with patient that she is on significant amount of pain medications and I would not recommend escalating them further.  Recommend switching Robaxin to Flexeril 10 mg TID PRN.  She can also consider putting lidocaine 4% patches on trapezius muscles to help control the pain.  Advised not to put lidocaine patches on the incision itself.  Patient has pain in the bilateral knee and the Xray shows degenerative changes. Discussed intra articular knee injection. Discussed the possibility of infection, bleeding, nerve damage, headache, increased pain, paraplegia. Patient understands and agrees.     RTC for bilateral knee injections and 4 weeks follow up.    Navneet Gary DO  Pain Management   [facility]       INSPECT REPORT    As part of the patient's treatment plan, I may be prescribing controlled substances. The patient has been made aware of appropriate use of such medications, including potential risk of somnolence, limited ability to drive and/or work safely, and the potential for dependence or overdose. It has also been made clear that these medications are for use by this patient only, without concomitant use of alcohol or other substances unless prescribed.     Patient has completed prescribing agreement detailing terms of continued prescribing of controlled substances, including monitoring INSPECT reports, urine drug screening, and pill counts if necessary. The patient is aware that inappropriate use will results in cessation of prescribing such medications.    INSPECT report has been reviewed and scanned into the patient's chart.

## 2025-07-15 NOTE — TELEPHONE ENCOUNTER
Copied documentation from staff message. Per chart review patient was scheduled by staff.    ----- Message from Carmen Salamanca sent at 6/29/2025  4:08 PM EDT -----  Regarding: FW:  From Trupti, can you have patient follow up with either Pau or Ricardo this week please. Thank you  ----- Message -----  From: Chano Lyles MD  Sent: 6/29/2025   6:24 AM EDT  To: WOODY Santos    Was seen in the hospital over the weekend. Asked to set up routine followup with one of you guys.

## 2025-07-16 ENCOUNTER — OFFICE VISIT (OUTPATIENT)
Dept: PAIN MEDICINE | Facility: CLINIC | Age: 85
End: 2025-07-16
Payer: MEDICARE

## 2025-07-16 VITALS
DIASTOLIC BLOOD PRESSURE: 63 MMHG | SYSTOLIC BLOOD PRESSURE: 119 MMHG | WEIGHT: 154 LBS | BODY MASS INDEX: 30.08 KG/M2 | HEART RATE: 64 BPM | OXYGEN SATURATION: 96 % | RESPIRATION RATE: 16 BRPM

## 2025-07-16 DIAGNOSIS — Z98.1 S/P CERVICAL SPINAL FUSION: ICD-10-CM

## 2025-07-16 DIAGNOSIS — M54.12 CERVICAL RADICULOPATHY: ICD-10-CM

## 2025-07-16 DIAGNOSIS — M17.0 PRIMARY OSTEOARTHRITIS OF BOTH KNEES: Primary | ICD-10-CM

## 2025-07-17 ENCOUNTER — TELEPHONE (OUTPATIENT)
Dept: NEUROSURGERY | Facility: CLINIC | Age: 85
End: 2025-07-17
Payer: MEDICARE

## 2025-07-17 NOTE — TELEPHONE ENCOUNTER
Tried to call patient about her appointment as Provider stated she needs to follow up with  and we needed to move her.  HUB OKAY TO RELAY

## 2025-07-21 NOTE — PROGRESS NOTES
"Subjective   History of Present Illness: Domonique See is a 85 y.o. female is here today for follow-up for neck pain with new Ct. Today patient reports bilateral shoulder blade pain  with burning under her right armpit.  Overall she has seen significant improvement in lower extremity function and neck pain as well as paresthesias and numbness into her upper extremities.  She continues to require wheelchair for most of her mobility though    Chief Complaint   Patient presents with    Neck Pain          Previous treatment:   NSAID'S, Narcotic's, and Rest    Previous neurosurgery:  Cervical laminectomy decompression posterior c7-t1,C5-T2 fusion 5/29/2025    Previous injections: Epidural block-5/21/25    The following portions of the patient's history were reviewed and updated as appropriate: allergies, current medications, past family history, past medical history, past social history, past surgical history, and problem list.    Review of Systems   Constitutional:  Positive for activity change.   HENT: Negative.     Eyes: Negative.    Respiratory: Negative.     Cardiovascular:  Positive for leg swelling (bilateral legs).   Gastrointestinal: Negative.    Endocrine: Negative.    Genitourinary: Negative.    Musculoskeletal:  Positive for arthralgias, myalgias and neck pain (left sided neck pain).   Skin: Negative.    Allergic/Immunologic: Positive for environmental allergies.   Neurological:  Positive for weakness (bilateral legs) and numbness (tingling/right hand).   Hematological: Negative.    Psychiatric/Behavioral:  Positive for sleep disturbance.        Objective      /58 (BP Location: Right arm, Patient Position: Sitting)   Pulse 60   Ht 152.4 cm (60\")   Wt 77.1 kg (170 lb) Comment: Patient stated  LMP  (LMP Unknown)   SpO2 94%   BMI 33.20 kg/m²    Body mass index is 33.2 kg/m².  Vitals:    07/28/25 1054   PainSc: 7          Neurological Exam        Assessment & Plan   Independent Review of " Radiographic Studies:      I personally reviewed and interpreted the images from the following studies.    CT cervical spine: There is some loosening and backout of lateral mass screws unilaterally.  Evidence of developing posterior lateral fusion.  Hardware otherwise intact and in good position    Medical Decision Making:      Domonique See is a 85 y.o. female status post posterior cervical thoracic laminectomy and fusion for spondylolisthesis and severe central stenosis.  Overall the patient is seeing improvement in her preoperative symptoms.  CT scan demonstrates some loosening backout of unilateral lateral mass screws.  Given this and the patient's overall bone quality we will continue her cervical collar.  She should continue with physical therapy.  Will also see about a bone stimulator if it is compatible with her pacemaker.  We will see her back in 3 months to evaluate her progress.      Diagnoses and all orders for this visit:    1. S/P spinal fusion (Primary)      No follow-ups on file.    This patient was examined wearing appropriate personal protective equipment.                      Dr. Lake Bernal IV    07/28/25  11:26 EDT

## 2025-07-28 ENCOUNTER — OFFICE VISIT (OUTPATIENT)
Dept: NEUROSURGERY | Facility: CLINIC | Age: 85
End: 2025-07-28
Payer: MEDICARE

## 2025-07-28 VITALS
HEART RATE: 60 BPM | OXYGEN SATURATION: 94 % | BODY MASS INDEX: 33.38 KG/M2 | HEIGHT: 60 IN | DIASTOLIC BLOOD PRESSURE: 58 MMHG | WEIGHT: 170 LBS | SYSTOLIC BLOOD PRESSURE: 111 MMHG

## 2025-07-28 DIAGNOSIS — Z98.1 S/P SPINAL FUSION: Primary | ICD-10-CM

## 2025-07-28 PROCEDURE — 99024 POSTOP FOLLOW-UP VISIT: CPT | Performed by: NEUROLOGICAL SURGERY

## 2025-07-28 PROCEDURE — 1160F RVW MEDS BY RX/DR IN RCRD: CPT | Performed by: NEUROLOGICAL SURGERY

## 2025-07-28 PROCEDURE — 1159F MED LIST DOCD IN RCRD: CPT | Performed by: NEUROLOGICAL SURGERY

## 2025-07-28 RX ORDER — DICLOFENAC POTASSIUM 50 MG/1
TABLET, FILM COATED ORAL
COMMUNITY
Start: 2025-07-18

## 2025-08-06 ENCOUNTER — HOSPITAL ENCOUNTER (OUTPATIENT)
Dept: PAIN MEDICINE | Facility: HOSPITAL | Age: 85
Discharge: HOME | End: 2025-08-06
Payer: MEDICARE

## 2025-08-06 VITALS
WEIGHT: 170 LBS | HEIGHT: 60 IN | HEART RATE: 81 BPM | RESPIRATION RATE: 16 BRPM | BODY MASS INDEX: 33.38 KG/M2 | SYSTOLIC BLOOD PRESSURE: 130 MMHG | OXYGEN SATURATION: 95 % | DIASTOLIC BLOOD PRESSURE: 60 MMHG | TEMPERATURE: 96.9 F

## 2025-08-06 DIAGNOSIS — R52 PAIN: ICD-10-CM

## 2025-08-06 DIAGNOSIS — M17.0 PRIMARY OSTEOARTHRITIS OF BOTH KNEES: Primary | ICD-10-CM

## 2025-08-06 PROCEDURE — 25010000002 BUPIVACAINE (PF) 0.25 % SOLUTION: Performed by: STUDENT IN AN ORGANIZED HEALTH CARE EDUCATION/TRAINING PROGRAM

## 2025-08-06 PROCEDURE — 76942 ECHO GUIDE FOR BIOPSY: CPT

## 2025-08-06 PROCEDURE — 25010000002 METHYLPREDNISOLONE PER 80 MG: Performed by: STUDENT IN AN ORGANIZED HEALTH CARE EDUCATION/TRAINING PROGRAM

## 2025-08-06 RX ORDER — BUPIVACAINE HYDROCHLORIDE 2.5 MG/ML
10 INJECTION, SOLUTION EPIDURAL; INFILTRATION; INTRACAUDAL; PERINEURAL ONCE
Status: COMPLETED | OUTPATIENT
Start: 2025-08-06 | End: 2025-08-06

## 2025-08-06 RX ORDER — METHYLPREDNISOLONE ACETATE 80 MG/ML
80 INJECTION, SUSPENSION INTRA-ARTICULAR; INTRALESIONAL; INTRAMUSCULAR; SOFT TISSUE ONCE
Status: COMPLETED | OUTPATIENT
Start: 2025-08-06 | End: 2025-08-06

## 2025-08-06 RX ADMIN — BUPIVACAINE HYDROCHLORIDE 10 ML: 2.5 INJECTION, SOLUTION EPIDURAL; INFILTRATION; INTRACAUDAL; PERINEURAL at 12:03

## 2025-08-06 RX ADMIN — METHYLPREDNISOLONE ACETATE 80 MG: 80 INJECTION, SUSPENSION INTRA-ARTICULAR; INTRALESIONAL; INTRAMUSCULAR; SOFT TISSUE at 12:03

## 2025-08-28 ENCOUNTER — CLINICAL SUPPORT NO REQUIREMENTS (OUTPATIENT)
Dept: CARDIOLOGY | Facility: CLINIC | Age: 85
End: 2025-08-28
Payer: MEDICARE

## 2025-08-28 ENCOUNTER — OFFICE VISIT (OUTPATIENT)
Dept: CARDIOLOGY | Facility: CLINIC | Age: 85
End: 2025-08-28
Payer: MEDICARE

## 2025-08-28 VITALS
DIASTOLIC BLOOD PRESSURE: 56 MMHG | BODY MASS INDEX: 33.38 KG/M2 | HEART RATE: 68 BPM | HEIGHT: 60 IN | OXYGEN SATURATION: 97 % | SYSTOLIC BLOOD PRESSURE: 134 MMHG | WEIGHT: 170 LBS

## 2025-08-28 DIAGNOSIS — I48.0 PAROXYSMAL ATRIAL FIBRILLATION: Primary | ICD-10-CM

## 2025-08-28 DIAGNOSIS — Z79.01 LONG TERM (CURRENT) USE OF ANTICOAGULANTS: ICD-10-CM

## 2025-08-28 DIAGNOSIS — E66.9 OBESITY (BMI 30-39.9): ICD-10-CM

## 2025-08-28 DIAGNOSIS — I34.0 NONRHEUMATIC MITRAL VALVE REGURGITATION: ICD-10-CM

## 2025-08-28 DIAGNOSIS — R00.1 BRADYCARDIA, SINUS: ICD-10-CM

## 2025-08-28 DIAGNOSIS — Z95.0 PACEMAKER: Primary | Chronic | ICD-10-CM

## 2025-08-28 DIAGNOSIS — Z95.0 PACEMAKER: ICD-10-CM

## 2025-08-28 DIAGNOSIS — I95.89 CHRONIC HYPOTENSION: ICD-10-CM

## 2025-08-28 DIAGNOSIS — Z86.79: ICD-10-CM

## 2025-08-28 DIAGNOSIS — I35.1 NONRHEUMATIC AORTIC VALVE INSUFFICIENCY: ICD-10-CM

## 2025-08-28 RX ORDER — CYCLOBENZAPRINE HCL 10 MG
10 TABLET ORAL 3 TIMES DAILY PRN
COMMUNITY

## 2025-08-28 RX ORDER — BUMETANIDE 2 MG/1
2 TABLET ORAL DAILY
COMMUNITY

## (undated) DEVICE — TBG PRESS/MONITOR FIX M/F LL A/ 24IN STRL

## (undated) DEVICE — PINNACLE INTRODUCER SHEATH: Brand: PINNACLE

## (undated) DEVICE — Device: Brand: WEBSTER CS

## (undated) DEVICE — GW TRNSEP SAFESEPT LT/ATRIAL RO 135CM .014IN

## (undated) DEVICE — CATH CRYO/ABL ARCTICFRONTADVANCE MAP 12F 28MM

## (undated) DEVICE — Device: Brand: THERMOCOOL SMARTTOUCH SF

## (undated) DEVICE — Device: Brand: CARTO 3

## (undated) DEVICE — CABL CONN CATH EP COAXL UMB 72IN

## (undated) DEVICE — CABL CONN CATH EP UMB 48IN

## (undated) DEVICE — KT PK ANGIOPLASTY ACC 9 MERIT

## (undated) DEVICE — PK TRY HEART CATH 50

## (undated) DEVICE — SHEATH FLXCATH STEER 12FR

## (undated) DEVICE — OCTA,PERSEID,2-2-2-2-2,D-CURVE: Brand: OCTARAY MAPPING CATHETER

## (undated) DEVICE — NDL TRNSEP BRK XS LNG 18G 98CM A/

## (undated) DEVICE — CATH ABL ACHIEVE MP 3.3F20MM 165CM

## (undated) DEVICE — PAD E/S GRND SGL/FOIL 9FT/CORD DISP

## (undated) DEVICE — ELECTRD DEFIB M/FUNC PROPADZ RADIOL 2PK

## (undated) DEVICE — Device: Brand: SMARTABLATE

## (undated) DEVICE — Device: Brand: VIZIGO

## (undated) DEVICE — PROVE COVER: Brand: UNBRANDED

## (undated) DEVICE — ST INTRO PERFORMER W/GW J/TP .038IN 14FR

## (undated) DEVICE — Device: Brand: TORAYGUIDE GUIDEWIRE

## (undated) DEVICE — Device: Brand: SOUNDSTAR

## (undated) DEVICE — Device: Brand: REFERENCE PATCH CARTO 3

## (undated) DEVICE — TBG IV DRIP CHAMBER MACRO SGL 72IN

## (undated) DEVICE — INTERFACE CABLE: Brand: CARTO 3 SYSTEM ECO INTERFACE CABLE